# Patient Record
Sex: FEMALE | Race: OTHER | Employment: UNEMPLOYED | URBAN - METROPOLITAN AREA
[De-identification: names, ages, dates, MRNs, and addresses within clinical notes are randomized per-mention and may not be internally consistent; named-entity substitution may affect disease eponyms.]

---

## 2018-02-26 ENCOUNTER — TELEPHONE (OUTPATIENT)
Dept: BARIATRICS/WEIGHT MGMT | Age: 53
End: 2018-02-26

## 2018-02-26 ENCOUNTER — OFFICE VISIT (OUTPATIENT)
Dept: FAMILY MEDICINE CLINIC | Age: 53
End: 2018-02-26

## 2018-02-26 VITALS
SYSTOLIC BLOOD PRESSURE: 142 MMHG | WEIGHT: 293 LBS | RESPIRATION RATE: 18 BRPM | HEART RATE: 111 BPM | HEIGHT: 69 IN | BODY MASS INDEX: 43.4 KG/M2 | DIASTOLIC BLOOD PRESSURE: 88 MMHG | OXYGEN SATURATION: 95 %

## 2018-02-26 DIAGNOSIS — E11.9 TYPE 2 DIABETES MELLITUS WITHOUT COMPLICATION, WITHOUT LONG-TERM CURRENT USE OF INSULIN (HCC): Primary | ICD-10-CM

## 2018-02-26 DIAGNOSIS — Z12.39 SCREENING FOR BREAST CANCER: ICD-10-CM

## 2018-02-26 DIAGNOSIS — I10 ESSENTIAL HYPERTENSION: ICD-10-CM

## 2018-02-26 DIAGNOSIS — G89.29 CHRONIC BILATERAL LOW BACK PAIN, WITH SCIATICA PRESENCE UNSPECIFIED: ICD-10-CM

## 2018-02-26 DIAGNOSIS — R58 BLEEDING: ICD-10-CM

## 2018-02-26 DIAGNOSIS — M54.5 CHRONIC BILATERAL LOW BACK PAIN, WITH SCIATICA PRESENCE UNSPECIFIED: ICD-10-CM

## 2018-02-26 LAB
BILIRUBIN, POC: NEGATIVE
BLOOD URINE, POC: NORMAL
CLARITY, POC: CLEAR
COLOR, POC: YELLOW
CREATININE URINE POCT: 100
GLUCOSE URINE, POC: NEGATIVE
KETONES, POC: NORMAL
LEUKOCYTE EST, POC: NEGATIVE
MICROALBUMIN/CREAT 24H UR: 10 MG/G{CREAT}
MICROALBUMIN/CREAT UR-RTO: NORMAL
NITRITE, POC: NEGATIVE
PH, POC: 5.5
PROTEIN, POC: NEGATIVE
SPECIFIC GRAVITY, POC: 1.02
UROBILINOGEN, POC: 0.2

## 2018-02-26 PROCEDURE — G8417 CALC BMI ABV UP PARAM F/U: HCPCS | Performed by: FAMILY MEDICINE

## 2018-02-26 PROCEDURE — 81002 URINALYSIS NONAUTO W/O SCOPE: CPT | Performed by: FAMILY MEDICINE

## 2018-02-26 PROCEDURE — 3046F HEMOGLOBIN A1C LEVEL >9.0%: CPT | Performed by: FAMILY MEDICINE

## 2018-02-26 PROCEDURE — 3014F SCREEN MAMMO DOC REV: CPT | Performed by: FAMILY MEDICINE

## 2018-02-26 PROCEDURE — 82044 UR ALBUMIN SEMIQUANTITATIVE: CPT | Performed by: FAMILY MEDICINE

## 2018-02-26 PROCEDURE — G8427 DOCREV CUR MEDS BY ELIG CLIN: HCPCS | Performed by: FAMILY MEDICINE

## 2018-02-26 PROCEDURE — 99204 OFFICE O/P NEW MOD 45 MIN: CPT | Performed by: FAMILY MEDICINE

## 2018-02-26 PROCEDURE — 3017F COLORECTAL CA SCREEN DOC REV: CPT | Performed by: FAMILY MEDICINE

## 2018-02-26 PROCEDURE — 1036F TOBACCO NON-USER: CPT | Performed by: FAMILY MEDICINE

## 2018-02-26 PROCEDURE — G8484 FLU IMMUNIZE NO ADMIN: HCPCS | Performed by: FAMILY MEDICINE

## 2018-02-26 ASSESSMENT — PATIENT HEALTH QUESTIONNAIRE - PHQ9
SUM OF ALL RESPONSES TO PHQ QUESTIONS 1-9: 0
2. FEELING DOWN, DEPRESSED OR HOPELESS: 0
SUM OF ALL RESPONSES TO PHQ9 QUESTIONS 1 & 2: 0
1. LITTLE INTEREST OR PLEASURE IN DOING THINGS: 0

## 2018-02-27 ENCOUNTER — TELEPHONE (OUTPATIENT)
Dept: PAIN MANAGEMENT | Age: 53
End: 2018-02-27

## 2018-02-27 ENCOUNTER — TELEPHONE (OUTPATIENT)
Dept: FAMILY MEDICINE CLINIC | Age: 53
End: 2018-02-27

## 2018-02-27 DIAGNOSIS — G89.29 CHRONIC BILATERAL LOW BACK PAIN WITHOUT SCIATICA: Primary | ICD-10-CM

## 2018-02-27 DIAGNOSIS — M54.50 CHRONIC BILATERAL LOW BACK PAIN WITHOUT SCIATICA: Primary | ICD-10-CM

## 2018-02-27 NOTE — TELEPHONE ENCOUNTER
Pt stated that she is having a mammogram scheduled for 03/1      . She stated that her insurance company is requring a CPT code for 3-D mammogram.  Can you please contact pt with information?

## 2018-02-27 NOTE — TELEPHONE ENCOUNTER
Screening Questionnaire for [address] [facility] -    Referring diagnosis:     1. Do you currently have a [facility] Primary Care Provider:  Yes  2. Name of current PCP: Dr. Naty Lee   3. Name of current Insurance: Isabel Ding    4. Name of last Pain provider: Never seen pain management   5. Last time seen by previous Pain provider: Never seen    6. Do you have old medical records from this Pain provider:  No  7. Last time you had MRI/XRays done for your pain: Over a year ago   8. Do you have the MRI/XRay reports:  No  9. Are you taking any opioids at this time:  No      Please inform patients that they need a CURRENT [facility] provider - check with the referring provider's office to confirm. Please inform patients to bring all previous pain and MRI records (if outside of [facility]). Please inform patients about our [facility] program.    Any unresolved questions, please refer to the Provider for approval.    Approved for Consult:   Yes

## 2018-02-28 LAB
ORGANISM: ABNORMAL
URINE CULTURE, ROUTINE: ABNORMAL
URINE CULTURE, ROUTINE: ABNORMAL

## 2018-03-01 ENCOUNTER — TELEPHONE (OUTPATIENT)
Dept: FAMILY MEDICINE CLINIC | Age: 53
End: 2018-03-01

## 2018-03-06 ENCOUNTER — OFFICE VISIT (OUTPATIENT)
Dept: GYNECOLOGY | Age: 53
End: 2018-03-06

## 2018-03-06 ENCOUNTER — OFFICE VISIT (OUTPATIENT)
Dept: PAIN MANAGEMENT | Age: 53
End: 2018-03-06

## 2018-03-06 VITALS
HEIGHT: 64 IN | TEMPERATURE: 97.5 F | HEART RATE: 63 BPM | DIASTOLIC BLOOD PRESSURE: 83 MMHG | WEIGHT: 293 LBS | SYSTOLIC BLOOD PRESSURE: 135 MMHG | RESPIRATION RATE: 17 BRPM | BODY MASS INDEX: 50.02 KG/M2

## 2018-03-06 VITALS
WEIGHT: 293 LBS | SYSTOLIC BLOOD PRESSURE: 136 MMHG | HEIGHT: 68 IN | HEART RATE: 71 BPM | BODY MASS INDEX: 44.41 KG/M2 | DIASTOLIC BLOOD PRESSURE: 85 MMHG

## 2018-03-06 DIAGNOSIS — M54.16 LUMBAR RADICULOPATHY: ICD-10-CM

## 2018-03-06 DIAGNOSIS — Z01.419 WELL WOMAN EXAM WITH ROUTINE GYNECOLOGICAL EXAM: Primary | ICD-10-CM

## 2018-03-06 DIAGNOSIS — N95.0 POST-MENOPAUSAL BLEEDING: ICD-10-CM

## 2018-03-06 DIAGNOSIS — N63.10 BREAST MASS, RIGHT: ICD-10-CM

## 2018-03-06 DIAGNOSIS — G89.29 OTHER CHRONIC PAIN: ICD-10-CM

## 2018-03-06 DIAGNOSIS — N63.11 MASS OF UPPER OUTER QUADRANT OF RIGHT BREAST: ICD-10-CM

## 2018-03-06 DIAGNOSIS — M96.1 POSTLAMINECTOMY SYNDROME OF LUMBAR REGION: Primary | ICD-10-CM

## 2018-03-06 PROCEDURE — 1036F TOBACCO NON-USER: CPT | Performed by: ANESTHESIOLOGY

## 2018-03-06 PROCEDURE — G8484 FLU IMMUNIZE NO ADMIN: HCPCS | Performed by: ANESTHESIOLOGY

## 2018-03-06 PROCEDURE — G0444 DEPRESSION SCREEN ANNUAL: HCPCS | Performed by: ANESTHESIOLOGY

## 2018-03-06 PROCEDURE — G8427 DOCREV CUR MEDS BY ELIG CLIN: HCPCS | Performed by: ANESTHESIOLOGY

## 2018-03-06 PROCEDURE — 99386 PREV VISIT NEW AGE 40-64: CPT | Performed by: OBSTETRICS & GYNECOLOGY

## 2018-03-06 PROCEDURE — G8417 CALC BMI ABV UP PARAM F/U: HCPCS | Performed by: ANESTHESIOLOGY

## 2018-03-06 PROCEDURE — 3017F COLORECTAL CA SCREEN DOC REV: CPT | Performed by: ANESTHESIOLOGY

## 2018-03-06 PROCEDURE — 99204 OFFICE O/P NEW MOD 45 MIN: CPT | Performed by: ANESTHESIOLOGY

## 2018-03-06 PROCEDURE — 3014F SCREEN MAMMO DOC REV: CPT | Performed by: ANESTHESIOLOGY

## 2018-03-06 RX ORDER — OXYCODONE HYDROCHLORIDE AND ACETAMINOPHEN 5; 325 MG/1; MG/1
1 TABLET ORAL 2 TIMES DAILY PRN
Qty: 15 TABLET | Refills: 0 | Status: SHIPPED | OUTPATIENT
Start: 2018-03-06 | End: 2018-03-27 | Stop reason: SDUPTHER

## 2018-03-06 RX ORDER — GABAPENTIN 300 MG/1
CAPSULE ORAL
Qty: 60 CAPSULE | Refills: 1 | Status: SHIPPED | OUTPATIENT
Start: 2018-03-06 | End: 2018-04-23 | Stop reason: DRUGHIGH

## 2018-03-06 RX ORDER — MELOXICAM 15 MG/1
15 TABLET ORAL DAILY
Qty: 30 TABLET | Refills: 1 | Status: SHIPPED | OUTPATIENT
Start: 2018-03-06 | End: 2018-04-23 | Stop reason: SDUPTHER

## 2018-03-06 ASSESSMENT — ENCOUNTER SYMPTOMS
NAUSEA: 0
EYE REDNESS: 0
SHORTNESS OF BREATH: 0
EYE DISCHARGE: 0
HEARTBURN: 0
CONSTIPATION: 0
COUGH: 0
HEMOPTYSIS: 0
ABDOMINAL PAIN: 0
EYE PAIN: 0
ORTHOPNEA: 0
BACK PAIN: 1
WHEEZING: 0
VOMITING: 0

## 2018-03-06 ASSESSMENT — PATIENT HEALTH QUESTIONNAIRE - PHQ9
SUM OF ALL RESPONSES TO PHQ QUESTIONS 1-9: 27
7. TROUBLE CONCENTRATING ON THINGS, SUCH AS READING THE NEWSPAPER OR WATCHING TELEVISION: 3
8. MOVING OR SPEAKING SO SLOWLY THAT OTHER PEOPLE COULD HAVE NOTICED. OR THE OPPOSITE, BEING SO FIGETY OR RESTLESS THAT YOU HAVE BEEN MOVING AROUND A LOT MORE THAN USUAL: 3
1. LITTLE INTEREST OR PLEASURE IN DOING THINGS: 3
10. IF YOU CHECKED OFF ANY PROBLEMS, HOW DIFFICULT HAVE THESE PROBLEMS MADE IT FOR YOU TO DO YOUR WORK, TAKE CARE OF THINGS AT HOME, OR GET ALONG WITH OTHER PEOPLE: 3
SUM OF ALL RESPONSES TO PHQ9 QUESTIONS 1 & 2: 6
3. TROUBLE FALLING OR STAYING ASLEEP: 3
6. FEELING BAD ABOUT YOURSELF - OR THAT YOU ARE A FAILURE OR HAVE LET YOURSELF OR YOUR FAMILY DOWN: 3
9. THOUGHTS THAT YOU WOULD BE BETTER OFF DEAD, OR OF HURTING YOURSELF: 3
5. POOR APPETITE OR OVEREATING: 3
2. FEELING DOWN, DEPRESSED OR HOPELESS: 3
4. FEELING TIRED OR HAVING LITTLE ENERGY: 3

## 2018-03-06 NOTE — PROGRESS NOTES
Conditions:    Past Medical History:   Diagnosis Date    Hyperlipidemia     Hypertension     Type 2 diabetes mellitus without complication ([facility] [address]        Past Surgical History:   Procedure Laterality Date    BACK SURGERY      2001    BREAST LUMPECTOMY Bilateral     2015       No Known Allergies    Current Outpatient Prescriptions   Medication Sig Dispense Refill    meloxicam (MOBIC) 15 MG tablet Take 1 tablet by mouth daily With food 30 tablet 1    gabapentin (NEURONTIN) 300 MG capsule May take 1 to 2 capsule at night, as tolerated. . 60 capsule 1    Handicap Placard MISC by Does not apply route For 1 year 1 each 0    Scooter MISC by Does not apply route Ambulatory aid - needs new motorized scooter. Earlier one from [address] [place] [address] is broken 1 each 0    oxyCODONE-acetaminophen (PERCOCET) 5-325 MG per tablet Take 1 tablet by mouth 2 times daily as needed (for severe pain) for up to 7 days. 15 tablet 0     No current facility-administered medications for this visit. Family History   Problem Relation Age of Onset    Diabetes Mother     Stroke Mother     Heart Disease Father     Other Father     High Blood Pressure Father     Diabetes Maternal Aunt     Diabetes Maternal Grandmother        Social History     Social History    Marital status: Single     Spouse name: N/A    Number of children: N/A    Years of education: N/A     Occupational History    Not on file. Social History Main Topics    Smoking status: Never Smoker    Smokeless tobacco: Never Used    Alcohol use No    Drug use: No    Sexual activity: Not on file     Other Topics Concern    Not on file     Social History Narrative    No narrative on file   Moved to [place] approximately 1 year ago from [address] [place] [address]. Occupation: None  Currently Employed: No  Any pending lawsuits for pain: No  Disability: Yes    5.  Imaging Studies:   NONE    Results of the above studies were personally reviewed by me with the patient in detail along with the images, if

## 2018-03-06 NOTE — PATIENT INSTRUCTIONS
is a state of focused concentration that makes you less aware of your surroundings. It may cause your brain to release chemicals that relieve pain. You can have a therapist help you through hypnosis. Or you can learn to use it on yourself. · Cognitive behavioral therapy is a type of counseling. It helps you change your thought patterns. Changes in your thoughts can help change your behavior and the way you perceive your body. Other treatments and ideas  · Aromatherapy uses the scent of oils obtained from plants to help you relax or to relieve stress. · Meditation focuses your attention to give a clear awareness of your life. You sit quietly, focus on one image or sound, and breathe deeply. · Yoga uses stretching and exercises (called postures) to reduce stress and improve flexibility and health. · Keep track of your pain in a pain diary. This can help you understand how the things you do affect your pain. · In rare cases, your doctor may advise you to get a nerve block to help with pain. A nerve block is a shot of medicine to interrupt pain signals to your brain. · Some hospitals have special pain clinics or centers. Your doctor may refer you to a pain clinic or center. Or you can ask your doctor about them. Where can you learn more? Go to https://"[employer]. org and sign in to your [employer] account. Enter O778 in the Vocent box to learn more about \"Developing a Pain Management Plan: Care Instructions. \"     If you do not have an account, please click on the \"Sign Up Now\" link. Current as of: October 14, 2016  Content Version: 11.5  © 1270-6132 [employer]. Care instructions adapted under license by [facility] ([facility]). If you have questions about a medical condition or this instruction, always ask your healthcare professional. [place] [address] any warranty or liability for your use of this information.        Patient Education        Safe Use of instruction, always ask your healthcare professional. [place] [address] any warranty or liability for your use of this information.

## 2018-03-08 ENCOUNTER — TELEPHONE (OUTPATIENT)
Dept: BARIATRICS/WEIGHT MGMT | Age: 53
End: 2018-03-08

## 2018-03-08 ENCOUNTER — TELEPHONE (OUTPATIENT)
Dept: PAIN MANAGEMENT | Age: 53
End: 2018-03-08

## 2018-03-08 DIAGNOSIS — M96.1 POSTLAMINECTOMY SYNDROME OF LUMBAR REGION: Primary | ICD-10-CM

## 2018-03-08 RX ORDER — ALPRAZOLAM 1 MG/1
TABLET ORAL
Qty: 2 TABLET | Refills: 0 | Status: SHIPPED | OUTPATIENT
Start: 2018-03-08 | End: 2018-04-08

## 2018-03-08 NOTE — TELEPHONE ENCOUNTER
patient attended seminar, certainly with super obesity and current medical condition, will significantly benefit from weight loss in general, has no social support system in place and is wheel chair bound and thus I feel it will not be safe to proceed with surgical weight loss. She asked if she can be admitted to a nursing home so she can have the help needed , advised her that this is not long term solution. She will f/u with PCP to discuss other weight loss options. Case d/w PCP.

## 2018-03-08 NOTE — TELEPHONE ENCOUNTER
The patient needs an order for Oxygen during her Open MRI, and she also needs a prescription for a relaxing  medication prior to for anxiety.

## 2018-03-09 ENCOUNTER — OFFICE VISIT (OUTPATIENT)
Dept: FAMILY MEDICINE CLINIC | Age: 53
End: 2018-03-09

## 2018-03-09 VITALS
HEIGHT: 64 IN | RESPIRATION RATE: 16 BRPM | BODY MASS INDEX: 50.02 KG/M2 | DIASTOLIC BLOOD PRESSURE: 84 MMHG | OXYGEN SATURATION: 97 % | HEART RATE: 78 BPM | SYSTOLIC BLOOD PRESSURE: 136 MMHG | WEIGHT: 293 LBS

## 2018-03-09 DIAGNOSIS — Z20.2 STD EXPOSURE: ICD-10-CM

## 2018-03-09 DIAGNOSIS — E11.9 TYPE 2 DIABETES MELLITUS WITHOUT COMPLICATION, WITHOUT LONG-TERM CURRENT USE OF INSULIN (HCC): Primary | ICD-10-CM

## 2018-03-09 LAB
ALBUMIN SERPL-MCNC: 4.5 G/DL (ref 3.4–5)
ALP BLD-CCNC: 69 U/L (ref 40–129)
ALT SERPL-CCNC: 11 U/L (ref 10–40)
ANION GAP SERPL CALCULATED.3IONS-SCNC: 15 MMOL/L (ref 3–16)
AST SERPL-CCNC: 11 U/L (ref 15–37)
BILIRUB SERPL-MCNC: 0.3 MG/DL (ref 0–1)
BILIRUBIN DIRECT: <0.2 MG/DL (ref 0–0.3)
BILIRUBIN, INDIRECT: ABNORMAL MG/DL (ref 0–1)
BUN BLDV-MCNC: 16 MG/DL (ref 7–20)
CALCIUM SERPL-MCNC: 8.7 MG/DL (ref 8.3–10.6)
CHLORIDE BLD-SCNC: 101 MMOL/L (ref 99–110)
CHOLESTEROL, TOTAL: 216 MG/DL (ref 0–199)
CO2: 25 MMOL/L (ref 21–32)
CREAT SERPL-MCNC: <0.5 MG/DL (ref 0.6–1.1)
GFR AFRICAN AMERICAN: >60
GFR NON-AFRICAN AMERICAN: >60
GLUCOSE BLD-MCNC: 92 MG/DL (ref 70–99)
HCT VFR BLD CALC: 37.8 % (ref 36–48)
HDLC SERPL-MCNC: 69 MG/DL (ref 40–60)
HEMOGLOBIN: 12.7 G/DL (ref 12–16)
HPV COMMENT: NORMAL
HPV TYPE 16: NOT DETECTED
HPV TYPE 18: NOT DETECTED
HPVOH (OTHER TYPES): NOT DETECTED
LDL CHOLESTEROL CALCULATED: 126 MG/DL
MCH RBC QN AUTO: 27.8 PG (ref 26–34)
MCHC RBC AUTO-ENTMCNC: 33.7 G/DL (ref 31–36)
MCV RBC AUTO: 82.5 FL (ref 80–100)
PDW BLD-RTO: 16.8 % (ref 12.4–15.4)
PLATELET # BLD: 323 K/UL (ref 135–450)
PMV BLD AUTO: 7.9 FL (ref 5–10.5)
POTASSIUM SERPL-SCNC: 4.8 MMOL/L (ref 3.5–5.1)
RBC # BLD: 4.59 M/UL (ref 4–5.2)
SODIUM BLD-SCNC: 141 MMOL/L (ref 136–145)
TOTAL PROTEIN: 7.5 G/DL (ref 6.4–8.2)
TRIGL SERPL-MCNC: 104 MG/DL (ref 0–150)
TSH SERPL DL<=0.05 MIU/L-ACNC: 2.95 UIU/ML (ref 0.27–4.2)
VLDLC SERPL CALC-MCNC: 21 MG/DL
WBC # BLD: 7.4 K/UL (ref 4–11)

## 2018-03-09 PROCEDURE — 99213 OFFICE O/P EST LOW 20 MIN: CPT | Performed by: FAMILY MEDICINE

## 2018-03-09 PROCEDURE — 1036F TOBACCO NON-USER: CPT | Performed by: FAMILY MEDICINE

## 2018-03-09 PROCEDURE — 3044F HG A1C LEVEL LT 7.0%: CPT | Performed by: FAMILY MEDICINE

## 2018-03-09 PROCEDURE — 3014F SCREEN MAMMO DOC REV: CPT | Performed by: FAMILY MEDICINE

## 2018-03-09 PROCEDURE — G8417 CALC BMI ABV UP PARAM F/U: HCPCS | Performed by: FAMILY MEDICINE

## 2018-03-09 PROCEDURE — G8427 DOCREV CUR MEDS BY ELIG CLIN: HCPCS | Performed by: FAMILY MEDICINE

## 2018-03-09 PROCEDURE — G8484 FLU IMMUNIZE NO ADMIN: HCPCS | Performed by: FAMILY MEDICINE

## 2018-03-09 PROCEDURE — 3017F COLORECTAL CA SCREEN DOC REV: CPT | Performed by: FAMILY MEDICINE

## 2018-03-10 LAB
ESTIMATED AVERAGE GLUCOSE: 119.8 MG/DL
HBA1C MFR BLD: 5.8 %
HEPATITIS C ANTIBODY INTERPRETATION: NORMAL

## 2018-03-12 LAB
HIV AG/AB: NORMAL
HIV ANTIGEN: NORMAL
HIV-1 ANTIBODY: NORMAL
HIV-2 AB: NORMAL

## 2018-03-12 ASSESSMENT — ENCOUNTER SYMPTOMS
EYES NEGATIVE: 1
GASTROINTESTINAL NEGATIVE: 1
RESPIRATORY NEGATIVE: 1
BACK PAIN: 1

## 2018-03-13 NOTE — PROGRESS NOTES
Subjective:      Patient ID: Leah Ramires is a 47 y.o. female. Patient is here for annual. Patient with some PMB. Patient states feels a lump in her right breast that she has had. Review of Systems   Constitutional: Negative. HENT: Negative. Eyes: Negative. Respiratory: Negative. Cardiovascular: Negative. Gastrointestinal: Negative. Genitourinary: Positive for vaginal bleeding. Musculoskeletal: Positive for arthralgias and back pain. Skin: Negative. Neurological: Negative. Psychiatric/Behavioral: Negative. Date of Birth 1965  Past Medical History:   Diagnosis Date    Hyperlipidemia     Hypertension     [facility]urop[facility] ([facility] [address]     Type 2 diabetes mellitus without complication ([facility] [address] [facility]      Past Surgical History:   Procedure Laterality Date    BACK SURGERY          BREAST LUMPECTOMY Bilateral          OB History    Para Term  AB Living   2 2 2     2   SAB TAB Ectopic Molar Multiple Live Births                    # Outcome Date GA Lbr Bryan/2nd Weight Sex Delivery Anes PTL Lv   2 Term  37w0d   F Vag-Spont      1 Term  37w0d   F Vag-Spont           Social History     Social History    Marital status: Single     Spouse name: N/A    Number of children: N/A    Years of education: N/A     Occupational History    Not on file. Social History Main Topics    Smoking status: Never Smoker    Smokeless tobacco: Never Used    Alcohol use No    Drug use: No    Sexual activity: No     Other Topics Concern    Not on file     Social History Narrative    No narrative on file     No Known Allergies  Outpatient Prescriptions Marked as Taking for the 3/6/18 encounter (Office Visit) with Joe Benitez MD   Medication Sig Dispense Refill    meloxicam (MOBIC) 15 MG tablet Take 1 tablet by mouth daily With food 30 tablet 1    gabapentin (NEURONTIN) 300 MG capsule May take 1 to 2 capsule at night, as tolerated. . 60 capsule 1    Handicap Placard MISC by Does not apply route For 1 year 1 each 0    Scooter MISC by Does not apply route Ambulatory aid - needs new motorized scooter. Earlier one from [place] is broken 1 each 0    oxyCODONE-acetaminophen (PERCOCET) 5-325 MG per tablet Take 1 tablet by mouth 2 times daily as needed (for severe pain) for up to 7 days. 15 tablet 0     Family History   Problem Relation Age of Onset    Diabetes Mother     Stroke Mother     Heart Disease Father     Other Father     High Blood Pressure Father     Diabetes Maternal Aunt     Diabetes Maternal Grandmother      /83 (Site: Right Arm, Position: Sitting, Cuff Size: Large Adult)   Pulse 63   Temp 97.5 °F (36.4 °C) (Oral)   Resp 17   Ht 5' 4\" (1.626 m)   Wt (!) 411 lb 2 oz (186.5 kg)   LMP 02/19/2018   Breastfeeding? No Comment: last menstrual cycle was 2013  BMI 70.57 kg/m²       Objective:   Physical Exam   Constitutional: She is oriented to person, place, and time. She appears well-developed and well-nourished. No distress. HENT:   Head: Normocephalic and atraumatic. Eyes: EOM are normal. Pupils are equal, round, and reactive to light. Neck: Normal range of motion. Neck supple. No thyromegaly present. Cardiovascular: Normal rate, regular rhythm and normal heart sounds. Exam reveals no gallop and no friction rub. No murmur heard. Pulmonary/Chest: Effort normal and breath sounds normal. No respiratory distress. She has no wheezes. She has no rales. Abdominal: Soft. She exhibits no distension and no mass. There is no hepatomegaly. There is no tenderness. There is no rebound and no guarding. No hernia. Genitourinary: Vagina normal and uterus normal. Rectal exam shows no external hemorrhoid and no fissure. No breast swelling, tenderness, discharge or bleeding. Pelvic exam was performed with patient supine. No labial fusion. There is no rash, tenderness, lesion or injury on the right labia. There is no rash, tenderness, lesion or injury on the left labia.

## 2018-03-14 NOTE — TELEPHONE ENCOUNTER
Pt called and LVM stating that PT needs Dr. Bessie Espino to fax over a Rx for oxygen and how much she needs to be on. She states that [address] [place] [address] told her to have Dr. Bessie Espino fax the Rx for Xanax over to [facility], and needs the Rx faxed to [address] [place] [address] for the [employer].

## 2018-03-19 ENCOUNTER — HOSPITAL ENCOUNTER (OUTPATIENT)
Dept: PHYSICAL THERAPY | Age: 53
Discharge: HOME | End: 2018-03-31
Attending: ANESTHESIOLOGY | Admitting: ANESTHESIOLOGY

## 2018-03-21 ENCOUNTER — TELEPHONE (OUTPATIENT)
Dept: GYNECOLOGY | Age: 53
End: 2018-03-21

## 2018-03-26 ENCOUNTER — TELEPHONE (OUTPATIENT)
Dept: FAMILY MEDICINE CLINIC | Age: 53
End: 2018-03-26

## 2018-03-26 DIAGNOSIS — M54.16 LUMBAR RADICULOPATHY: Primary | ICD-10-CM

## 2018-03-27 ENCOUNTER — TELEPHONE (OUTPATIENT)
Dept: PAIN MANAGEMENT | Age: 53
End: 2018-03-27

## 2018-03-27 DIAGNOSIS — E66.01 MORBID OBESITY (HCC): ICD-10-CM

## 2018-03-27 DIAGNOSIS — M96.1 POSTLAMINECTOMY SYNDROME OF LUMBAR REGION: ICD-10-CM

## 2018-03-27 DIAGNOSIS — M96.1 POSTLAMINECTOMY SYNDROME OF LUMBAR REGION: Primary | ICD-10-CM

## 2018-03-27 DIAGNOSIS — M54.16 LUMBAR RADICULOPATHY: ICD-10-CM

## 2018-03-27 RX ORDER — OXYCODONE HYDROCHLORIDE AND ACETAMINOPHEN 5; 325 MG/1; MG/1
1 TABLET ORAL 2 TIMES DAILY PRN
Qty: 15 TABLET | Refills: 0 | Status: SHIPPED | OUTPATIENT
Start: 2018-03-27 | End: 2018-03-30 | Stop reason: SDUPTHER

## 2018-03-30 ENCOUNTER — TELEPHONE (OUTPATIENT)
Dept: FAMILY MEDICINE CLINIC | Age: 53
End: 2018-03-30

## 2018-03-30 DIAGNOSIS — M54.16 LUMBAR RADICULOPATHY: ICD-10-CM

## 2018-03-30 DIAGNOSIS — M96.1 POSTLAMINECTOMY SYNDROME OF LUMBAR REGION: ICD-10-CM

## 2018-03-30 RX ORDER — OXYCODONE HYDROCHLORIDE AND ACETAMINOPHEN 5; 325 MG/1; MG/1
1 TABLET ORAL 2 TIMES DAILY PRN
Qty: 15 TABLET | Refills: 0 | Status: SHIPPED | OUTPATIENT
Start: 2018-03-30 | End: 2018-04-04 | Stop reason: SDUPTHER

## 2018-04-01 ENCOUNTER — HOSPITAL ENCOUNTER (OUTPATIENT)
Dept: OTHER | Age: 53
Discharge: HOME | End: 2018-04-30
Attending: ANESTHESIOLOGY | Admitting: ANESTHESIOLOGY

## 2018-04-04 DIAGNOSIS — M96.1 POSTLAMINECTOMY SYNDROME OF LUMBAR REGION: ICD-10-CM

## 2018-04-04 DIAGNOSIS — M54.16 LUMBAR RADICULOPATHY: ICD-10-CM

## 2018-04-04 RX ORDER — OXYCODONE HYDROCHLORIDE AND ACETAMINOPHEN 5; 325 MG/1; MG/1
1 TABLET ORAL 2 TIMES DAILY PRN
Qty: 30 TABLET | Refills: 0 | Status: SHIPPED | OUTPATIENT
Start: 2018-04-04 | End: 2018-04-19

## 2018-04-11 ENCOUNTER — TELEPHONE (OUTPATIENT)
Dept: PAIN MANAGEMENT | Age: 53
End: 2018-04-11

## 2018-04-11 DIAGNOSIS — M54.16 LUMBAR RADICULOPATHY: ICD-10-CM

## 2018-04-11 DIAGNOSIS — M96.1 POSTLAMINECTOMY SYNDROME OF LUMBAR REGION: ICD-10-CM

## 2018-04-11 RX ORDER — OXYCODONE HYDROCHLORIDE AND ACETAMINOPHEN 5; 325 MG/1; MG/1
1 TABLET ORAL 2 TIMES DAILY PRN
Qty: 30 TABLET | Refills: 0 | Status: CANCELLED | OUTPATIENT
Start: 2018-04-11 | End: 2018-04-26

## 2018-04-17 ENCOUNTER — OFFICE VISIT (OUTPATIENT)
Dept: GYNECOLOGY | Age: 53
End: 2018-04-17

## 2018-04-17 VITALS
RESPIRATION RATE: 17 BRPM | HEART RATE: 59 BPM | SYSTOLIC BLOOD PRESSURE: 135 MMHG | HEIGHT: 69 IN | DIASTOLIC BLOOD PRESSURE: 83 MMHG | TEMPERATURE: 98 F | BODY MASS INDEX: 43.4 KG/M2 | WEIGHT: 293 LBS

## 2018-04-17 DIAGNOSIS — R35.0 URINARY FREQUENCY: ICD-10-CM

## 2018-04-17 DIAGNOSIS — N95.0 POST-MENOPAUSAL BLEEDING: Primary | ICD-10-CM

## 2018-04-17 PROCEDURE — G8427 DOCREV CUR MEDS BY ELIG CLIN: HCPCS | Performed by: OBSTETRICS & GYNECOLOGY

## 2018-04-17 PROCEDURE — 1036F TOBACCO NON-USER: CPT | Performed by: OBSTETRICS & GYNECOLOGY

## 2018-04-17 PROCEDURE — 3014F SCREEN MAMMO DOC REV: CPT | Performed by: OBSTETRICS & GYNECOLOGY

## 2018-04-17 PROCEDURE — 99213 OFFICE O/P EST LOW 20 MIN: CPT | Performed by: OBSTETRICS & GYNECOLOGY

## 2018-04-17 PROCEDURE — 3017F COLORECTAL CA SCREEN DOC REV: CPT | Performed by: OBSTETRICS & GYNECOLOGY

## 2018-04-17 PROCEDURE — G8417 CALC BMI ABV UP PARAM F/U: HCPCS | Performed by: OBSTETRICS & GYNECOLOGY

## 2018-04-23 ENCOUNTER — OFFICE VISIT (OUTPATIENT)
Dept: INTERNAL MEDICINE CLINIC | Age: 53
End: 2018-04-23

## 2018-04-23 ENCOUNTER — OFFICE VISIT (OUTPATIENT)
Dept: PAIN MANAGEMENT | Age: 53
End: 2018-04-23

## 2018-04-23 VITALS
OXYGEN SATURATION: 90 % | SYSTOLIC BLOOD PRESSURE: 120 MMHG | BODY MASS INDEX: 45.99 KG/M2 | HEART RATE: 59 BPM | HEIGHT: 67 IN | WEIGHT: 293 LBS | TEMPERATURE: 98.3 F | DIASTOLIC BLOOD PRESSURE: 88 MMHG

## 2018-04-23 VITALS
BODY MASS INDEX: 45.99 KG/M2 | WEIGHT: 293 LBS | SYSTOLIC BLOOD PRESSURE: 134 MMHG | DIASTOLIC BLOOD PRESSURE: 85 MMHG | HEART RATE: 63 BPM | HEIGHT: 67 IN

## 2018-04-23 DIAGNOSIS — R30.0 BURNING WITH URINATION: ICD-10-CM

## 2018-04-23 DIAGNOSIS — F11.90 CHRONIC, CONTINUOUS USE OF OPIOIDS: ICD-10-CM

## 2018-04-23 DIAGNOSIS — R31.9 HEMATURIA, UNSPECIFIED TYPE: Primary | ICD-10-CM

## 2018-04-23 DIAGNOSIS — R42 VERTIGO: ICD-10-CM

## 2018-04-23 DIAGNOSIS — R32 URINARY INCONTINENCE, UNSPECIFIED TYPE: ICD-10-CM

## 2018-04-23 DIAGNOSIS — M96.1 POSTLAMINECTOMY SYNDROME OF LUMBAR REGION: Primary | ICD-10-CM

## 2018-04-23 DIAGNOSIS — R10.9 BILATERAL FLANK PAIN: ICD-10-CM

## 2018-04-23 DIAGNOSIS — H91.90 HEARING LOSS, UNSPECIFIED HEARING LOSS TYPE, UNSPECIFIED LATERALITY: ICD-10-CM

## 2018-04-23 DIAGNOSIS — Z13.31 POSITIVE DEPRESSION SCREENING: ICD-10-CM

## 2018-04-23 DIAGNOSIS — G89.29 OTHER CHRONIC PAIN: ICD-10-CM

## 2018-04-23 LAB
ANION GAP SERPL CALCULATED.3IONS-SCNC: 20 MMOL/L (ref 3–16)
BASOPHILS ABSOLUTE: 0 K/UL (ref 0–0.2)
BASOPHILS RELATIVE PERCENT: 0.4 %
BILIRUBIN, POC: ABNORMAL
BLOOD URINE, POC: ABNORMAL
BUN BLDV-MCNC: 19 MG/DL (ref 7–20)
CALCIUM SERPL-MCNC: 8.8 MG/DL (ref 8.3–10.6)
CHLORIDE BLD-SCNC: 102 MMOL/L (ref 99–110)
CLARITY, POC: ABNORMAL
CO2: 20 MMOL/L (ref 21–32)
COLOR, POC: YELLOW
CREAT SERPL-MCNC: <0.5 MG/DL (ref 0.6–1.1)
EOSINOPHILS ABSOLUTE: 0.1 K/UL (ref 0–0.6)
EOSINOPHILS RELATIVE PERCENT: 2 %
GFR AFRICAN AMERICAN: >60
GFR NON-AFRICAN AMERICAN: >60
GLUCOSE BLD-MCNC: 76 MG/DL (ref 70–99)
GLUCOSE URINE, POC: ABNORMAL
HCT VFR BLD CALC: 37.3 % (ref 36–48)
HEMOGLOBIN: 12.3 G/DL (ref 12–16)
KETONES, POC: ABNORMAL
LEUKOCYTE EST, POC: ABNORMAL
LYMPHOCYTES ABSOLUTE: 2 K/UL (ref 1–5.1)
LYMPHOCYTES RELATIVE PERCENT: 31.4 %
MCH RBC QN AUTO: 27.4 PG (ref 26–34)
MCHC RBC AUTO-ENTMCNC: 33 G/DL (ref 31–36)
MCV RBC AUTO: 83 FL (ref 80–100)
MONOCYTES ABSOLUTE: 0.5 K/UL (ref 0–1.3)
MONOCYTES RELATIVE PERCENT: 8.5 %
NEUTROPHILS ABSOLUTE: 3.6 K/UL (ref 1.7–7.7)
NEUTROPHILS RELATIVE PERCENT: 57.7 %
NITRITE, POC: ABNORMAL
PDW BLD-RTO: 16.4 % (ref 12.4–15.4)
PH, POC: 6.5
PLATELET # BLD: 298 K/UL (ref 135–450)
PMV BLD AUTO: 8.1 FL (ref 5–10.5)
POTASSIUM SERPL-SCNC: 5.1 MMOL/L (ref 3.5–5.1)
PROTEIN, POC: ABNORMAL
RBC # BLD: 4.5 M/UL (ref 4–5.2)
SODIUM BLD-SCNC: 142 MMOL/L (ref 136–145)
SPECIFIC GRAVITY, POC: 1.02
UROBILINOGEN, POC: 0.2
WBC # BLD: 6.2 K/UL (ref 4–11)

## 2018-04-23 PROCEDURE — 3017F COLORECTAL CA SCREEN DOC REV: CPT | Performed by: INTERNAL MEDICINE

## 2018-04-23 PROCEDURE — G8417 CALC BMI ABV UP PARAM F/U: HCPCS | Performed by: ANESTHESIOLOGY

## 2018-04-23 PROCEDURE — 3014F SCREEN MAMMO DOC REV: CPT | Performed by: INTERNAL MEDICINE

## 2018-04-23 PROCEDURE — 3017F COLORECTAL CA SCREEN DOC REV: CPT | Performed by: ANESTHESIOLOGY

## 2018-04-23 PROCEDURE — 1036F TOBACCO NON-USER: CPT | Performed by: INTERNAL MEDICINE

## 2018-04-23 PROCEDURE — 99214 OFFICE O/P EST MOD 30 MIN: CPT | Performed by: ANESTHESIOLOGY

## 2018-04-23 PROCEDURE — 81002 URINALYSIS NONAUTO W/O SCOPE: CPT | Performed by: INTERNAL MEDICINE

## 2018-04-23 PROCEDURE — 1036F TOBACCO NON-USER: CPT | Performed by: ANESTHESIOLOGY

## 2018-04-23 PROCEDURE — 3014F SCREEN MAMMO DOC REV: CPT | Performed by: ANESTHESIOLOGY

## 2018-04-23 PROCEDURE — G8417 CALC BMI ABV UP PARAM F/U: HCPCS | Performed by: INTERNAL MEDICINE

## 2018-04-23 PROCEDURE — G8427 DOCREV CUR MEDS BY ELIG CLIN: HCPCS | Performed by: INTERNAL MEDICINE

## 2018-04-23 PROCEDURE — G8427 DOCREV CUR MEDS BY ELIG CLIN: HCPCS | Performed by: ANESTHESIOLOGY

## 2018-04-23 PROCEDURE — 99214 OFFICE O/P EST MOD 30 MIN: CPT | Performed by: INTERNAL MEDICINE

## 2018-04-23 RX ORDER — MECLIZINE HCL 12.5 MG/1
12.5 TABLET ORAL 3 TIMES DAILY PRN
Qty: 15 TABLET | Refills: 0 | Status: SHIPPED | OUTPATIENT
Start: 2018-04-23 | End: 2019-01-28

## 2018-04-23 RX ORDER — MELOXICAM 15 MG/1
15 TABLET ORAL DAILY
Qty: 30 TABLET | Refills: 1 | Status: SHIPPED | OUTPATIENT
Start: 2018-04-23 | End: 2018-06-22 | Stop reason: SDUPTHER

## 2018-04-23 RX ORDER — OXYCODONE HYDROCHLORIDE AND ACETAMINOPHEN 5; 325 MG/1; MG/1
1 TABLET ORAL 2 TIMES DAILY PRN
Qty: 60 TABLET | Refills: 0 | Status: SHIPPED | OUTPATIENT
Start: 2018-04-23 | End: 2018-05-22 | Stop reason: SDUPTHER

## 2018-04-23 RX ORDER — GABAPENTIN 300 MG/1
300 CAPSULE ORAL 3 TIMES DAILY
Qty: 90 CAPSULE | Refills: 1 | Status: SHIPPED | OUTPATIENT
Start: 2018-04-23 | End: 2018-05-14 | Stop reason: SDUPTHER

## 2018-04-23 RX ORDER — OXYCODONE HYDROCHLORIDE AND ACETAMINOPHEN 5; 325 MG/1; MG/1
1 TABLET ORAL 2 TIMES DAILY PRN
Qty: 30 TABLET | Refills: 0 | Status: SHIPPED | OUTPATIENT
Start: 2018-04-23 | End: 2018-04-23 | Stop reason: DRUGHIGH

## 2018-04-23 RX ORDER — OXYCODONE AND ACETAMINOPHEN 10; 325 MG/1; MG/1
1 TABLET ORAL EVERY 4 HOURS PRN
COMMUNITY
End: 2018-04-23 | Stop reason: ALTCHOICE

## 2018-04-23 ASSESSMENT — ENCOUNTER SYMPTOMS
HEARTBURN: 0
COUGH: 0
WHEEZING: 0
BACK PAIN: 1
ABDOMINAL PAIN: 0
EYE DISCHARGE: 0
EYE REDNESS: 0
NAUSEA: 0
HEMOPTYSIS: 0
VOMITING: 0
SHORTNESS OF BREATH: 0
CONSTIPATION: 0
ORTHOPNEA: 0
EYE PAIN: 0

## 2018-04-25 LAB — URINE CULTURE, ROUTINE: NORMAL

## 2018-04-27 LAB
6-ACETYLMORPHINE: NOT DETECTED
7-AMINOCLONAZEPAM: NOT DETECTED
ALPHA-OH-ALPRAZOLAM: NOT DETECTED
ALPRAZOLAM: NOT DETECTED
AMPHETAMINE: NOT DETECTED
BARBITURATES: NOT DETECTED
BENZOYLECGONINE: NOT DETECTED
BUPRENORPHINE: NOT DETECTED
CARISOPRODOL: NOT DETECTED
CLONAZEPAM: NOT DETECTED
CODEINE: NOT DETECTED
CREATININE URINE: 199.3 MG/DL (ref 20–400)
DIAZEPAM: NOT DETECTED
DRUGS EXPECTED: NORMAL
EER PAIN MGT DRUG PANEL, HIGH RES/EMIT U: NORMAL
ETHYL GLUCURONIDE: NOT DETECTED
FENTANYL: NOT DETECTED
HYDROCODONE: NOT DETECTED
HYDROMORPHONE: NOT DETECTED
LORAZEPAM: NOT DETECTED
MARIJUANA METABOLITE: NOT DETECTED
MDA: NOT DETECTED
MDEA: NOT DETECTED
MDMA URINE: NOT DETECTED
MEPERIDINE: NOT DETECTED
METHADONE: NOT DETECTED
METHAMPHETAMINE: NOT DETECTED
METHYLPHENIDATE: NOT DETECTED
MIDAZOLAM: NOT DETECTED
MORPHINE: NOT DETECTED
NORBUPRENORPHINE, FREE: NOT DETECTED
NORDIAZEPAM: NOT DETECTED
NORFENTANYL: NOT DETECTED
NORHYDROCODONE, URINE: NOT DETECTED
NOROXYCODONE: PRESENT
NOROXYMORPHONE, URINE: PRESENT
OXAZEPAM: NOT DETECTED
OXYCODONE: PRESENT
OXYMORPHONE: PRESENT
PAIN MANAGEMENT DRUG PANEL: NORMAL
PAIN MANAGEMENT DRUG PANEL: NORMAL
PCP: NOT DETECTED
PHENTERMINE: NOT DETECTED
PROPOXYPHENE: NOT DETECTED
TAPENTADOL, URINE: NOT DETECTED
TAPENTADOL-O-SULFATE, URINE: NOT DETECTED
TEMAZEPAM: NOT DETECTED
TRAMADOL: NOT DETECTED
ZOLPIDEM: NOT DETECTED

## 2018-04-28 ASSESSMENT — ENCOUNTER SYMPTOMS
BACK PAIN: 0
SINUS PRESSURE: 0
CONSTIPATION: 0
COUGH: 0
CHEST TIGHTNESS: 0
SORE THROAT: 0
WHEEZING: 0
DIARRHEA: 0
NAUSEA: 0
SHORTNESS OF BREATH: 0
BLOOD IN STOOL: 0
RHINORRHEA: 0
ABDOMINAL PAIN: 0
VOMITING: 0

## 2018-05-01 ENCOUNTER — HOSPITAL ENCOUNTER (OUTPATIENT)
Dept: OTHER | Age: 53
Discharge: HOME | End: 2018-05-31
Attending: ANESTHESIOLOGY | Admitting: ANESTHESIOLOGY

## 2018-05-03 ENCOUNTER — OFFICE VISIT (OUTPATIENT)
Dept: INTERNAL MEDICINE CLINIC | Age: 53
End: 2018-05-03

## 2018-05-03 ENCOUNTER — OFFICE VISIT (OUTPATIENT)
Dept: SLEEP MEDICINE | Age: 53
End: 2018-05-03

## 2018-05-03 VITALS
SYSTOLIC BLOOD PRESSURE: 118 MMHG | DIASTOLIC BLOOD PRESSURE: 70 MMHG | HEART RATE: 78 BPM | OXYGEN SATURATION: 98 % | HEIGHT: 67 IN

## 2018-05-03 VITALS
SYSTOLIC BLOOD PRESSURE: 118 MMHG | OXYGEN SATURATION: 97 % | BODY MASS INDEX: 45.99 KG/M2 | RESPIRATION RATE: 16 BRPM | HEART RATE: 71 BPM | HEIGHT: 67 IN | WEIGHT: 293 LBS | TEMPERATURE: 97.3 F | DIASTOLIC BLOOD PRESSURE: 62 MMHG

## 2018-05-03 DIAGNOSIS — G47.33 OSA (OBSTRUCTIVE SLEEP APNEA): Primary | ICD-10-CM

## 2018-05-03 DIAGNOSIS — Z12.11 COLON CANCER SCREENING: ICD-10-CM

## 2018-05-03 DIAGNOSIS — M25.511 RIGHT SHOULDER PAIN, UNSPECIFIED CHRONICITY: ICD-10-CM

## 2018-05-03 DIAGNOSIS — E11.42 DIABETIC POLYNEUROPATHY ASSOCIATED WITH TYPE 2 DIABETES MELLITUS (HCC): ICD-10-CM

## 2018-05-03 DIAGNOSIS — M54.2 NECK PAIN: Primary | ICD-10-CM

## 2018-05-03 DIAGNOSIS — Z23 NEED FOR STREPTOCOCCUS PNEUMONIAE VACCINATION: ICD-10-CM

## 2018-05-03 DIAGNOSIS — R07.9 CHEST PAIN, UNSPECIFIED TYPE: ICD-10-CM

## 2018-05-03 PROCEDURE — G0009 ADMIN PNEUMOCOCCAL VACCINE: HCPCS | Performed by: INTERNAL MEDICINE

## 2018-05-03 PROCEDURE — 99204 OFFICE O/P NEW MOD 45 MIN: CPT | Performed by: PSYCHIATRY & NEUROLOGY

## 2018-05-03 PROCEDURE — 3014F SCREEN MAMMO DOC REV: CPT | Performed by: PSYCHIATRY & NEUROLOGY

## 2018-05-03 PROCEDURE — 1036F TOBACCO NON-USER: CPT | Performed by: INTERNAL MEDICINE

## 2018-05-03 PROCEDURE — 3017F COLORECTAL CA SCREEN DOC REV: CPT | Performed by: PSYCHIATRY & NEUROLOGY

## 2018-05-03 PROCEDURE — G8427 DOCREV CUR MEDS BY ELIG CLIN: HCPCS | Performed by: INTERNAL MEDICINE

## 2018-05-03 PROCEDURE — 99214 OFFICE O/P EST MOD 30 MIN: CPT | Performed by: INTERNAL MEDICINE

## 2018-05-03 PROCEDURE — G8417 CALC BMI ABV UP PARAM F/U: HCPCS | Performed by: PSYCHIATRY & NEUROLOGY

## 2018-05-03 PROCEDURE — 1036F TOBACCO NON-USER: CPT | Performed by: PSYCHIATRY & NEUROLOGY

## 2018-05-03 PROCEDURE — 3014F SCREEN MAMMO DOC REV: CPT | Performed by: INTERNAL MEDICINE

## 2018-05-03 PROCEDURE — G8427 DOCREV CUR MEDS BY ELIG CLIN: HCPCS | Performed by: PSYCHIATRY & NEUROLOGY

## 2018-05-03 PROCEDURE — 90732 PPSV23 VACC 2 YRS+ SUBQ/IM: CPT | Performed by: INTERNAL MEDICINE

## 2018-05-03 PROCEDURE — G8417 CALC BMI ABV UP PARAM F/U: HCPCS | Performed by: INTERNAL MEDICINE

## 2018-05-03 PROCEDURE — 3017F COLORECTAL CA SCREEN DOC REV: CPT | Performed by: INTERNAL MEDICINE

## 2018-05-03 PROCEDURE — 3044F HG A1C LEVEL LT 7.0%: CPT | Performed by: INTERNAL MEDICINE

## 2018-05-03 PROCEDURE — 2022F DILAT RTA XM EVC RTNOPTHY: CPT | Performed by: INTERNAL MEDICINE

## 2018-05-03 RX ORDER — TIZANIDINE 2 MG/1
2 TABLET ORAL EVERY 8 HOURS PRN
Qty: 30 TABLET | Refills: 0 | Status: SHIPPED | OUTPATIENT
Start: 2018-05-03 | End: 2018-06-22 | Stop reason: SDUPTHER

## 2018-05-03 ASSESSMENT — ENCOUNTER SYMPTOMS
EYES NEGATIVE: 1
APNEA: 1
CHOKING: 1
ALLERGIC/IMMUNOLOGIC NEGATIVE: 1
GASTROINTESTINAL NEGATIVE: 1

## 2018-05-03 ASSESSMENT — SLEEP AND FATIGUE QUESTIONNAIRES
HOW LIKELY ARE YOU TO NOD OFF OR FALL ASLEEP WHILE SITTING QUIETLY AFTER LUNCH WITHOUT ALCOHOL: 3
HOW LIKELY ARE YOU TO NOD OFF OR FALL ASLEEP WHILE SITTING INACTIVE IN A PUBLIC PLACE: 3
HOW LIKELY ARE YOU TO NOD OFF OR FALL ASLEEP IN A CAR, WHILE STOPPED FOR A FEW MINUTES IN TRAFFIC: 0
HOW LIKELY ARE YOU TO NOD OFF OR FALL ASLEEP WHILE SITTING AND READING: 3
HOW LIKELY ARE YOU TO NOD OFF OR FALL ASLEEP WHEN YOU ARE A PASSENGER IN A CAR FOR AN HOUR WITHOUT A BREAK: 3
ESS TOTAL SCORE: 18
HOW LIKELY ARE YOU TO NOD OFF OR FALL ASLEEP WHILE WATCHING TV: 3
HOW LIKELY ARE YOU TO NOD OFF OR FALL ASLEEP WHILE SITTING AND TALKING TO SOMEONE: 0
HOW LIKELY ARE YOU TO NOD OFF OR FALL ASLEEP WHILE LYING DOWN TO REST IN THE AFTERNOON WHEN CIRCUMSTANCES PERMIT: 3
NECK CIRCUMFERENCE (INCHES): 18

## 2018-05-04 ENCOUNTER — TELEPHONE (OUTPATIENT)
Dept: PULMONOLOGY | Age: 53
End: 2018-05-04

## 2018-05-07 ENCOUNTER — TELEPHONE (OUTPATIENT)
Dept: FAMILY MEDICINE CLINIC | Age: 53
End: 2018-05-07

## 2018-05-11 ENCOUNTER — TELEPHONE (OUTPATIENT)
Dept: PULMONOLOGY | Age: 53
End: 2018-05-11

## 2018-05-14 DIAGNOSIS — G89.29 OTHER CHRONIC PAIN: ICD-10-CM

## 2018-05-14 DIAGNOSIS — M96.1 POSTLAMINECTOMY SYNDROME OF LUMBAR REGION: ICD-10-CM

## 2018-05-14 RX ORDER — GABAPENTIN 300 MG/1
300 CAPSULE ORAL 3 TIMES DAILY
Qty: 90 CAPSULE | Refills: 1 | Status: SHIPPED | OUTPATIENT
Start: 2018-05-14 | End: 2018-07-18 | Stop reason: SDUPTHER

## 2018-05-16 ENCOUNTER — TELEPHONE (OUTPATIENT)
Dept: INTERNAL MEDICINE CLINIC | Age: 53
End: 2018-05-16

## 2018-05-16 NOTE — TELEPHONE ENCOUNTER
Patient just got a walker with wheels and a seat. She uses that in the home. Her scooter has been broken and patient requested a new one and it is being denied because of the walker.  She needs the scooter for long distances when she goes out due to back pain  Physical Therapy at  [facility] is calling for patient  cb [phone] Graeme Duty

## 2018-05-21 NOTE — TELEPHONE ENCOUNTER
Pain staff -  We already ordered motorized scooter for her. Please find out how we could be of help. Thanks.

## 2018-05-22 DIAGNOSIS — M96.1 POSTLAMINECTOMY SYNDROME OF LUMBAR REGION: ICD-10-CM

## 2018-05-22 RX ORDER — OXYCODONE HYDROCHLORIDE AND ACETAMINOPHEN 5; 325 MG/1; MG/1
1 TABLET ORAL 2 TIMES DAILY PRN
Qty: 60 TABLET | Refills: 0 | Status: SHIPPED | OUTPATIENT
Start: 2018-05-22 | End: 2018-06-22 | Stop reason: SDUPTHER

## 2018-05-23 ENCOUNTER — HOSPITAL ENCOUNTER (OUTPATIENT)
Dept: CT IMAGING | Age: 53
Discharge: HOME | End: 2018-05-23
Attending: INTERNAL MEDICINE | Admitting: INTERNAL MEDICINE

## 2018-05-23 ENCOUNTER — OFFICE VISIT (OUTPATIENT)
Dept: ENT CLINIC | Age: 53
End: 2018-05-23

## 2018-05-23 VITALS — DIASTOLIC BLOOD PRESSURE: 74 MMHG | HEART RATE: 69 BPM | TEMPERATURE: 98 F | SYSTOLIC BLOOD PRESSURE: 129 MMHG

## 2018-05-23 DIAGNOSIS — H90.3 SENSORINEURAL HEARING LOSS, BILATERAL: Primary | ICD-10-CM

## 2018-05-23 DIAGNOSIS — R31.9 HEMATURIA, UNSPECIFIED TYPE: ICD-10-CM

## 2018-05-23 DIAGNOSIS — R10.9 BILATERAL FLANK PAIN: ICD-10-CM

## 2018-05-23 DIAGNOSIS — R31.9 HEMATURIA: ICD-10-CM

## 2018-05-23 DIAGNOSIS — K21.9 LARYNGOPHARYNGEAL REFLUX (LPR): ICD-10-CM

## 2018-05-23 DIAGNOSIS — R09.89 GLOBUS PHARYNGEUS: ICD-10-CM

## 2018-05-23 PROCEDURE — G8427 DOCREV CUR MEDS BY ELIG CLIN: HCPCS | Performed by: OTOLARYNGOLOGY

## 2018-05-23 PROCEDURE — G8417 CALC BMI ABV UP PARAM F/U: HCPCS | Performed by: OTOLARYNGOLOGY

## 2018-05-23 PROCEDURE — 3014F SCREEN MAMMO DOC REV: CPT | Performed by: OTOLARYNGOLOGY

## 2018-05-23 PROCEDURE — 3017F COLORECTAL CA SCREEN DOC REV: CPT | Performed by: OTOLARYNGOLOGY

## 2018-05-23 PROCEDURE — 99204 OFFICE O/P NEW MOD 45 MIN: CPT | Performed by: OTOLARYNGOLOGY

## 2018-05-23 RX ORDER — OMEPRAZOLE 40 MG/1
40 CAPSULE, DELAYED RELEASE ORAL DAILY
Qty: 30 CAPSULE | Refills: 3 | Status: SHIPPED | OUTPATIENT
Start: 2018-05-23 | End: 2018-09-24 | Stop reason: SDUPTHER

## 2018-05-23 NOTE — TELEPHONE ENCOUNTER
Dr. Emily Love, here is information on the order for the pt motorized scooter. The patient has an order for a motorized chair. [facility] where she originally ordered it could not accomodates her weight limit.  The patient was told she needs to find a place to take the order that can accomodate  her needs. (Routing comment)     Pain staff -  We already ordered motorized scooter for her. Please find out how we could be of help. Thanks.

## 2018-05-24 ENCOUNTER — TELEPHONE (OUTPATIENT)
Dept: ENT CLINIC | Age: 53
End: 2018-05-24

## 2018-06-01 ENCOUNTER — HOSPITAL ENCOUNTER (OUTPATIENT)
Dept: OTHER | Age: 53
Discharge: HOME | End: 2018-06-30
Attending: ANESTHESIOLOGY | Admitting: ANESTHESIOLOGY

## 2018-06-05 ENCOUNTER — OFFICE VISIT (OUTPATIENT)
Dept: AUDIOLOGY | Age: 53
End: 2018-06-05

## 2018-06-05 ENCOUNTER — TELEPHONE (OUTPATIENT)
Dept: INTERNAL MEDICINE CLINIC | Age: 53
End: 2018-06-05

## 2018-06-05 DIAGNOSIS — H90.3 SENSORY HEARING LOSS, BILATERAL: Primary | ICD-10-CM

## 2018-06-05 PROCEDURE — 92550 TYMPANOMETRY & REFLEX THRESH: CPT | Performed by: AUDIOLOGIST

## 2018-06-05 PROCEDURE — 92557 COMPREHENSIVE HEARING TEST: CPT | Performed by: AUDIOLOGIST

## 2018-06-08 ENCOUNTER — HOSPITAL ENCOUNTER (OUTPATIENT)
Dept: OTHER | Age: 53
Discharge: HOME | End: 2018-06-08
Attending: PSYCHIATRY & NEUROLOGY | Admitting: PSYCHIATRY & NEUROLOGY

## 2018-06-08 DIAGNOSIS — G47.33 OSA (OBSTRUCTIVE SLEEP APNEA): ICD-10-CM

## 2018-06-11 PROCEDURE — 95811 POLYSOM 6/>YRS CPAP 4/> PARM: CPT | Performed by: PSYCHIATRY & NEUROLOGY

## 2018-06-13 ENCOUNTER — OFFICE VISIT (OUTPATIENT)
Dept: ENT CLINIC | Age: 53
End: 2018-06-13

## 2018-06-13 VITALS
WEIGHT: 293 LBS | DIASTOLIC BLOOD PRESSURE: 82 MMHG | SYSTOLIC BLOOD PRESSURE: 117 MMHG | BODY MASS INDEX: 44.41 KG/M2 | HEIGHT: 68 IN

## 2018-06-13 DIAGNOSIS — K21.9 LARYNGOPHARYNGEAL REFLUX (LPR): ICD-10-CM

## 2018-06-13 DIAGNOSIS — H90.3 SENSORINEURAL HEARING LOSS, BILATERAL: ICD-10-CM

## 2018-06-13 DIAGNOSIS — R09.89 GLOBUS PHARYNGEUS: Primary | ICD-10-CM

## 2018-06-13 PROCEDURE — G8417 CALC BMI ABV UP PARAM F/U: HCPCS | Performed by: OTOLARYNGOLOGY

## 2018-06-13 PROCEDURE — 99213 OFFICE O/P EST LOW 20 MIN: CPT | Performed by: OTOLARYNGOLOGY

## 2018-06-13 PROCEDURE — 3014F SCREEN MAMMO DOC REV: CPT | Performed by: OTOLARYNGOLOGY

## 2018-06-13 PROCEDURE — 3017F COLORECTAL CA SCREEN DOC REV: CPT | Performed by: OTOLARYNGOLOGY

## 2018-06-13 PROCEDURE — 1036F TOBACCO NON-USER: CPT | Performed by: OTOLARYNGOLOGY

## 2018-06-13 PROCEDURE — G8427 DOCREV CUR MEDS BY ELIG CLIN: HCPCS | Performed by: OTOLARYNGOLOGY

## 2018-06-13 RX ORDER — RANITIDINE 150 MG/1
150 TABLET ORAL 2 TIMES DAILY
Qty: 60 TABLET | Refills: 3 | Status: SHIPPED | OUTPATIENT
Start: 2018-06-13 | End: 2020-01-09

## 2018-06-14 ENCOUNTER — TELEPHONE (OUTPATIENT)
Dept: PULMONOLOGY | Age: 53
End: 2018-06-14

## 2018-06-19 ENCOUNTER — OFFICE VISIT (OUTPATIENT)
Dept: INTERNAL MEDICINE CLINIC | Age: 53
End: 2018-06-19

## 2018-06-19 ENCOUNTER — TELEPHONE (OUTPATIENT)
Dept: INTERNAL MEDICINE CLINIC | Age: 53
End: 2018-06-19

## 2018-06-19 VITALS
WEIGHT: 293 LBS | OXYGEN SATURATION: 97 % | DIASTOLIC BLOOD PRESSURE: 70 MMHG | SYSTOLIC BLOOD PRESSURE: 120 MMHG | HEIGHT: 68 IN | TEMPERATURE: 98.3 F | BODY MASS INDEX: 44.41 KG/M2 | HEART RATE: 70 BPM

## 2018-06-19 DIAGNOSIS — L30.4 INTERTRIGO: ICD-10-CM

## 2018-06-19 DIAGNOSIS — R26.9 ABNORMALITY OF GAIT AND MOBILITY: Primary | ICD-10-CM

## 2018-06-19 DIAGNOSIS — R21 SKIN ERUPTION: ICD-10-CM

## 2018-06-19 DIAGNOSIS — M54.2 NECK PAIN: ICD-10-CM

## 2018-06-19 DIAGNOSIS — L91.8 SKIN TAGS, MULTIPLE ACQUIRED: ICD-10-CM

## 2018-06-19 DIAGNOSIS — R23.8 SCALP IRRITATION: ICD-10-CM

## 2018-06-19 DIAGNOSIS — M54.16 LUMBAR RADICULOPATHY: ICD-10-CM

## 2018-06-19 PROCEDURE — 99214 OFFICE O/P EST MOD 30 MIN: CPT | Performed by: INTERNAL MEDICINE

## 2018-06-19 PROCEDURE — 3017F COLORECTAL CA SCREEN DOC REV: CPT | Performed by: INTERNAL MEDICINE

## 2018-06-19 PROCEDURE — 1036F TOBACCO NON-USER: CPT | Performed by: INTERNAL MEDICINE

## 2018-06-19 PROCEDURE — 3014F SCREEN MAMMO DOC REV: CPT | Performed by: INTERNAL MEDICINE

## 2018-06-19 PROCEDURE — G8417 CALC BMI ABV UP PARAM F/U: HCPCS | Performed by: INTERNAL MEDICINE

## 2018-06-19 PROCEDURE — G8427 DOCREV CUR MEDS BY ELIG CLIN: HCPCS | Performed by: INTERNAL MEDICINE

## 2018-06-19 RX ORDER — TRIAMCINOLONE ACETONIDE 1 MG/G
CREAM TOPICAL
Qty: 15 G | Refills: 0 | Status: SHIPPED | OUTPATIENT
Start: 2018-06-19 | End: 2019-02-11 | Stop reason: SDUPTHER

## 2018-06-19 RX ORDER — NYSTATIN 100000 U/G
CREAM TOPICAL
Qty: 30 G | Refills: 0 | Status: SHIPPED | OUTPATIENT
Start: 2018-06-19 | End: 2019-01-28 | Stop reason: SDUPTHER

## 2018-06-19 RX ORDER — SELENIUM SULFIDE 2.5 MG/100ML
LOTION TOPICAL
Qty: 120 ML | Refills: 0 | Status: SHIPPED | OUTPATIENT
Start: 2018-06-19 | End: 2018-07-19

## 2018-06-19 NOTE — TELEPHONE ENCOUNTER
Dr. Tressa Ley, I contacted [facility] and you do not need an application. The information needed is the pt demographics, insurance information and Dx faxed to [phone]. The [facility] Sami Coleman in [address] [place] [address] is the best location because of the patient's address.

## 2018-06-20 PROBLEM — F11.90 CHRONIC, CONTINUOUS USE OF OPIOIDS: Status: ACTIVE | Noted: 2018-06-20

## 2018-06-22 ENCOUNTER — OFFICE VISIT (OUTPATIENT)
Dept: PAIN MANAGEMENT | Age: 53
End: 2018-06-22

## 2018-06-22 VITALS
BODY MASS INDEX: 45.99 KG/M2 | OXYGEN SATURATION: 94 % | SYSTOLIC BLOOD PRESSURE: 119 MMHG | DIASTOLIC BLOOD PRESSURE: 70 MMHG | HEIGHT: 67 IN | TEMPERATURE: 98.4 F | WEIGHT: 293 LBS | RESPIRATION RATE: 20 BRPM | HEART RATE: 74 BPM

## 2018-06-22 DIAGNOSIS — M25.562 CHRONIC PAIN OF BOTH KNEES: ICD-10-CM

## 2018-06-22 DIAGNOSIS — G89.4 CHRONIC PAIN SYNDROME: ICD-10-CM

## 2018-06-22 DIAGNOSIS — G89.29 CHRONIC LEFT SHOULDER PAIN: ICD-10-CM

## 2018-06-22 DIAGNOSIS — M25.561 CHRONIC PAIN OF BOTH KNEES: ICD-10-CM

## 2018-06-22 DIAGNOSIS — M25.571 CHRONIC PAIN OF BOTH ANKLES: ICD-10-CM

## 2018-06-22 DIAGNOSIS — G89.29 CHRONIC PAIN OF BOTH KNEES: ICD-10-CM

## 2018-06-22 DIAGNOSIS — M96.1 POSTLAMINECTOMY SYNDROME OF LUMBAR REGION: Primary | ICD-10-CM

## 2018-06-22 DIAGNOSIS — G89.29 CHRONIC PAIN OF BOTH ANKLES: ICD-10-CM

## 2018-06-22 DIAGNOSIS — M25.572 CHRONIC PAIN OF BOTH ANKLES: ICD-10-CM

## 2018-06-22 DIAGNOSIS — M25.512 CHRONIC LEFT SHOULDER PAIN: ICD-10-CM

## 2018-06-22 PROCEDURE — 1036F TOBACCO NON-USER: CPT | Performed by: ANESTHESIOLOGY

## 2018-06-22 PROCEDURE — 3017F COLORECTAL CA SCREEN DOC REV: CPT | Performed by: ANESTHESIOLOGY

## 2018-06-22 PROCEDURE — 99214 OFFICE O/P EST MOD 30 MIN: CPT | Performed by: ANESTHESIOLOGY

## 2018-06-22 PROCEDURE — G8427 DOCREV CUR MEDS BY ELIG CLIN: HCPCS | Performed by: ANESTHESIOLOGY

## 2018-06-22 PROCEDURE — G8417 CALC BMI ABV UP PARAM F/U: HCPCS | Performed by: ANESTHESIOLOGY

## 2018-06-22 PROCEDURE — 3014F SCREEN MAMMO DOC REV: CPT | Performed by: ANESTHESIOLOGY

## 2018-06-22 RX ORDER — MELOXICAM 15 MG/1
15 TABLET ORAL DAILY
Qty: 30 TABLET | Refills: 1 | Status: SHIPPED | OUTPATIENT
Start: 2018-06-22 | End: 2018-07-06 | Stop reason: SDUPTHER

## 2018-06-22 RX ORDER — OXYCODONE HYDROCHLORIDE AND ACETAMINOPHEN 5; 325 MG/1; MG/1
1 TABLET ORAL EVERY 8 HOURS PRN
Qty: 90 TABLET | Refills: 0 | Status: SHIPPED | OUTPATIENT
Start: 2018-06-22 | End: 2018-07-18 | Stop reason: SDUPTHER

## 2018-06-22 RX ORDER — TIZANIDINE 2 MG/1
2 TABLET ORAL EVERY 8 HOURS PRN
Qty: 30 TABLET | Refills: 1 | Status: SHIPPED | OUTPATIENT
Start: 2018-06-22 | End: 2018-07-06 | Stop reason: SDUPTHER

## 2018-06-22 ASSESSMENT — ENCOUNTER SYMPTOMS
VOMITING: 0
ABDOMINAL PAIN: 0
EYE DISCHARGE: 0
SHORTNESS OF BREATH: 0
HEARTBURN: 0
EYE REDNESS: 0
CONSTIPATION: 0
NAUSEA: 0
COUGH: 0
WHEEZING: 0
HEMOPTYSIS: 0
BACK PAIN: 1
EYE PAIN: 0
ORTHOPNEA: 0

## 2018-06-22 ASSESSMENT — PATIENT HEALTH QUESTIONNAIRE - PHQ9
SUM OF ALL RESPONSES TO PHQ QUESTIONS 1-9: 2
2. FEELING DOWN, DEPRESSED OR HOPELESS: 1
1. LITTLE INTEREST OR PLEASURE IN DOING THINGS: 1
SUM OF ALL RESPONSES TO PHQ9 QUESTIONS 1 & 2: 2

## 2018-06-25 ENCOUNTER — TELEPHONE (OUTPATIENT)
Dept: PULMONOLOGY | Age: 53
End: 2018-06-25

## 2018-06-26 ENCOUNTER — TELEPHONE (OUTPATIENT)
Dept: INTERNAL MEDICINE CLINIC | Age: 53
End: 2018-06-26

## 2018-06-26 ENCOUNTER — TELEPHONE (OUTPATIENT)
Dept: PAIN MANAGEMENT | Age: 53
End: 2018-06-26

## 2018-06-26 DIAGNOSIS — M96.1 POSTLAMINECTOMY SYNDROME OF LUMBAR REGION: Primary | ICD-10-CM

## 2018-06-26 DIAGNOSIS — M17.11 PRIMARY OSTEOARTHRITIS OF RIGHT KNEE: ICD-10-CM

## 2018-06-26 DIAGNOSIS — E66.01 MORBID OBESITY WITH BODY MASS INDEX (BMI) OF 60.0 TO 69.9 IN ADULT (HCC): ICD-10-CM

## 2018-06-26 ASSESSMENT — ENCOUNTER SYMPTOMS
BACK PAIN: 1
DIARRHEA: 0
RHINORRHEA: 0
CHEST TIGHTNESS: 0
VOMITING: 0
ABDOMINAL PAIN: 0
NAUSEA: 0
COUGH: 0
BLOOD IN STOOL: 0
SHORTNESS OF BREATH: 0
SINUS PRESSURE: 0
CONSTIPATION: 0
SORE THROAT: 0
WHEEZING: 0

## 2018-06-26 NOTE — TELEPHONE ENCOUNTER
Pt calling, states her current scooter is more than 11years old and she states [place]. She states she needs an Rx to fix her scooter and an Rx for a new scooter to be sent for her. Please see first message for the fax number and what is needed along with the Rx. She is very frustrated that no response is currently available. She is aggravated that she was told we had a paper copy of her Rx for the scooter repair but there is not one, it would need to be reprinted and signed. She states she is not able to take the paper copy of the Rx for the scooter repair to piyush herself, it needs to be faxed. pls advise, she states she doesn't want to keep waiting, wants this to be taken care of now.

## 2018-06-26 NOTE — TELEPHONE ENCOUNTER
patient checking status of diabetic shoe order and power wheel chair  Has been waiting for 2 months please call patient [phone]

## 2018-06-26 NOTE — TELEPHONE ENCOUNTER
I will refer to pain clinic staff -  Paper copy for scooter was provided on the day of her visit to patient    Pain staff - please coordinate this scooter repair/replacement. I have signed multiple scripts over the past couple of months for this issue. Thank you.

## 2018-06-27 ENCOUNTER — TELEPHONE (OUTPATIENT)
Dept: PULMONOLOGY | Age: 53
End: 2018-06-27

## 2018-06-27 NOTE — TELEPHONE ENCOUNTER
Prema Jerry said that they are waiting on surgery clearance from Dr. Maren Watts before they can schedule the patient for surgery. I seen prior notes from Dr. Maren Watts 5/4/18 where she needs to be on cpap prior to surgery. Prema Jerry wanted to know how long does the patient need to be on cpap and I told her that she needs to be on the cpap min 31 days for compliance check. She said that the patient is scheduled for set up of machine 6/29/18 and has tentatively scheduled the patient for surgery 8/6/18. She would like to have Dr. Beata Prater office to keep her updated since the patient has had to cancel a few times if there are any changes or if the patient doesn't meet compliance.

## 2018-06-28 ENCOUNTER — TELEPHONE (OUTPATIENT)
Dept: INTERNAL MEDICINE CLINIC | Age: 53
End: 2018-06-28

## 2018-06-29 ASSESSMENT — ENCOUNTER SYMPTOMS
ABDOMINAL PAIN: 0
BACK PAIN: 1
NAUSEA: 0
RHINORRHEA: 0
VOMITING: 0
WHEEZING: 0
DIARRHEA: 0
BLOOD IN STOOL: 0
COUGH: 0
CHEST TIGHTNESS: 0
SORE THROAT: 0
SINUS PRESSURE: 0
SHORTNESS OF BREATH: 0
CONSTIPATION: 0

## 2018-07-02 ENCOUNTER — OFFICE VISIT (OUTPATIENT)
Dept: ORTHOPEDIC SURGERY | Age: 53
End: 2018-07-02

## 2018-07-02 VITALS
WEIGHT: 293 LBS | HEART RATE: 60 BPM | HEIGHT: 67 IN | BODY MASS INDEX: 45.99 KG/M2 | DIASTOLIC BLOOD PRESSURE: 64 MMHG | SYSTOLIC BLOOD PRESSURE: 113 MMHG

## 2018-07-02 DIAGNOSIS — E66.01 MORBID OBESITY WITH BODY MASS INDEX (BMI) OF 60.0 TO 69.9 IN ADULT (HCC): ICD-10-CM

## 2018-07-02 DIAGNOSIS — M25.562 PAIN IN BOTH KNEES, UNSPECIFIED CHRONICITY: Primary | ICD-10-CM

## 2018-07-02 DIAGNOSIS — M17.12 PRIMARY OSTEOARTHRITIS OF LEFT KNEE: ICD-10-CM

## 2018-07-02 DIAGNOSIS — M17.11 PRIMARY OSTEOARTHRITIS OF RIGHT KNEE: ICD-10-CM

## 2018-07-02 DIAGNOSIS — M25.561 PAIN IN BOTH KNEES, UNSPECIFIED CHRONICITY: Primary | ICD-10-CM

## 2018-07-02 PROCEDURE — 1036F TOBACCO NON-USER: CPT | Performed by: ORTHOPAEDIC SURGERY

## 2018-07-02 PROCEDURE — 99204 OFFICE O/P NEW MOD 45 MIN: CPT | Performed by: ORTHOPAEDIC SURGERY

## 2018-07-02 PROCEDURE — 3017F COLORECTAL CA SCREEN DOC REV: CPT | Performed by: ORTHOPAEDIC SURGERY

## 2018-07-02 PROCEDURE — G8417 CALC BMI ABV UP PARAM F/U: HCPCS | Performed by: ORTHOPAEDIC SURGERY

## 2018-07-02 PROCEDURE — G8427 DOCREV CUR MEDS BY ELIG CLIN: HCPCS | Performed by: ORTHOPAEDIC SURGERY

## 2018-07-02 PROCEDURE — 3014F SCREEN MAMMO DOC REV: CPT | Performed by: ORTHOPAEDIC SURGERY

## 2018-07-02 ASSESSMENT — ENCOUNTER SYMPTOMS
SHORTNESS OF BREATH: 1
BACK PAIN: 1

## 2018-07-02 NOTE — PROGRESS NOTES
Date:  2018    Name:  Pedrito Dai  Address:  [address]    :  1965      Age:   47 y.o.    SSN:  [id number]      Medical Record Number:  [id number]    Reason for Visit:    Chief Complaint    Knee Pain (np bilateral knee pain )      DOS:2018     HPI: Pedrito Dai is a 47 y.o. female here today for evaluation of her bilateral knees. Patient is a morbidly obese woman that has a 5 month history of bilateral knee pain. She uses a wheelchair scooter, but states that she fell out of it, landing on both of her knees. Since that time she has had significant discomfort, especially with flexing her knees. When asked to localize the pain, she points over the anterior aspect of both knees. She has never had any formal treatment for her knees, including physical therapy, injections, but does take daily meloxicam for low back issues. She denies any numbness tingling or paresthesias of the bilateral lower extremities. Pain can be as much as an 8 out of 10 when it's at its worst.  The left is typically worse than the right      Pain Assessment  Location of Pain: Knee  Location Modifiers: Left, Right  Severity of Pain: 8  Quality of Pain: Sharp  Duration of Pain: Persistent  Frequency of Pain: Constant  Aggravating Factors: Standing, Walking (sitting for long time)  Limiting Behavior: Yes  Relieving Factors:  (straighten legs)  Result of Injury: No  Work-Related Injury: No  Are there other pain locations you wish to document?: No  ROS: Pertinent items are noted in HPI.         Past Medical History:   Diagnosis Date    Arthritis     Hyperlipidemia     Hypertension     [facility] ([facility] [address]     SHYANN (obstructive sleep apnea)     Type 2 diabetes mellitus without complication ([facility])         Past Surgical History:   Procedure Laterality Date    BACK SURGERY          BREAST LUMPECTOMY Bilateral     2015       Family History   Problem Relation Age of Onset    Diabetes Mother intact without erythema or ecchymosis. Significant adipose tissues noted over both knees. No significant swelling. No deformity.      Effusion; none.      Palpation: Non-tender to the medial or lateral joint line. No fullness in the popliteal fossa. Moderate pain with patellar grind testing. Non-tender to the medial or lateral patellar facets. Non-tender to medial and lateral collateral ligaments. Moderate Patellofemoral crepitus. Calf compartments are soft and non-tender. No signs of DVT.      Range of Motion: From 0 to 100 degrees of flexion with pain. Internal and external rotation of the hip are maintained without pain or deficit.      Ligamentous Stability: Stable to valgus and varus stress testing at 0° and 30°.      Neurologic and vascular: Intact sensation to light touch in all 5 digits. 2+ palpable pedal pulses.         Diagnostics:  Radiology:     X-rays of the bilateral knee including bilateral AP, Bilateral Merchant, Bilateral Gae Mash and a lateral were obtained and reviewed in office:    Impression: There is tricompartmental osteoarthritis noted of both knees with severe patellofemoral involvement. No other focal bony abnormalities or obvious osseous defects can be appreciated. Assessment: Bilateral knee osteoarthritis with severe patellofemoral involvement    Plan: We had a lengthy discussion involving treatment options. She is not a candidate for knee replacement due to her severe obesity. Additionally, we do not feel that she is a good candidate for cortisone at this point due to the high risk for infection. Her best course of treatment for improvement in symptoms is physical therapy. We would like to start her on 2-3 days of physical therapy for the next 4-6 weeks, focusing on range of motion and strengthening exercises. We feel that this is the best course of action at this point, and may help her become more active.     Additionally, her current scooter is broken in multiple places and does not fit her body habitus. We feel that she needs a new, updated scooter that is more appropriate for her body habitus. We would like to formulate a plan with our DME for a possible replacement scooter. We will plan on seeing her back in 4-6 weeks following a course of physical therapy. At that time we will make any other further treatment recommendations. All questions were answered at today's visit. Penny Rocha DO, MPH  Clinical Fellow in [address] [place] [address]      This dictation was performed with a verbal recognition program [facility]) and it was checked for errors. It is possible that there are still dictated errors within this office note. If so, please bring any errors to my attention for an addendum. All efforts were made to ensure that this office note is accurate.     Attestation:  I attest that my visit today with Temo Piyush was supervised by Dr. Ila Nj

## 2018-07-02 NOTE — PROGRESS NOTES
BACK SURGERY      2001    BREAST LUMPECTOMY Bilateral     2015       Family History   Problem Relation Age of Onset    Diabetes Mother     Stroke Mother     Heart Disease Father     Other Father     High Blood Pressure Father     Diabetes Maternal Aunt     Diabetes Maternal Grandmother        Social History     Social History    Marital status:      Spouse name: N/A    Number of children: N/A    Years of education: N/A     Social History Main Topics    Smoking status: Never Smoker    Smokeless tobacco: Never Used    Alcohol use No    Drug use: No    Sexual activity: No     Other Topics Concern    None     Social History Narrative    None       Current Outpatient Prescriptions   Medication Sig Dispense Refill    diclofenac (PENNSAID) 1.5 % SOLN APPLY TO PAINFUL AREA 1-2 TIMES DAILY  PRN PAIN 1 Bottle 2    Scooter MISC by Does not apply route Requires repair of existing scooter 1 each 0    tiZANidine (ZANAFLEX) 2 MG tablet Take 1 tablet by mouth every 8 hours as needed (neck pain) 30 tablet 1    meloxicam (MOBIC) 15 MG tablet Take 1 tablet by mouth daily With food 30 tablet 1    oxyCODONE-acetaminophen (PERCOCET) 5-325 MG per tablet Take 1 tablet by mouth every 8 hours as needed for Pain for up to 30 days. Take lowest dose possible to manage pain. Earliest Fill Date: 6/22/18 90 tablet 0    nystatin (MYCOSTATIN) 803934 UNIT/GM cream Apply topically 2 times daily. 30 g 0    triamcinolone (KENALOG) 0.1 % cream Apply topically 2 times daily. 15 g 0    selenium sulfide (SELSUN) 2.5 % lotion Apply topically daily as needed. 120 mL 0    ranitidine (ZANTAC) 150 MG tablet Take 1 tablet by mouth 2 times daily 60 tablet 3    omeprazole (PRILOSEC) 40 MG delayed release capsule Take 1 capsule by mouth daily Take tablet one hour before evening meal 30 capsule 3    gabapentin (NEURONTIN) 300 MG capsule Take 1 capsule by mouth 3 times daily for 60 days. . 90 capsule 1    Scooter MISC by Does not 2+ palpable pedal pulses.           Comparison right Knee Exam:   Significant adipose tissues noted over both knees     Gait/Alignment: Uses a scooter chair for ambulation       Patella tracking: No J sign.      Inspection/Skin: Skin is intact without erythema or ecchymosis. Significant adipose tissues noted over both knees. No significant swelling. No deformity.      Effusion; none.      Palpation: Non-tender to the medial or lateral joint line. No fullness in the popliteal fossa. Moderate pain with patellar grind testing. Non-tender to the medial or lateral patellar facets. Non-tender to medial and lateral collateral ligaments. Moderate Patellofemoral crepitus. Calf compartments are soft and non-tender. No signs of DVT.      Range of Motion: From 0 to 100 degrees of flexion with pain. Internal and external rotation of the hip are maintained without pain or deficit.      Ligamentous Stability: Stable to valgus and varus stress testing at 0° and 30°.      Neurologic and vascular: Intact sensation to light touch in all 5 digits. 2+ palpable pedal pulses.         Diagnostics:  Radiology:     X-rays of the bilateral knee including bilateral AP, Bilateral Merchant, Bilateral Nathalia Juba and a lateral were obtained and reviewed in office:    Impression: There is tricompartmental osteoarthritis noted of both knees with severe patellofemoral involvement. No other focal bony abnormalities or obvious osseous defects can be appreciated. Assessment: Bilateral knee osteoarthritis with severe patellofemoral involvement    Plan: We had a lengthy discussion involving treatment options. She is not a candidate for knee replacement due to her severe obesity. Additionally, we do not feel that she is a good candidate for cortisone at this point due to the high risk for infection. Her best course of treatment for improvement in symptoms is physical therapy.   We would like to start her on 2-3 days of physical therapy for the next 4-6 weeks, focusing on range of motion and strengthening exercises. We feel that this is the best course of action at this point, and may help her become more active. Additionally, her current scooter is broken in multiple places and does not fit her body habitus. We feel that she needs a new, updated scooter that is more appropriate for her body habitus. We would like to formulate a plan with our DME for a possible replacement scooter. We will plan on seeing her back in 4-6 weeks following a course of physical therapy. At that time we will make any other further treatment recommendations. All questions were answered at today's visit. Miah Kevin DO, MPH  Clinical Fellow in [address] [place] [address]      This dictation was performed with a verbal recognition program [facility]) and it was checked for errors. It is possible that there are still dictated errors within this office note. If so, please bring any errors to my attention for an addendum. All efforts were made to ensure that this office note is accurate. Attestation:  I attest that my visit today with Gagandeep Angel was supervised by Dr. Wray Records      I supervised my sports medicine fellow in the evaluation and development of a treatment plan  for this patient. I personally interviewed the patient and performed a physical examination. In addition, I discussed the patient's condition and treatment options with them. All of their questions were answered. I personally reviewed the patient's pain scale, review of systems, family history, social history, past medical history, allergies and medications. 13 point review of systems was collected today and is filed in the medical record. Greater than 50% of the visit was spent counseling the patient.       Андрей Lancaster MD  Sports Medicine, Arthroscopic Knee and Shoulder Surgery    This dictation was performed with a verbal recognition program [facility]) and it was checked for errors. It is possible that there are still dictated errors within this office note. If so, please bring any errors to my attention for an addendum. All efforts were made to ensure that this office note is accurate.

## 2018-07-03 ENCOUNTER — TELEPHONE (OUTPATIENT)
Dept: PULMONOLOGY | Age: 53
End: 2018-07-03

## 2018-07-03 NOTE — TELEPHONE ENCOUNTER
Spoke to [facility] at [facility]  She is faxing over download up to today.

## 2018-07-05 NOTE — TELEPHONE ENCOUNTER
I reviewed he download report, Patient is clear for surgery as long as she is able to use the machine on nightly basis more than 4 hours a night

## 2018-07-06 ENCOUNTER — TELEPHONE (OUTPATIENT)
Dept: PULMONOLOGY | Age: 53
End: 2018-07-06

## 2018-07-06 DIAGNOSIS — G89.29 CHRONIC PAIN OF BOTH ANKLES: ICD-10-CM

## 2018-07-06 DIAGNOSIS — G89.29 CHRONIC LEFT SHOULDER PAIN: ICD-10-CM

## 2018-07-06 DIAGNOSIS — M54.2 NECK PAIN: ICD-10-CM

## 2018-07-06 DIAGNOSIS — G89.29 OTHER CHRONIC PAIN: ICD-10-CM

## 2018-07-06 DIAGNOSIS — M25.562 CHRONIC PAIN OF BOTH KNEES: ICD-10-CM

## 2018-07-06 DIAGNOSIS — M96.1 POSTLAMINECTOMY SYNDROME OF LUMBAR REGION: ICD-10-CM

## 2018-07-06 DIAGNOSIS — M25.561 CHRONIC PAIN OF BOTH KNEES: ICD-10-CM

## 2018-07-06 DIAGNOSIS — M25.572 CHRONIC PAIN OF BOTH ANKLES: ICD-10-CM

## 2018-07-06 DIAGNOSIS — M25.512 CHRONIC LEFT SHOULDER PAIN: ICD-10-CM

## 2018-07-06 DIAGNOSIS — M25.571 CHRONIC PAIN OF BOTH ANKLES: ICD-10-CM

## 2018-07-06 DIAGNOSIS — G89.29 CHRONIC PAIN OF BOTH KNEES: ICD-10-CM

## 2018-07-06 RX ORDER — MELOXICAM 15 MG/1
15 TABLET ORAL DAILY
Qty: 30 TABLET | Refills: 1 | Status: SHIPPED | OUTPATIENT
Start: 2018-07-06 | End: 2018-08-14 | Stop reason: SDUPTHER

## 2018-07-06 RX ORDER — TIZANIDINE 2 MG/1
2 TABLET ORAL EVERY 8 HOURS PRN
Qty: 30 TABLET | Refills: 1 | Status: SHIPPED | OUTPATIENT
Start: 2018-07-06 | End: 2018-08-14 | Stop reason: SDUPTHER

## 2018-07-06 RX ORDER — TIZANIDINE 2 MG/1
2 TABLET ORAL EVERY 8 HOURS PRN
Qty: 30 TABLET | Refills: 0 | OUTPATIENT
Start: 2018-07-06

## 2018-07-06 NOTE — TELEPHONE ENCOUNTER
Patient was called she verbalized understanding & ask that I call Dr. Junior Welsh office at [phone]. Marcia Bahena from Dr. Talley  office took down the message that patient was cleared for surgery by Dr. Geovanny Conner as long as she was able to use the machine on nightly basis for more than 4 hours and said that she would send to the nurse.

## 2018-07-06 NOTE — TELEPHONE ENCOUNTER
Mackenzie with Dr. Rodríguez [facility] office is asking if Dr. Jason Elias can type up a letter giving the ok for surgery they are not able to take a verbal.   Fax [phone]

## 2018-07-09 NOTE — TELEPHONE ENCOUNTER
Please type a letter saying the patient is cleared for surgery as long as he uses the CPAP most of the nights more than 4 hours

## 2018-07-11 NOTE — TELEPHONE ENCOUNTER
Pt called in stating that she would like the orders for both her wheelchair & her power scooter to be faxed to [phone]. I saw an order for pt's scooter in her chart but I wasn't sure if the order for the wheelchair is in her chart because I was having a hard time finding it. Pt would like a call back before we close today regardless, indicating whether we were able to fax them or not.     Pt call back -- [phone]

## 2018-07-16 DIAGNOSIS — M96.1 POSTLAMINECTOMY SYNDROME OF LUMBAR REGION: ICD-10-CM

## 2018-07-16 DIAGNOSIS — M54.16 LUMBAR RADICULOPATHY: ICD-10-CM

## 2018-07-16 DIAGNOSIS — Z74.09 IMPAIRED MOBILITY: Primary | ICD-10-CM

## 2018-07-16 RX ORDER — WHEELCHAIR
EACH MISCELLANEOUS
Qty: 1 EACH | Refills: 0 | Status: SHIPPED | OUTPATIENT
Start: 2018-07-16 | End: 2020-01-23

## 2018-07-17 ENCOUNTER — TELEPHONE (OUTPATIENT)
Dept: PAIN MANAGEMENT | Age: 53
End: 2018-07-17

## 2018-07-17 DIAGNOSIS — M25.512 CHRONIC LEFT SHOULDER PAIN: ICD-10-CM

## 2018-07-17 DIAGNOSIS — G89.29 CHRONIC PAIN OF BOTH ANKLES: ICD-10-CM

## 2018-07-17 DIAGNOSIS — M25.571 CHRONIC PAIN OF BOTH ANKLES: ICD-10-CM

## 2018-07-17 DIAGNOSIS — M25.562 CHRONIC PAIN OF BOTH KNEES: ICD-10-CM

## 2018-07-17 DIAGNOSIS — M25.561 CHRONIC PAIN OF BOTH KNEES: ICD-10-CM

## 2018-07-17 DIAGNOSIS — M96.1 POSTLAMINECTOMY SYNDROME OF LUMBAR REGION: ICD-10-CM

## 2018-07-17 DIAGNOSIS — G89.29 CHRONIC PAIN OF BOTH KNEES: ICD-10-CM

## 2018-07-17 DIAGNOSIS — M25.572 CHRONIC PAIN OF BOTH ANKLES: ICD-10-CM

## 2018-07-17 DIAGNOSIS — G89.29 OTHER CHRONIC PAIN: ICD-10-CM

## 2018-07-17 DIAGNOSIS — G89.29 CHRONIC LEFT SHOULDER PAIN: ICD-10-CM

## 2018-07-18 RX ORDER — GABAPENTIN 300 MG/1
300 CAPSULE ORAL 3 TIMES DAILY
Qty: 90 CAPSULE | Refills: 1 | Status: SHIPPED | OUTPATIENT
Start: 2018-07-18 | End: 2018-08-14 | Stop reason: SDUPTHER

## 2018-07-18 RX ORDER — OXYCODONE HYDROCHLORIDE AND ACETAMINOPHEN 5; 325 MG/1; MG/1
1 TABLET ORAL EVERY 8 HOURS PRN
Qty: 90 TABLET | Refills: 0 | Status: SHIPPED | OUTPATIENT
Start: 2018-07-21 | End: 2018-08-14 | Stop reason: SDUPTHER

## 2018-07-19 ENCOUNTER — TELEPHONE (OUTPATIENT)
Dept: INTERNAL MEDICINE CLINIC | Age: 53
End: 2018-07-19

## 2018-07-23 ENCOUNTER — OFFICE VISIT (OUTPATIENT)
Dept: ORTHOPEDIC SURGERY | Age: 53
End: 2018-07-23

## 2018-07-23 ENCOUNTER — OFFICE VISIT (OUTPATIENT)
Dept: INTERNAL MEDICINE CLINIC | Age: 53
End: 2018-07-23

## 2018-07-23 VITALS
HEART RATE: 68 BPM | WEIGHT: 293 LBS | BODY MASS INDEX: 45.99 KG/M2 | DIASTOLIC BLOOD PRESSURE: 68 MMHG | SYSTOLIC BLOOD PRESSURE: 128 MMHG | HEIGHT: 67 IN | OXYGEN SATURATION: 99 %

## 2018-07-23 VITALS
BODY MASS INDEX: 45.99 KG/M2 | DIASTOLIC BLOOD PRESSURE: 66 MMHG | SYSTOLIC BLOOD PRESSURE: 119 MMHG | WEIGHT: 293 LBS | HEIGHT: 67 IN | HEART RATE: 59 BPM

## 2018-07-23 DIAGNOSIS — M96.1 POSTLAMINECTOMY SYNDROME OF LUMBAR REGION: ICD-10-CM

## 2018-07-23 DIAGNOSIS — M19.071 PRIMARY OSTEOARTHRITIS OF BOTH ANKLES: Primary | ICD-10-CM

## 2018-07-23 DIAGNOSIS — M19.072 PRIMARY OSTEOARTHRITIS OF BOTH ANKLES: Primary | ICD-10-CM

## 2018-07-23 DIAGNOSIS — M25.552 BILATERAL HIP PAIN: ICD-10-CM

## 2018-07-23 DIAGNOSIS — Z13.31 POSITIVE DEPRESSION SCREENING: ICD-10-CM

## 2018-07-23 DIAGNOSIS — M54.16 LUMBAR RADICULOPATHY: Primary | ICD-10-CM

## 2018-07-23 DIAGNOSIS — M25.551 BILATERAL HIP PAIN: ICD-10-CM

## 2018-07-23 PROCEDURE — 1036F TOBACCO NON-USER: CPT | Performed by: INTERNAL MEDICINE

## 2018-07-23 PROCEDURE — 3014F SCREEN MAMMO DOC REV: CPT | Performed by: INTERNAL MEDICINE

## 2018-07-23 PROCEDURE — 99213 OFFICE O/P EST LOW 20 MIN: CPT | Performed by: INTERNAL MEDICINE

## 2018-07-23 PROCEDURE — 3014F SCREEN MAMMO DOC REV: CPT | Performed by: ORTHOPAEDIC SURGERY

## 2018-07-23 PROCEDURE — 3017F COLORECTAL CA SCREEN DOC REV: CPT | Performed by: INTERNAL MEDICINE

## 2018-07-23 PROCEDURE — 3017F COLORECTAL CA SCREEN DOC REV: CPT | Performed by: ORTHOPAEDIC SURGERY

## 2018-07-23 PROCEDURE — G8417 CALC BMI ABV UP PARAM F/U: HCPCS | Performed by: ORTHOPAEDIC SURGERY

## 2018-07-23 PROCEDURE — 1036F TOBACCO NON-USER: CPT | Performed by: ORTHOPAEDIC SURGERY

## 2018-07-23 PROCEDURE — G8427 DOCREV CUR MEDS BY ELIG CLIN: HCPCS | Performed by: INTERNAL MEDICINE

## 2018-07-23 PROCEDURE — G8417 CALC BMI ABV UP PARAM F/U: HCPCS | Performed by: INTERNAL MEDICINE

## 2018-07-23 PROCEDURE — G8431 POS CLIN DEPRES SCRN F/U DOC: HCPCS | Performed by: INTERNAL MEDICINE

## 2018-07-23 PROCEDURE — 99214 OFFICE O/P EST MOD 30 MIN: CPT | Performed by: ORTHOPAEDIC SURGERY

## 2018-07-23 PROCEDURE — G8427 DOCREV CUR MEDS BY ELIG CLIN: HCPCS | Performed by: ORTHOPAEDIC SURGERY

## 2018-07-23 ASSESSMENT — ENCOUNTER SYMPTOMS
SORE THROAT: 1
SINUS PAIN: 1
BACK PAIN: 1

## 2018-07-23 NOTE — PROGRESS NOTES
Date:  2018    Name:  Brian Price  Address:  [address]    :  1965      Age:   47 y.o.    SSN:  [id number]      Medical Record Number:  [id number]    Reason for Visit:    Chief Complaint    Pain (opnp bilateral ankle pain )      DOS:2018     HPI: Brian Price is a 47 y.o. female here today for evaluation of her bilateral ankles. We have seen her earlier this month for bilateral knee osteoarthritis that was exacerbated after a fall from her wheelchair. She has been working on getting a replacement scooter since her last visit. Additionally, we had sent her for some focused physical therapy, but she is not been able to do this since her last visit. Her biggest complaint today is very severe bilateral ankle pain. She denies any trauma or inciting events, but states that as she is been attempting to get out of her wheelchair the last couple of months for weight loss that she has noticed increasing pain. She denies any numbness tingling or paresthesias in these areas. She denies any previous treatment in the past.  When asked to localize the pain, she points on the lateral aspect of the right ankle on the medial aspect of the left ankle and hindfoot. Pain can be as much as an 8 out of 10 when it's at its worse. Pain Assessment  Location of Pain: Ankle  Location Modifiers: Left, Right  Severity of Pain: 8  Quality of Pain: Sharp  Duration of Pain: Persistent  Frequency of Pain: Intermittent  Aggravating Factors: Walking, Standing (movement)  Limiting Behavior: Yes  Relieving Factors: Rest  Result of Injury: No  Work-Related Injury: No  Are there other pain locations you wish to document?: No  ROS: Pertinent items are noted in HPI.         Past Medical History:   Diagnosis Date    Arthritis     Hyperlipidemia     Hypertension     Morbid obesity with body mass index (BMI) of 60.0 to 69.9 in adult ([facility] 2018    [facility]     SHYANN (obstructive sleep apnea)     Primary osteoarthritis of right knee 7/2/2018    Type 2 diabetes mellitus without complication [facility])         Past Surgical History:   Procedure Laterality Date    BACK SURGERY      2001    BREAST LUMPECTOMY Bilateral     2015       Family History   Problem Relation Age of Onset    Diabetes Mother     Stroke Mother     Heart Disease Father     Other Father     High Blood Pressure Father     Diabetes Maternal Aunt     Diabetes Maternal Grandmother        Social History     Social History    Marital status:      Spouse name: N/A    Number of children: N/A    Years of education: N/A     Social History Main Topics    Smoking status: Never Smoker    Smokeless tobacco: Never Used    Alcohol use No    Drug use: No    Sexual activity: No     Other Topics Concern    None     Social History Narrative    None       Current Outpatient Prescriptions   Medication Sig Dispense Refill    oxyCODONE-acetaminophen (PERCOCET) 5-325 MG per tablet Take 1 tablet by mouth every 8 hours as needed for Pain for up to 30 days. Take lowest dose possible to manage pain. Earliest Fill Date: 7/21/18 90 tablet 0    gabapentin (NEURONTIN) 300 MG capsule Take 1 capsule by mouth 3 times daily for 60 days. . 90 capsule 1    meloxicam (MOBIC) 15 MG tablet TAKE 1 TABLET BY MOUTH DAILY WITH FOOD 30 tablet 1    Scooter MISC by Does not apply route Dispense a scooter with an adjustable chair  Weight-382.9 lbs 1 each 0    Misc. Devices [facility]) MISC Dispense a power wheelchair  Weight -382.9 lbs 1 each 0    Scooter MISC by Does not apply route Motorized scooter for ambulatory aid  Repair or replace  (Patient is >300 lbs) 1 each 0    tiZANidine (ZANAFLEX) 2 MG tablet Take 1 tablet by mouth every 8 hours as needed (neck pain) 30 tablet 1    Scooter MISC by Does not apply route Requires repair of existing scooter 1 each 0    nystatin (MYCOSTATIN) 799480 UNIT/GM cream Apply topically 2 times daily.  30 g 0

## 2018-07-23 NOTE — PATIENT INSTRUCTIONS
-Referral to Physical Therapy  -Continue same medications  -Continue care per Pain Management  -Have hip xrays done  -Referral to Orthopedics  -Have a screening colonoscopy done by Gastroenterology  -Follow up with Sleep Medicine regarding CPAP machine

## 2018-07-23 NOTE — TELEPHONE ENCOUNTER
I contacted [facility] and spoke with Mynor Hand about the progress of the scooter for Amel. Mynor Hand stated in a few days Dr. Teri Carrera will get the information that is needed to have the order to repair the motorized wheelchair the patient currently have and the process can take up to a month. After the order has been received and processed for the wheel chair to be repaired then they will start to process the order for a new scooter will happen and then that information will go out to Dr. Teri Carrera for the order to begin the process. I am currently waiting for [facility] from [facility] to contact me concerning the scooter for the pt.

## 2018-07-23 NOTE — PROGRESS NOTES
Review of Systems   Constitutional:        Recent weight change   HENT: Positive for sinus pain and sore throat. Eye or hearing impairment   Respiratory:        Difficulty breathing   Cardiovascular: Positive for leg swelling. Poor circulation    Gastrointestinal:        Gallstones    Genitourinary: Positive for frequency. Loss of urine/incontinence   Musculoskeletal: Positive for back pain and neck pain. Bilateral ankle/foot pain    Neurological: Positive for dizziness and loss of consciousness. Endo/Heme/Allergies:        Bleeding problem  Breast masses   All other systems reviewed and are negative.

## 2018-07-23 NOTE — PROGRESS NOTES
Date:  2018    Name:  Henok Armas  Address:  [address]    :  1965      Age:   47 y.o.    SSN:  [id number]      Medical Record Number:  [id number]    Reason for Visit:    Chief Complaint    Pain (opnp bilateral ankle pain )      DOS:2018     HPI: Henok Armas is a 47 y.o. female here today for evaluation of her bilateral ankles. We have seen her earlier this month for bilateral knee osteoarthritis that was exacerbated after a fall from her wheelchair. She has been working on getting a replacement scooter since her last visit. Additionally, we had sent her for some focused physical therapy, but she is not been able to do this since her last visit. Her biggest complaint today is very severe bilateral ankle pain. She denies any trauma or inciting events, but states that as she is been attempting to get out of her wheelchair the last couple of months for weight loss that she has noticed increasing pain. She denies any numbness tingling or paresthesias in these areas. She denies any previous treatment in the past.  When asked to localize the pain, she points on the lateral aspect of the right ankle on the medial aspect of the left ankle and hindfoot. Pain can be as much as an 8 out of 10 when it's at its worse. Pain Assessment  Location of Pain: Ankle  Location Modifiers: Left, Right  Severity of Pain: 8  Quality of Pain: Sharp  Duration of Pain: Persistent  Frequency of Pain: Intermittent  Aggravating Factors: Walking, Standing (movement)  Limiting Behavior: Yes  Relieving Factors: Rest  Result of Injury: No  Work-Related Injury: No  Are there other pain locations you wish to document?: No  ROS: Pertinent items are noted in HPI.         Past Medical History:   Diagnosis Date    Arthritis     Hyperlipidemia     Hypertension     Morbid obesity with body mass index (BMI) of 60.0 to 69.9 in adult ([facility] 2018    [facility]     SHYANN (obstructive  nystatin (MYCOSTATIN) 491689 UNIT/GM cream Apply topically 2 times daily. 30 g 0    triamcinolone (KENALOG) 0.1 % cream Apply topically 2 times daily. 15 g 0    ranitidine (ZANTAC) 150 MG tablet Take 1 tablet by mouth 2 times daily 60 tablet 3    omeprazole (PRILOSEC) 40 MG delayed release capsule Take 1 capsule by mouth daily Take tablet one hour before evening meal 30 capsule 3    Scooter MISC by Does not apply route Ambulatory aid - needs new motorized scooter or repair of existing scooter. Earlier one from [place] is broken 1 each 0    Handicap Placard MISC by Does not apply route Requesting for 5 years 1 each 0    meclizine (ANTIVERT) 12.5 MG tablet Take 1 tablet by mouth 3 times daily as needed for Dizziness 15 tablet 0    Misc. Devices (BARIATRIC ROLLATOR) MISC 1 Units by Does not apply route as needed (ambulatory help) Patient Height - 5'7\" and Weight - 412 lbs 1 each 0     No current facility-administered medications for this visit. No Known Allergies    Vital signs:  /66   Pulse 59   Ht 5' 7\" (1.702 m)   Wt (!) 400 lb (181.4 kg)   BMI 62.65 kg/m²        Neuro: Alert & oriented x 3,  normal,  no focal deficits noted. Normal affect. Eyes: sclera clear  Ears: Normal external ear  Mouth:  No perioral lesions  Pulm: Respirations unlabored and regular  Pulse: Regular rate and rhythm   Skin: Warm, well perfused       Right Ankle Exam:   Overall alignment is appreciated. Within normal limits. Gait: No use of assistive devices. Inspection/Skin: Skin is intact without erythema or ecchymosis. No swelling. No deformity.      Palpation: Moderate tenderness over the lateral ankle distal to the lateral malleolus No bony tenderness of the medial/lateral malleoli, midfoot, and forefoot. Calf compartments are soft and non-tender. No tenderness over the proximal fibula.  No signs of DVT.      Range of Motion: normal plantar/dorsiflexion, eversion and inversion     Strength: 4 /5 strength of the tibialis anterior, EHL, gastrocsoleus complex, and peroneals with no subluxation      Ligamentous Stability: stable anterior drawer test in plantar and dorsiflexion. Negative external rotation test.  Midfoot is stable. Negative squeeze test     Neurologic and vascular: Intact sensation to light touch in all 5 digits. 2+ palpable pedal pulses. Comparison right Ankle Exam:   Overall alignment is appreciated. Within normal limits. Gait: No use of assistive devices. Inspection/Skin: Skin is intact without erythema or ecchymosis. No swelling. No deformity.      Palpation: Moderate tenderness over the subtalar joint  No bony tenderness of the medial/lateral malleoli, midfoot, and forefoot. Calf compartments are soft and non-tender. No tenderness over the proximal fibula. No signs of DVT.      Range of Motion: normal plantar/dorsiflexion, eversion and inversion     Strength: 4/5 strength of the tibialis anterior, EHL, gastrocsoleus complex, and peroneals with no subluxation      Ligamentous Stability: stable anterior drawer test in plantar and dorsiflexion. Negative external rotation test.  Midfoot is stable. Negative squeeze test     Neurologic and vascular: Intact sensation to light touch in all 5 digits. 2+ palpable pedal pulses. Diagnostics:  Radiology:     X-rays of the bilateral ankle including AP, Mortise and lateral views were obtained and reviewed in office:    Impression: The right ankle demonstrates some mild to moderate osteoarthritis of the ankle. The left ankle and hindfoot shows some mild arthrosis of the ankle and subtalar joint. No other focal bony abnormalities or obvious osseous defects be appreciated. Assessment: Osteoarthritis bilateral ankles    Plan: Patient's radiographs and clinical exam findings were discussed with her at today's visit. We feel that the majority of her symptoms in both her knees and her ankles are coming from osteoarthritis.   She is currently taking meloxicam, but we will switch her over to topical Voltaren gel. She is not a good candidate for injections due to her underlying diabetes which places her at a higher risk for infection. We would like to start her on focused physical therapy to address both her ankles and knees with strengthening and range of motion. We anticipate that she will need 2-3 days per week of focused therapy for the next 4-6 weeks. We will plan on seeing her back for a follow-up in the next 4-6 weeks. All questions were answered at today's visit. Caden Thomas DO, MPH  Clinical Fellow in [address] [place] [address]      This dictation was performed with a verbal recognition program [facility]) and it was checked for errors. It is possible that there are still dictated errors within this office note. If so, please bring any errors to my attention for an addendum. All efforts were made to ensure that this office note is accurate. Attestation:  I attest that my visit today with Lianne Davidsonn was supervised by Dr. Roxi Gill          I supervised my sports medicine fellow in the evaluation and development of a treatment plan  for this patient. I personally interviewed the patient and performed a physical examination. In addition, I discussed the patient's condition and treatment options with them. All of their questions were answered. I personally reviewed the patient's pain scale, review of systems, family history, social history, past medical history, allergies and medications. 13 point review of systems was collected today and is filed in the medical record. Greater than 50% of the visit was spent counseling the patient. Kendall Boo MD  Sports Medicine, Arthroscopic Knee and Shoulder Surgery    This dictation was performed with a verbal recognition program [facility]) and it was checked for errors.   It is possible that there are still dictated errors within this office note. If so, please bring any errors to my attention for an addendum. All efforts were made to ensure that this office note is accurate.

## 2018-07-24 ENCOUNTER — TELEPHONE (OUTPATIENT)
Dept: ORTHOPEDIC SURGERY | Age: 53
End: 2018-07-24

## 2018-07-26 ENCOUNTER — TELEPHONE (OUTPATIENT)
Dept: PAIN MANAGEMENT | Age: 53
End: 2018-07-26

## 2018-07-26 NOTE — TELEPHONE ENCOUNTER
Pt calling in regards to for her wheel chair form   She states that  new motion fax the form twice was it received or will  sign for the wheel chair?      New motion /[id number] option 3

## 2018-07-27 ENCOUNTER — TELEPHONE (OUTPATIENT)
Dept: PULMONOLOGY | Age: 53
End: 2018-07-27

## 2018-07-27 NOTE — TELEPHONE ENCOUNTER
I called patient for a confirm for Monday appt an she stated that she was having issue with her mask and that she needed a fan. She was seeing if dr Kristin Vidales could prescribe that to her and find a mask that fits her well.

## 2018-07-29 ASSESSMENT — ENCOUNTER SYMPTOMS
DIARRHEA: 0
BACK PAIN: 1
VOMITING: 0
ABDOMINAL PAIN: 0
CONSTIPATION: 0
SORE THROAT: 0
COUGH: 0
SHORTNESS OF BREATH: 0
CHEST TIGHTNESS: 0
NAUSEA: 0
RHINORRHEA: 0

## 2018-07-30 ENCOUNTER — HOSPITAL ENCOUNTER (OUTPATIENT)
Dept: PHYSICAL THERAPY | Age: 53
Setting detail: THERAPIES SERIES
Discharge: HOME | End: 2018-07-30
Payer: COMMERCIAL

## 2018-07-30 PROCEDURE — 97116 GAIT TRAINING THERAPY: CPT

## 2018-07-30 PROCEDURE — G8978 MOBILITY CURRENT STATUS: HCPCS

## 2018-07-30 PROCEDURE — 97164 PT RE-EVAL EST PLAN CARE: CPT

## 2018-07-30 PROCEDURE — 97530 THERAPEUTIC ACTIVITIES: CPT

## 2018-07-30 PROCEDURE — G8979 MOBILITY GOAL STATUS: HCPCS

## 2018-07-30 NOTE — PROGRESS NOTES
Modified Oswestry Low Back Pain Disability    Patient: Brian Price  : 1965  MRN: [id number]  Date: 2018  Electronically Signed by: Amber Juárez PT, DPT [id number]     Please Read: Could you please complete this questionnaire. It is designed to give us information as to how your back (or leg) trouble has affected your ability to manage in everyday life. Please answer every section. Blue one box only in each section that most closely describes you today  SECTION 1 - Pain Intensity  []A. The pain is mild and comes and goes  []B. The pain is mild and does not vary much  [x]C. The pain is moderate and comes and goes  []D. The pain is moderate and does not vary much  []E. The pain is severe and comes and goes  []F. The pain is severe and does not vary much SECTION 6 - Standing   []A. I can stand as long as I want without increased pain  []B. I can stand as long as I want but my pain increases with time  []C. Pain prevents me from standing more than 1 hour  []D. Pain prevents me from standing for more than 1/2 hour  []E. Pain prevents me from standing for more than 10 minutes  [x]F. I avoid standing because it increases my pain right away   SECTION 2 - Personal Care [place] , etc.)  []A. I do not have to change the way I wash and dress myself to avoid pain  []B. I do not normally change the way I wash or dress myself even though it causes some pain  []C. Washing and dressing increases my pain, but I can do it without changing my way of doing it  [x]D. Washing and dressing increases my pain, and I find it necessary to change the way I do it  []E. Because of my pain I am partially unable to wash and dress without help  []F. Because of my pain I am completely unable to wash or dress without help SECTION 7 - Sleeping  []A. I get no pain when I am in bed  []B. I get pain in bed, but it does not prevent me from sleeping well  [x]C.  Because of my pain, my sleep is only 3/4 of my normal amount travel   SECTION 5 - Sitting   []A. Sitting does not cause me any pain  []B. I can only sit as long as I like providing that I have my choice of seating surfaces  [x]C. Pain prevents me from sitting for more than 1 hour  []D. Pain prevents me from sitting for more than 1/2 hour  []E. Pain prevents me from sitting for more than 10 minutes  []F. Pain prevents me from sitting at all SECTION 10 - Employment/Homemaking  []A. My normal job/homemaking activities do not cause pain  []B. My normal job/homemaking activities increase my pain, but I can still perform all that is required of me  []C. I can perform most of my job/homemaking duties, but pain prevents me from performing more physically stressful activities (ex. Lifting, vacuuming, etc.)  [x]D. Pain prevents me from doing anything but light duties  []E. Pain prevents me from doing even light duties  []F. Pain prevents me from performing any job or homemaking chores     COMMENTS:     Patient Score: 32      Scoring Method for the Modified Oswestry Low Back Pain Disability Questionnaire      1. Each of the 10 sections is scored separately (0 to 5 points each) then added up (max. Total = 50). EXAMPLE:  Section 1. Pain Intensity  Item Score Item Description Point Value   A I have no pain at the moment 0   B The pain is very mild at the moment 1   C The pain is moderate at the moment 2   D The pain is fairly severe at the moment 3   E The pain is very severe at the moment 4   F The pain is the worst imaginable 5     2. If all 10 sections are completed, simply double the patient's score. 3. If a section is omitted, divide the patient's total score by the number of sections completed times 5. FORMULA: [(Patient's score) / (# Sections Completed X 5)] X 100 = ____% Disability    EXAMPLE:  If number of sections completed = 9  If patient's score = 22  The equation = [22 / (9 X 5)] X 100 = 48.9% Disability    4.  Interpretation of disability scores:    SCORE SCORE

## 2018-07-30 NOTE — FLOWSHEET NOTE
Physical Therapy Daily Treatment Note  Date:  2018    Patient Name:  Gagandeep Angel    :  1965  MRN: [id number]  Restrictions/Precautions:  Hx lumbar laminectomy , shortness of breath with movement, OA, Hx falls but most recent 3 years ago, HTN, DM, neuropathy  Medical/Treatment Diagnosis Information:  · Diagnosis: postlaminectomy syndrome of lumbar region M96.1, lumbar radiculopathy M54.16, other chronic pain G89.29  · Treatment Diagnosis: impaired gait due to LBP, B LE weakness  Insurance/Certification information:  PT Insurance Information: Arnold Ryan, prior auth required  Physician Information:  Referring Practitioner: Dr. Leonila Benitez MD Follow-up: 5/3/18  Plan of care signed (Y/N):  Y, resent 18 with updated goals  Visit# / total visits:   (Requires notification after annual cap $ has been reached PT+OT) (INS [place] [place] last 3 visits thru 18)  Pain level: 7/10 upon arrival      G-Code noted on 2018:   PT G-Codes   Functional Assessment Tool Used: modified oswestry  Score: 64% impaired  Functional Limitation: Mobility: Walking and moving around  Mobility: Walking and Moving Around Current Status (): CL  Mobility: Walking and Moving Around Goal Status (): At least 40 percent but less than 60 percent impaired, limited or restricted    Progress Note: []  Yes  [x]  No  Next due by: Visit #10      Subjective:  18: Pt history and communication thru Rachel greenberg.  Pt reports history of LB surgery for L4-5 herniated disc in  and had laminectomy. She reports this did not help her and over the next 5 years she gained weight and was recommended to have another surgery but would have to lose weight first.  Since then pt reports increased weight and increased pain levels in LB and B legs which made her move less. Pt has had an electric scooter for ~ 5 years now which is her primary means for locomotion.   She used to have a cane but tried not to use it as Ruby Astudillo here for entire treatment for communication with pt. Pt reports when went home after last visit, she went out to get the mail in her scooter and with the rain the pathway was wet/slick, slopes a little and pt felt the back of her scooter pushing her and she fell forward out of scooter and landed on knees and since her L ankle has been bothering her more than previously, R ankle also bothering her and L toes 2-5 bothering her. She has not been able to walking with rollator within home since incident due to increased pain with WB but has been able to transfer to toilet. Pt stated she did not go to the ER.       07/30/18: INS authorized last 3 visits thru Aug 20, 2018. Re-eval completed. Preston Washington,  here for entire treatment for communication with pt. \"I took strong medication before I came in, in preparation for exercises. \"   Recently pain ranges from 5-8/10 in LB. No change in medical history since last visit - no heart issues or CA with the exception of stones in gall bladder and thickened uterine lining to have tested. Pt has decreased frequency with going to the gym, when was coming to therapy was more active and went to gym about 3x per week and now only 1x per 2 weeks. Pt has stopped HEP - unsure of reason other than stress with calling INS. Does use TENS unit 2x daily which seems to help pain for about an hour. Pain is worse with standing/walking and only able to walk with rollator very short household distances. This limits household chores. Pain is better with TENS, pain medication. Objective: 03/27/18  Observation:    Palpation: mod tender to LB along surgical scar from prior surgery  Observation: Pt arrives to PT in electric scooter that she maneuvers independently. Ambulates with clinic's 4WW with SBA and followed by W/C with reciprocal gait pattern although with decreased B step lengths, slow speed. Pt ambulated 15', 10'.     Body Mechanics: Pt

## 2018-07-30 NOTE — PLAN OF CARE
Outpatient Physical Therapy  Phone: [phone] Fax: [phone]    To:   Dr. Kayla Hardy          From: Jovanna Garcia, PT, DPT [id number]   Date: 2018  Patient: Sue Hernandez     : 1965 MRN: [id number]  Diagnosis:   postlaminectomy syndrome of lumbar region M96.1, lumbar radiculopathy M54.16, other chronic pain G89.29       Treatment Diagnosis:   impaired gait due to LBP, B LE weakness     Physical Therapy Certification/Re-Certification Form  Dear Dr. Kathryn Julien,   The following patient has been evaluated for physical therapy services and for therapy to continue, Medicare requires monthly physician review of the treatment plan. Please review the attached evaluation and/or summary of the patient's plan of care, and verify that you agree therapy should continue by signing the attached document and sending it back to our office.     Plan of Care/Treatment to date:  [x] Therapeutic Exercise (Review/Progress HEP and provide verbal/tactile cueing for activities related to strengthening, flexibility,  endurance, ROM.)       [x] Therapeutic Activity (Provide verbal/tactile cueing for dynamic activities to promote functional tasks.)          [x] Gait Training (Provide verbal/tactile/visual cueing for facilitation of normalized gait pattern without or with the least restrictive AD to decrease pain and/or risk for falling.)          [x] Neuromuscular Re-education (Review/Progress HEP and provide verbal/tactile cueing for activities related to improving balance, coordination, kinesthetic sense, posture, motor skill, proprioception.)         [x] Manual Therapy (Provide manual therapy to mobilize soft tissue/joints for the purpose of modulating pain, promoting relaxation, increasing ROM, reducing/eliminating soft tissue swelling/inflammation/tightness, improving soft tissue extensibility)                [x] Modalities (For modulating pain/tenderness/paresthesias, reducing swelling/inflammation/tightness, improving soft tissue extensibility, and/or to increase muscle tone/strength):     [] Ultrasound  [] Electrical Stimulation        [] Cervical Traction [] Lumbar Traction    ? [] Cold/hotpack [] Iontophoresis   Other:      []          []      Goals:    Short term goals  Time Frame for Short term goals: 4 weeks (continue/updated STGs 1-2 07/30/18)  Short term goal 1: Decreased pain 6/10 at worst to allow for improved tolerance to sitting   Short term goal 2: B hip/quad strength 4-/5 to allow for improved WB tolerance   Short term goal 3: Pt ambulate with rollator 50' independently with symmetrical gait pattern MET  Long term goals  Time Frame for Long term goals : 8 weeks (continue/updated current LTGs 07/30/18)  Long term goal 1: Pt will demo good understanding and knowledge of HEP  Long term goal 2: Decreased LBP 4/10 at worst to allow for sleep with rare complaints  Long term goal 3: Lumbar AROM flexion 5 inches fingertips to floor to allow for bending for N dressing/bathing  Long term goal 4: Strength B LE 4+/5 to allow for prolonged standing for household chores  Long term goal 5: Ambualte with rollator independently for 200' with symmetrical gait pattern without rest break needed    Frequency/Duration: finish 3 visits per initial POC - delay due to INS  # Days per week: [] 1 day # Weeks: [] 1 week [] 5 weeks     [x] 2 days? [x] 2 weeks [] 6 weeks     [] 3 days   [] 3 weeks [] 7 weeks     [] 4 days   [] 4 weeks [] 8 weeks    Rehab Potential: [] Excellent [x] Good [] Fair  [] Poor       Electronically signed by: Li Masters PT, DPT [id number]        If you have any questions or concerns, please don't hesitate to call.   Thank you for your referral.      Physician Signature:________________________________Date:__________________  By signing above, therapists plan is approved by physician

## 2018-07-31 ENCOUNTER — OFFICE VISIT (OUTPATIENT)
Dept: ORTHOPEDIC SURGERY | Age: 53
End: 2018-07-31

## 2018-07-31 VITALS
WEIGHT: 293 LBS | HEART RATE: 76 BPM | SYSTOLIC BLOOD PRESSURE: 127 MMHG | BODY MASS INDEX: 50.02 KG/M2 | HEIGHT: 64 IN | DIASTOLIC BLOOD PRESSURE: 82 MMHG

## 2018-07-31 DIAGNOSIS — M75.81 ROTATOR CUFF TENDINITIS, RIGHT: ICD-10-CM

## 2018-07-31 DIAGNOSIS — M25.511 RIGHT SHOULDER PAIN, UNSPECIFIED CHRONICITY: Primary | ICD-10-CM

## 2018-07-31 DIAGNOSIS — M75.82 ROTATOR CUFF TENDINITIS, LEFT: ICD-10-CM

## 2018-07-31 DIAGNOSIS — M25.512 LEFT SHOULDER PAIN, UNSPECIFIED CHRONICITY: ICD-10-CM

## 2018-07-31 PROCEDURE — 1036F TOBACCO NON-USER: CPT | Performed by: ORTHOPAEDIC SURGERY

## 2018-07-31 PROCEDURE — G8427 DOCREV CUR MEDS BY ELIG CLIN: HCPCS | Performed by: ORTHOPAEDIC SURGERY

## 2018-07-31 PROCEDURE — 3017F COLORECTAL CA SCREEN DOC REV: CPT | Performed by: ORTHOPAEDIC SURGERY

## 2018-07-31 PROCEDURE — G8417 CALC BMI ABV UP PARAM F/U: HCPCS | Performed by: ORTHOPAEDIC SURGERY

## 2018-07-31 PROCEDURE — 99214 OFFICE O/P EST MOD 30 MIN: CPT | Performed by: ORTHOPAEDIC SURGERY

## 2018-07-31 PROCEDURE — 20610 DRAIN/INJ JOINT/BURSA W/O US: CPT | Performed by: ORTHOPAEDIC SURGERY

## 2018-07-31 PROCEDURE — 3014F SCREEN MAMMO DOC REV: CPT | Performed by: ORTHOPAEDIC SURGERY

## 2018-07-31 NOTE — PROGRESS NOTES
Chief Complaint    Shoulder Pain (bilateral left is worse)      History of Present Illness:  Mathew Bragg is a 47 y.o. female here today for bilateral shoulder evaluation. She is right-hand dominant. She complains of about 4 months of left greater than right shoulder pain. She is generalized deconditioning because of failed back surgery and super obesity - she has been mostly wheelchair bound for the last 4 years. She is able to stand and pivot and can ambulatory from her bed to the restroom at home. She's been wheelchair-bound since some back surgery about 4 years ago. She denies any new injuries to the shoulders. [facility] a few months ago. She's never done any formal physical therapy for her shoulders. She gets some exercise during the week at [facility]. She does some water aerobics as much as she can. She has difficulty with her left shoulder more than the right in the pool and doing anything in general.    She is a native aerobic speaker. An  was present during the visit. Dr. Matty Hightower is also fluent in [employer]. Communication was made with the patient through the , person to person with Dr. Matty Hightower in the patient in both English and Nepali    Pain Assessment  Location of Pain: Shoulder  Location Modifiers: Left, Right  Severity of Pain: 8  Quality of Pain: Other (Comment)  Duration of Pain: Other (Comment)  Frequency of Pain: Other (Comment)  Aggravating Factors: Other (Comment)  Limiting Behavior: Yes  Relieving Factors: Other (Comment)  Result of Injury: No  Work-Related Injury: No  Are there other pain locations you wish to document?: No    Medical History:    Review of Systems   Musculoskeletal: Positive for joint pain.        Lido    NDC#: 5102-7519-43  Lot: [id number]  Expiration Date: 5/19  Dose: 8cc  Location: injection was given in Right Shoulder    Depomedrol     NDC#: 8144-3513-12  Lot: [id number]  Expiration Date: were made to ensure that this office note is accurate. Attestation:  I attest that my visit today with Jack Minaya was supervised by Dr An Russ.  _______________  I was physically present and personally supervised the Orthopaedic Sports Medicine Fellow in the evaluation and development of a treatment plan for this patient. I personally interviewed the patient and performed a physical examination. In addition, I discussed the patient's condition and treatment options with them. I have also reviewed and agree with the past medical, family and social history unless otherwise noted. All of the patient's questions were answered. Saad Russ MD, PhD  7/31/2018

## 2018-07-31 NOTE — LETTER
Shoulder Elbow Rehabilitation Referral    Patient Name: Brandon Deluna      YOB: 1965    Diagnosis: generalized deconditioning. Bilateral rotator cuff tendinitis. Left > right. Surgery Date:     Precautions:     Post Op Instructions:  [] Continuous passive motion (CPM)  [] Elbow range of motion  [] Exercise in plane of scapula  []  Strengthening     [] Pulley and instruction   [x] Home exercise program (copy to patient)   [] Sling when arm at risk  [] Sling or brace at all times   [] AAROM: Forward elevation to              [] AAROM: External rotation  To      [] Isometric external rotator strengthening [] AAROM: internal rotation: up the back  [] Isometric abductor strengthening  [] AAROM: Internal abduction     [] Isometric internal rotator strengthening [] AAROM: cross-body adduction             Stretching:     Strengthening:  [x] Four quadrant (FE, ER, IR, CBA)  [] Sleeper stretch    [x] Rotator cuff (ER, IR, Abd)  [] Forward Elevation    [] External Rotators     [] External Rotation    [] Internal Rotators  [] Internal Rotation: up/back   [] Abductors     [] Internal Rotation: supine in abduction [] Flexors  [] Cross-body abduction    [] Extensors  [] Pendulum (FE, Abd/Add, cw/ccw)  [x] Scapular Stabilizers   [] Wall-walking (FE, Abd)    [] Shoulder shrugs     [] Table slides      [] Rhomboid pinch  [] Elbow (flex, ext, pron, sup)    [] Lat.  Pull downs     [] Medial epicondylitis program   [] Forward punch   [] Lateral epicondylitis program   [] Internal rotators     [] Progressive resistive exercises  [] Bench Press        [] Bench press plus  Activities:     [] Lateral pull-downs  [] Rowing     [] Progressive two-hand supine press  [] Stepper/Exercise bike   [] Biceps: curls/supination  [] Swimming  [] Water exercises    Modalities: PRN    Return to Sport:  [] Ultrasound     [] Plyometrics  [] Iontophoresis    [] Rhythmic stabilization  [] Moist heat     [] Core strengthening [] Massage     [] Sports specific program:      [x] Cryotherapy      [] Electrical stimulation     [] Paraffin  [] Whirlpool  [] TENS    [x] Home exercise program (copy to patient). [x] Supervised physical therapy  Frequency: []  1x week  [x] 2x week  [] 3x week  [] Other:   Duration: [] 2 weeks   [] 4 weeks  [x] 6 weeks  [] Other:     Sincerely,              Mik Ruano MD  Clinical Fellow in [address] [place] [address]  7/31/2018     This dictation was performed with a verbal recognition program [facility]) and it was checked for errors. It is possible that there are still dictated errors within this office note. If so, please bring any errors to my attention for an addendum. All efforts were made to ensure that this office note is accurate.

## 2018-08-02 ENCOUNTER — OFFICE VISIT (OUTPATIENT)
Dept: SLEEP MEDICINE | Age: 53
End: 2018-08-02

## 2018-08-02 VITALS
RESPIRATION RATE: 16 BRPM | TEMPERATURE: 98.5 F | WEIGHT: 293 LBS | HEIGHT: 66 IN | DIASTOLIC BLOOD PRESSURE: 82 MMHG | OXYGEN SATURATION: 97 % | SYSTOLIC BLOOD PRESSURE: 126 MMHG | HEART RATE: 60 BPM | BODY MASS INDEX: 47.09 KG/M2

## 2018-08-02 DIAGNOSIS — N95.1 MENOPAUSAL HOT FLUSHES: ICD-10-CM

## 2018-08-02 DIAGNOSIS — E66.01 MORBID OBESITY WITH BODY MASS INDEX (BMI) OF 60.0 TO 69.9 IN ADULT (HCC): ICD-10-CM

## 2018-08-02 DIAGNOSIS — G47.33 OSA ON CPAP: Primary | ICD-10-CM

## 2018-08-02 DIAGNOSIS — Z99.89 OSA ON CPAP: Primary | ICD-10-CM

## 2018-08-02 PROCEDURE — 1036F TOBACCO NON-USER: CPT | Performed by: PSYCHIATRY & NEUROLOGY

## 2018-08-02 PROCEDURE — G8427 DOCREV CUR MEDS BY ELIG CLIN: HCPCS | Performed by: PSYCHIATRY & NEUROLOGY

## 2018-08-02 PROCEDURE — 99213 OFFICE O/P EST LOW 20 MIN: CPT | Performed by: PSYCHIATRY & NEUROLOGY

## 2018-08-02 PROCEDURE — 3017F COLORECTAL CA SCREEN DOC REV: CPT | Performed by: PSYCHIATRY & NEUROLOGY

## 2018-08-02 PROCEDURE — G8417 CALC BMI ABV UP PARAM F/U: HCPCS | Performed by: PSYCHIATRY & NEUROLOGY

## 2018-08-02 PROCEDURE — 3014F SCREEN MAMMO DOC REV: CPT | Performed by: PSYCHIATRY & NEUROLOGY

## 2018-08-02 ASSESSMENT — SLEEP AND FATIGUE QUESTIONNAIRES
HOW LIKELY ARE YOU TO NOD OFF OR FALL ASLEEP WHILE SITTING AND READING: 0
HOW LIKELY ARE YOU TO NOD OFF OR FALL ASLEEP WHILE SITTING INACTIVE IN A PUBLIC PLACE: 0
HOW LIKELY ARE YOU TO NOD OFF OR FALL ASLEEP WHILE SITTING QUIETLY AFTER LUNCH WITHOUT ALCOHOL: 2
HOW LIKELY ARE YOU TO NOD OFF OR FALL ASLEEP WHEN YOU ARE A PASSENGER IN A CAR FOR AN HOUR WITHOUT A BREAK: 2
ESS TOTAL SCORE: 7
HOW LIKELY ARE YOU TO NOD OFF OR FALL ASLEEP WHILE LYING DOWN TO REST IN THE AFTERNOON WHEN CIRCUMSTANCES PERMIT: 3
HOW LIKELY ARE YOU TO NOD OFF OR FALL ASLEEP IN A CAR, WHILE STOPPED FOR A FEW MINUTES IN TRAFFIC: 0
HOW LIKELY ARE YOU TO NOD OFF OR FALL ASLEEP WHILE SITTING AND TALKING TO SOMEONE: 0
HOW LIKELY ARE YOU TO NOD OFF OR FALL ASLEEP WHILE WATCHING TV: 0

## 2018-08-02 NOTE — PATIENT INSTRUCTIONS
foods.  · Do not smoke. Smoking can make hot flashes worse. If you need help quitting, talk to your doctor about stop-smoking programs and medicines. These can increase your chances of quitting for good. · Get at least 30 minutes of exercise on most days of the week. Walking is a good choice. You also may want to do other activities, such as running, swimming, cycling, or playing tennis or team sports. Where can you learn more? Go to https://DataLockerpepic(In)Touch Network.Scanntech. org and sign in to your CastleOS account. Enter F700 in the [employer] box to learn more about \"Hot Flashes During Menopause: Care Instructions. \"     If you do not have an account, please click on the \"Sign Up Now\" link. Current as of: October 6, 2017  Content Version: 11.6  © 0815-1969 [employer]. Care instructions adapted under license by [facility] ([facility]). If you have questions about a medical condition or this instruction, always ask your healthcare professional. [place] [address] any warranty or liability for your use of this information. Patient Education        Learning About CPAP for Sleep Apnea  What is CPAP? CPAP is a small machine that you use at home every night while you sleep. It increases air pressure in your throat to keep your airway open. When you have sleep apnea, this can help you sleep better so you feel much better. CPAP stands for \"continuous positive airway pressure. \"  The CPAP machine will have one of the following:  · A mask that covers your nose and mouth  · Prongs that fit into your nose  · A mask that covers your nose only, the most common type. This type is called NCPAP. The N stands for \"nasal.\"  Why is it done? CPAP is usually the best treatment for obstructive sleep apnea. It is the first treatment choice and the most widely used. Your doctor may suggest CPAP if you have:  · Moderate to severe sleep apnea.   · Sleep apnea and coronary artery disease (CAD) or heart failure. How does it help? · CPAP can help you have more normal sleep, so you feel less sleepy and more alert during the daytime. · CPAP may help keep heart failure or other heart problems from getting worse. · CPAP may help lower your blood pressure. · If you use CPAP, your bed partner may also sleep better because you are not snoring or restless. What are the side effects? Some people who use CPAP have:  · A dry or stuffy nose and a sore throat. · Irritated skin on the face. · Sore eyes. · Bloating. If you have any of these problems, work with your doctor to fix them. Here are some things you can try:  · Be sure the mask or nasal prongs fit well. · See if your doctor can adjust the pressure of your CPAP. · If your nose is dry, try a humidifier. · If your nose is runny or stuffy, try decongestant medicine or a steroid nasal spray. Be safe with medicines. Read and follow all instructions on the label. Do not use the medicine longer than the label says. If these things do not help, you might try a different type of machine. Some machines have air pressure that adjusts on its own. Others have air pressures that are different when you breathe in than when you breathe out. This may reduce discomfort caused by too much pressure in your nose. Where can you learn more? Go to https://ImmuVenpeandreaeb.Magnum [employer] Resources. org and sign in to your GINKGOTREE account. Enter X934 in the [employer] box to learn more about \"Learning About CPAP for Sleep Apnea. \"     If you do not have an account, please click on the \"Sign Up Now\" link. Current as of: December 6, 2017  Content Version: 11.6  © 5644-7001 [employer]. Care instructions adapted under license by [facility] ([facility]). If you have questions about a medical condition or this instruction, always ask your healthcare professional. [address] any warranty or liability for your use of this information.

## 2018-08-02 NOTE — PROGRESS NOTES
MD RUY Summers Board Certified in Sleep Medicine  Certified in [address] [place] [address] Certified in Neurology [address], [address] ([place] [address] [place] [address], [facility] Arturo Robison [address] SLEEP MEDICINE [place]    Subjective:     Patient ID: Telly Albert is a 47 y.o. female. Chief Complaint   Patient presents with    Follow-up     1st cpap - wants to discuss mask        HPI:        Telly Albert is a 47 y.o. female was seen today as a follow for obstructive sleep apnea. The patient underwent comprehensive split polysomnogram on 06/08/2018, the overnight registration revealed severe obstructive sleep apnea with apnea hypopnea index of 64/h with lowest O2 saturation of 73%, patient spent about 48.4 minutes below 90%. Subsequently, the patient underwent successful PAP titration on the second half of the night, the lowest O2 saturation while on PAP was 91%. Patient is using the PAP machine about 100% of the time, more than 4 hours a night about  97 %, in total average of 6:48 hours a night in last 30 days. Currently on PAP at 15 cm, the AHI is only 0.9 events per hour at this pressure. Patient improved regarding daytime sleepiness and fatigue, wakes up refreshed in the morning. The Patient scored Total score: 7 on Steele Sleepiness Scale ( more than 10 is indicative of daytime sleepiness)   Patient has no problem with PAP pressure or mask. Wakes up in the morning with dry mouth, the setting of the heated humidifier at # auto. DOT/CDL - N/A  Previous Report(s) Reviewed: historical medical records     Social History     Social History    Marital status:      Spouse name: N/A    Number of children: N/A    Years of education: N/A     Occupational History    Not on file.      Social History Main Topics   

## 2018-08-02 NOTE — TELEPHONE ENCOUNTER
I refaxed LMN ( that was already faxed 7/25/2018). I noted for the them to call the patient right away. Fax with confirmation sheet scanned in.

## 2018-08-06 ENCOUNTER — OFFICE VISIT (OUTPATIENT)
Dept: ORTHOPEDIC SURGERY | Age: 53
End: 2018-08-06

## 2018-08-06 VITALS
SYSTOLIC BLOOD PRESSURE: 128 MMHG | BODY MASS INDEX: 43.4 KG/M2 | DIASTOLIC BLOOD PRESSURE: 85 MMHG | HEART RATE: 55 BPM | HEIGHT: 69 IN | WEIGHT: 293 LBS

## 2018-08-06 DIAGNOSIS — M70.61 GREATER TROCHANTERIC BURSITIS OF BOTH HIPS: ICD-10-CM

## 2018-08-06 DIAGNOSIS — M25.551 PAIN OF BOTH HIP JOINTS: Primary | ICD-10-CM

## 2018-08-06 DIAGNOSIS — M70.62 GREATER TROCHANTERIC BURSITIS OF BOTH HIPS: ICD-10-CM

## 2018-08-06 DIAGNOSIS — M25.552 PAIN OF BOTH HIP JOINTS: Primary | ICD-10-CM

## 2018-08-06 PROCEDURE — G8417 CALC BMI ABV UP PARAM F/U: HCPCS | Performed by: ORTHOPAEDIC SURGERY

## 2018-08-06 PROCEDURE — 99213 OFFICE O/P EST LOW 20 MIN: CPT | Performed by: ORTHOPAEDIC SURGERY

## 2018-08-06 PROCEDURE — 3014F SCREEN MAMMO DOC REV: CPT | Performed by: ORTHOPAEDIC SURGERY

## 2018-08-06 PROCEDURE — 3017F COLORECTAL CA SCREEN DOC REV: CPT | Performed by: ORTHOPAEDIC SURGERY

## 2018-08-06 PROCEDURE — G8427 DOCREV CUR MEDS BY ELIG CLIN: HCPCS | Performed by: ORTHOPAEDIC SURGERY

## 2018-08-06 NOTE — LETTER
Hip Rehabilitation Referral    Patient Name: Reji Astorga      YOB: 1965    Diagnosis: Bilateral Greater Trochanteric Bursitis / Hip pain     Precautions: N/A    Evaluate and Treat          Stretching and soft tissue mobs:  Strengthening:  [x] IT Band     [x] Core stabilization  [x] Adductors     [x] Glute eccentric progressing to concentric  [x] Hip Flexors     [] Pelvic and trunk strengthening  [x] Gluteals     [] Abductors     [x] Iliopsoas complex    [] Flexors  [x] Rectus femoris     [x] Progressive resistive exercises  [] TFL        [] Sartorius                     Activities:       [x] Kinematic Retraining       [x] Activity modification     [x] Patient education to avoid continued irritation with ADLs  [x] Normalization of gait    Modalities:     Return to Sport:  [x] Ultrasound     [] Plyometrics  [] Iontophoresis    [] Rhythmic stabilization  [x] Moist heat     [] Core strengthening   [x] Massage     [] Sports specific program:      [x] Cryotherapy      [] Electrical stimulation     [] Paraffin  [] Whirlpool  [] TENS  [x] Per therapist discretion  [x] Home exercise program (copy to patient). Perform exercises for:  30    minutes   2- 3      times/day  [x] Supervised physical therapy  Frequency: []  1x week  [x] 2x week  [] 3x week  [] Other:   Duration: [] 2 weeks   [] 4 weeks  [x] 6 weeks  [] Other:     Additional Instructions:       Non- Op CALLIE / Hip pain   Focus on core stabilization, glute & eccentric hip flexor strengthening progressing to concentric. Soft tissue mobs focus on hip adductors, rectus femoris, iliopsoas, TFL, & gluteals. Kinematic re-training and activity modification as indicated. Trochanteric Bursitis/Glutes Medius Tendinitis/tendinopathy:   Soft tissue mobs to Glutes,  Adductors,  ITB, primary and secondary hip flexors. Core stabilization, pelvic and trunk control exercise, Glute strengthening.   Activity modification and Pt education to avoid continued

## 2018-08-09 ENCOUNTER — TELEPHONE (OUTPATIENT)
Dept: INTERNAL MEDICINE CLINIC | Age: 53
End: 2018-08-09

## 2018-08-09 NOTE — TELEPHONE ENCOUNTER
Cassidy Etienne said [facility] called her insurance and wanted to order a scooter, Insurance needs to know if the Dr. Suzy Wallaceie her to have one and they need a script

## 2018-08-10 ENCOUNTER — TELEPHONE (OUTPATIENT)
Dept: INTERNAL MEDICINE CLINIC | Age: 53
End: 2018-08-10

## 2018-08-14 ENCOUNTER — TELEPHONE (OUTPATIENT)
Dept: PAIN MANAGEMENT | Age: 53
End: 2018-08-14

## 2018-08-14 ENCOUNTER — OFFICE VISIT (OUTPATIENT)
Dept: PAIN MANAGEMENT | Age: 53
End: 2018-08-14

## 2018-08-14 VITALS
SYSTOLIC BLOOD PRESSURE: 138 MMHG | DIASTOLIC BLOOD PRESSURE: 84 MMHG | BODY MASS INDEX: 45.99 KG/M2 | WEIGHT: 293 LBS | HEIGHT: 67 IN | HEART RATE: 59 BPM

## 2018-08-14 DIAGNOSIS — M96.1 POSTLAMINECTOMY SYNDROME OF LUMBAR REGION: Primary | ICD-10-CM

## 2018-08-14 DIAGNOSIS — M13.0 POLYARTHROPATHY, MULTIPLE SITES: ICD-10-CM

## 2018-08-14 DIAGNOSIS — G89.4 CHRONIC PAIN SYNDROME: ICD-10-CM

## 2018-08-14 PROCEDURE — 1036F TOBACCO NON-USER: CPT | Performed by: ANESTHESIOLOGY

## 2018-08-14 PROCEDURE — 3017F COLORECTAL CA SCREEN DOC REV: CPT | Performed by: ANESTHESIOLOGY

## 2018-08-14 PROCEDURE — G8417 CALC BMI ABV UP PARAM F/U: HCPCS | Performed by: ANESTHESIOLOGY

## 2018-08-14 PROCEDURE — 3014F SCREEN MAMMO DOC REV: CPT | Performed by: ANESTHESIOLOGY

## 2018-08-14 PROCEDURE — G8427 DOCREV CUR MEDS BY ELIG CLIN: HCPCS | Performed by: ANESTHESIOLOGY

## 2018-08-14 PROCEDURE — 99213 OFFICE O/P EST LOW 20 MIN: CPT | Performed by: ANESTHESIOLOGY

## 2018-08-14 RX ORDER — TIZANIDINE 2 MG/1
2 TABLET ORAL EVERY 8 HOURS PRN
Qty: 30 TABLET | Refills: 2 | Status: SHIPPED | OUTPATIENT
Start: 2018-08-14 | End: 2018-11-12 | Stop reason: SDUPTHER

## 2018-08-14 RX ORDER — GABAPENTIN 300 MG/1
300 CAPSULE ORAL 3 TIMES DAILY
Qty: 90 CAPSULE | Refills: 2 | Status: SHIPPED | OUTPATIENT
Start: 2018-08-14 | End: 2018-11-12 | Stop reason: SDUPTHER

## 2018-08-14 RX ORDER — OXYCODONE HYDROCHLORIDE AND ACETAMINOPHEN 5; 325 MG/1; MG/1
1 TABLET ORAL EVERY 8 HOURS PRN
Qty: 90 TABLET | Refills: 0 | Status: SHIPPED | OUTPATIENT
Start: 2018-08-20 | End: 2018-09-18 | Stop reason: DRUGHIGH

## 2018-08-14 RX ORDER — MELOXICAM 15 MG/1
15 TABLET ORAL DAILY
Qty: 30 TABLET | Refills: 2 | Status: SHIPPED | OUTPATIENT
Start: 2018-08-14 | End: 2018-11-12 | Stop reason: ALTCHOICE

## 2018-08-14 ASSESSMENT — ENCOUNTER SYMPTOMS
COUGH: 0
EYE DISCHARGE: 0
CONSTIPATION: 0
ABDOMINAL PAIN: 0
HEMOPTYSIS: 0
ORTHOPNEA: 0
VOMITING: 0
BACK PAIN: 1
HEARTBURN: 0
NAUSEA: 0
EYE REDNESS: 0
SHORTNESS OF BREATH: 0
WHEEZING: 0
EYE PAIN: 0

## 2018-08-14 NOTE — TELEPHONE ENCOUNTER
Pt needs the number to company for her back  brace  She will also like to know was the rx faxed for the back   Pt states she has not received it   Please return call

## 2018-08-15 ENCOUNTER — HOSPITAL ENCOUNTER (OUTPATIENT)
Dept: PHYSICAL THERAPY | Age: 53
Setting detail: THERAPIES SERIES
Discharge: HOME | End: 2018-08-15
Payer: COMMERCIAL

## 2018-08-15 PROCEDURE — 97110 THERAPEUTIC EXERCISES: CPT

## 2018-08-15 PROCEDURE — 97116 GAIT TRAINING THERAPY: CPT
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
## 2018-08-17 ENCOUNTER — HOSPITAL ENCOUNTER (OUTPATIENT)
Dept: PHYSICAL THERAPY | Age: 53
Setting detail: THERAPIES SERIES
Discharge: HOME | End: 2018-08-17
Payer: COMMERCIAL

## 2018-08-17 PROCEDURE — 97116 GAIT TRAINING THERAPY: CPT

## 2018-08-17 PROCEDURE — 97110 THERAPEUTIC EXERCISES: CPT

## 2018-08-17 PROCEDURE — 97530 THERAPEUTIC ACTIVITIES: CPT
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
## 2018-08-20 ENCOUNTER — HOSPITAL ENCOUNTER (OUTPATIENT)
Dept: PHYSICAL THERAPY | Age: 53
Setting detail: THERAPIES SERIES
Discharge: HOME | End: 2018-08-20
Payer: COMMERCIAL

## 2018-08-20 PROCEDURE — 97110 THERAPEUTIC EXERCISES: CPT | Performed by: PHYSICAL THERAPIST

## 2018-08-20 PROCEDURE — 97161 PT EVAL LOW COMPLEX 20 MIN: CPT | Performed by: PHYSICAL THERAPIST

## 2018-08-20 PROCEDURE — G8978 MOBILITY CURRENT STATUS: HCPCS | Performed by: PHYSICAL THERAPIST

## 2018-08-20 PROCEDURE — G8979 MOBILITY GOAL STATUS: HCPCS | Performed by: PHYSICAL THERAPIST

## 2018-08-20 NOTE — PLAN OF CARE
above  Functional Disability Index:LEFS:    Pain Scale: 8/10  Easing factors: lying supine, pain medication  Provocative factors: sitting, standing, sleeping     Type: []Constant   [x]Intermittent  []Radiating []Localized []other:     Numbness/Tingling: bilateral legs    Functional Limitations/Impairments: []Sitting [x]Standing [x]Walking    [x]Squatting/bending  [x]Stairs           [x]ADL's  [x]Transfers []Sports/Recreation []Other:    Occupation/School: n/a     Living Status/Prior Level of Function: Independent with ADLs and IADLs  (insert highest prior level of function)    OBJECTIVE:     ROM LEFT RIGHT   HIP Flex 90 90   HIP Abd     HIP Ext     HIP IR 10 5   HIP ER 35 30   Knee ext     Knee Flex     Ankle PF     Ankle DF     Ankle In     Ankle Ev     Strength  LEFT RIGHT   HIP Flexors 3/5 3/5   HIP Abductors 3-/5 3-/5   HIP Ext     Hip ER     Knee EXT (quad) 4/5 4/5   Knee Flex (HS) 4/5 4/5   Ankle DF     Ankle PF     Ankle Inv     Ankle EV          Circumference  Mid  7 cm       LE Dermatomes     LE myotomes       Single Leg Squat: n/a    Joint mobility:    []Normal    [x]Hypo   []Hyper    Palpation: Bilateral Gr trochanter    Functional Mobility/Transfers: Transfer from scooter to hi-lo table independently. Bed mobility-slow, labored but able to perform transfer independently    Posture: n/a    Bandages/Dressings/Incisions: n/a    Gait: (include devices/WB status) not tested    Orthopedic Special Tests: (+) Monico Slight                       [x] Patient history, allergies, meds reviewed. Medical chart reviewed. See intake form. Review Of Systems (ROS):  [x]Performed Review of systems (Integumentary, CardioPulmonary, Neurological) by intake and observation. Intake form has been scanned into medical record. Patient has been instructed to contact their primary care physician regarding ROS issues if not already being addressed at this time.         Co-morbidities/Complexities (which will affect course of rehabilitation):   []None           Arthritic conditions   []Rheumatoid arthritis (M05.9)  [x]Osteoarthritis (M19.91)   Cardiovascular conditions   [x]Hypertension (I10)  []Hyperlipidemia (E78.5)  []Angina pectoris (I20)  []Atherosclerosis (I70)   Musculoskeletal conditions   [x]Disc pathology   []Congenital spine pathologies   [x]Prior surgical intervention  []Osteoporosis (M81.8)  []Osteopenia (M85.8)   Endocrine conditions   []Hypothyroid (E03.9)  []Hyperthyroid Gastrointestinal conditions   []Constipation (K62.05)   Metabolic conditions   [x]Morbid obesity (E66.01)  [x]Diabetes type 1(E10.65) or 2 (E11.65)   []Neuropathy (G60.9)     Pulmonary conditions   []Asthma (J45)  []Coughing   []COPD (J44.9)   Psychological Disorders  []Anxiety (F41.9)  []Depression (F32.9)   []Other:   [x]Other:  Neuropathy          Barriers to/and or personal factors that will affect rehab potential:              []Age  []Sex              []Motivation/Lack of Motivation                        [x]Co-Morbidities              []Cognitive Function, education/learning barriers              []Environmental, home barriers              []profession/work barriers  []past PT/medical experience  []other:  Justification: OA is degenerative, HTN/ DM will slow healing    PACEMAKER:  - Denied having a pacemaker that would contraindicate the use of electrical modalities. METAL IMPLANTS:  - Denied metal implants that would contraindicate the use of thermal modalities. CANCER HISTORY:  - Denied a history of cancer that would contraindicate thermal modalities. Falls Risk Assessment (30 days):   [x] Falls Risk assessed and no intervention required.   [] Falls Risk assessed and Patient requires intervention due to being higher risk   TUG score (>12s at risk):     [] Falls education provided, including       G-Codes:       ASSESSMENT:   Functional Impairments:     [x]Noted lumbar/proximal hip/LE joint hypomobility   [x]Decreased LE functional []Excellent   []Good    [x]Fair   []Poor    Physical Therapy Evaluation Complexity Justification  [x] A history of present problem with:  [] no personal factors and/or comorbidities that impact the plan of care;  []1-2 personal factors and/or comorbidities that impact the plan of care  [x]3 personal factors and/or comorbidities that impact the plan of care  [x] An examination of body systems using standardized tests and measures addressing any of the following: body structures and functions (impairments), activity limitations, and/or participation restrictions;:  [] a total of 1-2 or more elements   [] a total of 3 or more elements   [x] a total of 4 or more elements   [x] A clinical presentation with:  [x] stable and/or uncomplicated characteristics   [] evolving clinical presentation with changing characteristics  [] unstable and unpredictable characteristics;   [x] Clinical decision making of [x] low, [] moderate, [] high complexity using standardized patient assessment instrument and/or measurable assessment of functional outcome. [x] EVAL (LOW) 37569 (typically 20 minutes face-to-face)  [] EVAL (MOD) 89150 (typically 30 minutes face-to-face)  [] EVAL (HIGH) 35295 (typically 45 minutes face-to-face)  [] RE-EVAL     PLAN  Frequency/Duration:  1 days per week for 5 Weeks:  Interventions:  1. Therapeutic exercise including: strength training, ROM/flexibility, NMR and proprioception for the proximal hip, core and Lower extremity  2. Manual therapy as indicated including Dry Needling/IASTM, STM, PROM, Gr I-IV mobilizations, spinal mobilization/manipulation. 3. Modalities as needed including: thermal agents, E-stim, US, iontophoresis as indicated. 4. Patient education on joint protection, activity modification, progression of HEP. HEP instruction: Can be found scanned in media file. (see scanned forms)    GOALS:  Patient stated goal: Decrease pain      Therapist goals for Patient:   Short Term Goals:  To be achieved in: 2 weeks  1. Independent in HEP and progression per patient tolerance, in order to prevent re-injury. 2. Patient will have a decrease in pain to facilitate improvement in movement, function, and ADLs as indicated by Functional Deficits. Long Term Goals: To be achieved in: 5 weeks  1. Disability index score of 60% or less for the LEFS to assist with reaching prior level of function. 2. Patient will demonstrate increased AROM to hip IR 15 to allow for proper joint functioning as indicated by patients Functional Deficits. 3. Patient will demonstrate an increase in Strength to 4/5 hip flex/abd/ER to allow for proper functional mobility as indicated by patients Functional Deficits. 4. Patient will return to tolerate sitting for 30 mintues without increased symptoms or restriction.           Electronically signed by:  Noelle Garcia PT

## 2018-08-20 NOTE — FLOWSHEET NOTE
[facility]      Bike      Elliptical      Standing Stretch:  (insert muscles)      Weight Shifting      Lateral Walk      Squats      Single Leg Stance Balance                            Therapeutic Exercise and NMR EXR  [] (30432) Provided verbal/tactile cueing for activities related to strengthening, flexibility, endurance, ROM for improvements in LE, proximal hip, and core control with self care, mobility, lifting, ambulation.  [] (49215) Provided verbal/tactile cueing for activities related to improving balance, coordination, kinesthetic sense, posture, motor skill, proprioception  to assist with LE, proximal hip, and core control in self care, mobility, lifting, ambulation and eccentric single leg control.      NMR and Therapeutic Activities:    [] (07243 or 08348) Provided verbal/tactile cueing for activities related to improving balance, coordination, kinesthetic sense, posture, motor skill, proprioception and motor activation to allow for proper function of core, proximal hip and LE with self care and ADLs  [] (02557) Gait Re-education- Provided training and instruction to the patient for proper LE, core and proximal hip recruitment and positioning and eccentric body weight control with ambulation re-education including up and down stairs     Home Exercise Program:    [x] (44516) Reviewed/Progressed HEP activities related to strengthening, flexibility, endurance, ROM of core, proximal hip and LE for functional self-care, mobility, lifting and ambulation/stair navigation   [] (92218)Reviewed/Progressed HEP activities related to improving balance, coordination, kinesthetic sense, posture, motor skill, proprioception of core, proximal hip and LE for self care, mobility, lifting, and ambulation/stair navigation      Manual Treatments:  PROM / Scar Mobs / STM/Rollerstick / Knee (Flex./Ext.)  Stretch: H.S. / ITB / Piriformis / Quad / Groin / Hip Flexor   [] (27487) Provided manual therapy to mobilize LE, proximal hip and/or LS spine soft tissue/joints for the purpose of modulating pain, promoting relaxation,  increasing ROM, reducing/eliminating soft tissue swelling/inflammation/restriction, improving soft tissue extensibility and allowing for proper ROM for normal function with self care, mobility, lifting and ambulation. Modalities:      Charges:  Timed Code Treatment Minutes: 15   Total Treatment Minutes: 30     [x] EVAL (LOW) 59533 (typically 20 minutes face-to-face)  [] EVAL (MOD) 12467 (typically 30 minutes face-to-face)  [] EVAL (HIGH) 13909 (typically 45 minutes face-to-face)  [] RE-EVAL     [x] AB(55152) x  1   [] IONTO  [] NMR (70640) x      [] VASO  [] Manual (05484) x       [] Other:  [] TA x       [] Mech Traction (98110)  [] ES(attended) (76034)      [] ES (un) (76318):     GOALS:   Short Term Goals: To be achieved in: 2 weeks  1. Independent in HEP and progression per patient tolerance, in order to prevent re-injury. 2. Patient will have a decrease in pain to facilitate improvement in movement, function, and ADLs as indicated by Functional Deficits. Long Term Goals: To be achieved in: 5 weeks  1. Disability index score of 60% or less for the LEFS to assist with reaching prior level of function. 2. Patient will demonstrate increased AROM to hip IR 15 to allow for proper joint functioning as indicated by patients Functional Deficits. 3. Patient will demonstrate an increase in Strength to 4/5 hip flex/abd/ER to allow for proper functional mobility as indicated by patients Functional Deficits. 4. Patient will return to tolerate sitting for 30 mintues without increased symptoms or restriction. Progression Towards Functional goals:  [] Patient is progressing as expected towards functional goals listed. [] Progression is slowed due to complexities listed. [] Progression has been slowed due to co-morbidities.   [x] Plan just implemented, too soon to assess goals progression  [] Other:     ASSESSMENT: See eval    Treatment/Activity Tolerance:  [] Patient tolerated treatment well [] Patient limited by fatique  [] Patient limited by pain  [x] Patient limited by other medical complications  [] Other:     Prognosis: [] Good [x] Fair  [] Poor    Patient Requires Follow-up: [x] Yes  [] No    PLAN FOR NEXT SESSION: progress hip/LE strengthening as tolerated    PLAN: See eval  [] Continue per plan of care [] Alter current plan (see comments)  [x] Plan of care initiated [] Hold pending MD visit [] Discharge    Electronically signed by: Maximino Fam PT  [id number]

## 2018-08-21 ENCOUNTER — HOSPITAL ENCOUNTER (OUTPATIENT)
Dept: PREADMISSION TESTING | Age: 53
Discharge: HOME | End: 2018-08-21
Attending: OBSTETRICS & GYNECOLOGY | Admitting: OBSTETRICS & GYNECOLOGY

## 2018-08-21 DIAGNOSIS — Z01.818 ENCOUNTER FOR PREADMISSION TESTING: ICD-10-CM

## 2018-08-21 LAB
A/G RATIO: 1.1 (ref 1.1–2.2)
ALBUMIN SERPL-MCNC: 4 G/DL (ref 3.4–5)
ALP BLD-CCNC: 69 U/L (ref 40–129)
ALT SERPL-CCNC: 10 U/L (ref 10–40)
ANION GAP SERPL CALCULATED.3IONS-SCNC: 12 MMOL/L (ref 3–16)
AST SERPL-CCNC: 10 U/L (ref 15–37)
BACTERIA: ABNORMAL /HPF
BASOPHILS ABSOLUTE: 0 K/UL (ref 0–0.2)
BASOPHILS RELATIVE PERCENT: 0.3 %
BILIRUB SERPL-MCNC: 0.4 MG/DL (ref 0–1)
BILIRUBIN URINE: NEGATIVE
BLOOD, URINE: NEGATIVE
BUN BLDV-MCNC: 13 MG/DL (ref 7–20)
CALCIUM SERPL-MCNC: 9.4 MG/DL (ref 8.3–10.6)
CHLORIDE BLD-SCNC: 100 MMOL/L (ref 99–110)
CLARITY: CLEAR
CO2: 24 MMOL/L (ref 21–32)
COLOR: YELLOW
CREAT SERPL-MCNC: <0.5 MG/DL (ref 0.6–1.1)
EKG ATRIAL RATE: 73 BPM
EKG DIAGNOSIS: NORMAL
EKG P AXIS: 29 DEGREES
EKG P-R INTERVAL: 160 MS
EKG Q-T INTERVAL: 382 MS
EKG QRS DURATION: 90 MS
EKG QTC CALCULATION (BAZETT): 420 MS
EKG R AXIS: -7 DEGREES
EKG T AXIS: 30 DEGREES
EKG VENTRICULAR RATE: 73 BPM
EOSINOPHILS ABSOLUTE: 0.1 K/UL (ref 0–0.6)
EOSINOPHILS RELATIVE PERCENT: 0.9 %
EPITHELIAL CELLS, UA: 4 /HPF (ref 0–5)
GFR AFRICAN AMERICAN: >60
GFR NON-AFRICAN AMERICAN: >60
GLOBULIN: 3.8 G/DL
GLUCOSE BLD-MCNC: 93 MG/DL (ref 70–99)
GLUCOSE URINE: NEGATIVE MG/DL
HCT VFR BLD CALC: 39.8 % (ref 36–48)
HEMOGLOBIN: 13.1 G/DL (ref 12–16)
HYALINE CASTS: 2 /LPF (ref 0–8)
KETONES, URINE: ABNORMAL MG/DL
LEUKOCYTE ESTERASE, URINE: ABNORMAL
LYMPHOCYTES ABSOLUTE: 1.8 K/UL (ref 1–5.1)
LYMPHOCYTES RELATIVE PERCENT: 23 %
MCH RBC QN AUTO: 28.3 PG (ref 26–34)
MCHC RBC AUTO-ENTMCNC: 32.9 G/DL (ref 31–36)
MCV RBC AUTO: 86.1 FL (ref 80–100)
MICROSCOPIC EXAMINATION: YES
MONOCYTES ABSOLUTE: 0.5 K/UL (ref 0–1.3)
MONOCYTES RELATIVE PERCENT: 6.5 %
NEUTROPHILS ABSOLUTE: 5.5 K/UL (ref 1.7–7.7)
NEUTROPHILS RELATIVE PERCENT: 69.3 %
NITRITE, URINE: NEGATIVE
PDW BLD-RTO: 15.2 % (ref 12.4–15.4)
PH UA: 7
PLATELET # BLD: 320 K/UL (ref 135–450)
PMV BLD AUTO: 7.4 FL (ref 5–10.5)
POTASSIUM SERPL-SCNC: 4.4 MMOL/L (ref 3.5–5.1)
PROTEIN UA: NEGATIVE MG/DL
RBC # BLD: 4.62 M/UL (ref 4–5.2)
RBC UA: 6 /HPF (ref 0–4)
SODIUM BLD-SCNC: 136 MMOL/L (ref 136–145)
SPECIFIC GRAVITY UA: 1.02
TOTAL PROTEIN: 7.8 G/DL (ref 6.4–8.2)
URINE REFLEX TO CULTURE: YES
URINE TYPE: ABNORMAL
UROBILINOGEN, URINE: 0.2 E.U./DL
WBC # BLD: 8 K/UL (ref 4–11)
WBC UA: 15 /HPF (ref 0–5)

## 2018-08-22 LAB
ORGANISM: ABNORMAL
URINE CULTURE, ROUTINE: ABNORMAL
URINE CULTURE, ROUTINE: ABNORMAL

## 2018-08-24 ENCOUNTER — TELEPHONE (OUTPATIENT)
Dept: INTERNAL MEDICINE CLINIC | Age: 53
End: 2018-08-24

## 2018-08-27 ENCOUNTER — HOSPITAL ENCOUNTER (OUTPATIENT)
Dept: PHYSICAL THERAPY | Age: 53
Setting detail: THERAPIES SERIES
Discharge: HOME | End: 2018-08-27
Payer: COMMERCIAL

## 2018-08-27 PROCEDURE — 97110 THERAPEUTIC EXERCISES: CPT | Performed by: PHYSICAL THERAPIST

## 2018-08-27 NOTE — FLOWSHEET NOTE
Flex 10x B     SLR Abd       SLR Ext      SLR Add. Clamshells 2x15 B Orange loop, supine One leg at a time   [employer] w/ ball squeeze 1x10,  1x5     HR/TR      Add squeeze 10x5''     Ankle Theraband      BAPS      Bike      Elliptical      Standing Stretch:  (insert muscles)      Weight Shifting      Lateral Walk      Squats      Single Leg Stance Balance                            Therapeutic Exercise and NMR EXR  [x] (33070) Provided verbal/tactile cueing for activities related to strengthening, flexibility, endurance, ROM for improvements in LE, proximal hip, and core control with self care, mobility, lifting, ambulation.  [] (65339) Provided verbal/tactile cueing for activities related to improving balance, coordination, kinesthetic sense, posture, motor skill, proprioception  to assist with LE, proximal hip, and core control in self care, mobility, lifting, ambulation and eccentric single leg control.      NMR and Therapeutic Activities:    [] (13639 or 76653) Provided verbal/tactile cueing for activities related to improving balance, coordination, kinesthetic sense, posture, motor skill, proprioception and motor activation to allow for proper function of core, proximal hip and LE with self care and ADLs  [] (86270) Gait Re-education- Provided training and instruction to the patient for proper LE, core and proximal hip recruitment and positioning and eccentric body weight control with ambulation re-education including up and down stairs     Home Exercise Program:    [x] (97896) Reviewed/Progressed HEP activities related to strengthening, flexibility, endurance, ROM of core, proximal hip and LE for functional self-care, mobility, lifting and ambulation/stair navigation   [] (65688)Reviewed/Progressed HEP activities related to improving balance, coordination, kinesthetic sense, posture, motor skill, proprioception of core, proximal hip and LE for self care, mobility, lifting, and ambulation/stair navigation Manual Treatments:  PROM / Scar Mobs / STM/Rollerstick / Knee (Flex./Ext.)  Stretch: H.S. / ITB / Piriformis / Quad / Groin / Hip Flexor   [] (86447) Provided manual therapy to mobilize LE, proximal hip and/or LS spine soft tissue/joints for the purpose of modulating pain, promoting relaxation,  increasing ROM, reducing/eliminating soft tissue swelling/inflammation/restriction, improving soft tissue extensibility and allowing for proper ROM for normal function with self care, mobility, lifting and ambulation. Modalities:      Charges:  Timed Code Treatment Minutes: 30   Total Treatment Minutes: 30     [] EVAL (LOW) 26431 (typically 20 minutes face-to-face)  [] EVAL (MOD) 18514 (typically 30 minutes face-to-face)  [] EVAL (HIGH) 13416 (typically 45 minutes face-to-face)  [] RE-EVAL     [x] UL(83142) x  2   [] IONTO  [] NMR (15711) x      [] VASO  [] Manual (67851) x       [] Other:  [] TA x       [] Mech Traction (92718)  [] ES(attended) (78758)      [] ES (un) (00443):     GOALS:   Short Term Goals: To be achieved in: 2 weeks  1. Independent in HEP and progression per patient tolerance, in order to prevent re-injury. 2. Patient will have a decrease in pain to facilitate improvement in movement, function, and ADLs as indicated by Functional Deficits. Long Term Goals: To be achieved in: 5 weeks  1. Disability index score of 60% or less for the LEFS to assist with reaching prior level of function. 2. Patient will demonstrate increased AROM to hip IR 15 to allow for proper joint functioning as indicated by patients Functional Deficits. 3. Patient will demonstrate an increase in Strength to 4/5 hip flex/abd/ER to allow for proper functional mobility as indicated by patients Functional Deficits. 4. Patient will return to tolerate sitting for 30 mintues without increased symptoms or restriction.      Progression Towards Functional goals:  [] Patient is progressing as expected towards functional goals listed. [] Progression is slowed due to complexities listed. [] Progression has been slowed due to co-morbidities. [x] Plan just implemented, too soon to assess goals progression  [] Other:     ASSESSMENT:  Fatigued easily with TE. Unable to complete second set of bridges due to fatigue.       Treatment/Activity Tolerance:  [] Patient tolerated treatment well [] Patient limited by fatique  [] Patient limited by pain  [x] Patient limited by other medical complications  [] Other:     Prognosis: [] Good [x] Fair  [] Poor    Patient Requires Follow-up: [x] Yes  [] No    PLAN FOR NEXT SESSION: progress hip/LE strengthening as tolerated    PLAN: See eval  [x] Continue per plan of care [] Alter current plan (see comments)  [] Plan of care initiated [] Hold pending MD visit [] Discharge    Electronically signed by: Carmen Her PT  [id number]

## 2018-08-28 ENCOUNTER — PAT TELEPHONE (OUTPATIENT)
Dept: PREADMISSION TESTING | Age: 53
End: 2018-08-28

## 2018-08-28 ENCOUNTER — OFFICE VISIT (OUTPATIENT)
Dept: ORTHOPEDIC SURGERY | Age: 53
End: 2018-08-28

## 2018-08-28 ENCOUNTER — SURG/PROC ORDERS (OUTPATIENT)
Dept: ANESTHESIOLOGY | Age: 53
End: 2018-08-28

## 2018-08-28 VITALS
DIASTOLIC BLOOD PRESSURE: 68 MMHG | HEART RATE: 83 BPM | WEIGHT: 293 LBS | BODY MASS INDEX: 45.99 KG/M2 | HEIGHT: 67 IN | SYSTOLIC BLOOD PRESSURE: 101 MMHG

## 2018-08-28 VITALS — HEIGHT: 67 IN | BODY MASS INDEX: 45.99 KG/M2 | WEIGHT: 293 LBS

## 2018-08-28 DIAGNOSIS — M25.512 LEFT SHOULDER PAIN, UNSPECIFIED CHRONICITY: ICD-10-CM

## 2018-08-28 DIAGNOSIS — M25.511 RIGHT SHOULDER PAIN, UNSPECIFIED CHRONICITY: ICD-10-CM

## 2018-08-28 DIAGNOSIS — M75.81 ROTATOR CUFF TENDINITIS, RIGHT: Primary | ICD-10-CM

## 2018-08-28 DIAGNOSIS — M75.82 ROTATOR CUFF TENDINITIS, LEFT: ICD-10-CM

## 2018-08-28 PROCEDURE — 3014F SCREEN MAMMO DOC REV: CPT | Performed by: ORTHOPAEDIC SURGERY

## 2018-08-28 PROCEDURE — G8417 CALC BMI ABV UP PARAM F/U: HCPCS | Performed by: ORTHOPAEDIC SURGERY

## 2018-08-28 PROCEDURE — 99213 OFFICE O/P EST LOW 20 MIN: CPT | Performed by: ORTHOPAEDIC SURGERY

## 2018-08-28 PROCEDURE — 1036F TOBACCO NON-USER: CPT | Performed by: ORTHOPAEDIC SURGERY

## 2018-08-28 PROCEDURE — G8427 DOCREV CUR MEDS BY ELIG CLIN: HCPCS | Performed by: ORTHOPAEDIC SURGERY

## 2018-08-28 PROCEDURE — 3017F COLORECTAL CA SCREEN DOC REV: CPT | Performed by: ORTHOPAEDIC SURGERY

## 2018-08-28 RX ORDER — AMOXICILLIN AND CLAVULANATE POTASSIUM 875; 125 MG/1; MG/1
1 TABLET, FILM COATED ORAL 2 TIMES DAILY
COMMUNITY
End: 2019-01-28

## 2018-08-28 RX ORDER — SODIUM CHLORIDE 0.9 % (FLUSH) 0.9 %
10 SYRINGE (ML) INJECTION PRN
Status: CANCELLED | OUTPATIENT
Start: 2018-08-28 | End: 2019-08-28

## 2018-08-28 RX ORDER — SODIUM CHLORIDE 0.9 % (FLUSH) 0.9 %
10 SYRINGE (ML) INJECTION EVERY 12 HOURS SCHEDULED
Status: CANCELLED | OUTPATIENT
Start: 2018-08-28 | End: 2019-08-28

## 2018-08-28 RX ORDER — SODIUM CHLORIDE 9 MG/ML
INJECTION, SOLUTION INTRAVENOUS CONTINUOUS
Status: CANCELLED | OUTPATIENT
Start: 2018-08-28 | End: 2019-08-28

## 2018-08-30 ENCOUNTER — HOSPITAL ENCOUNTER (OUTPATIENT)
Dept: SURGERY | Age: 53
Discharge: HOME | End: 2018-08-30
Attending: OBSTETRICS & GYNECOLOGY | Admitting: OBSTETRICS & GYNECOLOGY

## 2018-08-30 VITALS
HEIGHT: 67 IN | DIASTOLIC BLOOD PRESSURE: 87 MMHG | OXYGEN SATURATION: 96 % | WEIGHT: 293 LBS | SYSTOLIC BLOOD PRESSURE: 141 MMHG | TEMPERATURE: 96.9 F | RESPIRATION RATE: 16 BRPM | BODY MASS INDEX: 45.99 KG/M2 | HEART RATE: 81 BPM

## 2018-08-30 DIAGNOSIS — N95.0 POSTMENOPAUSAL BLEEDING: ICD-10-CM

## 2018-08-30 LAB
GLUCOSE BLD-MCNC: 84 MG/DL (ref 70–99)
GLUCOSE BLD-MCNC: 86 MG/DL (ref 70–99)
PERFORMED ON: NORMAL
PERFORMED ON: NORMAL
PREGNANCY, URINE: NEGATIVE

## 2018-08-30 RX ORDER — MEPERIDINE HYDROCHLORIDE 25 MG/ML
12.5 INJECTION INTRAMUSCULAR; INTRAVENOUS; SUBCUTANEOUS EVERY 5 MIN PRN
Status: DISCONTINUED | OUTPATIENT
Start: 2018-08-30 | End: 2018-08-31 | Stop reason: HOSPADM

## 2018-08-30 RX ORDER — ONDANSETRON 2 MG/ML
4 INJECTION INTRAMUSCULAR; INTRAVENOUS
Status: ACTIVE | OUTPATIENT
Start: 2018-08-30 | End: 2018-08-30

## 2018-08-30 RX ORDER — FENTANYL CITRATE 50 UG/ML
25 INJECTION, SOLUTION INTRAMUSCULAR; INTRAVENOUS EVERY 5 MIN PRN
Status: DISCONTINUED | OUTPATIENT
Start: 2018-08-30 | End: 2018-08-31 | Stop reason: HOSPADM

## 2018-08-30 RX ORDER — FENTANYL CITRATE 50 UG/ML
50 INJECTION, SOLUTION INTRAMUSCULAR; INTRAVENOUS EVERY 5 MIN PRN
Status: DISCONTINUED | OUTPATIENT
Start: 2018-08-30 | End: 2018-08-31 | Stop reason: HOSPADM

## 2018-08-30 RX ORDER — MORPHINE SULFATE 2 MG/ML
2 INJECTION, SOLUTION INTRAMUSCULAR; INTRAVENOUS EVERY 5 MIN PRN
Status: DISCONTINUED | OUTPATIENT
Start: 2018-08-30 | End: 2018-08-31 | Stop reason: HOSPADM

## 2018-08-30 RX ORDER — OXYCODONE HYDROCHLORIDE AND ACETAMINOPHEN 5; 325 MG/1; MG/1
2 TABLET ORAL PRN
Status: ACTIVE | OUTPATIENT
Start: 2018-08-30 | End: 2018-08-30

## 2018-08-30 RX ORDER — OXYCODONE HYDROCHLORIDE AND ACETAMINOPHEN 5; 325 MG/1; MG/1
1 TABLET ORAL PRN
Status: ACTIVE | OUTPATIENT
Start: 2018-08-30 | End: 2018-08-30

## 2018-08-30 RX ORDER — ACETAMINOPHEN 500 MG
500 TABLET ORAL EVERY 6 HOURS PRN
Qty: 30 TABLET | Refills: 0 | Status: SHIPPED | OUTPATIENT
Start: 2018-08-30 | End: 2020-01-09

## 2018-08-30 RX ORDER — SODIUM CHLORIDE 0.9 % (FLUSH) 0.9 %
10 SYRINGE (ML) INJECTION PRN
Status: DISCONTINUED | OUTPATIENT
Start: 2018-08-30 | End: 2018-08-31 | Stop reason: HOSPADM

## 2018-08-30 RX ORDER — MORPHINE SULFATE 2 MG/ML
1 INJECTION, SOLUTION INTRAMUSCULAR; INTRAVENOUS EVERY 5 MIN PRN
Status: DISCONTINUED | OUTPATIENT
Start: 2018-08-30 | End: 2018-08-31 | Stop reason: HOSPADM

## 2018-08-30 RX ORDER — SODIUM CHLORIDE 0.9 % (FLUSH) 0.9 %
10 SYRINGE (ML) INJECTION EVERY 12 HOURS SCHEDULED
Status: DISCONTINUED | OUTPATIENT
Start: 2018-08-30 | End: 2018-08-31 | Stop reason: HOSPADM

## 2018-08-30 RX ORDER — SODIUM CHLORIDE 9 MG/ML
INJECTION, SOLUTION INTRAVENOUS CONTINUOUS
Status: DISCONTINUED | OUTPATIENT
Start: 2018-08-30 | End: 2018-08-31 | Stop reason: HOSPADM

## 2018-08-30 RX ADMIN — SODIUM CHLORIDE: 9 INJECTION, SOLUTION INTRAVENOUS at 10:28

## 2018-08-30 RX ADMIN — FENTANYL CITRATE 25 MCG: 50 INJECTION, SOLUTION INTRAMUSCULAR; INTRAVENOUS at 12:50

## 2018-08-30 RX ADMIN — FENTANYL CITRATE 25 MCG: 50 INJECTION, SOLUTION INTRAMUSCULAR; INTRAVENOUS at 12:30

## 2018-08-30 ASSESSMENT — PAIN SCALES - GENERAL
PAINLEVEL_OUTOF10: 0
PAINLEVEL_OUTOF10: 5
PAINLEVEL_OUTOF10: 5

## 2018-08-30 ASSESSMENT — PAIN - FUNCTIONAL ASSESSMENT: PAIN_FUNCTIONAL_ASSESSMENT: 0-10

## 2018-08-30 ASSESSMENT — ENCOUNTER SYMPTOMS: SHORTNESS OF BREATH: 1

## 2018-08-30 ASSESSMENT — LIFESTYLE VARIABLES: SMOKING_STATUS: 0

## 2018-08-30 NOTE — ANESTHESIA POST-OP
[facility] Department of Anesthesiology  Post-Anesthesia Note       Name:  Iraj Parker                                         Age:  47 y.o. MRN:  [id number]     Last Vitals & Oxygen Saturation: BP (!) 141/87   Pulse 81   Temp 96.9 °F (36.1 °C) (Temporal)   Resp 16   Ht 5' 7\" (1.702 m)   Wt (!) 368 lb (166.9 kg)   LMP 08/28/2014   SpO2 96%   BMI 57.64 kg/m²   No data found.       Level of consciousness: awake, alert and oriented    Respiratory: stable     Cardiovascular: stable     Hydration: stable     PONV: stable     Post-op pain: adequate analgesia    Post-op assessment: no apparent anesthetic complications and tolerated procedure well    Complications:  none    Virginia Cesar MD  August 30, 2018   6:43 PM

## 2018-08-30 NOTE — H&P
Patient Name: Sita French  Patient : 1965  Patient MRN: [id number]    Primary Oncologist: Carmelina Guillen  Referring Physician: Ki Vincent MD  OBGYN Physician: Kalpana Macdonald  PCP: Ronnell Garnett MD  Pain Mgt: Leonila Benitez MD  Bariatric: Laura Lundy DO    Reason for Consult     Post menopausal bleeding and thickened endometrium    Chief Complaint  Cervical stenosis  PMB  Urinary incontinence    HPI  Pt reports urinary incontinence, which is worsening in the last 3 months and that she has had episodes of bleeding intermittently since 2018. Her LMP was in . She was seen by Dr. Kalpana Macdonald and in office EMB was attempted on 2 occasions, however due to body habitus, difficult exam, cervical stenosis and pt discomfort procedure was discontinued. Urology referral has been made for urinary incontinence, her appointment is in . She denies fecal incontinence. She denies pelvic pain, however she has ongoing back pain. Of note pt was seen 2018 by Dr. Catarina Sotomayor and is planning laparoscopic gastric sleeve gastrectomy if sheis able to obtain pulmonary and cardiac clearance. These appointments have yet to be arranged. She has SHYANN but has not used CPAP in > 5 years. Her machine broke and she has not had it replaced. She has been counseled on importance of this during and after surgery. PCP ordered CT of abdomen and pelvis on 2018 but this has yet to take place.      Previous Therapies  EMB attempted  Problem List  Cervical stenosis  PMB  Urinary incontinence  Diabetes mellitus type II  Hyperlipidemia  Hypertension  Lumbar radiculopathy  Morbid obesity  SHYANN  Postlaminectomy syndrome    Past Medical History  See HPI & problem list    Surgical History  , Procedure: Lumpectomy of breast (procedure)  , Procedure: Surgical procedure (procedure)    OB/GYN History  Menses Details: Last Period: ; ; Pregnancy Details: : 2; Para: 2; #Living Children: 2; ; Menopause Information: Postmenopausal; Menopause Age: 47; ; OB/GYN Medication Hx: IUD: No; Other Contraceptive Hx: No;    Allergies  No known medication allergies    Medications  Cytotec (Misoprostol Oral) 200 mcg tablet 1 tablet orally 2 times per day. the night before and morning of your procedure  Meclizine Oral 12.5 mg antivert  Mobic (Meloxicam Oral) 15 mg  Neurontin (Gabapentin Oral) 300 mg  Oxycodone-Acetaminophen Oral 5 mg-325 mg    Social History  Smoking Status Never smoker    Pt speaks Kiswahili and is [place]. Her English is quite good. Alcohol use: None    Family History  Father: Unknown History - Hypertension  Mother: Cerebrovascular accident, Diabetes  Grandmother - Maternal (2nd Degree): Unknown History - Diabetes  Aunt - Maternal (2nd Degree):  - Diabetes, Uterine cancer  2 cousins with blood cancer  1 paternal uncle with bone cancer  Health Maintenance  Mammogram - Diagnostic (bilateral) on 03/15/2018, ACR category 2-benign  Mammogram - Screening (bilateral) on 03/15/2018, ACR BI-RADS Category: 0 (incomplete: need additional imaging evaluation and/or prior mammograms  for comparison)    Pap Smear on 2018, Nml HPV Negative    Review of Systems     Constitutional:  No weight loss, No fever, No chills, No night sweats. Energy level good. Eyes:  No diplopia, No transient or permanent loss of vision, No scotomata. ENT / Mouth:  No epistaxis, No dysphagia, No hoarseness, No oral ulcers, No gingival bleeding. No sore throat, No postnasal drip, No nasal drip, No mouth pain, No sinus pain, No tinnitus, Normal hearing. Cardiovascular:  No chest pain, No palpitations, No syncope, No upper extremity edema, No lower extremity edema, No calf discomfort. Respiratory:  No cough. No hemoptysis, No pleurlsy, No wheezing, No dyspnea. Breast:  No breast mass, No pain, No nipple discharge, No change in size, No change in shape.   Gastrointestinal:  No abdominal pain, No abdominal cramping, No nausea, No oriented to name, place and time. Motor skills grossly intact. SKIN: Normal skin color, texture, turgor and no jaundice. appears intact   EXTREMITIES: no LE edema   GYNECOLOGIC: Deferred Per patient request  RECTAL:Deferred Per patient request    Labs                             Imaging    Transabdominal and transvaginal ultrasound of the pelvis:  3/15/2018 10:18 AM EDT     INDICATION:  Postmenopausal bleeding;      COMPARISON:  None     TECHNIQUE:  Transabdominal and transvaginal multiplanar grayscale imaging was performed for   evaluation of the uterus, endometrium, and adnexal structures. FINDINGS:     The uterus measures 8.2 x 4.2 x 4.6 cm for a total volume of 84.7 mL. In the posterior uterine   body, there is a 2.5 x 2.0 x 2.6 cm partially calcified myometrial mass, compatible with a   calcified fibroid. The endometrial stripe is difficult to visualize but abnormally thickened at   7 mm. No free pelvic fluid is seen. The right ovary measures 1.7 x 1.3 x 1.9 cm, for a total volume of 2.2 mL. The left ovary measures 1.9 x 1.2 x 1.4 cm, for a total volume of 1.7 mL. The ovaries are normal in size and imaging appearance. IMPRESSION:     Endometrial thickness of 7 mm is abnormally thickened for a postmenopausal female with   bleeding. Further evaluation with endometrial biopsy is recommended. Calcified uterine fibroid. OCM - Patient Care Management  Pain Scale Value 7  Pain Management Plan:     Depression Status Was screened; Outcome positive: No; Screening Date: 05/01/2018; Screening Tool: Patient Health Questionnaire (PHQ9)  Psycho-social PHQ-9 Follow-up Plan (if applicable):     Assessment & Plan  1. PMB   2. Cervical stenosis  3. Thickened EM  Due to patient intolerance of examinations in the office previously I do recommend the patient an exam under anesthesia, D&C hysteroscopy.  The risks, benefits, and alternatives have been discussed including but not limited to:

## 2018-08-30 NOTE — ANESTHESIA PRE-OP
MG per tablet Take 1 tablet by mouth 2 times daily      oxyCODONE-acetaminophen (PERCOCET) 5-325 MG per tablet Take 1 tablet by mouth every 8 hours as needed for Pain for up to 30 days. Take lowest dose possible to manage pain. Earliest Fill Date: 8/20/18 90 tablet 0    meloxicam (MOBIC) 15 MG tablet Take 1 tablet by mouth daily With food 30 tablet 2    tiZANidine (ZANAFLEX) 2 MG tablet Take 1 tablet by mouth every 8 hours as needed (neck pain) 30 tablet 2    gabapentin (NEURONTIN) 300 MG capsule Take 1 capsule by mouth 3 times daily for 90 days. . 90 capsule 2    Misc. Devices [facility]) MISC Dispense a power wheelchair  Weight -382.9 lbs 1 each 0    nystatin (MYCOSTATIN) 055648 UNIT/GM cream Apply topically 2 times daily. 30 g 0    triamcinolone (KENALOG) 0.1 % cream Apply topically 2 times daily. 15 g 0    ranitidine (ZANTAC) 150 MG tablet Take 1 tablet by mouth 2 times daily 60 tablet 3    Handicap Placard MISC by Does not apply route Requesting for 5 years 1 each 0    meclizine (ANTIVERT) 12.5 MG tablet Take 1 tablet by mouth 3 times daily as needed for Dizziness 15 tablet 0    Misc. Devices (BARIATRIC ROLLATOR) MISC 1 Units by Does not apply route as needed (ambulatory help) Patient Height - 5'7\" and Weight - 412 lbs 1 each 0    diclofenac sodium (VOLTAREN) 1 % GEL Apply 4 g topically 4 times daily 5 Tube 0    Scooter MISC by Does not apply route Dispense a scooter with an adjustable chair  Weight-382.9 lbs 1 each 0    Scooter MISC by Does not apply route Motorized scooter for ambulatory aid  Repair or replace  (Patient is >300 lbs) 1 each 0    Scooter MISC by Does not apply route Requires repair of existing scooter 1 each 0    omeprazole (PRILOSEC) 40 MG delayed release capsule Take 1 capsule by mouth daily Take tablet one hour before evening meal 30 capsule 3    Scooter MISC by Does not apply route Ambulatory aid - needs new motorized scooter or repair of existing scooter.   Earlier one from [place] is broken 1 each 0     No current facility-administered medications on file prior to encounter. Current Outpatient Prescriptions   Medication Sig Dispense Refill    Mirabegron ER 25 MG TB24 Take 25 mg by mouth nightly       Calcium Carb-Cholecalciferol 600-800 MG-UNIT CHEW Take 1 tablet by mouth      amoxicillin-clavulanate (AUGMENTIN) 875-125 MG per tablet Take 1 tablet by mouth 2 times daily      oxyCODONE-acetaminophen (PERCOCET) 5-325 MG per tablet Take 1 tablet by mouth every 8 hours as needed for Pain for up to 30 days. Take lowest dose possible to manage pain. Earliest Fill Date: 8/20/18 90 tablet 0    meloxicam (MOBIC) 15 MG tablet Take 1 tablet by mouth daily With food 30 tablet 2    tiZANidine (ZANAFLEX) 2 MG tablet Take 1 tablet by mouth every 8 hours as needed (neck pain) 30 tablet 2    gabapentin (NEURONTIN) 300 MG capsule Take 1 capsule by mouth 3 times daily for 90 days. . 90 capsule 2    Misc. Devices [facility]) MISC Dispense a power wheelchair  Weight -382.9 lbs 1 each 0    nystatin (MYCOSTATIN) 580396 UNIT/GM cream Apply topically 2 times daily. 30 g 0    triamcinolone (KENALOG) 0.1 % cream Apply topically 2 times daily. 15 g 0    ranitidine (ZANTAC) 150 MG tablet Take 1 tablet by mouth 2 times daily 60 tablet 3    Handicap Placard MISC by Does not apply route Requesting for 5 years 1 each 0    meclizine (ANTIVERT) 12.5 MG tablet Take 1 tablet by mouth 3 times daily as needed for Dizziness 15 tablet 0    Misc.  Devices (BARIATRIC ROLLATOR) MISC 1 Units by Does not apply route as needed (ambulatory help) Patient Height - 5'7\" and Weight - 412 lbs 1 each 0    diclofenac sodium (VOLTAREN) 1 % GEL Apply 4 g topically 4 times daily 5 Tube 0    Scooter MISC by Does not apply route Dispense a scooter with an adjustable chair  Weight-382.9 lbs 1 each 0    Scooter MISC by Does not apply route Motorized scooter for ambulatory aid  Repair or replace  (Patient is >300 lbs) 1 each 0    Value Date    WBC 8.0 08/21/2018    RBC 4.62 08/21/2018    HGB 13.1 08/21/2018    HCT 39.8 08/21/2018    MCV 86.1 08/21/2018    RDW 15.2 08/21/2018     08/21/2018     CMP    Lab Results   Component Value Date     08/21/2018    K 4.4 08/21/2018     08/21/2018    CO2 24 08/21/2018    BUN 13 08/21/2018    CREATININE <0.5 08/21/2018    GFRAA >60 08/21/2018    AGRATIO 1.1 08/21/2018    LABGLOM >60 08/21/2018    GLUCOSE 93 08/21/2018    PROT 7.8 08/21/2018    CALCIUM 9.4 08/21/2018    BILITOT 0.4 08/21/2018    ALKPHOS 69 08/21/2018    AST 10 08/21/2018    ALT 10 08/21/2018     BMP    Lab Results   Component Value Date     08/21/2018    K 4.4 08/21/2018     08/21/2018    CO2 24 08/21/2018    BUN 13 08/21/2018    CREATININE <0.5 08/21/2018    CALCIUM 9.4 08/21/2018    GFRAA >60 08/21/2018    LABGLOM >60 08/21/2018    GLUCOSE 93 08/21/2018     POCGlucose  No results for input(s): GLUCOSE in the last 72 hours. Coags  No results found for: PROTIME, INR, APTT  HCG (If Applicable)   Lab Results   Component Value Date    PREGTESTUR Negative 08/30/2018      ABGs No results found for: PHART, PO2ART, WYD5ARS, HSP1KIL, BEART, K1GRLVXA   Type & Screen (If Applicable)  No results found for: [facility]    Surgeon:    Procedure:    Medications prior to admission:   Prior to Admission medications    Medication Sig Start Date End Date Taking? Authorizing Provider   Mirabegron ER 25 MG TB24 Take 25 mg by mouth nightly  7/27/18   Historical Provider, MD   Calcium Carb-Cholecalciferol 600-800 MG-UNIT CHEW Take 1 tablet by mouth 8/3/18   Historical Provider, MD   amoxicillin-clavulanate (AUGMENTIN) 875-125 MG per tablet Take 1 tablet by mouth 2 times daily    Historical Provider, MD   oxyCODONE-acetaminophen (PERCOCET) 5-325 MG per tablet Take 1 tablet by mouth every 8 hours as needed for Pain for up to 30 days. Take lowest dose possible to manage pain.  Earliest Fill Date: 8/20/18 8/20/18 9/19/18  Han Dainelson Jayy Dixon MD   meloxicam PHILIPP [facility]) 15 MG tablet Take 1 tablet by mouth daily With food 8/14/18   Jersey Ireland MD   tiZANidine (ZANAFLEX) 2 MG tablet Take 1 tablet by mouth every 8 hours as needed (neck pain) 8/14/18   Jersey Ireland MD   gabapentin (NEURONTIN) 300 MG capsule Take 1 capsule by mouth 3 times daily for 90 days. . 8/14/18 11/12/18  Jersey Ireland MD   diclofenac sodium (VOLTAREN) 1 % GEL Apply 4 g topically 4 times daily 7/24/18 8/28/18  Octavio Vera MD   Scooter MISC by Does not apply route Dispense a scooter with an adjustable chair  Weight-382.9 lbs 7/16/18   Tory Hand MD   Misc. Devices [facility]) MISC Dispense a power wheelchair  Weight -382.9 lbs 7/16/18   Tory Hand MD   Scooter MISC by Does not apply route Motorized scooter for ambulatory aid  Repair or replace  (Patient is >300 lbs) 7/11/18   Jersey Ireland MD   Scooter MISC by Does not apply route Requires repair of existing scooter 6/22/18   Jersey Ireland MD   nystatin (MYCOSTATIN) 060542 UNIT/GM cream Apply topically 2 times daily. 6/19/18   Tory Hand MD   triamcinolone (KENALOG) 0.1 % cream Apply topically 2 times daily. 6/19/18   Tory Hand MD   ranitidine (ZANTAC) 150 MG tablet Take 1 tablet by mouth 2 times daily 6/13/18   Henok Caraballo MD   omeprazole (PRILOSEC) 40 MG delayed release capsule Take 1 capsule by mouth daily Take tablet one hour before evening meal 5/23/18 8/28/18  Henok Caraballo MD   Scooter MISC by Does not apply route Ambulatory aid - needs new motorized scooter or repair of existing scooter. Earlier one from [place] is broken 4/23/18   Jersey Ireland MD   Handicap Placard MISC by Does not apply route Requesting for 5 years 4/23/18   Jersey Ireland MD   meclizine (ANTIVERT) 12.5 MG tablet Take 1 tablet by mouth 3 times daily as needed for Dizziness 4/23/18   Tory Hand MD   Misc.  Devices (BARIATRIC ROLLATOR) MISC 1 Units by Does not apply route as needed (ambulatory help) Patient Height - 5'7\" and Weight - 412 lbs 3/27/18   She Castanon MD       Current medications:    Current Outpatient Prescriptions   Medication Sig Dispense Refill    Mirabegron ER 25 MG TB24 Take 25 mg by mouth nightly       Calcium Carb-Cholecalciferol 600-800 MG-UNIT CHEW Take 1 tablet by mouth      amoxicillin-clavulanate (AUGMENTIN) 875-125 MG per tablet Take 1 tablet by mouth 2 times daily      oxyCODONE-acetaminophen (PERCOCET) 5-325 MG per tablet Take 1 tablet by mouth every 8 hours as needed for Pain for up to 30 days. Take lowest dose possible to manage pain. Earliest Fill Date: 8/20/18 90 tablet 0    meloxicam (MOBIC) 15 MG tablet Take 1 tablet by mouth daily With food 30 tablet 2    tiZANidine (ZANAFLEX) 2 MG tablet Take 1 tablet by mouth every 8 hours as needed (neck pain) 30 tablet 2    gabapentin (NEURONTIN) 300 MG capsule Take 1 capsule by mouth 3 times daily for 90 days. . 90 capsule 2    diclofenac sodium (VOLTAREN) 1 % GEL Apply 4 g topically 4 times daily 5 Tube 0    Scooter MISC by Does not apply route Dispense a scooter with an adjustable chair  Weight-382.9 lbs 1 each 0    Misc. Devices [facility]) MISC Dispense a power wheelchair  Weight -382.9 lbs 1 each 0    Scooter MISC by Does not apply route Motorized scooter for ambulatory aid  Repair or replace  (Patient is >300 lbs) 1 each 0    Scooter MISC by Does not apply route Requires repair of existing scooter 1 each 0    nystatin (MYCOSTATIN) 322787 UNIT/GM cream Apply topically 2 times daily. 30 g 0    triamcinolone (KENALOG) 0.1 % cream Apply topically 2 times daily.  15 g 0    ranitidine (ZANTAC) 150 MG tablet Take 1 tablet by mouth 2 times daily 60 tablet 3    omeprazole (PRILOSEC) 40 MG delayed release capsule Take 1 capsule by mouth daily Take tablet one hour before evening meal 30 capsule 3    Scooter MISC by Does not apply route Ambulatory aid - needs new motorized scooter or repair of existing scooter. Earlier one from [place] is broken 1 each 0    Handicap Placard MISC by Does not apply route Requesting for 5 years 1 each 0    meclizine (ANTIVERT) 12.5 MG tablet Take 1 tablet by mouth 3 times daily as needed for Dizziness 15 tablet 0    Misc.  Devices (BARIATRIC ROLLATOR) MISC 1 Units by Does not apply route as needed (ambulatory help) Patient Height - 5'7\" and Weight - 412 lbs 1 each 0     Current Facility-Administered Medications   Medication Dose Route Frequency Provider Last Rate Last Dose    0.9 % sodium chloride infusion   Intravenous Continuous Marcello Griffin MD        famotidine (PEPCID) injection 20 mg  20 mg Intravenous Once Marcello Griffin MD        sodium chloride flush 0.9 % injection 10 mL  10 mL Intravenous 2 times per day Marcello Griffin MD        sodium chloride flush 0.9 % injection 10 mL  10 mL Intravenous PRN Marcello Griffin MD           Allergies:  No Known Allergies    Problem List:    Patient Active Problem List   Diagnosis Code    Type 2 diabetes mellitus without complication, without long-term current use of insulin ([facility]) E11.9    Postlaminectomy syndrome of lumbar region M96.1    Lumbar radiculopathy M54.16    SHYANN (obstructive sleep apnea) G47.33    Chronic, continuous use of opioids F11.90    Morbid obesity with body mass index (BMI) of 60.0 to 69.9 in adult ([facility] [address] E66.01, Z68.44    Primary osteoarthritis of right knee M17.11    Primary osteoarthritis of left knee M17.12    Polyarthropathy, multiple sites M13.0    Chronic pain syndrome G89.4       Past Medical History:        Diagnosis Date    Arthritis     TAN (dyspnea on exertion)     Hyperlipidemia     Hypertension     Morbid obesity with body mass index (BMI) of 60.0 to 69.9 in adult ([facility] [address] 7/2/2018    Neuropathy     SHYANN (obstructive sleep apnea)     Primary osteoarthritis of right knee 7/2/2018    Type 2 diabetes mellitus without complication ([facility] [address] controlled with diet    Urinary incontinence        Past Surgical History:        Procedure Laterality Date    BACK SURGERY      2001    BREAST LUMPECTOMY Bilateral     2015       Social History:    Social History   Substance Use Topics    Smoking status: Never Smoker    Smokeless tobacco: Never Used    Alcohol use No                                Counseling given: Not Answered      Vital Signs (Current):   Vitals:    08/30/18 0946   Weight: (!) 368 lb (166.9 kg)   Height: 5' 7\" (1.702 m)                                              BP Readings from Last 3 Encounters:   08/28/18 101/68   08/14/18 138/84   08/06/18 128/85       NPO Status:  mn+, see mar                                                                               BMI:   Wt Readings from Last 3 Encounters:   08/30/18 (!) 368 lb (166.9 kg)   08/28/18 (!) 368 lb (166.9 kg)   08/28/18 (!) 368 lb (166.9 kg)     Body mass index is 57.64 kg/m². Anesthesia Evaluation  Patient summary reviewed no history of anesthetic complications:   Airway: Mallampati: II  TM distance: >3 FB   Neck ROM: full  Mouth opening: > = 3 FB Dental:          Pulmonary: breath sounds clear to auscultation  (+) shortness of breath (exert):  sleep apnea: on CPAP,      (-) not a current smoker          Patient did not smoke on day of surgery. Cardiovascular:    (+) hypertension:, TAN:, hyperlipidemia    (-) pacemaker, past MI, CAD, CABG/stent and dysrhythmias    ECG reviewed  Rhythm: regular  Rate: normal           Beta Blocker:  Not on Beta Blocker         Neuro/Psych:   (+) neuromuscular disease (lumbar rad, neuropathy):,             GI/Hepatic/Renal:   (+) morbid obesity     (-) liver disease and no renal disease       Endo/Other:    (+) Diabetes (no meds)Type II DM, , : arthritis: OA., no malignancy/cancer. (-) hypothyroidism, hyperthyroidism, no malignancy/cancer               Abdominal:   (+) obese,     Abdomen: soft.     Vascular: negative vascular

## 2018-08-30 NOTE — PROGRESS NOTES
1210 Pt received from OR VSS, Pt on 5L O2, moderate amount of blood on pad. Pt complains of slight pain in vaginal area.  Electronically signed by Sloane Stephens RN on 8/30/2018 at 12:21 PM

## 2018-08-31 ENCOUNTER — OFFICE VISIT (OUTPATIENT)
Dept: INTERNAL MEDICINE CLINIC | Age: 53
End: 2018-08-31

## 2018-08-31 VITALS
OXYGEN SATURATION: 98 % | WEIGHT: 293 LBS | HEART RATE: 67 BPM | BODY MASS INDEX: 45.99 KG/M2 | DIASTOLIC BLOOD PRESSURE: 60 MMHG | SYSTOLIC BLOOD PRESSURE: 132 MMHG | HEIGHT: 67 IN | TEMPERATURE: 98.6 F

## 2018-08-31 DIAGNOSIS — M54.16 LUMBAR RADICULOPATHY: ICD-10-CM

## 2018-08-31 DIAGNOSIS — M15.9 OSTEOARTHRITIS OF MULTIPLE JOINTS, UNSPECIFIED OSTEOARTHRITIS TYPE: ICD-10-CM

## 2018-08-31 DIAGNOSIS — L02.415 ABSCESS OF RIGHT THIGH: ICD-10-CM

## 2018-08-31 DIAGNOSIS — I10 ESSENTIAL HYPERTENSION: ICD-10-CM

## 2018-08-31 DIAGNOSIS — M96.1 POSTLAMINECTOMY SYNDROME OF LUMBAR REGION: ICD-10-CM

## 2018-08-31 DIAGNOSIS — E66.01 MORBID OBESITY WITH BMI OF 50.0-59.9, ADULT (HCC): ICD-10-CM

## 2018-08-31 DIAGNOSIS — E11.9 TYPE 2 DIABETES MELLITUS WITHOUT COMPLICATION, WITHOUT LONG-TERM CURRENT USE OF INSULIN (HCC): Primary | ICD-10-CM

## 2018-08-31 DIAGNOSIS — E78.2 MIXED HYPERLIPIDEMIA: ICD-10-CM

## 2018-08-31 DIAGNOSIS — R06.02 SHORTNESS OF BREATH ON EXERTION: ICD-10-CM

## 2018-08-31 PROCEDURE — 2022F DILAT RTA XM EVC RTNOPTHY: CPT | Performed by: INTERNAL MEDICINE

## 2018-08-31 PROCEDURE — 3014F SCREEN MAMMO DOC REV: CPT | Performed by: INTERNAL MEDICINE

## 2018-08-31 PROCEDURE — 3044F HG A1C LEVEL LT 7.0%: CPT | Performed by: INTERNAL MEDICINE

## 2018-08-31 PROCEDURE — G8417 CALC BMI ABV UP PARAM F/U: HCPCS | Performed by: INTERNAL MEDICINE

## 2018-08-31 PROCEDURE — 3017F COLORECTAL CA SCREEN DOC REV: CPT | Performed by: INTERNAL MEDICINE

## 2018-08-31 PROCEDURE — 1036F TOBACCO NON-USER: CPT | Performed by: INTERNAL MEDICINE

## 2018-08-31 PROCEDURE — G8427 DOCREV CUR MEDS BY ELIG CLIN: HCPCS | Performed by: INTERNAL MEDICINE

## 2018-08-31 PROCEDURE — 99214 OFFICE O/P EST MOD 30 MIN: CPT | Performed by: INTERNAL MEDICINE

## 2018-08-31 RX ORDER — GLUCOSAMINE HCL/CHONDROITIN SU 500-400 MG
CAPSULE ORAL
Qty: 100 STRIP | Refills: 0 | Status: SHIPPED | OUTPATIENT
Start: 2018-08-31 | End: 2018-12-03 | Stop reason: SDUPTHER

## 2018-08-31 RX ORDER — BLOOD PRESSURE TEST KIT
KIT MISCELLANEOUS
Qty: 1 KIT | Refills: 0 | Status: SHIPPED | OUTPATIENT
Start: 2018-08-31 | End: 2020-01-23

## 2018-08-31 RX ORDER — LANCETS 30 GAUGE
EACH MISCELLANEOUS
Qty: 100 EACH | Refills: 3 | Status: SHIPPED | OUTPATIENT
Start: 2018-08-31 | End: 2019-01-28 | Stop reason: SDUPTHER

## 2018-08-31 RX ORDER — WHEELCHAIR
EACH MISCELLANEOUS
Qty: 1 EACH | Refills: 0 | Status: SHIPPED | OUTPATIENT
Start: 2018-08-31 | End: 2021-06-15

## 2018-08-31 RX ORDER — DIPHENHYDRAMINE HYDROCHLORIDE 25 MG/1
CAPSULE, LIQUID FILLED ORAL
Qty: 1 KIT | Refills: 0 | Status: SHIPPED | OUTPATIENT
Start: 2018-08-31 | End: 2020-01-23

## 2018-08-31 ASSESSMENT — ENCOUNTER SYMPTOMS
BLOOD IN STOOL: 0
WHEEZING: 0
COUGH: 0
ABDOMINAL PAIN: 0
SINUS PRESSURE: 0
NAUSEA: 0
SORE THROAT: 0
CHEST TIGHTNESS: 0
BACK PAIN: 1
VOMITING: 0
DIARRHEA: 0
SHORTNESS OF BREATH: 1
CONSTIPATION: 0
RHINORRHEA: 0

## 2018-08-31 NOTE — TELEPHONE ENCOUNTER
Brittanie Augustine from [facility] contacted me today to have the Rx for a bariatric extra wide wheelchair faxed to [facility] at [phone]. Brittanie Augustine stated she was able to have the patient provided with a bariatric extra wide wheelchair that will be delivered sometime today or tomorrow.

## 2018-08-31 NOTE — PROGRESS NOTES
[address] [facility]   YOB: 1965    Date of Visit:  8/31/2018    Chief Complaint   Patient presents with    Hypertension     BP kit to monitor BP    Diabetes     glucometer and testing supplies    Other     Patient states she needs a new chair    Mass     under left breast and between the legs       HPI  Hypertension- took medication 4 years ago. Pt doesn't recall the name. Pt states she reduces salt as much as she can. Type 2 Diabetes-took medication 4 years ago. Pt states she took insulin. Pt has been diet controlled for 4 years. Pt states she has been eating more sugar for the past 1 week. Pt would like monitors to check blood pressure and blood sugars at home. Pt is doing a low fat, low sugar, high protein diet. Pt states she weighed 412 lbs in April and she weighs 375 lbs. Pt has lost 37 lbs on her own. Pt sees a Bariatric monthly for the past 5 months. Pt states she plans to have Gastric Sleeve Surgery this year. Pt has SOB with exertion of taking 10 steps. Pt has seen Pulmonary and Cardiology. Pt has sleep apnea. Pt uses CPAP nightly. Pt has morbid obesity, lumbar spinal stenosis, SOB, hip and knee arthritis, shoulder arthritis must be added. Pt can't carry things or do a manual wheelchair no arm strength to wheel herself or get up and out of the chair. Power wheelchair not working at all. Pt states she had an abcess under her breast last week. Pt states she was given antibiotics for a UTI that cleared it up. Pt states she another infection with green pus inner right thigh, hard big and painful. Pt has had for 2 weeks got better with antibiotics but didn't go away. Pt has felt hot and chills like she needs to be covered before taking antibiotics last week. Pt is taking Augmentin. Review of Systems   Constitutional: Negative for activity change, chills, fatigue and fever. HENT: Negative for congestion, postnasal drip, rhinorrhea, sinus pressure and sore throat. clear and moist and mucous membranes are normal.   Eyes: Pupils are equal, round, and reactive to light. Conjunctivae, EOM and lids are normal.   Neck: Neck supple. Carotid bruit is not present. No thyromegaly present. Cardiovascular: Normal rate, regular rhythm, S1 normal, S2 normal and normal heart sounds. Exam reveals no gallop and no friction rub. No murmur heard. Pulmonary/Chest: Effort normal. She has no wheezes. She has no rhonchi. She has no rales. Abdominal: Soft. Normal appearance and bowel sounds are normal. She exhibits no distension. There is no hepatosplenomegaly. There is no tenderness. Musculoskeletal:        Lumbar back: She exhibits tenderness. Lymphadenopathy:        Head (right side): No submandibular adenopathy present. Head (left side): No submandibular adenopathy present. Neurological: She is alert and oriented to person, place, and time. Gait (pt is unable to ambulate) abnormal.   Skin:   Right inner thigh with small pea sized resolving abscess with no drainage from opening, tender   Psychiatric: She has a normal mood and affect. Nursing note reviewed. No results found for this visit on 08/31/18.   Lab Review    WBC 08/21/2018 8.0     RBC 08/21/2018 4.62     Hemoglobin 08/21/2018 13.1     Hematocrit 08/21/2018 39.8     MCV 08/21/2018 86.1     MCH 08/21/2018 28.3     MCHC 08/21/2018 32.9     RDW 08/21/2018 15.2     Platelets 43/55/9051 320     MPV 08/21/2018 7.4     Neutrophils % 08/21/2018 69.3     Lymphocytes % 08/21/2018 23.0     Monocytes % 08/21/2018 6.5     Eosinophils % 08/21/2018 0.9     Basophils % 08/21/2018 0.3     Neutrophils # 08/21/2018 5.5     Lymphocytes # 08/21/2018 1.8     Monocytes # 08/21/2018 0.5     Eosinophils # 08/21/2018 0.1     Basophils # 08/21/2018 0.0     Sodium 08/21/2018 136     Potassium 08/21/2018 4.4     Chloride 08/21/2018 100     CO2 08/21/2018 24     Anion Gap 08/21/2018 12     Glucose 08/21/2018 93 0    9. Osteoarthritis of multiple joints, unspecified osteoarthritis type  Misc. Devices [facility]) MISC; Dispense bariatric extra wide wheelchair    Height-5 feet 6 & 3/4 inches  Weight- 375 lbs  BMI-59.3  Dispense: 1 each; Refill: 0      Discussed medications with patient, who voiced understanding of their use and indications. All questions answered. Return in about 3 months (around 11/30/2018) for hypertension, and chronic medical conditions.

## 2018-09-04 NOTE — PROGRESS NOTES
History of Present Illness:  Brandon Deluna returns for follow-up of both shoulders. Her right shoulders been bothering her more than the left. She did do a bit of physical therapy at the [place] location  She had been there before well under the care of Dr. Goran Vasquez. She wants to know she can do her physical therapy someplace closer. At her last visit I gave her corticosteroid injection for the more symptomatic right side. She had quite a bit of pain for the first 3 or 4 days after the injection. She reports that the right shoulder has improved overall. She reports that her right shoulder is much better. She rates her pain levels on the left side as 7 out of 10. The interview today was conducted with the aid of a bilingual Yakut speaking . I am fluent in Yakut which facilitated the encounter. Medical History:  Patient's medications, allergies, past medical, surgical, social and family histories were reviewed and updated as appropriate. Pertinent items are noted in HPI  Review of systems reviewed from Patient History Form dated on July 31, 2018 and available in the patient's chart under the Media tab. Vital Signs:  Vitals:    08/28/18 1000   BP: 101/68   Pulse: 83     Constitutional: Morbidly obese with a BMI approaching 60. She is in a wheelchair. Bilateral Shoulder Examination:    Inspection:  Right shoulder benign-appearing. Obese. Palpation:  Tenderness over the rotator cuff. No crepitus. Her maximal tenderness is over the suprascapular notch, on the right. Active Range of Motion: Range of motion is improved on the right side. Forward elevation past 120° versus 90° on the left. .  Diminished in internal rotation. Painful at the end range. Good rhythm is noted. Passive Range of Motion:  Slight pain at the end range and elevation. Strength:  Generalized deconditioning with 4 over 5 external rotation strength. Negative drop arm.     Special Tests:  Intact sensation over the axillary autonomous zone. Assessment :  Bilateral rotator cuff syndrome. She is responding to conservative treatment on the right. We can have her continue with exercises bilaterally. Impression:  Encounter Diagnoses   Name Primary?  Rotator cuff tendinitis, right Yes    Rotator cuff tendinitis, left     Left shoulder pain, unspecified chronicity     Right shoulder pain, unspecified chronicity        Office Procedures:  Orders Placed This Encounter   Procedures    [facility]     Referral Priority:   Routine     Referral Type:   Eval and Treat     Requested Specialty:   Physical Therapy     Number of Visits Requested:   1       Treatment Plan: We will get her into physical therapy closer to her home. We'll consider repeat injection for the left shoulder if the symptoms persist.  Follow-up will be in 4-6 weeks. She is in agreement with this plan, and all questions were answered today. Sada Chu MD, PhD  8/28/2018

## 2018-09-06 ENCOUNTER — HOSPITAL ENCOUNTER (OUTPATIENT)
Dept: PHYSICAL THERAPY | Age: 53
Setting detail: THERAPIES SERIES
Discharge: HOME | End: 2018-09-06
Payer: COMMERCIAL

## 2018-09-06 PROCEDURE — 97110 THERAPEUTIC EXERCISES: CPT | Performed by: PHYSICAL THERAPIST

## 2018-09-06 PROCEDURE — 97112 NEUROMUSCULAR REEDUCATION: CPT | Performed by: PHYSICAL THERAPIST

## 2018-09-06 NOTE — FLOWSHEET NOTE
[address] [facility]    [facility] Therapy Daily Treatment Note  Date:  2018    Patient Name:  Daniel Kent    :  1965  MRN: [id number]  Restrictions/Precautions:    Medical/Treatment Diagnosis Information:  · Diagnosis: Bilateral hip pain   M25.551, M25.552  · Treatment Diagnosis: PT practice pattern: 4D,  bilateral LE pain  Insurance/Certification information:  PT Insurance Information: Lock   30 visits/yr, $0 copay  Physician Information:  Referring Practitioner: Dr Tamara Cho of care signed (Y/N):     Date of Patient follow up with Physician:     G-Code (if applicable):   CM   Date G-Code Applied:   18  PT G-Codes  Functional Assessment Tool Used: LEFS  Score: 88% impaired  Functional Limitation: Mobility: Walking and moving around  Mobility: Walking and Moving Around Current Status (): At least 80 percent but less than 100 percent impaired, limited or restricted  Mobility: Walking and Moving Around Goal Status (): At least 40 percent but less than 60 percent impaired, limited or restricted    Progress Note: [x]  Yes  []  No  Next due by: Visit #10       Latex Allergy:  []NO      [x]YES  Preferred Language for Healthcare:   []English       [x]other:    Visit # Insurance Allowable   3 30 (16 previously used)     Auth Required   []  Yes    [] No    Visits Approved  Date Ranged-       Pain level:  8/10     SUBJECTIVE:   Patient states having pain with sitting. She extends her leg to help with this pain.      OBJECTIVE: See eval  Observation:   Test measurements:      RESTRICTIONS/PRECAUTIONS: OA, DM, HTN, Lumbar laminectomy , Neuropathy    Exercises/Interventions:       Script:  18  Exercise Sets/Reps Notes Last Progression   Gastroc,/Soleus Stretch      Heelslides       LLLD Wallslide      Fig 4 stretch: piriformis 3x30'' B manual    HS Stretch:  Tableside / 90-90 3x30'' B manual    LTR 5x10'' B     SAQ 2x15 B     LAQ 15x B SLR Flex 10x B     SLR Abd       SLR Ext      SLR Add. Clamshells 2x15 B [place] loop, supine One leg at a time   Bridges w/ ball squeeze 1x10,  1x10     HR/TR X10/-     Add squeeze 10x5''     BKFO 10x5\" each     BAPS      Bike      Elliptical      Standing Stretch:  (insert muscles)      Weight Shifting      Lateral Walk      Squats      Single Leg Stance Balance                            Therapeutic Exercise and NMR EXR  [x] (06851) Provided verbal/tactile cueing for activities related to strengthening, flexibility, endurance, ROM for improvements in LE, proximal hip, and core control with self care, mobility, lifting, ambulation. [x] (51699) Provided verbal/tactile cueing for activities related to improving balance, coordination, kinesthetic sense, posture, motor skill, proprioception  to assist with LE, proximal hip, and core control in self care, mobility, lifting, ambulation and eccentric single leg control.      NMR and Therapeutic Activities:    [] (69431 or 53005) Provided verbal/tactile cueing for activities related to improving balance, coordination, kinesthetic sense, posture, motor skill, proprioception and motor activation to allow for proper function of core, proximal hip and LE with self care and ADLs  [] (82841) Gait Re-education- Provided training and instruction to the patient for proper LE, core and proximal hip recruitment and positioning and eccentric body weight control with ambulation re-education including up and down stairs     Home Exercise Program:    [x] (90925) Reviewed/Progressed HEP activities related to strengthening, flexibility, endurance, ROM of core, proximal hip and LE for functional self-care, mobility, lifting and ambulation/stair navigation   [x] (10007)Reviewed/Progressed HEP activities related to improving balance, coordination, kinesthetic sense, posture, motor skill, proprioception of core, proximal hip and LE for self care, mobility, lifting, and ambulation/stair navigation      Manual Treatments:  PROM / Scar Mobs / STM/Rollerstick / Knee (Flex./Ext.)  Stretch: H.S. / ITB / Piriformis / Mitchael Audrey / Groin / Hip Flexor   [] (25620) Provided manual therapy to mobilize LE, proximal hip and/or LS spine soft tissue/joints for the purpose of modulating pain, promoting relaxation,  increasing ROM, reducing/eliminating soft tissue swelling/inflammation/restriction, improving soft tissue extensibility and allowing for proper ROM for normal function with self care, mobility, lifting and ambulation. Modalities:      Charges:  Timed Code Treatment Minutes: 45   Total Treatment Minutes: 45     [] EVAL (LOW) 42946 (typically 20 minutes face-to-face)  [] EVAL (MOD) 54860 (typically 30 minutes face-to-face)  [] EVAL (HIGH) 45558 (typically 45 minutes face-to-face)  [] RE-EVAL     [x] OY(78651) x  2   [] IONTO  [x] NMR (37821) x  1   [] VASO  [] Manual (77250) x       [] Other:  [] TA x       [] Mech Traction (98881)  [] ES(attended) (28066)      [] ES (un) (56461):     GOALS:   Short Term Goals: To be achieved in: 2 weeks  1. Independent in HEP and progression per patient tolerance, in order to prevent re-injury. 2. Patient will have a decrease in pain to facilitate improvement in movement, function, and ADLs as indicated by Functional Deficits. Long Term Goals: To be achieved in: 5 weeks  1. Disability index score of 60% or less for the LEFS to assist with reaching prior level of function. 2. Patient will demonstrate increased AROM to hip IR 15 to allow for proper joint functioning as indicated by patients Functional Deficits. 3. Patient will demonstrate an increase in Strength to 4/5 hip flex/abd/ER to allow for proper functional mobility as indicated by patients Functional Deficits. 4. Patient will return to tolerate sitting for 30 mintues without increased symptoms or restriction.      Progression Towards Functional goals:  [] Patient is progressing as expected towards functional goals listed. [] Progression is slowed due to complexities listed. [] Progression has been slowed due to co-morbidities. [x] Plan just implemented, too soon to assess goals progression  [] Other:     ASSESSMENT:  Fatigued easily with TE. Patient unsteady with standing exercises.      Treatment/Activity Tolerance:  [] Patient tolerated treatment well [] Patient limited by fatique  [] Patient limited by pain  [x] Patient limited by other medical complications  [] Other:     Prognosis: [] Good [x] Fair  [] Poor    Patient Requires Follow-up: [x] Yes  [] No    PLAN FOR NEXT SESSION: progress hip/LE strengthening as tolerated    PLAN: See eval  [x] Continue per plan of care [] Alter current plan (see comments)  [] Plan of care initiated [] Hold pending MD visit [] Discharge    Electronically signed by:Afshin SIEGEL [facility] 1

## 2018-09-10 ENCOUNTER — HOSPITAL ENCOUNTER (OUTPATIENT)
Dept: PHYSICAL THERAPY | Age: 53
Setting detail: THERAPIES SERIES
Discharge: HOME | End: 2018-09-10
Payer: COMMERCIAL

## 2018-09-10 PROCEDURE — 97112 NEUROMUSCULAR REEDUCATION: CPT | Performed by: SPECIALIST/TECHNOLOGIST

## 2018-09-10 PROCEDURE — 97110 THERAPEUTIC EXERCISES: CPT | Performed by: SPECIALIST/TECHNOLOGIST

## 2018-09-10 NOTE — FLOWSHEET NOTE
SLR Abd       SLR Ext      SLR Add. Clamshells 2x15 B [place] loop, supine    Bridges 2 x 10 Hold ball squeeze D/T increase pain    HR/TR 2 X10/- Increase reps     Add squeeze 15x5'' Increase reps     BKFO 10x5\" each     BAPS      Bike      Elliptical      Standing Stretch:  (insert muscles)      Weight Shifting      Lateral Walk      Squats      Single Leg Stance Balance                            Therapeutic Exercise and NMR EXR  [x] (75289) Provided verbal/tactile cueing for activities related to strengthening, flexibility, endurance, ROM for improvements in LE, proximal hip, and core control with self care, mobility, lifting, ambulation. [x] (38822) Provided verbal/tactile cueing for activities related to improving balance, coordination, kinesthetic sense, posture, motor skill, proprioception  to assist with LE, proximal hip, and core control in self care, mobility, lifting, ambulation and eccentric single leg control.      NMR and Therapeutic Activities:    [] (05148 or 36842) Provided verbal/tactile cueing for activities related to improving balance, coordination, kinesthetic sense, posture, motor skill, proprioception and motor activation to allow for proper function of core, proximal hip and LE with self care and ADLs  [] (18485) Gait Re-education- Provided training and instruction to the patient for proper LE, core and proximal hip recruitment and positioning and eccentric body weight control with ambulation re-education including up and down stairs     Home Exercise Program:    [x] (75989) Reviewed/Progressed HEP activities related to strengthening, flexibility, endurance, ROM of core, proximal hip and LE for functional self-care, mobility, lifting and ambulation/stair navigation   [x] (45871)Reviewed/Progressed HEP activities related to improving balance, coordination, kinesthetic sense, posture, motor skill, proprioception of core, proximal hip and LE for self care, mobility, lifting, and

## 2018-09-17 ENCOUNTER — HOSPITAL ENCOUNTER (OUTPATIENT)
Dept: PHYSICAL THERAPY | Age: 53
Setting detail: THERAPIES SERIES
Discharge: HOME | End: 2018-09-17
Payer: COMMERCIAL

## 2018-09-17 ENCOUNTER — CLINICAL DOCUMENTATION (OUTPATIENT)
Dept: INTERNAL MEDICINE CLINIC | Age: 53
End: 2018-09-17

## 2018-09-17 ENCOUNTER — TELEPHONE (OUTPATIENT)
Dept: INTERNAL MEDICINE CLINIC | Age: 53
End: 2018-09-17

## 2018-09-17 DIAGNOSIS — M13.0 POLYARTHROPATHY, MULTIPLE SITES: ICD-10-CM

## 2018-09-17 DIAGNOSIS — M96.1 POSTLAMINECTOMY SYNDROME OF LUMBAR REGION: ICD-10-CM

## 2018-09-17 PROCEDURE — 97112 NEUROMUSCULAR REEDUCATION: CPT | Performed by: PHYSICAL THERAPIST

## 2018-09-17 PROCEDURE — 97110 THERAPEUTIC EXERCISES: CPT | Performed by: PHYSICAL THERAPIST

## 2018-09-17 RX ORDER — OXYCODONE HYDROCHLORIDE AND ACETAMINOPHEN 5; 325 MG/1; MG/1
1 TABLET ORAL EVERY 8 HOURS PRN
Qty: 90 TABLET | Refills: 0 | Status: CANCELLED | OUTPATIENT
Start: 2018-09-17 | End: 2018-10-17

## 2018-09-17 NOTE — TELEPHONE ENCOUNTER
Kenyon be  Stated that the pt requested a low sodium diet for her [employer]    and they will need an order faxed to this number [phone]

## 2018-09-17 NOTE — TELEPHONE ENCOUNTER
Kelsi Allison from [employer] stated she will need an order for a low sodium diet because pt stated her meals need to have low sodium. The fax number is [phone].

## 2018-09-18 RX ORDER — OXYCODONE AND ACETAMINOPHEN 7.5; 325 MG/1; MG/1
1 TABLET ORAL EVERY 8 HOURS PRN
Qty: 90 TABLET | Refills: 0 | Status: SHIPPED | OUTPATIENT
Start: 2018-09-18 | End: 2018-10-15 | Stop reason: SDUPTHER

## 2018-09-19 ENCOUNTER — HOSPITAL ENCOUNTER (OUTPATIENT)
Dept: PHYSICAL THERAPY | Age: 53
Setting detail: THERAPIES SERIES
Discharge: HOME | End: 2018-09-19
Payer: COMMERCIAL

## 2018-09-19 ENCOUNTER — TELEPHONE (OUTPATIENT)
Dept: INTERNAL MEDICINE CLINIC | Age: 53
End: 2018-09-19

## 2018-09-19 DIAGNOSIS — K06.9 GUM DISEASE: Primary | ICD-10-CM

## 2018-09-19 PROCEDURE — 97110 THERAPEUTIC EXERCISES: CPT | Performed by: PHYSICAL THERAPIST

## 2018-09-19 PROCEDURE — 97112 NEUROMUSCULAR REEDUCATION: CPT | Performed by: PHYSICAL THERAPIST

## 2018-09-19 NOTE — FLOWSHEET NOTE
The [address] [facility], [facility]    Physical Therapy Daily Treatment Note  Date:  2018    Patient Name:  Nayla Winter    :  1965  MRN: [id number]  Restrictions/Precautions:    Medical/Treatment Diagnosis Information:  · Diagnosis: Bilateral hip pain   M25.551, M25.552  · Treatment Diagnosis: PT practice pattern: 4D,  bilateral LE pain  Insurance/Certification information:  PT Insurance Information: Lock   30 visits/yr, $0 copay  Physician Information:  Referring Practitioner: Dr Kurt Sharp of care signed (Y/N):     Date of Patient follow up with Physician:     G-Code (if applicable):   CM   Date G-Code Applied:   18  PT G-Codes  Functional Assessment Tool Used: LEFS  Score: 88% impaired  Functional Limitation: Mobility: Walking and moving around  Mobility: Walking and Moving Around Current Status (): At least 80 percent but less than 100 percent impaired, limited or restricted  Mobility: Walking and Moving Around Goal Status (): At least 40 percent but less than 60 percent impaired, limited or restricted    Progress Note: [x]  Yes  []  No  Next due by: Visit #10       Latex Allergy:  []NO      [x]YES  Preferred Language for Healthcare:   []English       [x]other:    Visit # Insurance Allowable   6 30 (16 previously used)     Auth Required   []  Yes    [] No    Visits Approved  Date Ranged-       Pain level:  8/10     SUBJECTIVE:    States the hips are feeling a little better compared to the last visit. OBJECTIVE: See eval  Observation:   Test measurements:      RESTRICTIONS/PRECAUTIONS: OA, DM, HTN, Lumbar laminectomy , Neuropathy    Exercises/Interventions:       Script:  18  Exercise Sets/Reps Notes Last Progression   Gastroc,/Soleus Stretch      Heelslides       LLLD Wallslide      Fig 4 stretch: piriformis 3x30'' B manual    HS Stretch:  Tableside / 90-90 3x30'' B manual    LTR 5x10'' B     SAQ 1# 2x15 B Added wt.     LAQ 15x B SLR Flex 15x B     SLR Abd       SLR Ext      SLR Add. Clamshells 2x15 B [place] loop, supine    Bridges 2 x 10     HR/TR 2 X10/-     Add squeeze 15x5''     BKFO 10x5\" each     Standing hip abd 2x10 B     BAPS      Bike      Elliptical      Standing Stretch:  (insert muscles)      Weight Shifting      Lateral Walk      Mini Squats 2x10     Single Leg Stance Balance                            Therapeutic Exercise and NMR EXR  [x] (36176) Provided verbal/tactile cueing for activities related to strengthening, flexibility, endurance, ROM for improvements in LE, proximal hip, and core control with self care, mobility, lifting, ambulation. [x] (88619) Provided verbal/tactile cueing for activities related to improving balance, coordination, kinesthetic sense, posture, motor skill, proprioception  to assist with LE, proximal hip, and core control in self care, mobility, lifting, ambulation and eccentric single leg control.      NMR and Therapeutic Activities:    [] (58156 or 08810) Provided verbal/tactile cueing for activities related to improving balance, coordination, kinesthetic sense, posture, motor skill, proprioception and motor activation to allow for proper function of core, proximal hip and LE with self care and ADLs  [] (06691) Gait Re-education- Provided training and instruction to the patient for proper LE, core and proximal hip recruitment and positioning and eccentric body weight control with ambulation re-education including up and down stairs     Home Exercise Program:    [x] (28483) Reviewed/Progressed HEP activities related to strengthening, flexibility, endurance, ROM of core, proximal hip and LE for functional self-care, mobility, lifting and ambulation/stair navigation   [x] (81036)Reviewed/Progressed HEP activities related to improving balance, coordination, kinesthetic sense, posture, motor skill, proprioception of core, proximal hip and LE for self care, mobility, lifting, and

## 2018-09-24 ENCOUNTER — HOSPITAL ENCOUNTER (OUTPATIENT)
Dept: PHYSICAL THERAPY | Age: 53
Setting detail: THERAPIES SERIES
Discharge: HOME | End: 2018-09-24
Payer: COMMERCIAL

## 2018-09-24 ENCOUNTER — TELEPHONE (OUTPATIENT)
Dept: INTERNAL MEDICINE CLINIC | Age: 53
End: 2018-09-24

## 2018-09-24 PROCEDURE — 97110 THERAPEUTIC EXERCISES: CPT | Performed by: PHYSICAL THERAPIST

## 2018-09-24 PROCEDURE — 97112 NEUROMUSCULAR REEDUCATION: CPT | Performed by: PHYSICAL THERAPIST

## 2018-09-24 RX ORDER — OMEPRAZOLE 40 MG/1
40 CAPSULE, DELAYED RELEASE ORAL DAILY
Qty: 30 CAPSULE | Refills: 3 | Status: SHIPPED | OUTPATIENT
Start: 2018-09-24 | End: 2018-12-14 | Stop reason: SDUPTHER

## 2018-09-24 NOTE — FLOWSHEET NOTE
[address] [facility]    [facility] Daily Treatment Note  Date:  2018    Patient Name:  Alexandra Bradshaw    :  1965  MRN: [id number]  Restrictions/Precautions:    Medical/Treatment Diagnosis Information:  · Diagnosis: Bilateral hip pain   M25.551, M25.552  · Treatment Diagnosis: PT practice pattern: 4D,  bilateral LE pain  Insurance/Certification information:  PT Insurance Information: Lock   30 visits/yr, $0 copay  Physician Information:  Referring Practitioner: Dr Ulrich Brochure of care signed (Y/N):     Date of Patient follow up with Physician:     G-Code (if applicable):   CM   Date G-Code Applied:   18  PT G-Codes  Functional Assessment Tool Used: LEFS  Score: 88% impaired  Functional Limitation: Mobility: Walking and moving around  Mobility: Walking and Moving Around Current Status (): At least 80 percent but less than 100 percent impaired, limited or restricted  Mobility: Walking and Moving Around Goal Status (): At least 40 percent but less than 60 percent impaired, limited or restricted    Progress Note: [x]  Yes  []  No  Next due by: Visit #10       Latex Allergy:  []NO      [x]YES  Preferred Language for Healthcare:   []English       [x]other:    Visit # Insurance Allowable   7 30 (16 previously used)     Auth Required   []  Yes    [] No    Visits Approved  Date Ranged-       Pain level:  6/10     SUBJECTIVE:  States the hips are not feeling as painful today but also has been lying down for the majority of the past 3 days. Reports having less hip pain overall when sitting but feels more comfortable to extend leg when seated. Still having a lot of pain with standing/walking and unable to bear weight for more than one minute due to pain. OBJECTIVE:   Observation: 18:  Unable to do more than 3 SLRs without taking a rest break. Test measurements:  18:   Hip flex 90 B, Hip IR: L 12, R 7 Hip flex L 3/5, R 3+/5, Hip abd 3-/5                                                      LEFS 88%    RESTRICTIONS/PRECAUTIONS: OA, DM, HTN, Lumbar laminectomy 2001, Neuropathy    Exercises/Interventions:       Script:  9/24/18  Exercise Sets/Reps Notes Last Progression   Gastroc,/Soleus Stretch      Heelslides       LLLD Wallslide      Fig 4 stretch: piriformis 3x30'' B manual    HS Stretch:  Tableside / 90-90 3x30'' B manual    LTR 5x10'' B     SAQ 1# 2x15 B Added wt. LAQ 15x B     SLR Flex 15x B     SLR Abd       SLR Ext      SLR Add. Clamshells 2x15 B [place] loop, supine     Held due to increase pain   HR/TR 2 X10/-     Add squeeze 15x5''  Sub max contraction   BKFO 10x5\" each     Standing hip abd 2x10 B     BAPS      Bike      Elliptical      Standing Stretch:  (insert muscles)      Weight Shifting      Lateral Walk      Mini Squats 2x10     Single Leg Stance Balance                            Therapeutic Exercise and NMR EXR  [x] (56774) Provided verbal/tactile cueing for activities related to strengthening, flexibility, endurance, ROM for improvements in LE, proximal hip, and core control with self care, mobility, lifting, ambulation. [x] (34160) Provided verbal/tactile cueing for activities related to improving balance, coordination, kinesthetic sense, posture, motor skill, proprioception  to assist with LE, proximal hip, and core control in self care, mobility, lifting, ambulation and eccentric single leg control.      NMR and Therapeutic Activities:    [] (23986 or 14457) Provided verbal/tactile cueing for activities related to improving balance, coordination, kinesthetic sense, posture, motor skill, proprioception and motor activation to allow for proper function of core, proximal hip and LE with self care and ADLs  [] (33345) Gait Re-education- Provided training and instruction to the patient for proper LE, core and proximal hip recruitment and positioning and LEFS to assist with reaching prior level of function. 2. Patient will demonstrate increased AROM to hip IR 15 to allow for proper joint functioning as indicated by patients Functional Deficits. 3. Patient will demonstrate an increase in Strength to 4/5 hip flex/abd/ER to allow for proper functional mobility as indicated by patients Functional Deficits. 4. Patient will return to tolerate sitting for 30 mintues without increased symptoms or restriction. Progression Towards Functional goals:  [] Patient is progressing as expected towards functional goals listed. [] Progression is slowed due to complexities listed. [x] Progression has been slowed due to co-morbidities. [] Plan just implemented, too soon to assess goals progression  [] Other:     ASSESSMENT:  Unable to perform bridge due to increased pain. Also has increased pain with maximal contraction on hip add isometric. Pt was instructed to do sub-max contraction for improved comfort. Minimal changes with hip ROM, strength. No change in functional disability score. Functionally unable to stand or walk more than one minute due to hip pain. Treatment/Activity Tolerance:  [] Patient tolerated treatment well [] Patient limited by fatique  [] Patient limited by pain  [x] Patient limited by other medical complications  [] Other:     Prognosis: [] Good [x] Fair  [] Poor    Patient Requires Follow-up: [x] Yes  [] No    PLAN FOR NEXT SESSION: progress hip/LE strengthening as tolerated    PLAN: Continue x 1 visit, then D/C to HEP.     [x] Continue per plan of care [] Alter current plan (see comments)  [] Plan of care initiated [] Hold pending MD visit [] Discharge    Electronically signed by: Laura Medina PT [id number]

## 2018-09-26 ENCOUNTER — HOSPITAL ENCOUNTER (OUTPATIENT)
Dept: PHYSICAL THERAPY | Age: 53
Setting detail: THERAPIES SERIES
Discharge: HOME | End: 2018-09-26
Payer: COMMERCIAL

## 2018-09-26 ENCOUNTER — CLINICAL DOCUMENTATION (OUTPATIENT)
Dept: INTERNAL MEDICINE CLINIC | Age: 53
End: 2018-09-26

## 2018-09-26 PROCEDURE — 97112 NEUROMUSCULAR REEDUCATION: CPT | Performed by: PHYSICAL THERAPIST

## 2018-09-26 PROCEDURE — G8979 MOBILITY GOAL STATUS: HCPCS | Performed by: PHYSICAL THERAPIST

## 2018-09-26 PROCEDURE — 97110 THERAPEUTIC EXERCISES: CPT | Performed by: PHYSICAL THERAPIST

## 2018-09-26 PROCEDURE — G8980 MOBILITY D/C STATUS: HCPCS | Performed by: PHYSICAL THERAPIST

## 2018-09-26 NOTE — DISCHARGE SUMMARY
[facility] [place]     Physical Therapy Discharge  Date: 2018        Patient Name:  Daniel Kent    :  1965  MRN: [id number]  Referring Physician: Dr Ninfa Mcghee  Diagnosis:   Bilateral hip pain    M25.551, M25.552                        [] Surgical [x] Conservative    Total number of visits: 8   Reporting Period:   Beginning Date:18   End Date:18    OBJECTIVE  Test used Initial score Discharge Score   Pain Summary  8/10 6/10   Functional questionnaire LEFS 88% 88%   Functional Testing            ROM Hip flex 90 B 90 B    Hip IR  L 5, R 10 L 12, R 7   Strength Hip flex L 3/5, R 3/5 L 3/5, R 3+/5    Hip abd L 3-/5, R 3-/5 L 3-/5, R 3-/5        Functional Limitation G-Code (if applicable):   CM          Test/tests used to determine % limitation: LEFS   Actual Score used to drive % limitation:  10/80     88%    Co-morbidities/Complexities (which will affect course of rehabilitation):   []None           Arthritic conditions   []Rheumatoid arthritis (M05.9)  [x]Osteoarthritis (M19.91)   Cardiovascular conditions   [x]Hypertension (I10)  []Hyperlipidemia (E78.5)  []Angina pectoris (I20)  []Atherosclerosis (I70)   Musculoskeletal conditions   [x]Disc pathology   []Congenital spine pathologies   [x]Prior surgical intervention  []Osteoporosis (M81.8)  []Osteopenia (M85.8)   Endocrine conditions   []Hypothyroid (E03.9)  []Hyperthyroid Gastrointestinal conditions   []Constipation (F41.89)   Metabolic conditions   [x]Morbid obesity (E66.01)  [x]Diabetes type 1(E10.65) or 2 (E11.65)   []Neuropathy (G60.9)     Pulmonary conditions   []Asthma (J45)  []Coughing   []COPD (J44.9)   Psychological Disorders  []Anxiety (F41.9)  []Depression (F32.9)   []Other:   [x]Other:  Neuropathy                Treatment to date:  [x] Therapeutic Exercise    [] Modalities:  [] Therapeutic Activity             []Ultrasound            []Electrical Stimulation  [] Gait Training []Cervical Traction    [] Lumbar Traction  [x] Neuromuscular Re-education [] Cold/hotpack         []Iontophoresis  [x] Instruction in HEP      Other:  [x] Manual Therapy                   []                       ?           []   Assessment:  [] All Goals were achieved. [] The following goals were achieved (#'s):  [x] The following goals were not achieved for the following reasons:/assessmen of improvement as it relates to each goal: No golas met. ROM, Strength, Function, LEFS score all unchanged since initial evaluation    Plan of Care:  [x] Discharge from Therapy Services due to:    Reason for Discharge:   [] All goals achieved    [] Patient having surgery  [] Physician discontinued therapy  [] Insurance/Financial Limitations [] Patient did not return for follow up visits [] Home program/1 visit only   [x] No subjective or objective improvement [] Plateaued   [] Patient was unable to adhere to the plan of care established at evaluation. [] Referred back to physician for re-evaluation and did not return.      [] Other:?       Electronically signed by:  Geetha Tom PT

## 2018-09-26 NOTE — FLOWSHEET NOTE
[address] [facility]    Physical Therapy Daily Treatment Note  Date:  2018    Patient Name:  Azalia Clemons    :  1965  MRN: [id number]  Restrictions/Precautions:    Medical/Treatment Diagnosis Information:  · Diagnosis: Bilateral hip pain   M25.551, M25.552  · Treatment Diagnosis: PT practice pattern: 4D,  bilateral LE pain  Insurance/Certification information:  PT Insurance Information: Lock   30 visits/yr, $0 copay  Physician Information:  Referring Practitioner: Dr Dane Cardenas of care signed (Y/N):     Date of Patient follow up with Physician:     G-Code (if applicable):   CM   Date G-Code Applied:   18  PT G-Codes  Functional Assessment Tool Used: LEFS  Score: 88% impaired  Functional Limitation: Mobility: Walking and moving around  Mobility: Walking and Moving Around Current Status (): At least 80 percent but less than 100 percent impaired, limited or restricted  Mobility: Walking and Moving Around Goal Status (): At least 40 percent but less than 60 percent impaired, limited or restricted    Progress Note: [x]  Yes  []  No  Next due by: Visit #10       Latex Allergy:  []NO      [x]YES  Preferred Language for Healthcare:   []English       [x]other:    Visit # Insurance Allowable    (16 previously used)     Auth Required   []  Yes    [] No    Visits Approved  Date Ranged-       Pain level:  10     SUBJECTIVE:  States the hips are feeling about the same as the last visit. Continues to hurt when standing or walking. OBJECTIVE:   Observation: 18:  Unable to do more than 3 SLRs without taking a rest break. Test measurements:  18:   Hip flex 90 B, Hip IR: L 12, R 7                                                     Hip flex L 3/5, R 3+/5, Hip abd 3-/5                                                      LEFS 88%                                      18:  See D/C note    RESTRICTIONS/PRECAUTIONS: OA, DM, HTN, strengthening, flexibility, endurance, ROM of core, proximal hip and LE for functional self-care, mobility, lifting and ambulation/stair navigation   [x] (72063)Reviewed/Progressed HEP activities related to improving balance, coordination, kinesthetic sense, posture, motor skill, proprioception of core, proximal hip and LE for self care, mobility, lifting, and ambulation/stair navigation      Manual Treatments:  PROM / Scar Mobs / STM/Rollerstick / Knee (Flex./Ext.)  Stretch: H.S. / ITB / Piriformis / Mitchael Audrey / Groin / Hip Flexor   [] (55089) Provided manual therapy to mobilize LE, proximal hip and/or LS spine soft tissue/joints for the purpose of modulating pain, promoting relaxation,  increasing ROM, reducing/eliminating soft tissue swelling/inflammation/restriction, improving soft tissue extensibility and allowing for proper ROM for normal function with self care, mobility, lifting and ambulation. Modalities:      Charges:  Timed Code Treatment Minutes: 45   Total Treatment Minutes: 45     [] EVAL (LOW) 66543 (typically 20 minutes face-to-face)  [] EVAL (MOD) 89118 (typically 30 minutes face-to-face)  [] EVAL (HIGH) 65638 (typically 45 minutes face-to-face)  [] RE-EVAL     [x] FL(14623) x  2   [] IONTO  [x] NMR (55395) x  1   [] VASO  [] Manual (12840) x       [] Other:  [] TA x       [] Mech Traction (13790)  [] ES(attended) (19313)      [] ES (un) (54623):     GOALS:   Short Term Goals: To be achieved in: 2 weeks  1. Independent in HEP and progression per patient tolerance, in order to prevent re-injury. 2. Patient will have a decrease in pain to facilitate improvement in movement, function, and ADLs as indicated by Functional Deficits. Long Term Goals: To be achieved in: 5 weeks  1. Disability index score of 60% or less for the LEFS to assist with reaching prior level of function.    2. Patient will demonstrate increased AROM to hip IR 15 to allow for proper joint functioning as indicated by patients

## 2018-10-01 ENCOUNTER — TELEPHONE (OUTPATIENT)
Dept: INTERNAL MEDICINE CLINIC | Age: 53
End: 2018-10-01

## 2018-10-01 ENCOUNTER — TELEPHONE (OUTPATIENT)
Dept: PULMONOLOGY | Age: 53
End: 2018-10-01

## 2018-10-01 NOTE — TELEPHONE ENCOUNTER
Patient is calling to see if orders were faxed for assistance form for power wheel chair with adjustable arm and leg rests as well as adjustable back. Her old wheel chair is broken and costs to much to repair.   Insurance said they will replace it but patient said the old kind she had is too painful and needs the adjustable as noted above     [phone] or [phone]

## 2018-10-02 ENCOUNTER — TELEPHONE (OUTPATIENT)
Dept: INTERNAL MEDICINE CLINIC | Age: 53
End: 2018-10-02

## 2018-10-05 ENCOUNTER — OFFICE VISIT (OUTPATIENT)
Dept: ORTHOPEDIC SURGERY | Age: 53
End: 2018-10-05
Payer: COMMERCIAL

## 2018-10-05 VITALS
BODY MASS INDEX: 47.09 KG/M2 | SYSTOLIC BLOOD PRESSURE: 106 MMHG | HEART RATE: 71 BPM | HEIGHT: 66 IN | DIASTOLIC BLOOD PRESSURE: 58 MMHG | WEIGHT: 293 LBS

## 2018-10-05 DIAGNOSIS — M70.62 TROCHANTERIC BURSITIS OF BOTH HIPS: Primary | ICD-10-CM

## 2018-10-05 DIAGNOSIS — M70.61 TROCHANTERIC BURSITIS OF BOTH HIPS: Primary | ICD-10-CM

## 2018-10-05 PROCEDURE — 3017F COLORECTAL CA SCREEN DOC REV: CPT | Performed by: ORTHOPAEDIC SURGERY

## 2018-10-05 PROCEDURE — 20610 DRAIN/INJ JOINT/BURSA W/O US: CPT | Performed by: ORTHOPAEDIC SURGERY

## 2018-10-05 PROCEDURE — G8428 CUR MEDS NOT DOCUMENT: HCPCS | Performed by: ORTHOPAEDIC SURGERY

## 2018-10-05 PROCEDURE — G8484 FLU IMMUNIZE NO ADMIN: HCPCS | Performed by: ORTHOPAEDIC SURGERY

## 2018-10-05 PROCEDURE — 1036F TOBACCO NON-USER: CPT | Performed by: ORTHOPAEDIC SURGERY

## 2018-10-05 PROCEDURE — 3014F SCREEN MAMMO DOC REV: CPT | Performed by: ORTHOPAEDIC SURGERY

## 2018-10-05 PROCEDURE — G8417 CALC BMI ABV UP PARAM F/U: HCPCS | Performed by: ORTHOPAEDIC SURGERY

## 2018-10-05 PROCEDURE — 99214 OFFICE O/P EST MOD 30 MIN: CPT | Performed by: ORTHOPAEDIC SURGERY

## 2018-10-05 NOTE — PROGRESS NOTES
Melba cShmitz MD  Orthopaedic Surgery & Sports Medicine  Shoulder, Hip & Knee Specialist                       MAIN PHONE NUMBER  [phone]      PATIENT: Rosamaria Paige    46 y.o.  female  Zee: 1965   MRN:  [id number]       Date of current encounter: 8/6/2018  This encounter is evaluated as a:       [] New Patient Visit    [] Established Patient Visit   [] Post-Op Visit     [x] Consult: requested by  Dr. Louisa Asher        [] Worker's Comp       Patient's PCP is Dr. Abdulaziz Polk MD    Subjective:     Chief Complaint   Patient presents with    Hip Pain     np, mindy hips       HPI:  Patient is a very nice 54-year-old gentleman who comes in today for evaluation of both of his hips. He is been complaining of persistent lateral sided hip pain on both sides. There is no pain radiating down the leg. There is no pain radiating to the groin.         Pain Assessment  Location of Pain:  (hip)  Location Modifiers: Right, Left  Severity of Pain: 10  Quality of Pain: Sharp  Frequency of Pain: Intermittent  Limiting Behavior: Yes  Result of Injury: No  Work-Related Injury: No  Are there other pain locations you wish to document?: No    Patient Active Problem List   Diagnosis    Type 2 diabetes mellitus without complication, without long-term current use of insulin (HCC)    Postlaminectomy syndrome of lumbar region    Lumbar radiculopathy    SHYANN (obstructive sleep apnea)    Chronic, continuous use of opioids    Morbid obesity with body mass index (BMI) of 60.0 to 69.9 in adult [facility])    Primary osteoarthritis of right knee    Primary osteoarthritis of left knee    Polyarthropathy, multiple sites    Chronic pain syndrome    Postmenopausal bleeding     Past Medical History:   Diagnosis Date    Arthritis     TAN (dyspnea on exertion)     Hyperlipidemia     Hypertension     Morbid obesity with body mass index (BMI) of 60.0 to 69.9 in adult ([facility] [address] 7/2/2018    Neuropathy     SHYANN (obstructive sleep 2 times daily 60 tablet 3    Scooter MISC by Does not apply route Ambulatory aid - needs new motorized scooter or repair of existing scooter. Earlier one from [place] is broken 1 each 0    Handicap Placard MISC by Does not apply route Requesting for 5 years 1 each 0    meclizine (ANTIVERT) 12.5 MG tablet Take 1 tablet by mouth 3 times daily as needed for Dizziness 15 tablet 0    Misc. Devices (BARIATRIC ROLLATOR) MISC 1 Units by Does not apply route as needed (ambulatory help) Patient Height - 5'7\" and Weight - 412 lbs 1 each 0     Social History     Social History    Marital status:      Spouse name: N/A    Number of children: N/A    Years of education: N/A     Occupational History    Not on file. Social History Main Topics    Smoking status: Never Smoker    Smokeless tobacco: Never Used    Alcohol use No    Drug use: No    Sexual activity: No     Other Topics Concern    Not on file     Social History Narrative    No narrative on file     Family History   Problem Relation Age of Onset    Diabetes Mother     Stroke Mother     Heart Disease Father     Other Father     High Blood Pressure Father     Diabetes Maternal Aunt     Diabetes Maternal Grandmother        Review of Systems:    Farrah Rivers's reported review of systems has been reviewed and has been scanned into her medical record for today's visit. The scanned image can be found in media images folder. She was instructed to contact her primary care physician regarding ROS positives if not already being addressed during today's visit. Objective:   Physical Exam  Vital Signs:  /85   Pulse 55   Ht 5' 9\" (1.753 m)   Wt (!) 400 lb (181.4 kg)   BMI 59.07 kg/m²     Constitution:  Generally, Farrah Rivers is [x] alert, [x] appears stated age, and [x] in no distress.   Her general body habitus is [] Cachectic  [] Thin  [x] Normal  [] Obese  [] Morbidly Obese    Head: [x] Normocephalic  Eyes: [x] Extra-occular muscles if she has any additional concerns or questions. Patient Education Materials Provided:  [x] Dr Aliza Lamas: New patient folder,  Anatomic Drawings and treatment algorithms    There are no Patient Instructions on file for this visit. Orders Placed This Encounter   Procedures    XR HIP BILATERAL W AP PELVIS (2 VIEWS)     Order Specific Question:   Reason for exam:     Answer:   room 12    OSR PT - [facility]     Referral Priority:   Routine     Referral Type:   Eval and Treat     Referral Reason:   Specialty Services Required     Requested Specialty:   Physical Therapy     Number of Visits Requested:   1       Refills/New Prescriptions:  No orders of the defined types were placed in this encounter. Today's prescription medications will be e-scribed (when appropriate) to the Patient's Preferred Pharmacy:   [facility], [address] [facility]  [facility]  Phone: [phone] Fax: [phone]    [id number], [address] [address]  Phone: [phone] Fax: [phone]    [facility] [address] [address] [place] [address] - [address] [place] [address] [phone] [facility] [phone]  [facility] [address]  Phone: [phone] Fax: [phone]         Renzo Michel MD  Orthopaedic Surgeon, Sports Medicine  Director, Hip Arthroscopy and [address] [place] [address]    Contact Information:  ([place] [address] [phone])  [facility] main number: use after hours [phone])    This dictation was performed with a verbal recognition program (DRAGON) and it was checked for errors. It is possible that there are still dictated errors within this office note.   If so, please bring any errors

## 2018-10-05 NOTE — LETTER
Hip Rehabilitation Referral    Patient Name: Alex Sanford      YOB: 1965    Diagnosis: Bilateral CALLIE / Hip pain     Precautions: N/A    Evaluate and Treat          Stretching and soft tissue mobs:  Strengthening:  [x] IT Band     [x] Core stabilization  [x] Adductors     [x] Glute eccentric progressing to concentric  [x] Hip Flexors     [] Pelvic and trunk strengthening  [x] Gluteals     [] Abductors     [x] Iliopsoas complex    [] Flexors  [x] Rectus femoris     [x] Progressive resistive exercises  [] TFL        [] Sartorius                     Activities:       [x] Kinematic Retraining       [x] Activity modification     [x] Patient education to avoid continued irritation with ADLs  [x] Normalization of gait    Modalities:     Return to Sport:  [x] Ultrasound     [] Plyometrics  [] Iontophoresis    [] Rhythmic stabilization  [x] Moist heat     [] Core strengthening   [x] Massage     [] Sports specific program:      [x] Cryotherapy      [] Electrical stimulation     [] Paraffin  [] Whirlpool  [] TENS  [x] Per therapist discretion  [x] Home exercise program (copy to patient). Perform exercises for:  30    minutes   2- 3      times/day  [x] Supervised physical therapy  Frequency: []  1x week  [x] 2x week  [] 3x week  [] Other:   Duration: [] 2 weeks   [] 4 weeks  [x] 6 weeks  [] Other:     Additional Instructions:       Non- Op CALLIE / Hip pain   Focus on core stabilization, glute & eccentric hip flexor strengthening progressing to concentric. Soft tissue mobs focus on hip adductors, rectus femoris, iliopsoas, TFL, & gluteals. Kinematic re-training and activity modification as indicated. Trochanteric Bursitis/Glutes Medius Tendinitis/tendinopathy:   Soft tissue mobs to Glutes,  Adductors,  ITB, primary and secondary hip flexors. Core stabilization, pelvic and trunk control exercise, Glute strengthening.   Activity modification and Pt education to avoid continued

## 2018-10-05 NOTE — PROGRESS NOTES
MAIN PHONE NUMBER  [phone]      PATIENT: Dayan Ritter    46 y.o.  female  YOB: 1965   MRN:  [id number]       Date of current encounter: 10/5/2018  This encounter is evaluated as a:       [] New Patient Visit    [x] Established Patient Visit   [] Post-Op Visit    Patient's PCP is Dr. Bebeto Manuel MD    Subjective:     Chief Complaint   Patient presents with    Follow-up     bilateral Hip pain       HPI:  Dayan Ritter is a very pleasant 46year old female who presents today for a follow up of her bilateral hips. A trained  was present for the entirety of the visit to assist with communication. As previously noted she has bilateral lateral sided hip pain consistent with trochanteric bursitis. Since her last visit she has done physical therapy which has helped some. Her left hip is more painful than her right. There no pain radiating down the leg. There is no pain radiating to the groin.         Pain Assessment  Location of Pain:  (bilateral hip)  Location Modifiers: Left, Right  Severity of Pain: 6  Quality of Pain: Dull  Duration of Pain: Persistent (depends on movement)  Frequency of Pain: Constant  Aggravating Factors: Bending, Stretching, Straightening, Exercise, Kneeling, Squatting, Standing, Walking, Stairs  Limiting Behavior: Yes  Relieving Factors:  (nothing)  Result of Injury: No  Work-Related Injury: No  Are there other pain locations you wish to document?: No    Patient Active Problem List   Diagnosis    Type 2 diabetes mellitus without complication, without long-term current use of insulin (HCC)    Postlaminectomy syndrome of lumbar region    Lumbar radiculopathy    SHYANN (obstructive sleep apnea)    Chronic, continuous use of opioids    Morbid obesity with body mass index (BMI) of 60.0 to 69.9 in adult [facility])    Primary osteoarthritis of right knee    Primary osteoarthritis of left knee    Polyarthropathy, multiple sites    Chronic her condition or if she needs assistance in scheduling the above tests. She is welcome to call for an appointment sooner if she has any additional concerns or questions. Orders Placed This Encounter   Procedures    [facility] TO - [facility]     Referral Priority:   Routine     Referral Type:   Eval and Treat     Referral Reason:   Specialty Services Required     Requested Specialty:   Physical Therapy     Number of Visits Requested:   1       Refills/New Prescriptions:  No orders of the defined types were placed in this encounter. Today's prescription medications will be e-scribed (when appropriate) to the Patient's Preferred Pharmacy:   [facility], [address] [facility]  [address] [address]  Phone: [phone] Fax: [phone]    [id number] [address] [facility], [address] [address]  Phone: [phone] Fax: [phone]    [phone] - [facility] - [address] [place] [address] [phone] Judy Tomas [phone]  [facility] [address]  Phone: [phone] Fax: [phone]       Geoff Gilmore PA-C  Physician Assistant, Orthopedic Surgery    During this examination, Geoff Gilmore PA-C, functioned as a scribe for Dr. Rubin Thomas. This dictation was performed with a verbal recognition program (DRAGON) and it was checked for errors. It is possible that there are still dictated errors within this office note. If so, please bring any errors to my attention for an addendum. All efforts were made to ensure that this office note is accurate. Attending Attestation Note:    I, Dr. Rubin Thomas MD, personally performed the services described in this documentation as scribed by Geoff Gilmore PA-C   in my presence, and it is both accurate and complete.       Rubin Thomas MD  Orthopaedic Surgeon,

## 2018-10-10 ENCOUNTER — TELEPHONE (OUTPATIENT)
Dept: INTERNAL MEDICINE CLINIC | Age: 53
End: 2018-10-10

## 2018-10-10 NOTE — TELEPHONE ENCOUNTER
Patient spoke with the people that are going to deliver her wheelchair. They need to be called with all of her diagnosis so she can get the wheelchair. Traci Connelly 30. He is the mobility . Phone [phone]  Fax -[phone]  This has to do with her getting adjustable seat, back seat and leg rests.   Patient would like to be called when the Dr. Tony Quiroz to the Mobility Rep

## 2018-10-12 ENCOUNTER — TELEPHONE (OUTPATIENT)
Dept: INTERNAL MEDICINE CLINIC | Age: 53
End: 2018-10-12

## 2018-10-12 NOTE — TELEPHONE ENCOUNTER
Pt called in letting us know that she has approx. 6 lesions on the skin of both of her thighs & her stomach; yellow in color & yellow colored drainage comes out when squeezed; painful. Pt has surgery this month & a follow up appt scheduled with Dr. Hunter on 11/30; cannot come in any sooner. Pt also inquiring about status of her wheelchair through [address] [place] [address]. Please review telephone encounter from 10/10.

## 2018-10-15 ENCOUNTER — TELEPHONE (OUTPATIENT)
Dept: PAIN MANAGEMENT | Age: 53
End: 2018-10-15

## 2018-10-15 DIAGNOSIS — M13.0 POLYARTHROPATHY, MULTIPLE SITES: ICD-10-CM

## 2018-10-15 DIAGNOSIS — M96.1 POSTLAMINECTOMY SYNDROME OF LUMBAR REGION: ICD-10-CM

## 2018-10-15 RX ORDER — OXYCODONE AND ACETAMINOPHEN 7.5; 325 MG/1; MG/1
1 TABLET ORAL EVERY 8 HOURS PRN
Qty: 90 TABLET | Refills: 0 | Status: SHIPPED | OUTPATIENT
Start: 2018-10-18 | End: 2018-11-12 | Stop reason: SDUPTHER

## 2018-10-18 ENCOUNTER — CLINICAL DOCUMENTATION (OUTPATIENT)
Dept: FAMILY MEDICINE CLINIC | Age: 53
End: 2018-10-18

## 2018-10-23 ENCOUNTER — OFFICE VISIT (OUTPATIENT)
Dept: ORTHOPEDIC SURGERY | Age: 53
End: 2018-10-23
Payer: COMMERCIAL

## 2018-10-23 VITALS — HEART RATE: 73 BPM | DIASTOLIC BLOOD PRESSURE: 61 MMHG | SYSTOLIC BLOOD PRESSURE: 116 MMHG

## 2018-10-23 DIAGNOSIS — M16.11 PRIMARY OSTEOARTHRITIS OF RIGHT HIP: Primary | ICD-10-CM

## 2018-10-23 DIAGNOSIS — M70.61 GREATER TROCHANTERIC BURSITIS OF RIGHT HIP: ICD-10-CM

## 2018-10-23 PROCEDURE — 1036F TOBACCO NON-USER: CPT | Performed by: ORTHOPAEDIC SURGERY

## 2018-10-23 PROCEDURE — G8417 CALC BMI ABV UP PARAM F/U: HCPCS | Performed by: ORTHOPAEDIC SURGERY

## 2018-10-23 PROCEDURE — 3017F COLORECTAL CA SCREEN DOC REV: CPT | Performed by: ORTHOPAEDIC SURGERY

## 2018-10-23 PROCEDURE — 99214 OFFICE O/P EST MOD 30 MIN: CPT | Performed by: ORTHOPAEDIC SURGERY

## 2018-10-23 PROCEDURE — 3014F SCREEN MAMMO DOC REV: CPT | Performed by: ORTHOPAEDIC SURGERY

## 2018-10-23 PROCEDURE — G8428 CUR MEDS NOT DOCUMENT: HCPCS | Performed by: ORTHOPAEDIC SURGERY

## 2018-10-23 PROCEDURE — 20610 DRAIN/INJ JOINT/BURSA W/O US: CPT | Performed by: ORTHOPAEDIC SURGERY

## 2018-10-23 PROCEDURE — G8482 FLU IMMUNIZE ORDER/ADMIN: HCPCS | Performed by: ORTHOPAEDIC SURGERY

## 2018-11-09 NOTE — PROGRESS NOTES
abnormal. Coordination normal. She displays no Babinski's sign on the right side. She displays no Babinski's sign on the left side. Reflex Scores:       Tricep reflexes are 2+ on the right side and 2+ on the left side. Bicep reflexes are 2+ on the right side and 2+ on the left side. Brachioradialis reflexes are 2+ on the right side and 2+ on the left side. Patellar reflexes are 1+ on the right side and 1+ on the left side. Achilles reflexes are 1+ on the right side and 1+ on the left side. Skin: Skin is warm. No rash noted. She is not diaphoretic. Psychiatric: She has a normal mood and affect. Her behavior is normal. Thought content normal.       Vitals:    11/12/18 1134   BP: 128/82   Site: Right Lower Arm   Position: Sitting   Cuff Size: Medium Adult   Pulse: 63   Weight: (!) 340 lb (154.2 kg)   Height: 5' 8\" (1.727 m)       Body mass index is 51.7 kg/m². Pain Score:   6 /10    Goals of chronic pain therapy:      Multi-disciplinary comprehensive pain management with functional improvement and reducedopioid use. Prescription pain medication monitoring:      MEDD current = 33.75   ORT Score =  0   Other Risk factors - depression, morbid obesity. OARRS:    Checked today: Yes    Adversereport: No   UDS:    Date of last UDS: 04/23/2018    Adverse report: No      Medication Effects -        Analgesia:      Average level of pain for the past month = 6/10     Percentage of pain relief = 80%     Activities Improved: Activities of daily living = 30%     Sleep = 30%     Mood = 30%     Social functioning = 30%     Adverse Effects:      Any adverse effects: No     Type/Severity of side effect: None    Aberrant Behavior:     Requests for frequent early refills: No     Increased dose without authorization: No     Reports lost or stolen prescriptions: No     Attempts to obtain prescriptions from other providers: No     Use of pain medication in response to situational stressor: No

## 2018-11-12 ENCOUNTER — OFFICE VISIT (OUTPATIENT)
Dept: PAIN MANAGEMENT | Age: 53
End: 2018-11-12
Payer: COMMERCIAL

## 2018-11-12 VITALS
DIASTOLIC BLOOD PRESSURE: 82 MMHG | HEIGHT: 68 IN | WEIGHT: 293 LBS | HEART RATE: 63 BPM | BODY MASS INDEX: 44.41 KG/M2 | SYSTOLIC BLOOD PRESSURE: 128 MMHG

## 2018-11-12 DIAGNOSIS — M13.0 POLYARTHROPATHY, MULTIPLE SITES: ICD-10-CM

## 2018-11-12 DIAGNOSIS — F11.90 CHRONIC, CONTINUOUS USE OF OPIOIDS: ICD-10-CM

## 2018-11-12 DIAGNOSIS — M96.1 POSTLAMINECTOMY SYNDROME OF LUMBAR REGION: Primary | ICD-10-CM

## 2018-11-12 DIAGNOSIS — G89.4 CHRONIC PAIN SYNDROME: ICD-10-CM

## 2018-11-12 PROCEDURE — G8417 CALC BMI ABV UP PARAM F/U: HCPCS | Performed by: ANESTHESIOLOGY

## 2018-11-12 PROCEDURE — 3014F SCREEN MAMMO DOC REV: CPT | Performed by: ANESTHESIOLOGY

## 2018-11-12 PROCEDURE — G8427 DOCREV CUR MEDS BY ELIG CLIN: HCPCS | Performed by: ANESTHESIOLOGY

## 2018-11-12 PROCEDURE — 3017F COLORECTAL CA SCREEN DOC REV: CPT | Performed by: ANESTHESIOLOGY

## 2018-11-12 PROCEDURE — 99213 OFFICE O/P EST LOW 20 MIN: CPT | Performed by: ANESTHESIOLOGY

## 2018-11-12 PROCEDURE — 1036F TOBACCO NON-USER: CPT | Performed by: ANESTHESIOLOGY

## 2018-11-12 PROCEDURE — G8482 FLU IMMUNIZE ORDER/ADMIN: HCPCS | Performed by: ANESTHESIOLOGY

## 2018-11-12 RX ORDER — TIZANIDINE 2 MG/1
2 TABLET ORAL EVERY 8 HOURS PRN
Qty: 30 TABLET | Refills: 1 | Status: SHIPPED | OUTPATIENT
Start: 2018-11-12 | End: 2019-01-16 | Stop reason: SDUPTHER

## 2018-11-12 RX ORDER — GABAPENTIN 300 MG/1
300 CAPSULE ORAL 3 TIMES DAILY
Qty: 90 CAPSULE | Refills: 1 | Status: SHIPPED | OUTPATIENT
Start: 2018-11-12 | End: 2019-01-16 | Stop reason: SDUPTHER

## 2018-11-12 RX ORDER — OXYCODONE AND ACETAMINOPHEN 7.5; 325 MG/1; MG/1
1 TABLET ORAL EVERY 8 HOURS PRN
Qty: 90 TABLET | Refills: 0 | Status: SHIPPED | OUTPATIENT
Start: 2018-11-16 | End: 2018-12-14 | Stop reason: SDUPTHER

## 2018-11-12 ASSESSMENT — ENCOUNTER SYMPTOMS
VOMITING: 0
NAUSEA: 0
CONSTIPATION: 0
BACK PAIN: 1
COUGH: 0
EYE DISCHARGE: 0
ABDOMINAL PAIN: 0
EYE PAIN: 0
EYE REDNESS: 0
WHEEZING: 0
SHORTNESS OF BREATH: 0

## 2018-11-30 ENCOUNTER — OFFICE VISIT (OUTPATIENT)
Dept: INTERNAL MEDICINE CLINIC | Age: 53
End: 2018-11-30
Payer: COMMERCIAL

## 2018-11-30 VITALS
OXYGEN SATURATION: 95 % | BODY MASS INDEX: 44.41 KG/M2 | HEART RATE: 65 BPM | HEIGHT: 68 IN | WEIGHT: 293 LBS | SYSTOLIC BLOOD PRESSURE: 120 MMHG | DIASTOLIC BLOOD PRESSURE: 70 MMHG

## 2018-11-30 DIAGNOSIS — E78.2 MIXED HYPERLIPIDEMIA: ICD-10-CM

## 2018-11-30 DIAGNOSIS — E87.6 HYPOKALEMIA: ICD-10-CM

## 2018-11-30 DIAGNOSIS — R31.29 MICROSCOPIC HEMATURIA: ICD-10-CM

## 2018-11-30 DIAGNOSIS — R10.30 LOWER ABDOMINAL PAIN: ICD-10-CM

## 2018-11-30 DIAGNOSIS — R11.2 NAUSEA AND VOMITING, INTRACTABILITY OF VOMITING NOT SPECIFIED, UNSPECIFIED VOMITING TYPE: Primary | ICD-10-CM

## 2018-11-30 DIAGNOSIS — K59.00 CONSTIPATION, UNSPECIFIED CONSTIPATION TYPE: ICD-10-CM

## 2018-11-30 DIAGNOSIS — E11.9 TYPE 2 DIABETES MELLITUS WITHOUT COMPLICATION, WITHOUT LONG-TERM CURRENT USE OF INSULIN (HCC): ICD-10-CM

## 2018-11-30 LAB
BILIRUBIN, POC: ABNORMAL
BLOOD URINE, POC: ABNORMAL
CHOLESTEROL, TOTAL: 164 MG/DL (ref 0–199)
CLARITY, POC: ABNORMAL
COLOR, POC: ABNORMAL
GLUCOSE URINE, POC: ABNORMAL
HDLC SERPL-MCNC: 53 MG/DL (ref 40–60)
KETONES, POC: 40
LDL CHOLESTEROL CALCULATED: 94 MG/DL
LEUKOCYTE EST, POC: ABNORMAL
NITRITE, POC: ABNORMAL
PH, POC: 5.5
POTASSIUM SERPL-SCNC: 3.6 MMOL/L (ref 3.5–5.1)
PROTEIN, POC: ABNORMAL
SPECIFIC GRAVITY, POC: 1.03
TRIGL SERPL-MCNC: 86 MG/DL (ref 0–150)
UROBILINOGEN, POC: 0.2
VLDLC SERPL CALC-MCNC: 17 MG/DL

## 2018-11-30 PROCEDURE — 81002 URINALYSIS NONAUTO W/O SCOPE: CPT | Performed by: INTERNAL MEDICINE

## 2018-11-30 PROCEDURE — 3014F SCREEN MAMMO DOC REV: CPT | Performed by: INTERNAL MEDICINE

## 2018-11-30 PROCEDURE — 3017F COLORECTAL CA SCREEN DOC REV: CPT | Performed by: INTERNAL MEDICINE

## 2018-11-30 PROCEDURE — G8417 CALC BMI ABV UP PARAM F/U: HCPCS | Performed by: INTERNAL MEDICINE

## 2018-11-30 PROCEDURE — G8427 DOCREV CUR MEDS BY ELIG CLIN: HCPCS | Performed by: INTERNAL MEDICINE

## 2018-11-30 PROCEDURE — G8482 FLU IMMUNIZE ORDER/ADMIN: HCPCS | Performed by: INTERNAL MEDICINE

## 2018-11-30 PROCEDURE — 1036F TOBACCO NON-USER: CPT | Performed by: INTERNAL MEDICINE

## 2018-11-30 PROCEDURE — 99214 OFFICE O/P EST MOD 30 MIN: CPT | Performed by: INTERNAL MEDICINE

## 2018-11-30 RX ORDER — ONDANSETRON 4 MG/1
4 TABLET, ORALLY DISINTEGRATING ORAL EVERY 8 HOURS
COMMUNITY
Start: 2018-11-29 | End: 2019-02-11 | Stop reason: CLARIF

## 2018-11-30 RX ORDER — ONDANSETRON 4 MG/1
4 TABLET, FILM COATED ORAL 3 TIMES DAILY PRN
Qty: 30 TABLET | Refills: 0 | Status: SHIPPED | OUTPATIENT
Start: 2018-11-30 | End: 2019-02-11 | Stop reason: CLARIF

## 2018-11-30 RX ORDER — POLYETHYLENE GLYCOL 3350 17 G/17G
17 POWDER, FOR SOLUTION ORAL DAILY
Qty: 510 G | Refills: 0 | Status: SHIPPED | OUTPATIENT
Start: 2018-11-30 | End: 2018-12-30

## 2018-11-30 RX ORDER — DOCUSATE SODIUM 100 MG/1
100 CAPSULE, LIQUID FILLED ORAL DAILY
COMMUNITY
Start: 2018-10-25 | End: 2019-01-24

## 2018-11-30 RX ORDER — DOCUSATE SODIUM 100 MG/1
100 CAPSULE, LIQUID FILLED ORAL 2 TIMES DAILY
Qty: 60 CAPSULE | Refills: 0 | Status: SHIPPED | OUTPATIENT
Start: 2018-11-30 | End: 2020-01-09

## 2018-11-30 ASSESSMENT — ENCOUNTER SYMPTOMS
DIARRHEA: 0
NAUSEA: 1
SHORTNESS OF BREATH: 0
SINUS PRESSURE: 0
BACK PAIN: 1
VOMITING: 1
SORE THROAT: 0
WHEEZING: 0
BLOOD IN STOOL: 0
CONSTIPATION: 0
COUGH: 0
RHINORRHEA: 0
CHEST TIGHTNESS: 0
ABDOMINAL PAIN: 0

## 2018-11-30 NOTE — PROGRESS NOTES
Ben Chavira   Date ofBirth:  1965    Date of Visit:  11/30/2018    Chief Complaint   Patient presents with    Diabetes    Hypertension    Other     Chronic Medical Conditions       HPI  Margi Part ID# [id number]    Vomiting   Couldn't eat and keep food down  4 oz per day supposed to drinking 64 oz  IVF's    S/p gastric Sleeve Surgery    Is not able to eat or do anything  BP up and down  Vomiting  Clear foods    Constipation-stools very hard and once a week. Ensure once daily    Review of Systems   Constitutional: Negative for chills, fatigue and fever. HENT: Negative for congestion, postnasal drip, rhinorrhea, sinus pressure and sore throat. Eyes: Negative for visual disturbance. Respiratory: Negative for cough, chest tightness, shortness of breath and wheezing. Cardiovascular: Negative for chest pain, palpitations and leg swelling. Gastrointestinal: Positive for nausea and vomiting. Negative for abdominal pain, blood in stool, constipation and diarrhea. Genitourinary: Negative for dysuria, frequency and hematuria. Musculoskeletal: Positive for back pain and gait problem. Negative for arthralgias and myalgias. Skin: Negative for rash. Neurological: Negative for dizziness, tremors, syncope, weakness, light-headedness, numbness and headaches. Psychiatric/Behavioral: Negative for dysphoric mood and sleep disturbance. The patient is not nervous/anxious.         No Known Allergies  Outpatient Prescriptions Marked as Taking for the 11/30/18 encounter (Office Visit) with Watson Peng MD   Medication Sig Dispense Refill    ondansetron (ZOFRAN) 4 MG tablet Take 1 tablet by mouth 3 times daily as needed for Nausea or Vomiting 30 tablet 0    polyethylene glycol (GLYCOLAX) powder Take 17 g by mouth daily 510 g 0    docusate sodium (COLACE) 100 MG capsule Take 1 capsule by mouth 2 times daily 60 capsule 0    diclofenac sodium 1 % GEL APPLY 4 G TOPICALLY 4 TIMES DAILY 500 g 0    affect. Nursing note reviewed.         Results for POC orders placed in visit on 11/30/18   POCT Urinalysis no Micro   Result Value Ref Range    Color, UA Dark Irene     Clarity, UA Cloudy     Glucose, UA POC N     Bilirubin, UA Moderate     Ketones, UA 40     Spec Grav, UA 1.030     Blood, UA POC Small     pH, UA 5.5     Protein, UA POC Trace     Urobilinogen, UA 0.2     Leukocytes, UA N     Nitrite, UA N      Lab Review   [facility] Outpatient Visit on 08/30/2018   Component Date Value    Pregnancy, Urine 08/30/2018 Negative     POC Glucose 08/30/2018 86     Performed on 08/30/2018 ACCU-CHEK     POC Glucose 08/30/2018 84     Performed on 08/30/2018 [facility] Outpatient Visit on 08/21/2018   Component Date Value    Ventricular Rate 08/21/2018 73     Atrial Rate 08/21/2018 73     P-R Interval 08/21/2018 160     QRS Duration 08/21/2018 90     Q-T Interval 08/21/2018 382     QTc Calculation (Bazett) 08/21/2018 420     P Axis 08/21/2018 29     R Axis 08/21/2018 -7     T Axis 08/21/2018 30     Diagnosis 08/21/2018                      Value:Normal sinus rhythm  Moderate voltage criteria for LVH, may be normal variant  Borderline ECG  No previous ECGs available  Confirmed by DHEERAJ BILLINGS, ARMAAN ([id number]) on 8/21/2018 6:15:38 PM      WBC 08/21/2018 8.0     RBC 08/21/2018 4.62     Hemoglobin 08/21/2018 13.1     Hematocrit 08/21/2018 39.8     MCV 08/21/2018 86.1     MCH 08/21/2018 28.3     MCHC 08/21/2018 32.9     RDW 08/21/2018 15.2     Platelets 96/96/7411 320     MPV 08/21/2018 7.4     Neutrophils % 08/21/2018 69.3     Lymphocytes % 08/21/2018 23.0     Monocytes % 08/21/2018 6.5     Eosinophils % 08/21/2018 0.9     Basophils % 08/21/2018 0.3     Neutrophils # 08/21/2018 5.5     Lymphocytes # 08/21/2018 1.8     Monocytes # 08/21/2018 0.5     Eosinophils # 08/21/2018 0.1     Basophils # 08/21/2018 0.0     Sodium 08/21/2018 136     Potassium 08/21/2018 4.4     Chloride 08/21/2018

## 2018-11-30 NOTE — PATIENT INSTRUCTIONS
-Colace 100mg 2 times daily (stool softener)  -Miralax 17 grams once daily (laxative)  -Zofran 4 mg 3 times daily as needed for nausea and vomiting  -Increase fluids  -Increase Ensure nutritional shake to 2 times daily  -CT abdomen pelvis  -over the counter Neosporin to skin areas as needed        Patient Education       ÇáÅãÓÇß: ÅÑÔÇÏÇÊ ÇáÑÚÇíÉ  [ Constipation: Care Instructions ]  ÅÑÔÇÏÇÊ ÇáÚäÇíÉ ÇáÎÇÕÉ Èß  ÇáÅãÓÇß ãÚäÇå ÇáãÑæÑ ÈæÞÊ ÚÕíÈ ÃËäÇÁ ÇáÊÈÑÒ (ÍÑßÉ ÇáÃãÚÇÁ). íÊÈÑÒ ÇáÃÔÎÇÕ ãä 3 ãÑÇÊ Ýí Çáíæã ÇáæÇÍÏ Åáì ãÑÉ ßá 3 ÃíÇã. æãÇ ÞÏ íßæä ÚÇÏíðÇ YFKSWNT áß ÞÏ íßæä ãÎÊáÝðÇ. ÞÏ íÍÏË ÇáÅãÓÇß OSTQHPQ ÈÃáã Ýí ÇáãÓÊÞíã æÊÔäÌÇÊ. æÞÏ íÊÝÇÞã ÇáÃáã ÚäÏ ãÍÇæáÉ ÇáÊÈÑÒ. PCPXHVVV ÊæÌÏ ßãíÇÊ ÈÓíØÉ ãä ÇáÏã ÝÇÊÍ XLAQWILT Úáì æÑÞ CXANTXS Ãæ ÓØÍ ÇáÈÑÇÒ ÈÓÈÈ ÇáÃæÑÏÉ FBDRPGDQ ÈÇáÞÑÈ ãä ÇáãÓÊÞíã (ÇáÈæÇÓíÑ). ÞÏ ÊÓÇÚÏ ÇáÊÛííÑÇÊ ÇáÈÓíØÉ Ýí äÙÇãß ÇáÛÐÇÆí æäãØ ÍíÇÊß Ýí ÊÌäÈ ÇáÅãÓÇß XQGVUCZL. æÞÏ íÕÝ áß ØÈíÈß ÃíÖðÇ ÏæÇÁð ááãÓÇÚÏÉ Ýí Êáííä ÇáÈÑÇÒ. ÞÏ ÊÓÈÈ ÈÚÖ ÇáÃÏæíÉ (ãËá ÃÏæíÉ ÚáÇÌ ÇáÃáã Ãæ ãÖÇÏÇÊ ÇáÇßÊÆÇÈ) ÇáÅãÓÇß. ÃÎÈÑ ØÈíÈß ÈÌãíÚ ÇáÃÏæíÉ ÇáÊí RPJELPSD. ÞÏ íÑÛÈ ØÈíÈß Ýí ÅÌÑÇÁ ÊÛííÑ ÏæÇÆí áÊÎÝíÝ ÇáÃÚÑÇÖ. Åä ãÊÇÈÚÉ ÇáÑÚÇíÉ ÌÒÁ ÑÆíÓí Ýí ÚáÇÌß æÓáÇãÊß. ÊÃßÏ ãä ÅÌÑÇÁ ÊÑÊíÈÇÊ ÌãíÚ ãæÇÚíÏ ÒíÇÑÉ ÇáØÈíÈ VKBGFXT ÅáíåÇ CFQZTRSE ÈØÈíÈß ÅÐÇ ßÇäÊ áÏíß ãÔßáÇÊ. æãä ÇáÃÝßÇÑ ÇáÌíÏÉ ÃíÖðÇ ãÚÑÝÉ äÊÇÆÌ SQMYGHUQ WRQUKIBEK ÈÞÇÆãÉ MDPEMJNS ÇáÊí SGQKWEQR. ßíÝ íãßäß ÇáÇÚÊäÇÁ ÈäÝÓß Ýí THYSIJ¿   · ÇÔÑÈ ßãíÉ æÝíÑÉ ãä EPSMASO ÈãÇ íßÝí áÃä íÕÈÍ áæä Èæáß ÃÕÝÑ OCPJCY Ãæ ÕÇÝíðÇ ãËá ÇáãÇÁ. ÅÐÇ ßäÊ ãä ÇáãÕÇÈíä ÈÃãÑÇÖ Çáßáì Ãæ ÇáÞáÈ Ãæ ÇáßÈÏ æáÒã Úáíß ÊÞáíá PYGLRWK¡ ÝÊÍÏË ãÚ ØÈíÈß ÞÈá Ãä ÊÞæã ÈÒíÇÏÉ ßãíÉ KXFCDEU ÇáÊí ÊÔÑÈåÇ.  · ÃÏÑÌ ÇáÃØÚãÉ ÇáÛäíÉ KIHRIYBY ãËá ÇáÝæÇßå KUKEPNKVY æÇáÈÞæáíÇÊ æÇáÍÈæÈ ÇáßÇãáÉ Ýí äÙÇãß ÇáÛÐÇÆí ÈÔßá íæãí.  · Þã Úáì ÇáÃÞá ÈããÇÑÓÉ ÇáÊãÑíäÇÊ áãÏÉ 30 ÏÞíÞÉ Ýí ãÚÙã ÃíÇã ÇáÃÓÈæÚ. æíÚÏ ÇáãÔí ÎíÇÑðÇ ÌíÏðÇ. ÞÏ ÊÑÛÈ ÃíÖðÇ Ýí ããÇÑÓÉ ÃäÔØÉ ÃÎÑì ãËá ÇáÌÑí Ãæ WEWKMWK Ãæ ÑßæÈ ÇáÏÑÇÌÉ Ãæ áÚÈ ÇáÊäÓ Ãæ ÇáÑíÇÖÇÊ ÇáÌãÇÚíÉ.    · ÊäÇæá ãßãáÇÊ ÇáÃáíÇÝ ãËá ÓíÊÑæÓíá ÓÇÚÉ. ÞÏ íÓÊãÑ ÇáÛËíÇä æÇáÞíÁ ÇáäÇÊÌÇä Úä ÇáÊÓãã ÇáÛÐÇÆí ãä 12 Åáì 48 ÓÇÚÉ. áÞÏ ÝÍÕß ÇáØÈíÈ ÝÍÕðÇ ÏÞíÞðÇ¡ æáßä ÞÏ ÊÊØæÑ ÇáÃÚÑÇÖ áÇÍÞðÇ. ÝÅÐÇ ãÇ áÇÍÙÊ ÃíÉ STNBZS Ãæ ÃÚÑÇÖ ÌÏíÏÉ¡ ÝÇØáÈ GSUBUP ÇáØÈí ÝæÑðÇ. Åä ãÊÇÈÚÉ ÇáÑÚÇíÉ ÌÒÁ ÑÆíÓí Ýí ÚáÇÌß æÓáÇãÊß. ÝÇÍÑÕ Úáì ÅÌÑÇÁ ÊÑÊíÈÇÊ ÌãíÚ ãæÇÚíÏ ÒíÇÑÉ ÇáØÈíÈ æÇáÊÒã ÈÊáß ÇáãæÇÚíÏ æÇÊÕá ÈØÈíÈß ÅÐÇ ßÇäÊ áÏíß ãÔßáÇÊ. æãä ÇáÃÝßÇÑ ÇáÌíÏÉ ÃíÖðÇ ãÚÑÝÉ äÊÇÆÌ OCBMFWLQ DQSUMIXSX ÈÞÇÆãÉ ZWWFQQGU ÇáÊí TPMCQJHP. ßíÝ ÊÚÊäí ÈäÝÓß Ýí GHGCXF¿   · ááæÞÇíÉ ãä CQQYHU¡ ÇÔÑÈ ßãíÉ æÝíÑÉ ãä KFIDPWB ÈãÇ íßÝí áÃä íÕÈÍ áæä Èæáß ÃÕÝÑ WFRUFY Ãæ ÕÇÝíðÇ ãËá ÇáãÇÁ. æÇÔÑÈ ÇáãÇÁ æÇáÓæÇÆá ÇáÕÇÝíÉ ÇáÃÎÑì ÇáÎÇáíÉ ãä ÇáßÇÝííä ÍÊì ÊÔÚÑ ÈÇáÊÍÓä. ÅÐÇ ßäÊ ãÕÇÈðÇ ÈÃãÑÇÖ Çáßõáì Ãæ ÇáÞáÈ Ãæ ÇáßÈÏ æÚáíß Ãä ÊáÊÒã ÈÊÞáíá DBDXFXP¡ ÝÊÍÏË ãÚ ØÈíÈß ÞÈá Ãä ÊÞæã ÈÒíÇÏÉ ßãíÉ ORVDNGY ÇáÊí ÊÔÑÈåÇ.  · ÇÓÊÑÍ Ýí ÇáÓÑíÑ Åáì Ãä ÊÔÚÑ ÈÇáÊÍÓä.  · ÚäÏãÇ íãßäß ÇáÃßá¡ ÌÑøöÈ ÊäÇæá ÇáÍÓÇÁ ÇáÓÇÏÉ æÇáÃØÚãÉ ÇáÎÝíÝÉ æÇáÓæÇÆá Åáì Ãä ÊÐåÈ ÌãíÚ ÇáÃÚÑÇÖ áãÏÉ ãä 12 Åáì 48 ÓÇÚÉ. ÊÊÖãä ÇáÇÎÊíÇÑÇÊ ÇáÌíÏÉ ÇáÃÎÑì ÇáÎÈÒ ÇáãÍãÕ ÇáÌÇÝ æÇáÈÓßæíÊ ÇáãÞÑãÔ æÇáÍÈæÈ ÇáãØåæÉ æØÈÞ Íáæ ÇáÌíáÇÊíä ãËá ÇáÌíá Ãæ (Korina-O). ãÊì íÌÈ PWBDECW áØáÈ ÇáãÓÇÚÏÉ¿  ÇÊÕá ÈÇáÑÞã 911 Ýí Ãí æÞÊ ÊÑì Ýíå ÍÇÌÊß Åáì ÑÚÇíÉ ØÇÑÆÉ. ÝãËáÇð¡ ÇÊÕá Ýí ÍÇáÉ:   · ÝÞÏÇä ÇáæÚí. ÇÊÕá ÈØÈíÈß ÇáÂä Ãæ ÇÈÍË Úä ÇáÑÚÇíÉ ÇáØÈíÉ ÇáÝæÑíÉ Ýí ÇáÍÇáÇÊ ÇáÊÇáíÉ:   · ÙåæÑ ÚáÇãÇÊ ÇáÌÝÇÝ áÏíß¡ ãËá:   o o ÌÝÇÝ ÇáÚíä æÇáÝã.  o o ÊÈæá ßãíÉ ÞáíáÉ ãä ÇáÈæá ÇáÞÇÊã ÝÞØ. o o ÇáÔÚæÑ ÈÔÏÉ ÇáÚØÔ Úä ÇáãÚÊÇÏ.  · ÅÕÇÈÊß ÈÃáã ÌÏíÏ Ýí ÇáÈØä Ãæ ÊÝÇÞã ÇáÃáã ÇáãæÌæÏ ÈÇáÝÚá.  · ÅÕÇÈÊß AKFMXX Ãæ ÇÑÊÝÇÚ ÏÑÌÉ HVXOPTJ ÇáãÑÊÝÚÉ ÃÕáÇð.  · ÇáÊÞíÄ ÏãðÇ Ãæ ãÇ íÔÈå ÑÇÓÈ ÇáÞåæÉ. ÊÇÈÚ ÌíÏðÇ ÃíÉ ÊÛííÑÇÊ ÊØÑÃ Úáì ÕÍÊß æÇÍÑÕ Úáì ÇáÇÊÕÇá ÈÇáØÈíÈ Ýí SEFSYEP ÇáÊÇáíÉ:   · áÏíß ÛËíÇä æÞíÁ ãÓÊãÑíä.  · ÊÝÇÞã ÇáÞíÁ.  · ÇÓÊãÑÇÑ ÇáÞíÁ áÃßËÑ ãä íæãíä.  · ÚÏã ÇáÊÍÓøõä æÝÞ ãÇ ÊÊæÞÚå. Ãíä íãßä ãÚÑÝÉ ÇáãÒíÏ¿  ÇäÊÞÇá Åáì [email or web address]   ÏÎæá H591 íãßäß ãÚÑÝÉ ÇáãÒíÏ ãä ÎáÇá ãÑÈÚ ÇáÈÍË \"ÇáÛËíÇä æÇáÞíÁ: ÅÑÔÇÏÇÊ ÇáÑÚÇíÉ - [ Nausea and Vomiting: Care Instructions ]. \"  © 0776-3019 [employer]. ÊãÊ ÊåíÆÉ ÅÑÔÇÏÇÊ ÇáÚäÇíÉ ÈãæÌÈ ÊÑÎíÕ ãä ãÎÊÕ ÇáÑÚÇíÉ ÇáÕÍíÉ áÏíß. ÅÐÇ ßÇäÊ áÏíß ÃíÉ NQSOTGKFT Úä ÍÇáÉ ØÈíÉ Ãæ Ãí ãä åÐå MTPSLXBBZ¡ ÝÊæÌå ÏæãðÇ FNRSIIV Åáì ãÎÊÕ ÇáÑÚÇíÉ ÇáÕÍíÉ. ÊäÝí ãäÙãÉ [employer] [place] ÖãÇä Ãæ [facility] IURIZFM åÐå BEWECTGII.   ÅÕÏÇÑ ÇáãÍÊæì: 11.8 ãÍÏøË VZBIFUMB ãä: 2 XSNB DXFBX [id number]

## 2018-12-01 LAB
ESTIMATED AVERAGE GLUCOSE: 111.2 MG/DL
HBA1C MFR BLD: 5.5 %

## 2018-12-02 LAB — URINE CULTURE, ROUTINE: NORMAL

## 2018-12-03 DIAGNOSIS — E11.9 TYPE 2 DIABETES MELLITUS WITHOUT COMPLICATION, WITHOUT LONG-TERM CURRENT USE OF INSULIN (HCC): ICD-10-CM

## 2018-12-06 RX ORDER — CALCIUM CITRATE/VITAMIN D3 200MG-6.25
TABLET ORAL
Qty: 100 STRIP | Refills: 0 | Status: SHIPPED | OUTPATIENT
Start: 2018-12-06 | End: 2019-01-28 | Stop reason: SDUPTHER

## 2018-12-14 ENCOUNTER — HOSPITAL ENCOUNTER (OUTPATIENT)
Dept: CT IMAGING | Age: 53
Discharge: HOME | End: 2018-12-14
Payer: COMMERCIAL

## 2018-12-14 DIAGNOSIS — R10.30 LOWER ABDOMINAL PAIN: ICD-10-CM

## 2018-12-14 DIAGNOSIS — M96.1 POSTLAMINECTOMY SYNDROME OF LUMBAR REGION: ICD-10-CM

## 2018-12-14 DIAGNOSIS — M13.0 POLYARTHROPATHY, MULTIPLE SITES: ICD-10-CM

## 2018-12-14 PROCEDURE — 74177 CT ABD & PELVIS W/CONTRAST: CPT

## 2018-12-14 PROCEDURE — 6360000004 HC RX CONTRAST MEDICATION: Performed by: INTERNAL MEDICINE

## 2018-12-14 RX ORDER — OXYCODONE AND ACETAMINOPHEN 7.5; 325 MG/1; MG/1
1 TABLET ORAL EVERY 8 HOURS PRN
Qty: 90 TABLET | Refills: 0 | Status: SHIPPED | OUTPATIENT
Start: 2018-12-14 | End: 2019-01-16 | Stop reason: SDUPTHER

## 2018-12-14 RX ADMIN — IOHEXOL 50 ML: 240 INJECTION, SOLUTION INTRATHECAL; INTRAVASCULAR; INTRAVENOUS; ORAL at 10:14

## 2018-12-14 RX ADMIN — IOVERSOL 75 ML: 678 INJECTION INTRA-ARTERIAL; INTRAVENOUS at 10:14

## 2018-12-14 NOTE — TELEPHONE ENCOUNTER
Patient called requesting a Percocet refill. States she did not go out of town. She is also requesting an e-script because she can't come to the office to pick it up, she doesn't have transportation.

## 2018-12-15 RX ORDER — OMEPRAZOLE 40 MG/1
40 CAPSULE, DELAYED RELEASE ORAL DAILY
Qty: 30 CAPSULE | Refills: 3 | Status: SHIPPED | OUTPATIENT
Start: 2018-12-15 | End: 2019-02-28 | Stop reason: SDUPTHER

## 2019-01-16 ENCOUNTER — OFFICE VISIT (OUTPATIENT)
Dept: PAIN MANAGEMENT | Age: 54
End: 2019-01-16
Payer: COMMERCIAL

## 2019-01-16 VITALS
SYSTOLIC BLOOD PRESSURE: 115 MMHG | HEART RATE: 74 BPM | WEIGHT: 293 LBS | BODY MASS INDEX: 45.99 KG/M2 | HEIGHT: 67 IN | DIASTOLIC BLOOD PRESSURE: 76 MMHG

## 2019-01-16 DIAGNOSIS — M13.0 POLYARTHROPATHY, MULTIPLE SITES: ICD-10-CM

## 2019-01-16 DIAGNOSIS — M96.1 POSTLAMINECTOMY SYNDROME OF LUMBAR REGION: ICD-10-CM

## 2019-01-16 DIAGNOSIS — G89.4 CHRONIC PAIN SYNDROME: Primary | ICD-10-CM

## 2019-01-16 DIAGNOSIS — F11.90 CHRONIC, CONTINUOUS USE OF OPIOIDS: ICD-10-CM

## 2019-01-16 PROCEDURE — 1036F TOBACCO NON-USER: CPT | Performed by: ANESTHESIOLOGY

## 2019-01-16 PROCEDURE — G8417 CALC BMI ABV UP PARAM F/U: HCPCS | Performed by: ANESTHESIOLOGY

## 2019-01-16 PROCEDURE — G8482 FLU IMMUNIZE ORDER/ADMIN: HCPCS | Performed by: ANESTHESIOLOGY

## 2019-01-16 PROCEDURE — 99214 OFFICE O/P EST MOD 30 MIN: CPT | Performed by: ANESTHESIOLOGY

## 2019-01-16 PROCEDURE — G8427 DOCREV CUR MEDS BY ELIG CLIN: HCPCS | Performed by: ANESTHESIOLOGY

## 2019-01-16 PROCEDURE — 3017F COLORECTAL CA SCREEN DOC REV: CPT | Performed by: ANESTHESIOLOGY

## 2019-01-16 PROCEDURE — 3014F SCREEN MAMMO DOC REV: CPT | Performed by: ANESTHESIOLOGY

## 2019-01-16 RX ORDER — OXYCODONE AND ACETAMINOPHEN 7.5; 325 MG/1; MG/1
1 TABLET ORAL EVERY 8 HOURS PRN
Qty: 90 TABLET | Refills: 0 | Status: SHIPPED | OUTPATIENT
Start: 2019-01-16 | End: 2019-02-08 | Stop reason: SDUPTHER

## 2019-01-16 RX ORDER — GABAPENTIN 300 MG/1
300 CAPSULE ORAL 3 TIMES DAILY
Qty: 90 CAPSULE | Refills: 1 | Status: SHIPPED | OUTPATIENT
Start: 2019-01-16 | End: 2020-01-08 | Stop reason: ALTCHOICE

## 2019-01-16 RX ORDER — TIZANIDINE 2 MG/1
2 TABLET ORAL EVERY 8 HOURS PRN
Qty: 30 TABLET | Refills: 1 | Status: SHIPPED | OUTPATIENT
Start: 2019-01-16 | End: 2020-01-08 | Stop reason: ALTCHOICE

## 2019-01-16 ASSESSMENT — ENCOUNTER SYMPTOMS
EYE REDNESS: 0
VOMITING: 0
SHORTNESS OF BREATH: 0
ABDOMINAL PAIN: 0
CONSTIPATION: 0
EYE PAIN: 0
BACK PAIN: 1
WHEEZING: 0
EYE DISCHARGE: 0
NAUSEA: 0
COUGH: 0

## 2019-01-18 ENCOUNTER — OFFICE VISIT (OUTPATIENT)
Dept: ORTHOPEDIC SURGERY | Age: 54
End: 2019-01-18
Payer: COMMERCIAL

## 2019-01-18 VITALS — WEIGHT: 293 LBS | BODY MASS INDEX: 43.4 KG/M2 | HEIGHT: 69 IN

## 2019-01-18 DIAGNOSIS — M70.62 TROCHANTERIC BURSITIS OF BOTH HIPS: Primary | ICD-10-CM

## 2019-01-18 DIAGNOSIS — M70.61 TROCHANTERIC BURSITIS OF BOTH HIPS: Primary | ICD-10-CM

## 2019-01-18 PROCEDURE — G8417 CALC BMI ABV UP PARAM F/U: HCPCS | Performed by: ORTHOPAEDIC SURGERY

## 2019-01-18 PROCEDURE — 1036F TOBACCO NON-USER: CPT | Performed by: ORTHOPAEDIC SURGERY

## 2019-01-18 PROCEDURE — G8427 DOCREV CUR MEDS BY ELIG CLIN: HCPCS | Performed by: ORTHOPAEDIC SURGERY

## 2019-01-18 PROCEDURE — G8482 FLU IMMUNIZE ORDER/ADMIN: HCPCS | Performed by: ORTHOPAEDIC SURGERY

## 2019-01-18 PROCEDURE — 20610 DRAIN/INJ JOINT/BURSA W/O US: CPT | Performed by: ORTHOPAEDIC SURGERY

## 2019-01-18 PROCEDURE — 3017F COLORECTAL CA SCREEN DOC REV: CPT | Performed by: ORTHOPAEDIC SURGERY

## 2019-01-18 PROCEDURE — 99204 OFFICE O/P NEW MOD 45 MIN: CPT | Performed by: ORTHOPAEDIC SURGERY

## 2019-01-18 PROCEDURE — 3014F SCREEN MAMMO DOC REV: CPT | Performed by: ORTHOPAEDIC SURGERY

## 2019-01-24 ENCOUNTER — OFFICE VISIT (OUTPATIENT)
Dept: ORTHOPEDIC SURGERY | Age: 54
End: 2019-01-24
Payer: COMMERCIAL

## 2019-01-24 VITALS
BODY MASS INDEX: 43.4 KG/M2 | HEART RATE: 70 BPM | WEIGHT: 293 LBS | DIASTOLIC BLOOD PRESSURE: 49 MMHG | HEIGHT: 69 IN | SYSTOLIC BLOOD PRESSURE: 108 MMHG

## 2019-01-24 DIAGNOSIS — M25.512 LEFT SHOULDER PAIN, UNSPECIFIED CHRONICITY: ICD-10-CM

## 2019-01-24 DIAGNOSIS — M25.511 RIGHT SHOULDER PAIN, UNSPECIFIED CHRONICITY: ICD-10-CM

## 2019-01-24 DIAGNOSIS — M79.641 RIGHT HAND PAIN: ICD-10-CM

## 2019-01-24 DIAGNOSIS — M75.82 ROTATOR CUFF TENDINITIS, LEFT: Primary | ICD-10-CM

## 2019-01-24 DIAGNOSIS — M75.81 ROTATOR CUFF TENDINITIS, RIGHT: ICD-10-CM

## 2019-01-24 DIAGNOSIS — M79.642 HAND PAIN, LEFT: ICD-10-CM

## 2019-01-24 PROCEDURE — G8482 FLU IMMUNIZE ORDER/ADMIN: HCPCS | Performed by: ORTHOPAEDIC SURGERY

## 2019-01-24 PROCEDURE — 1036F TOBACCO NON-USER: CPT | Performed by: ORTHOPAEDIC SURGERY

## 2019-01-24 PROCEDURE — G8417 CALC BMI ABV UP PARAM F/U: HCPCS | Performed by: ORTHOPAEDIC SURGERY

## 2019-01-24 PROCEDURE — G8427 DOCREV CUR MEDS BY ELIG CLIN: HCPCS | Performed by: ORTHOPAEDIC SURGERY

## 2019-01-24 PROCEDURE — 3014F SCREEN MAMMO DOC REV: CPT | Performed by: ORTHOPAEDIC SURGERY

## 2019-01-24 PROCEDURE — 3017F COLORECTAL CA SCREEN DOC REV: CPT | Performed by: ORTHOPAEDIC SURGERY

## 2019-01-24 PROCEDURE — 99213 OFFICE O/P EST LOW 20 MIN: CPT | Performed by: ORTHOPAEDIC SURGERY

## 2019-01-24 PROCEDURE — 20610 DRAIN/INJ JOINT/BURSA W/O US: CPT | Performed by: ORTHOPAEDIC SURGERY

## 2019-01-24 RX ORDER — PANTOPRAZOLE SODIUM 40 MG/1
40 TABLET, DELAYED RELEASE ORAL
COMMUNITY
Start: 2019-01-23 | End: 2019-02-11 | Stop reason: CLARIF

## 2019-01-24 RX ORDER — SUCRALFATE 1 G/1
1 TABLET ORAL
COMMUNITY
Start: 2019-01-23 | End: 2019-02-11 | Stop reason: CLARIF

## 2019-01-28 ENCOUNTER — OFFICE VISIT (OUTPATIENT)
Dept: ORTHOPEDIC SURGERY | Age: 54
End: 2019-01-28
Payer: COMMERCIAL

## 2019-01-28 ENCOUNTER — OFFICE VISIT (OUTPATIENT)
Dept: INTERNAL MEDICINE CLINIC | Age: 54
End: 2019-01-28
Payer: COMMERCIAL

## 2019-01-28 VITALS
WEIGHT: 293 LBS | HEIGHT: 69 IN | HEART RATE: 84 BPM | SYSTOLIC BLOOD PRESSURE: 131 MMHG | BODY MASS INDEX: 43.4 KG/M2 | DIASTOLIC BLOOD PRESSURE: 80 MMHG

## 2019-01-28 VITALS
HEIGHT: 67 IN | WEIGHT: 293 LBS | SYSTOLIC BLOOD PRESSURE: 118 MMHG | BODY MASS INDEX: 45.99 KG/M2 | DIASTOLIC BLOOD PRESSURE: 88 MMHG | HEART RATE: 71 BPM | OXYGEN SATURATION: 96 %

## 2019-01-28 DIAGNOSIS — L30.4 INTERTRIGO: ICD-10-CM

## 2019-01-28 DIAGNOSIS — E11.9 TYPE 2 DIABETES MELLITUS WITHOUT COMPLICATION, WITHOUT LONG-TERM CURRENT USE OF INSULIN (HCC): ICD-10-CM

## 2019-01-28 DIAGNOSIS — E55.9 VITAMIN D DEFICIENCY: ICD-10-CM

## 2019-01-28 DIAGNOSIS — K21.9 GASTROESOPHAGEAL REFLUX DISEASE, ESOPHAGITIS PRESENCE NOT SPECIFIED: Primary | ICD-10-CM

## 2019-01-28 DIAGNOSIS — K59.00 CONSTIPATION, UNSPECIFIED CONSTIPATION TYPE: ICD-10-CM

## 2019-01-28 DIAGNOSIS — M17.12 PRIMARY OSTEOARTHRITIS OF LEFT KNEE: ICD-10-CM

## 2019-01-28 DIAGNOSIS — R10.9 ABDOMINAL PAIN, UNSPECIFIED ABDOMINAL LOCATION: ICD-10-CM

## 2019-01-28 DIAGNOSIS — R23.4 FISSURE IN SKIN: ICD-10-CM

## 2019-01-28 DIAGNOSIS — R31.29 MICROSCOPIC HEMATURIA: ICD-10-CM

## 2019-01-28 DIAGNOSIS — L65.9 ALOPECIA: ICD-10-CM

## 2019-01-28 DIAGNOSIS — M17.11 PRIMARY OSTEOARTHRITIS OF RIGHT KNEE: Primary | ICD-10-CM

## 2019-01-28 LAB
AMORPHOUS: ABNORMAL /HPF
BILIRUBIN URINE: ABNORMAL
BLOOD, URINE: ABNORMAL
CLARITY: ABNORMAL
COLOR: ABNORMAL
CRYSTALS, UA: ABNORMAL /HPF
EPITHELIAL CELLS, UA: 8 /HPF (ref 0–5)
GLUCOSE URINE: NEGATIVE MG/DL
KETONES, URINE: 15 MG/DL
LEUKOCYTE ESTERASE, URINE: ABNORMAL
MICROSCOPIC EXAMINATION: YES
NITRITE, URINE: NEGATIVE
PH UA: 5.5
PROTEIN UA: ABNORMAL MG/DL
RBC UA: ABNORMAL /HPF (ref 0–2)
SPECIFIC GRAVITY UA: >1.03
UROBILINOGEN, URINE: 0.2 E.U./DL
WBC UA: 12 /HPF (ref 0–5)

## 2019-01-28 PROCEDURE — 20610 DRAIN/INJ JOINT/BURSA W/O US: CPT | Performed by: ORTHOPAEDIC SURGERY

## 2019-01-28 PROCEDURE — 3014F SCREEN MAMMO DOC REV: CPT | Performed by: INTERNAL MEDICINE

## 2019-01-28 PROCEDURE — 99214 OFFICE O/P EST MOD 30 MIN: CPT | Performed by: ORTHOPAEDIC SURGERY

## 2019-01-28 PROCEDURE — 3046F HEMOGLOBIN A1C LEVEL >9.0%: CPT | Performed by: INTERNAL MEDICINE

## 2019-01-28 PROCEDURE — G8428 CUR MEDS NOT DOCUMENT: HCPCS | Performed by: ORTHOPAEDIC SURGERY

## 2019-01-28 PROCEDURE — 1036F TOBACCO NON-USER: CPT | Performed by: ORTHOPAEDIC SURGERY

## 2019-01-28 PROCEDURE — 3017F COLORECTAL CA SCREEN DOC REV: CPT | Performed by: ORTHOPAEDIC SURGERY

## 2019-01-28 PROCEDURE — 1036F TOBACCO NON-USER: CPT | Performed by: INTERNAL MEDICINE

## 2019-01-28 PROCEDURE — 2022F DILAT RTA XM EVC RTNOPTHY: CPT | Performed by: INTERNAL MEDICINE

## 2019-01-28 PROCEDURE — G8482 FLU IMMUNIZE ORDER/ADMIN: HCPCS | Performed by: ORTHOPAEDIC SURGERY

## 2019-01-28 PROCEDURE — G8417 CALC BMI ABV UP PARAM F/U: HCPCS | Performed by: INTERNAL MEDICINE

## 2019-01-28 PROCEDURE — 99214 OFFICE O/P EST MOD 30 MIN: CPT | Performed by: INTERNAL MEDICINE

## 2019-01-28 PROCEDURE — 81001 URINALYSIS AUTO W/SCOPE: CPT | Performed by: INTERNAL MEDICINE

## 2019-01-28 PROCEDURE — 3014F SCREEN MAMMO DOC REV: CPT | Performed by: ORTHOPAEDIC SURGERY

## 2019-01-28 PROCEDURE — G8482 FLU IMMUNIZE ORDER/ADMIN: HCPCS | Performed by: INTERNAL MEDICINE

## 2019-01-28 PROCEDURE — G8417 CALC BMI ABV UP PARAM F/U: HCPCS | Performed by: ORTHOPAEDIC SURGERY

## 2019-01-28 PROCEDURE — G8427 DOCREV CUR MEDS BY ELIG CLIN: HCPCS | Performed by: INTERNAL MEDICINE

## 2019-01-28 PROCEDURE — 3017F COLORECTAL CA SCREEN DOC REV: CPT | Performed by: INTERNAL MEDICINE

## 2019-01-28 RX ORDER — NYSTATIN 100000 U/G
CREAM TOPICAL
Qty: 30 G | Refills: 0 | Status: SHIPPED | OUTPATIENT
Start: 2019-01-28 | End: 2020-01-09

## 2019-01-28 RX ORDER — LANCETS 30 GAUGE
EACH MISCELLANEOUS
Qty: 100 EACH | Refills: 2 | Status: SHIPPED | OUTPATIENT
Start: 2019-01-28 | End: 2020-01-23

## 2019-01-28 RX ORDER — ERGOCALCIFEROL 1.25 MG/1
50000 CAPSULE ORAL WEEKLY
Qty: 4 CAPSULE | Refills: 3 | Status: SHIPPED | OUTPATIENT
Start: 2019-01-28 | End: 2020-01-09

## 2019-01-29 ENCOUNTER — TELEPHONE (OUTPATIENT)
Dept: ORTHOPEDIC SURGERY | Age: 54
End: 2019-01-29

## 2019-02-03 ASSESSMENT — ENCOUNTER SYMPTOMS
BLOOD IN STOOL: 0
DIARRHEA: 0
CONSTIPATION: 1
RHINORRHEA: 0
SINUS PRESSURE: 0
ABDOMINAL PAIN: 1
CHEST TIGHTNESS: 0
COUGH: 0
SHORTNESS OF BREATH: 0
VOMITING: 0
SORE THROAT: 0
NAUSEA: 1
WHEEZING: 0

## 2019-02-05 ENCOUNTER — OFFICE VISIT (OUTPATIENT)
Dept: RHEUMATOLOGY | Age: 54
End: 2019-02-05
Payer: COMMERCIAL

## 2019-02-05 VITALS — SYSTOLIC BLOOD PRESSURE: 110 MMHG | HEART RATE: 60 BPM | DIASTOLIC BLOOD PRESSURE: 73 MMHG

## 2019-02-05 DIAGNOSIS — Z11.59 ENCOUNTER FOR SCREENING FOR OTHER VIRAL DISEASES: ICD-10-CM

## 2019-02-05 DIAGNOSIS — M15.9 PRIMARY OSTEOARTHRITIS INVOLVING MULTIPLE JOINTS: ICD-10-CM

## 2019-02-05 DIAGNOSIS — M15.9 PRIMARY OSTEOARTHRITIS INVOLVING MULTIPLE JOINTS: Primary | ICD-10-CM

## 2019-02-05 LAB — SEDIMENTATION RATE, ERYTHROCYTE: 39 MM/HR (ref 0–30)

## 2019-02-05 PROCEDURE — G8417 CALC BMI ABV UP PARAM F/U: HCPCS | Performed by: INTERNAL MEDICINE

## 2019-02-05 PROCEDURE — 3014F SCREEN MAMMO DOC REV: CPT | Performed by: INTERNAL MEDICINE

## 2019-02-05 PROCEDURE — G8427 DOCREV CUR MEDS BY ELIG CLIN: HCPCS | Performed by: INTERNAL MEDICINE

## 2019-02-05 PROCEDURE — G8482 FLU IMMUNIZE ORDER/ADMIN: HCPCS | Performed by: INTERNAL MEDICINE

## 2019-02-05 PROCEDURE — 1036F TOBACCO NON-USER: CPT | Performed by: INTERNAL MEDICINE

## 2019-02-05 PROCEDURE — 3017F COLORECTAL CA SCREEN DOC REV: CPT | Performed by: INTERNAL MEDICINE

## 2019-02-05 PROCEDURE — 99204 OFFICE O/P NEW MOD 45 MIN: CPT | Performed by: INTERNAL MEDICINE

## 2019-02-06 LAB
A/G RATIO: 1.5 (ref 1.1–2.2)
ALBUMIN SERPL-MCNC: 4.1 G/DL (ref 3.4–5)
ALP BLD-CCNC: 60 U/L (ref 40–129)
ALT SERPL-CCNC: 13 U/L (ref 10–40)
ANION GAP SERPL CALCULATED.3IONS-SCNC: 14 MMOL/L (ref 3–16)
AST SERPL-CCNC: 10 U/L (ref 15–37)
BILIRUB SERPL-MCNC: <0.2 MG/DL (ref 0–1)
BUN BLDV-MCNC: 18 MG/DL (ref 7–20)
C-REACTIVE PROTEIN: 12.1 MG/L (ref 0–5.1)
CALCIUM SERPL-MCNC: 8.9 MG/DL (ref 8.3–10.6)
CHLORIDE BLD-SCNC: 105 MMOL/L (ref 99–110)
CO2: 26 MMOL/L (ref 21–32)
CREAT SERPL-MCNC: 0.7 MG/DL (ref 0.6–1.1)
GFR AFRICAN AMERICAN: >60
GFR NON-AFRICAN AMERICAN: >60
GLOBULIN: 2.7 G/DL
GLUCOSE BLD-MCNC: 82 MG/DL (ref 70–99)
HEPATITIS B CORE IGM ANTIBODY: NORMAL
HEPATITIS B SURFACE ANTIGEN INTERPRETATION: NORMAL
HEPATITIS C ANTIBODY INTERPRETATION: NORMAL
POTASSIUM SERPL-SCNC: 4.3 MMOL/L (ref 3.5–5.1)
RHEUMATOID FACTOR: 14 IU/ML
SODIUM BLD-SCNC: 145 MMOL/L (ref 136–145)
TOTAL PROTEIN: 6.8 G/DL (ref 6.4–8.2)

## 2019-02-07 ENCOUNTER — APPOINTMENT (OUTPATIENT)
Dept: CT IMAGING | Age: 54
End: 2019-02-07
Payer: COMMERCIAL

## 2019-02-07 ENCOUNTER — HOSPITAL ENCOUNTER (EMERGENCY)
Age: 54
Discharge: HOME | End: 2019-02-07
Attending: EMERGENCY MEDICINE
Payer: COMMERCIAL

## 2019-02-07 ENCOUNTER — APPOINTMENT (OUTPATIENT)
Dept: GENERAL RADIOLOGY | Age: 54
End: 2019-02-07
Payer: COMMERCIAL

## 2019-02-07 VITALS
DIASTOLIC BLOOD PRESSURE: 80 MMHG | SYSTOLIC BLOOD PRESSURE: 115 MMHG | BODY MASS INDEX: 45.99 KG/M2 | WEIGHT: 293 LBS | RESPIRATION RATE: 18 BRPM | HEART RATE: 72 BPM | HEIGHT: 67 IN | OXYGEN SATURATION: 99 % | TEMPERATURE: 97.8 F

## 2019-02-07 DIAGNOSIS — S50.02XA CONTUSION OF LEFT ELBOW, INITIAL ENCOUNTER: ICD-10-CM

## 2019-02-07 DIAGNOSIS — G93.0 ARACHNOID CYST: ICD-10-CM
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
## 2019-02-07 LAB
A/G RATIO: 1 (ref 1.1–2.2)
ALBUMIN SERPL-MCNC: 3.7 G/DL (ref 3.4–5)
ALP BLD-CCNC: 58 U/L (ref 40–129)
ALT SERPL-CCNC: 9 U/L (ref 10–40)
ANION GAP SERPL CALCULATED.3IONS-SCNC: 15 MMOL/L (ref 3–16)
AST SERPL-CCNC: 19 U/L (ref 15–37)
BILIRUB SERPL-MCNC: 0.4 MG/DL (ref 0–1)
BUN BLDV-MCNC: 16 MG/DL (ref 7–20)
CALCIUM SERPL-MCNC: 9.7 MG/DL (ref 8.3–10.6)
CHLORIDE BLD-SCNC: 104 MMOL/L (ref 99–110)
CO2: 20 MMOL/L (ref 21–32)
CREAT SERPL-MCNC: 0.6 MG/DL (ref 0.6–1.1)
GFR AFRICAN AMERICAN: >60
GFR NON-AFRICAN AMERICAN: >60
GLOBULIN: 3.8 G/DL
GLUCOSE BLD-MCNC: 83 MG/DL (ref 70–99)
POTASSIUM SERPL-SCNC: 4.7 MMOL/L (ref 3.5–5.1)
SODIUM BLD-SCNC: 139 MMOL/L (ref 136–145)
TOTAL PROTEIN: 7.5 G/DL (ref 6.4–8.2)
TROPONIN: <0.01 NG/ML

## 2019-02-07 PROCEDURE — 93005 ELECTROCARDIOGRAM TRACING: CPT | Performed by: EMERGENCY MEDICINE

## 2019-02-07 PROCEDURE — 6370000000 HC RX 637 (ALT 250 FOR IP): Performed by: EMERGENCY MEDICINE

## 2019-02-07 PROCEDURE — 70450 CT HEAD/BRAIN W/O DYE: CPT

## 2019-02-07 PROCEDURE — 36415 COLL VENOUS BLD VENIPUNCTURE: CPT

## 2019-02-07 PROCEDURE — 99284 EMERGENCY DEPT VISIT MOD MDM: CPT

## 2019-02-07 PROCEDURE — 80053 COMPREHEN METABOLIC PANEL: CPT

## 2019-02-07 PROCEDURE — 73080 X-RAY EXAM OF ELBOW: CPT

## 2019-02-07 PROCEDURE — 84484 ASSAY OF TROPONIN QUANT: CPT

## 2019-02-07 RX ADMIN — LIDOCAINE HYDROCHLORIDE: 20 SOLUTION ORAL; TOPICAL at 14:39

## 2019-02-07 ASSESSMENT — PAIN DESCRIPTION - DESCRIPTORS: DESCRIPTORS: ACHING

## 2019-02-07 ASSESSMENT — PAIN DESCRIPTION - ORIENTATION: ORIENTATION: RIGHT;LEFT

## 2019-02-07 ASSESSMENT — PAIN SCALES - GENERAL: PAINLEVEL_OUTOF10: 5

## 2019-02-07 ASSESSMENT — PAIN DESCRIPTION - LOCATION: LOCATION: KNEE

## 2019-02-08 ENCOUNTER — CLINICAL DOCUMENTATION (OUTPATIENT)
Dept: INTERNAL MEDICINE CLINIC | Age: 54
End: 2019-02-08

## 2019-02-08 DIAGNOSIS — M96.1 POSTLAMINECTOMY SYNDROME OF LUMBAR REGION: ICD-10-CM

## 2019-02-08 DIAGNOSIS — M13.0 POLYARTHROPATHY, MULTIPLE SITES: ICD-10-CM

## 2019-02-08 DIAGNOSIS — K59.00 CONSTIPATION, UNSPECIFIED CONSTIPATION TYPE: ICD-10-CM

## 2019-02-08 LAB
CCP IGG ANTIBODIES: 6 UNITS (ref 0–19)
EKG ATRIAL RATE: 57 BPM
EKG DIAGNOSIS: NORMAL
EKG P AXIS: 40 DEGREES
EKG P-R INTERVAL: 164 MS
EKG Q-T INTERVAL: 404 MS
EKG QRS DURATION: 94 MS
EKG QTC CALCULATION (BAZETT): 393 MS
EKG R AXIS: -8 DEGREES
EKG T AXIS: 7 DEGREES
EKG VENTRICULAR RATE: 57 BPM

## 2019-02-08 PROCEDURE — 93010 ELECTROCARDIOGRAM REPORT: CPT | Performed by: INTERNAL MEDICINE

## 2019-02-08 RX ORDER — OXYCODONE AND ACETAMINOPHEN 7.5; 325 MG/1; MG/1
1 TABLET ORAL EVERY 8 HOURS PRN
Qty: 90 TABLET | Refills: 0 | Status: SHIPPED | OUTPATIENT
Start: 2019-02-15 | End: 2020-01-08 | Stop reason: ALTCHOICE

## 2019-02-08 RX ORDER — POLYETHYLENE GLYCOL 3350 17 G/17G
17 POWDER, FOR SOLUTION ORAL DAILY
Qty: 510 G | Refills: 0 | Status: SHIPPED | OUTPATIENT
Start: 2019-02-08 | End: 2019-02-28 | Stop reason: SDUPTHER

## 2019-02-11 ENCOUNTER — OFFICE VISIT (OUTPATIENT)
Dept: ORTHOPEDIC SURGERY | Age: 54
End: 2019-02-11
Payer: COMMERCIAL

## 2019-02-11 ENCOUNTER — OFFICE VISIT (OUTPATIENT)
Dept: INTERNAL MEDICINE CLINIC | Age: 54
End: 2019-02-11
Payer: COMMERCIAL

## 2019-02-11 VITALS
HEIGHT: 66 IN | BODY MASS INDEX: 47.09 KG/M2 | WEIGHT: 293 LBS | HEART RATE: 80 BPM | SYSTOLIC BLOOD PRESSURE: 111 MMHG | DIASTOLIC BLOOD PRESSURE: 73 MMHG

## 2019-02-11 VITALS
RESPIRATION RATE: 16 BRPM | HEART RATE: 76 BPM | BODY MASS INDEX: 47.09 KG/M2 | WEIGHT: 293 LBS | OXYGEN SATURATION: 97 % | HEIGHT: 66 IN | TEMPERATURE: 98.2 F | SYSTOLIC BLOOD PRESSURE: 120 MMHG | DIASTOLIC BLOOD PRESSURE: 74 MMHG

## 2019-02-11 DIAGNOSIS — K13.0 DRY LIPS: ICD-10-CM

## 2019-02-11 DIAGNOSIS — R21 RASH: ICD-10-CM

## 2019-02-11 DIAGNOSIS — R11.10 VOMITING, INTRACTABILITY OF VOMITING NOT SPECIFIED, PRESENCE OF NAUSEA NOT SPECIFIED, UNSPECIFIED VOMITING TYPE: ICD-10-CM

## 2019-02-11 DIAGNOSIS — M17.11 PRIMARY OSTEOARTHRITIS OF RIGHT KNEE: Primary | ICD-10-CM

## 2019-02-11 DIAGNOSIS — K59.00 CONSTIPATION, UNSPECIFIED CONSTIPATION TYPE: Primary | ICD-10-CM

## 2019-02-11 DIAGNOSIS — K80.20 GALLSTONES: ICD-10-CM

## 2019-02-11 DIAGNOSIS — R11.0 NAUSEA: ICD-10-CM

## 2019-02-11 DIAGNOSIS — M17.12 PRIMARY OSTEOARTHRITIS OF LEFT KNEE: ICD-10-CM

## 2019-02-11 DIAGNOSIS — K21.9 GASTROESOPHAGEAL REFLUX DISEASE, ESOPHAGITIS PRESENCE NOT SPECIFIED: ICD-10-CM

## 2019-02-11 PROCEDURE — 3017F COLORECTAL CA SCREEN DOC REV: CPT | Performed by: INTERNAL MEDICINE

## 2019-02-11 PROCEDURE — 20610 DRAIN/INJ JOINT/BURSA W/O US: CPT | Performed by: ORTHOPAEDIC SURGERY

## 2019-02-11 PROCEDURE — G8427 DOCREV CUR MEDS BY ELIG CLIN: HCPCS | Performed by: ORTHOPAEDIC SURGERY

## 2019-02-11 PROCEDURE — 1036F TOBACCO NON-USER: CPT | Performed by: ORTHOPAEDIC SURGERY

## 2019-02-11 PROCEDURE — 99214 OFFICE O/P EST MOD 30 MIN: CPT | Performed by: INTERNAL MEDICINE

## 2019-02-11 PROCEDURE — 3014F SCREEN MAMMO DOC REV: CPT | Performed by: INTERNAL MEDICINE

## 2019-02-11 PROCEDURE — 1036F TOBACCO NON-USER: CPT | Performed by: INTERNAL MEDICINE

## 2019-02-11 PROCEDURE — 3017F COLORECTAL CA SCREEN DOC REV: CPT | Performed by: ORTHOPAEDIC SURGERY

## 2019-02-11 PROCEDURE — G8482 FLU IMMUNIZE ORDER/ADMIN: HCPCS | Performed by: INTERNAL MEDICINE

## 2019-02-11 PROCEDURE — G8482 FLU IMMUNIZE ORDER/ADMIN: HCPCS | Performed by: ORTHOPAEDIC SURGERY

## 2019-02-11 PROCEDURE — G8417 CALC BMI ABV UP PARAM F/U: HCPCS | Performed by: INTERNAL MEDICINE

## 2019-02-11 PROCEDURE — G8427 DOCREV CUR MEDS BY ELIG CLIN: HCPCS | Performed by: INTERNAL MEDICINE

## 2019-02-11 PROCEDURE — 99213 OFFICE O/P EST LOW 20 MIN: CPT | Performed by: ORTHOPAEDIC SURGERY

## 2019-02-11 PROCEDURE — 3014F SCREEN MAMMO DOC REV: CPT | Performed by: ORTHOPAEDIC SURGERY

## 2019-02-11 PROCEDURE — G8417 CALC BMI ABV UP PARAM F/U: HCPCS | Performed by: ORTHOPAEDIC SURGERY

## 2019-02-11 RX ORDER — ONDANSETRON 4 MG/1
4 TABLET, FILM COATED ORAL 3 TIMES DAILY PRN
Qty: 30 TABLET | Refills: 0 | Status: SHIPPED | OUTPATIENT
Start: 2019-02-11 | End: 2020-01-09

## 2019-02-11 RX ORDER — CHOLECALCIFEROL (VITAMIN D3) 125 MCG
500 CAPSULE ORAL DAILY
COMMUNITY
End: 2020-01-09

## 2019-02-11 RX ORDER — TRIAMCINOLONE ACETONIDE 1 MG/G
CREAM TOPICAL
Qty: 15 G | Refills: 0 | Status: SHIPPED | OUTPATIENT
Start: 2019-02-11 | End: 2019-02-11 | Stop reason: SDUPTHER

## 2019-02-11 RX ORDER — ONDANSETRON 4 MG/1
4 TABLET, FILM COATED ORAL 3 TIMES DAILY PRN
Qty: 30 TABLET | Refills: 0 | Status: SHIPPED | OUTPATIENT
Start: 2019-02-11 | End: 2019-02-11 | Stop reason: SDUPTHER

## 2019-02-11 RX ORDER — TRIAMCINOLONE ACETONIDE 1 MG/G
CREAM TOPICAL
Qty: 15 G | Refills: 0 | Status: SHIPPED | OUTPATIENT
Start: 2019-02-11 | End: 2020-01-09

## 2019-02-11 ASSESSMENT — ENCOUNTER SYMPTOMS
BACK PAIN: 1
SHORTNESS OF BREATH: 1
CONSTIPATION: 1

## 2019-02-18 ASSESSMENT — ENCOUNTER SYMPTOMS
TROUBLE SWALLOWING: 0
RHINORRHEA: 0
SORE THROAT: 0
BLOOD IN STOOL: 0
NAUSEA: 1
ABDOMINAL DISTENTION: 0
CHEST TIGHTNESS: 0
CONSTIPATION: 1
SHORTNESS OF BREATH: 0
COUGH: 0
VOMITING: 1
ABDOMINAL PAIN: 0
DIARRHEA: 0
WHEEZING: 0
SINUS PRESSURE: 0

## 2019-02-19 ENCOUNTER — OFFICE VISIT (OUTPATIENT)
Dept: DERMATOLOGY | Age: 54
End: 2019-02-19
Payer: COMMERCIAL

## 2019-02-19 DIAGNOSIS — Z78.9 NON-TOBACCO USER: ICD-10-CM

## 2019-02-19 DIAGNOSIS — L64.9 ANDROGENETIC ALOPECIA: ICD-10-CM

## 2019-02-19 DIAGNOSIS — L81.0 POST-INFLAMMATORY HYPERPIGMENTATION: ICD-10-CM

## 2019-02-19 DIAGNOSIS — L73.9 FOLLICULITIS: ICD-10-CM

## 2019-02-19 DIAGNOSIS — L21.9 SEBORRHEIC DERMATITIS: Primary | ICD-10-CM

## 2019-02-19 DIAGNOSIS — L30.4 INTERTRIGO: ICD-10-CM

## 2019-02-19 DIAGNOSIS — R23.8 LONGITUDINAL RIDGING OF NAIL: ICD-10-CM

## 2019-02-19 PROCEDURE — 3017F COLORECTAL CA SCREEN DOC REV: CPT | Performed by: DERMATOLOGY

## 2019-02-19 PROCEDURE — G8417 CALC BMI ABV UP PARAM F/U: HCPCS | Performed by: DERMATOLOGY

## 2019-02-19 PROCEDURE — G8482 FLU IMMUNIZE ORDER/ADMIN: HCPCS | Performed by: DERMATOLOGY

## 2019-02-19 PROCEDURE — 99203 OFFICE O/P NEW LOW 30 MIN: CPT | Performed by: DERMATOLOGY

## 2019-02-19 PROCEDURE — 3014F SCREEN MAMMO DOC REV: CPT | Performed by: DERMATOLOGY

## 2019-02-19 PROCEDURE — 1036F TOBACCO NON-USER: CPT | Performed by: DERMATOLOGY

## 2019-02-19 PROCEDURE — G8427 DOCREV CUR MEDS BY ELIG CLIN: HCPCS | Performed by: DERMATOLOGY

## 2019-02-19 RX ORDER — CLINDAMYCIN PHOSPHATE 10 UG/ML
LOTION TOPICAL
Qty: 60 ML | Refills: 2 | Status: SHIPPED | OUTPATIENT
Start: 2019-02-19 | End: 2020-01-09

## 2019-02-19 RX ORDER — KETOCONAZOLE 20 MG/ML
SHAMPOO TOPICAL
Qty: 120 ML | Refills: 2 | Status: SHIPPED | OUTPATIENT
Start: 2019-02-19 | End: 2020-01-09

## 2019-02-27 ENCOUNTER — CLINICAL DOCUMENTATION (OUTPATIENT)
Dept: INTERNAL MEDICINE CLINIC | Age: 54
End: 2019-02-27

## 2019-02-28 ENCOUNTER — OFFICE VISIT (OUTPATIENT)
Dept: INTERNAL MEDICINE CLINIC | Age: 54
End: 2019-02-28
Payer: COMMERCIAL

## 2019-02-28 VITALS
HEART RATE: 76 BPM | TEMPERATURE: 98.5 F | SYSTOLIC BLOOD PRESSURE: 118 MMHG | HEIGHT: 66 IN | BODY MASS INDEX: 47.09 KG/M2 | WEIGHT: 293 LBS | OXYGEN SATURATION: 93 % | DIASTOLIC BLOOD PRESSURE: 82 MMHG

## 2019-02-28 DIAGNOSIS — K13.0 DRY LIPS: ICD-10-CM

## 2019-02-28 DIAGNOSIS — R21 RASH: ICD-10-CM

## 2019-02-28 DIAGNOSIS — K21.9 GASTROESOPHAGEAL REFLUX DISEASE, ESOPHAGITIS PRESENCE NOT SPECIFIED: ICD-10-CM

## 2019-02-28 DIAGNOSIS — K59.00 CONSTIPATION, UNSPECIFIED CONSTIPATION TYPE: Primary | ICD-10-CM

## 2019-02-28 DIAGNOSIS — L85.3 DRY SKIN DERMATITIS: ICD-10-CM

## 2019-02-28 PROCEDURE — 3017F COLORECTAL CA SCREEN DOC REV: CPT | Performed by: INTERNAL MEDICINE

## 2019-02-28 PROCEDURE — 3014F SCREEN MAMMO DOC REV: CPT | Performed by: INTERNAL MEDICINE

## 2019-02-28 PROCEDURE — G8427 DOCREV CUR MEDS BY ELIG CLIN: HCPCS | Performed by: INTERNAL MEDICINE

## 2019-02-28 PROCEDURE — G8482 FLU IMMUNIZE ORDER/ADMIN: HCPCS | Performed by: INTERNAL MEDICINE

## 2019-02-28 PROCEDURE — 1036F TOBACCO NON-USER: CPT | Performed by: INTERNAL MEDICINE

## 2019-02-28 PROCEDURE — G8417 CALC BMI ABV UP PARAM F/U: HCPCS | Performed by: INTERNAL MEDICINE

## 2019-02-28 PROCEDURE — 99214 OFFICE O/P EST MOD 30 MIN: CPT | Performed by: INTERNAL MEDICINE

## 2019-02-28 RX ORDER — AMMONIUM LACTATE 12 G/100G
LOTION TOPICAL
Qty: 225 G | Refills: 0 | Status: SHIPPED | OUTPATIENT
Start: 2019-02-28 | End: 2020-01-09

## 2019-02-28 RX ORDER — POLYETHYLENE GLYCOL 3350 17 G/17G
17 POWDER, FOR SOLUTION ORAL DAILY
Qty: 1530 G | Refills: 0 | Status: SHIPPED | OUTPATIENT
Start: 2019-02-28 | End: 2019-03-30

## 2019-02-28 RX ORDER — OMEPRAZOLE 40 MG/1
40 CAPSULE, DELAYED RELEASE ORAL DAILY
Qty: 90 CAPSULE | Refills: 0 | Status: SHIPPED | OUTPATIENT
Start: 2019-02-28 | End: 2020-01-09

## 2019-02-28 ASSESSMENT — PATIENT HEALTH QUESTIONNAIRE - PHQ9
SUM OF ALL RESPONSES TO PHQ QUESTIONS 1-9: 0
SUM OF ALL RESPONSES TO PHQ9 QUESTIONS 1 & 2: 0
2. FEELING DOWN, DEPRESSED OR HOPELESS: 0
1. LITTLE INTEREST OR PLEASURE IN DOING THINGS: 0
SUM OF ALL RESPONSES TO PHQ QUESTIONS 1-9: 0

## 2019-03-07 ASSESSMENT — ENCOUNTER SYMPTOMS
WHEEZING: 0
DIARRHEA: 0
SORE THROAT: 0
SHORTNESS OF BREATH: 0
COUGH: 0
RHINORRHEA: 0
ABDOMINAL PAIN: 0
BLOOD IN STOOL: 0
CHEST TIGHTNESS: 0
CONSTIPATION: 1
VOMITING: 0
SINUS PRESSURE: 0
NAUSEA: 0

## 2019-04-02 ENCOUNTER — TELEPHONE (OUTPATIENT)
Dept: INTERNAL MEDICINE CLINIC | Age: 54
End: 2019-04-02

## 2019-04-02 NOTE — TELEPHONE ENCOUNTER
Sita Barker from [employer] is calling to ask about the status of the request he placed for a motor wheelchair for Pt.    Sita Barker Phone # [phone]

## 2019-04-06 ENCOUNTER — TELEPHONE (OUTPATIENT)
Dept: RHEUMATOLOGY | Age: 54
End: 2019-04-06

## 2019-04-06 NOTE — TELEPHONE ENCOUNTER
Notes recorded by Shanika Hazel on 4/6/2019 at 12:13 PM EDT  Used [employer] to have  leave VM on both home and mobile numbers requesting patient call us to schedule.  ------    Notes recorded by Shannan Wu MD on 2/10/2019 at 4:53 PM EST  Please call and let her know blood work shows slightly elevated inflammatory arthritis and rheumatoid factor, please make a follow up appointment to discuss

## 2019-04-08 ENCOUNTER — TELEPHONE (OUTPATIENT)
Dept: RHEUMATOLOGY | Age: 54
End: 2019-04-08

## 2019-04-08 NOTE — TELEPHONE ENCOUNTER
This patient saw Dr. Ila Espino, not Dr. Rosalynd Fleischer, please forward to the MA in [facility].

## 2019-04-18 ENCOUNTER — TELEPHONE (OUTPATIENT)
Dept: INTERNAL MEDICINE CLINIC | Age: 54
End: 2019-04-18

## 2019-12-30 ENCOUNTER — TELEPHONE (OUTPATIENT)
Dept: PAIN MANAGEMENT | Age: 54
End: 2019-12-30

## 2019-12-30 NOTE — TELEPHONE ENCOUNTER
The pt called she would like to come in for a f/u appt. She has not been seen since 1/2019. Is it ok to make a f/u appt?

## 2020-01-03 ENCOUNTER — OFFICE VISIT (OUTPATIENT)
Dept: ORTHOPEDIC SURGERY | Age: 55
End: 2020-01-03
Payer: COMMERCIAL

## 2020-01-03 VITALS
SYSTOLIC BLOOD PRESSURE: 104 MMHG | HEIGHT: 69 IN | WEIGHT: 241 LBS | HEART RATE: 56 BPM | BODY MASS INDEX: 35.7 KG/M2 | DIASTOLIC BLOOD PRESSURE: 62 MMHG

## 2020-01-03 PROCEDURE — 3017F COLORECTAL CA SCREEN DOC REV: CPT | Performed by: ORTHOPAEDIC SURGERY

## 2020-01-03 PROCEDURE — G8417 CALC BMI ABV UP PARAM F/U: HCPCS | Performed by: ORTHOPAEDIC SURGERY

## 2020-01-03 PROCEDURE — 1036F TOBACCO NON-USER: CPT | Performed by: ORTHOPAEDIC SURGERY

## 2020-01-03 PROCEDURE — G8484 FLU IMMUNIZE NO ADMIN: HCPCS | Performed by: ORTHOPAEDIC SURGERY

## 2020-01-03 PROCEDURE — 20610 DRAIN/INJ JOINT/BURSA W/O US: CPT | Performed by: ORTHOPAEDIC SURGERY

## 2020-01-03 PROCEDURE — G8427 DOCREV CUR MEDS BY ELIG CLIN: HCPCS | Performed by: ORTHOPAEDIC SURGERY

## 2020-01-03 PROCEDURE — 99214 OFFICE O/P EST MOD 30 MIN: CPT | Performed by: ORTHOPAEDIC SURGERY

## 2020-01-03 NOTE — PROGRESS NOTES
Date:  1/3/2020    Name:  Jonathan Bernardo  Address:  [address] [address]    :  1965      Age:   53 y.o.    SSN:  [id number]      Medical Record Number:  [id number]    Reason for Visit:    Chief Complaint    Hip Pain (OP/SP bilateral hip pain, injections last given on 19 helped for about 6 months, reports no injury, feeling same pain as before)      DOS:1/3/2020     HPI: Jonathan Bernardo is a 53 y.o. female here today for repeat evaluation of bilateral lateral sided hip pain. This has been ongoing for several years. She had been previously evaluated by us 1 year ago by Dr Lui Gonzales at which time she received cortisone injections for both greater trochanter bursitis. These were quite helpful and the relief lasted for about 6 months. Lately she continues to have lateral sided hip pain worse with lying on her left hip but also with prolonged sitting. The pain does radiate into her groin again worse with prolonged sitting. She had been evaluated by multiple orthopedic surgeons at her group for bilateral shoulder discomfort, bilateral knee arthritis all receiving cortisone injections. She had generally been deconditioned and is wheelchair enabled, but this has been specifically as she has been [place] from both bariatric surgery and lumbar spine surgery. Of note she has lost about 200 pounds in the past year and 4 months. She continues to see gains in functional improvement in weight loss. ROS: Review of systems reviewed from Patient History Form completed today and available in the patient's chart under the Media tab.        Past Medical History:   Diagnosis Date    Arthritis     TAN (dyspnea on exertion)     Hyperlipidemia     Hypertension     Morbid obesity with body mass index (BMI) of 60.0 to 69.9 in adult ([facility] [address] 2018    Neuropathy     SHYANN (obstructive sleep apnea)     Primary osteoarthritis of right knee 2018    Type 2 diabetes mellitus without complication ([address]     controlled with diet    Urinary incontinence         Past Surgical History:   Procedure Laterality Date    BACK SURGERY      2001    BREAST LUMPECTOMY Bilateral     2015    DILATION AND CURETTAGE OF UTERUS N/A 08/30/2018    GASTRIC BYPASS SURGERY      SLEEVE GASTRECTOMY  10/13/2018       Family History   Problem Relation Age of Onset    Diabetes Mother     Stroke Mother     Osteoarthritis Mother     Heart Disease Father     Other Father     High Blood Pressure Father     Osteoarthritis Father     Diabetes Maternal Aunt     Cancer Maternal Aunt     Diabetes Maternal Grandmother     Cancer Paternal Aunt        Social History     Socioeconomic History    Marital status:      Spouse name: None    Number of children: None    Years of education: None    Highest education level: None   Occupational History    None   Social Needs    Financial resource strain: None    Food insecurity:     Worry: None     Inability: None    Transportation needs:     Medical: None     Non-medical: None   Tobacco Use    Smoking status: Never Smoker    Smokeless tobacco: Never Used   Substance and Sexual Activity    Alcohol use: No    Drug use: No    Sexual activity: Never   Lifestyle    Physical activity:     Days per week: None     Minutes per session: None    Stress: None   Relationships    Social connections:     Talks on phone: None     Gets together: None     Attends Islam service: None     Active member of club or organization: None     Attends meetings of clubs or organizations: None     Relationship status: None    Intimate partner violence:     Fear of current or ex partner: None     Emotionally abused: None     Physically abused: None     Forced sexual activity: None   Other Topics Concern    None   Social History Narrative    ** Merged History Encounter **            Current Outpatient Medications   Medication Sig Dispense Refill    ammonium lactate (LAC-HYDRIN) 12 % lotion Apply topically daily. 225 g 0    omeprazole (PRILOSEC) 40 MG delayed release capsule Take 1 capsule by mouth daily 90 capsule 0    ketoconazole (NIZORAL) 2 % shampoo Apply to scalp 3 days per week on alternating days, leave lather on for 5 minutes, then rinse off. 120 mL 2    clindamycin (CLEOCIN T) 1 % lotion Apply thin layer to affected areas on body BID 60 mL 2    vitamin B-12 (CYANOCOBALAMIN) 500 MCG tablet Take 500 mcg by mouth daily      ondansetron (ZOFRAN) 4 MG tablet Take 1 tablet by mouth 3 times daily as needed for Nausea or Vomiting 30 tablet 0    triamcinolone (KENALOG) 0.1 % cream Apply topically 2 times daily. 15 g 0    diclofenac sodium 1 % GEL Apply 4 grams to lower extremity joints and 2 grams to upper extremity joints as needed 4 times/day 4 Tube 2    blood glucose test strips (TRUE METRIX BLOOD GLUCOSE TEST) strip USE TO TEST ONE A  strip 2    Lancets MISC Use to test blood sugar once daily 100 each 2    nystatin (MYCOSTATIN) 859608 UNIT/GM cream Apply topically 2 times daily. 30 g 0    vitamin D (ERGOCALCIFEROL) 24596 units CAPS capsule Take 1 capsule by mouth once a week 4 capsule 3    tiZANidine (ZANAFLEX) 2 MG tablet Take 1 tablet by mouth every 8 hours as needed (neck pain) 30 tablet 1    docusate sodium (COLACE) 100 MG capsule Take 1 capsule by mouth 2 times daily 60 capsule 0    Meals on Wheels MISC by Does not apply route Meals should be low carbohydrate, low sodium, and low fat. 1 each 0    Misc.  Devices [facility]) MISC Dispense bariatric extra wide wheelchair    Height-5 feet 6 & 3/4 inches  Weight- 375 lbs  BMI-59.3 1 each 0    Blood Pressure KIT Dispense blood pressure kit with extra large cuff 1 kit 0    Blood Glucose Monitoring Suppl (BLOOD GLUCOSE MONITOR SYSTEM) w/Device KIT Dispense glucometer covered by patient's insurance 1 kit 0    acetaminophen (TYLENOL) 500 MG tablet Take 1 tablet by mouth every 6 hours as needed for Pain 30 tablet 0  Mirabegron ER 25 MG TB24 Take 25 mg by mouth nightly       Calcium Carb-Cholecalciferol 600-800 MG-UNIT CHEW Take 1 tablet by mouth      Scooter MISC by Does not apply route Dispense a scooter with an adjustable chair  Weight-382.9 lbs 1 each 0    Misc. Devices [facility]) MISC Dispense a power wheelchair  Weight -382.9 lbs 1 each 0    Scooter MISC by Does not apply route Motorized scooter for ambulatory aid  Repair or replace  (Patient is >300 lbs) 1 each 0    Scooter MISC by Does not apply route Requires repair of existing scooter 1 each 0    ranitidine (ZANTAC) 150 MG tablet Take 1 tablet by mouth 2 times daily 60 tablet 3    Scooter MISC by Does not apply route Ambulatory aid - needs new motorized scooter or repair of existing scooter. Earlier one from [place] is broken 1 each 0    Handicap Placard MISC by Does not apply route Requesting for 5 years 1 each 0    Misc. Devices (BARIATRIC ROLLATOR) MISC 1 Units by Does not apply route as needed (ambulatory help) Patient Height - 5'7\" and Weight - 412 lbs 1 each 0    oxyCODONE-acetaminophen (PERCOCET) 7.5-325 MG per tablet Take 1 tablet by mouth every 8 hours as needed for Pain for up to 30 days. Intended supply: 30 days. Earliest Fill Date: 2/15/19 90 tablet 0    gabapentin (NEURONTIN) 300 MG capsule Take 1 capsule by mouth 3 times daily for 60 days. . 90 capsule 1     No current facility-administered medications for this visit. No Known Allergies    Vital signs:  /62   Pulse 56   Ht 5' 9\" (1.753 m)   Wt 241 lb (109.3 kg)   BMI 35.59 kg/m²        Constitutional: The physical examination finds the patient to be well-developed and well-nourished. The patient is alert and oriented x3 and was cooperative throughout the visit. Neuro: no focal deficits noted.  Normal mood, judgement, decision making  Eyes: sclera clear, EOMI  Ears: Normal external ear  Mouth:  No perioral lesions  Pulm: Respirations unlabored and regular  Pulse: Extremities well perfused, warm, capillary refill < 2 seconds  Musculoskeletal:    Hip Examination:  LEFT    Skin/Inspection: no skin lesions, cellulitis, or extreme edema in the lower extremities. Palpation: Very tender at the  greater trochanter. But non tender around the ASIS, AIIS, abductor musculature, nor TFL origin. Range of Motion: Flexion arc 0 to 90deg, internal rotation to 25 deg, external rotation to 45 deg, with mild pain. Strength: 5/5 hip flexion strength, 4/5 abductor strength, 5/5 adductor strength. Special Tests: No pain FADIR , no pain with DENISE, positive Simona test.       Contralateral Hip Examination: RIGHT     Skin/Inspection: no skin lesions, cellulitis, or extreme edema in the lower extremities. Palpation: Tender at greater trochanter. Non tender around the ASIS, AIIS, abductor musculature, nor TFL origin. Range of Motion: Flexion arc 0 to 90deg, internal rotation to 25 deg, external rotation to 45 deg, with no pain or difficulty. Strength: 5/5 hip flexion strength, 4/5 abductor strength, 5/5 adductor strength. Special Tests: No pain FADIR , no pain with DENISE, negative Simona test.     Diagnostics:  Radiology:       Pertinent imaging reviewed, images only - no report available. Radiographs were obtained and reviewed in the office; 2 views: bilateral michaud views of the hip with an AP PELVIS    Impression: mild to moderate hip joint space narrowing with no evidence of cyst formation, osteophyte formation, AVN, fracture or loose bodies. Assessment: Bilateral hip greater trochanteric bursitis, and mild osteoarthritis of the hip joints. Plan:   Pertinent imaging was reviewed. The etiology, natural history, and treatment options for the disorder were discussed. The roles of activity medication, antiinflammatories, injections, bracing, physical therapy, and surgical interventions were all described to the patient and questions were answered.     We believe patient is a candidate for bilateral greater trochanter bursa injections. The indications and risks of steroid injection as well as treatment alternatives were discussed with the patient who consented to the procedure. Under sterile conditions and after informed consent was obtained the patient was given an injection into the left and then right hip greater trochanter region knee. 2cc 40 mg of Depo-Medrol and 4 mL of 1% ropivicaine were placed in the hip after it was prepped with chlorhexidine. This resulted in good relief of symptoms. There were no complications. The patient was advised to ice the hip this evening and to avoid vigorous activities for the next 2 days. They were advised to call us if there was any erythema, enduration, swelling or increasing pain. She would also benefit from a physical therapy program bilateral abductor strengthening. A referral was sent via Epic. We will see her back as needed going forward. Val Salguero is in agreement with this plan. All questions were answered to patient's satisfaction and was encouraged to call with any further questions. Lidia Spicer  [address]   Email: [email or web address]  Cell: [phone]    The encounter with Val Salguero was supervised by Dr Miracle Cabrera who personally examined the patient and reviewed the plan. This dictation was performed with a verbal recognition program (DRAGON) and it was checked for errors. It is possible that there are still dictated errors within this office note. If so, please bring any errors to my attention for an addendum. All efforts were made to ensure that this office note is accurate. Attestation:  I was physically present and performed my own examination of this patient and have discussed the case, including pertinent history and exam findings with the fellow. I agree with the documented assessment and plan. Ej Ewing.  Ryan

## 2020-01-03 NOTE — PROGRESS NOTES
Depo medrol NDC 4446-4016-33  Lot# [id number]  Exp.03/2021  Hip, bilateral    Ropivacaine    [facility]#:  [id number]  LOT#:  [id number]  Exp Date:  05/2023  Hip, bilateral

## 2020-01-08 ENCOUNTER — OFFICE VISIT (OUTPATIENT)
Dept: PAIN MANAGEMENT | Age: 55
End: 2020-01-08
Payer: COMMERCIAL

## 2020-01-08 VITALS
HEART RATE: 58 BPM | BODY MASS INDEX: 37.28 KG/M2 | DIASTOLIC BLOOD PRESSURE: 59 MMHG | SYSTOLIC BLOOD PRESSURE: 97 MMHG | HEIGHT: 66 IN | WEIGHT: 232 LBS

## 2020-01-08 PROBLEM — F45.42 PAIN DISORDER WITH PSYCHOLOGICAL FACTORS: Status: ACTIVE | Noted: 2020-01-08

## 2020-01-08 PROBLEM — M47.892 OTHER SPONDYLOSIS, CERVICAL REGION: Status: ACTIVE | Noted: 2020-01-08

## 2020-01-08 PROCEDURE — G8427 DOCREV CUR MEDS BY ELIG CLIN: HCPCS | Performed by: ANESTHESIOLOGY

## 2020-01-08 PROCEDURE — G8417 CALC BMI ABV UP PARAM F/U: HCPCS | Performed by: ANESTHESIOLOGY

## 2020-01-08 PROCEDURE — 1036F TOBACCO NON-USER: CPT | Performed by: ANESTHESIOLOGY

## 2020-01-08 PROCEDURE — 99214 OFFICE O/P EST MOD 30 MIN: CPT | Performed by: ANESTHESIOLOGY

## 2020-01-08 PROCEDURE — G8484 FLU IMMUNIZE NO ADMIN: HCPCS | Performed by: ANESTHESIOLOGY

## 2020-01-08 PROCEDURE — 3017F COLORECTAL CA SCREEN DOC REV: CPT | Performed by: ANESTHESIOLOGY

## 2020-01-08 RX ORDER — GABAPENTIN 300 MG/1
300 CAPSULE ORAL 2 TIMES DAILY
Qty: 60 CAPSULE | Refills: 1 | Status: SHIPPED | OUTPATIENT
Start: 2020-01-08 | End: 2020-03-06 | Stop reason: SDUPTHER

## 2020-01-08 RX ORDER — OXYCODONE HYDROCHLORIDE AND ACETAMINOPHEN 5; 325 MG/1; MG/1
1 TABLET ORAL EVERY 8 HOURS PRN
Qty: 20 TABLET | Refills: 0 | Status: SHIPPED | OUTPATIENT
Start: 2020-01-08 | End: 2020-01-30 | Stop reason: SDUPTHER

## 2020-01-08 ASSESSMENT — ENCOUNTER SYMPTOMS
EYE REDNESS: 0
WHEEZING: 0
VOMITING: 0
NAUSEA: 0
COUGH: 0
CONSTIPATION: 0
SHORTNESS OF BREATH: 0
EYE DISCHARGE: 0
BACK PAIN: 0
EYE PAIN: 0
ABDOMINAL PAIN: 0

## 2020-01-08 NOTE — PROGRESS NOTES
[facility]  [address], [place], [address]  [phone] (B)  [phone] (f)    1/8/20        Farrah Subramanianarben Franco  1965      Leslie Robledo MD      Chief Complaint:   Chief Complaint   Patient presents with    Back Pain     Follow up on back pain       History of Present Illness   HPI    Seen us last in 11/2018 - returns after >1 year    Pain constant in lower back>neck>joints alana shoulders, knees, hips, achy sharp throbbing with occasional tingling without new onset weakness. Pain worse with daily activities, changes in weather standing walking and is currently not taking any medications for pain. RED FLAG symptoms (Symptoms may include, but not limited to, acute trauma,fever, drug use, malignancy, weakness, perianal numbness, bladder/bowel changes) since last visit - No    Changes since last visit:  Reports she stopped all medications over the past year. Seen RHEUM in 02/2019 (Dr Corrinne Shiver) for OA - did not follow through. Seen ORTHO (Dr Mei Mercado) last month, and had greater trochanteric injections. Past Medical History:   Diagnosis Date    Arthritis     TAN (dyspnea on exertion)     Hyperlipidemia     Hypertension     Morbid obesity with body mass index (BMI) of 60.0 to 69.9 in adult [facility]) 7/2/2018    Neuropathy     SHYANN (obstructive sleep apnea)     Primary osteoarthritis of right knee 7/2/2018    Type 2 diabetes mellitus without complication ([address]     controlled with diet    Urinary incontinence        Past Surgical History:   Procedure Laterality Date    BACK SURGERY      2001    BREAST LUMPECTOMY Bilateral     2015    DILATION AND CURETTAGE OF UTERUS N/A 08/30/2018    GASTRIC BYPASS SURGERY      SLEEVE GASTRECTOMY  10/13/2018       No Known Allergies    Current Outpatient Medications   Medication Sig Dispense Refill    gabapentin (NEURONTIN) 300 MG capsule Take 1 capsule by mouth 2 times daily for 60 days.  Intended supply: 90 days 60 capsule 1    oxyCODONE-acetaminophen (PERCOCET) 5-325 MG per tablet Take 1 tablet by mouth every 8 hours as needed (severe pain) for up to 7 days. Take lowest dose possible to manage pain 20 tablet 0    ammonium lactate (LAC-HYDRIN) 12 % lotion Apply topically daily. 225 g 0    omeprazole (PRILOSEC) 40 MG delayed release capsule Take 1 capsule by mouth daily 90 capsule 0    ketoconazole (NIZORAL) 2 % shampoo Apply to scalp 3 days per week on alternating days, leave lather on for 5 minutes, then rinse off. 120 mL 2    clindamycin (CLEOCIN T) 1 % lotion Apply thin layer to affected areas on body BID 60 mL 2    vitamin B-12 (CYANOCOBALAMIN) 500 MCG tablet Take 500 mcg by mouth daily      ondansetron (ZOFRAN) 4 MG tablet Take 1 tablet by mouth 3 times daily as needed for Nausea or Vomiting 30 tablet 0    triamcinolone (KENALOG) 0.1 % cream Apply topically 2 times daily. 15 g 0    diclofenac sodium 1 % GEL Apply 4 grams to lower extremity joints and 2 grams to upper extremity joints as needed 4 times/day 4 Tube 2    blood glucose test strips (TRUE METRIX BLOOD GLUCOSE TEST) strip USE TO TEST ONE A  strip 2    Lancets MISC Use to test blood sugar once daily 100 each 2    nystatin (MYCOSTATIN) 834051 UNIT/GM cream Apply topically 2 times daily. 30 g 0    vitamin D (ERGOCALCIFEROL) 02164 units CAPS capsule Take 1 capsule by mouth once a week 4 capsule 3    docusate sodium (COLACE) 100 MG capsule Take 1 capsule by mouth 2 times daily 60 capsule 0    Meals on Wheels MISC by Does not apply route Meals should be low carbohydrate, low sodium, and low fat. 1 each 0    Misc.  Devices [facility]) MISC Dispense bariatric extra wide wheelchair    Height-5 feet 6 & 3/4 inches  Weight- 375 lbs  BMI-59.3 1 each 0    Blood Pressure KIT Dispense blood pressure kit with extra large cuff 1 kit 0    Blood Glucose Monitoring Suppl (BLOOD GLUCOSE MONITOR SYSTEM) w/Device KIT Dispense glucometer covered by patient's insurance 1 kit 0    acetaminophen (TYLENOL) 500 MG tablet Take 1 tablet by mouth every 6 hours as needed for Pain 30 tablet 0    Mirabegron ER 25 MG TB24 Take 25 mg by mouth nightly       Calcium Carb-Cholecalciferol 600-800 MG-UNIT CHEW Take 1 tablet by mouth      Scooter MISC by Does not apply route Dispense a scooter with an adjustable chair  Weight-382.9 lbs 1 each 0    Misc. Devices [facility]) MISC Dispense a power wheelchair  Weight -382.9 lbs 1 each 0    Scooter MISC by Does not apply route Motorized scooter for ambulatory aid  Repair or replace  (Patient is >300 lbs) 1 each 0    Scooter MISC by Does not apply route Requires repair of existing scooter 1 each 0    ranitidine (ZANTAC) 150 MG tablet Take 1 tablet by mouth 2 times daily 60 tablet 3    Scooter MISC by Does not apply route Ambulatory aid - needs new motorized scooter or repair of existing scooter. Earlier one from [place] is broken 1 each 0    Handicap Placard MISC by Does not apply route Requesting for 5 years 1 each 0    Misc. Devices (BARIATRIC ROLLATOR) MISC 1 Units by Does not apply route as needed (ambulatory help) Patient Height - 5'7\" and Weight - 412 lbs 1 each 0     No current facility-administered medications for this visit.         Family History   Problem Relation Age of Onset    Diabetes Mother     Stroke Mother     Osteoarthritis Mother     Heart Disease Father     Other Father     High Blood Pressure Father     Osteoarthritis Father     Diabetes Maternal Aunt     Cancer Maternal Aunt     Diabetes Maternal Grandmother     Cancer Paternal Aunt        Social History     Socioeconomic History    Marital status:      Spouse name: Not on file    Number of children: Not on file    Years of education: Not on file    Highest education level: Not on file   Occupational History    Not on file   Social Needs    Financial resource strain: Not on file    Food insecurity:     Worry: Not on file Inability: Not on file    Transportation needs:     Medical: Not on file     Non-medical: Not on file   Tobacco Use    Smoking status: Never Smoker    Smokeless tobacco: Never Used   Substance and Sexual Activity    Alcohol use: No    Drug use: No    Sexual activity: Never   Lifestyle    Physical activity:     Days per week: Not on file     Minutes per session: Not on file    Stress: Not on file   Relationships    Social connections:     Talks on phone: Not on file     Gets together: Not on file     Attends Buddhist service: Not on file     Active member of club or organization: Not on file     Attends meetings of clubs or organizations: Not on file     Relationship status: Not on file    Intimate partner violence:     Fear of current or ex partner: Not on file     Emotionally abused: Not on file     Physically abused: Not on file     Forced sexual activity: Not on file   Other Topics Concern    Not on file   Social History Narrative    ** Merged History Encounter **          Imaging Studies:   MRI LS (03/22/2018)  \"Impression   Degenerative thecal sac narrowing and neural foraminal stenosis as detailed   above, worst at L4-5. \"        Results of the above studies were personally reviewed by me with the patient indetail along with the images, when available. The patient was instructed to contact the primary care provider regarding other unrelated findings not addressed during today's visit. Review of Systems:   Review of Systems   Constitutional: Negative for chills and fever. HENT: Negative for hearing loss and tinnitus. Eyes: Negative for pain, discharge and redness. Respiratory: Negative for cough, shortness of breath and wheezing. Cardiovascular: Negative for chest pain and palpitations. Gastrointestinal: Negative for abdominal pain, constipation, nausea and vomiting. Genitourinary: Negative for frequency, hematuria and urgency.    Musculoskeletal: Negative for back pain, myalgias and the right side and 1+ on the left side. Bicep reflexes are 1+ on the right side and 1+ on the left side. Brachioradialis reflexes are 1+ on the right side and 1+ on the left side. Patellar reflexes are 1+ on the right side and 1+ on the left side. Achilles reflexes are 1+ on the right side and 1+ on the left side. Psychiatric:         Behavior: Behavior normal.         Thought Content: Thought content normal.         Vitals:    01/08/20 0936   BP: (!) 97/59   Site: Left Lower Arm   Position: Sitting   Cuff Size: Medium Adult   Pulse: 58   Weight: 232 lb (105.2 kg)   Height: 5' 6\" (1.676 m)       Body mass index is 37.45 kg/m². Pain Score:   9 /10    Goals of chronic pain therapy:      Multi-disciplinary comprehensive pain management with functional improvement. Prescription pain medication monitoring:      MEDD current = 0   ORT Score = 1   LOW     Other Risk factors - depression, morbid obesity. Date of Last Medication Agreement: 04/23/2018   Date Naloxone prescribed: NA     UDT:    Date of last UDT: 04/23/2018    Adverse report: No     OARRS:    Checked today: Yes    Adverse report: No     Medication Effects -        Analgesia:      Average level of pain for the past month = 9/10     Percentage of pain relief = 80%     Activities Improved: Activities of daily living = 10%     Sleep = 60%     Mood = 40%     Social functioning = 10%     Adverse Effects:      Any adverse effects: No     Type/Severity of side effect: none    Aberrant Behavior:     Requests for frequent early refills: No     Increased dose without authorization: No     Reports lost or stolen prescriptions: No     Attempts to obtain prescriptions from other providers: No     Use of pain medication in response to situational stressor: No      Increasingly unkempt or impaired: No     Negative mood changes: No     Abusing alcohol or illicit drugs: No     Appears intoxicated: No     Other: NA     Controlled Substances Reviewed OARRS report in detail, including Narx Scores and Overdose Risk Score. - Encouraged regular home exercises and strengthening as long-termgoals. - Counseled on dual effects, limitations, side effects and long-term complications of opioids. Discussed proper use and potential life threatening side effects of inappropriate use. - Educational materialprovided on multidisciplinary chronic pain management, safe opioid use. Follow-up: RTC X 2 months. Patient engaged in shared decision making. The entire treatment plan was discussed with patient and agreed. More than 25 minutes spent on face-to-face encounterwith the patient by the provider. More than 50% of the time spent on counseling/coordination of care regarding chronic pain. Thank you for allowing us to participate in the care of your patient - please do not hesitate to contact us if you have any questions or concerns.           Luke Gar MD  Board Certified in Anesthesiology and Pain Medicine

## 2020-01-09 ENCOUNTER — HOSPITAL ENCOUNTER (OUTPATIENT)
Age: 55
Discharge: HOME | End: 2020-01-09
Payer: COMMERCIAL

## 2020-01-09 ENCOUNTER — HOSPITAL ENCOUNTER (OUTPATIENT)
Dept: GENERAL RADIOLOGY | Age: 55
Discharge: HOME | End: 2020-01-09
Payer: COMMERCIAL

## 2020-01-09 ENCOUNTER — OFFICE VISIT (OUTPATIENT)
Dept: PRIMARY CARE CLINIC | Age: 55
End: 2020-01-09
Payer: COMMERCIAL

## 2020-01-09 VITALS
HEIGHT: 66 IN | BODY MASS INDEX: 37.38 KG/M2 | TEMPERATURE: 98.2 F | SYSTOLIC BLOOD PRESSURE: 90 MMHG | DIASTOLIC BLOOD PRESSURE: 60 MMHG | HEART RATE: 52 BPM | OXYGEN SATURATION: 97 % | WEIGHT: 232.6 LBS

## 2020-01-09 DIAGNOSIS — R11.2 NAUSEA AND VOMITING, INTRACTABILITY OF VOMITING NOT SPECIFIED, UNSPECIFIED VOMITING TYPE: ICD-10-CM

## 2020-01-09 DIAGNOSIS — R42 DIZZINESS: ICD-10-CM

## 2020-01-09 DIAGNOSIS — R00.2 PALPITATIONS: ICD-10-CM

## 2020-01-09 DIAGNOSIS — R06.02 SHORTNESS OF BREATH: ICD-10-CM

## 2020-01-09 LAB
A/G RATIO: 1.6 (ref 1.1–2.2)
ALBUMIN SERPL-MCNC: 4.4 G/DL (ref 3.4–5)
ALP BLD-CCNC: 81 U/L (ref 40–129)
ALT SERPL-CCNC: 10 U/L (ref 10–40)
ANION GAP SERPL CALCULATED.3IONS-SCNC: 15 MMOL/L (ref 3–16)
AST SERPL-CCNC: 9 U/L (ref 15–37)
BILIRUB SERPL-MCNC: 0.4 MG/DL (ref 0–1)
BILIRUBIN URINE: NEGATIVE
BLOOD, URINE: ABNORMAL
BUN BLDV-MCNC: 21 MG/DL (ref 7–20)
CALCIUM SERPL-MCNC: 9.3 MG/DL (ref 8.3–10.6)
CHLORIDE BLD-SCNC: 101 MMOL/L (ref 99–110)
CLARITY: CLEAR
CO2: 25 MMOL/L (ref 21–32)
COLOR: YELLOW
CREAT SERPL-MCNC: <0.5 MG/DL (ref 0.6–1.1)
EPITHELIAL CELLS, UA: 6 /HPF (ref 0–5)
GFR AFRICAN AMERICAN: >60
GFR NON-AFRICAN AMERICAN: >60
GLOBULIN: 2.7 G/DL
GLUCOSE BLD-MCNC: 79 MG/DL (ref 70–99)
GLUCOSE URINE: NEGATIVE MG/DL
HYALINE CASTS: 3 /LPF (ref 0–8)
KETONES, URINE: 15 MG/DL
LEUKOCYTE ESTERASE, URINE: ABNORMAL
MICROSCOPIC EXAMINATION: YES
NITRITE, URINE: NEGATIVE
PH UA: 5.5 (ref 5–8)
POTASSIUM SERPL-SCNC: 4.4 MMOL/L (ref 3.5–5.1)
PROTEIN UA: NEGATIVE MG/DL
RBC UA: 5 /HPF (ref 0–4)
REASON FOR REJECTION: NORMAL
REJECTED TEST: NORMAL
SODIUM BLD-SCNC: 141 MMOL/L (ref 136–145)
SPECIFIC GRAVITY UA: 1.03 (ref 1–1.03)
TOTAL PROTEIN: 7.1 G/DL (ref 6.4–8.2)
TSH SERPL DL<=0.05 MIU/L-ACNC: 1.81 UIU/ML (ref 0.27–4.2)
URINE TYPE: ABNORMAL
UROBILINOGEN, URINE: 0.2 E.U./DL
WBC UA: 50 /HPF (ref 0–5)

## 2020-01-09 PROCEDURE — G8427 DOCREV CUR MEDS BY ELIG CLIN: HCPCS | Performed by: INTERNAL MEDICINE

## 2020-01-09 PROCEDURE — G0008 ADMIN INFLUENZA VIRUS VAC: HCPCS | Performed by: INTERNAL MEDICINE

## 2020-01-09 PROCEDURE — 1036F TOBACCO NON-USER: CPT | Performed by: INTERNAL MEDICINE

## 2020-01-09 PROCEDURE — 90715 TDAP VACCINE 7 YRS/> IM: CPT | Performed by: INTERNAL MEDICINE

## 2020-01-09 PROCEDURE — 93000 ELECTROCARDIOGRAM COMPLETE: CPT | Performed by: INTERNAL MEDICINE

## 2020-01-09 PROCEDURE — 99214 OFFICE O/P EST MOD 30 MIN: CPT | Performed by: INTERNAL MEDICINE

## 2020-01-09 PROCEDURE — G8482 FLU IMMUNIZE ORDER/ADMIN: HCPCS | Performed by: INTERNAL MEDICINE

## 2020-01-09 PROCEDURE — 90686 IIV4 VACC NO PRSV 0.5 ML IM: CPT | Performed by: INTERNAL MEDICINE

## 2020-01-09 PROCEDURE — 90471 IMMUNIZATION ADMIN: CPT | Performed by: INTERNAL MEDICINE

## 2020-01-09 PROCEDURE — G8417 CALC BMI ABV UP PARAM F/U: HCPCS | Performed by: INTERNAL MEDICINE

## 2020-01-09 PROCEDURE — 71046 X-RAY EXAM CHEST 2 VIEWS: CPT

## 2020-01-09 PROCEDURE — 3017F COLORECTAL CA SCREEN DOC REV: CPT | Performed by: INTERNAL MEDICINE

## 2020-01-09 RX ORDER — MECLIZINE HYDROCHLORIDE 25 MG/1
25 TABLET ORAL 3 TIMES DAILY PRN
Qty: 30 TABLET | Refills: 0 | Status: SHIPPED | OUTPATIENT
Start: 2020-01-09 | End: 2020-03-02

## 2020-01-09 RX ORDER — OMEPRAZOLE 20 MG/1
20 CAPSULE, DELAYED RELEASE ORAL DAILY
Qty: 30 CAPSULE | Refills: 3 | Status: SHIPPED | OUTPATIENT
Start: 2020-01-09 | End: 2020-03-02 | Stop reason: SDUPTHER

## 2020-01-09 ASSESSMENT — ENCOUNTER SYMPTOMS
VOMITING: 1
SINUS PRESSURE: 0
RHINORRHEA: 0
BLOOD IN STOOL: 0
CONSTIPATION: 0
SORE THROAT: 0
WHEEZING: 0
COLOR CHANGE: 1
ABDOMINAL PAIN: 0
NAUSEA: 1
SHORTNESS OF BREATH: 1
DIARRHEA: 0
CHEST TIGHTNESS: 0
COUGH: 0

## 2020-01-09 ASSESSMENT — PATIENT HEALTH QUESTIONNAIRE - PHQ9
SUM OF ALL RESPONSES TO PHQ QUESTIONS 1-9: 0
SUM OF ALL RESPONSES TO PHQ9 QUESTIONS 1 & 2: 0
1. LITTLE INTEREST OR PLEASURE IN DOING THINGS: 0
SUM OF ALL RESPONSES TO PHQ QUESTIONS 1-9: 0
2. FEELING DOWN, DEPRESSED OR HOPELESS: 0

## 2020-01-09 NOTE — PROGRESS NOTES
PHQ Scores 1/9/2020 2/28/2019 6/22/2018 3/6/2018 2/26/2018   PHQ2 Score 0 0 2 6 0   PHQ9 Score 0 0 2 27 0     Interpretation of Total Score Depression Severity: 1-4 = Minimal depression, 5-9 = Mild depression, 10-14 = Moderate depression, 15-19 = Moderately severe depression, 20-27 = Severe depression    Vaccine Information Sheet, \"Influenza - Inactivated\"  given to [facility], or parent/legal guardian of  [facility] and verbalized understanding. Patient responses:    Have you ever had a reaction to a flu vaccine? No  Do you have any current illness? No  Have you ever had Gu[place] Syndrome? No  Do you have a serious allergy to any of the follow: Neomycin, Polymyxin, Thimerosal, eggs or egg products? No    Flu vaccine given per order. Please see immunization tab. Risks and benefits explained. Current VIS given.

## 2020-01-09 NOTE — PATIENT INSTRUCTIONS
WO CAD BILATERAL; Future      Patient Education        Influenza (Flu) Vaccine (Inactivated or Recombinant): What You Need to Know  Why get vaccinated? Influenza vaccine can prevent influenza (flu). Flu is a contagious disease that spreads around the [place] every year, usually between October and May. Anyone can get the flu, but it is more dangerous for some people. Infants and young children, people 72years of age and older, pregnant women, and people with certain health conditions or a weakened immune system are at greatest risk of flu complications. Pneumonia, bronchitis, sinus infections and ear infections are examples of flu-related complications. If you have a medical condition, such as heart disease, cancer or diabetes, flu can make it worse. Flu can cause fever and chills, sore throat, muscle aches, fatigue, cough, headache, and runny or stuffy nose. Some people may have vomiting and diarrhea, though this is more common in children than adults. Each year, thousands of people in the [facility] die from flu, and many more are hospitalized. Flu vaccine prevents millions of illnesses and flu-related visits to the doctor each year. Influenza vaccine  CDC recommends everyone 10months of age and older get vaccinated every flu season. Children 6 months through 6years of age may need 2 doses during a single flu season. Everyone else needs only 1 dose each flu season. It takes about 2 weeks for protection to develop after vaccination. There are many flu viruses, and they are always changing. Each year a new flu vaccine is made to protect against three or four viruses that are likely to cause disease in the upcoming flu season. Even when the vaccine doesn't exactly match these viruses, it may still provide some protection. Influenza vaccine does not cause flu. Influenza vaccine may be given at the same time as other vaccines.   Talk with your health care provider  Tell your vaccine provider if the person getting the vaccine:  · Has had an allergic reaction after a previous dose of influenza vaccine, or has any severe, life-threatening allergies. · Has ever had Guillain-Barré Syndrome (also called GBS). In some cases, your health care provider may decide to postpone influenza vaccination to a future visit. People with minor illnesses, such as a cold, may be vaccinated. People who are moderately or severely ill should usually wait until they recover before getting influenza vaccine. Your health care provider can give you more information. Risks of a vaccine reaction  · Soreness, redness, and swelling where shot is given, fever, muscle aches, and headache can happen after influenza vaccine. · There may be a very small increased risk of Guillain-Barré Syndrome (GBS) after inactivated influenza vaccine (the flu shot). Guy Esposito children who get the flu shot along with pneumococcal vaccine (PCV13), and/or DTaP vaccine at the same time might be slightly more likely to have a seizure caused by fever. Tell your health care provider if a child who is getting flu vaccine has ever had a seizure. People sometimes faint after medical procedures, including vaccination. Tell your provider if you feel dizzy or have vision changes or ringing in the ears. As with any medicine, there is a very remote chance of a vaccine causing a severe allergic reaction, other serious injury, or death. What if there is a serious problem? An allergic reaction could occur after the vaccinated person leaves the clinic. If you see signs of a severe allergic reaction (hives, swelling of the face and throat, difficulty breathing, a fast heartbeat, dizziness, or weakness), call 9-1-1 and get the person to the nearest hospital.  For other signs that concern you, call your health care provider. Adverse reactions should be reported to the Vaccine Adverse Event Reporting System (VAERS).  Your health care provider will usually file this report, or

## 2020-01-09 NOTE — PROGRESS NOTES
 Scooter MISC by Does not apply route Motorized scooter for ambulatory aid  Repair or replace  (Patient is >300 lbs) 1 each 0    Scooter MISC by Does not apply route Requires repair of existing scooter 1 each 0    Scooter MISC by Does not apply route Ambulatory aid - needs new motorized scooter or repair of existing scooter. Earlier one from [place] is broken 1 each 0    Handicap Placard MISC by Does not apply route Requesting for 5 years 1 each 0    Misc. Devices (BARIATRIC ROLLATOR) MISC 1 Units by Does not apply route as needed (ambulatory help) Patient Height - 5'7\" and Weight - 412 lbs 1 each 0         Vitals:    01/09/20 0946   BP: 90/60   Site: Left Upper Arm   Position: Sitting   Cuff Size: Large Adult   Pulse: 52   Temp: 98.2 °F (36.8 °C)   TempSrc: Oral   SpO2: 97%   Weight: 232 lb 9.6 oz (105.5 kg)   Height: 5' 6\" (1.676 m)     Body mass index is 37.54 kg/m². Physical Exam  Nursing note reviewed. Constitutional:       General: She is not in acute distress. Appearance: Normal appearance. She is well-developed. HENT:      Right Ear: Tympanic membrane and ear canal normal.      Left Ear: Tympanic membrane and ear canal normal.      Mouth/Throat:      Pharynx: Oropharynx is clear. Eyes:      General: Lids are normal.      Extraocular Movements: Extraocular movements intact. Conjunctiva/sclera: Conjunctivae normal.      Pupils: Pupils are equal, round, and reactive to light. Neck:      Musculoskeletal: Neck supple. Thyroid: No thyromegaly. Vascular: No carotid bruit. Cardiovascular:      Rate and Rhythm: Normal rate and regular rhythm. Heart sounds: Normal heart sounds, S1 normal and S2 normal. No murmur. No friction rub. No gallop. Pulmonary:      Effort: Pulmonary effort is normal. No respiratory distress. Breath sounds: Normal breath sounds. No wheezing, rhonchi or rales. Abdominal:      General: Bowel sounds are normal. There is no distension. Palpations: Abdomen is soft. Tenderness: There is no tenderness. Musculoskeletal:      Right lower leg: No edema. Left lower leg: She exhibits tenderness (medial lower leg). No edema. Lymphadenopathy:      Head:      Right side of head: No submandibular adenopathy. Left side of head: No submandibular adenopathy. Skin:     Comments: Reddish brown discoloration left lower leg   Neurological:      Mental Status: She is alert and oriented to person, place, and time. Cranial Nerves: Cranial nerves are intact. Psychiatric:         Mood and Affect: Mood normal.           No results found for this visit on 01/09/20. Lab Review         Assessment/Plan     1. Palpitations  - CBC Auto Differential; Future  - Comprehensive Metabolic Panel; Future  - TSH without Reflex; Future  - EKG 12 Lead  - Referral to Sommer Gann MD, Cardiology [facility]  - Echocardiogram complete; Future  - avoid caffeine  - avoid decongestants    2. Chest pain, unspecified type  - XR CHEST STANDARD (2 VW); Future  - Echocardiogram complete; Future  - Referral to Sommer Gann MD, Cardiology [facility]    3. Shortness of breath  - CBC Auto Differential; Future  - Referral to Sommer Gann MD, Cardiology [facility]  - XR CHEST STANDARD (2 VW); Future  - Echocardiogram complete; Future    4. Dizziness  - CBC Auto Differential; Future  - Comprehensive Metabolic Panel; Future  - meclizine (ANTIVERT) 25 MG tablet; Take 1 tablet by mouth 3 times daily as needed for Dizziness or Nausea  Dispense: 30 tablet; Refill: 0    5. Gastroesophageal reflux disease, esophagitis presence not specified  - omeprazole (PRILOSEC) 20 MG delayed release capsule; Take 1 capsule by mouth daily  Dispense: 30 capsule; Refill: 3  -Decrease caffeine, avoid spicy foods, avoid tomato based foods  -Eat small meals instead of large meals  -Wait 2-3 hours after eating before lying down    6.  Nausea and vomiting, intractability of vomiting not specified, unspecified vomiting type  - CBC Auto Differential; Future  - Comprehensive Metabolic Panel; Future  - meclizine (ANTIVERT) 25 MG tablet; Take 1 tablet by mouth 3 times daily as needed for Dizziness or Nausea  Dispense: 30 tablet; Refill: 0  - US Abdomen Complete; Future  - POCT Urinalysis no Micro    7. Left leg pain  - US DUP LOWER EXTREMITY LEFT GUANACO; Future    8. Need for Tdap vaccination  - Tdap (age 6y and older) IM ([address]) given    5. Need for influenza vaccination  - INFLUENZA, QUADV, 3 YRS AND OLDER, IM PF, PREFILL SYR OR SDV, 0.5ML (AFLURIA QUADV, PF) given    10. Encounter for screening mammogram for breast cancer  - NORMA DIGITAL SCREEN W OR WO CAD BILATERAL; Future          Return in about 1 week (around 1/16/2020) for results.

## 2020-01-10 ENCOUNTER — HOSPITAL ENCOUNTER (OUTPATIENT)
Dept: PHYSICAL THERAPY | Age: 55
Setting detail: THERAPIES SERIES
Discharge: HOME | End: 2020-01-10
Payer: COMMERCIAL

## 2020-01-10 ENCOUNTER — TELEPHONE (OUTPATIENT)
Dept: PRIMARY CARE CLINIC | Age: 55
End: 2020-01-10

## 2020-01-10 PROCEDURE — 97162 PT EVAL MOD COMPLEX 30 MIN: CPT | Performed by: PHYSICAL THERAPIST

## 2020-01-10 PROCEDURE — 97110 THERAPEUTIC EXERCISES: CPT | Performed by: PHYSICAL THERAPIST

## 2020-01-10 NOTE — PLAN OF CARE
[facility] [address]                                                         Physical Therapy Certification    Dear Referring Practitioner: Muna Cordon. Bora iPckett MD,    We had the pleasure of evaluating the following patient for physical therapy services at [address] [place] [address]. A summary of our findings can be found in the initial assessment below. This includes our plan of care. If you have any questions or concerns regarding these findings, please do not hesitate to contact me at the office phone number checked above. Thank you for the referral.       Physician Signature:_______________________________Date:__________________  By signing above (or electronic signature), therapists plan is approved by physician      Patient: Ashley Barnes   : 1965   MRN: [id number]  Referring Physician: Referring Practitioner: Muna Cordon. Bora Pickett MD      Evaluation Date: 1/10/2020      Medical Diagnosis Information:  Diagnosis: M70.61, M70.62 (ICD-10-CM) - Trochanteric bursitis of both hips   Treatment Diagnosis: M54.5, M70.71, M70.72, M25.551, M25.552; leading to impaired ADLs and IADLs                                         Insurance information: PT Insurance Information: PT BENEFITS  FACILITY/ [facility]/ %/30 [facility]/ AUTH REQUIRED ONLY AFTER 30TH VISIT / 20 PAG     Precautions/ Contra-indications:   Latex Allergy:  [x]NO      []YES  Preferred Language for Healthcare:   [x]English       []other:    SUBJECTIVE: Patient stated complaint: Pt presents to PT with B hip pain, pain has been present for 3 years. L hip hurts more than R. Pain is sharp in nature. Pain is on and off. Walking, sleeping on side, sitting for a long time cause pain. Pain is 9/10. Pain is at lateral hip and groin. Pain is not taking pain medication. Pt has not used heat or ice.  Pt states that change of position helps relieve pain a little but the pain is still present. Pt has been using a wheel chair for 6 years. Pt uses the wheel chair for community and home mobility. Pt can not stand up unsupported, she always needs to hold on to something. Pt is not sure if she has had PT in the past for her hips. 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 Pt states that her back still bothers her despite the surgery. Pt saw Dr. Mike Patton recently and received cortisone injections into both hips. Pt states that her pain is a little bit less since the injection. He encouraged her to try PT. Relevant Medical History: 2001 lumbar surgery  Functional Disability Index:   WOMAC 64/96=66.7% deficit 1/20/20    Pain Scale: 9/10  Easing factors: change of position, but pain persisted  Provocative factors: Walking, sleeping on side, sitting for a long     Type: []Constant   [x]Intermittent  []Radiating []Localized []other:     Numbness/Tingling: + present for several years and is getting worse. Knee to toes, laterally. Equal bilaterally. Occupation/School: Disabled    Living Status/Prior Level of Function: Independent with ADLs and IADLs. OBJECTIVE:     ROM PROM AROM Comments    Left Right Left Right    Flexion 110 90 60 60 P! And stretch   Extension     Not assessed. Pt unable to lay prone and unable to stand   Abduction     Not assessed   Adduction     Not assessed   ER 45 35   stretch   IR 10 20                Flexibility Left Right Comments   Hamstrings SLR 75 SLR 75 P!  And stretch   ITB (Obers test)   Not assessed- can't lay on side   Hip flexor(Afshin test)   Not assessed   gastroc + tightness + tightness Not assessed-can't lay prone   Rectus femoris(Elys test)              Special  Test Left Right Comments   FABERS      Scour test      Impingement test      Trendelenburg test   Not assessed- can't perform SLS           Strength Left Right Comments   Hip flexors 3+/5 3+/5 Back p! Hip extension 3+/5 3+/5 Back p! Hip abduction 3+/5 3+/5 Back and hip p! Hip adduction 3+/5 3+/5    Hip ER 3+/5 3+/5    Hip IR 3+/5 3+/5    Quads 3+/5 3+/5 Back p! Hamstrings 3+/5 3+/5      Joint mobility: Not assessed   []Normal    []Hypo   []Hyper    Palpation: generalized TTP around lateral hip    Functional Mobility/Transfers: Independent but with modifications. Posture: Seated posture WNL. Forward flexed posture when standing    Gait: Not assessed, pt utilizes WC and can not stand independently    Dermatomes: sensation equal B, though progressive decreased in sensation from proximal to distal from L1-5. No sensation at S1-2                       [x] Patient history, allergies, meds reviewed. Medical chart reviewed. See intake form. Review Of Systems (ROS):  [x]Performed Review of systems (Integumentary, CardioPulmonary, Neurological) by intake and observation. Intake form has been scanned into medical record. Patient has been instructed to contact their primary care physician regarding ROS issues if not already being addressed at this time.       Co-morbidities/Complexities (which will affect course of rehabilitation):   []None           Arthritic conditions   []Rheumatoid arthritis (M05.9)  [x]Osteoarthritis (M19.91)   Cardiovascular conditions   [x]Hypertension (I10)  []Hyperlipidemia (E78.5)  []Angina pectoris (I20)  []Atherosclerosis (I70)   Musculoskeletal conditions   []Disc pathology   []Congenital spine pathologies   []Prior surgical intervention  []Osteoporosis (M81.8)  []Osteopenia (M85.8)   Endocrine conditions   []Hypothyroid (E03.9)  []Hyperthyroid Gastrointestinal conditions   []Constipation (L68.80)   Metabolic conditions   []Morbid obesity (E66.01)  [x]Diabetes type 1(E10.65) or 2 (E11.65)   []Neuropathy (G60.9)     Pulmonary conditions   []Asthma (J45)  []Coughing   []COPD (J44.9)   Psychological Disorders  []Anxiety (F41.9)  []Depression (F32.9)   []Other:   [x]Other: WC       Barriers to/and or personal factors that will affect rehab potential:              []Age  []Sex              []Motivation/Lack of Motivation                        []Co-Morbidities              []Cognitive Function, education/learning barriers              []Environmental, home barriers              []profession/work barriers  []past PT/medical experience  []other:  Justification: Pt requires use of WC for mobility and cannot lay prone, therefore will be limited in progressions of and participating in PT program. Pt has chronic pain. Falls Risk Assessment (30 days): Increased risk d/t inability to stand independently; however, balance not formally assessed for this reason and d/t safety  [x] Falls Risk assessed and no intervention required.   [] Falls Risk assessed and Patient requires intervention due to being higher risk   TUG score (>12s at risk):     [] Falls education provided, including     ASSESSMENT:   Functional Impairments:     [x]Noted lumbar/proximal hip/LE hypomobility   [x]Decreased LE functional ROM   [x]Decreased core/proximal hip strength and neuromuscular control   [x]Decreased LE functional strength   [x]Reduced balance/proprioceptive control   []other:      Functional Activity Limitations (from functional questionnaire and intake)   [x]Reduced ability to tolerate prolonged functional positions   [x]Reduced ability or difficulty with changes of positions or transfers between positions   [x]Reduced ability to maintain good posture and demonstrate good body mechanics with sitting, bending, and lifting   [x]Reduced ability to sleep   [x] Reduced ability or tolerance with driving and/or computer work   [x]Reduced ability to perform lifting, carrying tasks   [x]Reduced ability to squat   [x]Reduced ability to forward bend   [x]Reduced ability to ambulate prolonged functional periods/distances/surfaces   [x]Reduced ability to ascend/descend stairs   [x]Reduced ability to run, hop or jump   []other:     Participation Restrictions   [x]Reduced participation in self care activities   [x]Reduced participation in home management activities   []Reduced participation in work activities   [x]Reduced participation in social activities. []Reduced participation in sport activities. Classification :    []Signs/symptoms consistent with post-surgical status including decreased ROM, strength and function.    []Signs/symptoms consistent with joint sprain/strain   []Signs/symptoms consistent with patella-femoral syndrome   []Signs/symptoms consistent with knee OA/hip OA   [x]Signs/symptoms consistent with internal derangement of knee/Hip   [x]Signs/symptoms consistent with functional hip weakness/NMR control      []Signs/symptoms consistent with tendinitis/tendinosis    []signs/symptoms consistent with pathology which may benefit from Dry needling      []other:      Prognosis/Rehab Potential:      []Excellent   []Good    [x]Fair   []Poor    Tolerance of evaluation/treatment:    []Excellent   []Good    [x]Fair   []Poor    Physical Therapy Evaluation Complexity Justification  [x] A history of present problem with:  [] no personal factors and/or comorbidities that impact the plan of care;  []1-2 personal factors and/or comorbidities that impact the plan of care  [x]3 personal factors and/or comorbidities that impact the plan of care  [x] An examination of body systems using standardized tests and measures addressing any of the following: body structures and functions (impairments), activity limitations, and/or participation restrictions;:  [] a total of 1-2 or more elements   [] a total of 3 or more elements   [x] a total of 4 or more elements   [x] A clinical presentation with:  [] stable and/or uncomplicated characteristics   [x] evolving clinical presentation with changing characteristics  [] unstable and unpredictable characteristics;   [x] Clinical decision making of [] low, [x] moderate, [] high complexity using standardized patient assessment instrument and/or measurable assessment of functional outcome. [] EVAL (LOW) 92655 (typically 20 minutes face-to-face)  [x] EVAL (MOD) 56741 (typically 30 minutes face-to-face)  [] EVAL (HIGH) 80040 (typically 45 minutes face-to-face)  [] RE-EVAL       PLAN  Frequency/Duration:  2 days per week for 8 Weeks:  Interventions:  [x]  Therapeutic exercise including: strength training, ROM, for Lower extremity and core   [x]  NMR activation and proprioception for LE, Glutes and Core   [x]  Manual therapy as indicated for LE, Hip and spine to include: Dry Needling/IASTM, STM, PROM, Gr I-IV mobilizations, manipulation. [x] Modalities as needed that may include: thermal agents, E-stim, Biofeedback, US, iontophoresis as indicated  [x] Patient education on joint protection, postural re-education, activity modification, progression of HEP. HEP instruction:   Initiated 1/10/20. Printed hand out given. Pt demonstrated proper form of each exercise and expressed verbal understanding of frequency and duration. GOALS:   Patient stated goal: decrease pain, get better, get stronger   [] Progressing: [] Met: [] Not Met: [] Adjusted    Therapist goals for Patient:   Short Term Goals: To be achieved in: 4 weeks 2/7/20  1. Independent in HEP and progression per patient tolerance, in order to prevent re-injury. [] Progressing: [] Met: [] Not Met: [] Adjusted   2. Patient will have a decrease in pain to facilitate improvement in movement, function, and ADLs as indicated by Functional Deficits. [] Progressing: [] Met: [] Not Met: [] Adjusted     Long Term Goals: To be achieved in: 8 weeks 3/6/20  1. Disability index score of 33% or less for the [facility] to assist with reaching prior level of function. [] Progressing: [] Met: [] Not Met: [] Adjusted  2.  Patient will demonstrate increased AROM to [facility] to allow for proper joint functioning as indicated by patients Functional

## 2020-01-10 NOTE — FLOWSHEET NOTE
Not assessed-can't lay prone   Rectus femoris(Elys test)                      Special  Test Left Right Comments   FABERS         Scour test         Impingement test         Trendelenburg test     Not assessed- can't perform SLS                Strength Left Right Comments   Hip flexors 3+/5 3+/5 Back p! Hip extension 3+/5 3+/5 Back p! Hip abduction 3+/5 3+/5 Back and hip p! Hip adduction 3+/5 3+/5     Hip ER 3+/5 3+/5     Hip IR 3+/5 3+/5     Quads 3+/5 3+/5 Back p! Hamstrings 3+/5 3+/5        Joint mobility: Not assessed              []?Normal                       []?Hypo              []?Hyper     Palpation: generalized TTP around lateral hip     Functional Mobility/Transfers: Independent but with modifications.     Posture: Seated posture WNL. Forward flexed posture when standing     Gait: Not assessed, pt utilizes WC and can not stand independently     Dermatomes: sensation equal B, though progressive decreased in sensation from proximal to distal from L1-5.  No sensation at S1-2      RESTRICTIONS/PRECAUTIONS:     Exercises/Interventions:     ROM/STRETCHES     Seated hamstring      Seated piriformis     Supine piriformis     Supine figure 4     Quad       ITB     Butterfly     Hip flexor stretch kneeling     PREs     Glute sets 10x10\"    ADD sets 10x10\" foam roll    ABD sets     Quad sets     TA brace          LAQ 2x10 B, 0#                                  Manual interventions            Plan for next session: TA brace, quad sets, ABD sets    Therapeutic Exercise and NMR EXR  [x] (64406) Provided verbal/tactile cueing for activities related to strengthening, flexibility, endurance, ROM for improvements in LE, proximal hip, and core control with self care, mobility, lifting, ambulation.  [] (52426) Provided verbal/tactile cueing for activities related to improving balance, coordination, kinesthetic sense, posture, motor skill, proprioception  to assist with LE, proximal hip, and core control in self care, mobility, lifting, ambulation and eccentric single leg control. NMR and Therapeutic Activities:    [] (18717 or 99775) Provided verbal/tactile cueing for activities related to improving balance, coordination, kinesthetic sense, posture, motor skill, proprioception and motor activation to allow for proper function of core, proximal hip and LE with self care and ADLs  [] (83295) Gait Re-education- Provided training and instruction to the patient for proper LE, core and proximal hip recruitment and positioning and eccentric body weight control with ambulation re-education including up and down stairs     Home Exercise Program:    [x] (16695) Reviewed/Progressed HEP activities related to strengthening, flexibility, endurance, ROM of core, proximal hip and LE for functional self-care, mobility, lifting and ambulation/stair navigation   [] (58850)Reviewed/Progressed HEP activities related to improving balance, coordination, kinesthetic sense, posture, motor skill, proprioception of core, proximal hip and LE for self care, mobility, lifting, and ambulation/stair navigation      Manual Treatments:    [] (04565) Provided manual therapy to mobilize LE, proximal hip and/or LS spine soft tissue/joints for the purpose of modulating pain, promoting relaxation,  increasing ROM, reducing/eliminating soft tissue swelling/inflammation/restriction, improving soft tissue extensibility and allowing for proper ROM for normal function with self care, mobility, lifting and ambulation.      Modalities:      Charges:  Timed Code Treatment Minutes: 15   Total Treatment Minutes: 35   Time in: 8:30  Time out: 9:19    [] EVAL (LOW) 76516 (typically 20 minutes face-to-face)  [x] EVAL (MOD) 36164 (typically 30 minutes face-to-face)  [] EVAL (HIGH) 59908 (typically 45 minutes face-to-face)  [] RE-EVAL     [x] SY(78163) x 1     [] IONTO  [] NMR (99040) x     [] VASO  [] Manual (06077) x      [] Other:  [] TA x      [] Mech Traction (91229)  [] ES(attended) (94983)      [] ES (un) (16926):     GOALS:   Patient stated goal: decrease pain, get better, get stronger   []? Progressing: []? Met: []? Not Met: []? Adjusted     Therapist goals for Patient:   Short Term Goals: To be achieved in: 4 weeks 2/7/20  1. Independent in HEP and progression per patient tolerance, in order to prevent re-injury. []? Progressing: []? Met: []? Not Met: []? Adjusted   2. Patient will have a decrease in pain to facilitate improvement in movement, function, and ADLs as indicated by Functional Deficits. []? Progressing: []? Met: []? Not Met: []? Adjusted      Long Term Goals: To be achieved in: 8 weeks 3/6/20  1. Disability index score of 33% or less for the [facility] to assist with reaching prior level of function. []? Progressing: []? Met: []? Not Met: []? Adjusted  2. Patient will demonstrate increased AROM to [facility] to allow for proper joint functioning as indicated by patients Functional Deficits. []? Progressing: []? Met: []? Not Met: []? Adjusted  3. Patient will demonstrate an increase in BLE strength to 4/5 or greater to allow for proper functional mobility as indicated by patients Functional Deficits. []? Progressing: []? Met: []? Not Met: []? Adjusted  4. Patient will return to ADLs, IADLs and functional activities without increased symptoms or restriction. []? Progressing: []? Met: []? Not Met: []? Adjusted      Overall Progression Towards Functional goals/ Treatment Progress Update:  [] Patient is progressing as expected towards functional goals listed. [] Progression is slowed due to complexities/Impairments listed. [] Progression has been slowed due to co-morbidities.   [x] Plan just implemented, too soon to assess goals progression <30days   [] Goals require adjustment due to lack of progress  [] Patient is not progressing as expected and requires additional follow up with physician  [] Other    Prognosis for POC: [x] Good [] Fair  [] Poor      Patient requires continued skilled intervention: [x] Yes  [] No    Treatment/Activity Tolerance:  [x] Patient able to complete treatment  [] Patient limited by fatigue  [] Patient limited by pain     [] Patient limited by other medical complications  [x] Other: Full exercise session not completed at  needed to leave by [phone] to get to another appointment. PLAN: See eval  [] Continue per plan of care [] Alter current plan (see comments above)  [x] Plan of care initiated [] Hold pending MD visit [] Discharge      Electronically signed by:  Ck Baer PT, DPT  Physical Therapist  [email or web address]  [email or web address]      Note: If patient does not return for scheduled/ recommended follow up visits, this note will serve as a discharge from care along with most recent update on progress.

## 2020-01-11 ENCOUNTER — TELEPHONE (OUTPATIENT)
Dept: PRIMARY CARE CLINIC | Age: 55
End: 2020-01-11

## 2020-01-11 NOTE — TELEPHONE ENCOUNTER
Marii with [facility], [phone] called in to report the lab was unable to run the CBC and the specimen was QNS.

## 2020-01-13 ENCOUNTER — TELEPHONE (OUTPATIENT)
Dept: PRIMARY CARE CLINIC | Age: 55
End: 2020-01-13

## 2020-01-14 ENCOUNTER — TELEPHONE (OUTPATIENT)
Dept: ORTHOPEDIC SURGERY | Age: 55
End: 2020-01-14

## 2020-01-14 NOTE — TELEPHONE ENCOUNTER
Called Farrah on 1/14/20 @ 5:00 on her mobile number had to leave a voicemail letting the patient know she will need to call and set up her transportation for her Dr. Eric Rojo appt on 1/23/20 @ 10:15am in the [facility] location.

## 2020-01-15 ENCOUNTER — HOSPITAL ENCOUNTER (OUTPATIENT)
Dept: PHYSICAL THERAPY | Age: 55
Setting detail: THERAPIES SERIES
Discharge: HOME | End: 2020-01-15
Payer: COMMERCIAL

## 2020-01-15 PROCEDURE — 97110 THERAPEUTIC EXERCISES: CPT | Performed by: PHYSICAL THERAPIST

## 2020-01-15 PROCEDURE — 97530 THERAPEUTIC ACTIVITIES: CPT | Performed by: PHYSICAL THERAPIST

## 2020-01-15 NOTE — FLOWSHEET NOTE
self care, mobility, lifting, ambulation.  [] (13325) Provided verbal/tactile cueing for activities related to improving balance, coordination, kinesthetic sense, posture, motor skill, proprioception  to assist with LE, proximal hip, and core control in self care, mobility, lifting, ambulation and eccentric single leg control. NMR and Therapeutic Activities:    [] (85537 or 54140) Provided verbal/tactile cueing for activities related to improving balance, coordination, kinesthetic sense, posture, motor skill, proprioception and motor activation to allow for proper function of core, proximal hip and LE with self care and ADLs  [] (16055) Gait Re-education- Provided training and instruction to the patient for proper LE, core and proximal hip recruitment and positioning and eccentric body weight control with ambulation re-education including up and down stairs     Home Exercise Program:    [x] (26781) Reviewed/Progressed HEP activities related to strengthening, flexibility, endurance, ROM of core, proximal hip and LE for functional self-care, mobility, lifting and ambulation/stair navigation   [] (73952)Reviewed/Progressed HEP activities related to improving balance, coordination, kinesthetic sense, posture, motor skill, proprioception of core, proximal hip and LE for self care, mobility, lifting, and ambulation/stair navigation      Manual Treatments:    [] (50865) Provided manual therapy to mobilize LE, proximal hip and/or LS spine soft tissue/joints for the purpose of modulating pain, promoting relaxation,  increasing ROM, reducing/eliminating soft tissue swelling/inflammation/restriction, improving soft tissue extensibility and allowing for proper ROM for normal function with self care, mobility, lifting and ambulation.      Modalities:      Charges:  Timed Code Treatment Minutes: 50   Total Treatment Minutes: 50   Time in: 8:30  Time out: 9:20    [] EVAL (LOW) 07535 (typically 20 minutes face-to-face)  [] EVAL

## 2020-01-17 ENCOUNTER — HOSPITAL ENCOUNTER (OUTPATIENT)
Dept: ULTRASOUND IMAGING | Age: 55
Discharge: HOME | End: 2020-01-17
Payer: COMMERCIAL

## 2020-01-17 ENCOUNTER — HOSPITAL ENCOUNTER (OUTPATIENT)
Dept: PHYSICAL THERAPY | Age: 55
Setting detail: THERAPIES SERIES
Discharge: HOME | End: 2020-01-17
Payer: COMMERCIAL

## 2020-01-17 ENCOUNTER — HOSPITAL ENCOUNTER (OUTPATIENT)
Dept: VASCULAR LAB | Age: 55
Discharge: HOME | End: 2020-01-17
Payer: COMMERCIAL

## 2020-01-17 ENCOUNTER — HOSPITAL ENCOUNTER (OUTPATIENT)
Dept: MAMMOGRAPHY | Age: 55
Discharge: HOME | End: 2020-01-17
Payer: COMMERCIAL

## 2020-01-17 ENCOUNTER — APPOINTMENT (OUTPATIENT)
Dept: ULTRASOUND IMAGING | Age: 55
End: 2020-01-17
Payer: COMMERCIAL

## 2020-01-17 ENCOUNTER — HOSPITAL ENCOUNTER (OUTPATIENT)
Dept: NON INVASIVE DIAGNOSTICS | Age: 55
Discharge: HOME | End: 2020-01-17
Payer: COMMERCIAL

## 2020-01-17 LAB
BASOPHILS ABSOLUTE: 0 K/UL (ref 0–0.2)
BASOPHILS RELATIVE PERCENT: 0.6 %
EOSINOPHILS ABSOLUTE: 0.1 K/UL (ref 0–0.6)
EOSINOPHILS RELATIVE PERCENT: 1.2 %
HCT VFR BLD CALC: 38.5 % (ref 36–48)
HEMOGLOBIN: 12.6 G/DL (ref 12–16)
LV EF: 55 %
LVEF MODALITY: NORMAL
LYMPHOCYTES ABSOLUTE: 2.5 K/UL (ref 1–5.1)
LYMPHOCYTES RELATIVE PERCENT: 34.5 %
MCH RBC QN AUTO: 28.4 PG (ref 26–34)
MCHC RBC AUTO-ENTMCNC: 32.8 G/DL (ref 31–36)
MCV RBC AUTO: 86.6 FL (ref 80–100)
MONOCYTES ABSOLUTE: 0.5 K/UL (ref 0–1.3)
MONOCYTES RELATIVE PERCENT: 6.6 %
NEUTROPHILS ABSOLUTE: 4.2 K/UL (ref 1.7–7.7)
NEUTROPHILS RELATIVE PERCENT: 57.1 %
PDW BLD-RTO: 14.5 % (ref 12.4–15.4)
PLATELET # BLD: 287 K/UL (ref 135–450)
PMV BLD AUTO: 8.7 FL (ref 5–10.5)
RBC # BLD: 4.45 M/UL (ref 4–5.2)
WBC # BLD: 7.3 K/UL (ref 4–11)

## 2020-01-17 PROCEDURE — 76642 ULTRASOUND BREAST LIMITED: CPT

## 2020-01-17 PROCEDURE — 97530 THERAPEUTIC ACTIVITIES: CPT | Performed by: PHYSICAL THERAPIST

## 2020-01-17 PROCEDURE — 77063 BREAST TOMOSYNTHESIS BI: CPT

## 2020-01-17 PROCEDURE — 93971 EXTREMITY STUDY: CPT

## 2020-01-17 PROCEDURE — 93306 TTE W/DOPPLER COMPLETE: CPT

## 2020-01-17 PROCEDURE — 97110 THERAPEUTIC EXERCISES: CPT | Performed by: PHYSICAL THERAPIST

## 2020-01-17 PROCEDURE — 97112 NEUROMUSCULAR REEDUCATION: CPT | Performed by: PHYSICAL THERAPIST

## 2020-01-17 NOTE — FLOWSHEET NOTE
related to strengthening, flexibility, endurance, ROM for improvements in LE, proximal hip, and core control with self care, mobility, lifting, ambulation.  [] (12126) Provided verbal/tactile cueing for activities related to improving balance, coordination, kinesthetic sense, posture, motor skill, proprioception  to assist with LE, proximal hip, and core control in self care, mobility, lifting, ambulation and eccentric single leg control. NMR and Therapeutic Activities:    [] (93761 or 70280) Provided verbal/tactile cueing for activities related to improving balance, coordination, kinesthetic sense, posture, motor skill, proprioception and motor activation to allow for proper function of core, proximal hip and LE with self care and ADLs  [] (85503) Gait Re-education- Provided training and instruction to the patient for proper LE, core and proximal hip recruitment and positioning and eccentric body weight control with ambulation re-education including up and down stairs     Home Exercise Program:    [x] (66883) Reviewed/Progressed HEP activities related to strengthening, flexibility, endurance, ROM of core, proximal hip and LE for functional self-care, mobility, lifting and ambulation/stair navigation   [] (71924)Reviewed/Progressed HEP activities related to improving balance, coordination, kinesthetic sense, posture, motor skill, proprioception of core, proximal hip and LE for self care, mobility, lifting, and ambulation/stair navigation      Manual Treatments:    [] (76981) Provided manual therapy to mobilize LE, proximal hip and/or LS spine soft tissue/joints for the purpose of modulating pain, promoting relaxation,  increasing ROM, reducing/eliminating soft tissue swelling/inflammation/restriction, improving soft tissue extensibility and allowing for proper ROM for normal function with self care, mobility, lifting and ambulation.      Modalities:      Charges:  Timed Code Treatment Minutes: 53   Total Treatment Minutes: 53   Time in: 1:44  Time out: 3:00    [] EVAL (LOW) 19325 (typically 20 minutes face-to-face)  [] EVAL (MOD) 04712 (typically 30 minutes face-to-face)  [] EVAL (HIGH) 92499 (typically 45 minutes face-to-face)  [] RE-EVAL     [x] KM(39258) x 2     [] IONTO  [x] NMR (83553) x 1     [] VASO  [] Manual (31050) x      [] Other:  [x] TA x 1     [] Mech Traction (88637)  [] ES(attended) (56628)      [] ES (un) (74303):     GOALS:   Patient stated goal: decrease pain, get better, get stronger   []? Progressing: []? Met: []? Not Met: []? Adjusted     Therapist goals for Patient:   Short Term Goals: To be achieved in: 4 weeks 2/7/20  1. Independent in HEP and progression per patient tolerance, in order to prevent re-injury. []? Progressing: []? Met: []? Not Met: []? Adjusted   2. Patient will have a decrease in pain to facilitate improvement in movement, function, and ADLs as indicated by Functional Deficits. []? Progressing: []? Met: []? Not Met: []? Adjusted      Long Term Goals: To be achieved in: 8 weeks 3/6/20  1. Disability index score of 33% or less for the [facility] to assist with reaching prior level of function. []? Progressing: []? Met: []? Not Met: []? Adjusted  2. Patient will demonstrate increased AROM to [facility] to allow for proper joint functioning as indicated by patients Functional Deficits. []? Progressing: []? Met: []? Not Met: []? Adjusted  3. Patient will demonstrate an increase in BLE strength to 4/5 or greater to allow for proper functional mobility as indicated by patients Functional Deficits. []? Progressing: []? Met: []? Not Met: []? Adjusted  4. Patient will return to ADLs, IADLs and functional activities without increased symptoms or restriction. []? Progressing: []? Met: []? Not Met: []? Adjusted      Overall Progression Towards Functional goals/ Treatment Progress Update:  [] Patient is progressing as expected towards functional goals listed.     [] Progression is

## 2020-01-21 ENCOUNTER — OFFICE VISIT (OUTPATIENT)
Dept: PRIMARY CARE CLINIC | Age: 55
End: 2020-01-21
Payer: COMMERCIAL

## 2020-01-21 ENCOUNTER — TELEPHONE (OUTPATIENT)
Dept: PRIMARY CARE CLINIC | Age: 55
End: 2020-01-21

## 2020-01-21 ENCOUNTER — HOSPITAL ENCOUNTER (OUTPATIENT)
Dept: PHYSICAL THERAPY | Age: 55
Setting detail: THERAPIES SERIES
Discharge: HOME | End: 2020-01-21
Payer: COMMERCIAL

## 2020-01-21 ENCOUNTER — NURSE ONLY (OUTPATIENT)
Dept: CARDIOLOGY CLINIC | Age: 55
End: 2020-01-21

## 2020-01-21 ENCOUNTER — OFFICE VISIT (OUTPATIENT)
Dept: CARDIOLOGY CLINIC | Age: 55
End: 2020-01-21
Payer: COMMERCIAL

## 2020-01-21 VITALS
SYSTOLIC BLOOD PRESSURE: 90 MMHG | BODY MASS INDEX: 37.12 KG/M2 | HEIGHT: 66 IN | WEIGHT: 231 LBS | DIASTOLIC BLOOD PRESSURE: 60 MMHG | HEART RATE: 65 BPM

## 2020-01-21 VITALS
OXYGEN SATURATION: 99 % | DIASTOLIC BLOOD PRESSURE: 60 MMHG | WEIGHT: 231.2 LBS | SYSTOLIC BLOOD PRESSURE: 126 MMHG | BODY MASS INDEX: 37.16 KG/M2 | HEIGHT: 66 IN | HEART RATE: 55 BPM

## 2020-01-21 DIAGNOSIS — Z98.84 HISTORY OF WEIGHT LOSS SURGERY: ICD-10-CM

## 2020-01-21 DIAGNOSIS — R82.998 LEUKOCYTES IN URINE: ICD-10-CM

## 2020-01-21 DIAGNOSIS — E55.9 VITAMIN D DEFICIENCY: ICD-10-CM

## 2020-01-21 PROBLEM — R00.1 SINUS BRADYCARDIA: Status: ACTIVE | Noted: 2020-01-21

## 2020-01-21 LAB
VITAMIN B-12: 290 PG/ML (ref 211–911)
VITAMIN D 25-HYDROXY: 25 NG/ML

## 2020-01-21 PROCEDURE — 93000 ELECTROCARDIOGRAM COMPLETE: CPT | Performed by: INTERNAL MEDICINE

## 2020-01-21 PROCEDURE — 1036F TOBACCO NON-USER: CPT | Performed by: INTERNAL MEDICINE

## 2020-01-21 PROCEDURE — 99204 OFFICE O/P NEW MOD 45 MIN: CPT | Performed by: INTERNAL MEDICINE

## 2020-01-21 PROCEDURE — 3017F COLORECTAL CA SCREEN DOC REV: CPT | Performed by: INTERNAL MEDICINE

## 2020-01-21 PROCEDURE — G8482 FLU IMMUNIZE ORDER/ADMIN: HCPCS | Performed by: INTERNAL MEDICINE

## 2020-01-21 PROCEDURE — 97530 THERAPEUTIC ACTIVITIES: CPT | Performed by: PHYSICAL THERAPIST

## 2020-01-21 PROCEDURE — 97112 NEUROMUSCULAR REEDUCATION: CPT | Performed by: PHYSICAL THERAPIST

## 2020-01-21 PROCEDURE — 99214 OFFICE O/P EST MOD 30 MIN: CPT | Performed by: INTERNAL MEDICINE

## 2020-01-21 PROCEDURE — 97110 THERAPEUTIC EXERCISES: CPT | Performed by: PHYSICAL THERAPIST

## 2020-01-21 PROCEDURE — G8417 CALC BMI ABV UP PARAM F/U: HCPCS | Performed by: INTERNAL MEDICINE

## 2020-01-21 PROCEDURE — G8427 DOCREV CUR MEDS BY ELIG CLIN: HCPCS | Performed by: INTERNAL MEDICINE

## 2020-01-21 PROCEDURE — 0296T PR EXT ECG > 48HR TO 21 DAY RCRD W/CONECT INTL RCRD: CPT | Performed by: INTERNAL MEDICINE

## 2020-01-21 RX ORDER — CIPROFLOXACIN 250 MG/1
250 TABLET, FILM COATED ORAL 2 TIMES DAILY
Qty: 14 TABLET | Refills: 0 | Status: SHIPPED | OUTPATIENT
Start: 2020-01-21 | End: 2020-01-23

## 2020-01-21 RX ORDER — MECLIZINE HCL 12.5 MG/1
12.5 TABLET ORAL 3 TIMES DAILY PRN
Qty: 60 TABLET | Refills: 1 | Status: SHIPPED | OUTPATIENT
Start: 2020-01-21 | End: 2020-04-07 | Stop reason: SDUPTHER

## 2020-01-21 ASSESSMENT — ENCOUNTER SYMPTOMS
WHEEZING: 0
ABDOMINAL PAIN: 0
COLOR CHANGE: 0
EYE DISCHARGE: 0
COUGH: 0
SHORTNESS OF BREATH: 0
BLOOD IN STOOL: 0
VOMITING: 0
ABDOMINAL DISTENTION: 0
BACK PAIN: 0
FACIAL SWELLING: 0
CHEST TIGHTNESS: 0

## 2020-01-21 NOTE — PROGRESS NOTES
Age of Onset    Diabetes Mother     Stroke Mother     Osteoarthritis Mother     Heart Disease Father     Other Father     High Blood Pressure Father     Osteoarthritis Father     Diabetes Maternal Aunt     Cancer Maternal Aunt     Diabetes Maternal Grandmother     Cancer Paternal Aunt        Allergies   No Known Allergies    Medications:     Home Medications:  Were reviewed and are listed in nursing record. and/or listed below    Prior to Admission medications    Medication Sig Start Date End Date Taking? Authorizing Provider   ciprofloxacin (CIPRO) 250 MG tablet Take 1 tablet by mouth 2 times daily for 7 days 1/21/20 1/28/20 Yes Timothy Salmeron MD   meclizine (ANTIVERT) 12.5 MG tablet Take 1 tablet by mouth 3 times daily as needed for Dizziness or Nausea 1/21/20  Yes Timothy Salmeron MD   omeprazole (PRILOSEC) 20 MG delayed release capsule Take 1 capsule by mouth daily 1/9/20  Yes Timothy Salmeorn MD   meclizine (ANTIVERT) 25 MG tablet Take 1 tablet by mouth 3 times daily as needed for Dizziness or Nausea 1/9/20  Yes Timothy Salmeron MD   gabapentin (NEURONTIN) 300 MG capsule Take 1 capsule by mouth 2 times daily for 60 days. Intended supply: 90 days 1/8/20 3/8/20 Yes Betty Moreira MD   blood glucose test strips (TRUE METRIX BLOOD GLUCOSE TEST) strip USE TO TEST ONE A DAY 1/28/19  Yes Timothy Salmeron MD   Lancets MISC Use to test blood sugar once daily 1/28/19  Yes Timothy Salmeron MD   Meals on Wheels MISC by Does not apply route Meals should be low carbohydrate, low sodium, and low fat. 9/18/18  Yes Timothy Salmeron MD   Misc.  Devices [facility]) MISC Dispense bariatric extra wide wheelchair    Height-5 feet 6 & 3/4 inches  Weight- 375 lbs  BMI-59.3 8/31/18  Yes Timothy Salmeron MD   Blood Pressure KIT Dispense blood pressure kit with extra large cuff 8/31/18  Yes Timothy Salmeron MD   Blood Glucose Monitoring Suppl (BLOOD GLUCOSE MONITOR SYSTEM) w/Device KIT Dispense

## 2020-01-21 NOTE — PROGRESS NOTES
Patricia Kwaku   Date ofBirth:  1965    Date of Visit:  1/21/2020    Chief Complaint   Patient presents with    Results    Dizziness    Gastroesophageal Reflux    Dysuria       HPI  Mongolian  May number #[id number]    GERD- Pt states Omeprazole is helpful. Pt doesn't eat spicy foods or tomato based. Pt drinks hot chocolate. Dizziness-Pt states Meclizine helps but makes her sleepy. Pt c/o burning with urination and some urine leakage for awhile. Pt denies urinary frequency, urinary urgency, and hematuria. Pt c/o change in vision. Pt wants a referral to Eye specialist.    Pt wants a referral to Plastic Surgery for excess skin from losing weight. Abdominal ultrasound will be done on Friday 1/24/20. Pt has appointment with Cardiology at 1:00pm today for chest pain and palpitations. Chest xray is normal.     Review of Systems   Constitutional: Positive for unexpected weight change (s/p Laparoscopic Sleeve Gastrectomy). Negative for chills, fatigue and fever. HENT: Negative for congestion, postnasal drip, rhinorrhea, sinus pressure and sore throat. Eyes: Positive for visual disturbance. Respiratory: Positive for shortness of breath. Negative for cough, chest tightness and wheezing. Cardiovascular: Positive for chest pain and palpitations. Negative for leg swelling. Gastrointestinal: Positive for nausea and vomiting. Negative for abdominal pain, blood in stool, constipation and diarrhea. Genitourinary: Negative for dysuria, frequency and hematuria. Musculoskeletal: Positive for myalgias. Negative for arthralgias. Skin: Negative for rash. Neurological: Positive for dizziness. Negative for tremors, syncope, weakness, light-headedness, numbness and headaches. Psychiatric/Behavioral: Negative for dysphoric mood and sleep disturbance. The patient is not nervous/anxious.         No Known Allergies  Outpatient Medications Marked as Taking for the 1/21/20 encounter (Office Visit) with Mandie Beard MD   Medication Sig Dispense Refill    omeprazole (PRILOSEC) 20 MG delayed release capsule Take 1 capsule by mouth daily 30 capsule 3    meclizine (ANTIVERT) 25 MG tablet Take 1 tablet by mouth 3 times daily as needed for Dizziness or Nausea 30 tablet 0    gabapentin (NEURONTIN) 300 MG capsule Take 1 capsule by mouth 2 times daily for 60 days. Intended supply: 90 days 60 capsule 1    [DISCONTINUED] blood glucose test strips (TRUE METRIX BLOOD GLUCOSE TEST) strip USE TO TEST ONE A  strip 2    [DISCONTINUED] Lancets MISC Use to test blood sugar once daily 100 each 2    [DISCONTINUED] Meals on Wheels MISC by Does not apply route Meals should be low carbohydrate, low sodium, and low fat. 1 each 0    Misc. Devices [facility]) MISC Dispense bariatric extra wide wheelchair    Height-5 feet 6 & 3/4 inches  Weight- 375 lbs  BMI-59.3 1 each 0    [DISCONTINUED] Blood Pressure KIT Dispense blood pressure kit with extra large cuff 1 kit 0    [DISCONTINUED] Blood Glucose Monitoring Suppl (BLOOD GLUCOSE MONITOR SYSTEM) w/Device KIT Dispense glucometer covered by patient's insurance 1 kit 0    [DISCONTINUED] Mirabegron ER 25 MG TB24 Take 25 mg by mouth nightly       [DISCONTINUED] Scooter MISC by Does not apply route Dispense a scooter with an adjustable chair  Weight-382.9 lbs (Patient not taking: Reported on 1/21/2020) 1 each 0    [DISCONTINUED] Misc. Devices [facility]) MISC Dispense a power wheelchair  Weight -382.9 lbs 1 each 0    [DISCONTINUED] Scooter MISC by Does not apply route Motorized scooter for ambulatory aid  Repair or replace  (Patient is >300 lbs) (Patient not taking: Reported on 1/21/2020) 1 each 0    Scooter MISC by Does not apply route Requires repair of existing scooter 1 each 0    [DISCONTINUED] Scooter MISC by Does not apply route Ambulatory aid - needs new motorized scooter or repair of existing scooter.   Earlier one from [place] is broken 1 each 0    [DISCONTINUED] Handicap Placard MISC by Does not apply route Requesting for 5 years 1 each 0    [DISCONTINUED] Misc. Devices (BARIATRIC ROLLATOR) MISC 1 Units by Does not apply route as needed (ambulatory help) Patient Height - 5'7\" and Weight - 412 lbs 1 each 0         Vitals:    01/21/20 1143   BP: 126/60   Site: Right Upper Arm   Position: Sitting   Cuff Size: Large Adult   Pulse: 55   SpO2: 99%   Weight: 231 lb 3.2 oz (104.9 kg)   Height: 5' 6\" (1.676 m)     Body mass index is 37.32 kg/m². Physical Exam  Nursing note reviewed. Constitutional:       General: She is not in acute distress. Appearance: Normal appearance. She is well-developed. HENT:      Right Ear: Tympanic membrane and ear canal normal.      Left Ear: Tympanic membrane and ear canal normal.      Mouth/Throat:      Pharynx: Oropharynx is clear. Eyes:      General: Lids are normal.      Extraocular Movements: Extraocular movements intact. Conjunctiva/sclera: Conjunctivae normal.      Pupils: Pupils are equal, round, and reactive to light. Neck:      Musculoskeletal: Neck supple. Thyroid: No thyromegaly. Vascular: No carotid bruit. Cardiovascular:      Rate and Rhythm: Normal rate and regular rhythm. Heart sounds: Normal heart sounds, S1 normal and S2 normal. No murmur. No friction rub. No gallop. Pulmonary:      Effort: Pulmonary effort is normal. No respiratory distress. Breath sounds: Normal breath sounds. No wheezing, rhonchi or rales. Abdominal:      General: Bowel sounds are normal. There is no distension. Palpations: Abdomen is soft. Tenderness: There is no abdominal tenderness. Musculoskeletal:      Right lower leg: No edema. Left lower leg: No edema. Lymphadenopathy:      Head:      Right side of head: No submandibular adenopathy. Left side of head: No submandibular adenopathy.    Skin:     Comments: Excess skin from significant weight loss   Neurological:      Mental

## 2020-01-21 NOTE — PROGRESS NOTES
14 day Zio placed in [place] office for palpitations,  present during hook up and instructions explained to patient. Patient expressed understanding

## 2020-01-21 NOTE — FLOWSHEET NOTE
Abduction         Not assessed   Adduction         Not assessed   ER 45 35     stretch   IR 10 20                          Flexibility Left Right Comments   Hamstrings SLR 75 SLR 75 P! And stretch   ITB (Obers test)     Not assessed- can't lay on side   Hip flexor(Afshin test)     Not assessed   gastroc + tightness + tightness Not assessed-can't lay prone   Rectus femoris(Elys test)                      Special  Test Left Right Comments   FABERS         Scour test         Impingement test         Trendelenburg test     Not assessed- can't perform SLS                Strength Left Right Comments   Hip flexors 3+/5 3+/5 Back p! Hip extension 3+/5 3+/5 Back p! Hip abduction 3+/5 3+/5 Back and hip p! Hip adduction 3+/5 3+/5     Hip ER 3+/5 3+/5     Hip IR 3+/5 3+/5     Quads 3+/5 3+/5 Back p! Hamstrings 3+/5 3+/5        Joint mobility: Not assessed              []?Normal                       []?Hypo              []?Hyper     Palpation: generalized TTP around lateral hip     Functional Mobility/Transfers: Independent but with modifications.     Posture: Seated posture WNL. Forward flexed posture when standing     Gait: Not assessed, pt utilizes WC and can not stand independently     Dermatomes: sensation equal B, though progressive decreased in sensation from proximal to distal from L1-5.  No sensation at S1-2      RESTRICTIONS/PRECAUTIONS:     Exercises/Interventions:     ROM/STRETCHES     Seated hamstring  3x30\" B    Incline calf 3x30\"    Supine piriformis     Seated figure 4 3x30\" B    Standing hip flexion 3x30\" B At half wall for support and stability    ITB     Butterfly     Hip flexor stretch kneeling     PREs     Glute sets 10x10\"    ADD sets 10x10\" foam roll    Quad sets    TA brace    Hooklying heel lift  Lift foot 1-2\" from table                  LAQ 3x10 B, 3#    Clamshells 3x10, blue tied band Seated, back supported   Bridges 3x10 DL    Step ups x10 L2, B         Balance     DLS, FT, EO 9J98\" Airex Manual interventions            Plan for next session: progress as tolerated    Therapeutic Exercise and NMR EXR  [x] (27255) Provided verbal/tactile cueing for activities related to strengthening, flexibility, endurance, ROM for improvements in LE, proximal hip, and core control with self care, mobility, lifting, ambulation.  [] (43061) Provided verbal/tactile cueing for activities related to improving balance, coordination, kinesthetic sense, posture, motor skill, proprioception  to assist with LE, proximal hip, and core control in self care, mobility, lifting, ambulation and eccentric single leg control.      NMR and Therapeutic Activities:    [] (24722 or 35848) Provided verbal/tactile cueing for activities related to improving balance, coordination, kinesthetic sense, posture, motor skill, proprioception and motor activation to allow for proper function of core, proximal hip and LE with self care and ADLs  [] (91864) Gait Re-education- Provided training and instruction to the patient for proper LE, core and proximal hip recruitment and positioning and eccentric body weight control with ambulation re-education including up and down stairs     Home Exercise Program:    [x] (52444) Reviewed/Progressed HEP activities related to strengthening, flexibility, endurance, ROM of core, proximal hip and LE for functional self-care, mobility, lifting and ambulation/stair navigation   [] (29664)Reviewed/Progressed HEP activities related to improving balance, coordination, kinesthetic sense, posture, motor skill, proprioception of core, proximal hip and LE for self care, mobility, lifting, and ambulation/stair navigation      Manual Treatments:    [] (61019) Provided manual therapy to mobilize LE, proximal hip and/or LS spine soft tissue/joints for the purpose of modulating pain, promoting relaxation,  increasing ROM, reducing/eliminating soft tissue swelling/inflammation/restriction, improving soft tissue extensibility and allowing for proper ROM for normal function with self care, mobility, lifting and ambulation. Modalities:      Charges:  Timed Code Treatment Minutes: 38   Total Treatment Minutes: 38   Time in: 1:44  Time out: 3:00    [] EVAL (LOW) 33959 (typically 20 minutes face-to-face)  [] EVAL (MOD) 62704 (typically 30 minutes face-to-face)  [] EVAL (HIGH) 13501 (typically 45 minutes face-to-face)  [] RE-EVAL     [x] WC(34936) x 1     [] IONTO  [x] NMR (94089) x 1     [] VASO  [] Manual (36537) x      [] Other:  [x] TA x 1     [] Mech Traction (49911)  [] ES(attended) (50268)      [] ES (un) (02710):     GOALS:   Patient stated goal: decrease pain, get better, get stronger   []? Progressing: []? Met: []? Not Met: []? Adjusted     Therapist goals for Patient:   Short Term Goals: To be achieved in: 4 weeks 2/7/20  1. Independent in HEP and progression per patient tolerance, in order to prevent re-injury. []? Progressing: []? Met: []? Not Met: []? Adjusted   2. Patient will have a decrease in pain to facilitate improvement in movement, function, and ADLs as indicated by Functional Deficits. []? Progressing: []? Met: []? Not Met: []? Adjusted      Long Term Goals: To be achieved in: 8 weeks 3/6/20  1. Disability index score of 33% or less for the [facility] to assist with reaching prior level of function. []? Progressing: []? Met: []? Not Met: []? Adjusted  2. Patient will demonstrate increased AROM to [facility] to allow for proper joint functioning as indicated by patients Functional Deficits. []? Progressing: []? Met: []? Not Met: []? Adjusted  3. Patient will demonstrate an increase in BLE strength to 4/5 or greater to allow for proper functional mobility as indicated by patients Functional Deficits. []? Progressing: []? Met: []? Not Met: []? Adjusted  4. Patient will return to ADLs, IADLs and functional activities without increased symptoms or restriction. []? Progressing: []? Met: []?  Not Met: []?

## 2020-01-21 NOTE — ASSESSMENT & PLAN NOTE
ECG today shows sinus bradycardia with a heart rate of 49. She is asymptomatic from a bradycardia perspective. No prior episodes of syncope. We will get a monitor to assess her palpitations at the same time this will help assessing her overall heart rate.

## 2020-01-22 LAB — URINE CULTURE, ROUTINE: NORMAL

## 2020-01-23 ENCOUNTER — OFFICE VISIT (OUTPATIENT)
Dept: ORTHOPEDIC SURGERY | Age: 55
End: 2020-01-23
Payer: COMMERCIAL

## 2020-01-23 ENCOUNTER — HOSPITAL ENCOUNTER (OUTPATIENT)
Dept: PHYSICAL THERAPY | Age: 55
Setting detail: THERAPIES SERIES
Discharge: HOME | End: 2020-01-23
Payer: COMMERCIAL

## 2020-01-23 VITALS
BODY MASS INDEX: 37.13 KG/M2 | SYSTOLIC BLOOD PRESSURE: 100 MMHG | DIASTOLIC BLOOD PRESSURE: 64 MMHG | HEART RATE: 50 BPM | HEIGHT: 66 IN | WEIGHT: 231.04 LBS

## 2020-01-23 PROCEDURE — 97530 THERAPEUTIC ACTIVITIES: CPT | Performed by: PHYSICAL THERAPIST

## 2020-01-23 PROCEDURE — 3017F COLORECTAL CA SCREEN DOC REV: CPT | Performed by: ORTHOPAEDIC SURGERY

## 2020-01-23 PROCEDURE — 99214 OFFICE O/P EST MOD 30 MIN: CPT | Performed by: ORTHOPAEDIC SURGERY

## 2020-01-23 PROCEDURE — G8482 FLU IMMUNIZE ORDER/ADMIN: HCPCS | Performed by: ORTHOPAEDIC SURGERY

## 2020-01-23 PROCEDURE — 1036F TOBACCO NON-USER: CPT | Performed by: ORTHOPAEDIC SURGERY

## 2020-01-23 PROCEDURE — 97110 THERAPEUTIC EXERCISES: CPT | Performed by: PHYSICAL THERAPIST

## 2020-01-23 PROCEDURE — G8428 CUR MEDS NOT DOCUMENT: HCPCS | Performed by: ORTHOPAEDIC SURGERY

## 2020-01-23 PROCEDURE — 20610 DRAIN/INJ JOINT/BURSA W/O US: CPT | Performed by: ORTHOPAEDIC SURGERY

## 2020-01-23 PROCEDURE — G8417 CALC BMI ABV UP PARAM F/U: HCPCS | Performed by: ORTHOPAEDIC SURGERY

## 2020-01-23 RX ORDER — ONDANSETRON 4 MG/1
TABLET, FILM COATED ORAL
COMMUNITY
End: 2020-03-02 | Stop reason: SDUPTHER

## 2020-01-23 RX ORDER — METHYLPREDNISOLONE ACETATE 40 MG/ML
80 INJECTION, SUSPENSION INTRA-ARTICULAR; INTRALESIONAL; INTRAMUSCULAR; SOFT TISSUE ONCE
Status: COMPLETED | OUTPATIENT
Start: 2020-01-23 | End: 2020-01-23

## 2020-01-23 RX ORDER — SUCRALFATE 1 G/1
TABLET ORAL
COMMUNITY
End: 2020-01-23

## 2020-01-23 RX ORDER — OXYBUTYNIN CHLORIDE 10 MG/1
TABLET, EXTENDED RELEASE ORAL
COMMUNITY
Start: 2018-08-22 | End: 2020-01-23

## 2020-01-23 RX ORDER — OXYCODONE AND ACETAMINOPHEN 7.5; 325 MG/1; MG/1
TABLET ORAL
COMMUNITY
End: 2020-01-30 | Stop reason: SDUPTHER

## 2020-01-23 RX ORDER — B-COMPLEX WITH VITAMIN C
TABLET ORAL
COMMUNITY
Start: 2020-01-08 | End: 2020-03-05

## 2020-01-23 RX ORDER — KETOCONAZOLE 20 MG/ML
SHAMPOO TOPICAL
COMMUNITY
End: 2021-06-15

## 2020-01-23 RX ORDER — RANITIDINE 150 MG/1
TABLET ORAL
COMMUNITY
End: 2021-06-15

## 2020-01-23 RX ORDER — CHLORHEXIDINE GLUCONATE 0.12 MG/ML
RINSE ORAL
COMMUNITY
End: 2021-06-15

## 2020-01-23 RX ORDER — PANTOPRAZOLE SODIUM 40 MG/1
TABLET, DELAYED RELEASE ORAL
COMMUNITY
End: 2020-01-23

## 2020-01-23 RX ORDER — TIZANIDINE 2 MG/1
TABLET ORAL
COMMUNITY
End: 2021-06-15

## 2020-01-23 RX ORDER — TRIAMCINOLONE ACETONIDE 1 MG/G
CREAM TOPICAL
COMMUNITY
End: 2021-06-15

## 2020-01-23 RX ORDER — SELENIUM SULFIDE 2.5 MG/100ML
LOTION TOPICAL
COMMUNITY
End: 2021-06-15

## 2020-01-23 RX ORDER — MISOPROSTOL 200 UG/1
TABLET ORAL
COMMUNITY
Start: 2018-05-01 | End: 2021-06-15

## 2020-01-23 RX ORDER — NYSTATIN 100000 U/G
CREAM TOPICAL
COMMUNITY
End: 2021-06-15

## 2020-01-23 RX ORDER — MELOXICAM 15 MG/1
TABLET ORAL
COMMUNITY
End: 2020-03-02

## 2020-01-23 RX ADMIN — METHYLPREDNISOLONE ACETATE 80 MG: 40 INJECTION, SUSPENSION INTRA-ARTICULAR; INTRALESIONAL; INTRAMUSCULAR; SOFT TISSUE at 12:01

## 2020-01-23 RX ADMIN — METHYLPREDNISOLONE ACETATE 80 MG: 40 INJECTION, SUSPENSION INTRA-ARTICULAR; INTRALESIONAL; INTRAMUSCULAR; SOFT TISSUE at 12:00

## 2020-01-23 NOTE — PROGRESS NOTES
Chief Complaint    Shoulder Pain (F/U BILAT SHOULDER)      History of Present Illness:  Olivia Kaminski is a pleasant, 53 y.o., female, here today for follow up of bilateral shoulder pain and limitation. The pain is worse in the left shoulder, and located superior along the [facility] joint. This has been ongoing for over a year, with no preceding injury. She has been followed by several surgeons in our group for bilateral shoulder cuff disease, bilateral knee OA, and hip OA. She has been treated conservatively with intermittent injections, as she recovers from general deconditioning and over 170lb weight loss from Gastric Sleeve surgery in Oct of 2018. She is wheelchair enabled, but is working with PT and is able to ambulate short distances with a walker. She currently finds her left shoulder bothersome with cross body adduction movements, such as swimming. She has pain at night only if she rolls on it. She has no neck pain or distal parasthesias. She cannot take NSAIDS due to her sleeve surgery. She has not had PT for her shoulders, and last time for an injection in both shoulder was in Jan 2019. She had relief for a number of months. She reports no new injuries or setbacks. Medical History:  Patient's medications, allergies, past medical, surgical, social and family histories were reviewed and updated as appropriate. Musculoskeletal ROS: positive for - joint pain      Review of Systems  A 14 point review of systems was completed by the patient on 1/23/2020 and is available in the media section of the scanned medical record and was reviewed on 1/23/2020. The review is negative with the exception of those things mentioned in the HPI and Past Medical History    Vital Signs:  Vitals:    01/23/20 1020   BP: 100/64   Pulse: 50       General/Appearance: Alert and oriented and in no apparent distress. Skin:  There are no skin lesions, cellulitis, or extreme edema.  The patient has warm and well-perfused Bilateral encouraged to call with any further questions or concerns. Misa Mario is in agreement with this plan. Sincerely,    Oskar Mccoy  [address]   Email: [email or web address]  Cell: [phone]    01/23/20  11:44 AM     The encounter with Misa Mario was supervised by Dr Vikash Polk who personally examined the patient and reviewed the plan. This dictation was performed with a verbal recognition program (DRAGON) and it was checked for errors. It is possible that there are still dictated errors within this office note. If so, please bring any errors to my attention for an addendum. All efforts were made to ensure that this office note is accurate.   ________________________  I was physically present and personally supervised the Orthopaedic Sports Medicine Fellow in the evaluation and development of a treatment plan for this patient. I personally interviewed the patient and performed a physical examination. In addition, I discussed the patient's condition and treatment options with them. I have also reviewed and agree with the past medical, family and social history unless otherwise noted. All of the patient's questions were answered. Saad Polk MD, PhD  1/23/2020

## 2020-01-23 NOTE — FLOWSHEET NOTE
Pt will come in early for ob history update.   The [address] [place] [address] and Sports Rehabilitation, Loki Mathias    Physical Therapy Daily Treatment Note  Date:  2020    Patient Name:  Jeromy Green    :  1965  MRN: [id number]  Restrictions/Precautions:    Medical/Treatment Diagnosis Information:  · Diagnosis: M70.61, M70.62 (ICD-10-CM) - Trochanteric bursitis of both hips  · Treatment Diagnosis: M54.5, M70.71, M70.72, M25.551, M25.552; leading to impaired ADLs and IADLs  Insurance/Certification information:  PT Insurance Information: PT BENEFITS  [facility]/ AUTH REQUIRED ONLY AFTER 30TH VISIT / 20 PAG  Physician Information:  Referring Practitioner: Alexis Wolfe MD  Has the plan of care been signed (Y/N):        []  Yes  [x]  No     Date of Patient follow up with Physician: Not scheduled at this time      Is this a Progress Report:     []  Yes  [x]  No        If Yes:  Date Range for reporting period:  Beginning  Ending     Progress report will be due (10 Rx or 30 days whichever is less):       Recertification will be due (POC Duration  / 90 days whichever is less): 3/6/20         Visit # Insurance Allowable Auth Required   3 30 []  Yes [x]  No        Functional Scale:    Date assessed:    WOMAC 64/96=66.7% deficit   20     Latex Allergy:  [x]NO      []YES  Preferred Language for Healthcare:   [x]English       []other:      Pain level:  8/10     SUBJECTIVE:   present. Pt states that her L hip continues to be more painful than the R but overall she continues to feel better. OBJECTIVE:              ROM PROM AROM Comments     Left Right Left Right     Flexion 110 90 60 60 P! And stretch   Extension         Not assessed. Pt unable to lay prone and unable to stand   Abduction         Not assessed   Adduction         Not assessed   ER 45 35     stretch   IR 10 20                          Flexibility Left Right Comments   Hamstrings SLR 75 SLR 75 P!  And stretch   ITB Alberto Bess improvements in LE, proximal hip, and core control with self care, mobility, lifting, ambulation.  [] (06913) Provided verbal/tactile cueing for activities related to improving balance, coordination, kinesthetic sense, posture, motor skill, proprioception  to assist with LE, proximal hip, and core control in self care, mobility, lifting, ambulation and eccentric single leg control. NMR and Therapeutic Activities:    [] (65395 or 23156) Provided verbal/tactile cueing for activities related to improving balance, coordination, kinesthetic sense, posture, motor skill, proprioception and motor activation to allow for proper function of core, proximal hip and LE with self care and ADLs  [] (53185) Gait Re-education- Provided training and instruction to the patient for proper LE, core and proximal hip recruitment and positioning and eccentric body weight control with ambulation re-education including up and down stairs     Home Exercise Program:    [x] (99708) Reviewed/Progressed HEP activities related to strengthening, flexibility, endurance, ROM of core, proximal hip and LE for functional self-care, mobility, lifting and ambulation/stair navigation   [] (50326)Reviewed/Progressed HEP activities related to improving balance, coordination, kinesthetic sense, posture, motor skill, proprioception of core, proximal hip and LE for self care, mobility, lifting, and ambulation/stair navigation      Manual Treatments:    [] (80077) Provided manual therapy to mobilize LE, proximal hip and/or LS spine soft tissue/joints for the purpose of modulating pain, promoting relaxation,  increasing ROM, reducing/eliminating soft tissue swelling/inflammation/restriction, improving soft tissue extensibility and allowing for proper ROM for normal function with self care, mobility, lifting and ambulation.      Modalities:      Charges:  Timed Code Treatment Minutes: 30   Total Treatment Minutes: 30   Time in: 8:30  Time out: 9:30    [] LIAM (LOW) 53624 (typically 20 minutes face-to-face)  [] EVAL (MOD) 15533 (typically 30 minutes face-to-face)  [] EVAL (HIGH) 39509 (typically 45 minutes face-to-face)  [] RE-EVAL     [x] VD(79280) x 1     [] IONTO  [] NMR (19027) x      [] VASO  [] Manual (13870) x      [] Other:  [x] TA x 1     [] Mech Traction (00120)  [] ES(attended) (91066)      [] ES (un) (76007):     GOALS:   Patient stated goal: decrease pain, get better, get stronger   []? Progressing: []? Met: []? Not Met: []? Adjusted     Therapist goals for Patient:   Short Term Goals: To be achieved in: 4 weeks 2/7/20  1. Independent in HEP and progression per patient tolerance, in order to prevent re-injury. []? Progressing: []? Met: []? Not Met: []? Adjusted   2. Patient will have a decrease in pain to facilitate improvement in movement, function, and ADLs as indicated by Functional Deficits. []? Progressing: []? Met: []? Not Met: []? Adjusted      Long Term Goals: To be achieved in: 8 weeks 3/6/20  1. Disability index score of 33% or less for the [facility] to assist with reaching prior level of function. []? Progressing: []? Met: []? Not Met: []? Adjusted  2. Patient will demonstrate increased AROM to [facility] to allow for proper joint functioning as indicated by patients Functional Deficits. []? Progressing: []? Met: []? Not Met: []? Adjusted  3. Patient will demonstrate an increase in BLE strength to 4/5 or greater to allow for proper functional mobility as indicated by patients Functional Deficits. []? Progressing: []? Met: []? Not Met: []? Adjusted  4. Patient will return to ADLs, IADLs and functional activities without increased symptoms or restriction. []? Progressing: []? Met: []? Not Met: []? Adjusted      Overall Progression Towards Functional goals/ Treatment Progress Update:  [] Patient is progressing as expected towards functional goals listed. [] Progression is slowed due to complexities/Impairments listed.   [] Progression has been slowed due to co-morbidities. [x] Plan just implemented, too soon to assess goals progression <30days   [] Goals require adjustment due to lack of progress  [] Patient is not progressing as expected and requires additional follow up with physician  [] Other    Prognosis for POC: [x] Good [] Fair  [] Poor      Patient requires continued skilled intervention: [x] Yes  [] No    Treatment/Activity Tolerance:  [x] Patient able to complete treatment  [] Patient limited by fatigue  [] Patient limited by pain     [] Patient limited by other medical complications  [x] Other: Did not perform all standing exercises sequentially today d/t reports of LBP at LPV, provided rest breaks to perform seated exercises between standing exercises. Pt tolerated well and did experience back pain. Pt stated that she felt really good during session. Continues to note fatigue with LAQ. Balance not performed this date d/t time constraint, pt's transportation had arrived and she had another appointment to get to. Pt requires PT follow up to address ROM, strength and functional mobility deficits. PLAN: See eval  [x] Continue per plan of care [] Alter current plan (see comments above)   [] Plan of care initiated [] Hold pending MD visit [] Discharge    Electronically signed by:  Idania Hanley, PT, DPT  Physical Therapist  [id number]  [email or web address]      Note: If patient does not return for scheduled/ recommended follow up visits, this note will serve as a discharge from care along with most recent update on progress.

## 2020-01-24 ENCOUNTER — HOSPITAL ENCOUNTER (OUTPATIENT)
Dept: ULTRASOUND IMAGING | Age: 55
Discharge: HOME | End: 2020-01-24
Payer: COMMERCIAL

## 2020-01-24 ENCOUNTER — TELEPHONE (OUTPATIENT)
Dept: ADMINISTRATIVE | Age: 55
End: 2020-01-24

## 2020-01-24 PROCEDURE — 76700 US EXAM ABDOM COMPLETE: CPT

## 2020-01-27 ENCOUNTER — OFFICE VISIT (OUTPATIENT)
Dept: ORTHOPEDIC SURGERY | Age: 55
End: 2020-01-27
Payer: COMMERCIAL

## 2020-01-27 VITALS — HEIGHT: 68 IN | BODY MASS INDEX: 35.01 KG/M2 | WEIGHT: 231 LBS

## 2020-01-27 PROCEDURE — 20610 DRAIN/INJ JOINT/BURSA W/O US: CPT | Performed by: ORTHOPAEDIC SURGERY

## 2020-01-27 PROCEDURE — 3017F COLORECTAL CA SCREEN DOC REV: CPT | Performed by: ORTHOPAEDIC SURGERY

## 2020-01-27 PROCEDURE — 1036F TOBACCO NON-USER: CPT | Performed by: ORTHOPAEDIC SURGERY

## 2020-01-27 PROCEDURE — G8427 DOCREV CUR MEDS BY ELIG CLIN: HCPCS | Performed by: ORTHOPAEDIC SURGERY

## 2020-01-27 PROCEDURE — 99213 OFFICE O/P EST LOW 20 MIN: CPT | Performed by: ORTHOPAEDIC SURGERY

## 2020-01-27 PROCEDURE — G8482 FLU IMMUNIZE ORDER/ADMIN: HCPCS | Performed by: ORTHOPAEDIC SURGERY

## 2020-01-27 PROCEDURE — G8417 CALC BMI ABV UP PARAM F/U: HCPCS | Performed by: ORTHOPAEDIC SURGERY

## 2020-01-27 RX ORDER — METHYLPREDNISOLONE ACETATE 40 MG/ML
40 INJECTION, SUSPENSION INTRA-ARTICULAR; INTRALESIONAL; INTRAMUSCULAR; SOFT TISSUE ONCE
Status: COMPLETED | OUTPATIENT
Start: 2020-01-27 | End: 2020-01-27

## 2020-01-27 RX ADMIN — METHYLPREDNISOLONE ACETATE 40 MG: 40 INJECTION, SUSPENSION INTRA-ARTICULAR; INTRALESIONAL; INTRAMUSCULAR; SOFT TISSUE at 17:58

## 2020-01-27 RX ADMIN — METHYLPREDNISOLONE ACETATE 40 MG: 40 INJECTION, SUSPENSION INTRA-ARTICULAR; INTRALESIONAL; INTRAMUSCULAR; SOFT TISSUE at 17:57

## 2020-01-27 NOTE — PROGRESS NOTES
Center  1/27/2020         The encounter with Zonia Watt was supervised by Dr Eunice Shah who personally examined the patient and reviewed the plan. This dictation was performed with a verbal recognition program (DRAGON) and it was checked for errors. It is possible that there are still dictated errors within this office note. If so, please bring any errors to my attention for an addendum. All efforts were made to ensure that this office note is accurate. I supervised my sports medicine fellow in the evaluation and development of a treatment plan  for this patient. I personally interviewed the patient and performed the physical examination. In addition, I discussed the diagnosis and treatment options. All of the patient's questions were answered. I personally reviewed the patient's pain scale, review of systems, family history, social history, past medical history, allergies and medications. Review of systems was collected today, reviewed and is included in the medical record. It is available under the media tab. Steven Carlos MD  Sports Medicine, Arthroscopic Knee and Shoulder Surgery    This dictation was performed with a verbal recognition program [facility]) and it was checked for errors. It is possible that there are still dictated errors within this office note. If so, please bring any errors to my attention for an addendum. All efforts were made to ensure that this office note is accurate.

## 2020-01-28 ENCOUNTER — TELEPHONE (OUTPATIENT)
Dept: PRIMARY CARE CLINIC | Age: 55
End: 2020-01-28

## 2020-01-28 ENCOUNTER — HOSPITAL ENCOUNTER (OUTPATIENT)
Dept: PHYSICAL THERAPY | Age: 55
Setting detail: THERAPIES SERIES
Discharge: HOME | End: 2020-01-28
Payer: COMMERCIAL

## 2020-01-28 PROBLEM — E55.9 VITAMIN D DEFICIENCY: Status: ACTIVE | Noted: 2020-01-28

## 2020-01-28 PROBLEM — R42 DIZZINESS: Status: ACTIVE | Noted: 2020-01-28

## 2020-01-28 PROBLEM — K21.9 GASTROESOPHAGEAL REFLUX DISEASE: Status: ACTIVE | Noted: 2020-01-28

## 2020-01-28 PROBLEM — R39.9 URINARY TRACT INFECTION SYMPTOMS: Status: ACTIVE | Noted: 2020-01-28

## 2020-01-28 PROCEDURE — 97110 THERAPEUTIC EXERCISES: CPT | Performed by: PHYSICAL THERAPIST

## 2020-01-28 ASSESSMENT — ENCOUNTER SYMPTOMS
ABDOMINAL PAIN: 0
CONSTIPATION: 0
NAUSEA: 1
VOMITING: 1
BLOOD IN STOOL: 0
DIARRHEA: 0
COUGH: 0
CHEST TIGHTNESS: 0
SORE THROAT: 0
RHINORRHEA: 0
WHEEZING: 0
SINUS PRESSURE: 0
SHORTNESS OF BREATH: 1

## 2020-01-28 NOTE — FLOWSHEET NOTE
[facility] [address] [place] [address] [facility]    [facility] Therapy Daily Treatment Note  Date:  2020    Patient Name:  Benedict Fontenot    :  1965  MRN: [id number]  Restrictions/Precautions:    Medical/Treatment Diagnosis Information:  · Diagnosis: M70.61, M70.62 (ICD-10-CM) - Trochanteric bursitis of both hips  · Treatment Diagnosis: M54.5, M70.71, M70.72, M25.551, M25.552; leading to impaired ADLs and IADLs  Insurance/Certification information:  PT Insurance Information: PT BENEFITS  [facility] [employer] %/30 VPCY/ AUTH REQUIRED ONLY AFTER 30TH VISIT / 20 PAG  Physician Information:  Referring Practitioner: Kyler Hayward. Antonio Duarte MD  Has the plan of care been signed (Y/N):        []  Yes  [x]  No     Date of Patient follow up with Physician: Not scheduled at this time      Is this a Progress Report:     []  Yes  [x]  No        If Yes:  Date Range for reporting period:  Beginning  Ending     Progress report will be due (10 Rx or 30 days whichever is less):       Recertification will be due (POC Duration  / 90 days whichever is less): 3/6/20         Visit # Insurance Allowable Auth Required   6 30 []  Yes [x]  No        Functional Scale:    Date assessed:    WOMAC 64/96=66.7% deficit   20     Latex Allergy:  [x]NO      []YES  Preferred Language for Healthcare:   [x]English       []other:      Pain level:  8/10     SUBJECTIVE:   present. Pt states that her hips are feeling better, she has only faint pain in the L hip. Pt states she saw another MD yesterday for her knees and got 2 injections    OBJECTIVE:              ROM PROM AROM Comments     Left Right Left Right     Flexion 110 90 60 60 P! And stretch   Extension         Not assessed.  Pt unable to lay prone and unable to stand   Abduction         Not assessed   Adduction         Not assessed   ER 45 35     stretch   IR 10 20                          Flexibility Left Right Comments Hamstrings SLR 75 SLR 75 P! And stretch   ITB (Obers test)     Not assessed- can't lay on side   Hip flexor(Afshin test)     Not assessed   gastroc + tightness + tightness Not assessed-can't lay prone   Rectus femoris(Elys test)                      Special  Test Left Right Comments   FABERS         Scour test         Impingement test         Trendelenburg test     Not assessed- can't perform SLS                Strength Left Right Comments   Hip flexors 3+/5 3+/5 Back p! Hip extension 3+/5 3+/5 Back p! Hip abduction 3+/5 3+/5 Back and hip p! Hip adduction 3+/5 3+/5     Hip ER 3+/5 3+/5     Hip IR 3+/5 3+/5     Quads 3+/5 3+/5 Back p! Hamstrings 3+/5 3+/5        Joint mobility: Not assessed              []?Normal                       []?Hypo              []?Hyper     Palpation: generalized TTP around lateral hip     Functional Mobility/Transfers: Independent but with modifications.     Posture: Seated posture WNL. Forward flexed posture when standing     Gait: Not assessed, pt utilizes WC and can not stand independently     Dermatomes: sensation equal B, though progressive decreased in sensation from proximal to distal from L1-5.  No sensation at S1-2      RESTRICTIONS/PRECAUTIONS:     Exercises/Interventions:     ROM/STRETCHES     Seated hamstring  3x30\" B    Incline calf 3x30\"    Supine piriformis     Seated figure 4 3x30\" B    Standing hip flexion    HEP    ITB     Butterfly     Hip flexor stretch kneeling     PREs     Glute sets 10x10\"    ADD sets 10x10\" foam roll    Quad sets    TA brace    Hooklying heel lift  Lift foot 1-2\" from table                  LAQ 3x10 B, 3#    Clamshells 3x12, blue tied band Seated, back supported   Bridges 3x10 DL    Step ups 2x10 L2, B         Balance     DLS, FT, EO Tandem stance               Manual interventions            Plan for next session: progress as tolerated, SLR    Therapeutic Exercise and NMR EXR  [x] (23999) Provided verbal/tactile cueing for activities related to strengthening, flexibility, endurance, ROM for improvements in LE, proximal hip, and core control with self care, mobility, lifting, ambulation.  [] (93664) Provided verbal/tactile cueing for activities related to improving balance, coordination, kinesthetic sense, posture, motor skill, proprioception  to assist with LE, proximal hip, and core control in self care, mobility, lifting, ambulation and eccentric single leg control. NMR and Therapeutic Activities:    [] (20814 or 21018) Provided verbal/tactile cueing for activities related to improving balance, coordination, kinesthetic sense, posture, motor skill, proprioception and motor activation to allow for proper function of core, proximal hip and LE with self care and ADLs  [] (26892) Gait Re-education- Provided training and instruction to the patient for proper LE, core and proximal hip recruitment and positioning and eccentric body weight control with ambulation re-education including up and down stairs     Home Exercise Program:    [x] (28019) Reviewed/Progressed HEP activities related to strengthening, flexibility, endurance, ROM of core, proximal hip and LE for functional self-care, mobility, lifting and ambulation/stair navigation   [] (56737)Reviewed/Progressed HEP activities related to improving balance, coordination, kinesthetic sense, posture, motor skill, proprioception of core, proximal hip and LE for self care, mobility, lifting, and ambulation/stair navigation      Manual Treatments:    [] (72178) Provided manual therapy to mobilize LE, proximal hip and/or LS spine soft tissue/joints for the purpose of modulating pain, promoting relaxation,  increasing ROM, reducing/eliminating soft tissue swelling/inflammation/restriction, improving soft tissue extensibility and allowing for proper ROM for normal function with self care, mobility, lifting and ambulation.      Modalities:      Charges:  Timed Code Treatment Minutes: 25   Total Treatment Minutes: 25   Time in: 8:35  Time out: 9:00    [] EVAL (LOW) 48270 (typically 20 minutes face-to-face)  [] EVAL (MOD) 42270 (typically 30 minutes face-to-face)  [] EVAL (HIGH) 15075 (typically 45 minutes face-to-face)  [] RE-EVAL     [x] XT(61762) x 2     [] IONTO  [] NMR (01695) x      [] VASO  [] Manual (40972) x      [] Other:  [] TA x      [] Mech Traction (75589)  [] ES(attended) (34171)      [] ES (un) (49004):     GOALS:   Patient stated goal: decrease pain, get better, get stronger   []? Progressing: []? Met: []? Not Met: []? Adjusted     Therapist goals for Patient:   Short Term Goals: To be achieved in: 4 weeks 2/7/20  1. Independent in HEP and progression per patient tolerance, in order to prevent re-injury. []? Progressing: []? Met: []? Not Met: []? Adjusted   2. Patient will have a decrease in pain to facilitate improvement in movement, function, and ADLs as indicated by Functional Deficits. []? Progressing: []? Met: []? Not Met: []? Adjusted      Long Term Goals: To be achieved in: 8 weeks 3/6/20  1. Disability index score of 33% or less for the [facility] to assist with reaching prior level of function. []? Progressing: []? Met: []? Not Met: []? Adjusted  2. Patient will demonstrate increased AROM to [facility] to allow for proper joint functioning as indicated by patients Functional Deficits. []? Progressing: []? Met: []? Not Met: []? Adjusted  3. Patient will demonstrate an increase in BLE strength to 4/5 or greater to allow for proper functional mobility as indicated by patients Functional Deficits. []? Progressing: []? Met: []? Not Met: []? Adjusted  4. Patient will return to ADLs, IADLs and functional activities without increased symptoms or restriction. []? Progressing: []? Met: []? Not Met: []? Adjusted      Overall Progression Towards Functional goals/ Treatment Progress Update:  [] Patient is progressing as expected towards functional goals listed.     [] Progression is slowed due to complexities/Impairments listed. [] Progression has been slowed due to co-morbidities. [x] Plan just implemented, too soon to assess goals progression <30days   [] Goals require adjustment due to lack of progress  [] Patient is not progressing as expected and requires additional follow up with physician  [] Other    Prognosis for POC: [x] Good [] Fair  [] Poor      Patient requires continued skilled intervention: [x] Yes  [] No    Treatment/Activity Tolerance:  [x] Patient able to complete treatment  [] Patient limited by fatigue  [] Patient limited by pain     [] Patient limited by other medical complications  [x] Other: Pt noted some R knee pain/pressure with LAQ this date and reported that it was more challenging compared to the L knee. Full session not completed as pt's ride service arrived earlier than they were scheduled and they do not wait. Transportation has been inconsistent for the pt's last several sessions. It is the primary restraint to progression of program at this time as full sessions are unable to be completed. Pt requires PT follow up to address ROM, strength and functional mobility deficits. PLAN: See eval  [x] Continue per plan of care [] Alter current plan (see comments above)   [] Plan of care initiated [] Hold pending MD visit [] Discharge    Electronically signed by:  Nitish Hughes, PT, DPT  Physical Therapist  [id number]  [email or web address]      Note: If patient does not return for scheduled/ recommended follow up visits, this note will serve as a discharge from care along with most recent update on progress.

## 2020-01-29 ENCOUNTER — HOSPITAL ENCOUNTER (OUTPATIENT)
Dept: PHYSICAL THERAPY | Age: 55
Setting detail: THERAPIES SERIES
Discharge: HOME | End: 2020-01-29
Payer: COMMERCIAL

## 2020-01-29 ENCOUNTER — TELEPHONE (OUTPATIENT)
Dept: ORTHOPEDIC SURGERY | Age: 55
End: 2020-01-29

## 2020-01-29 ENCOUNTER — TELEPHONE (OUTPATIENT)
Dept: PRIMARY CARE CLINIC | Age: 55
End: 2020-01-29

## 2020-01-29 PROCEDURE — 97161 PT EVAL LOW COMPLEX 20 MIN: CPT | Performed by: PHYSICAL THERAPIST

## 2020-01-29 PROCEDURE — 97110 THERAPEUTIC EXERCISES: CPT | Performed by: PHYSICAL THERAPIST

## 2020-01-29 NOTE — PLAN OF CARE
The [address] [place] [address] and [facility]       Physical Therapy Certification    Dear Referring Practitioner: Dr. Marilynn Ríos,    We had the pleasure of evaluating the following patient for physical therapy services at [address] [place] [address]. A summary of our findings can be found in the initial assessment below. This includes our plan of care. If you have any questions or concerns regarding these findings, please do not hesitate to contact me at the office phone number checked above. Thank you for the referral.       Physician Signature:_______________________________Date:__________________  By signing above (or electronic signature), therapists plan is approved by physician      Patient: Evelyne Banerjee   : 1965   MRN: [id number]  Referring Physician: Referring Practitioner: Dr. Marilynn Ríos      Evaluation Date: 2020      Medical Diagnosis Information:  Diagnosis: D47.970 (ICD-10-CM) - Other spondylosis, cervical region   Treatment Diagnosis: M54.2, R53.1 leading to impaired ADLs and IADLs                                         Insurance information: PT Insurance Information: PT BENEFITS 2020 FACILITY/ [facility]/ %/30 VPCY/ AUTH REQUIRED ONLY AFTER 30TH VISIT / 20 PAG    Precautions/ Contra-indications:   Latex Allergy:  [x]NO      []YES  Preferred Language for Healthcare:   [x]English       []other:    SUBJECTIVE: Patient stated complaint: Pt presents to PT with neck pain. Pt states that the pain began 6 months ago, pt does not recall a specific injury or event that caused the pain. Of note she has had B shoulder pain for 1 year, L > R. Pt states that she does get cortisone injections for her shoulders as she has arthritis, states she can get them every 3 months but does not always get them that frequently. Pt states that her pain is in the center of the neck and above both shoulders.  Pt states that she has pain that goes down along her shoulder Elbow extension and/or wrist flexion  (C7) 4/5 4/5    Thumb extension and/or ulnar deviation ((C8)   Not assessed   Abduction and /or adduction of hand intrinsics (T1)   Not assessed   Shoulder ER 3+/5 3+/5    Shoulder IR 4-/5 4-/5          DTR  Not assessed L R Comments   Biceps (C5,C6)      Brachioradialis (C5,C6)      Triceps (C7,C8)                Special Tests Results Comments   Foraminal compression test  -    Distraction test -    Upper limb tension test  NPV   Shoulder abduction(relief) test   NPV   Vertebral artery test   NPV   Valsalva test  NPV   Joint play assessments  Hypo mobile R lateral glides  Extension     Dermatomes:  Equal sensation: C1-3, C6-8  Decreased sensation: C4-5 on L    Joint mobility:    []Normal    [x]Hypo   []Hyper    Palpation: TTP TP of C2-7. B 1st and 2nd rib    Functional Mobility/Transfers: Independent    Posture: Slightly rounded shoulders and forward head    Gait: Use of WB                       [x] Patient history, allergies, meds reviewed. Medical chart reviewed. See intake form. Review Of Systems (ROS):  [x]Performed Review of systems (Integumentary, CardioPulmonary, Neurological) by intake and observation. Intake form has been scanned into medical record. Patient has been instructed to contact their primary care physician regarding ROS issues if not already being addressed at this time.       Co-morbidities/Complexities (which will affect course of rehabilitation):  []None           Arthritic conditions   []Rheumatoid arthritis (M05.9)  [x]Osteoarthritis (M19.91)   Cardiovascular conditions   []Hypertension (I10)  []Hyperlipidemia (E78.5)  []Angina pectoris (I20)  []Atherosclerosis (I70)   Musculoskeletal conditions   [x]Disc pathology   []Congenital spine pathologies   [x]Prior surgical intervention  []Osteoporosis (M81.8)  []Osteopenia (M85.8)   Endocrine conditions   []Hypothyroid (E03.9)  []Hyperthyroid Gastrointestinal conditions   []Constipation (K59.00) Metabolic conditions   []Morbid obesity (E66.01)  []Diabetes type 1(E10.65) or 2 (E11.65)   []Neuropathy (G60.9)     Pulmonary conditions   []Asthma (J45)  []Coughing   []COPD (J44.9)   Psychological Disorders  []Anxiety (F41.9)  []Depression (F32.9)   []Other:   []Other:          Barriers to/and or personal factors that will affect rehab potential:              []Age  []Sex              []Motivation/Lack of Motivation                        [x]Co-Morbidities              []Cognitive Function, education/learning barriers              []Environmental, home barriers              []profession/work barriers  []past PT/medical experience  []other:  Justification: Pt has several medication conditions but she wants to get better    Falls Risk Assessment (30 days): Increased risk d/t poor balance and gait tolerance that requires use of AD. [x] Falls Risk assessed and no intervention required.   [] Falls Risk assessed and Patient requires intervention due to being higher risk   TUG score (>12s at risk):     [] Falls education provided, including     ASSESSMENT:    Functional Impairments:     [x]Noted cervical/thoracic/GHJ joint hypomobility   []Noted cervical/thoracic/GHJ joint hypermobility   [x]Decreased cervical/UE functional ROM   []Noted Headache pain aggravated by neck movements with/without dizziness   []Abnormal reflexes/sensation/myotomal/dermatomal deficits   []Decreased DCF control or ability to hold head up   [x]Decreased RC/scapular/core strength and neuromuscular control    [x]Decreased UE functional strength   []other:      Functional Activity Limitations (from functional questionnaire and intake)   [x]Reduced ability to tolerate prolonged functional positions   []Reduced ability or difficulty with changes of positions or transfers between positions   [x]Reduced ability to maintain good posture and demonstrate good body mechanics with sitting, bending, and lifting   [] Reduced ability or tolerance with driving and/or computer work   []Reduced ability to perform lifting, reaching, carrying tasks   []Reduced ability to concentrate   [x]Reduced ability to sleep    []Reduced ability to tolerate any impact through UE or spine   []Reduced ability to ambulate prolonged functional periods/distances   []other:    Participation Restrictions   [x]Reduced participation in self care activities   [x]Reduced participation in home management activities   []Reduced participation in work activities   [x]Reduced participation in social activities. []Reduced participation in sport/recreational activities. Classification/Subgrouping:   [x]signs/symptoms consistent with neck pain with mobility deficits     []signs/symptoms consistent with neck pain with movement coordinated impairments    []signs/symptoms consistent with neck pain with radiating pain    []signs/symptoms consistent with neck pain with headaches (cervicogenic)    [x]Signs/symptoms consistent with nerve root involvement including myotome & dermatome dysfunction   []sign/symptoms consistent with facet dysfunction of cervical and thoracic spine    []signs/symptoms consistent suggesting central cord compression/UMN syndromes   []signs/symptoms consistent with discogenic cervical pain   [x]signs/symptoms consistent with rib dysfunction   []signs/symptoms consistent with postural dysfunction   [x]signs/symptoms consistent with shoulder pathology    []signs/symptoms consistent with post-surgical status including decreased ROM, strength and function.    []signs/symptoms consistent with pathology which may benefit from Dry Needling   []signs/symptoms which may limit the use of advanced manual therapy techniques: (Hypertension, recent trauma, intolerance to end range positions, prior TIA, visual issues, UE myotomes loss )     Prognosis/Rehab Potential:      []Excellent   []Good    [x]Fair   []Poor    Tolerance of evaluation/treatment: modification, progression of HEP. HEP instruction:   Initiated 1/29/20. Printed hand out given. Pt demonstrated proper form of each exercise and expressed verbal understanding of frequency and duration. GOALS:   Patient stated goal: get better    [] Progressing: [] Met: [] Not Met: [] Adjusted     Therapist goals for Patient:   Short Term Goals: To be achieved in: 2 weeks  1. Independent in HEP and progression per patient tolerance, in order to prevent re-injury. [] Progressing: [] Met: [] Not Met: [] Adjusted   2. Patient will have a decrease in pain to facilitate improvement in movement, function, and ADLs as indicated by Functional Deficits. [] Progressing: [] Met: [] Not Met: [] Adjusted    Long Term Goals: To be achieved in: 8 weeks  1. Disability index score of 20% or less for the NDI to assist with reaching prior level of function. [] Progressing: [] Met: [] Not Met: [] Adjusted  2. Patient will demonstrate increased AROM to WNL of cervical/thoracic spine to allow for proper joint functioning as indicated by patients Functional Deficits. [] Progressing: [] Met: [] Not Met: [] Adjusted  Patient will demonstrate an increase in postural awareness and control and activation of  Deep cervical stabilizers to allow for proper functional mobility as indicated by patients Functional Deficits. [] Progressing: [] Met: [] Not Met: [] Adjusted  4. Patient will return to ADLs, IADLs and functional activities without increased symptoms or restriction. [] Progressing: [] Met: [] Not Met: [] Adjusted  5. Patient will be able to swim without neck pain. [] Progressing: [] Met: [] Not Met: [] Adjusted      Electronically signed by:  Berry Brunner, PT, DPT  Physical Therapist  [id number]  [email or web address]      Note: If patient does not return for scheduled/ recommended follow up visits, this note will serve as a discharge from care along with most recent update on progress.

## 2020-01-29 NOTE — FLOWSHEET NOTE
The [address] [place] [address] [facility]      Physical Therapy Daily Treatment Note  Date:  2020    Patient Name:  Jason Lema    :  1965  MRN: [id number]  Restrictions/Precautions:    Medical/Treatment Diagnosis Information:  Diagnosis: M47.892 (ICD-10-CM) - Other spondylosis, cervical region  Treatment Diagnosis: M54.2, R53.1 leading to impaired ADLs and IADLs  Insurance/Certification information:  PT Insurance Information: PT BENEFITS  FACILITY/ [facility]/ %/30 VPCY/ AUTH REQUIRED ONLY AFTER 30TH VISIT / 20 PAG  Physician Information:  Referring Practitioner: Dr. Irma Matthew  Has the plan of care been signed (Y/N):        []  Yes  [x]  No     Date of Patient follow up with Physician:       Is this a Progress Report:     []  Yes  [x]  No        If Yes:  Date Range for reporting period:  Beginning  Ending    Progress report will be due (10 Rx or 30 days whichever is less):  84      Recertification will be due (POC Duration  / 90 days whichever is less):  3/29/20      Visit # Insurance Allowable Auth Required   1 30 []  Yes [x]  No        Functional Scale:    Date assessed:   Complete at [facility].  Pt's transportation arrived before she was able to complete paperwork    Latex Allergy:  [x]NO      []YES  Preferred Language for Healthcare:   [x]English       []other:    Pain level:  7/10     SUBJECTIVE:  See eval    OBJECTIVE:         ROM AROM Comments   Flexion 30 p!  40 end range     Extension 25!  35 end range     Side bend R 35     Side bend L 30     Rotation R 55     Rotation L 47     Shoulder AROM clearing Seated: 90 deg flex/ABD                   Strength L R Comments   Neck flexion (C1-2)     4-/5   Neck side bend (C3) 4-/5 4-/5     Shoulder elevation (C4) 4-/5 4-/5     Shoulder abduction (C5) 4-/5 4-/5     Elbow flexion and /or wrist extension  (C6) 4/5 4/5     Elbow extension and/or wrist flexion  (C7) 4/5 4/5     Thumb extension and/or ulnar None    Charges:  Timed Code Treatment Minutes: 30   Total Treatment Minutes: 56   Time in: 8:45  Time out: 9:41    [x] EVAL (LOW) 09584 (typically 20 minutes face-to-face)  [] EVAL (MOD) 70134 (typically 30 minutes face-to-face)  [] EVAL (HIGH) 24060 (typically 45 minutes face-to-face)  [] RE-EVAL     [x] SV(20465) x 2    [] IONTO  [] NMR (47266) x     [] VASO  [] Manual (10548) x      [] Other:  [] TA x      [] Mech Traction (11479)  [] ES(attended) (28295)      [] ES (un) (69101):     GOALS:  Patient stated goal: get better    []? Progressing: []? Met: []? Not Met: []? Adjusted      Therapist goals for Patient:   Short Term Goals: To be achieved in: 2 weeks 2/12/20  1. Independent in HEP and progression per patient tolerance, in order to prevent re-injury. []? Progressing: []? Met: []? Not Met: []? Adjusted   2. Patient will have a decrease in pain to facilitate improvement in movement, function, and ADLs as indicated by Functional Deficits. []? Progressing: []? Met: []? Not Met: []? Adjusted     Long Term Goals: To be achieved in: 8 weeks 3/25/20  1. Disability index score of 20% or less for the NDI to assist with reaching prior level of function. []? Progressing: []? Met: []? Not Met: []? Adjusted  2. Patient will demonstrate increased AROM to WNL of cervical/thoracic spine to allow for proper joint functioning as indicated by patients Functional Deficits. []? Progressing: []? Met: []? Not Met: []? Adjusted  Patient will demonstrate an increase in postural awareness and control and activation of  Deep cervical stabilizers to allow for proper functional mobility as indicated by patients Functional Deficits. []? Progressing: []? Met: []? Not Met: []? Adjusted  4. Patient will return to ADLs, IADLs and functional activities without increased symptoms or restriction. []? Progressing: []? Met: []? Not Met: []? Adjusted  5. Patient will be able to swim without neck pain. []? Progressing: []?  Met: []? Not Met: []? Adjusted       Overall Progression Towards Functional goals/ Treatment Progress Update:  [] Patient is progressing as expected towards functional goals listed. [] Progression is slowed due to complexities/Impairments listed. [] Progression has been slowed due to co-morbidities. [x] Plan just implemented, too soon to assess goals progression <30days   [] Goals require adjustment due to lack of progress  [] Patient is not progressing as expected and requires additional follow up with physician  [] Other    Prognosis for POC: [x] Good [] Fair  [] Poor      Patient requires continued skilled intervention: [x] Yes  [] No    Treatment/Activity Tolerance:  [x] Patient able to complete treatment  [] Patient limited by fatigue  [] Patient limited by pain     [] Patient limited by other medical complications  [] Other:                  PLAN: See eval  [] Continue per plan of care [] Alter current plan (see comments above)  [x] Plan of care initiated [] Hold pending MD visit [] Discharge      Electronically signed by:  Angelina Porras, PT, DPT  Physical Therapist  [id number]  [email or web address]    Note: If patient does not return for scheduled/ recommended follow up visits, this note will serve as a discharge from care along with most recent update on progress.

## 2020-01-30 ENCOUNTER — TELEPHONE (OUTPATIENT)
Dept: ORTHOPEDIC SURGERY | Age: 55
End: 2020-01-30

## 2020-01-30 ENCOUNTER — HOSPITAL ENCOUNTER (OUTPATIENT)
Dept: PHYSICAL THERAPY | Age: 55
Setting detail: THERAPIES SERIES
Discharge: HOME | End: 2020-01-30
Payer: COMMERCIAL

## 2020-01-30 PROCEDURE — 97530 THERAPEUTIC ACTIVITIES: CPT | Performed by: PHYSICAL THERAPIST

## 2020-01-30 PROCEDURE — 97110 THERAPEUTIC EXERCISES: CPT | Performed by: PHYSICAL THERAPIST

## 2020-01-30 PROCEDURE — 97112 NEUROMUSCULAR REEDUCATION: CPT | Performed by: PHYSICAL THERAPIST

## 2020-01-30 RX ORDER — OXYCODONE HYDROCHLORIDE AND ACETAMINOPHEN 5; 325 MG/1; MG/1
1 TABLET ORAL EVERY 8 HOURS PRN
Qty: 15 TABLET | Refills: 0 | Status: SHIPPED | OUTPATIENT
Start: 2020-01-30 | End: 2020-02-17 | Stop reason: SDUPTHER

## 2020-01-30 RX ORDER — CYANOCOBALAMIN (VITAMIN B-12) 500 MCG
1 TABLET ORAL DAILY
Qty: 30 TABLET | Refills: 5 | Status: SHIPPED | OUTPATIENT
Start: 2020-01-30 | End: 2021-06-15

## 2020-01-30 NOTE — FLOWSHEET NOTE
Hip flexor(Afshin test)     Not assessed   gastroc + tightness + tightness Not assessed-can't lay prone   Rectus femoris(Elys test)                      Special  Test Left Right Comments   FABERS         Scour test         Impingement test         Trendelenburg test     Not assessed- can't perform SLS                Strength Left Right Comments   Hip flexors 3+/5 3+/5 Back p! Hip extension 3+/5 3+/5 Back p! Hip abduction 3+/5 3+/5 Back and hip p! Hip adduction 3+/5 3+/5     Hip ER 3+/5 3+/5     Hip IR 3+/5 3+/5     Quads 3+/5 3+/5 Back p! Hamstrings 3+/5 3+/5        Joint mobility: Not assessed              []?Normal                       []?Hypo              []?Hyper     Palpation: generalized TTP around lateral hip     Functional Mobility/Transfers: Independent but with modifications.     Posture: Seated posture WNL. Forward flexed posture when standing     Gait: Not assessed, pt utilizes WC and can not stand independently     Dermatomes: sensation equal B, though progressive decreased in sensation from proximal to distal from L1-5.  No sensation at S1-2      RESTRICTIONS/PRECAUTIONS:     Exercises/Interventions:     ROM/STRETCHES     Seated hamstring  3x30\" B    Incline calf 3x30\"    Supine piriformis     Seated figure 4 3x30\" B    Standing hip flexion    HEP    ITB     Butterfly     Hip flexor stretch kneeling     PREs     Glute sets 10x10\" Seated   ADD sets 10x10\" foam roll Seated   Quad sets    TA brace    Hooklying heel lift  Lift foot 1-2\" from table                  LAQ 3x10 B, 3#    Clamshells  Seated, back supported   Bridges     Step ups 2x10 L2, B         Balance     DLS, FT, EO [id number]\"[id number] stance 2x30\" B              Manual interventions            Plan for next session: progress as tolerated, recumbent bike, leg press    Therapeutic Exercise and NMR EXR  [x] (91828) Provided verbal/tactile cueing for activities related to strengthening, flexibility, endurance, ROM for improvements in LE,

## 2020-01-30 NOTE — FLOWSHEET NOTE
extension and/or ulnar deviation ((C8)     Not assessed   Abduction and /or adduction of hand intrinsics (T1)     Not assessed   Shoulder ER 3+/5 3+/5     Shoulder IR 4-/5 4-/5               DTR  Not assessed L R Comments   Biceps (C5,C6)         Brachioradialis (C5,C6)         Triceps (C7,C8)                         Special Tests Results Comments   Foraminal compression test  -     Distraction test -     Upper limb tension test   NPV   Shoulder abduction(relief) test    NPV   Vertebral artery test    NPV   Valsalva test   NPV   Joint play assessments  Hypo mobile R lateral glides  Extension      Dermatomes:  Equal sensation: C1-3, C6-8  Decreased sensation: C4-5 on L     Joint mobility:               []?Normal                      [x]? Hypo              []?Hyper     Palpation: TTP TP of C2-7.  B 1st and 2nd rib     Functional Mobility/Transfers: Independent     Posture: Slightly rounded shoulders and forward head     Gait: Use of WB    RESTRICTIONS/PRECAUTIONS:     Exercises/Interventions:   Exercise/Equipment Resistance/Repetitions Other comments   Stretching/PROM     3 finger rotation x15 rotation    3 finger retraction 10x5\"    UT side bend stretch     Levater scap stretch     Scalene stretch     Pectoral stretch     Table slide 10x10\" flex         Isometrics     Retraction     shrugs     Cervical Flexion      Cervical Extension     Cervical sidebending               PRE's     External Rotation     Internal Rotation     Serratus     Biceps     Triceps     Shrugs     Horizontal Abd with ER     Reverse Flys     EXT     Flexion     Retraction x10         Cable Column/Theraband     Scapular Retraction     External Rotation     Internal Rotation     Ext     TRIC     Lats     Shrugs     Flex     BIC     W ER 2x10 red               Manual Intervention      Cerv mobs/manip      Thoracic mobs/manip      CT manip      Rib mobilizations        Therapeutic Exercise and NMR EXR  [x] (00726) Provided verbal/tactile cueing for activities related to strengthening, flexibility, endurance, ROM  for improvements in cervical, postural, scapular, scapulothoracic and UE control with self care, reaching, carrying, lifting, house/yardwork, driving/computer work.    [] (04236) Provided verbal/tactile cueing for activities related to improving balance, coordination, kinesthetic sense, posture, motor skill, proprioception  to assist with cervical, scapular, scapulothoracic and UE control with self care, reaching, carrying, lifting, house/yardwork, driving/computer work. Therapeutic Activities:    [] (46982 or 67445) Provided verbal/tactile cueing for activities related to improving balance, coordination, kinesthetic sense, posture, motor skill, proprioception and motor activation to allow for proper function of cervical, scapular, scapulothoracic and UE control with self care, carrying, lifting, driving/computer work. Home Exercise Program:  Pt access code: Helen Salazar  Initiated 1/29/20. Printed hand out given. Pt demonstrated proper form of each exercise and expressed verbal understanding of frequency and duration.     [x] (59285) Reviewed/Progressed HEP activities related to strengthening, flexibility, endurance, ROM of cervical, scapular, scapulothoracic and UE control with self care, reaching, carrying, lifting, house/yardwork, driving/computer work  [] (26149) Reviewed/Progressed HEP activities related to improving balance, coordination, kinesthetic sense, posture, motor skill, proprioception of cervical, scapular, scapulothoracic and UE control with self care, reaching, carrying, lifting, house/yardwork, driving/computer work      Manual Treatments:    [] (22457) Provided manual therapy to mobilize soft tissue/joints of cervical/CT, scapular GHJ and UE for the purpose of decreasing headache, modulating pain, promoting relaxation,  increasing ROM, reducing/eliminating soft tissue swelling/inflammation/restriction, improving soft tissue

## 2020-02-03 ENCOUNTER — OFFICE VISIT (OUTPATIENT)
Dept: SURGERY | Age: 55
End: 2020-02-03
Payer: COMMERCIAL

## 2020-02-03 VITALS
BODY MASS INDEX: 34.71 KG/M2 | WEIGHT: 229 LBS | TEMPERATURE: 97.6 F | HEIGHT: 68 IN | DIASTOLIC BLOOD PRESSURE: 78 MMHG | SYSTOLIC BLOOD PRESSURE: 119 MMHG

## 2020-02-03 PROCEDURE — 99203 OFFICE O/P NEW LOW 30 MIN: CPT | Performed by: SURGERY

## 2020-02-03 PROCEDURE — G8482 FLU IMMUNIZE ORDER/ADMIN: HCPCS | Performed by: SURGERY

## 2020-02-03 PROCEDURE — G8427 DOCREV CUR MEDS BY ELIG CLIN: HCPCS | Performed by: SURGERY

## 2020-02-03 PROCEDURE — 3017F COLORECTAL CA SCREEN DOC REV: CPT | Performed by: SURGERY

## 2020-02-03 PROCEDURE — 1036F TOBACCO NON-USER: CPT | Performed by: SURGERY

## 2020-02-03 PROCEDURE — G8417 CALC BMI ABV UP PARAM F/U: HCPCS | Performed by: SURGERY

## 2020-02-03 NOTE — PROGRESS NOTES
Department of General Surgery - Adult  General Surgery Service  Resident Office Note      CHIEF COMPLAINT:  Right upper quadrant and nausea    HISTORY OF PRESENT ILLNESS:  This is a 53 y.o. female with Hx of a laparoscopic sleeve gastrectomy in October 2018 who presents with nausea and RUQ pain since the surgery. Patient states that she always has nausea when she eats. The pain is usually in the morning and not associated with meals. She was given Reglan which she takes before each meal and which she states helps with the nausea. She also had a RUQ ultrasound performed to further evaluate the pain on 1/24/2020 and it showed a distended gallbladder with cholelithiasis. Past Medical History:        Diagnosis Date    Arthritis     TAN (dyspnea on exertion)     Gastroesophageal reflux disease 1/28/2020    Hyperlipidemia     Hypertension     Morbid obesity with body mass index (BMI) of 60.0 to 69.9 in adult ([address] 7/2/2018    Neuropathy     SHYANN (obstructive sleep apnea)     Primary osteoarthritis of right knee 7/2/2018    Type 2 diabetes mellitus without complication ([address]     controlled with diet    Urinary incontinence        Past Surgical History:           Procedure Laterality Date    BACK SURGERY      2001    BREAST LUMPECTOMY Bilateral     2015    DILATION AND CURETTAGE OF UTERUS N/A 08/30/2018    GASTRIC BYPASS SURGERY      SLEEVE GASTRECTOMY  10/13/2018       Allergies:  Patient has no known allergies. Medications:   Home Meds  Current Outpatient Medications on File Prior to Visit   Medication Sig Dispense Refill    vitamin D-3 (CHOLECALCIFEROL) 125 MCG (5000 UT) TABS Take 1 tablet by mouth daily 30 tablet 5    Cyanocobalamin (VITAMIN B 12) 500 MCG TABS Take 1 tablet by mouth daily 30 tablet 5    oxyCODONE-acetaminophen (PERCOCET) 5-325 MG per tablet Take 1 tablet by mouth every 8 hours as needed (severe pain) for up to 5 days.  Take lowest dose possible to manage pain 15 tablet 0    Relation Age of Onset    Diabetes Mother     Stroke Mother     Osteoarthritis Mother     Heart Disease Father     Other Father     High Blood Pressure Father     Osteoarthritis Father     Diabetes Maternal Aunt     Cancer Maternal Aunt     Diabetes Maternal Grandmother     Cancer Paternal Aunt        Social History:   TOBACCO:   reports that she has never smoked. She has never used smokeless tobacco.  ETOH:   reports no history of alcohol use. DRUGS:   reports no history of drug use. REVIEW OF SYSTEMS:     Constitutional: Negative for chills and fever. Neurological: Negative for dizziness, speech difficulty, numbness. HENT: Negative for congestion, facial swelling, and voice change. Eyes: Negative for photophobia and visual disturbance. Respiratory: Negative for cough, chest tightness and shortness of breath. Cardiovascular: Negative for chest pain and palpitations. Genitourinary: Negative for difficulty urinating, dysuria, flank pain, frequency and hematuria. Musculoskeletal: Negative for new gait problem, joint swelling and myalgias. Skin: Negative for color change, pallor and rash. PHYSICAL EXAM:    Vitals:    02/03/20 1516   BP: 119/78   Temp: 97.6 °F (36.4 °C)       General appearance: alert, resting in bed  Neuro: A&Ox3, no focal deficits  HENT: trachea midline, no JVD, no LAD  Eyes: PERRLA, no scleral icterus  Chest/Lungs: CTAB, no crackles/rales, wheezes/rhonchi   Cardiovascular: RRR, no murmurs/gallops/rubs  Abdomen: soft, obese, non-tender, non-distended, negative barber's sign  Skin: warm and dry  Extremities: no edema, no cyanosis    ASSESSMENT AND PLAN:  This is a 53 y.o. female with Hx of a laparoscopic sleeve gastrectomy who presents with nausea and RUQ abdominal pain. The nausea and pain seem to be more likely related to gastroparesis as opposed to cholelithiasis. Recommend patient continue Reglan.  If her nausea persists then she should see her bariatric

## 2020-02-03 NOTE — LETTER
The [employer] of [address] [place] [address]  Phone: [phone]  Fax: [phone]    Dc Armas MD        February 5, 2020     Michelle Jaramillo MD  [address] [facility] [address]    Patient: Allyn Boggs  MR Number: [id number]  YOB: 1965  Date of Visit: 2/3/2020    Dear Dr. Michelle Jaramillo: Thank you for the request for consultation for Allyn Boggs to me for the evaluation of postprandial nausea. Below are the relevant portions of my assessment and plan of care. This is a 53 y.o. female with Hx of a laparoscopic sleeve gastrectomy who presents with nausea and RUQ abdominal pain. The nausea and pain seem to be more likely related to gastroparesis as opposed to cholelithiasis. Recommend patient continue Reglan. If her nausea persists then she should see her bariatric surgeon. If her work up is negative and her symptoms persists then she may benefit from a cholecystectomy in the future. If you have questions, please do not hesitate to call me. I look forward to following Amel along with you.     Sincerely,        Dc Armas MD

## 2020-02-04 ENCOUNTER — HOSPITAL ENCOUNTER (OUTPATIENT)
Dept: PHYSICAL THERAPY | Age: 55
Setting detail: THERAPIES SERIES
Discharge: HOME | End: 2020-02-04
Payer: COMMERCIAL

## 2020-02-04 PROCEDURE — 97110 THERAPEUTIC EXERCISES: CPT | Performed by: PHYSICAL THERAPIST

## 2020-02-04 PROCEDURE — 97530 THERAPEUTIC ACTIVITIES: CPT | Performed by: PHYSICAL THERAPIST

## 2020-02-04 NOTE — FLOWSHEET NOTE
and/or wrist flexion  (C7) 4/5 4/5     Thumb extension and/or ulnar deviation ((C8)     Not assessed   Abduction and /or adduction of hand intrinsics (T1)     Not assessed   Shoulder ER 3+/5 3+/5     Shoulder IR 4-/5 4-/5               DTR  Not assessed L R Comments   Biceps (C5,C6)         Brachioradialis (C5,C6)         Triceps (C7,C8)                         Special Tests Results Comments   Foraminal compression test  -     Distraction test -     Upper limb tension test  -    Shoulder abduction(relief) test   -    Vertebral artery test   Not assessed    Valsalva test  Not assessed    Joint play assessments  Hypo mobile R lateral glides  Extension      Dermatomes:  Equal sensation: C1-3, C6-8  Decreased sensation: C4-5 on L     Joint mobility:               []?Normal                      [x]? Hypo              []?Hyper     Palpation: TTP TP of C2-7.  B 1st and 2nd rib     Functional Mobility/Transfers: Independent     Posture: Slightly rounded shoulders and forward head     Gait: Use of WB    RESTRICTIONS/PRECAUTIONS:     Exercises/Interventions:   Exercise/Equipment Resistance/Repetitions Other comments   Stretching/PROM     3 finger rotation x15 rotation    3 finger retraction     UT side bend stretch 5x10\" B    Levater scap stretch     Scalene stretch     Pectoral stretch     Table slide 10x10\" flex         Isometrics     Retraction 10x10\" supine    shrugs     Cervical Flexion      Cervical Extension     Cervical sidebending               PRE's     External Rotation     Internal Rotation     Serratus     Biceps     Triceps     Shrugs     Horizontal Abd with ER     Reverse Flys     EXT     Flexion     Retraction x10         Cable Column/Theraband     Scapular Retraction     External Rotation     Internal Rotation     Ext     TRIC     Lats     Shrugs     Flex     BIC     W ER 3x10 red               Manual Intervention      Cerv mobs/manip      Thoracic mobs/manip      CT manip      Rib mobilizations cervical stabilizers to allow for proper functional mobility as indicated by patients Functional Deficits. []? Progressing: []? Met: []? Not Met: []? Adjusted  4. Patient will return to ADLs, IADLs and functional activities without increased symptoms or restriction. []? Progressing: []? Met: []? Not Met: []? Adjusted  5. Patient will be able to swim without neck pain. []? Progressing: []? Met: []? Not Met: []? Adjusted       Overall Progression Towards Functional goals/ Treatment Progress Update:  [] Patient is progressing as expected towards functional goals listed. [] Progression is slowed due to complexities/Impairments listed. [] Progression has been slowed due to co-morbidities. [x] Plan just implemented, too soon to assess goals progression <30days   [] Goals require adjustment due to lack of progress  [] Patient is not progressing as expected and requires additional follow up with physician  [] Other    Prognosis for POC: [x] Good [] Fair  [] Poor      Patient requires continued skilled intervention: [x] Yes  [] No    Treatment/Activity Tolerance:  [x] Patient able to complete treatment  [] Patient limited by fatigue  [] Patient limited by pain     [] Patient limited by other medical complications   [] Other: Pt tolerated supine retraction well, noted pain with initiation of exercise but it was tolerable and pain resolved as she continued reps. Good tolerance to UT stretch, pain free and reported feeling a good stretch. Pt no longer experiencing pain with scap retraction. Did report mild by by final reps of W ER. Pt requires PT follow up to address ROM, strength and functional mobility deficits. PLAN: See eval  [x] Continue per plan of care [] Alter current plan (see comments above)  [] Plan of care initiated [] Hold pending MD visit [] Discharge      Electronically signed by:  Eric Leahy, PT, DPT  Physical Therapist  [id number]  [email or web address]    Note: If patient does not

## 2020-02-04 NOTE — FLOWSHEET NOTE
[facility] [address] [place] [address] [facility]    [facility] Therapy Daily Treatment Note  Date:  2020    Patient Name:  Doroteo Guevara    :  1965  MRN: [id number]  Restrictions/Precautions:    Medical/Treatment Diagnosis Information:  · Diagnosis: M70.61, M70.62 (ICD-10-CM) - Trochanteric bursitis of both hips  · Treatment Diagnosis: M54.5, M70.71, M70.72, M25.551, M25.552; leading to impaired ADLs and IADLs  Insurance/Certification information:  PT Insurance Information: PT BENEFITS  FACILITY/ [facility]/ %/30 [facility]/ AUTH REQUIRED ONLY AFTER 30TH VISIT / 20 PAG  Physician Information:  Referring Practitioner: Bolivar Martinez. Juan Carlos Whitley MD  Has the plan of care been signed (Y/N):        []  Yes  [x]  No     Date of Patient follow up with Physician: Not scheduled at this time      Is this a Progress Report:     []  Yes  [x]  No        If Yes:  Date Range for reporting period:  Beginning  Ending     Progress report will be due (10 Rx or 30 days whichever is less): 34      Recertification will be due (POC Duration  / 90 days whichever is less): 3/6/20         Visit # Insurance Allowable Auth Required   8 30 []  Yes [x]  No        Functional Scale:    Date assessed:    WOMAC 64/96=66.7% deficit   1/10/20     Latex Allergy:  [x]NO      []YES  Preferred Language for Healthcare:   [x]English       []other:      Pain level:  8/10     SUBJECTIVE:   present. Pt states that her hips are feeling fine, but her knees hurt for 3 days after LPV, pt states that she stopped all exercises because of the pain, feeling better now. Pain is the front of the knee. OBJECTIVE:              ROM PROM AROM Comments     Left Right Left Right     Flexion 110 90 60 60 P! And stretch   Extension         Not assessed.  Pt unable to lay prone and unable to stand   Abduction         Not assessed   Adduction         Not assessed   ER 45 35     stretch   IR 10 20                        Flexibility Left Right Comments   Hamstrings SLR 75 SLR 75 P! And stretch   ITB (Obers test)     Not assessed- can't lay on side   Hip flexor(Afshin test)     Not assessed   gastroc + tightness + tightness Not assessed-can't lay prone   Rectus femoris(Elys test)                      Special  Test Left Right Comments   FABERS         Scour test         Impingement test         Trendelenburg test     Not assessed- can't perform SLS                Strength Left Right Comments   Hip flexors 3+/5 3+/5 Back p! Hip extension 3+/5 3+/5 Back p! Hip abduction 3+/5 3+/5 Back and hip p! Hip adduction 3+/5 3+/5     Hip ER 3+/5 3+/5     Hip IR 3+/5 3+/5     Quads 3+/5 3+/5 Back p! Hamstrings 3+/5 3+/5        Joint mobility: Not assessed              []?Normal                       []?Hypo              []?Hyper     Palpation: generalized TTP around lateral hip     Functional Mobility/Transfers: Independent but with modifications.     Posture: Seated posture WNL. Forward flexed posture when standing     Gait: Not assessed, pt utilizes WC and can not stand independently     Dermatomes: sensation equal B, though progressive decreased in sensation from proximal to distal from L1-5.  No sensation at S1-2      RESTRICTIONS/PRECAUTIONS:     Exercises/Interventions:     ROM/STRETCHES     Seated hamstring  3x30\" B    Incline calf 3x30\"    Supine piriformis     Seated figure 4 3x30\" B    Standing hip flexion    HEP    ITB     Butterfly     Hip flexor stretch kneeling     PREs     Glute sets Seated; HEP   ADD sets Seated; HEP   Quad sets    TA brace    Hooklying heel lift  Lift foot 1-2\" from table                  LAQ     Clamshells 3x15, blue tied band Seated, back supported   Bridges 3x15 DL    Step ups 2x10 L2, B         Quantum knee ext 3x10 DL 10# Range: 90-30   Quantum knee flex 3x10 DL 25# Range: available   Quantum leg press 3x10 # Range: 90-10        Balance     DLS, FT, EO 1G96\"[id number] stance Manual interventions            Plan for next session: progress as tolerated    Therapeutic Exercise and NMR EXR  [x] (61247) Provided verbal/tactile cueing for activities related to strengthening, flexibility, endurance, ROM for improvements in LE, proximal hip, and core control with self care, mobility, lifting, ambulation.  [] (83326) Provided verbal/tactile cueing for activities related to improving balance, coordination, kinesthetic sense, posture, motor skill, proprioception  to assist with LE, proximal hip, and core control in self care, mobility, lifting, ambulation and eccentric single leg control.      NMR and Therapeutic Activities:    [] (66529 or 63408) Provided verbal/tactile cueing for activities related to improving balance, coordination, kinesthetic sense, posture, motor skill, proprioception and motor activation to allow for proper function of core, proximal hip and LE with self care and ADLs  [] (22153) Gait Re-education- Provided training and instruction to the patient for proper LE, core and proximal hip recruitment and positioning and eccentric body weight control with ambulation re-education including up and down stairs     Home Exercise Program:    [x] (89564) Reviewed/Progressed HEP activities related to strengthening, flexibility, endurance, ROM of core, proximal hip and LE for functional self-care, mobility, lifting and ambulation/stair navigation   [] (66997)Reviewed/Progressed HEP activities related to improving balance, coordination, kinesthetic sense, posture, motor skill, proprioception of core, proximal hip and LE for self care, mobility, lifting, and ambulation/stair navigation      Manual Treatments:    [] (05783) Provided manual therapy to mobilize LE, proximal hip and/or LS spine soft tissue/joints for the purpose of modulating pain, promoting relaxation,  increasing ROM, reducing/eliminating soft tissue swelling/inflammation/restriction, improving soft tissue extensibility and allowing for proper ROM for normal function with self care, mobility, lifting and ambulation. Modalities:      Charges:  Timed Code Treatment Minutes: 53   Total Treatment Minutes: 53   Time in: 8:38  Time out: 10:01    [] EVAL (LOW) 24043 (typically 20 minutes face-to-face)  [] EVAL (MOD) 63820 (typically 30 minutes face-to-face)  [] EVAL (HIGH) 56099 (typically 45 minutes face-to-face)  [] RE-EVAL     [x] BA(00779) x 3     [] IONTO  [] NMR (58510) x       [] VASO  [] Manual (87088) x      [] Other:  [x] TA x  1    [] Mech Traction (93635)  [] ES(attended) (98301)      [] ES (un) (07174):     GOALS:   Patient stated goal: decrease pain, get better, get stronger   []? Progressing: []? Met: []? Not Met: []? Adjusted     Therapist goals for Patient:   Short Term Goals: To be achieved in: 4 weeks 2/7/20  1. Independent in HEP and progression per patient tolerance, in order to prevent re-injury. []? Progressing: []? Met: []? Not Met: []? Adjusted   2. Patient will have a decrease in pain to facilitate improvement in movement, function, and ADLs as indicated by Functional Deficits. []? Progressing: []? Met: []? Not Met: []? Adjusted      Long Term Goals: To be achieved in: 8 weeks 3/6/20  1. Disability index score of 33% or less for the [facility] to assist with reaching prior level of function. []? Progressing: []? Met: []? Not Met: []? Adjusted  2. Patient will demonstrate increased AROM to [facility] to allow for proper joint functioning as indicated by patients Functional Deficits. []? Progressing: []? Met: []? Not Met: []? Adjusted  3. Patient will demonstrate an increase in BLE strength to 4/5 or greater to allow for proper functional mobility as indicated by patients Functional Deficits. []? Progressing: []? Met: []? Not Met: []? Adjusted  4. Patient will return to ADLs, IADLs and functional activities without increased symptoms or restriction. []? Progressing: []? Met: []? Not Met: []?  Adjusted

## 2020-02-06 ENCOUNTER — HOSPITAL ENCOUNTER (OUTPATIENT)
Dept: PHYSICAL THERAPY | Age: 55
Setting detail: THERAPIES SERIES
Discharge: HOME | End: 2020-02-06
Payer: COMMERCIAL

## 2020-02-06 PROCEDURE — 97530 THERAPEUTIC ACTIVITIES: CPT | Performed by: PHYSICAL THERAPIST

## 2020-02-06 PROCEDURE — 97110 THERAPEUTIC EXERCISES: CPT | Performed by: PHYSICAL THERAPIST

## 2020-02-06 NOTE — FLOWSHEET NOTE
and/or ulnar deviation ((C8)     Not assessed   Abduction and /or adduction of hand intrinsics (T1)     Not assessed   Shoulder ER 3+/5 3+/5     Shoulder IR 4-/5 4-/5               DTR  Not assessed L R Comments   Biceps (C5,C6)         Brachioradialis (C5,C6)         Triceps (C7,C8)                         Special Tests Results Comments   Foraminal compression test  -     Distraction test -     Upper limb tension test  -    Shoulder abduction(relief) test   -    Vertebral artery test   Not assessed    Valsalva test  Not assessed    Joint play assessments  Hypo mobile R lateral glides  Extension      Dermatomes:  Equal sensation: C1-3, C6-8  Decreased sensation: C4-5 on L     Joint mobility:               []?Normal                      [x]? Hypo              []?Hyper     Palpation: TTP TP of C2-7.  B 1st and 2nd rib     Functional Mobility/Transfers: Independent     Posture: Slightly rounded shoulders and forward head     Gait: Use of WB    RESTRICTIONS/PRECAUTIONS:     Exercises/Interventions:   Exercise/Equipment Resistance/Repetitions Other comments   Stretching/PROM     3 finger rotation x15 rotation    3 finger retraction     UT side bend stretch 5x10\" B    Levater scap stretch     Scalene stretch     Pectoral stretch     Table slide 10x10\" flex    OH flex 10x10\" supine with bar         Isometrics     Retraction 10x10\" supine    shrugs     Cervical Flexion      Cervical Extension     Cervical sidebending               PRE's     External Rotation     Internal Rotation     Serratus     Biceps     Triceps     Shrugs     Horizontal Abd with ER     Reverse Flys     EXT     Flexion     Retraction          Cable Column/Theraband     Scapular Retraction 3x10 green    External Rotation 2x10 red, B Noted shoulder pain by end of 2nd set   Internal Rotation 3x10 green, B    Ext     TRIC     Lats     Shrugs     Flex     BIC     W ER 2x10 green               Manual Intervention      Cerv mobs/manip      Thoracic mobs/manip
Progression Towards Functional goals/ Treatment Progress Update:  [] Patient is progressing as expected towards functional goals listed. [] Progression is slowed due to complexities/Impairments listed. [] Progression has been slowed due to co-morbidities. [x] Plan just implemented, too soon to assess goals progression <30days   [] Goals require adjustment due to lack of progress  [] Patient is not progressing as expected and requires additional follow up with physician   [] Other    Prognosis for POC: [x] Good [] Fair  [] Poor      Patient requires continued skilled intervention: [x] Yes  [] No    Treatment/Activity Tolerance:  [x] Patient able to complete treatment  [] Patient limited by fatigue  [] Patient limited by pain     [] Patient limited by other medical complications  [x] Other: Pt tolerated weight machines well again this date, no pain. Good tolerance to addition of side stepping, notes glute fatigue and required seated rest break after 2 passes. Pt demonstrates good control with DLS on airex. Pt requires PT follow up to address ROM, strength and functional mobility deficits. PLAN: See eval  [x] Continue per plan of care [] Alter current plan (see comments above)   [] Plan of care initiated [] Hold pending MD visit [] Discharge    Electronically signed by:  Bri Williamson, PT, DPT  Physical Therapist  [id number]  [email or web address]      Note: If patient does not return for scheduled/ recommended follow up visits, this note will serve as a discharge from care along with most recent update on progress.

## 2020-02-12 ENCOUNTER — HOSPITAL ENCOUNTER (OUTPATIENT)
Dept: PHYSICAL THERAPY | Age: 55
Setting detail: THERAPIES SERIES
Discharge: HOME | End: 2020-02-12
Payer: COMMERCIAL

## 2020-02-12 PROCEDURE — 97530 THERAPEUTIC ACTIVITIES: CPT | Performed by: PHYSICAL THERAPIST

## 2020-02-12 PROCEDURE — 97110 THERAPEUTIC EXERCISES: CPT | Performed by: PHYSICAL THERAPIST

## 2020-02-12 NOTE — FLOWSHEET NOTE
[facility]    Physical Therapy Daily Treatment Note  Date:  2020    Patient Name:  Jason Wallace    :  1965  MRN: [id number]  Restrictions/Precautions:    Medical/Treatment Diagnosis Information:  · Diagnosis: M70.61, M70.62 (ICD-10-CM) - Trochanteric bursitis of both hips  · Treatment Diagnosis: M54.5, M70.71, M70.72, M25.551, M25.552; leading to impaired ADLs and IADLs  Insurance/Certification information:  PT Insurance Information: PT BENEFITS  FACILITY/ [facility]/ %/30 VPCY/ AUTH REQUIRED ONLY AFTER 30TH VISIT / 20 PAG  Physician Information:  Referring Practitioner: Nancy Magallanes. Shelda Sandifer, MD  Has the plan of care been signed (Y/N):        []  Yes  [x]  No     Date of Patient follow up with Physician: Not scheduled at this time      Is this a Progress Report:     [x]  Yes  []  No        If Yes:  Date Range for reporting period:  Beginning 20  Ending  20    Progress report will be due (10 Rx or 30 days whichever is less): 29      Recertification will be due (POC Duration  / 90 days whichever is less): 3/6/20         Visit # Insurance Allowable Auth Required   10 30 []  Yes [x]  No        Functional Scale:    Date assessed:    WOMAC 64/96=66.7% deficit   1/10/20  WOMAC  L hip 65/96=67.7% deficit   20  R hip =1.1%     20  (3 questions omitted on R d/t L hip)    Latex Allergy:  [x]NO      []YES  Preferred Language for Healthcare:   [x]English       []other:      Pain level:  8/10     SUBJECTIVE:   present. Pt states that her hips continue to feel much better. She feels stronger. She has pain with laying on L side. OBJECTIVE:              ROM PROM AROM Comments     Left Right Left Right     Flexion 110 90 60 60 P! And stretch   Extension         Not assessed.  Pt unable to lay prone and unable to stand   Abduction         Not assessed   Adduction         Not assessed   ER 45 35     increasing ROM, reducing/eliminating soft tissue swelling/inflammation/restriction, improving soft tissue extensibility and allowing for proper ROM for normal function with self care, mobility, lifting and ambulation. Modalities:      Charges:  Timed Code Treatment Minutes: 60   Total Treatment Minutes: 60   Time in: 10:02  Time out: 11:08    [] EVAL (LOW) 37369 (typically 20 minutes face-to-face)  [] EVAL (MOD) 10758 (typically 30 minutes face-to-face)  [] EVAL (HIGH) 11552 (typically 45 minutes face-to-face)  [] RE-EVAL     [x] GS(08796) x 3     [] IONTO  [] NMR (42558) x       [] VASO  [] Manual (12594) x      [] Other:  [x] TA x  1    [] Mech Traction (89829)  [] ES(attended) (75113)      [] ES (un) (13535):     GOALS:   Patient stated goal: decrease pain, get better, get stronger   []? Progressing: []? Met: []? Not Met: []? Adjusted     Therapist goals for Patient:   Short Term Goals: To be achieved in: 4 weeks 2/7/20  1. Independent in HEP and progression per patient tolerance, in order to prevent re-injury. []? Progressing: []? Met: []? Not Met: []? Adjusted   2. Patient will have a decrease in pain to facilitate improvement in movement, function, and ADLs as indicated by Functional Deficits. []? Progressing: []? Met: []? Not Met: []? Adjusted      Long Term Goals: To be achieved in: 8 weeks 3/6/20  1. Disability index score of 33% or less for the [facility] to assist with reaching prior level of function. []? Progressing: []? Met: []? Not Met: []? Adjusted  2. Patient will demonstrate [facility] to allow for proper joint functioning as indicated by patients Functional Deficits. []? Progressing: []? Met: []? Not Met: []? Adjusted  3. Patient will demonstrate an increase in BLE strength to 4/5 or greater to allow for proper functional mobility as indicated by patients Functional Deficits. []? Progressing: []? Met: []? Not Met: []? Adjusted  4.  Patient will return to ADLs, IADLs and

## 2020-02-12 NOTE — FLOWSHEET NOTE
The [address] [place] [address] [facility]      Physical Therapy Daily Treatment Note  Date:  2020    Patient Name:  Johnathan Robbins    :  1965  MRN: [id number]  Restrictions/Precautions:    Medical/Treatment Diagnosis Information:  Diagnosis: M47.892 (ICD-10-CM) - Other spondylosis, cervical region  Treatment Diagnosis: M54.2, R53.1 leading to impaired ADLs and IADLs  Insurance/Certification information:  PT Insurance Information: PT BENEFITS  FACILITY/ [facility]/ %/30 VPCY/ AUTH REQUIRED ONLY AFTER 30TH VISIT / 20 PAG  Physician Information:  Referring Practitioner: Dr. Car Corea  Has the plan of care been signed (Y/N):        []  Yes  [x]  No     Date of Patient follow up with Physician:       Is this a Progress Report:     []  Yes  [x]  No        If Yes:  Date Range for reporting period:  Beginning  Ending    Progress report will be due (10 Rx or 30 days whichever is less):        Recertification will be due (POC Duration  / 90 days whichever is less):  3/29/20      Visit # Insurance Allowable Auth Required   4 30 []  Yes [x]  No    Total visits: 10 (treated in conjunction with hip)    Functional Scale:    Date assessed:   NDI 25/50=50% deficit    20    Latex Allergy:  [x]NO      []YES  Preferred Language for Healthcare:   [x]English       []other:    Pain level:  7/10     SUBJECTIVE:  Pt states her neck feels better. She had L shoulder pain for 3 days following LPV. She could not raise her arm up, she could not hold onto her medication bottle to open them. Pain went in an arc around the shoulder from front to back. Pain went down into her arm and hand. Her R shoulder is not bothering her. MD appt scheduled with Dr. Eunice Cuellar on 3/5.     OBJECTIVE:         ROM AROM Comments   Flexion 30 p!  40 end range     Extension 25!  35 end range     Side bend R 35     Side bend L 30     Rotation R 55     Rotation L 47     Shoulder AROM clearing Seated: 90 deg flex/ABD                   Strength L R Comments   Neck flexion (C1-2)     4-/5   Neck side bend (C3) 4-/5 4-/5     Shoulder elevation (C4) 4-/5 4-/5     Shoulder abduction (C5) 4-/5 4-/5     Elbow flexion and /or wrist extension  (C6) 4/5 4/5     Elbow extension and/or wrist flexion  (C7) 4/5 4/5     Thumb extension and/or ulnar deviation ((C8)     Not assessed   Abduction and /or adduction of hand intrinsics (T1)     Not assessed   Shoulder ER 3+/5 3+/5     Shoulder IR 4-/5 4-/5               DTR  Not assessed L R Comments   Biceps (C5,C6)         Brachioradialis (C5,C6)         Triceps (C7,C8)                         Special Tests Results Comments   Foraminal compression test  -     Distraction test -     Upper limb tension test  -    Shoulder abduction(relief) test   -    Vertebral artery test   Not assessed    Valsalva test  Not assessed    Joint play assessments  Hypo mobile R lateral glides  Extension      Dermatomes:  Equal sensation: C1-3, C6-8  Decreased sensation: C4-5 on L     Joint mobility:               []?Normal                      [x]? Hypo              []?Hyper     Palpation: TTP TP of C2-7.  B 1st and 2nd rib     Functional Mobility/Transfers: Independent     Posture: Slightly rounded shoulders and forward head     Gait: Use of WB    RESTRICTIONS/PRECAUTIONS:     Exercises/Interventions:   Exercise/Equipment Resistance/Repetitions Other comments   Stretching/PROM     3 finger rotation x15 rotation    3 finger retraction     UT side bend stretch 5x10\" B    Levater scap stretch     Scalene stretch     Pectoral stretch     Table slide 10x10\" flex    OH flex          Isometrics     Cervical Retraction 10x10\" supine    Cervical Flexion      Cervical Extension     Cervical sidebending               PRE's     External Rotation     Internal Rotation     Serratus     Biceps     Triceps     Shrugs     Horizontal Abd with ER     Reverse Flys     EXT     Flexion     Retraction          Cable Column/Theraband related to improving balance, coordination, kinesthetic sense, posture, motor skill, proprioception of cervical, scapular, scapulothoracic and UE control with self care, reaching, carrying, lifting, house/yardwork, driving/computer work      Manual Treatments:    [] (83617) Provided manual therapy to mobilize soft tissue/joints of cervical/CT, scapular GHJ and UE for the purpose of decreasing headache, modulating pain, promoting relaxation,  increasing ROM, reducing/eliminating soft tissue swelling/inflammation/restriction, improving soft tissue extensibility and allowing for proper ROM for normal function with self care, reaching, carrying, lifting, house/yardwork, driving/computer work    Modalities:  None    Charges:  Timed Code Treatment Minutes: 42   Total Treatment Minutes: 42   Time in: 11:08  Time out: 11:50    [] EVAL (LOW) 11640 (typically 20 minutes face-to-face)  [] EVAL (MOD) 48654 (typically 30 minutes face-to-face)  [] EVAL (HIGH) 52007 (typically 45 minutes face-to-face)  [] RE-EVAL     [x] QK(32526) x 3    [] IONTO  [] NMR (09154) x     [] VASO  [] Manual (80471) x      [] Other:  [] TA x      [] Mech Traction (65855)  [] ES(attended) (39240)      [] ES (un) (25541):     GOALS:  Patient stated goal: get better    []? Progressing: []? Met: []? Not Met: []? Adjusted      Therapist goals for Patient:   Short Term Goals: To be achieved in: 2 weeks 2/12/20  1. Independent in HEP and progression per patient tolerance, in order to prevent re-injury. []? Progressing: []? Met: []? Not Met: []? Adjusted   2. Patient will have a decrease in pain to facilitate improvement in movement, function, and ADLs as indicated by Functional Deficits. []? Progressing: []? Met: []? Not Met: []? Adjusted     Long Term Goals: To be achieved in: 8 weeks 3/25/20  1. Disability index score of 20% or less for the NDI to assist with reaching prior level of function. []? Progressing: []? Met: []? Not Met: []? Adjusted  2. Patient will demonstrate increased AROM to WNL of cervical/thoracic spine to allow for proper joint functioning as indicated by patients Functional Deficits. []? Progressing: []? Met: []? Not Met: []? Adjusted  Patient will demonstrate an increase in postural awareness and control and activation of  Deep cervical stabilizers to allow for proper functional mobility as indicated by patients Functional Deficits. []? Progressing: []? Met: []? Not Met: []? Adjusted  4. Patient will return to ADLs, IADLs and functional activities without increased symptoms or restriction. []? Progressing: []? Met: []? Not Met: []? Adjusted  5. Patient will be able to swim without neck pain. []? Progressing: []? Met: []? Not Met: []? Adjusted       Overall Progression Towards Functional goals/ Treatment Progress Update:  [] Patient is progressing as expected towards functional goals listed. [] Progression is slowed due to complexities/Impairments listed. [] Progression has been slowed due to co-morbidities. [x] Plan just implemented, too soon to assess goals progression <30days   [] Goals require adjustment due to lack of progress  [] Patient is not progressing as expected and requires additional follow up with physician  [] Other    Prognosis for POC: [x] Good [] Fair  [] Poor      Patient requires continued skilled intervention: [x] Yes  [] No    Treatment/Activity Tolerance:  [x] Patient able to complete treatment  [] Patient limited by fatigue  [] Patient limited by pain     [] Patient limited by other medical complications   [] Other: Held OH flex with wand and TB ER this date d/t exacerbation of pain following LPV, rest of program remained the same to assess pt response and determine if those 2 exercises were the aggravating factors. Pt required VCs to keep trunk upright and focus on scap squeeze only rather than leaning trunk back.  Pt only able to complete 10 reps of TB IR on L d/t increase in shoulder pain, pain felt in same area where pain was increased following LPV. Able to complete on R side without pain. Pt requires PT follow up to address ROM, strength and functional mobility deficits. PLAN: See eval  [x] Continue per plan of care [] Alter current plan (see comments above)  [] Plan of care initiated [] Hold pending MD visit [] Discharge      Electronically signed by:  Morales Langston, PT, DPT  Physical Therapist  [id number]  [email or web address]    Note: If patient does not return for scheduled/ recommended follow up visits, this note will serve as a discharge from care along with most recent update on progress.

## 2020-02-14 ENCOUNTER — HOSPITAL ENCOUNTER (OUTPATIENT)
Dept: PHYSICAL THERAPY | Age: 55
Setting detail: THERAPIES SERIES
Discharge: HOME | End: 2020-02-14
Payer: COMMERCIAL

## 2020-02-14 PROCEDURE — 97110 THERAPEUTIC EXERCISES: CPT | Performed by: PHYSICAL THERAPIST

## 2020-02-14 PROCEDURE — 97530 THERAPEUTIC ACTIVITIES: CPT | Performed by: PHYSICAL THERAPIST

## 2020-02-14 NOTE — FLOWSHEET NOTE
The [address] [place] [address] and [facility]    Physical Therapy Daily Treatment Note  Date:  2020    Patient Name:  Jason Wallace    :  1965  MRN: [id number]  Restrictions/Precautions:    Medical/Treatment Diagnosis Information:  · Diagnosis: M70.61, M70.62 (ICD-10-CM) - Trochanteric bursitis of both hips  · Treatment Diagnosis: M54.5, M70.71, M70.72, M25.551, M25.552; leading to impaired ADLs and IADLs  Insurance/Certification information:  PT Insurance Information: PT BENEFITS  FACILITY/ [facility]/ %/30 VPCY/ AUTH REQUIRED ONLY AFTER 30TH VISIT / 20 PAG  Physician Information:  Referring Practitioner: Nancy Magallanes. Shelda Sandifer, MD  Has the plan of care been signed (Y/N):        []  Yes  [x]  No     Date of Patient follow up with Physician: Not scheduled at this time      Is this a Progress Report:     [x]  Yes  []  No        If Yes:  Date Range for reporting period:  Beginning 20  Ending  20    Progress report will be due (10 Rx or 30 days whichever is less): 65      Recertification will be due (POC Duration  / 90 days whichever is less): 3/6/20         Visit # Insurance Allowable Auth Required   11 30 []  Yes [x]  No        Functional Scale:    Date assessed:    WOMAC 64/96=66.7% deficit   1/10/20  WOMAC  L hip 65/96=67.7% deficit   20  R hip =1.1%     20  (3 questions omitted on R d/t L hip)    Latex Allergy:  [x]NO      []YES  Preferred Language for Healthcare:   [x]English       []other:      Pain level:  8/10     SUBJECTIVE:   present. Pt's transportation was late to pick her up, arrives 10 min late. Hips are feeling good, no complaints. Pt went to the gym yesterday and rode the bike for 30 minutes in addition to using weight machines and a stretching machine. OBJECTIVE:              ROM PROM AROM Comments     Left Right Left Right     Flexion 110 90 60 60 P! And stretch   Extension         Not assessed.  Pt unable to lay prone and unable to stand   Abduction         Not assessed   Adduction         Not assessed   ER 45 35     stretch   IR 10 20                          Flexibility Left Right Comments   Hamstrings SLR 75 SLR 75 P! And stretch   ITB (Obers test)     Not assessed- can't lay on side   Hip flexor(Afshin test)     Not assessed   gastroc + tightness + tightness Not assessed-can't lay prone   Rectus femoris(Elys test)                      Special  Test Left Right Comments   FABERS         Scour test         Impingement test         Trendelenburg test     Not assessed- can't perform SLS                Strength Left Right Comments   Hip flexors 3+/5 3+/5 Back p! Hip extension 3+/5 3+/5 Back p! Hip abduction 3+/5 3+/5 Back and hip p! Hip adduction 3+/5 3+/5     Hip ER 3+/5 3+/5     Hip IR 3+/5 3+/5     Quads 3+/5 3+/5 Back p! Hamstrings 3+/5 3+/5        Joint mobility: Not assessed              []?Normal                       []?Hypo              []?Hyper     Palpation: generalized TTP around lateral hip     Functional Mobility/Transfers: Independent but with modifications.     Posture: Seated posture WNL. Forward flexed posture when standing     Gait: Not assessed, pt utilizes WC and can not stand independently     Dermatomes: sensation equal B, though progressive decreased in sensation from proximal to distal from L1-5.  No sensation at S1-2      RESTRICTIONS/PRECAUTIONS:     Exercises/Interventions:     ROM/STRETCHES     Seated hamstring  3x30\" B    Incline calf 3x30\"    Supine piriformis     Seated figure 4 3x30\" B    Standing hip flexion    HEP    ITB     Butterfly     Hip flexor stretch kneeling     PREs     Glute sets 10x10\"Seated   ADD sets Seated; HEP   Quad sets    TA brace    Hooklying heel lift  Lift foot 1-2\" from table                  LAQ     Clamshells 3x15, black tied band Seated, back supported   Bridges 3x15 DL, black tied band    Step ups 3x10 L3, B    Side stepping 2x passes, blue tied band Along half wall for support             Quantum knee ext 3x10 DL 15# Range: 90-30   Quantum knee flex 3x10 DL 25# Range: available   Quantum leg press 3x10 # Range: 90-10        Balance     DLS, FT, EO AirexTandem stance     Static sitting 3x30\" SB         Manual interventions            Plan for next session: progress as tolerated    Therapeutic Exercise and NMR EXR  [x] (05906) Provided verbal/tactile cueing for activities related to strengthening, flexibility, endurance, ROM for improvements in LE, proximal hip, and core control with self care, mobility, lifting, ambulation.  [] (96938) Provided verbal/tactile cueing for activities related to improving balance, coordination, kinesthetic sense, posture, motor skill, proprioception  to assist with LE, proximal hip, and core control in self care, mobility, lifting, ambulation and eccentric single leg control.      NMR and Therapeutic Activities:    [] (31548 or 41698) Provided verbal/tactile cueing for activities related to improving balance, coordination, kinesthetic sense, posture, motor skill, proprioception and motor activation to allow for proper function of core, proximal hip and LE with self care and ADLs  [] (89500) Gait Re-education- Provided training and instruction to the patient for proper LE, core and proximal hip recruitment and positioning and eccentric body weight control with ambulation re-education including up and down stairs     Home Exercise Program:    [x] (31725) Reviewed/Progressed HEP activities related to strengthening, flexibility, endurance, ROM of core, proximal hip and LE for functional self-care, mobility, lifting and ambulation/stair navigation   [] (15645)Reviewed/Progressed HEP activities related to improving balance, coordination, kinesthetic sense, posture, motor skill, proprioception of core, proximal hip and LE for self care, mobility, lifting, and ambulation/stair navigation      Manual Treatments:    [] (51701) Provided manual therapy to mobilize LE, proximal hip and/or LS spine soft tissue/joints for the purpose of modulating pain, promoting relaxation,  increasing ROM, reducing/eliminating soft tissue swelling/inflammation/restriction, improving soft tissue extensibility and allowing for proper ROM for normal function with self care, mobility, lifting and ambulation. Modalities:      Charges:  Timed Code Treatment Minutes: 54   Total Treatment Minutes: 54   Time in: 1:40  Time out: 2:50    [] EVAL (LOW) 32791 (typically 20 minutes face-to-face)  [] EVAL (MOD) 50161 (typically 30 minutes face-to-face)  [] EVAL (HIGH) 63220 (typically 45 minutes face-to-face)  [] RE-EVAL     [x] YS(95899) x 3     [] IONTO  [] NMR (94596) x       [] VASO  [] Manual (50921) x      [] Other:  [x] TA x  1    [] Mech Traction (04315)  [] ES(attended) (52616)      [] ES (un) (31478):     GOALS:   Patient stated goal: decrease pain, get better, get stronger   []? Progressing: []? Met: []? Not Met: []? Adjusted     Therapist goals for Patient:   Short Term Goals: To be achieved in: 4 weeks 2/7/20  1. Independent in HEP and progression per patient tolerance, in order to prevent re-injury. []? Progressing: []? Met: []? Not Met: []? Adjusted   2. Patient will have a decrease in pain to facilitate improvement in movement, function, and ADLs as indicated by Functional Deficits. []? Progressing: []? Met: []? Not Met: []? Adjusted      Long Term Goals: To be achieved in: 8 weeks 3/6/20  1. Disability index score of 33% or less for the [facility] to assist with reaching prior level of function. []? Progressing: []? Met: []? Not Met: []? Adjusted  2. Patient will demonstrate increased AROM to [facility] to allow for proper joint functioning as indicated by patients Functional Deficits. []? Progressing: []? Met: []? Not Met: []? Adjusted  3.  Patient will demonstrate an increase in BLE strength to 4/5 or greater to allow for proper functional mobility as indicated by patients Functional Deficits. []? Progressing: []? Met: []? Not Met: []? Adjusted  4. Patient will return to ADLs, IADLs and functional activities without increased symptoms or restriction. []? Progressing: []? Met: []? Not Met: []? Adjusted      Overall Progression Towards Functional goals/ Treatment Progress Update:  [] Patient is progressing as expected towards functional goals listed. [] Progression is slowed due to complexities/Impairments listed. [] Progression has been slowed due to co-morbidities. [x] Plan just implemented, too soon to assess goals progression <30days   [] Goals require adjustment due to lack of progress  [] Patient is not progressing as expected and requires additional follow up with physician   [] Other    Prognosis for POC: [x] Good [] Fair  [] Poor      Patient requires continued skilled intervention: [x] Yes  [] No    Treatment/Activity Tolerance:  [x] Patient able to complete treatment  [] Patient limited by fatigue  [] Patient limited by pain     [] Patient limited by other medical complications  [x] Other: Pt had difficulty initially with seated static balance on SB, + sway side to side, but able to steady herself as reps continued, able to complete without exacerbation of back pain. Pt tolerated increase to L3 step ups well, require initial VCs to use UEs for balance control only and not to pull herself up with her arms; did require seated rest break after 2 sets d/t back pain but then able to complete final set. Required seated rest break between passes of side stepping d/t back pain. Standing exercise progress is limited d/t pt tolerance secondary to back pain. Pt able to increase weight on knee ext and leg press this date. Overall pt demonstrating improved mobility since beginning PT. Pt requires PT follow up to address ROM, strength and functional mobility deficits.        PLAN: See eval  [x] Continue per plan of care [] Alter current plan (see comments above)   [] Plan of care initiated [] Hold pending MD visit [] Discharge    Electronically signed by:  Trista Haynes PT, DPT  Physical Therapist  [id number]  [email or web address]      Note: If patient does not return for scheduled/ recommended follow up visits, this note will serve as a discharge from care along with most recent update on progress.

## 2020-02-14 NOTE — FLOWSHEET NOTE
[facility] [address] [place] [address] [facility]      Physical Therapy Daily Treatment Note  Date:  2020    Patient Name:  Beata Hill    :  1965  MRN: [id number]  Restrictions/Precautions:    Medical/Treatment Diagnosis Information:  Diagnosis: M47.892 (ICD-10-CM) - Other spondylosis, cervical region  Treatment Diagnosis: M54.2, R53.1 leading to impaired ADLs and IADLs  Insurance/Certification information:  PT Insurance Information: PT BENEFITS  [facility]/ AUTH REQUIRED ONLY AFTER 30TH VISIT / 20 PAG  Physician Information:  Referring Practitioner: Dr. Blue Apodaca  Has the plan of care been signed (Y/N):        []  Yes  [x]  No     Date of Patient follow up with Physician:       Is this a Progress Report:     []  Yes  [x]  No        If Yes:  Date Range for reporting period:  Beginning  Ending    Progress report will be due (10 Rx or 30 days whichever is less):        Recertification will be due (POC Duration  / 90 days whichever is less):  3/29/20      Visit # Insurance Allowable Auth Required   5 30 []  Yes [x]  No    Total visits: 11 (treated in conjunction with hip)    Functional Scale:    Date assessed:   NDI 25/50=50% deficit    20    Latex Allergy:  [x]NO      []YES  Preferred Language for Healthcare:   [x]English       []other:    Pain level:  7/10     SUBJECTIVE:  Pt states her shoulders feel better. She did not have flare up of pain after last visit as she did last week. Feels good about changes made to program. She went to the gym yesterday and used some weight machines: shoulder press, row, rear deltoid. She swam for 45 min. Pt states she felt good and did not have shoulder pain.     OBJECTIVE:         ROM AROM Comments   Flexion 30 p!  40 end range     Extension 25!  35 end range     Side bend R 35     Side bend L 30     Rotation R 55     Rotation L 47     Shoulder AROM clearing Seated: 90 deg flex/ABD                 Strength L R Comments   Neck flexion (C1-2)     4-/5   Neck side bend (C3) 4-/5 4-/5     Shoulder elevation (C4) 4-/5 4-/5     Shoulder abduction (C5) 4-/5 4-/5     Elbow flexion and /or wrist extension  (C6) 4/5 4/5     Elbow extension and/or wrist flexion  (C7) 4/5 4/5     Thumb extension and/or ulnar deviation ((C8)     Not assessed   Abduction and /or adduction of hand intrinsics (T1)     Not assessed   Shoulder ER 3+/5 3+/5     Shoulder IR 4-/5 4-/5               DTR  Not assessed L R Comments   Biceps (C5,C6)         Brachioradialis (C5,C6)         Triceps (C7,C8)                         Special Tests Results Comments   Foraminal compression test  -     Distraction test -     Upper limb tension test  -    Shoulder abduction(relief) test   -    Vertebral artery test   Not assessed    Valsalva test  Not assessed    Joint play assessments  Hypo mobile R lateral glides  Extension      Dermatomes:  Equal sensation: C1-3, C6-8  Decreased sensation: C4-5 on L     Joint mobility:               []?Normal                      [x]? Hypo              []?Hyper     Palpation: TTP TP of C2-7.  B 1st and 2nd rib     Functional Mobility/Transfers: Independent     Posture: Slightly rounded shoulders and forward head     Gait: Use of WB    RESTRICTIONS/PRECAUTIONS:     Exercises/Interventions:   Exercise/Equipment Resistance/Repetitions Other comments   Stretching/PROM     3 finger rotation x15 rotation    3 finger retraction     UT side bend stretch     Levater scap stretch     Scalene stretch     Pectoral stretch     Table slide 10x10\" flex    OH flex          Isometrics     Cervical Retraction 10x10\" supine    Cervical Flexion      Cervical Extension     Cervical sidebending               PRE's     External Rotation     Internal Rotation     Serratus     Biceps 3x10 3#    Triceps     Shrugs     Horizontal Abd with ER     Reverse Flys     EXT     Flexion     Retraction          Cable Column/Theraband     Scapular Retraction 3x10 green    External Rotation  Noted shoulder pain by end of 2nd set   Internal Rotation 3x10 green, B    Ext     TRIC     Lats     Shrugs     Flex     BIC     W ER 3x10 green               Manual Intervention      Cerv mobs/manip      Thoracic mobs/manip      CT manip      Rib mobilizations        Therapeutic Exercise and NMR EXR  [x] (89331) Provided verbal/tactile cueing for activities related to strengthening, flexibility, endurance, ROM  for improvements in cervical, postural, scapular, scapulothoracic and UE control with self care, reaching, carrying, lifting, house/yardwork, driving/computer work.    [] (06064) Provided verbal/tactile cueing for activities related to improving balance, coordination, kinesthetic sense, posture, motor skill, proprioception  to assist with cervical, scapular, scapulothoracic and UE control with self care, reaching, carrying, lifting, house/yardwork, driving/computer work. Therapeutic Activities:    [] (48275 or 55459) Provided verbal/tactile cueing for activities related to improving balance, coordination, kinesthetic sense, posture, motor skill, proprioception and motor activation to allow for proper function of cervical, scapular, scapulothoracic and UE control with self care, carrying, lifting, driving/computer work. Home Exercise Program:  Pt access code: Maura Stock  Initiated 1/29/20. Printed hand out given. Pt demonstrated proper form of each exercise and expressed verbal understanding of frequency and duration. Updated 2/4/20. Printed hand out provided. Pt demonstrated proper form of each exercise and expressed verbal understanding of frequency and duration.   [x] (36866) Reviewed/Progressed HEP activities related to strengthening, flexibility, endurance, ROM of cervical, scapular, scapulothoracic and UE control with self care, reaching, carrying, lifting, house/yardwork, driving/computer work  [] (73157) Reviewed/Progressed HEP activities related to demonstrate increased AROM to WNL of cervical/thoracic spine to allow for proper joint functioning as indicated by patients Functional Deficits. []? Progressing: []? Met: []? Not Met: []? Adjusted  Patient will demonstrate an increase in postural awareness and control and activation of  Deep cervical stabilizers to allow for proper functional mobility as indicated by patients Functional Deficits. []? Progressing: []? Met: []? Not Met: []? Adjusted  4. Patient will return to ADLs, IADLs and functional activities without increased symptoms or restriction. []? Progressing: []? Met: []? Not Met: []? Adjusted  5. Patient will be able to swim without neck pain. []? Progressing: []? Met: []? Not Met: []? Adjusted       Overall Progression Towards Functional goals/ Treatment Progress Update:  [] Patient is progressing as expected towards functional goals listed. [] Progression is slowed due to complexities/Impairments listed. [] Progression has been slowed due to co-morbidities. [x] Plan just implemented, too soon to assess goals progression <30days   [] Goals require adjustment due to lack of progress  [] Patient is not progressing as expected and requires additional follow up with physician  [] Other    Prognosis for POC: [x] Good [] Fair  [] Poor      Patient requires continued skilled intervention: [x] Yes  [] No    Treatment/Activity Tolerance:  [x] Patient able to complete treatment  [] Patient limited by fatigue  [] Patient limited by pain     [] Patient limited by other medical complications   [] Other: Pt tolerated addition of bicep curls well. Continues to note pain by end of 2nd set of TB IR on L. Reports only fatigue with W ER, no longer feels pain. Notes neck cracking following cervical rotation AROM. Pt requires PT follow up to address ROM, strength and functional mobility deficits.                  PLAN: See eval  [x] Continue per plan of care [] Alter current plan (see comments above)  [] Plan of care initiated [] Hold pending MD visit [] Discharge      Electronically signed by:  Javier Byrd PT, DPT  Physical Therapist  [id number]  [email or web address]    Note: If patient does not return for scheduled/ recommended follow up visits, this note will serve as a discharge from care along with most recent update on progress.

## 2020-02-17 RX ORDER — OXYCODONE HYDROCHLORIDE AND ACETAMINOPHEN 5; 325 MG/1; MG/1
1 TABLET ORAL EVERY 8 HOURS PRN
Qty: 15 TABLET | Refills: 0 | Status: SHIPPED | OUTPATIENT
Start: 2020-02-17 | End: 2020-02-27 | Stop reason: SDUPTHER

## 2020-02-17 RX ORDER — ERGOCALCIFEROL 1.25 MG/1
CAPSULE ORAL
Qty: 4 CAPSULE | Refills: 0 | OUTPATIENT
Start: 2020-02-17

## 2020-02-17 NOTE — TELEPHONE ENCOUNTER
MEDICATION ISSUES     Reported Issue:  Medication Refill    Medication Information     - Refill medication requested: Percocet     - Medication dosage and quantity: 5/325mg  #15     - Reporting side effects, if any: No     - Other issues, if any: NA     Controlled Substances     - Last refill date (per [place]): NA      - Follow-up Appointment date: None     Pharmacy Information      - PHARMACY updated with patient: Yes     - PHARMACY updated in Chart: Yes      NOTE for Controlled Substances     PLEASE READ TO PATIENTS:    1. For routine refills: ALL MEDICATION REFILLS NEED 2 WORKING DAYS (48-HOUR) ADVANCED NOTICE - Not filled on weekends or holidays.     - Patient informed about the above policy:  Yes    2. If switching or changing opioid pain medications -    PATIENTS NEED TO BRING OLD MEDICATION FOR PILL COUNT AND POSSIBLE DESTRUCTION - before new medication could be filled.      - Patient informed about the above policy:  Yes

## 2020-02-18 ENCOUNTER — HOSPITAL ENCOUNTER (OUTPATIENT)
Dept: PHYSICAL THERAPY | Age: 55
Setting detail: THERAPIES SERIES
Discharge: HOME | End: 2020-02-18
Payer: COMMERCIAL

## 2020-02-18 PROCEDURE — 97110 THERAPEUTIC EXERCISES: CPT | Performed by: PHYSICAL THERAPIST

## 2020-02-18 PROCEDURE — 97530 THERAPEUTIC ACTIVITIES: CPT | Performed by: PHYSICAL THERAPIST

## 2020-02-18 PROCEDURE — 0298T PR EXT ECG > 48HR TO 21 DAY REVIEW AND INTERPRETATN: CPT | Performed by: INTERNAL MEDICINE

## 2020-02-18 NOTE — FLOWSHEET NOTE
The [address] [place] [address] and Sports Rehabilitation, [facility]    Physical Therapy Daily Treatment Note  Date:  2020    Patient Name:  Beata Hill    :  1965  MRN: [id number]  Restrictions/Precautions:    Medical/Treatment Diagnosis Information:  · Diagnosis: M70.61, M70.62 (ICD-10-CM) - Trochanteric bursitis of both hips  · Treatment Diagnosis: M54.5, M70.71, M70.72, M25.551, M25.552; leading to impaired ADLs and IADLs  Insurance/Certification information:  PT Insurance Information: PT BENEFITS  FACILITY/ [facility]/ %/30 VPCY/ AUTH REQUIRED ONLY AFTER 30TH VISIT / 20 PAG  Physician Information:  Referring Practitioner: Jessi Hanson. Román Guthrie MD  Has the plan of care been signed (Y/N):        []  Yes  [x]  No     Date of Patient follow up with Physician: Not scheduled at this time      Is this a Progress Report:     [x]  Yes  []  No        If Yes:  Date Range for reporting period:  Beginning 20  Ending  20    Progress report will be due (10 Rx or 30 days whichever is less): 3/77/66      Recertification will be due (POC Duration  / 90 days whichever is less): 3/6/20         Visit # Insurance Allowable Auth Required   12 30 []  Yes [x]  No        Functional Scale:    Date assessed:    WOMAC 64/96=66.7% deficit   1/10/20  WOMAC  L hip 65/96=67.7% deficit   20  R hip =1.1%     20  (3 questions omitted on R d/t L hip)    Latex Allergy:  [x]NO      []YES  Preferred Language for Healthcare:   [x]English       []other:      Pain level:  8/10     SUBJECTIVE:   present. Her hips continue to feel much better. Pt states that when she swims it is very hard to move her whole leg, feels strong from her knee down but not from her hip. OBJECTIVE:              ROM PROM AROM Comments     Left Right Left Right     Flexion 110 90 60 60 P! And stretch   Extension         Not assessed.  Pt unable to lay prone and unable to stand   Abduction         Not stepping 3x passes, blue tied band Along half wall for support             Quantum knee ext 3x10 DL 15# Range: 90-30   Quantum knee flex 3x10 DL 25# Range: available   Quantum leg press 3x10 # Range: 90-10        Balance     DLS, FT, EO AirexTandem stance     Static sitting 3x30\" SB         Manual interventions            Plan for next session: progress as tolerated    Therapeutic Exercise and NMR EXR  [x] (33813) Provided verbal/tactile cueing for activities related to strengthening, flexibility, endurance, ROM for improvements in LE, proximal hip, and core control with self care, mobility, lifting, ambulation.  [] (49501) Provided verbal/tactile cueing for activities related to improving balance, coordination, kinesthetic sense, posture, motor skill, proprioception  to assist with LE, proximal hip, and core control in self care, mobility, lifting, ambulation and eccentric single leg control.      NMR and Therapeutic Activities:    [] (11300 or 69127) Provided verbal/tactile cueing for activities related to improving balance, coordination, kinesthetic sense, posture, motor skill, proprioception and motor activation to allow for proper function of core, proximal hip and LE with self care and ADLs  [] (28052) Gait Re-education- Provided training and instruction to the patient for proper LE, core and proximal hip recruitment and positioning and eccentric body weight control with ambulation re-education including up and down stairs     Home Exercise Program:    [x] (03278) Reviewed/Progressed HEP activities related to strengthening, flexibility, endurance, ROM of core, proximal hip and LE for functional self-care, mobility, lifting and ambulation/stair navigation   [] (73805)Reviewed/Progressed HEP activities related to improving balance, coordination, kinesthetic sense, posture, motor skill, proprioception of core, proximal hip and LE for self care, mobility, lifting, and ambulation/stair navigation      Manual Treatments:    [] (24624) Provided manual therapy to mobilize LE, proximal hip and/or LS spine soft tissue/joints for the purpose of modulating pain, promoting relaxation,  increasing ROM, reducing/eliminating soft tissue swelling/inflammation/restriction, improving soft tissue extensibility and allowing for proper ROM for normal function with self care, mobility, lifting and ambulation. Modalities:      Charges:  Timed Code Treatment Minutes: 55   Total Treatment Minutes: 55   Time in: 8:35  Time out: 10:00    [] EVAL (LOW) 76262 (typically 20 minutes face-to-face)  [] EVAL (MOD) 88943 (typically 30 minutes face-to-face)  [] EVAL (HIGH) 24685 (typically 45 minutes face-to-face)  [] RE-EVAL     [x] ML(54902) x 3     [] IONTO  [] NMR (18719) x       [] VASO  [] Manual (17715) x      [] Other:  [x] TA x  1    [] Mech Traction (16721)  [] ES(attended) (89017)      [] ES (un) (89329):     GOALS:   Patient stated goal: decrease pain, get better, get stronger   []? Progressing: []? Met: []? Not Met: []? Adjusted     Therapist goals for Patient:   Short Term Goals: To be achieved in: 4 weeks 2/7/20  1. Independent in HEP and progression per patient tolerance, in order to prevent re-injury. []? Progressing: []? Met: []? Not Met: []? Adjusted   2. Patient will have a decrease in pain to facilitate improvement in movement, function, and ADLs as indicated by Functional Deficits. []? Progressing: []? Met: []? Not Met: []? Adjusted      Long Term Goals: To be achieved in: 8 weeks 3/6/20  1. Disability index score of 33% or less for the [facility] to assist with reaching prior level of function. []? Progressing: []? Met: []? Not Met: []? Adjusted  2. Patient will demonstrate increased AROM to [facility] to allow for proper joint functioning as indicated by patients Functional Deficits. []? Progressing: []? Met: []? Not Met: []? Adjusted  3.  Patient will demonstrate an increase in BLE strength to 4/5 or greater to allow for up to address ROM, strength and functional mobility deficits. PLAN: See eval  [x] Continue per plan of care [] Alter current plan (see comments above)   [] Plan of care initiated [] Hold pending MD visit [] Discharge    Electronically signed by:  Bri Williamson PT, DPT  Physical Therapist  [id number]  [email or web address]      Note: If patient does not return for scheduled/ recommended follow up visits, this note will serve as a discharge from care along with most recent update on progress.

## 2020-02-18 NOTE — FLOWSHEET NOTE
[address] [facility], Nurys Gibbs      Physical Therapy Daily Treatment Note  Date:  2020    Patient Name:  Klaudia Foote    :  1965  MRN: [id number]  Restrictions/Precautions:    Medical/Treatment Diagnosis Information:  Diagnosis: M47.892 (ICD-10-CM) - Other spondylosis, cervical region  Treatment Diagnosis: M54.2, R53.1 leading to impaired ADLs and IADLs  Insurance/Certification information:  PT Insurance Information: PT BENEFITS  FACILITY/ [facility]/ %/30 VPCY/ AUTH REQUIRED ONLY AFTER 30TH VISIT / 20 PAG  Physician Information:  Referring Practitioner: Dr. Justa Tsai  Has the plan of care been signed (Y/N):        []  Yes  [x]  No     Date of Patient follow up with Physician:       Is this a Progress Report:     []  Yes  [x]  No        If Yes:  Date Range for reporting period:  Beginning  Ending    Progress report will be due (10 Rx or 30 days whichever is less):  3/26/95      Recertification will be due (POC Duration  / 90 days whichever is less):  3/29/20      Visit # Insurance Allowable Auth Required   5 30 []  Yes [x]  No    Total visits: 11 (treated in conjunction with hip)    Functional Scale:    Date assessed:   NDI 25/50=50% deficit    20    Latex Allergy:  [x]NO      []YES  Preferred Language for Healthcare:   [x]English       []other:    Pain level:  7/10     SUBJECTIVE:  Pt states her shoulders feel better. She did not have flare up of pain after last visit as she did last week. Feels good about changes made to program. She went to the gym yesterday and used some weight machines: shoulder press, row, rear deltoid. She swam for 45 min. Pt states she felt good and did not have shoulder pain.     OBJECTIVE:         ROM AROM Comments   Flexion 30 p!  40 end range     Extension 25!  35 end range     Side bend R 35     Side bend L 30     Rotation R 55     Rotation L 47     Shoulder AROM clearing Seated: 90 deg flex/ABD                 Strength L R Comments   Neck flexion (C1-2)     4-/5   Neck side bend (C3) 4-/5 4-/5     Shoulder elevation (C4) 4-/5 4-/5     Shoulder abduction (C5) 4-/5 4-/5     Elbow flexion and /or wrist extension  (C6) 4/5 4/5     Elbow extension and/or wrist flexion  (C7) 4/5 4/5     Thumb extension and/or ulnar deviation ((C8)     Not assessed   Abduction and /or adduction of hand intrinsics (T1)     Not assessed   Shoulder ER 3+/5 3+/5     Shoulder IR 4-/5 4-/5               DTR  Not assessed L R Comments   Biceps (C5,C6)         Brachioradialis (C5,C6)         Triceps (C7,C8)                         Special Tests Results Comments   Foraminal compression test  -     Distraction test -     Upper limb tension test  -    Shoulder abduction(relief) test   -    Vertebral artery test   Not assessed    Valsalva test  Not assessed    Joint play assessments  Hypo mobile R lateral glides  Extension      Dermatomes:  Equal sensation: C1-3, C6-8  Decreased sensation: C4-5 on L     Joint mobility:               []?Normal                      [x]? Hypo              []?Hyper     Palpation: TTP TP of C2-7.  B 1st and 2nd rib     Functional Mobility/Transfers: Independent     Posture: Slightly rounded shoulders and forward head     Gait: Use of WB    RESTRICTIONS/PRECAUTIONS:     Exercises/Interventions:   Exercise/Equipment Resistance/Repetitions Other comments   Stretching/PROM     3 finger rotation x15 rotation    3 finger retraction     UT side bend stretch     Levater scap stretch     Scalene stretch     Pectoral stretch     Table slide 10x10\" flex    OH flex          Isometrics     Cervical Retraction 10x10\" supine    Cervical Flexion      Cervical Extension     Cervical sidebending               PRE's     External Rotation     Internal Rotation     Serratus     Biceps 3x10 3#    Triceps     Shrugs     Horizontal Abd with ER     Reverse Flys     EXT     Flexion     Retraction          Cable Column/Theraband     Scapular improving balance, coordination, kinesthetic sense, posture, motor skill, proprioception of cervical, scapular, scapulothoracic and UE control with self care, reaching, carrying, lifting, house/yardwork, driving/computer work      Manual Treatments:    [] (50845) Provided manual therapy to mobilize soft tissue/joints of cervical/CT, scapular GHJ and UE for the purpose of decreasing headache, modulating pain, promoting relaxation,  increasing ROM, reducing/eliminating soft tissue swelling/inflammation/restriction, improving soft tissue extensibility and allowing for proper ROM for normal function with self care, reaching, carrying, lifting, house/yardwork, driving/computer work    Modalities:  None    Charges:  Timed Code Treatment Minutes: 8   Total Treatment Minutes: 8   Time in: 10:08  Time out: 11:00    [] EVAL (LOW) 20847 (typically 20 minutes face-to-face)  [] EVAL (MOD) 28978 (typically 30 minutes face-to-face)  [] EVAL (HIGH) 82390 (typically 45 minutes face-to-face)  [] RE-EVAL     [x] FJ(08701) x 1    [] IONTO  [] NMR (41564) x     [] VASO  [] Manual (20278) x      [] Other:  [] TA x      [] Mech Traction (06080)  [] ES(attended) (20194)      [] ES (un) (70016):     GOALS:  Patient stated goal: get better    []? Progressing: []? Met: []? Not Met: []? Adjusted      Therapist goals for Patient:   Short Term Goals: To be achieved in: 2 weeks 2/12/20  1. Independent in HEP and progression per patient tolerance, in order to prevent re-injury. []? Progressing: []? Met: []? Not Met: []? Adjusted   2. Patient will have a decrease in pain to facilitate improvement in movement, function, and ADLs as indicated by Functional Deficits. []? Progressing: []? Met: []? Not Met: []? Adjusted     Long Term Goals: To be achieved in: 8 weeks 3/25/20  1. Disability index score of 20% or less for the NDI to assist with reaching prior level of function. []? Progressing: []? Met: []? Not Met: []? Adjusted  2.  Patient will

## 2020-02-19 ENCOUNTER — TELEPHONE (OUTPATIENT)
Dept: CARDIOLOGY CLINIC | Age: 55
End: 2020-02-19

## 2020-02-19 NOTE — TELEPHONE ENCOUNTER
Spoke with patient and went over monitor results, overall normal sinus rhythm with a very short run of SVT. Patient is going to call and make an appointment if having more symptoms.

## 2020-02-20 ENCOUNTER — HOSPITAL ENCOUNTER (OUTPATIENT)
Dept: PHYSICAL THERAPY | Age: 55
Setting detail: THERAPIES SERIES
Discharge: HOME | End: 2020-02-20
Payer: COMMERCIAL

## 2020-02-20 ENCOUNTER — TELEPHONE (OUTPATIENT)
Dept: ORTHOPEDIC SURGERY | Age: 55
End: 2020-02-20

## 2020-02-20 PROCEDURE — 97110 THERAPEUTIC EXERCISES: CPT | Performed by: PHYSICAL THERAPIST

## 2020-02-20 NOTE — FLOWSHEET NOTE
The [address] [place] [address] [facility]    Physical Therapy Daily Treatment Note  Date:  2020    Patient Name:  Alejandra Romero    :  1965  MRN: [id number]  Restrictions/Precautions:    Medical/Treatment Diagnosis Information:  · Diagnosis: M70.61, M70.62 (ICD-10-CM) - Trochanteric bursitis of both hips  · Treatment Diagnosis: M54.5, M70.71, M70.72, M25.551, M25.552; leading to impaired ADLs and IADLs  Insurance/Certification information:  PT Insurance Information: PT BENEFITS  FACILITY/ [facility]/ %/30 VPC[facility]/ AUTH REQUIRED ONLY AFTER 30TH VISIT / 20 PAG  Physician Information:  Referring Practitioner: Maynor Marino MD  Has the plan of care been signed (Y/N):        []  Yes  [x]  No     Date of Patient follow up with Physician: Not scheduled at this time      Is this a Progress Report:     [x]  Yes  []  No        If Yes:  Date Range for reporting period:  Beginning 20  Ending  20    Progress report will be due (10 Rx or 30 days whichever is less): 88      Recertification will be due (POC Duration  / 90 days whichever is less): 3/6/20         Visit # Insurance Allowable Auth Required   13 30 []  Yes [x]  No        Functional Scale:    Date assessed:    WOMAC 64/96=66.7% deficit   1/10/20  WOMAC  L hip 65/96=67.7% deficit   20  R hip =1.1%     20  (3 questions omitted on R d/t L hip)    Latex Allergy:  [x]NO      []YES  Preferred Language for Healthcare:   [x]English       []other:      Pain level:  8/10     SUBJECTIVE:   present. Pt arrives 25 minutes late as her  took her to the wrong location. Pt states that following last visit, once she got home she had increased pain in her knees and in her back. The knee pain is B and it is below the knee cap. Sitting with her knees bent is very painful. The pain feels deep, it is not tender to the touch.      OBJECTIVE:              ROM PROM AROM 2nd rep on L, after 5th rep on R   LAQ     Clamshells 3x15, black tied band Seated, back supported   Bridges 3x15 DL, black tied band    Step ups   x15 L2, B    Side stepping 2x passes, blue tied band Along half wall for support             Quantum knee ext 3x10 DL 15# Range: 90-30   Quantum knee flex 3x10 DL 25# Range: available   Quantum leg press 3x10 # Range: 90-10        Balance     DLS, FT, EO AirexTandem stance     Static sitting 3x30\" SB         Manual interventions            Plan for next session: progress as tolerated    Patient Education:  2/20/20 Pt had questions in regards to knee joint and TKA. Extensive time spent educating patient on the knee joint anatomy, OA and TKA procedure. Verbal education with use of knee model. Therapeutic Exercise and NMR EXR  [x] (00926) Provided verbal/tactile cueing for activities related to strengthening, flexibility, endurance, ROM for improvements in LE, proximal hip, and core control with self care, mobility, lifting, ambulation.  [] (76179) Provided verbal/tactile cueing for activities related to improving balance, coordination, kinesthetic sense, posture, motor skill, proprioception  to assist with LE, proximal hip, and core control in self care, mobility, lifting, ambulation and eccentric single leg control.      NMR and Therapeutic Activities:    [] (28936 or 78799) Provided verbal/tactile cueing for activities related to improving balance, coordination, kinesthetic sense, posture, motor skill, proprioception and motor activation to allow for proper function of core, proximal hip and LE with self care and ADLs  [] (40395) Gait Re-education- Provided training and instruction to the patient for proper LE, core and proximal hip recruitment and positioning and eccentric body weight control with ambulation re-education including up and down stairs     Home Exercise Program:    [x] (54752) Reviewed/Progressed HEP activities related to strengthening, flexibility, endurance, ROM of core, proximal hip and LE for functional self-care, mobility, lifting and ambulation/stair navigation   [] (32699)Reviewed/Progressed HEP activities related to improving balance, coordination, kinesthetic sense, posture, motor skill, proprioception of core, proximal hip and LE for self care, mobility, lifting, and ambulation/stair navigation      Manual Treatments:    [] (95521) Provided manual therapy to mobilize LE, proximal hip and/or LS spine soft tissue/joints for the purpose of modulating pain, promoting relaxation,  increasing ROM, reducing/eliminating soft tissue swelling/inflammation/restriction, improving soft tissue extensibility and allowing for proper ROM for normal function with self care, mobility, lifting and ambulation. Modalities:  20' ice, B knees    Charges:  Timed Code Treatment Minutes: 38   Total Treatment Minutes: 58   Time in: 8:35  Time out: 10:30    [] EVAL (LOW) 32286 (typically 20 minutes face-to-face)  [] EVAL (MOD) 78889 (typically 30 minutes face-to-face)  [] EVAL (HIGH) 70534 (typically 45 minutes face-to-face)  [] RE-EVAL     [x] VI(42099) x 3     [] IONTO  [] NMR (00166) x       [] VASO  [] Manual (25447) x      [] Other:  [] TA x      [] Mech Traction (41210)  [] ES(attended) (96548)      [] ES (un) (98025):     GOALS:   Patient stated goal: decrease pain, get better, get stronger   []? Progressing: []? Met: []? Not Met: []? Adjusted     Therapist goals for Patient:   Short Term Goals: To be achieved in: 4 weeks 2/7/20  1. Independent in HEP and progression per patient tolerance, in order to prevent re-injury. []? Progressing: []? Met: []? Not Met: []? Adjusted   2. Patient will have a decrease in pain to facilitate improvement in movement, function, and ADLs as indicated by Functional Deficits. []? Progressing: []? Met: []? Not Met: []? Adjusted      Long Term Goals: To be achieved in: 8 weeks 3/6/20  1.  Disability index score of 33% or less for the [facility] to assist with reaching prior level of function. []? Progressing: []? Met: []? Not Met: []? Adjusted  2. Patient will demonstrate increased AROM to [facility] to allow for proper joint functioning as indicated by patients Functional Deficits. []? Progressing: []? Met: []? Not Met: []? Adjusted  3. Patient will demonstrate an increase in BLE strength to 4/5 or greater to allow for proper functional mobility as indicated by patients Functional Deficits. []? Progressing: []? Met: []? Not Met: []? Adjusted  4. Patient will return to ADLs, IADLs and functional activities without increased symptoms or restriction. []? Progressing: []? Met: []? Not Met: []? Adjusted      Overall Progression Towards Functional goals/ Treatment Progress Update:  [] Patient is progressing as expected towards functional goals listed. [] Progression is slowed due to complexities/Impairments listed. [] Progression has been slowed due to co-morbidities. [x] Plan just implemented, too soon to assess goals progression <30days   [] Goals require adjustment due to lack of progress  [] Patient is not progressing as expected and requires additional follow up with physician   [] Other    Prognosis for POC: [x] Good [] Fair  [] Poor      Patient requires continued skilled intervention: [x] Yes  [] No    Treatment/Activity Tolerance:  [x] Patient able to complete treatment  [] Patient limited by fatigue  [] Patient limited by pain     [] Patient limited by other medical complications  [x] Other: Pt presented to clinic with increased pain. Able to complete clamshells and bridges without an increase in her baseline level of pain. Pt demonstrates good erect posture during sitting balance on SB.  Regressed weight on leg press to 100# d/t anterior knee pain upon arrival to PT, re-educated pt on proper form and to drive through heels rather than through toes, pt stated that it felt better and she was able to perform without increase in pain. Pt did note that knee pain increased mildly on knee ext this date. Held step ups d/t increased knee pain upon arrival to PT. Pt requires PT follow up to address ROM, strength and functional mobility deficits. PLAN: See eval  [x] Continue per plan of care [] Alter current plan (see comments above)   [] Plan of care initiated [] Hold pending MD visit [] Discharge    Electronically signed by:  Vanesa Morales, PT, DPT  Physical Therapist  [id number]  [email or web address]      Note: If patient does not return for scheduled/ recommended follow up visits, this note will serve as a discharge from care along with most recent update on progress.

## 2020-02-25 ENCOUNTER — HOSPITAL ENCOUNTER (OUTPATIENT)
Dept: PHYSICAL THERAPY | Age: 55
Setting detail: THERAPIES SERIES
Discharge: HOME | End: 2020-02-25
Payer: COMMERCIAL

## 2020-02-25 PROCEDURE — 97530 THERAPEUTIC ACTIVITIES: CPT | Performed by: PHYSICAL THERAPIST

## 2020-02-25 PROCEDURE — 97110 THERAPEUTIC EXERCISES: CPT | Performed by: PHYSICAL THERAPIST

## 2020-02-25 PROCEDURE — 97140 MANUAL THERAPY 1/> REGIONS: CPT | Performed by: PHYSICAL THERAPIST

## 2020-02-25 NOTE — FLOWSHEET NOTE
The [address] [place] [address] [facility]      Physical Therapy Daily Treatment Note  Date:  2020    Patient Name:  Kevin Dailey    :  1965  MRN: [id number]  Restrictions/Precautions:    Medical/Treatment Diagnosis Information:  Diagnosis: M47.892 (ICD-10-CM) - Other spondylosis, cervical region  Treatment Diagnosis: M54.2, R53.1 leading to impaired ADLs and IADLs  Insurance/Certification information:  PT Insurance Information: PT BENEFITS  FACILITY/ [facility]/ %/30 VPCY/ AUTH REQUIRED ONLY AFTER 30TH VISIT / 20 PAG  Physician Information:  Referring Practitioner: Dr. Vinh Thompson  Has the plan of care been signed (Y/N):        []  Yes  [x]  No     Date of Patient follow up with Physician:       Is this a Progress Report:     []  Yes  [x]  No        If Yes:  Date Range for reporting period:  Beginning  Ending    Progress report will be due (10 Rx or 30 days whichever is less):        Recertification will be due (POC Duration  / 90 days whichever is less):  3/29/20      Visit # Insurance Allowable Auth Required   6 30 []  Yes [x]  No    Total visits: 14 (treated in conjunction with hip)    Functional Scale:    Date assessed:   NDI 25/50=50% deficit    20    Latex Allergy:  [x]NO      []YES  Preferred Language for Healthcare:   [x]English       []other:    Pain level:  7/10     SUBJECTIVE:  Pt states that her neck is feeling much better, she can now turn her head during stretching without pain/restriction, she is very pleased by this.  She is now feeling most of her discomfort through the shoulder/upper trap area, R > L.    OBJECTIVE:         ROM AROM Comments   Flexion 30 p!  40 end range     Extension 25!  35 end range     Side bend R 35     Side bend L 30     Rotation R 55     Rotation L 47     Shoulder AROM clearing Seated: 90 deg flex/ABD                   Strength L R Comments   Neck flexion (C1-2)     4-/5   Neck side bend (C3) 4-/5 4-/5   Shoulder elevation (C4) 4-/5 4-/5     Shoulder abduction (C5) 4-/5 4-/5     Elbow flexion and /or wrist extension  (C6) 4/5 4/5     Elbow extension and/or wrist flexion  (C7) 4/5 4/5     Thumb extension and/or ulnar deviation ((C8)     Not assessed   Abduction and /or adduction of hand intrinsics (T1)     Not assessed   Shoulder ER 3+/5 3+/5     Shoulder IR 4-/5 4-/5               DTR  Not assessed L R Comments   Biceps (C5,C6)         Brachioradialis (C5,C6)         Triceps (C7,C8)                         Special Tests Results Comments   Foraminal compression test  -     Distraction test -     Upper limb tension test  -    Shoulder abduction(relief) test   -    Vertebral artery test   Not assessed    Valsalva test  Not assessed    Joint play assessments  Hypo mobile R lateral glides  Extension      Dermatomes:  Equal sensation: C1-3, C6-8  Decreased sensation: C4-5 on L     Joint mobility:               []?Normal                      [x]? Hypo              []?Hyper     Palpation: TTP TP of C2-7.  B 1st and 2nd rib     Functional Mobility/Transfers: Independent     Posture: Slightly rounded shoulders and forward head     Gait: Use of WB    RESTRICTIONS/PRECAUTIONS:     Exercises/Interventions:   Exercise/Equipment Resistance/Repetitions Other comments   Stretching/PROM     3 finger rotation x15 rotation    3 finger retraction     UT side bend stretch     Levater scap stretch     Scalene stretch     Pectoral stretch     Table slide 10x10\" flex    OH flex          Isometrics     Cervical Retraction 10x10\" supine    Cervical Flexion      Cervical Extension     Cervical sidebending               PRE's     External Rotation     Internal Rotation     Serratus     Biceps 3x10 3#    Triceps     Shrugs     Horizontal Abd with ER     Reverse Flys     EXT     Flexion     Retraction 10x10\" seated         Cable Column/Theraband     Scapular Retraction 3x10 blue    External Rotation  Noted shoulder pain by end of 2nd set Internal Rotation 3x10 green, B    Ext     TRIC     Lats     Shrugs     Flex     BIC     W ER 3x10 green               Manual Intervention     Cerv mobs/manip     Thoracic mobs/manip     CT manip     Rib mobilizations     STM 8' B UT/LS     Therapeutic Exercise and NMR EXR  [x] (25000) Provided verbal/tactile cueing for activities related to strengthening, flexibility, endurance, ROM  for improvements in cervical, postural, scapular, scapulothoracic and UE control with self care, reaching, carrying, lifting, house/yardwork, driving/computer work.    [] (72742) Provided verbal/tactile cueing for activities related to improving balance, coordination, kinesthetic sense, posture, motor skill, proprioception  to assist with cervical, scapular, scapulothoracic and UE control with self care, reaching, carrying, lifting, house/yardwork, driving/computer work. Therapeutic Activities:    [] (34220 or 45069) Provided verbal/tactile cueing for activities related to improving balance, coordination, kinesthetic sense, posture, motor skill, proprioception and motor activation to allow for proper function of cervical, scapular, scapulothoracic and UE control with self care, carrying, lifting, driving/computer work. Home Exercise Program:  Pt access code: Calvin Bruce  Initiated 1/29/20. Printed hand out given. Pt demonstrated proper form of each exercise and expressed verbal understanding of frequency and duration. Updated 2/4/20. Printed hand out provided. Pt demonstrated proper form of each exercise and expressed verbal understanding of frequency and duration.   [x] (76033) Reviewed/Progressed HEP activities related to strengthening, flexibility, endurance, ROM of cervical, scapular, scapulothoracic and UE control with self care, reaching, carrying, lifting, house/yardwork, driving/computer work  [] (50710) Reviewed/Progressed HEP activities related to improving balance, coordination, kinesthetic sense, posture, motor skill, proprioception of cervical, scapular, scapulothoracic and UE control with self care, reaching, carrying, lifting, house/yardwork, driving/computer work      Manual Treatments:    [] (63844) Provided manual therapy to mobilize soft tissue/joints of cervical/CT, scapular GHJ and UE for the purpose of decreasing headache, modulating pain, promoting relaxation,  increasing ROM, reducing/eliminating soft tissue swelling/inflammation/restriction, improving soft tissue extensibility and allowing for proper ROM for normal function with self care, reaching, carrying, lifting, house/yardwork, driving/computer work    Modalities:  None    Charges:  Timed Code Treatment Minutes: 23   Total Treatment Minutes: 23   Time in: 10:20  Time out: 11:05    [] EVAL (LOW) 05425 (typically 20 minutes face-to-face)  [] EVAL (MOD) 08501 (typically 30 minutes face-to-face)  [] EVAL (HIGH) 36832 (typically 45 minutes face-to-face)  [] RE-EVAL     [x] YV(85050) x 1    [] IONTO  [] NMR (39344) x     [] VASO  [x] Manual (97305) x 1     [] Other:  [] TA x      [] Mech Traction (71046)  [] ES(attended) (61272)      [] ES (un) (94579):     GOALS:  Patient stated goal: get better    []? Progressing: []? Met: []? Not Met: []? Adjusted      Therapist goals for Patient:   Short Term Goals: To be achieved in: 2 weeks 2/12/20  1. Independent in HEP and progression per patient tolerance, in order to prevent re-injury. []? Progressing: []? Met: []? Not Met: []? Adjusted   2. Patient will have a decrease in pain to facilitate improvement in movement, function, and ADLs as indicated by Functional Deficits. []? Progressing: []? Met: []? Not Met: []? Adjusted     Long Term Goals: To be achieved in: 8 weeks 3/25/20  1. Disability index score of 20% or less for the NDI to assist with reaching prior level of function. []? Progressing: []? Met: []? Not Met: []? Adjusted  2.  Patient will demonstrate increased AROM to WNL of cervical/thoracic spine to allow

## 2020-02-25 NOTE — FLOWSHEET NOTE
The [address] [place] [address] and Sports [facility]    Physical Therapy Daily Treatment Note  Date:  2020    Patient Name:  Jason Lema    :  1965  MRN: [id number]  Restrictions/Precautions:    Medical/Treatment Diagnosis Information:  · Diagnosis: M70.61, M70.62 (ICD-10-CM) - Trochanteric bursitis of both hips  · Treatment Diagnosis: M54.5, M70.71, M70.72, M25.551, M25.552; leading to impaired ADLs and IADLs  Insurance/Certification information:  PT Insurance Information: PT BENEFITS  FACILITY/ [facility]/ %/30 VPCY/ AUTH REQUIRED ONLY AFTER 30TH VISIT / 20 PAG  Physician Information:  Referring Practitioner: Raul Fontana. Ramon Wilson MD  Has the plan of care been signed (Y/N):        []  Yes  [x]  No     Date of Patient follow up with Physician: Not scheduled at this time      Is this a Progress Report:     []  Yes  [x]  No      If Yes:  Date Range for reporting period:  Beginning   Ending     Progress report will be due (10 Rx or 30 days whichever is less):       Recertification will be due (POC Duration  / 90 days whichever is less): 3/6/20         Visit # Insurance Allowable Auth Required   14 30 []  Yes [x]  No        Functional Scale:    Date assessed:    WOMAC 64/96=66.7% deficit   1/10/20  WOMAC  L hip 65/96=67.7% deficit   20  R hip =1.1%     20  (3 questions omitted on R d/t L hip)    Latex Allergy:  [x]NO      []YES  Preferred Language for Healthcare:   [x]English       []other:      Pain level:  6/10     SUBJECTIVE:   present. Pt states that her hips feeling better, but her knees still hurt. Knee pain continues to be in the front of the knee, over the knee cap and below the knee cap. Knee 7/10, hip 6/10. OBJECTIVE:              ROM PROM AROM Comments     Left Right Left Right     Flexion 110 90 90 90    Extension  10  10     Assessed in sidelying. Pt unable to lay prone.    Abduction  20  15        Adduction         Not assessed   ER 45 35     stretch   IR 10 20                          Flexibility Left Right Comments   Hamstrings SLR 75 SLR 75 Reports LBP   ITB (Obers test)  -  -    Hip flexor(Afshin test)     Not assessed   gastroc + tightness + tightness    Rectus femoris  Mild tightness Mild tightness Assessed in sidelying, pt can't lay prone                Special  Test Left Right Comments   FABERS  + tightness + tightness     Scour test  -  -     Trendelenburg test     Not assessed- can't perform SLS                Strength Left Right Comments   Hip flexors 4-/5 4-/5 Back p! Hip extension 4-/5 4-/5 Back p! Hip abduction 4-/5 4-/5    Hip adduction 4-/5 4-/5     Hip ER 4-/5 4-/5  groin p! Hip IR 4/5 4/5  groin p! Quads 4-/5 4-/5    Hamstrings 4-/5 4-/5        Joint mobility: Not assessed              []?Normal                       []?Hypo              []?Hyper     Palpation: generalized TTP around lateral hip     Functional Mobility/Transfers: Independent but with modifications.     Posture: Seated posture WNL. Forward flexed posture when standing     Gait: Pt uses automatic WC for ambulation. She is able to walk short distance to get from chair to treatment table but she requires UE support on table, she is not able to walk independently without support     Dermatomes: sensation equal B, though progressive decreased in sensation from proximal to distal from L1-5.  No sensation at S1-2      RESTRICTIONS/PRECAUTIONS:     Exercises/Interventions:     ROM/STRETCHES     Seated hamstring  3x30\" B    Incline calf 3x30\"    Supine piriformis     Seated figure 4 3x30\" B    Standing hip flexion    HEP    ITB     Butterfly     Hip flexor stretch kneeling     PREs     Glute sets Seated   ADD sets Seated; HEP   Quad sets    TA brace    Hooklying heel lift x10 B Lift foot 1-2\" from table             Supine SLR Attempted Caused sharp pain in groin; after 2nd rep on L, after 5th rep on R   LAQ     Clamshells 3x15, black tied band Seated, back supported   Bridges 3x15 DL, black tied band    Step ups   3x15 L2, B    Side stepping 2x passes, blue tied band Along half wall for support   Standing ABD SLR               Quantum knee ext 3x10 DL 20# Range: 90-30   Quantum knee flex 3x10 DL 30/25/25# Range: available   Quantum leg press 3x10 # Range: 90-10        Balance     DLS, FT, EO AirexTandem stance     Static sitting 3x30\" SB         Manual interventions            Plan for next session: progress as tolerated, DLS FT EC airex, standing hip ABD    Patient Education:  2/20/20 Pt had questions in regards to knee joint and TKA. Extensive time spent educating patient on the knee joint anatomy, OA and TKA procedure. Verbal education with use of knee model. Therapeutic Exercise and NMR EXR  [x] (99678) Provided verbal/tactile cueing for activities related to strengthening, flexibility, endurance, ROM for improvements in LE, proximal hip, and core control with self care, mobility, lifting, ambulation.  [] (15625) Provided verbal/tactile cueing for activities related to improving balance, coordination, kinesthetic sense, posture, motor skill, proprioception  to assist with LE, proximal hip, and core control in self care, mobility, lifting, ambulation and eccentric single leg control.      NMR and Therapeutic Activities:    [] (48235 or 61325) Provided verbal/tactile cueing for activities related to improving balance, coordination, kinesthetic sense, posture, motor skill, proprioception and motor activation to allow for proper function of core, proximal hip and LE with self care and ADLs  [] (05451) Gait Re-education- Provided training and instruction to the patient for proper LE, core and proximal hip recruitment and positioning and eccentric body weight control with ambulation re-education including up and down stairs     Home Exercise Program:    [x] (33489) Reviewed/Progressed HEP activities related to strengthening, flexibility, endurance, assist with reaching prior level of function. [x]? Progressing: []? Met: []? Not Met: []? Adjusted  2. Patient will demonstrate increased AROM to [facility] to allow for proper joint functioning as indicated by patients Functional Deficits. [x]? Progressing: []? Met: []? Not Met: []? Adjusted  3. Patient will demonstrate an increase in BLE strength to 4/5 or greater to allow for proper functional mobility as indicated by patients Functional Deficits. [x]? Progressing: []? Met: []? Not Met: []? Adjusted  4. Patient will return to ADLs, IADLs and functional activities without increased symptoms or restriction. [x]? Progressing: []? Met: []? Not Met: []? Adjusted      Overall Progression Towards Functional goals/ Treatment Progress Update:  [x] Patient is progressing as expected towards functional goals listed. [] Progression is slowed due to complexities/Impairments listed. [] Progression has been slowed due to co-morbidities. [] Plan just implemented, too soon to assess goals progression <30days   [] Goals require adjustment due to lack of progress  [] Patient is not progressing as expected and requires additional follow up with physician   [] Other    Prognosis for POC: [x] Good [] Fair  [] Poor      Patient requires continued skilled intervention: [x] Yes  [] No    Treatment/Activity Tolerance:  [x] Patient able to complete treatment  [] Patient limited by fatigue  [] Patient limited by pain     [] Patient limited by other medical complications  [x] Other: Pt exhibits improved AROM hip flexion, ER remains WNL, no change in IR. Pt continues to note exacerbation of back pain with end range hip motions. Pt no longer has hip pain with SLR, though continues to note LBP. Pt exhibits improved strength since IE, greatest deficits remains in hip flex/abd and knee ext.  Pt exhibits functional weakness as noted by limited standing/walking tolerance, difficulty with sit<>stand (requires assist of push off from UEs) and fatigue at low weights on weight machines. Pt has improved tolerance to exercise program and demonstrates improved mobility around the clinic, she can get on and off the treatment table and in and out of her WC with greater ease. She continues to be limited in standing exercises d/t LBP and requires seated rest breaks as a result of the pain. Pt able to complete step ups this date without exacerbation of knee pain, returned to L2 step as L3 exacerbated knee pain, but increased reps to 15 and pt tolerated well. Pt able to tolerate increase weight on OKC knee ext this date, attempted increase weight on knee flex, tolerated for 10 reps but then too tiring, completed remaining sets on 25#. Pt requires PT follow up to address ROM, strength and functional mobility deficits. Pt exhibits bruising over medial lower leg, just below calf, states that this has been present for several months and continues to be painful, pt states at the beginning of January she had an US to r/o blood clot and the US was negative. Pt states that her leg can get red, painful and swollen. At beginning of session no redness or warmth noted over bruise, as session progressed pt's L ankle became red and warm to the touch. Pt's L ankle is swollen in comparison to her R. Advised pt to contact PCP for appropriate course of action. PLAN: See eval  [x] Continue per plan of care [] Alter current plan (see comments above)   [] Plan of care initiated [] Hold pending MD visit [] Discharge    Electronically signed by:  Berry Brunner, PT, DPT  Physical Therapist  [id number]  [email or web address]      Note: If patient does not return for scheduled/ recommended follow up visits, this note will serve as a discharge from care along with most recent update on progress.

## 2020-02-26 ENCOUNTER — TELEPHONE (OUTPATIENT)
Dept: PAIN MANAGEMENT | Age: 55
End: 2020-02-26

## 2020-02-26 ENCOUNTER — OFFICE VISIT (OUTPATIENT)
Dept: SURGERY | Age: 55
End: 2020-02-26
Payer: COMMERCIAL

## 2020-02-26 VITALS
RESPIRATION RATE: 16 BRPM | DIASTOLIC BLOOD PRESSURE: 84 MMHG | HEIGHT: 67 IN | SYSTOLIC BLOOD PRESSURE: 148 MMHG | HEART RATE: 78 BPM | BODY MASS INDEX: 35.94 KG/M2 | TEMPERATURE: 98 F | OXYGEN SATURATION: 99 % | WEIGHT: 229 LBS

## 2020-02-26 PROCEDURE — 3017F COLORECTAL CA SCREEN DOC REV: CPT | Performed by: SURGERY

## 2020-02-26 PROCEDURE — G8482 FLU IMMUNIZE ORDER/ADMIN: HCPCS | Performed by: SURGERY

## 2020-02-26 PROCEDURE — 99214 OFFICE O/P EST MOD 30 MIN: CPT | Performed by: SURGERY

## 2020-02-26 PROCEDURE — 1036F TOBACCO NON-USER: CPT | Performed by: SURGERY

## 2020-02-26 PROCEDURE — G8427 DOCREV CUR MEDS BY ELIG CLIN: HCPCS | Performed by: SURGERY

## 2020-02-26 PROCEDURE — G8417 CALC BMI ABV UP PARAM F/U: HCPCS | Performed by: SURGERY

## 2020-02-26 RX ORDER — OXYCODONE HYDROCHLORIDE AND ACETAMINOPHEN 5; 325 MG/1; MG/1
1 TABLET ORAL EVERY 8 HOURS PRN
Qty: 15 TABLET | Refills: 0 | Status: CANCELLED | OUTPATIENT
Start: 2020-02-26 | End: 2020-03-02

## 2020-02-26 NOTE — PROGRESS NOTES
physiology were discussed with the patient. She was counseled on ideal medical optimization (nutrition, weight stability, etc.). We also discussed the pros & cons of staging for multiple procedures including controlling tension from various sites and postoperative care managment. Clinical photos were obtained. The risks, benefits, alternatives, outcomes, personnel involved were discussed and all questions were answered in a satisfactory manner according to the patient. Specifically,the risks including, but not limited to: bleeding possibly requiring transfusion orreoperation, infection, seroma, nonhealing of wounds, poor cosmetic outcome, scarring, VTE (DVT/PE), and death were discussed.       Patria Gottlieb MD  [facility]  02/26/20

## 2020-02-27 ENCOUNTER — HOSPITAL ENCOUNTER (OUTPATIENT)
Dept: PHYSICAL THERAPY | Age: 55
Setting detail: THERAPIES SERIES
Discharge: HOME | End: 2020-02-27
Payer: COMMERCIAL

## 2020-02-27 ENCOUNTER — TELEPHONE (OUTPATIENT)
Dept: PRIMARY CARE CLINIC | Age: 55
End: 2020-02-27

## 2020-02-27 PROCEDURE — 97530 THERAPEUTIC ACTIVITIES: CPT | Performed by: PHYSICAL THERAPIST

## 2020-02-27 PROCEDURE — 97140 MANUAL THERAPY 1/> REGIONS: CPT | Performed by: PHYSICAL THERAPIST

## 2020-02-27 PROCEDURE — 97110 THERAPEUTIC EXERCISES: CPT | Performed by: PHYSICAL THERAPIST

## 2020-02-27 RX ORDER — OXYCODONE HYDROCHLORIDE AND ACETAMINOPHEN 5; 325 MG/1; MG/1
1 TABLET ORAL EVERY 8 HOURS PRN
Qty: 20 TABLET | Refills: 0 | Status: SHIPPED | OUTPATIENT
Start: 2020-02-27 | End: 2020-03-05

## 2020-02-27 NOTE — TELEPHONE ENCOUNTER
The pt called and LVM asking for our office to call her back so she can make her f/u appt. I called and got the pt scheduled for 3/6/20 @ 9:15 AM. Pt is now asking if she can get a refill till her f/u appt?

## 2020-02-27 NOTE — PROGRESS NOTES
The [address] [place] [address] [facility]      Physical Therapy Daily Treatment Note  Date:  2020    Patient Name:  Vira Ramirez    :  1965  MRN: [id number]  Restrictions/Precautions:    Medical/Treatment Diagnosis Information:  Diagnosis: M47.892 (ICD-10-CM) - Other spondylosis, cervical region  Treatment Diagnosis: M54.2, R53.1 leading to impaired ADLs and IADLs  Insurance/Certification information:  PT Insurance Information: PT BENEFITS  FACILITY/ [facility]/ %/30 VPCY/ AUTH REQUIRED ONLY AFTER 30TH VISIT / 20 PAG  Physician Information:  Referring Practitioner: Dr. Susan Chan  Has the plan of care been signed (Y/N):        []  Yes  [x]  No     Date of Patient follow up with Physician:       Is this a Progress Report:     []  Yes  [x]  No        If Yes:  Date Range for reporting period:  Beginning  Ending    Progress report will be due (10 Rx or 30 days whichever is less):        Recertification will be due (POC Duration  / 90 days whichever is less):  3/29/20      Visit # Insurance Allowable Auth Required   7 30 []  Yes [x]  No    Total visits: 15 (treated in conjunction with hip)    Functional Scale:    Date assessed:   NDI 25/50=50% deficit    20  NDI NPV    Latex Allergy:  [x]NO      []YES  Preferred Language for Healthcare:   [x]English       []other:    Pain level:  7/10     SUBJECTIVE:  Pt states that her neck continues to feel good. Primary complaint is now her shoulders. OBJECTIVE:         ROM AROM Comments   Flexion 40     Extension 40  p!  Along superior shoulder/upper trap   Side bend R 35     Side bend L 30     Rotation R 40    Rotation L 48     Shoulder AROM clearing Seated: 90 deg flex/ABD                   Strength L R Comments   Neck flexion (C1-2)     4-/5   Neck side bend (C3) 4-/5 4-/5     Shoulder elevation (C4) 4-/5 4-/5     Shoulder abduction (C5) 4-/5 4-/5     Elbow flexion and /or wrist extension  (C6) 4/5 4/5   Retraction 3x10 blue    External Rotation  Noted shoulder pain by end of 2nd set   Internal Rotation 3x10 green, B    Ext     TRIC     Lats     Shrugs     Flex     BIC     W ER 3x10 green               Manual Intervention     Cerv mobs/manip     Thoracic mobs/manip     CT manip     Rib mobilizations 3' R 1st rib, grade 3, pt supine   STM 8' B UT/LS     Therapeutic Exercise and NMR EXR  [x] (22694) Provided verbal/tactile cueing for activities related to strengthening, flexibility, endurance, ROM  for improvements in cervical, postural, scapular, scapulothoracic and UE control with self care, reaching, carrying, lifting, house/yardwork, driving/computer work.    [] (46595) Provided verbal/tactile cueing for activities related to improving balance, coordination, kinesthetic sense, posture, motor skill, proprioception  to assist with cervical, scapular, scapulothoracic and UE control with self care, reaching, carrying, lifting, house/yardwork, driving/computer work. Therapeutic Activities:    [] (98877 or 98095) Provided verbal/tactile cueing for activities related to improving balance, coordination, kinesthetic sense, posture, motor skill, proprioception and motor activation to allow for proper function of cervical, scapular, scapulothoracic and UE control with self care, carrying, lifting, driving/computer work. Home Exercise Program:  Pt access code: Rich Camejo  Initiated 1/29/20. Printed hand out given. Pt demonstrated proper form of each exercise and expressed verbal understanding of frequency and duration. Updated 2/4/20. Printed hand out provided. Pt demonstrated proper form of each exercise and expressed verbal understanding of frequency and duration.   [x] (44476) Reviewed/Progressed HEP activities related to strengthening, flexibility, endurance, ROM of cervical, scapular, scapulothoracic and UE control with self care, reaching, carrying, lifting, house/yardwork, driving/computer work  [] (55953) Reviewed/Progressed HEP activities related to improving balance, coordination, kinesthetic sense, posture, motor skill, proprioception of cervical, scapular, scapulothoracic and UE control with self care, reaching, carrying, lifting, house/yardwork, driving/computer work      Manual Treatments:    [] (35792) Provided manual therapy to mobilize soft tissue/joints of cervical/CT, scapular GHJ and UE for the purpose of decreasing headache, modulating pain, promoting relaxation,  increasing ROM, reducing/eliminating soft tissue swelling/inflammation/restriction, improving soft tissue extensibility and allowing for proper ROM for normal function with self care, reaching, carrying, lifting, house/yardwork, driving/computer work    Modalities:  None    Charges:  Timed Code Treatment Minutes: 57   Total Treatment Minutes: 57   Time in: 8:40  Time out: 9:45    [] EVAL (LOW) 08285 (typically 20 minutes face-to-face)  [] EVAL (MOD) 74204 (typically 30 minutes face-to-face)  [] EVAL (HIGH) 15984 (typically 45 minutes face-to-face)  [] RE-EVAL     [x] GN(69205) x 3    [] IONTO  [] NMR (79498) x     [] VASO  [x] Manual (68401) x 1     [] Other:  [] TA x      [] Mech Traction (80080)  [] ES(attended) (48547)      [] ES (un) (31793):     GOALS:  Patient stated goal: get better    []? Progressing: []? Met: []? Not Met: []? Adjusted      Therapist goals for Patient:   Short Term Goals: To be achieved in: 2 weeks 2/12/20  1. Independent in HEP and progression per patient tolerance, in order to prevent re-injury. []? Progressing: []? Met: []? Not Met: []? Adjusted   2. Patient will have a decrease in pain to facilitate improvement in movement, function, and ADLs as indicated by Functional Deficits. []? Progressing: []? Met: []? Not Met: []? Adjusted     Long Term Goals: To be achieved in: 8 weeks 3/25/20  1. Disability index score of 20% or less for the NDI to assist with reaching prior level of function. []?  Progressing: []? Met: []? Not Met: []? Adjusted  2. Patient will demonstrate increased AROM to WNL of cervical/thoracic spine to allow for proper joint functioning as indicated by patients Functional Deficits. []? Progressing: []? Met: []? Not Met: []? Adjusted  Patient will demonstrate an increase in postural awareness and control and activation of  Deep cervical stabilizers to allow for proper functional mobility as indicated by patients Functional Deficits. []? Progressing: []? Met: []? Not Met: []? Adjusted  4. Patient will return to ADLs, IADLs and functional activities without increased symptoms or restriction. []? Progressing: []? Met: []? Not Met: []? Adjusted  5. Patient will be able to swim without neck pain. []? Progressing: []? Met: []? Not Met: []? Adjusted       Overall Progression Towards Functional goals/ Treatment Progress Update:  [] Patient is progressing as expected towards functional goals listed. [] Progression is slowed due to complexities/Impairments listed. [] Progression has been slowed due to co-morbidities. [x] Plan just implemented, too soon to assess goals progression <30days   [] Goals require adjustment due to lack of progress  [] Patient is not progressing as expected and requires additional follow up with physician  [] Other    Prognosis for POC: [x] Good [] Fair  [] Poor      Patient requires continued skilled intervention: [x] Yes  [] No    Treatment/Activity Tolerance:  [x] Patient able to complete treatment  [] Patient limited by fatigue  [] Patient limited by pain     [] Patient limited by other medical complications   [] Other: Pt exhibits pain free cervical AROM which was painful at IE; however, ROM deficits persist. Pt does presents with regressed R rotation from IE states she feels like her neck won't go further. Pt exhibits equal sensation to light touch at all UE dermatomes. Pt exhibts soreness/TTP at B UT, R > L. She not longer has TTP along the cervical spine.  Pt exhibits improved ER and IR strength; flex and ABD not assessed at IE though strength deficits are noted at this time. L shoulder is more painful per subjective reports, though pt exhibits greater TTP and strength deficits on R. Added serratus punches and SL ER to program this date, pt noted R superior shoulder pain following 10 reps of punches and superior shoulder B following final set of SL ER. Pt tolerated R rotation stretching well, though noted her R shoulder got tired from having to hold it up for the stretch. Pt requires PT follow up to address ROM, strength and functional mobility deficits. PLAN: See eval  [x] Continue per plan of care [] Alter current plan (see comments above)  [] Plan of care initiated [] Hold pending MD visit [] Discharge      Electronically signed by:  May Way PT, DPT  Physical Therapist  [id number]  [email or web address]    Note: If patient does not return for scheduled/ recommended follow up visits, this note will serve as a discharge from care along with most recent update on progress.

## 2020-02-27 NOTE — TELEPHONE ENCOUNTER
Pt stated that she recently had an ultrasound on her leg for pain. The Ultrasound showed that there were no blood clots. She is still experiencing the pain and wanting to know what she should do for her symptoms?

## 2020-02-27 NOTE — FLOWSHEET NOTE
The [address] [place] [address] and Sports [facility]    Physical Therapy Daily Treatment Note  Date:  2020    Patient Name:  Ewa Antoine    :  1965  MRN: [id number]  Restrictions/Precautions:    Medical/Treatment Diagnosis Information:  · Diagnosis: M70.61, M70.62 (ICD-10-CM) - Trochanteric bursitis of both hips  · Treatment Diagnosis: M54.5, M70.71, M70.72, M25.551, M25.552; leading to impaired ADLs and IADLs  Insurance/Certification information:  PT Insurance Information: PT BENEFITS  FACILITY/ [facility]/ %/30 VPCY/ AUTH REQUIRED ONLY AFTER 30TH VISIT / 20 PAG  Physician Information:  Referring Practitioner: Rosi Warren MD  Has the plan of care been signed (Y/N):        []  Yes  [x]  No     Date of Patient follow up with Physician: Not scheduled at this time      Is this a Progress Report:     []  Yes  [x]  No      If Yes:  Date Range for reporting period:  Beginning   Ending     Progress report will be due (10 Rx or 30 days whichever is less):       Recertification will be due (POC Duration  / 90 days whichever is less): 3/6/20         Visit # Insurance Allowable Auth Required   14 30 []  Yes [x]  No        Functional Scale:    Date assessed:    WOMAC 64/96=66.7% deficit   1/10/20  WOMAC  L hip 65/96=67.7% deficit   20  R hip =1.1%     20  (3 questions omitted on R d/t L hip)    Latex Allergy:  [x]NO      []YES  Preferred Language for Healthcare:   [x]English       []other:      Pain level:  6/10     SUBJECTIVE:   present. Hips continue to feel better, but knees still hurt. OBJECTIVE:              ROM PROM AROM Comments     Left Right Left Right     Flexion 110 90 90 90    Extension  10  10     Assessed in sidelying. Pt unable to lay prone.    Abduction  20  15        Adduction         Not assessed   ER 45 35     stretch   IR 10 20                          Flexibility Left Right Comments   Hamstrings SLR 75 SLR Along half wall for support   Standing ABD SLR               Quantum knee ext 3x10 DL 20# Range: 90-30   Quantum knee flex 3x10 DL 25# Range: available   Quantum leg press 3x10 # Range: 90-10        Balance     DLS, FT, EO AirexTandem stance     Static sitting 3x30\" SB         Manual interventions            Plan for next session: progress as tolerated, DLS FT EC airex, standing hip ABD    Patient Education:  2/20/20 Pt had questions in regards to knee joint and TKA. Extensive time spent educating patient on the knee joint anatomy, OA and TKA procedure. Verbal education with use of knee model. Therapeutic Exercise and NMR EXR  [x] (18734) Provided verbal/tactile cueing for activities related to strengthening, flexibility, endurance, ROM for improvements in LE, proximal hip, and core control with self care, mobility, lifting, ambulation.  [] (17123) Provided verbal/tactile cueing for activities related to improving balance, coordination, kinesthetic sense, posture, motor skill, proprioception  to assist with LE, proximal hip, and core control in self care, mobility, lifting, ambulation and eccentric single leg control.      NMR and Therapeutic Activities:    [] (05478 or 13611) Provided verbal/tactile cueing for activities related to improving balance, coordination, kinesthetic sense, posture, motor skill, proprioception and motor activation to allow for proper function of core, proximal hip and LE with self care and ADLs  [] (20274) Gait Re-education- Provided training and instruction to the patient for proper LE, core and proximal hip recruitment and positioning and eccentric body weight control with ambulation re-education including up and down stairs     Home Exercise Program:    [x] (86751) Reviewed/Progressed HEP activities related to strengthening, flexibility, endurance, ROM of core, proximal hip and LE for functional self-care, mobility, lifting and ambulation/stair navigation   []

## 2020-03-02 ENCOUNTER — OFFICE VISIT (OUTPATIENT)
Dept: PRIMARY CARE CLINIC | Age: 55
End: 2020-03-02
Payer: COMMERCIAL

## 2020-03-02 VITALS
OXYGEN SATURATION: 99 % | DIASTOLIC BLOOD PRESSURE: 66 MMHG | HEART RATE: 54 BPM | TEMPERATURE: 98.4 F | WEIGHT: 229.6 LBS | BODY MASS INDEX: 36.03 KG/M2 | HEIGHT: 67 IN | SYSTOLIC BLOOD PRESSURE: 110 MMHG

## 2020-03-02 DIAGNOSIS — R20.2 NUMBNESS AND TINGLING OF BOTH FEET: ICD-10-CM

## 2020-03-02 DIAGNOSIS — L03.119 CELLULITIS OF LOWER EXTREMITY, UNSPECIFIED LATERALITY: ICD-10-CM

## 2020-03-02 DIAGNOSIS — R11.0 NAUSEA: ICD-10-CM

## 2020-03-02 DIAGNOSIS — R20.0 NUMBNESS AND TINGLING OF BOTH FEET: ICD-10-CM

## 2020-03-02 DIAGNOSIS — R10.13 EPIGASTRIC ABDOMINAL PAIN: ICD-10-CM

## 2020-03-02 DIAGNOSIS — R10.9 BILATERAL FLANK PAIN: ICD-10-CM

## 2020-03-02 DIAGNOSIS — E55.9 VITAMIN D DEFICIENCY: ICD-10-CM

## 2020-03-02 PROBLEM — K59.00 CONSTIPATION: Status: ACTIVE | Noted: 2020-03-02

## 2020-03-02 PROBLEM — K64.9 HEMORRHOIDS: Status: ACTIVE | Noted: 2020-03-02

## 2020-03-02 PROBLEM — M79.605 BILATERAL LEG PAIN: Status: ACTIVE | Noted: 2020-03-02

## 2020-03-02 PROBLEM — M79.604 BILATERAL LEG PAIN: Status: ACTIVE | Noted: 2020-03-02

## 2020-03-02 PROBLEM — E11.9 TYPE 2 DIABETES MELLITUS WITHOUT COMPLICATION, WITHOUT LONG-TERM CURRENT USE OF INSULIN (HCC): Status: RESOLVED | Noted: 2018-02-26 | Resolved: 2020-03-02

## 2020-03-02 LAB
A/G RATIO: 1.6 (ref 1.1–2.2)
ALBUMIN SERPL-MCNC: 3.9 G/DL (ref 3.4–5)
ALP BLD-CCNC: 65 U/L (ref 40–129)
ALT SERPL-CCNC: 12 U/L (ref 10–40)
ANION GAP SERPL CALCULATED.3IONS-SCNC: 16 MMOL/L (ref 3–16)
AST SERPL-CCNC: 13 U/L (ref 15–37)
BASOPHILS ABSOLUTE: 0 K/UL (ref 0–0.2)
BASOPHILS RELATIVE PERCENT: 0.4 %
BILIRUB SERPL-MCNC: 0.5 MG/DL (ref 0–1)
BILIRUBIN, POC: ABNORMAL
BLOOD URINE, POC: ABNORMAL
BUN BLDV-MCNC: 14 MG/DL (ref 7–20)
CALCIUM SERPL-MCNC: 8.9 MG/DL (ref 8.3–10.6)
CHLORIDE BLD-SCNC: 103 MMOL/L (ref 99–110)
CLARITY, POC: CLEAR
CO2: 24 MMOL/L (ref 21–32)
COLOR, POC: YELLOW
CREAT SERPL-MCNC: <0.5 MG/DL (ref 0.6–1.1)
EOSINOPHILS ABSOLUTE: 0 K/UL (ref 0–0.6)
EOSINOPHILS RELATIVE PERCENT: 0.8 %
GFR AFRICAN AMERICAN: >60
GFR NON-AFRICAN AMERICAN: >60
GLOBULIN: 2.4 G/DL
GLUCOSE BLD-MCNC: 80 MG/DL (ref 70–99)
GLUCOSE URINE, POC: ABNORMAL
HCT VFR BLD CALC: 35.9 % (ref 36–48)
HEMOGLOBIN: 11.9 G/DL (ref 12–16)
KETONES, POC: ABNORMAL
LEUKOCYTE EST, POC: ABNORMAL
LYMPHOCYTES ABSOLUTE: 1.7 K/UL (ref 1–5.1)
LYMPHOCYTES RELATIVE PERCENT: 32.2 %
MCH RBC QN AUTO: 29.1 PG (ref 26–34)
MCHC RBC AUTO-ENTMCNC: 33 G/DL (ref 31–36)
MCV RBC AUTO: 88.1 FL (ref 80–100)
MONOCYTES ABSOLUTE: 0.4 K/UL (ref 0–1.3)
MONOCYTES RELATIVE PERCENT: 7.1 %
NEUTROPHILS ABSOLUTE: 3.2 K/UL (ref 1.7–7.7)
NEUTROPHILS RELATIVE PERCENT: 59.5 %
NITRITE, POC: ABNORMAL
PDW BLD-RTO: 15.9 % (ref 12.4–15.4)
PH, POC: 7
PLATELET # BLD: 275 K/UL (ref 135–450)
PMV BLD AUTO: 8.3 FL (ref 5–10.5)
POTASSIUM SERPL-SCNC: 4.2 MMOL/L (ref 3.5–5.1)
PROTEIN, POC: ABNORMAL
RBC # BLD: 4.08 M/UL (ref 4–5.2)
SODIUM BLD-SCNC: 143 MMOL/L (ref 136–145)
SPECIFIC GRAVITY, POC: 1.03
TOTAL PROTEIN: 6.3 G/DL (ref 6.4–8.2)
UROBILINOGEN, POC: 0.2
VITAMIN D 25-HYDROXY: 31.4 NG/ML
WBC # BLD: 5.3 K/UL (ref 4–11)

## 2020-03-02 PROCEDURE — 99214 OFFICE O/P EST MOD 30 MIN: CPT | Performed by: INTERNAL MEDICINE

## 2020-03-02 PROCEDURE — 81002 URINALYSIS NONAUTO W/O SCOPE: CPT | Performed by: INTERNAL MEDICINE

## 2020-03-02 PROCEDURE — G8482 FLU IMMUNIZE ORDER/ADMIN: HCPCS | Performed by: INTERNAL MEDICINE

## 2020-03-02 PROCEDURE — 3017F COLORECTAL CA SCREEN DOC REV: CPT | Performed by: INTERNAL MEDICINE

## 2020-03-02 PROCEDURE — 1036F TOBACCO NON-USER: CPT | Performed by: INTERNAL MEDICINE

## 2020-03-02 PROCEDURE — G8417 CALC BMI ABV UP PARAM F/U: HCPCS | Performed by: INTERNAL MEDICINE

## 2020-03-02 PROCEDURE — G8427 DOCREV CUR MEDS BY ELIG CLIN: HCPCS | Performed by: INTERNAL MEDICINE

## 2020-03-02 RX ORDER — POLYETHYLENE GLYCOL 3350 17 G/17G
17 POWDER, FOR SOLUTION ORAL DAILY
Qty: 510 G | Refills: 0 | Status: SHIPPED | OUTPATIENT
Start: 2020-03-02 | End: 2020-04-08 | Stop reason: SDUPTHER

## 2020-03-02 RX ORDER — ONDANSETRON 4 MG/1
4 TABLET, FILM COATED ORAL EVERY 8 HOURS PRN
Qty: 30 TABLET | Refills: 0 | Status: SHIPPED | OUTPATIENT
Start: 2020-03-02 | End: 2020-04-08 | Stop reason: SDUPTHER

## 2020-03-02 RX ORDER — HYDROCORTISONE ACETATE 25 MG/1
25 SUPPOSITORY RECTAL EVERY 12 HOURS
Qty: 12 SUPPOSITORY | Refills: 1 | Status: SHIPPED | OUTPATIENT
Start: 2020-03-02 | End: 2021-06-15

## 2020-03-02 RX ORDER — DOCUSATE SODIUM 100 MG/1
100 CAPSULE, LIQUID FILLED ORAL 2 TIMES DAILY
Qty: 60 CAPSULE | Refills: 1 | Status: SHIPPED | OUTPATIENT
Start: 2020-03-02 | End: 2020-04-08 | Stop reason: SDUPTHER

## 2020-03-02 RX ORDER — OMEPRAZOLE 20 MG/1
20 CAPSULE, DELAYED RELEASE ORAL DAILY
Qty: 30 CAPSULE | Refills: 3 | Status: SHIPPED | OUTPATIENT
Start: 2020-03-02 | End: 2020-08-03

## 2020-03-02 RX ORDER — CEPHALEXIN 500 MG/1
500 CAPSULE ORAL 2 TIMES DAILY
Qty: 20 CAPSULE | Refills: 0 | Status: SHIPPED | OUTPATIENT
Start: 2020-03-02 | End: 2020-03-12

## 2020-03-02 ASSESSMENT — ENCOUNTER SYMPTOMS
DIARRHEA: 0
ABDOMINAL PAIN: 1
SINUS PRESSURE: 0
RHINORRHEA: 0
BACK PAIN: 1
SHORTNESS OF BREATH: 0
WHEEZING: 0
CONSTIPATION: 1
SORE THROAT: 0
COUGH: 0
CHEST TIGHTNESS: 0
BLOOD IN STOOL: 0
COLOR CHANGE: 1
VOMITING: 0
NAUSEA: 1

## 2020-03-02 NOTE — PROGRESS NOTES
Psychiatric/Behavioral: Negative for dysphoric mood and sleep disturbance. The patient is not nervous/anxious. No Known Allergies  Outpatient Medications Marked as Taking for the 3/2/20 encounter (Office Visit) with Ifeanyi Banda MD   Medication Sig Dispense Refill    oxyCODONE-acetaminophen (PERCOCET) 5-325 MG per tablet Take 1 tablet by mouth every 8 hours as needed (severe pain) for up to 7 days.  Take lowest dose possible to manage pain 20 tablet 0    vitamin D-3 (CHOLECALCIFEROL) 125 MCG (5000 UT) TABS Take 1 tablet by mouth daily 30 tablet 5    Cyanocobalamin (VITAMIN B 12) 500 MCG TABS Take 1 tablet by mouth daily 30 tablet 5    diclofenac sodium 1 % GEL Apply 4 g topically 4 times daily 5 Tube 3    B Complex Vitamins (VITAMIN B COMPLEX) TABS TAKE 1 TABLET BY MOUTH EVERY DAY      bisacodyl (DULCOLAX) 5 MG EC tablet bisacodyl 5 mg tablet,delayed release      meloxicam (MOBIC) 15 MG tablet meloxicam 15 mg tablet      misoprostol (CYTOTEC) 200 MCG tablet misoprostol 200 mcg tablet      Calcium Carb-Cholecalciferol 600-800 MG-UNIT CHEW Take 1 tablet by mouth      chlorhexidine (PERIDEX) 0.12 % solution chlorhexidine gluconate 0.12 % mouthwash      nystatin (MYCOSTATIN) 617622 UNIT/GM cream nystatin 100,000 unit/gram topical cream      triamcinolone (KENALOG) 0.1 % cream triamcinolone acetonide 0.1 % topical cream      tiZANidine (ZANAFLEX) 2 MG tablet tizanidine 2 mg tablet      selenium sulfide (SELSUN) 2.5 % lotion selenium sulfide 2.5 % lotion      ranitidine (ZANTAC) 150 MG tablet ranitidine 150 mg tablet      ketoconazole (NIZORAL) 2 % shampoo ketoconazole 2 % shampoo      ondansetron (ZOFRAN) 4 MG tablet ondansetron HCl 4 mg tablet      meclizine (ANTIVERT) 12.5 MG tablet Take 1 tablet by mouth 3 times daily as needed for Dizziness or Nausea 60 tablet 1    omeprazole (PRILOSEC) 20 MG delayed release capsule Take 1 capsule by mouth daily 30 capsule 3    gabapentin (NEURONTIN) 300 MG capsule Take 1 capsule by mouth 2 times daily for 60 days. Intended supply: 90 days 60 capsule 1    Misc. Devices [facility]) MISC Dispense bariatric extra wide wheelchair    Height-5 feet 6 & 3/4 inches  Weight- 375 lbs  BMI-59.3 1 each 0    Scooter MISC by Does not apply route Requires repair of existing scooter 1 each 0         Vitals:    03/02/20 0940   BP: 110/66   Site: Right Upper Arm   Position: Sitting   Cuff Size: Large Adult   Pulse: 54   Temp: 98.4 °F (36.9 °C)   TempSrc: Oral   SpO2: 99%   Weight: 229 lb 9.6 oz (104.1 kg)   Height: 5' 7\" (1.702 m)     Body mass index is 35.96 kg/m². Physical Exam  Nursing note reviewed. Constitutional:       General: She is not in acute distress. Appearance: Normal appearance. She is well-developed. HENT:      Mouth/Throat:      Pharynx: Oropharynx is clear. Eyes:      General: Lids are normal.      Extraocular Movements: Extraocular movements intact. Conjunctiva/sclera: Conjunctivae normal.      Pupils: Pupils are equal, round, and reactive to light. Neck:      Musculoskeletal: Neck supple. Thyroid: No thyromegaly. Vascular: No carotid bruit. Cardiovascular:      Rate and Rhythm: Normal rate and regular rhythm. Heart sounds: Normal heart sounds, S1 normal and S2 normal. No murmur. No friction rub. No gallop. Pulmonary:      Effort: Pulmonary effort is normal. No respiratory distress. Breath sounds: Normal breath sounds. No wheezing, rhonchi or rales. Abdominal:      General: Bowel sounds are normal. There is no distension. Palpations: Abdomen is soft. Tenderness: There is abdominal tenderness in the epigastric area. There is no guarding or rebound. Genitourinary:     Rectum: External hemorrhoid present. Musculoskeletal:      Right lower leg: No edema. Left lower leg: No edema. Comments: Left flank tender   Lymphadenopathy:      Head:      Right side of head: No submandibular adenopathy. 500 MG capsule; Take 1 capsule by mouth 2 times daily for 10 days  Dispense: 20 capsule; Refill: 0    3. Constipation, unspecified constipation type  - docusate sodium (COLACE) 100 MG capsule; Take 1 capsule by mouth 2 times daily  Dispense: 60 capsule; Refill: 1  - polyethylene glycol (MIRALAX) powder; Take 17 g by mouth daily  Dispense: 510 g; Refill: 0  -high fiber diet    4. Hemorrhoids, unspecified hemorrhoid type  - hydrocortisone (ANUSOL-HC) 25 MG suppository; Place 1 suppository rectally every 12 hours  Dispense: 12 suppository; Refill: 1    5. Numbness and tingling of both feet  - EMG; Future  - Hemoglobin A1C; Future    6. Epigastric abdominal pain  - [facility]  - [facility] Auto Differential; Future  - Comprehensive Metabolic Panel; Future  - Resume omeprazole (PRILOSEC) 20 MG delayed release capsule; Take 1 capsule by mouth daily  Dispense: 30 capsule; Refill: 3    7. Nausea  - [facility]  - CBC Auto Differential; Future  - Comprehensive Metabolic Panel; Future  - ondansetron (ZOFRAN) 4 MG tablet; Take 1 tablet by mouth every 8 hours as needed for Nausea or Vomiting  Dispense: 30 tablet; Refill: 0    8. Bilateral flank pain  - POCT Urinalysis no Micro  - CBC Auto Differential; Future  -increase water intake    9. Vitamin D deficiency  -Continue vitamin D 5,000 IU once daily  - Vitamin D 25 Hydroxy; Future            Discussed medications with patient, who voiced understanding of their use and indications. All questions answered. Return in about 1 week (around 3/9/2020) for leg pain, cellulitis, hemorrhoid, constipation.

## 2020-03-02 NOTE — PATIENT INSTRUCTIONS
1. Bilateral leg pain  -patient has chronic pain medication  -recent Left lower extremity venous ultrasound done on 1/17/20 is negative    2. Cellulitis of lower extremity, unspecified laterality  - CBC Auto Differential; Future  - cephALEXin (KEFLEX) 500 MG capsule; Take 1 capsule by mouth 2 times daily for 10 days  Dispense: 20 capsule; Refill: 0    3. Constipation, unspecified constipation type  - docusate sodium (COLACE) 100 MG capsule; Take 1 capsule by mouth 2 times daily  Dispense: 60 capsule; Refill: 1  - polyethylene glycol (MIRALAX) powder; Take 17 g by mouth daily  Dispense: 510 g; Refill: 0  -high fiber diet    4. Hemorrhoids, unspecified hemorrhoid type  - hydrocortisone (ANUSOL-HC) 25 MG suppository; Place 1 suppository rectally every 12 hours  Dispense: 12 suppository; Refill: 1    5. Numbness and tingling of both feet  - EMG; Future  - Hemoglobin A1C; Future    6. Epigastric abdominal pain  - Referral to Fadia Crawley MD, Gastroenterology, [place]  - CBC Auto Differential; Future  - Comprehensive Metabolic Panel; Future  - Resume omeprazole (PRILOSEC) 20 MG delayed release capsule; Take 1 capsule by mouth daily  Dispense: 30 capsule; Refill: 3    7. Nausea  - Referral to Fadia Crawley MD, Gastroenterology, [place]  - CBC Auto Differential; Future  - Comprehensive Metabolic Panel; Future  - ondansetron (ZOFRAN) 4 MG tablet; Take 1 tablet by mouth every 8 hours as needed for Nausea or Vomiting  Dispense: 30 tablet; Refill: 0    8. Bilateral flank pain  - POCT Urinalysis no Micro  - CBC Auto Differential; Future  -increase water intake    9. Vitamin D deficiency  -Continue vitamin D 5,000 IU once daily  - Vitamin D 25 Hydroxy; Future        Patient Education       ÇáÅãÓÇß: [id number] Shavon  [ Constipation: Care Instructions ]  ÅÑÔÇÏÇÊ ÇáÚäÇíÉ ÇáÎÇÕÉ Èß  ÇáÅãÓÇß ãÚäÇå ÇáãÑæÑ ÈæÞÊ ÚÕíÈ ÃËäÇÁ ÇáÊÈÑÒ (ÍÑßÉ ÇáÃãÚÇÁ).  íÊÈÑÒ ÇáÃÔÎÇÕ ãä 3 ãÑÇÊ Ýí Çáíæã ÇáæÇÍÏ Åáì ãÑÉ ÇáØæíá. ãÊì íÌÈ Úáíß MXNVDFS áØáÈ ÇáãÓÇÚÏÉ¿  ÇÊÕá ÈØÈíÈß ÇáÂä Ãæ ÇÈÍË Úä ÇáÑÚÇíÉ ÇáØÈíÉ ÇáÝæÑíÉ Ýí KCXUDYU ÇáÊÇáíÉ:   · ÅÕÇÈÊß ÈÃáã ÌÏíÏ Ýí ÇáÈØä Ãæ ÊÝÇÞã ÇáÃáã ÇáãæÌæÏ ÈÇáÝÚá.  · ÅÕÇÈÊß ÈÛËíÇä Ãæ ÞíÁ ÌÏíÏíä Ãæ ÊÝÇÞãåãÇ.  · æÌæÏ Ïã Ýí ÇáÈÑÇÒ. íÊÚíä ÇáãÑÇÞÈÉ ÇáÔÏíÏÉ áÃíÉ ÊÛííÑÇÊ ÊØÑÃ Úáì ÇáÕÍÉ QYHNZY Úáì IYQIPMC ÈÇáØÈíÈ Ýí CAKKYHW ÇáÊÇáíÉ:   · ÊÝÇÞã ÇáÅãÓÇß.  · ÅÐÇ áã ÊÊÍÓä æÝÞ ãÇ ÊÊæÞÚå. Ãíä íãßä ãÚÑÝÉ ÇáãÒíÏ¿  ÇäÊÞÇá Åáì [email or web address]  ÏÎæá P18 íãßäß ãÚÑÝÉ ÇáãÒíÏ ãä ÎáÇá ãÑÈÚ ÇáÈÍË \"ÇáÅãÓÇß: ÅÑÔÇÏÇÊ ÇáÑÚÇíÉ - [ Constipation: Care Instructions ]. \"  © 3838-1711 [employer]. Êã ÊÚÏíá ÅÑÔÇÏÇÊ ÇáÑÚÇíÉ ÈãæÌÈ ÊÑÎíÕ ÕÇÏÑ ãä ÇÎÊÕÇÕí ÇáÑÚÇíÉ ÇáÕÍíÉ ÇáÎÇÕ Èß. ÅÐÇ ßÇäÊ áÏíß ÃÓÆáÉ RWIMM ÈÍÇáÉ ãÑÖíÉ Ãæ ÈåÐå ÇáÊÚáíãÇÊ¡ ÝÇÍÑÕ Úáì ÇáÑÌæÚ ÏÇÆãðÇ Åáì ÇÎÊÕÇÕí ÇáÑÚÇíÉ ÇáÕÍíÉ. ÊõÎáí ÔÑßÉ Brevity KILNASIIK Úä Ãí ÖãÇä Ãæ ÇáÊÒÇã íÊÚáÞ ISGXCYEVW áåÐå CAVDONQDN. äÓÎÉ ÇáãÍÊæì: 12.3 ÓÇÑò ÇÚÊÈÇÑðÇ ãä: 26 ÍÒíÑÇä 2019       Patient Education       ÇáÈæÇÓíÑ: ÅÑÔÇÏÇÊ ÇáÑÚÇíÉ  [ Hemorrhoids: Care Instructions ]  ÅÑÔÇÏÇÊ ÇáÑÚÇíÉ ÇáÎÇÕÉ Èß    íõÚÏ ãÑÖ ÇáÈæÇÓíÑ ÊÖÎãðÇ íÕíÈ ÇáÃæÑÏÉ OGDQCJBA Ýí ÇáÞäÇÉ ÇáÔÑÌíÉ. ßãÇ Ãä ÇáäÒíÝ ÃËäÇÁ ÇáÊÛæøØ æÇáÍßÉ [id number] æÃáã ÇáãÓÊÞíã OKTBYW ÃßËÑ ÇáÃÚÑÇÖ ÔíæÚðÇ. æÞÏ íßæä ãÑÖ ÇáÈæÇÓíÑ ãÒÚÌðÇ Ýí ÈÚÖ ÃæÞÇÊ¡ ÅáÇ Ãäå äÇÏÑðÇ ãÇ íßæä ãÔßáÉ ÎØíÑÉ. æíãßä ÚáÇÌå ÈÅÌÑÇÁ ÊÛííÑÇÊ ÈÓíØÉ Úáì ÇáäÙÇã ÇáÛÐÇÆí æÚÇÏÇÊ ÇáÊÛæøØ. æÊÊÖãä åÐå ÇáÊÛííÑÇÊ ÊäÇæá ÇáãÒíÏ ãä ÇáÃáíÇÝ æÚÏã ÇáÖÛØ ÈÌåÏ áÅÎÑÇÌ ÇáÈÑÇÒ. æáÇ ÊÍÇÌ ãÚÙã ÍÇáÇÊ ÇáÈæÇÓíÑ ááÊÏÎá ÇáÌÑÇÍí æáÇ ZHIRUQMF ÇáÃÎÑì ÅáÇ ÅÐÇ ßÇäÊ ÖÎãÉ æãÄáãÉ æÊäÒÝ ßËíÑðÇ. ÊõÚÏ ÑÚÇíÉ ÇáãÊÇÈÚÉ ÌÒÁðÇ ÃÓÇÓíðÇ Ýí ÚáÇÌß æÓáÇãÊß. ÝÚáíß ÇáÍÑÕ Úáì ÊÑÊíÈ ÌãíÚ ãæÇÚíÏ ÒíÇÑÉ ÇáØÈíÈ GEXFDURLL ÈåÇ¡ XSKRUZVM ÈØÈíÈß ÚäÏ NAHSUGJX ãä Ãí ãÔßáÇÊ. æãä ÇáÌíÏ ÃíÖðÇ Ãä ÊÚÑÝ äÊÇÆÌ RTAPXVXE æßÐáß WRTQHCDP ÈÞÇÆãÉ ÇáÃÏæíÉ ÇáÊí PTYIHJAU. ßíÝ íãßäß ÇáÇÚÊäÇÁ ÈäÝÓß Ýí FELUEE¿   · æÃíÖðÇ ÍÇæáí ÇáÌáæÓ Úáì ÈõÚÏ ÈæÕÇÊ ÞáíáÉ ãä ÍãÇã ãÇÆí ÏÇÝÆ (ÍãÇã ÓíÊÒ) 3 ãÑÇÊ íæãíðÇ æÈÚÏ ÇáÊÈÑÒ.  æíÝíÏ ÇáãÇÁ

## 2020-03-03 ENCOUNTER — HOSPITAL ENCOUNTER (OUTPATIENT)
Dept: PHYSICAL THERAPY | Age: 55
Setting detail: THERAPIES SERIES
Discharge: HOME | End: 2020-03-03
Payer: COMMERCIAL

## 2020-03-03 LAB
ESTIMATED AVERAGE GLUCOSE: 102.5 MG/DL
HBA1C MFR BLD: 5.2 %

## 2020-03-03 PROCEDURE — 97530 THERAPEUTIC ACTIVITIES: CPT | Performed by: PHYSICAL THERAPIST

## 2020-03-03 PROCEDURE — 97110 THERAPEUTIC EXERCISES: CPT | Performed by: PHYSICAL THERAPIST

## 2020-03-03 NOTE — FLOWSHEET NOTE
The [address] [place] [address] and Sports [facility]    Physical Therapy Daily Treatment Note  Date:  3/3/2020    Patient Name:  Ashley Barnes    :  1965  MRN: [id number]  Restrictions/Precautions:    Medical/Treatment Diagnosis Information:  · Diagnosis: M70.61, M70.62 (ICD-10-CM) - Trochanteric bursitis of both hips  · Treatment Diagnosis: M54.5, M70.71, M70.72, M25.551, M25.552; leading to impaired ADLs and IADLs  Insurance/Certification information:  PT Insurance Information: PT BENEFITS  [facility]AC[facility]/ [facility]/ %/30 VPCY/ AUTH REQUIRED ONLY AFTER 30TH VISIT / 20 PAG  Physician Information:  Referring Practitioner: Muna Cordon. Bora Pickett MD  Has the plan of care been signed (Y/N):        []  Yes  [x]  No     Date of Patient follow up with Physician: Not scheduled at this time      Is this a Progress Report:     []  Yes  [x]  No      If Yes:  Date Range for reporting period:  Beginning   Ending     Progress report will be due (10 Rx or 30 days whichever is less):       Recertification will be due (POC Duration  / 90 days whichever is less): 3/6/20         Visit # Insurance Allowable Auth Required   15 30 []  Yes [x]  No        Functional Scale:    Date assessed:    WOMAC 64/96=66.7% deficit   1/10/20  WOMAC  L hip 65/96=67.7% deficit   20  R hip =1.1%     20  (3 questions omitted on R d/t L hip)    Latex Allergy:  [x]NO      []YES  Preferred Language for Healthcare:   [x]English       []other:      Pain level:  6/10     SUBJECTIVE:   present. Hips feel better. Knees still hurt. Saw PCP yesterday and foot MD today about the pain/bruising at her ankle, they have ordered another US and put her on an antibiotic. OBJECTIVE:              ROM PROM AROM Comments     Left Right Left Right     Flexion 110 90 90 90    Extension  10  10     Assessed in sidelying. Pt unable to lay prone.    Abduction  20  15        Adduction         Not assessed ER 45 35     stretch   IR 10 20                          Flexibility Left Right Comments   Hamstrings SLR 75 SLR 75 Reports LBP   ITB (Obers test)  -  -    Hip flexor(Afshin test)     Not assessed   gastroc + tightness + tightness    Rectus femoris  Mild tightness Mild tightness Assessed in sidelying, pt can't lay prone                Special  Test Left Right Comments   FABERS  + tightness + tightness     Scour test  -  -     Trendelenburg test     Not assessed- can't perform SLS                Strength Left Right Comments   Hip flexors 4-/5 4-/5 Back p! Hip extension 4-/5 4-/5 Back p! Hip abduction 4-/5 4-/5    Hip adduction 4-/5 4-/5     Hip ER 4-/5 4-/5  groin p! Hip IR 4/5 4/5  groin p! Quads 4-/5 4-/5    Hamstrings 4-/5 4-/5        Joint mobility: Not assessed              []?Normal                       []?Hypo              []?Hyper     Palpation: generalized TTP around lateral hip     Functional Mobility/Transfers: Independent but with modifications.     Posture: Seated posture WNL. Forward flexed posture when standing     Gait: Pt uses automatic WC for ambulation. She is able to walk short distance to get from chair to treatment table but she requires UE support on table, she is not able to walk independently without support     Dermatomes: sensation equal B, though progressive decreased in sensation from proximal to distal from L1-5.  No sensation at S1-2      RESTRICTIONS/PRECAUTIONS:     Exercises/Interventions:     ROM/STRETCHES     Seated hamstring  Resume 3/9, not completed d/t time constraint   Incline calf Resume 3/9, not completed d/t time constraint   Supine piriformis    Seated figure 4 Resume 3/9, not completed d/t time constraint       HEP    ITB     Butterfly     Hip flexor stretch kneeling     PREs     Hooklying heel lift  Lift foot 1-2\" from table  Resume 3/9, not completed d/t time constraint             Supine SLR Attempted Caused sharp pain in groin; after 2nd rep on L, after 5th to strengthening, flexibility, endurance, ROM of core, proximal hip and LE for functional self-care, mobility, lifting and ambulation/stair navigation   [] (48839)Reviewed/Progressed HEP activities related to improving balance, coordination, kinesthetic sense, posture, motor skill, proprioception of core, proximal hip and LE for self care, mobility, lifting, and ambulation/stair navigation      Manual Treatments:    [] (65310) Provided manual therapy to mobilize LE, proximal hip and/or LS spine soft tissue/joints for the purpose of modulating pain, promoting relaxation,  increasing ROM, reducing/eliminating soft tissue swelling/inflammation/restriction, improving soft tissue extensibility and allowing for proper ROM for normal function with self care, mobility, lifting and ambulation. Modalities:     Charges:  Timed Code Treatment Minutes: 43   Total Treatment Minutes: 43   Time in: 12:55  Time out: 1:38    [] EVAL (LOW) 32839 (typically 20 minutes face-to-face)  [] EVAL (MOD) 43795 (typically 30 minutes face-to-face)  [] EVAL (HIGH) 11373 (typically 45 minutes face-to-face)  [] RE-EVAL     [x] SN(38091) x 2     [] IONTO  [] NMR (88522) x       [] VASO  [] Manual (58206) x      [] Other:  [x] TA x 1     [] Mech Traction (13492)  [] ES(attended) (12726)      [] ES (un) (21618):     GOALS:   Patient stated goal: decrease pain, get better, get stronger   []? Progressing: []? Met: []? Not Met: []? Adjusted     Therapist goals for Patient:   Short Term Goals: To be achieved in: 4 weeks 2/7/20  1. Independent in HEP and progression per patient tolerance, in order to prevent re-injury. []? Progressing: [x]? Met: []? Not Met: []? Adjusted   2. Patient will have a decrease in pain to facilitate improvement in movement, function, and ADLs as indicated by Functional Deficits. []? Progressing: [x]? Met: []? Not Met: []? Adjusted      Long Term Goals: To be achieved in: 8 weeks 3/6/20  1.  Disability index score of 33%

## 2020-03-03 NOTE — PROGRESS NOTES
The [address] [place] [address] [facility]      Physical Therapy Daily Treatment Note  Date:  3/3/2020    Patient Name:  Jason Wallace    :  1965  MRN: [id number]  Restrictions/Precautions:    Medical/Treatment Diagnosis Information:  Diagnosis: M47.892 (ICD-10-CM) - Other spondylosis, cervical region  Treatment Diagnosis: M54.2, R53.1 leading to impaired ADLs and IADLs  Insurance/Certification information:  PT Insurance Information: PT BENEFITS  FACILITY/ [facility]/ %/30 VPCY/ AUTH REQUIRED ONLY AFTER 30TH VISIT / 20 PAG  Physician Information:  Referring Practitioner: Dr. Mami Batista  Has the plan of care been signed (Y/N):        []  Yes  [x]  No     Date of Patient follow up with Physician:       Is this a Progress Report:     []  Yes  [x]  No        If Yes:  Date Range for reporting period:  Beginning  Ending    Progress report will be due (10 Rx or 30 days whichever is less):        Recertification will be due (POC Duration  / 90 days whichever is less):  3/29/20      Visit # Insurance Allowable Auth Required   8 30 []  Yes [x]  No    Total visits: 16 (treated in conjunction with hip)    Functional Scale:    Date assessed:   NDI 25/50=50% deficit   20  =17.5% deficit   3/3/20 (work and driving omitted- N/A)    Latex Allergy:  [x]NO      []YES  Preferred Language for Healthcare:   [x]English       []other:    Pain level:  7/10     SUBJECTIVE:  Pt states that her neck and shoulders feel better. She has muscle soreness in both arms. New HEP is going well. OBJECTIVE:         ROM AROM Comments   Flexion 40     Extension 40  p!  Along superior shoulder/upper trap   Side bend R 35     Side bend L 30     Rotation R 40    Rotation L 48     Shoulder AROM clearing Seated: 90 deg flex/ABD                   Strength L R Comments   Neck flexion (C1-2)     4-/5   Neck side bend (C3) 4-/5 4-/5     Shoulder elevation (C4) 4-/5 4-/5     Shoulder abduction (C5) 4-/5 4-/5     Elbow flexion and /or wrist extension  (C6) 4/5 4/5     Elbow extension and/or wrist flexion  (C7) 4/5 4/5     Thumb extension and/or ulnar deviation ((C8)     Not assessed   Abduction and /or adduction of hand intrinsics (T1)     Not assessed   Shoulder ER 4-/5 4-/5     Shoulder IR 4/5 4/5     Shoulder flex 4/5 4-/5 p! Shoulder ABD 4/5 4-/5              DTR  Not assessed L R Comments   Biceps (C5,C6)         Brachioradialis (C5,C6)         Triceps (C7,C8)                         Special Tests Results Comments   Foraminal compression test  -     Distraction test -     Upper limb tension test  -    Shoulder abduction(relief) test   -    Vertebral artery test   Not assessed    Valsalva test  Not assessed    Joint play assessments  Hypo mobile R lateral glides  Extension   Painful arc - B L p! At end range   Neers + B    Drop arm - B    Hawkin's Jack -L, +R            Dermatomes:  Equal sensation: C1-T2, light touch     Joint mobility:               []?Normal                      [x]? Hypo              []?Hyper     Palpation: TTP TP of C2-7. B 1st and 2nd rib     Functional Mobility/Transfers: Independent     Posture: Slightly rounded shoulders and forward head     Gait: Use of WC for mobility      RESTRICTIONS/PRECAUTIONS:     Exercises/Interventions:   Exercise/Equipment Resistance/Repetitions Other comments   Stretching/PROM     3 finger rotation     3 finger retraction     UT side bend stretch     Levater scap stretch     Scalene stretch     Pectoral stretch     Table slide 10x10\" flex    OH flex     Cervical rotation 10x10\" to R side         Isometrics     Cervical Retraction 10x10\" supine    Cervical Flexion      Cervical Extension     Cervical sidebending               PRE's     External Rotation  Hold d/t p! Internal Rotation     Serratus     Biceps 3x10 3#, B    Triceps     Supine Serratus Punch  Hold d/t p!    Horizontal Abd with ER     Reverse Flys     EXT     Flexion Retraction 10x10\" seated         Cable Column/Theraband     Scapular Retraction 3x10 blue    External Rotation x7 R  3x10 L Reported p! Internal Rotation 3x10 green, B    Ext     TRIC     Lats     Shrugs     Flex     BIC     W ER 3x10 green               Manual Intervention     Cerv mobs/manip     Thoracic mobs/manip     CT manip     Rib mobilizations R 1st rib, grade 3, pt supine   STM B UT/LS     Therapeutic Exercise and NMR EXR  [x] (43618) Provided verbal/tactile cueing for activities related to strengthening, flexibility, endurance, ROM  for improvements in cervical, postural, scapular, scapulothoracic and UE control with self care, reaching, carrying, lifting, house/yardwork, driving/computer work.    [] (60341) Provided verbal/tactile cueing for activities related to improving balance, coordination, kinesthetic sense, posture, motor skill, proprioception  to assist with cervical, scapular, scapulothoracic and UE control with self care, reaching, carrying, lifting, house/yardwork, driving/computer work. Therapeutic Activities:    [] (03642 or 75138) Provided verbal/tactile cueing for activities related to improving balance, coordination, kinesthetic sense, posture, motor skill, proprioception and motor activation to allow for proper function of cervical, scapular, scapulothoracic and UE control with self care, carrying, lifting, driving/computer work. Home Exercise Program:  Pt access code: Sheyla Apodaca  Initiated 1/29/20. Printed hand out given. Pt demonstrated proper form of each exercise and expressed verbal understanding of frequency and duration. Updated 2/4/20. Printed hand out provided. Pt demonstrated proper form of each exercise and expressed verbal understanding of frequency and duration.   [x] (22393) Reviewed/Progressed HEP activities related to strengthening, flexibility, endurance, ROM of cervical, scapular, scapulothoracic and UE control with self care, reaching, carrying, lifting, reaching prior level of function. []? Progressing: []? Met: []? Not Met: []? Adjusted  2. Patient will demonstrate increased AROM to WNL of cervical/thoracic spine to allow for proper joint functioning as indicated by patients Functional Deficits. []? Progressing: []? Met: []? Not Met: []? Adjusted  Patient will demonstrate an increase in postural awareness and control and activation of  Deep cervical stabilizers to allow for proper functional mobility as indicated by patients Functional Deficits. []? Progressing: []? Met: []? Not Met: []? Adjusted  4. Patient will return to ADLs, IADLs and functional activities without increased symptoms or restriction. []? Progressing: []? Met: []? Not Met: []? Adjusted  5. Patient will be able to swim without neck pain. []? Progressing: []? Met: []? Not Met: []? Adjusted       Overall Progression Towards Functional goals/ Treatment Progress Update:  [] Patient is progressing as expected towards functional goals listed. [] Progression is slowed due to complexities/Impairments listed. [] Progression has been slowed due to co-morbidities. [x] Plan just implemented, too soon to assess goals progression <30days   [] Goals require adjustment due to lack of progress  [] Patient is not progressing as expected and requires additional follow up with physician  [] Other    Prognosis for POC: [x] Good [] Fair  [] Poor      Patient requires continued skilled intervention: [x] Yes  [] No    Treatment/Activity Tolerance:  [x] Patient able to complete treatment  [] Patient limited by fatigue  [] Patient limited by pain     [] Patient limited by other medical complications   [] Other: Good improvement in cervical rotation stretch. Pt notes that her L shoulder hurts > R during flex table slide, but stretch is tolerable. Reintroduced TB ER this date, able to complete 3x10 on L, only able to complete 7 reps on R secondary to exacerbation of shoulder pain.  Pt requires PT follow up to address ROM, strength and functional mobility deficits. PLAN: See eval  [x] Continue per plan of care [] Alter current plan (see comments above)  [] Plan of care initiated [] Hold pending MD visit [] Discharge      Electronically signed by:  Bing Lincoln PT, DPT  Physical Therapist  [id number]  [email or web address]    Note: If patient does not return for scheduled/ recommended follow up visits, this note will serve as a discharge from care along with most recent update on progress.

## 2020-03-05 ENCOUNTER — TELEPHONE (OUTPATIENT)
Dept: ORTHOPEDIC SURGERY | Age: 55
End: 2020-03-05

## 2020-03-05 ENCOUNTER — OFFICE VISIT (OUTPATIENT)
Dept: ORTHOPEDIC SURGERY | Age: 55
End: 2020-03-05
Payer: COMMERCIAL

## 2020-03-05 VITALS
DIASTOLIC BLOOD PRESSURE: 66 MMHG | BODY MASS INDEX: 36.02 KG/M2 | WEIGHT: 229.5 LBS | SYSTOLIC BLOOD PRESSURE: 110 MMHG | HEART RATE: 56 BPM | HEIGHT: 67 IN

## 2020-03-05 PROCEDURE — 1036F TOBACCO NON-USER: CPT | Performed by: ORTHOPAEDIC SURGERY

## 2020-03-05 PROCEDURE — G8482 FLU IMMUNIZE ORDER/ADMIN: HCPCS | Performed by: ORTHOPAEDIC SURGERY

## 2020-03-05 PROCEDURE — G8417 CALC BMI ABV UP PARAM F/U: HCPCS | Performed by: ORTHOPAEDIC SURGERY

## 2020-03-05 PROCEDURE — G8427 DOCREV CUR MEDS BY ELIG CLIN: HCPCS | Performed by: ORTHOPAEDIC SURGERY

## 2020-03-05 PROCEDURE — 99213 OFFICE O/P EST LOW 20 MIN: CPT | Performed by: ORTHOPAEDIC SURGERY

## 2020-03-05 PROCEDURE — 3017F COLORECTAL CA SCREEN DOC REV: CPT | Performed by: ORTHOPAEDIC SURGERY

## 2020-03-05 RX ORDER — MELOXICAM 15 MG/1
TABLET ORAL
COMMUNITY
End: 2020-03-16

## 2020-03-05 RX ORDER — MULTIVITAMIN,THER AND MINERALS
TABLET ORAL
COMMUNITY
Start: 2020-03-04 | End: 2021-06-15

## 2020-03-05 RX ORDER — ERGOCALCIFEROL 1.25 MG/1
CAPSULE ORAL
COMMUNITY
Start: 2020-01-27 | End: 2020-06-25 | Stop reason: SDUPTHER

## 2020-03-05 RX ORDER — VITAMIN B COMPLEX
CAPSULE ORAL
COMMUNITY
Start: 2020-01-22 | End: 2021-06-15

## 2020-03-05 RX ORDER — SPIRONOLACTONE 25 MG
TABLET ORAL
COMMUNITY
Start: 2020-03-04 | End: 2021-06-15

## 2020-03-05 NOTE — PROGRESS NOTES
Chief Complaint    Shoulder Pain (F/u bilat shoulder)      History of Present Illness:  Jonathan Bernardo is a pleasant, 53 y.o., female, here today for follow up of bilateral shoulder pain and limitation. The pain is worse in the left shoulder, and located laterally at the deltoid, and superior along the [facility] joint. This has been ongoing for over a year, with no preceding injury. She has been followed by several surgeons in our group for bilateral shoulder cuff disease, bilateral knee OA, and hip OA. She has been treated conservatively with intermittent injections, as she recovers from general deconditioning and over 170lb weight loss from Gastric Sleeve surgery in Oct of 2018. She is wheelchair enabled, but is working with PT and is able to ambulate short distances with a walker. After injections on her lateral shoulders last visit, her right side is doing much better and almost completely resolved. She is been working with physical therapy. She continues to have significant symptoms on her left radiating out to the lateral deltoid area. She has pain with overhead activities and especially when swimming. It does wake her from sleep. She has no neck pain or distal parasthesias. She cannot take NSAIDS due to her sleeve surgery. She denies any new fevers, chills, chest pains, shortness of breath, numbness, tingling or any new symptoms. Medical History:  Patient's medications, allergies, past medical, surgical, social and family histories were reviewed and updated as appropriate. Patient has had no medical changes since last evaluated      Review of Systems  A 14 point review of systems was completed by the patient on 1/23/2020 and is available in the media section of the scanned medical record and was reviewed on 3/5/2020.   The review is negative with the exception of those things mentioned in the HPI and Past Medical History    Physical Exam:  /66   Pulse 56   Ht 5' 7.01\" (1.702 m)   Wt 229 lb 8 tendinitis, left     Arthrosis of left acromioclavicular joint        Office Procedures:  Orders Placed This Encounter   Procedures    MRI SHOULDER LEFT WO CONTRAST     Standing Status:   Future     Standing Expiration Date:   3/5/2021     Order Specific Question:   Reason for exam:     Answer:   [facility]    MRI SHOULDER LEFT WO CONTRAST     Standing Status:   Future     Standing Expiration Date:   3/5/2021     Order Specific Question:   Reason for exam:     Answer:   Abdirizak Mccall MD, Orthopedic Surgery (Foot, Ankle), [facility]     Referral Priority:   Routine     Referral Type:   Eval and Treat     Referral Reason:   Specialty Services Required     Referred to Provider:   Magdiel Turner MD     Requested Specialty:   Orthopedic Surgery     Number of Visits Requested:   1       Treatment Plan:     Pertinent imaging was reviewed. The etiology, natural history, and treatment options for the disorder were discussed. The roles of activity modifications, medications, injections, physical therapy, and surgical interventions were all described to the patient and questions were answered. Her left shoulder pain is persistent despite injection and physical therapy. She cannot take NSAIDs. She has failed to gain significant improvement with conservative management. At this time I think we should pursue MRI to rule out rotator cuff tear or significant intra-articular pathology  Also noted she would prefer natural or holistic treatment if possible. We discussed surgery, as well as post-op rehab and recovery. She has multiple hip, knee and ankle problems, and may need her shoulder for mobilization with assistive device. We would not intervene until she did not need her shoulder for mobilization.  We also referred to Dr. Waddell Ask for her ankle    We will see Amel back after MRI    All questions were answered to patient's satisfaction and She was encouraged to call with any further questions or

## 2020-03-05 NOTE — TELEPHONE ENCOUNTER
REGARDING MRI OF LT SHLD, DX CODES ARE PRETAINING TO R SHOULDER,  NEED TO VERIFY INFO. CALLBACK [address] @ [facility]. [phone].

## 2020-03-06 ENCOUNTER — OFFICE VISIT (OUTPATIENT)
Dept: PAIN MANAGEMENT | Age: 55
End: 2020-03-06
Payer: COMMERCIAL

## 2020-03-06 VITALS
BODY MASS INDEX: 35.94 KG/M2 | WEIGHT: 229 LBS | SYSTOLIC BLOOD PRESSURE: 108 MMHG | DIASTOLIC BLOOD PRESSURE: 64 MMHG | HEIGHT: 67 IN

## 2020-03-06 PROBLEM — Z02.89 PAIN MEDICATION AGREEMENT: Status: ACTIVE | Noted: 2020-03-06

## 2020-03-06 PROBLEM — M47.896 OTHER SPONDYLOSIS, LUMBAR REGION: Status: ACTIVE | Noted: 2020-03-06

## 2020-03-06 PROCEDURE — 99214 OFFICE O/P EST MOD 30 MIN: CPT | Performed by: ANESTHESIOLOGY

## 2020-03-06 PROCEDURE — G8417 CALC BMI ABV UP PARAM F/U: HCPCS | Performed by: ANESTHESIOLOGY

## 2020-03-06 PROCEDURE — 1036F TOBACCO NON-USER: CPT | Performed by: ANESTHESIOLOGY

## 2020-03-06 PROCEDURE — G8482 FLU IMMUNIZE ORDER/ADMIN: HCPCS | Performed by: ANESTHESIOLOGY

## 2020-03-06 PROCEDURE — G8427 DOCREV CUR MEDS BY ELIG CLIN: HCPCS | Performed by: ANESTHESIOLOGY

## 2020-03-06 PROCEDURE — 3017F COLORECTAL CA SCREEN DOC REV: CPT | Performed by: ANESTHESIOLOGY

## 2020-03-06 RX ORDER — GABAPENTIN 300 MG/1
300 CAPSULE ORAL 3 TIMES DAILY
Qty: 90 CAPSULE | Refills: 1 | Status: SHIPPED | OUTPATIENT
Start: 2020-03-06 | End: 2020-03-30

## 2020-03-06 RX ORDER — OXYCODONE HYDROCHLORIDE AND ACETAMINOPHEN 5; 325 MG/1; MG/1
1 TABLET ORAL EVERY 8 HOURS PRN
Qty: 90 TABLET | Refills: 0 | Status: SHIPPED | OUTPATIENT
Start: 2020-03-06 | End: 2020-04-05

## 2020-03-06 ASSESSMENT — ENCOUNTER SYMPTOMS
EYE PAIN: 0
WHEEZING: 0
ABDOMINAL PAIN: 0
EYE REDNESS: 0
SHORTNESS OF BREATH: 0
BACK PAIN: 1
NAUSEA: 0
CONSTIPATION: 0
COUGH: 0
EYE DISCHARGE: 0
VOMITING: 0

## 2020-03-06 NOTE — PATIENT INSTRUCTIONS
Patient Education     Follow with your family physician for redness in left leg (cellulitis). I have ordered XRay for the left foot; follow with ORTHO for shoulder  Continue home exercises and gabapentin. Limit use of Percocet for severe pain as discussed    Learning About Managing Chronic Pain  What is a plan for pain management? A pain management plan helps you find ways to control pain with side effects you can live with. Some diseases and injuries can cause pain that lasts a long time. Constant pain can make you depressed. It can cause stress and make it hard for you to eat and sleep. But you don't need to live with uncontrolled pain. How can you plan for managing your pain? You and your doctor will work to make your plan. Your plan can include more than one type of pain control. You may take prescription or over-the-counter drugs. You can also use physical treatments, like massage and acupuncture. Other things can help too, such as meditation or a type of counseling to change how you think about your pain. It's important to let your doctor know how your pain is affecting your life. The goal of a pain management plan isn't to totally get rid of pain. Instead, the goal is to control the pain enough so that daily activities are easier. If your pain isn't controlled well enough, talk with your doctor. You may need to make a new plan. Or your doctor may refer you to a specialist.  What medicines are used? Your doctor may prescribe medicine to help with your pain. If you aren't taking a prescription medicine, you may be able to take an over-the-counter one. Here are the main types of medicine for chronic pain. · Non-opioids. These are things like acetaminophen, such as Tylenol, and non-steroidal anti-inflammatory drugs (NSAIDs), such as Advil. · Opioids. Morphine, codeine, and oxycodone are some examples. · Other medicines. Antidepressants and anti-seizure medicines may be used.  These medicines seem to change the way your brain senses pain. Another option may be a nerve block injection. Medicines are the most common treatment for pain. But to feel better, you'll need to do more than take medicine. You can also do things like reducing your stress level and changing how you think. How can you take medicine safely? Medicines can help you get better. But they can also be dangerous, especially if you don't take them the right way. Be safe with medicines. Read and follow all instructions on the label. If the medicine you take causes side effects such as constipation or nausea, you may need to take other medicines for those problems. Talk to your doctor about any side effects you have. If you were prescribed an opioid pain reliever, your care team will give you information on how to use it safely. You will also get directions for how to safely store the medicine and how to get rid of any that's left over. Follow these instructions carefully. What physical treatments can help? Physical treatments can be an important part of managing chronic pain. You may find that combining more than one treatment helps the most.  These treatments can include:  · Heat or cold. This can help arthritis, sore muscles, and other aches. · Hydrotherapy. It uses flowing water to relax muscles. · Massage. Massage involves rubbing the soft tissues of the body. It eases tension and pain. · Transcutaneous electrical nerve stimulation (TENS). This treatment uses a gentle electric current applied to the skin for pain relief. · Acupuncture. This is a form of traditional [facility] medicine. It uses very thin needles inserted into certain points of the body. · Physical therapy. This treatment uses stretches and exercises to reduce pain and help you move better. If you get physical therapy, make sure to do any home exercises or stretching your therapist has prescribed. Stay as active as you can.  Try to get some physical activity every

## 2020-03-06 NOTE — PROGRESS NOTES
tablet tizanidine 2 mg tablet      selenium sulfide (SELSUN) 2.5 % lotion selenium sulfide 2.5 % lotion      ranitidine (ZANTAC) 150 MG tablet ranitidine 150 mg tablet      ketoconazole (NIZORAL) 2 % shampoo ketoconazole 2 % shampoo      meclizine (ANTIVERT) 12.5 MG tablet Take 1 tablet by mouth 3 times daily as needed for Dizziness or Nausea 60 tablet 1    Misc. Devices [facility]) MISC Dispense bariatric extra wide wheelchair    Height-5 feet 6 & 3/4 inches  Weight- 375 lbs  BMI-59.3 1 each 0    Scooter MISC by Does not apply route Requires repair of existing scooter 1 each 0     No current facility-administered medications for this visit.         Family History   Problem Relation Age of Onset    Diabetes Mother     Stroke Mother     Osteoarthritis Mother     Heart Disease Father     Other Father     High Blood Pressure Father     Osteoarthritis Father     Diabetes Maternal Aunt     Cancer Maternal Aunt     Diabetes Maternal Grandmother     Cancer Paternal Aunt        Social History     Socioeconomic History    Marital status:      Spouse name: Not on file    Number of children: Not on file    Years of education: Not on file    Highest education level: Not on file   Occupational History    Not on file   Social Needs    Financial resource strain: Not on file    Food insecurity:     Worry: Not on file     Inability: Not on file    Transportation needs:     Medical: Not on file     Non-medical: Not on file   Tobacco Use    Smoking status: Never Smoker    Smokeless tobacco: Never Used   Substance and Sexual Activity    Alcohol use: No    Drug use: No    Sexual activity: Never   Lifestyle    Physical activity:     Days per week: Not on file     Minutes per session: Not on file    Stress: Not on file   Relationships    Social connections:     Talks on phone: Not on file     Gets together: Not on file     Attends Mandaen service: Not on file     Active member of club or organization: Not on file     Attends meetings of clubs or organizations: Not on file     Relationship status: Not on file    Intimate partner violence:     Fear of current or ex partner: Not on file     Emotionally abused: Not on file     Physically abused: Not on file     Forced sexual activity: Not on file   Other Topics Concern    Not on file   Social History Narrative    ** Merged History Encounter **              Imaging Studies:   MRI LS (03/22/2018)  \"Impression   Degenerative thecal sac narrowing and neural foraminal stenosis as detailed   above, worst at L4-5. \"          Results of the above studies were personally reviewed by me with the patient indetail along with the images, when available. The patient was instructed to contact the primary care provider regarding other unrelated findings not addressed during today's visit. Review of Systems:   Review of Systems   Constitutional: Negative for chills and fever. HENT: Negative for hearing loss and tinnitus. Eyes: Negative for pain, discharge and redness. Respiratory: Negative for cough, shortness of breath and wheezing. Cardiovascular: Negative for chest pain and palpitations. Gastrointestinal: Negative for abdominal pain, constipation, nausea and vomiting. Genitourinary: Negative for frequency, hematuria and urgency. Musculoskeletal: Positive for arthralgias, back pain and joint swelling. Negative for myalgias and neck pain. Skin: Negative for rash. Neurological: Negative for dizziness, seizures, weakness and headaches. Hematological: Does not bruise/bleed easily. Psychiatric/Behavioral: Negative for suicidal ideas. The patient is not nervous/anxious. The patient was instructed to contact primary care physician regarding ROS positives not being addressed during today's visit. Physical Exam:   Physical Exam  Constitutional:       General: She is not in acute distress. Appearance: She is not diaphoretic. Suamn Alonso MD)    - Informed patient that opioid pain medications may not be phoned into the pharmacy or refilled without being seen. Overall Progress:       PEG Score = 4 / 10     Physical Therapy: ongoing - last in 02/2020   Counseling: N/A   Injections: N/A     Patient compliance on plan of care:  Poor   Progress on current plan:  Fair    Assessment:      ICD-10-CM    1. Left foot pain M79.672 XR FOOT LEFT (2 VIEWS)     oxyCODONE-acetaminophen (PERCOCET) 5-325 MG per tablet   2. Polyarthropathy, multiple sites M13.0 XR FOOT LEFT (2 VIEWS)     oxyCODONE-acetaminophen (PERCOCET) 5-325 MG per tablet   3. Other spondylosis, lumbar region M47.896 oxyCODONE-acetaminophen (PERCOCET) 5-325 MG per tablet   4. Postlaminectomy syndrome of lumbar region M96.1 gabapentin (NEURONTIN) 300 MG capsule     oxyCODONE-acetaminophen (PERCOCET) 5-325 MG per tablet   5. H/O diabetic neuropathy Z86.39 gabapentin (NEURONTIN) 300 MG capsule   6. Chronic pain syndrome G89.4 Drug Panel-PM-HI Res-UR Interp-A     gabapentin (NEURONTIN) 300 MG capsule   7. Pain disorder with psychological factors F45.41    8. Chronic, continuous use of opioids F11.90    9. Pain medication agreement Z02.89 oxyCODONE-acetaminophen (PERCOCET) 5-325 MG per tablet       Plan:     Multiple pain issues -  1. Chronic low back pain s/p fusion 15 years ago; no focal changes. 2. Multiple joint pain - following with ORTHO for shoulder pain at this time, pending MRI. 3. Complains of pain in legs - recently had US through PCP and no evidence of DVT. 4. Complains of pain in left foot with cellulitis in the ankle area - may get XR left foot. 5. Low back exam showed mild facet tenderness, worse on extension/lateral rotation. Reports some benefit from PT - encouraged home exercises. 7. May consider lumbar facet injections in the future; requesting to continue Percocet,   8. Intolerant of NSAIDs (gastrectomy in 2018); off opioid for >1 year.  Counseled on opioid

## 2020-03-09 ENCOUNTER — HOSPITAL ENCOUNTER (OUTPATIENT)
Dept: PHYSICAL THERAPY | Age: 55
Setting detail: THERAPIES SERIES
Discharge: HOME | End: 2020-03-09
Payer: COMMERCIAL

## 2020-03-09 PROCEDURE — 97110 THERAPEUTIC EXERCISES: CPT | Performed by: PHYSICAL THERAPY ASSISTANT

## 2020-03-09 PROCEDURE — 97530 THERAPEUTIC ACTIVITIES: CPT | Performed by: PHYSICAL THERAPY ASSISTANT

## 2020-03-09 PROCEDURE — 97112 NEUROMUSCULAR REEDUCATION: CPT | Performed by: PHYSICAL THERAPY ASSISTANT

## 2020-03-09 NOTE — FLOWSHEET NOTE
The [address] [place] [address] and [facility]    Physical Therapy Daily Treatment Note  Date:  3/9/2020    Patient Name:  Jt Adan    :  1965  MRN: [id number]  Restrictions/Precautions:    Medical/Treatment Diagnosis Information:  · Diagnosis: M70.61, M70.62 (ICD-10-CM) - Trochanteric bursitis of both hips  · Treatment Diagnosis: M54.5, M70.71, M70.72, M25.551, M25.552; leading to impaired ADLs and IADLs  Insurance/Certification information:  PT Insurance Information: PT BENEFITS  FACILITY/ [facility] [employer]/ %/30 VPCY/ AUTH REQUIRED ONLY AFTER 30TH VISIT / 20 PAG  Physician Information:  Referring Practitioner: Wes Gavin. Layne Jorge MD  Has the plan of care been signed (Y/N):        []  Yes  [x]  No     Date of Patient follow up with Physician: Not scheduled at this time      Is this a Progress Report:     []  Yes  [x]  No      If Yes:  Date Range for reporting period:  Beginning   Ending     Progress report will be due (10 Rx or 30 days whichever is less):       Recertification will be due (POC Duration  / 90 days whichever is less): 3/6/20         Visit # Insurance Allowable Auth Required   16 30 []  Yes [x]  No        Functional Scale:    Date assessed:    WOMAC 64/96=66.7% deficit   1/10/20  WOMAC  L hip 65/96=67.7% deficit   20  R hip =1.1%     20  (3 questions omitted on R d/t L hip)    Latex Allergy:  [x]NO      []YES  Preferred Language for Healthcare:   [x]English       []other:      Pain level:  6/10 L, 0/10 R     SUBJECTIVE:   present. Hips are feeling much better. L knee continues to be painful. Per previous PCP appt- Pt states US is scheduled but has not been completed yet. Pt reports no improvement in redness since beginning antibiotic, has f/u with PCP tomorrow. Pt states ankle pain and redness increase throughout day.      OBJECTIVE:              ROM PROM AROM Comments     Left Right Left Right     Flexion 110 90 90 5th rep on R   LAQ     Clamshells 3x15, black tied band Seated, back supported   Bridges 3x15 DL, black tied band    Step ups   2x15 L2, B    Side stepping  Along half wall for support   Standing ABD SLR               Quantum knee ext 3x10 DL 20# Range: 90-30   Quantum knee flex 3x10 DL 25# Range: available   Quantum leg press 3x10 # Range: 90-10        Balance     DLS, FT, EO AirexTandem stance     Static sitting 3x30\" SB 1 rep with EC        Manual interventions            Plan for next session: progress as tolerated,    Patient Education:  2/20/20 Pt had questions in regards to knee joint and TKA. Extensive time spent educating patient on the knee joint anatomy, OA and TKA procedure. Verbal education with use of knee model. Therapeutic Exercise and NMR EXR  [x] (29898) Provided verbal/tactile cueing for activities related to strengthening, flexibility, endurance, ROM for improvements in LE, proximal hip, and core control with self care, mobility, lifting, ambulation.  [] (26992) Provided verbal/tactile cueing for activities related to improving balance, coordination, kinesthetic sense, posture, motor skill, proprioception  to assist with LE, proximal hip, and core control in self care, mobility, lifting, ambulation and eccentric single leg control.      NMR and Therapeutic Activities:    [] (44297 or 60437) Provided verbal/tactile cueing for activities related to improving balance, coordination, kinesthetic sense, posture, motor skill, proprioception and motor activation to allow for proper function of core, proximal hip and LE with self care and ADLs  [] (76876) Gait Re-education- Provided training and instruction to the patient for proper LE, core and proximal hip recruitment and positioning and eccentric body weight control with ambulation re-education including up and down stairs     Home Exercise Program:    [x] (06308) Reviewed/Progressed HEP activities related to strengthening, flexibility, less for the [facility] to assist with reaching prior level of function. [x]? Progressing: []? Met: []? Not Met: []? Adjusted  2. Patient will demonstrate increased AROM to [facility] to allow for proper joint functioning as indicated by patients Functional Deficits. [x]? Progressing: []? Met: []? Not Met: []? Adjusted  3. Patient will demonstrate an increase in BLE strength to 4/5 or greater to allow for proper functional mobility as indicated by patients Functional Deficits. [x]? Progressing: []? Met: []? Not Met: []? Adjusted  4. Patient will return to ADLs, IADLs and functional activities without increased symptoms or restriction. [x]? Progressing: []? Met: []? Not Met: []? Adjusted      Overall Progression Towards Functional goals/ Treatment Progress Update:  [x] Patient is progressing as expected towards functional goals listed. [] Progression is slowed due to complexities/Impairments listed. [] Progression has been slowed due to co-morbidities. [] Plan just implemented, too soon to assess goals progression <30days   [] Goals require adjustment due to lack of progress  [] Patient is not progressing as expected and requires additional follow up with physician   [] Other    Prognosis for POC: [x] Good [] Fair  [] Poor      Patient requires continued skilled intervention: [x] Yes  [] No    Treatment/Activity Tolerance:  [x] Patient able to complete treatment  [] Patient limited by fatigue  [] Patient limited by pain     [] Patient limited by other medical complications  [x] Other: Tolerated program well. Minimal LOB even with eyes closed during seated SB and stance on aeromat. Pt requires PT follow up to address ROM, strength and functional mobility deficits.       PLAN: See eval  [x] Continue per plan of care [] Alter current plan (see comments above)   [] Plan of care initiated [] Hold pending MD visit [] Discharge    Electronically signed by:  Devorah Arteaga PTA        Note: If patient does not return for

## 2020-03-10 ENCOUNTER — TELEPHONE (OUTPATIENT)
Dept: PAIN MANAGEMENT | Age: 55
End: 2020-03-10

## 2020-03-10 ENCOUNTER — HOSPITAL ENCOUNTER (OUTPATIENT)
Dept: VASCULAR LAB | Age: 55
Discharge: HOME | End: 2020-03-10
Payer: COMMERCIAL

## 2020-03-10 ENCOUNTER — HOSPITAL ENCOUNTER (OUTPATIENT)
Dept: GENERAL RADIOLOGY | Age: 55
Discharge: HOME | End: 2020-03-10
Payer: COMMERCIAL

## 2020-03-10 ENCOUNTER — TELEPHONE (OUTPATIENT)
Dept: PRIMARY CARE CLINIC | Age: 55
End: 2020-03-10

## 2020-03-10 ENCOUNTER — OFFICE VISIT (OUTPATIENT)
Dept: PRIMARY CARE CLINIC | Age: 55
End: 2020-03-10
Payer: COMMERCIAL

## 2020-03-10 VITALS
WEIGHT: 229.2 LBS | DIASTOLIC BLOOD PRESSURE: 66 MMHG | SYSTOLIC BLOOD PRESSURE: 104 MMHG | BODY MASS INDEX: 35.97 KG/M2 | RESPIRATION RATE: 16 BRPM | HEART RATE: 50 BPM | TEMPERATURE: 98.7 F | OXYGEN SATURATION: 97 % | HEIGHT: 67 IN

## 2020-03-10 LAB
6-ACETYLMORPHINE: NOT DETECTED
7-AMINOCLONAZEPAM: NOT DETECTED
ALPHA-OH-ALPRAZOLAM: NOT DETECTED
ALPRAZOLAM: NOT DETECTED
AMPHETAMINE: NOT DETECTED
BARBITURATES: NOT DETECTED
BENZOYLECGONINE: NOT DETECTED
BUPRENORPHINE: NOT DETECTED
CARISOPRODOL: NOT DETECTED
CLONAZEPAM: NOT DETECTED
CODEINE: NOT DETECTED
CREATININE URINE: 103.2 MG/DL (ref 20–400)
DIAZEPAM: NOT DETECTED
DRUGS EXPECTED: NORMAL
EER PAIN MGT DRUG PANEL, HIGH RES/EMIT U: NORMAL
ETHYL GLUCURONIDE: NOT DETECTED
FENTANYL: NOT DETECTED
HYDROCODONE: NOT DETECTED
HYDROMORPHONE: NOT DETECTED
LORAZEPAM: NOT DETECTED
MARIJUANA METABOLITE: NOT DETECTED
MDA: NOT DETECTED
MDEA: NOT DETECTED
MDMA URINE: NOT DETECTED
MEPERIDINE: NOT DETECTED
METHADONE: NOT DETECTED
METHAMPHETAMINE: NOT DETECTED
METHYLPHENIDATE: NOT DETECTED
MIDAZOLAM: NOT DETECTED
MORPHINE: NOT DETECTED
NORBUPRENORPHINE, FREE: NOT DETECTED
NORDIAZEPAM: NOT DETECTED
NORFENTANYL: NOT DETECTED
NORHYDROCODONE, URINE: NOT DETECTED
NOROXYCODONE: PRESENT
NOROXYMORPHONE, URINE: PRESENT
OXAZEPAM: NOT DETECTED
OXYCODONE: PRESENT
OXYMORPHONE: NOT DETECTED
PAIN MANAGEMENT DRUG PANEL: NORMAL
PAIN MANAGEMENT DRUG PANEL: NORMAL
PCP: NOT DETECTED
PHENTERMINE: NOT DETECTED
PROPOXYPHENE: NOT DETECTED
TAPENTADOL, URINE: NOT DETECTED
TAPENTADOL-O-SULFATE, URINE: NOT DETECTED
TEMAZEPAM: NOT DETECTED
TRAMADOL: NOT DETECTED
ZOLPIDEM: NOT DETECTED

## 2020-03-10 PROCEDURE — 73630 X-RAY EXAM OF FOOT: CPT

## 2020-03-10 PROCEDURE — 99213 OFFICE O/P EST LOW 20 MIN: CPT | Performed by: INTERNAL MEDICINE

## 2020-03-10 PROCEDURE — G8417 CALC BMI ABV UP PARAM F/U: HCPCS | Performed by: INTERNAL MEDICINE

## 2020-03-10 PROCEDURE — 3017F COLORECTAL CA SCREEN DOC REV: CPT | Performed by: INTERNAL MEDICINE

## 2020-03-10 PROCEDURE — 1036F TOBACCO NON-USER: CPT | Performed by: INTERNAL MEDICINE

## 2020-03-10 PROCEDURE — G8482 FLU IMMUNIZE ORDER/ADMIN: HCPCS | Performed by: INTERNAL MEDICINE

## 2020-03-10 PROCEDURE — 93970 EXTREMITY STUDY: CPT

## 2020-03-10 PROCEDURE — G8427 DOCREV CUR MEDS BY ELIG CLIN: HCPCS | Performed by: INTERNAL MEDICINE

## 2020-03-10 RX ORDER — DOXYCYCLINE HYCLATE 100 MG
100 TABLET ORAL 2 TIMES DAILY
Qty: 20 TABLET | Refills: 0 | Status: SHIPPED | OUTPATIENT
Start: 2020-03-10 | End: 2020-03-20

## 2020-03-10 NOTE — PROGRESS NOTES
She is not in acute distress. Appearance: Normal appearance. She is well-developed. HENT:      Mouth/Throat:      Pharynx: Oropharynx is clear. Eyes:      General: Lids are normal.      Extraocular Movements: Extraocular movements intact. Pupils: Pupils are equal, round, and reactive to light. Musculoskeletal:      Right lower leg: Edema present. Left lower leg: Edema present. Skin:     Comments: 10 x 14 cm area of erythema left lower leg, 8 x 9 cm area of erythema right lower leg    Left left tender  Swelling left lower leg, warmth left leg   Neurological:      Mental Status: She is alert. No results found for this visit on 03/10/20. Lab Review         Assessment/Plan     1. Bilateral leg pain  - US DUP LOWER EXTREMITY LEFT GUANACO  - US DUP LOWER EXTREMITY RIGHT GUANACO    2. Leg swelling  - US DUP LOWER EXTREMITY LEFT GUANACO  - US DUP LOWER EXTREMITY RIGHT GUANACO    3. Cellulitis of lower extremity, unspecified laterality  - Continue Keflex  - Start doxycycline hyclate (VIBRA-TABS) 100 MG tablet; Take 1 tablet by mouth 2 times daily for 10 days  Dispense: 20 tablet; Refill: 0      Discussed medications with patient, who voiced understanding of their use and indications. All questions answered. Return in about 1 week (around 3/17/2020) for leg pain and results.

## 2020-03-12 ENCOUNTER — HOSPITAL ENCOUNTER (OUTPATIENT)
Dept: PHYSICAL THERAPY | Age: 55
Setting detail: THERAPIES SERIES
Discharge: HOME | End: 2020-03-12
Payer: COMMERCIAL

## 2020-03-12 ENCOUNTER — OFFICE VISIT (OUTPATIENT)
Dept: ORTHOPEDIC SURGERY | Age: 55
End: 2020-03-12
Payer: COMMERCIAL

## 2020-03-12 VITALS — RESPIRATION RATE: 16 BRPM | HEART RATE: 56 BPM | WEIGHT: 229 LBS | BODY MASS INDEX: 35.94 KG/M2 | HEIGHT: 67 IN

## 2020-03-12 PROCEDURE — 3017F COLORECTAL CA SCREEN DOC REV: CPT | Performed by: ORTHOPAEDIC SURGERY

## 2020-03-12 PROCEDURE — G8482 FLU IMMUNIZE ORDER/ADMIN: HCPCS | Performed by: ORTHOPAEDIC SURGERY

## 2020-03-12 PROCEDURE — 1036F TOBACCO NON-USER: CPT | Performed by: ORTHOPAEDIC SURGERY

## 2020-03-12 PROCEDURE — 97110 THERAPEUTIC EXERCISES: CPT | Performed by: PHYSICAL THERAPIST

## 2020-03-12 PROCEDURE — 99213 OFFICE O/P EST LOW 20 MIN: CPT | Performed by: ORTHOPAEDIC SURGERY

## 2020-03-12 PROCEDURE — G8427 DOCREV CUR MEDS BY ELIG CLIN: HCPCS | Performed by: ORTHOPAEDIC SURGERY

## 2020-03-12 PROCEDURE — G8417 CALC BMI ABV UP PARAM F/U: HCPCS | Performed by: ORTHOPAEDIC SURGERY

## 2020-03-12 NOTE — FLOWSHEET NOTE
The [address] [place] [address] and Sports Rehabilitation, Deng Arita    Physical Therapy Daily Treatment Note  Date:  3/12/2020    Patient Name:  Beata Hill    :  1965  MRN: [id number]  Restrictions/Precautions:    Medical/Treatment Diagnosis Information:  · Diagnosis: M70.61, M70.62 (ICD-10-CM) - Trochanteric bursitis of both hips  · Treatment Diagnosis: M54.5, M70.71, M70.72, M25.551, M25.552; leading to impaired ADLs and IADLs  Insurance/Certification information:  PT Insurance Information: PT BENEFITS  FACILITY/ [facility]/ %/30 [facility]/ AUTH REQUIRED ONLY AFTER 30TH VISIT / 20 PAG  Physician Information:  Referring Practitioner: Jessi Hanson. Román Guthrie MD  Has the plan of care been signed (Y/N):        []  Yes  [x]  No     Date of Patient follow up with Physician: Not scheduled at this time      Is this a Progress Report:     []  Yes  [x]  No      If Yes:  Date Range for reporting period:  Beginning   Ending     Progress report will be due (10 Rx or 30 days whichever is less):       Recertification will be due (POC Duration  / 90 days whichever is less): 3/6/20         Visit # Insurance Allowable Auth Required   17 30 []  Yes [x]  No        Functional Scale:    Date assessed:    WOMAC 64/96=66.7% deficit   1/10/20  WOMAC  L hip 65/96=67.7% deficit   20  R hip =1.1%     20  (3 questions omitted on R d/t L hip)    Latex Allergy:  [x]NO      []YES  Preferred Language for Healthcare:   [x]English       []other:      Pain level:  6/10 L, 0/10 R     SUBJECTIVE:   present. Pt states that although her hips feel better she still has joint pain on the L and her L continues to be bothersome. She has trouble sitting now d/t knee pain and has to extend the knee for relief. Her L ankle continues to be problematic, she is seeing the foot MD today.     OBJECTIVE:              ROM PROM AROM Comments     Left Right Left Right     Flexion 110 90 90 90    Extension  10  10     Assessed in sidelying. Pt unable to lay prone. Abduction  20  15        Adduction         Not assessed   ER 45 35     stretch   IR 10 20                          Flexibility Left Right Comments   Hamstrings SLR 75 SLR 75 Reports LBP   ITB (Obers test)  -  -    Hip flexor(Afshin test)     Not assessed   gastroc + tightness + tightness    Rectus femoris  Mild tightness Mild tightness Assessed in sidelying, pt can't lay prone                Special  Test Left Right Comments   FABERS  + tightness + tightness     Scour test  -  -     Trendelenburg test     Not assessed- can't perform SLS                Strength Left Right Comments   Hip flexors 4-/5 4-/5 Back p! Hip extension 4-/5 4-/5 Back p! Hip abduction 4-/5 4-/5    Hip adduction 4-/5 4-/5     Hip ER 4-/5 4-/5  groin p! Hip IR 4/5 4/5  groin p! Quads 4-/5 4-/5    Hamstrings 4-/5 4-/5        Joint mobility: Not assessed              []?Normal                       []?Hypo              []?Hyper     Palpation: generalized TTP around lateral hip     Functional Mobility/Transfers: Independent but with modifications.     Posture: Seated posture WNL. Forward flexed posture when standing     Gait: Pt uses automatic WC for ambulation. She is able to walk short distance to get from chair to treatment table but she requires UE support on table, she is not able to walk independently without support     Dermatomes: sensation equal B, though progressive decreased in sensation from proximal to distal from L1-5.  No sensation at S1-2      RESTRICTIONS/PRECAUTIONS:     Exercises/Interventions:     ROM/STRETCHES     Seated hamstring  3x30\" B   Incline calf    Supine piriformis    Seated figure 4 3x30\" B       HEP    ITB     Butterfly     Hip flexor stretch kneeling     PREs     Hooklying heel lift x10 B Lift foot 1-2\" from table-              Supine SLR Attempted Caused sharp pain in groin; after 2nd rep on L, after 5th rep on R   LAQ     Clamshells 3x15, black tied band Seated, back supported   Bridges 3x15 DL, black tied band    Step ups       Side stepping  Along half wall for support   Standing ABD SLR               Quantum knee ext 3x10 DL 20# Range: 90-30   Quantum knee flex 3x10 DL 25# Range: available   Quantum leg press 3x10 # Range: 90-10        Balance     DLS, FT, EO AirexTandem stance     Static sitting 3x30\" SB 1 rep with EC        Manual interventions            Plan for next session: progress as tolerated,    Patient Education:  2/20/20 Pt had questions in regards to knee joint and TKA. Extensive time spent educating patient on the knee joint anatomy, OA and TKA procedure. Verbal education with use of knee model. Therapeutic Exercise and NMR EXR  [x] (54134) Provided verbal/tactile cueing for activities related to strengthening, flexibility, endurance, ROM for improvements in LE, proximal hip, and core control with self care, mobility, lifting, ambulation.  [] (51911) Provided verbal/tactile cueing for activities related to improving balance, coordination, kinesthetic sense, posture, motor skill, proprioception  to assist with LE, proximal hip, and core control in self care, mobility, lifting, ambulation and eccentric single leg control.      NMR and Therapeutic Activities:    [] (85842 or 91211) Provided verbal/tactile cueing for activities related to improving balance, coordination, kinesthetic sense, posture, motor skill, proprioception and motor activation to allow for proper function of core, proximal hip and LE with self care and ADLs  [] (31960) Gait Re-education- Provided training and instruction to the patient for proper LE, core and proximal hip recruitment and positioning and eccentric body weight control with ambulation re-education including up and down stairs     Home Exercise Program:    [x] (76359) Reviewed/Progressed HEP activities related to strengthening, flexibility, endurance, ROM of core, proximal hip and LE for functional self-care, mobility, lifting and ambulation/stair navigation   [] (66904)Reviewed/Progressed HEP activities related to improving balance, coordination, kinesthetic sense, posture, motor skill, proprioception of core, proximal hip and LE for self care, mobility, lifting, and ambulation/stair navigation      Manual Treatments:    [] (03485) Provided manual therapy to mobilize LE, proximal hip and/or LS spine soft tissue/joints for the purpose of modulating pain, promoting relaxation,  increasing ROM, reducing/eliminating soft tissue swelling/inflammation/restriction, improving soft tissue extensibility and allowing for proper ROM for normal function with self care, mobility, lifting and ambulation. Modalities:  20' ice, L knee and hip    Charges:  Timed Code Treatment Minutes: 23   Total Treatment Minutes: 43   Time in: 9:50  Time out: 10:50    [] EVAL (LOW) 86044 (typically 20 minutes face-to-face)  [] EVAL (MOD) 09380 (typically 30 minutes face-to-face)  [] EVAL (HIGH) 26662 (typically 45 minutes face-to-face)  [] RE-EVAL     [x] WR(55419) x 2     [] IONTO  [] NMR (72421) x       [] VASO  [] Manual (52194) x      [] Other:  [] TA x      [] Mech Traction (79851)  [] ES(attended) (11546)      [] ES (un) (18496):     GOALS:   Patient stated goal: decrease pain, get better, get stronger   []? Progressing: []? Met: []? Not Met: []? Adjusted     Therapist goals for Patient:   Short Term Goals: To be achieved in: 4 weeks 2/7/20  1. Independent in HEP and progression per patient tolerance, in order to prevent re-injury. []? Progressing: [x]? Met: []? Not Met: []? Adjusted   2. Patient will have a decrease in pain to facilitate improvement in movement, function, and ADLs as indicated by Functional Deficits. []? Progressing: [x]? Met: []? Not Met: []? Adjusted      Long Term Goals: To be achieved in: 8 weeks 3/6/20  1.  Disability index score of 33% or less for the [facility] to assist with reaching prior level of Plan of care initiated [] Hold pending MD visit [] Discharge    Electronically signed by:  Eric Leahy PT, DPT  Physical Therapist  [id number]  [email or web address]        Note: If patient does not return for scheduled/ recommended follow up visits, this note will serve as a discharge from care along with most recent update on progress.

## 2020-03-12 NOTE — PROGRESS NOTES
have pain/redness/swelling despite seeing PCP and having negative US and MRI for blood clots. OBJECTIVE:         ROM AROM Comments   Flexion 40     Extension 40  p! Along superior shoulder/upper trap   Side bend R 35     Side bend L 30     Rotation R 40    Rotation L 48     Shoulder AROM clearing Seated: 90 deg flex/ABD                   Strength L R Comments   Neck flexion (C1-2)     4-/5   Neck side bend (C3) 4-/5 4-/5     Shoulder elevation (C4) 4-/5 4-/5     Shoulder abduction (C5) 4-/5 4-/5     Elbow flexion and /or wrist extension  (C6) 4/5 4/5     Elbow extension and/or wrist flexion  (C7) 4/5 4/5     Thumb extension and/or ulnar deviation ((C8)     Not assessed   Abduction and /or adduction of hand intrinsics (T1)     Not assessed   Shoulder ER 4-/5 4-/5     Shoulder IR 4/5 4/5     Shoulder flex 4/5 4-/5 p! Shoulder ABD 4/5 4-/5              DTR  Not assessed L R Comments   Biceps (C5,C6)         Brachioradialis (C5,C6)         Triceps (C7,C8)                         Special Tests Results Comments   Foraminal compression test  -     Distraction test -     Upper limb tension test  -    Shoulder abduction(relief) test   -    Vertebral artery test   Not assessed    Valsalva test  Not assessed    Joint play assessments  Hypo mobile R lateral glides  Extension   Painful arc - B L p! At end range   Neers + B    Drop arm - B    Hawkin's Jack -L, +R            Dermatomes:  Equal sensation: C1-T2, light touch     Joint mobility:               []?Normal                      [x]? Hypo              []?Hyper     Palpation: TTP TP of C2-7.  B 1st and 2nd rib     Functional Mobility/Transfers: Independent     Posture: Slightly rounded shoulders and forward head     Gait: Use of WC for mobility      RESTRICTIONS/PRECAUTIONS:     Exercises/Interventions:   Exercise/Equipment Resistance/Repetitions Other comments   Stretching/PROM     3 finger rotation     3 finger retraction     UT side bend stretch     Levater scap progression per patient tolerance, in order to prevent re-injury. []? Progressing: []? Met: []? Not Met: []? Adjusted   2. Patient will have a decrease in pain to facilitate improvement in movement, function, and ADLs as indicated by Functional Deficits. []? Progressing: []? Met: []? Not Met: []? Adjusted     Long Term Goals: To be achieved in: 8 weeks 3/25/20  1. Disability index score of 20% or less for the NDI to assist with reaching prior level of function. []? Progressing: []? Met: []? Not Met: []? Adjusted  2. Patient will demonstrate increased AROM to WNL of cervical/thoracic spine to allow for proper joint functioning as indicated by patients Functional Deficits. []? Progressing: []? Met: []? Not Met: []? Adjusted  Patient will demonstrate an increase in postural awareness and control and activation of  Deep cervical stabilizers to allow for proper functional mobility as indicated by patients Functional Deficits. []? Progressing: []? Met: []? Not Met: []? Adjusted  4. Patient will return to ADLs, IADLs and functional activities without increased symptoms or restriction. []? Progressing: []? Met: []? Not Met: []? Adjusted  5. Patient will be able to swim without neck pain. []? Progressing: []? Met: []? Not Met: []? Adjusted       Overall Progression Towards Functional goals/ Treatment Progress Update:  [] Patient is progressing as expected towards functional goals listed. [] Progression is slowed due to complexities/Impairments listed. [] Progression has been slowed due to co-morbidities.   [x] Plan just implemented, too soon to assess goals progression <30days   [] Goals require adjustment due to lack of progress  [] Patient is not progressing as expected and requires additional follow up with physician  [] Other    Prognosis for POC: [x] Good [] Fair  [] Poor      Patient requires continued skilled intervention: [x] Yes  [] No    Treatment/Activity Tolerance:  [x] Patient able to

## 2020-03-13 PROBLEM — M84.362A STRESS FRACTURE OF LEFT TIBIA: Status: ACTIVE | Noted: 2020-03-13

## 2020-03-13 NOTE — PROGRESS NOTES
CHIEF COMPLAINT: Left anterior leg/ankle pain/ possible stress fracture distal tibia. HISTORY:  Ms. Bahman Ayers 53 y.o. [address] [facility] female presents today for evaluation of left anterior leg and ankle pain which started Feb 2020, and reported doing leg press with about 100 Lb as an exercise to loose more weight.  She is complaining of sharp pain, and swelling with ecchymosis end of Feb 2020. Pain is increase with standing and walking and ankle plantar flexion. Pain is dull achy pain by the end of the day. No radiation and no numbness and tingling sensation. No other complaint. There is no history of ankle injury. She uses a motorized wheelchair for the past 7 years because of her back. She also lost over 200 Lb.      Past Medical History:   Diagnosis Date    Arthritis     TAN (dyspnea on exertion)     Gastroesophageal reflux disease 1/28/2020    Hyperlipidemia     Hypertension     Morbid obesity with body mass index (BMI) of 60.0 to 69.9 in adult ([facility] [address] 7/2/2018    Neuropathy     SHYANN (obstructive sleep apnea)     Primary osteoarthritis of right knee 7/2/2018    Type 2 diabetes mellitus without complication ([facility] [address]     controlled with diet    Urinary incontinence        Past Surgical History:   Procedure Laterality Date    BACK SURGERY      2001    BREAST LUMPECTOMY Bilateral     2015    DILATION AND CURETTAGE OF UTERUS N/A 08/30/2018    GASTRIC BYPASS SURGERY      SLEEVE GASTRECTOMY  10/13/2018       Social History     Socioeconomic History    Marital status:      Spouse name: Not on file    Number of children: Not on file    Years of education: Not on file    Highest education level: Not on file   Occupational History    Not on file   Social Needs    Financial resource strain: Not on file    Food insecurity     Worry: Not on file     Inability: Not on file    Transportation needs     Medical: Not on file     Non-medical: Not on file   Tobacco Use    Smoking status: Never Smoker    EVERY DAY      Multiple Vitamins-Iron (DAILY CALISTA MULTIVITAMIN/IRON) TABS       meloxicam (MOBIC) 15 MG tablet meloxicam meloxicam (MOBIC) 15 MG tablet meloxicam 15 mg tablet 0 Active  Historical Provider [address] (00000)      docusate sodium (COLACE) 100 MG capsule Take 1 capsule by mouth 2 times daily 60 capsule 1    polyethylene glycol (MIRALAX) powder Take 17 g by mouth daily 510 g 0    hydrocortisone (ANUSOL-HC) 25 MG suppository Place 1 suppository rectally every 12 hours 12 suppository 1    omeprazole (PRILOSEC) 20 MG delayed release capsule Take 1 capsule by mouth daily 30 capsule 3    ondansetron (ZOFRAN) 4 MG tablet Take 1 tablet by mouth every 8 hours as needed for Nausea or Vomiting 30 tablet 0    Cyanocobalamin (VITAMIN B 12) 500 MCG TABS Take 1 tablet by mouth daily 30 tablet 5    diclofenac sodium 1 % GEL Apply 4 g topically 4 times daily 5 Tube 3    bisacodyl (DULCOLAX) 5 MG EC tablet bisacodyl 5 mg tablet,delayed release      misoprostol (CYTOTEC) 200 MCG tablet misoprostol 200 mcg tablet      Calcium Carb-Cholecalciferol 600-800 MG-UNIT CHEW Take 1 tablet by mouth      chlorhexidine (PERIDEX) 0.12 % solution chlorhexidine gluconate 0.12 % mouthwash      nystatin (MYCOSTATIN) 110521 UNIT/GM cream nystatin 100,000 unit/gram topical cream      triamcinolone (KENALOG) 0.1 % cream triamcinolone acetonide 0.1 % topical cream      tiZANidine (ZANAFLEX) 2 MG tablet tizanidine 2 mg tablet      selenium sulfide (SELSUN) 2.5 % lotion selenium sulfide 2.5 % lotion      ranitidine (ZANTAC) 150 MG tablet ranitidine 150 mg tablet      ketoconazole (NIZORAL) 2 % shampoo ketoconazole 2 % shampoo      meclizine (ANTIVERT) 12.5 MG tablet Take 1 tablet by mouth 3 times daily as needed for Dizziness or Nausea 60 tablet 1    Misc.  Devices [facility]) MISC Dispense bariatric extra wide wheelchair    Height-5 feet 6 & 3/4 inches  Weight- 375 lbs  BMI-59.3 1 each 0    Scooter MISC by Does not apply route Requires repair of existing scooter 1 each 0     No current facility-administered medications on file prior to visit. Pertinent items are noted in HPI  Review of systems reviewed from Patient History Form dated on 1/27/2020 and available in the patient's chart under the Media tab. No change noted. PHYSICAL EXAMINATION:  Ms. Praful Garduno is a very pleasant 53 y.o. [address] [facility] female who presents today in no acute distress, awake, alert, and oriented. She is well dressed, nourished and  groomed. Patient with normal affect. Height is  5' 6.5\" (1.689 m), weight is 229 lb (103.9 kg), Body mass index is 36.41 kg/m². Resting respiratory rate is 16. Examination of the gait, showed that the patient in a motorized chair.  Examination of left ankle showing decrease ROM of ankle joint(5 degree DF) with anterior ankle pain with plantar flexion compare to the other side. She has intact sensation and good pedal pulses.  She has good strength in all four planes, including eversion, and has significant tenderness on deep palpation over the anterior ankle and distal tibia, with moderate swelling compared to the other side.  The ankles are stable to drawer test bilaterally, equally.      IMAGING:  Xray's were reviewed, 3 views of the left ankle taken in office today, and showed no acute fracture. IMPRESSION: Left anterior leg/ankle pain/ possible stress fracture distal tibia. PLAN: I discussed with the patient the different treatment options. We recommended stretching exercises of the calf which was taught to the patient today. Since she is continuing to have significant symptoms, we will obtain an MRI of the left ankle in order to further evaluate for a stress fracture. F/U after MRI.       Eileen Chandler MD

## 2020-03-16 ENCOUNTER — TELEPHONE (OUTPATIENT)
Dept: PRIMARY CARE CLINIC | Age: 55
End: 2020-03-16

## 2020-03-16 ENCOUNTER — OFFICE VISIT (OUTPATIENT)
Dept: PRIMARY CARE CLINIC | Age: 55
End: 2020-03-16
Payer: COMMERCIAL

## 2020-03-16 VITALS
DIASTOLIC BLOOD PRESSURE: 60 MMHG | HEART RATE: 61 BPM | SYSTOLIC BLOOD PRESSURE: 118 MMHG | OXYGEN SATURATION: 96 % | WEIGHT: 230.4 LBS | BODY MASS INDEX: 36.16 KG/M2 | HEIGHT: 67 IN

## 2020-03-16 PROCEDURE — 3017F COLORECTAL CA SCREEN DOC REV: CPT | Performed by: INTERNAL MEDICINE

## 2020-03-16 PROCEDURE — G8427 DOCREV CUR MEDS BY ELIG CLIN: HCPCS | Performed by: INTERNAL MEDICINE

## 2020-03-16 PROCEDURE — 99213 OFFICE O/P EST LOW 20 MIN: CPT | Performed by: INTERNAL MEDICINE

## 2020-03-16 PROCEDURE — G8482 FLU IMMUNIZE ORDER/ADMIN: HCPCS | Performed by: INTERNAL MEDICINE

## 2020-03-16 PROCEDURE — 1036F TOBACCO NON-USER: CPT | Performed by: INTERNAL MEDICINE

## 2020-03-16 PROCEDURE — G8417 CALC BMI ABV UP PARAM F/U: HCPCS | Performed by: INTERNAL MEDICINE

## 2020-03-16 RX ORDER — BISACODYL 5 MG
5 TABLET, DELAYED RELEASE (ENTERIC COATED) ORAL DAILY PRN
Qty: 30 TABLET | Refills: 1 | Status: SHIPPED | OUTPATIENT
Start: 2020-03-16 | End: 2020-06-09 | Stop reason: SDUPTHER

## 2020-03-16 NOTE — PROGRESS NOTES
1026   BP: 118/60   Site: Left Upper Arm   Position: Sitting   Cuff Size: Large Adult   Pulse: 61   SpO2: 96%   Weight: 230 lb 6.4 oz (104.5 kg)   Height: 5' 6.5\" (1.689 m)     Body mass index is 36.63 kg/m². Physical Exam  Nursing note reviewed. Constitutional:       General: She is not in acute distress. Appearance: Normal appearance. She is well-developed. HENT:      Mouth/Throat:      Pharynx: Oropharynx is clear. Eyes:      General: Lids are normal.      Extraocular Movements: Extraocular movements intact. Pupils: Pupils are equal, round, and reactive to light. Neck:      Musculoskeletal: Neck supple. Thyroid: No thyromegaly. Vascular: No carotid bruit. Cardiovascular:      Rate and Rhythm: Normal rate and regular rhythm. Heart sounds: Normal heart sounds, S1 normal and S2 normal. No murmur. No friction rub. No gallop. Abdominal:      General: Bowel sounds are normal.      Palpations: Abdomen is soft. Tenderness: There is no abdominal tenderness. Musculoskeletal:      Right lower leg: No edema. Left lower leg: No edema. Lymphadenopathy:      Head:      Right side of head: No submandibular adenopathy. Left side of head: No submandibular adenopathy. Skin:     Comments: 10 x 13 cm area of erythema left lower leg, 6x3  cm area of erythema right lower leg    Left left tender  Swelling left lower leg has improved some, warmth left leg   Neurological:      Mental Status: She is alert. No results found for this visit on 03/16/20. Lab Review         Assessment/Plan     1. Bilateral leg pain  -right leg pain is better  -left leg no improvement in pain  -Pt takes Oxycodone-Acetaminophen for chronic pain  - Referral to [facility] Yvonne ECHEVARRIA MD, Vascular Surgery, [facility]    2. Leg swelling  - persistent with leg erythema  - Referral to [facility], MD, Vascular Surgery, [facility]    3.  Cellulitis of lower extremity, unspecified laterality  -right leg shows improvement  -Left leg no significant improvement with 2 antibiotics  -continue Doxycycline as prescribed    4. Constipation, unspecified constipation type  - bisacodyl (DULCOLAX) 5 MG EC tablet; Take 1 tablet by mouth daily as needed for Constipation  Dispense: 30 tablet; Refill: 1  -Continue Colace 100mg 2 times daily  -high fiber diet  -Metamucil daily    Discussed medications with patient, who voiced understanding of their use and indications. All questions answered. Return in about 4 weeks (around 4/13/2020) for constipation.

## 2020-03-16 NOTE — PATIENT INSTRUCTIONS
1. Bilateral leg pain  -right leg pain is better  -left leg no improvement in pain  -Pt takes Oxycodone-Acetaminophen for chronic pain  - Referral to [facility] Yvonne ECHEVARRIA MD, Vascular Surgery, [facility]    2. Leg swelling  - persistent with leg erythema  - Referral to [facility], MD, Vascular Surgery, [facility]    3. Cellulitis of lower extremity, unspecified laterality  -right leg shows improvement  -Left leg no significant improvement with 2 antibiotics  -continue Doxycycline as prescribed    4. Constipation, unspecified constipation type  - bisacodyl (DULCOLAX) 5 MG EC tablet; Take 1 tablet by mouth daily as needed for Constipation  Dispense: 30 tablet;  Refill: 1  -Continue Colace 100mg 2 times daily  -high fiber diet  -Metamucil daily

## 2020-03-17 ENCOUNTER — HOSPITAL ENCOUNTER (OUTPATIENT)
Dept: MRI IMAGING | Age: 55
Discharge: HOME | End: 2020-03-17
Payer: COMMERCIAL

## 2020-03-17 ENCOUNTER — APPOINTMENT (OUTPATIENT)
Dept: MRI IMAGING | Age: 55
End: 2020-03-17
Payer: COMMERCIAL

## 2020-03-17 PROCEDURE — 73221 MRI JOINT UPR EXTREM W/O DYE: CPT

## 2020-03-17 PROCEDURE — 73721 MRI JNT OF LWR EXTRE W/O DYE: CPT

## 2020-03-17 ASSESSMENT — ENCOUNTER SYMPTOMS
SHORTNESS OF BREATH: 0
COLOR CHANGE: 1

## 2020-03-23 ASSESSMENT — ENCOUNTER SYMPTOMS
ABDOMINAL PAIN: 0
DIARRHEA: 0
COLOR CHANGE: 1
CONSTIPATION: 1
SHORTNESS OF BREATH: 0

## 2020-03-24 ENCOUNTER — TELEPHONE (OUTPATIENT)
Dept: ORTHOPEDIC SURGERY | Age: 55
End: 2020-03-24

## 2020-03-24 ENCOUNTER — TELEPHONE (OUTPATIENT)
Dept: PRIMARY CARE CLINIC | Age: 55
End: 2020-03-24

## 2020-03-24 ENCOUNTER — VIRTUAL VISIT (OUTPATIENT)
Dept: ORTHOPEDIC SURGERY | Age: 55
End: 2020-03-24
Payer: COMMERCIAL

## 2020-03-24 PROBLEM — S86.312A PERONEAL TENDON TEAR, LEFT, INITIAL ENCOUNTER: Status: ACTIVE | Noted: 2020-03-24

## 2020-03-24 PROBLEM — M25.572 LEFT ANKLE PAIN: Status: ACTIVE | Noted: 2020-03-24

## 2020-03-24 PROCEDURE — 99442 PR PHYS/QHP TELEPHONE EVALUATION 11-20 MIN: CPT | Performed by: ORTHOPAEDIC SURGERY

## 2020-03-24 RX ORDER — METHYLPREDNISOLONE 4 MG/1
4 TABLET ORAL SEE ADMIN INSTRUCTIONS
Qty: 1 KIT | Refills: 0 | Status: SHIPPED | OUTPATIENT
Start: 2020-03-24 | End: 2020-03-31

## 2020-03-24 NOTE — TELEPHONE ENCOUNTER
They need a medical necessity form  To have emg   Done  Or else  They will reschedule  For end of may

## 2020-03-24 NOTE — TELEPHONE ENCOUNTER
[facility] is scheduling for a later date in May. They need a new referral to be able to do the new schedule.      Thank you

## 2020-03-25 ENCOUNTER — OFFICE VISIT (OUTPATIENT)
Dept: PRIMARY CARE CLINIC | Age: 55
End: 2020-03-25
Payer: COMMERCIAL

## 2020-03-25 VITALS
DIASTOLIC BLOOD PRESSURE: 66 MMHG | RESPIRATION RATE: 16 BRPM | HEART RATE: 60 BPM | BODY MASS INDEX: 36.29 KG/M2 | WEIGHT: 231.2 LBS | HEIGHT: 67 IN | OXYGEN SATURATION: 99 % | SYSTOLIC BLOOD PRESSURE: 104 MMHG | TEMPERATURE: 98.6 F

## 2020-03-25 PROCEDURE — G8482 FLU IMMUNIZE ORDER/ADMIN: HCPCS | Performed by: INTERNAL MEDICINE

## 2020-03-25 PROCEDURE — 1036F TOBACCO NON-USER: CPT | Performed by: INTERNAL MEDICINE

## 2020-03-25 PROCEDURE — 99214 OFFICE O/P EST MOD 30 MIN: CPT | Performed by: INTERNAL MEDICINE

## 2020-03-25 PROCEDURE — G8427 DOCREV CUR MEDS BY ELIG CLIN: HCPCS | Performed by: INTERNAL MEDICINE

## 2020-03-25 PROCEDURE — 2022F DILAT RTA XM EVC RTNOPTHY: CPT | Performed by: INTERNAL MEDICINE

## 2020-03-25 PROCEDURE — L4360 PNEUMAT WALKING BOOT PRE CST: HCPCS | Performed by: ORTHOPAEDIC SURGERY

## 2020-03-25 PROCEDURE — G8417 CALC BMI ABV UP PARAM F/U: HCPCS | Performed by: INTERNAL MEDICINE

## 2020-03-25 PROCEDURE — 3017F COLORECTAL CA SCREEN DOC REV: CPT | Performed by: INTERNAL MEDICINE

## 2020-03-25 PROCEDURE — 3044F HG A1C LEVEL LT 7.0%: CPT | Performed by: INTERNAL MEDICINE

## 2020-03-25 NOTE — PROGRESS NOTES
nervous/anxious. No Known Allergies  Outpatient Medications Marked as Taking for the 3/25/20 encounter (Office Visit) with Timothy Salmeron MD   Medication Sig Dispense Refill    methylPREDNISolone (MEDROL, CHRISTIANA,) 4 MG tablet Take 1 tablet by mouth See Admin Instructions for 7 days Take by mouth. 1 kit 0    bisacodyl (DULCOLAX) 5 MG EC tablet Take 1 tablet by mouth daily as needed for Constipation 30 tablet 1    gabapentin (NEURONTIN) 300 MG capsule Take 1 capsule by mouth 3 times daily for 60 days. Intended supply: 90 days 90 capsule 1    oxyCODONE-acetaminophen (PERCOCET) 5-325 MG per tablet Take 1 tablet by mouth every 8 hours as needed (severe pain) for up to 30 days.  Take lowest dose possible to manage pain 90 tablet 0    CALCIUM 600/VITAMIN D 600-400 MG-UNIT CHEW       Cholecalciferol (VITAMIN D3) 125 MCG (5000 UT) CAPS EVERY DAY      vitamin D (ERGOCALCIFEROL) 1.25 MG (79154 UT) CAPS capsule       b complex vitamins capsule TAKE 1 CAPSULE BY MOUTH EVERY DAY      Multiple Vitamins-Iron (DAILY CALISTA MULTIVITAMIN/IRON) TABS       docusate sodium (COLACE) 100 MG capsule Take 1 capsule by mouth 2 times daily 60 capsule 1    polyethylene glycol (MIRALAX) powder Take 17 g by mouth daily 510 g 0    hydrocortisone (ANUSOL-HC) 25 MG suppository Place 1 suppository rectally every 12 hours 12 suppository 1    omeprazole (PRILOSEC) 20 MG delayed release capsule Take 1 capsule by mouth daily 30 capsule 3    ondansetron (ZOFRAN) 4 MG tablet Take 1 tablet by mouth every 8 hours as needed for Nausea or Vomiting 30 tablet 0    Cyanocobalamin (VITAMIN B 12) 500 MCG TABS Take 1 tablet by mouth daily 30 tablet 5    diclofenac sodium 1 % GEL Apply 4 g topically 4 times daily 5 Tube 3    bisacodyl (DULCOLAX) 5 MG EC tablet bisacodyl 5 mg tablet,delayed release      misoprostol (CYTOTEC) 200 MCG tablet misoprostol 200 mcg tablet      Calcium Carb-Cholecalciferol 600-800 MG-UNIT CHEW Take 1 tablet by mouth respiratory distress. Breath sounds: Normal breath sounds. No wheezing, rhonchi or rales. Abdominal:      General: Bowel sounds are normal. There is no distension. Palpations: Abdomen is soft. Tenderness: There is abdominal tenderness (under abdominal fold). There is no rebound. Musculoskeletal:      Right lower leg: Edema present. Left lower leg: Edema present. Lymphadenopathy:      Head:      Right side of head: No submandibular adenopathy. Left side of head: No submandibular adenopathy. Skin:     Findings: Abrasion present. Comments: No foot ulcers    horizontal splits or tears under abdominal fold in crease, tender, no drainage   Neurological:      Mental Status: She is alert and oriented to person, place, and time. Comments: Bilateral foot monofilament exam abnormal and patient is unable to feel monofilament bilateral feet   Psychiatric:         Mood and Affect: Mood normal.           No results found for this visit on 03/25/20. Lab Review         Assessment/Plan     1. Type 2 diabetes mellitus with diabetic polyneuropathy, without long-term current use of insulin ([address]  - well controlled with weight loss  - Referral to James Ochoa MD, Ophthalmology, [facility]  - Comprehensive Metabolic Panel; Future  - Lipid Panel; Future  - Microalbumin / Creatinine Urine Ratio  -  DIABETES FOOT EXAM    2. Epigastric abdominal pain  - Increase Omeprazole 20mg to twice daily  - US Abdomen Complete; Future  - CBC Auto Differential; Future  - Comprehensive Metabolic Panel; Future    3. Right upper quadrant abdominal pain  - US Abdomen Complete; Future  - CBC Auto Differential; Future  - Comprehensive Metabolic Panel; Future    4. Skin abrasion  - mupirocin (BACTROBAN) 2 % ointment; Apply 3 times daily. Dispense: 15 g; Refill: 0  - Telfa pad to cover area      Discussed medications with patient, who voiced understanding of their use and indications.  All questions answered. Return in about 1 week (around 4/1/2020) for abdominal pain and skin abrasions.

## 2020-03-25 NOTE — PATIENT INSTRUCTIONS
1. Type 2 diabetes mellitus with diabetic polyneuropathy, without long-term current use of insulin ([facility] [address]  - well controlled with weight loss  - Referral to Chevy Mattson MD, Ophthalmology, [facility]  - Comprehensive Metabolic Panel; Future  - Lipid Panel; Future  - Microalbumin / Creatinine Urine Ratio  -  DIABETES FOOT EXAM    2. Epigastric abdominal pain  - Increase Omeprazole 20mg to twice daily  - US Abdomen Complete; Future  - CBC Auto Differential; Future  - Comprehensive Metabolic Panel; Future    3. Right upper quadrant abdominal pain  - US Abdomen Complete; Future  - CBC Auto Differential; Future  - Comprehensive Metabolic Panel; Future    4. Skin abrasion  - mupirocin (BACTROBAN) 2 % ointment; Apply 3 times daily.   Dispense: 15 g; Refill: 0  - Telfa pad to cover area

## 2020-03-26 ENCOUNTER — TELEPHONE (OUTPATIENT)
Dept: PRIMARY CARE CLINIC | Age: 55
End: 2020-03-26

## 2020-03-26 ENCOUNTER — TELEPHONE (OUTPATIENT)
Dept: ORTHOPEDIC SURGERY | Age: 55
End: 2020-03-26

## 2020-03-26 DIAGNOSIS — R10.13 EPIGASTRIC ABDOMINAL PAIN: ICD-10-CM

## 2020-03-26 DIAGNOSIS — R10.11 RIGHT UPPER QUADRANT ABDOMINAL PAIN: ICD-10-CM

## 2020-03-26 DIAGNOSIS — E11.42 TYPE 2 DIABETES MELLITUS WITH DIABETIC POLYNEUROPATHY, WITHOUT LONG-TERM CURRENT USE OF INSULIN (HCC): ICD-10-CM

## 2020-03-26 LAB
A/G RATIO: 1.7 (ref 1.1–2.2)
ALBUMIN SERPL-MCNC: 3.7 G/DL (ref 3.4–5)
ALP BLD-CCNC: 58 U/L (ref 40–129)
ALT SERPL-CCNC: 15 U/L (ref 10–40)
ANION GAP SERPL CALCULATED.3IONS-SCNC: 10 MMOL/L (ref 3–16)
AST SERPL-CCNC: 15 U/L (ref 15–37)
BASOPHILS ABSOLUTE: 0 K/UL (ref 0–0.2)
BASOPHILS RELATIVE PERCENT: 0.5 %
BILIRUB SERPL-MCNC: 0.3 MG/DL (ref 0–1)
BUN BLDV-MCNC: 14 MG/DL (ref 7–20)
CALCIUM SERPL-MCNC: 9 MG/DL (ref 8.3–10.6)
CHLORIDE BLD-SCNC: 108 MMOL/L (ref 99–110)
CHOLESTEROL, TOTAL: 178 MG/DL (ref 0–199)
CO2: 26 MMOL/L (ref 21–32)
CREAT SERPL-MCNC: <0.5 MG/DL (ref 0.6–1.1)
EOSINOPHILS ABSOLUTE: 0.1 K/UL (ref 0–0.6)
EOSINOPHILS RELATIVE PERCENT: 2 %
GFR AFRICAN AMERICAN: >60
GFR NON-AFRICAN AMERICAN: >60
GLOBULIN: 2.2 G/DL
GLUCOSE BLD-MCNC: 94 MG/DL (ref 70–99)
HCT VFR BLD CALC: 34.5 % (ref 36–48)
HDLC SERPL-MCNC: 68 MG/DL (ref 40–60)
HEMOGLOBIN: 11.4 G/DL (ref 12–16)
LDL CHOLESTEROL CALCULATED: 97 MG/DL
LYMPHOCYTES ABSOLUTE: 1.9 K/UL (ref 1–5.1)
LYMPHOCYTES RELATIVE PERCENT: 34 %
MCH RBC QN AUTO: 29.1 PG (ref 26–34)
MCHC RBC AUTO-ENTMCNC: 33.1 G/DL (ref 31–36)
MCV RBC AUTO: 88.1 FL (ref 80–100)
MONOCYTES ABSOLUTE: 0.4 K/UL (ref 0–1.3)
MONOCYTES RELATIVE PERCENT: 7.1 %
NEUTROPHILS ABSOLUTE: 3.1 K/UL (ref 1.7–7.7)
NEUTROPHILS RELATIVE PERCENT: 56.4 %
PDW BLD-RTO: 15.7 % (ref 12.4–15.4)
PLATELET # BLD: 246 K/UL (ref 135–450)
PMV BLD AUTO: 8.3 FL (ref 5–10.5)
POTASSIUM SERPL-SCNC: 5 MMOL/L (ref 3.5–5.1)
RBC # BLD: 3.92 M/UL (ref 4–5.2)
SODIUM BLD-SCNC: 144 MMOL/L (ref 136–145)
TOTAL PROTEIN: 5.9 G/DL (ref 6.4–8.2)
TRIGL SERPL-MCNC: 67 MG/DL (ref 0–150)
VLDLC SERPL CALC-MCNC: 13 MG/DL
WBC # BLD: 5.6 K/UL (ref 4–11)

## 2020-03-26 NOTE — TELEPHONE ENCOUNTER
Called and spoke with Farrah who speaks Urdu and broken english . She reports after a long discussion that she has pain in her calf what gets worse with the use of her boot. She did state that with the boot on she can stand and her foot pain is better. DVT screening showed negative signs for DVT. She is confused about wearing her boot and really wants to know why she is still having pain in her calf. Due to Covid-19 Pandemic we are triaging office visit to reduce exposure to Virus. I will address with Dr. Carroll Kaminski tomorrow at office.

## 2020-03-27 ASSESSMENT — ENCOUNTER SYMPTOMS
COUGH: 0
WHEEZING: 0
CONSTIPATION: 1
SHORTNESS OF BREATH: 0
BACK PAIN: 1
BLOOD IN STOOL: 0
SINUS PRESSURE: 0
CHEST TIGHTNESS: 0
VOMITING: 0
ABDOMINAL PAIN: 1
RHINORRHEA: 0
NAUSEA: 1
DIARRHEA: 0
COLOR CHANGE: 1
SORE THROAT: 0

## 2020-03-30 RX ORDER — GABAPENTIN 300 MG/1
CAPSULE ORAL
Qty: 90 CAPSULE | Refills: 0 | Status: SHIPPED | OUTPATIENT
Start: 2020-03-30 | End: 2020-04-06

## 2020-04-06 ENCOUNTER — TELEMEDICINE (OUTPATIENT)
Dept: PAIN MANAGEMENT | Age: 55
End: 2020-04-06
Payer: COMMERCIAL

## 2020-04-06 PROBLEM — Z86.39 H/O DIABETIC NEUROPATHY: Status: ACTIVE | Noted: 2020-04-06

## 2020-04-06 PROCEDURE — 99213 OFFICE O/P EST LOW 20 MIN: CPT | Performed by: ANESTHESIOLOGY

## 2020-04-06 PROCEDURE — 1036F TOBACCO NON-USER: CPT | Performed by: ANESTHESIOLOGY

## 2020-04-06 PROCEDURE — G8417 CALC BMI ABV UP PARAM F/U: HCPCS | Performed by: ANESTHESIOLOGY

## 2020-04-06 PROCEDURE — 3017F COLORECTAL CA SCREEN DOC REV: CPT | Performed by: ANESTHESIOLOGY

## 2020-04-06 PROCEDURE — G8428 CUR MEDS NOT DOCUMENT: HCPCS | Performed by: ANESTHESIOLOGY

## 2020-04-06 RX ORDER — OXYCODONE HYDROCHLORIDE AND ACETAMINOPHEN 5; 325 MG/1; MG/1
1 TABLET ORAL EVERY 8 HOURS PRN
Qty: 90 TABLET | Refills: 0 | Status: SHIPPED | OUTPATIENT
Start: 2020-04-06 | End: 2020-05-06 | Stop reason: SDUPTHER

## 2020-04-06 RX ORDER — GABAPENTIN 300 MG/1
300 CAPSULE ORAL 3 TIMES DAILY
Qty: 90 CAPSULE | Refills: 2 | Status: SHIPPED | OUTPATIENT
Start: 2020-04-06 | End: 2020-05-06 | Stop reason: SDUPTHER

## 2020-04-07 RX ORDER — MECLIZINE HCL 12.5 MG/1
12.5 TABLET ORAL 3 TIMES DAILY PRN
Qty: 60 TABLET | Refills: 1 | Status: SHIPPED | OUTPATIENT
Start: 2020-04-07 | End: 2020-05-06

## 2020-04-08 RX ORDER — DOCUSATE SODIUM 100 MG/1
100 CAPSULE, LIQUID FILLED ORAL 2 TIMES DAILY
Qty: 60 CAPSULE | Refills: 2 | Status: SHIPPED | OUTPATIENT
Start: 2020-04-08 | End: 2020-07-09

## 2020-04-08 RX ORDER — ONDANSETRON 4 MG/1
4 TABLET, FILM COATED ORAL EVERY 8 HOURS PRN
Qty: 30 TABLET | Refills: 2 | Status: SHIPPED | OUTPATIENT
Start: 2020-04-08 | End: 2020-07-09

## 2020-04-08 RX ORDER — POLYETHYLENE GLYCOL 3350 17 G/17G
17 POWDER, FOR SOLUTION ORAL DAILY
Qty: 510 G | Refills: 2 | Status: SHIPPED | OUTPATIENT
Start: 2020-04-08 | End: 2020-07-09

## 2020-04-09 ENCOUNTER — TELEPHONE (OUTPATIENT)
Dept: PRIMARY CARE CLINIC | Age: 55
End: 2020-04-09

## 2020-04-09 NOTE — TELEPHONE ENCOUNTER
[facility] informed me of the information that she received concerning patient. The paperwork is in process for the patient. Patient will  the original copy on Friday 4/10/2020. Original place up front for pickup.

## 2020-04-10 ENCOUNTER — TELEPHONE (OUTPATIENT)
Dept: PRIMARY CARE CLINIC | Age: 55
End: 2020-04-10

## 2020-04-21 ENCOUNTER — TELEPHONE (OUTPATIENT)
Dept: ORTHOPEDIC SURGERY | Age: 55
End: 2020-04-21

## 2020-04-24 ENCOUNTER — OFFICE VISIT (OUTPATIENT)
Dept: ORTHOPEDIC SURGERY | Age: 55
End: 2020-04-24
Payer: COMMERCIAL

## 2020-04-24 VITALS — BODY MASS INDEX: 34.86 KG/M2 | WEIGHT: 230 LBS | TEMPERATURE: 97.4 F | HEIGHT: 68 IN

## 2020-04-24 PROCEDURE — 99213 OFFICE O/P EST LOW 20 MIN: CPT | Performed by: ORTHOPAEDIC SURGERY

## 2020-04-24 PROCEDURE — 3017F COLORECTAL CA SCREEN DOC REV: CPT | Performed by: ORTHOPAEDIC SURGERY

## 2020-04-24 PROCEDURE — G8417 CALC BMI ABV UP PARAM F/U: HCPCS | Performed by: ORTHOPAEDIC SURGERY

## 2020-04-24 PROCEDURE — 1036F TOBACCO NON-USER: CPT | Performed by: ORTHOPAEDIC SURGERY

## 2020-04-24 PROCEDURE — G8427 DOCREV CUR MEDS BY ELIG CLIN: HCPCS | Performed by: ORTHOPAEDIC SURGERY

## 2020-04-24 PROCEDURE — 20610 DRAIN/INJ JOINT/BURSA W/O US: CPT | Performed by: ORTHOPAEDIC SURGERY

## 2020-04-27 ENCOUNTER — OFFICE VISIT (OUTPATIENT)
Dept: ORTHOPEDIC SURGERY | Age: 55
End: 2020-04-27
Payer: COMMERCIAL

## 2020-04-27 VITALS — HEIGHT: 68 IN | TEMPERATURE: 97.9 F | WEIGHT: 230 LBS | BODY MASS INDEX: 34.86 KG/M2

## 2020-04-27 PROCEDURE — 3017F COLORECTAL CA SCREEN DOC REV: CPT | Performed by: ORTHOPAEDIC SURGERY

## 2020-04-27 PROCEDURE — 1036F TOBACCO NON-USER: CPT | Performed by: ORTHOPAEDIC SURGERY

## 2020-04-27 PROCEDURE — G8417 CALC BMI ABV UP PARAM F/U: HCPCS | Performed by: ORTHOPAEDIC SURGERY

## 2020-04-27 PROCEDURE — G8427 DOCREV CUR MEDS BY ELIG CLIN: HCPCS | Performed by: ORTHOPAEDIC SURGERY

## 2020-04-27 PROCEDURE — 20610 DRAIN/INJ JOINT/BURSA W/O US: CPT | Performed by: ORTHOPAEDIC SURGERY

## 2020-04-27 PROCEDURE — 99214 OFFICE O/P EST MOD 30 MIN: CPT | Performed by: ORTHOPAEDIC SURGERY

## 2020-04-27 NOTE — PROGRESS NOTES
osteoarthritis of right knee 7/2/2018    Type 2 diabetes mellitus without complication ([address]     controlled with diet    Urinary incontinence         Past Surgical History:   Procedure Laterality Date    BACK SURGERY      2001    BREAST LUMPECTOMY Bilateral     2015    DILATION AND CURETTAGE OF UTERUS N/A 08/30/2018    GASTRIC BYPASS SURGERY      SLEEVE GASTRECTOMY  10/13/2018       Family History   Problem Relation Age of Onset    Diabetes Mother     Stroke Mother     Osteoarthritis Mother     Heart Disease Father     Other Father     High Blood Pressure Father     Osteoarthritis Father     Diabetes Maternal Aunt     Cancer Maternal Aunt     Diabetes Maternal Grandmother     Cancer Paternal Aunt        Social History     Socioeconomic History    Marital status:      Spouse name: Not on file    Number of children: Not on file    Years of education: Not on file    Highest education level: Not on file   Occupational History    Not on file   Social Needs    Financial resource strain: Not on file    Food insecurity     Worry: Not on file     Inability: Not on file    Transportation needs     Medical: Not on file     Non-medical: Not on file   Tobacco Use    Smoking status: Never Smoker    Smokeless tobacco: Never Used   Substance and Sexual Activity    Alcohol use: No    Drug use: No    Sexual activity: Never   Lifestyle    Physical activity     Days per week: Not on file     Minutes per session: Not on file    Stress: Not on file   Relationships    Social connections     Talks on phone: Not on file     Gets together: Not on file     Attends L[employer]-day service: Not on file     Active member of club or organization: Not on file     Attends meetings of clubs or organizations: Not on file     Relationship status: Not on file    Intimate partner violence     Fear of current or ex partner: Not on file     Emotionally abused: Not on file     Physically abused: Not on file     Forced sexual activity: Not on file   Other Topics Concern    Not on file   Social History Narrative    ** Merged History Encounter **            Current Outpatient Medications   Medication Sig Dispense Refill    docusate sodium (COLACE) 100 MG capsule Take 1 capsule by mouth 2 times daily 60 capsule 2    polyethylene glycol (MIRALAX) powder Take 17 g by mouth daily 510 g 2    ondansetron (ZOFRAN) 4 MG tablet Take 1 tablet by mouth every 8 hours as needed for Nausea or Vomiting 30 tablet 2    meclizine (ANTIVERT) 12.5 MG tablet Take 1 tablet by mouth 3 times daily as needed for Dizziness or Nausea 60 tablet 1    hydrocortisone (ANUSOL-HC) 2.5 % rectal cream Place rectally 2 times daily. 30 g 1    gabapentin (NEURONTIN) 300 MG capsule Take 1 capsule by mouth 3 times daily for 90 days. Intended supply: 90 days 90 capsule 2    oxyCODONE-acetaminophen (PERCOCET) 5-325 MG per tablet Take 1 tablet by mouth every 8 hours as needed for Pain for up to 30 days.  Take lowest dose possible to manage pain 90 tablet 0    bisacodyl (DULCOLAX) 5 MG EC tablet Take 1 tablet by mouth daily as needed for Constipation 30 tablet 1    CALCIUM 600/VITAMIN D 600-400 MG-UNIT CHEW       Cholecalciferol (VITAMIN D3) 125 MCG (5000 UT) CAPS EVERY DAY      vitamin D (ERGOCALCIFEROL) 1.25 MG (03545 UT) CAPS capsule       b complex vitamins capsule TAKE 1 CAPSULE BY MOUTH EVERY DAY      Multiple Vitamins-Iron (DAILY CALISTA MULTIVITAMIN/IRON) TABS       hydrocortisone (ANUSOL-HC) 25 MG suppository Place 1 suppository rectally every 12 hours 12 suppository 1    omeprazole (PRILOSEC) 20 MG delayed release capsule Take 1 capsule by mouth daily 30 capsule 3    Cyanocobalamin (VITAMIN B 12) 500 MCG TABS Take 1 tablet by mouth daily 30 tablet 5    diclofenac sodium 1 % GEL Apply 4 g topically 4 times daily 5 Tube 3    bisacodyl (DULCOLAX) 5 MG EC tablet bisacodyl 5 mg tablet,delayed release      misoprostol (CYTOTEC) 200 MCG tablet misoprostol

## 2020-04-29 ENCOUNTER — OFFICE VISIT (OUTPATIENT)
Dept: ORTHOPEDIC SURGERY | Age: 55
End: 2020-04-29
Payer: COMMERCIAL

## 2020-04-29 VITALS — WEIGHT: 230 LBS | HEIGHT: 68 IN | BODY MASS INDEX: 34.86 KG/M2

## 2020-04-29 PROCEDURE — 1036F TOBACCO NON-USER: CPT | Performed by: ORTHOPAEDIC SURGERY

## 2020-04-29 PROCEDURE — 99214 OFFICE O/P EST MOD 30 MIN: CPT | Performed by: ORTHOPAEDIC SURGERY

## 2020-04-29 PROCEDURE — 3017F COLORECTAL CA SCREEN DOC REV: CPT | Performed by: ORTHOPAEDIC SURGERY

## 2020-04-29 PROCEDURE — G8427 DOCREV CUR MEDS BY ELIG CLIN: HCPCS | Performed by: ORTHOPAEDIC SURGERY

## 2020-04-29 PROCEDURE — G8417 CALC BMI ABV UP PARAM F/U: HCPCS | Performed by: ORTHOPAEDIC SURGERY

## 2020-05-06 ENCOUNTER — TELEMEDICINE (OUTPATIENT)
Dept: PAIN MANAGEMENT | Age: 55
End: 2020-05-06
Payer: COMMERCIAL

## 2020-05-06 PROCEDURE — 99213 OFFICE O/P EST LOW 20 MIN: CPT | Performed by: ANESTHESIOLOGY

## 2020-05-06 PROCEDURE — 1036F TOBACCO NON-USER: CPT | Performed by: ANESTHESIOLOGY

## 2020-05-06 PROCEDURE — G8417 CALC BMI ABV UP PARAM F/U: HCPCS | Performed by: ANESTHESIOLOGY

## 2020-05-06 PROCEDURE — 3017F COLORECTAL CA SCREEN DOC REV: CPT | Performed by: ANESTHESIOLOGY

## 2020-05-06 PROCEDURE — G8427 DOCREV CUR MEDS BY ELIG CLIN: HCPCS | Performed by: ANESTHESIOLOGY

## 2020-05-06 RX ORDER — MECLIZINE HCL 12.5 MG/1
12.5 TABLET ORAL 3 TIMES DAILY PRN
Qty: 60 TABLET | Refills: 1 | Status: SHIPPED | OUTPATIENT
Start: 2020-05-06 | End: 2020-07-09

## 2020-05-06 RX ORDER — GABAPENTIN 300 MG/1
300 CAPSULE ORAL 3 TIMES DAILY
Qty: 90 CAPSULE | Refills: 2 | Status: SHIPPED | OUTPATIENT
Start: 2020-05-06 | End: 2020-06-02 | Stop reason: SDUPTHER

## 2020-05-06 RX ORDER — OXYCODONE HYDROCHLORIDE AND ACETAMINOPHEN 5; 325 MG/1; MG/1
1 TABLET ORAL EVERY 8 HOURS PRN
Qty: 90 TABLET | Refills: 0 | Status: SHIPPED | OUTPATIENT
Start: 2020-05-06 | End: 2020-06-02 | Stop reason: SDUPTHER

## 2020-05-06 NOTE — TELEPHONE ENCOUNTER
Medication:   Requested Prescriptions     Pending Prescriptions Disp Refills    meclizine (ANTIVERT) 12.5 MG tablet [Pharmacy Med Name: MECLIZINE 12.5 MG TABLET] 60 tablet 0     Sig: TAKE 1 TABLET BY MOUTH 3 TIMES DAILY AS NEEDED FOR DIZZINESS OR NAUSEA     Last Filled: 04/07/20    Last appt: 3/25/2020   Next appt: Visit date not found    Last OARRS:   RX Monitoring 4/6/2020   Attestation -   Periodic Controlled Substance Monitoring Possible medication side effects, risk of tolerance/dependence & alternative treatments discussed. ;Assessed functional status. ;Obtaining appropriate analgesic effect of treatment. Chronic Pain > 50 MEDD Obtained or confirmed \"Consent for Opioid Use\" on file.

## 2020-05-06 NOTE — PROGRESS NOTES
days. Intended supply: 90 days 90 capsule 2    oxyCODONE-acetaminophen (PERCOCET) 5-325 MG per tablet Take 1 tablet by mouth every 8 hours as needed for Pain for up to 30 days. Take lowest dose possible to manage pain 90 tablet 0    meclizine (ANTIVERT) 12.5 MG tablet TAKE 1 TABLET BY MOUTH 3 TIMES DAILY AS NEEDED FOR DIZZINESS OR NAUSEA 60 tablet 1    diclofenac sodium (VOLTAREN) 1 % GEL Apply 2 g topically 4 times daily 1 Tube 5    diclofenac sodium (VOLTAREN) 1 % GEL Apply 4 g topically 4 times daily 5 Tube 3    docusate sodium (COLACE) 100 MG capsule Take 1 capsule by mouth 2 times daily 60 capsule 2    polyethylene glycol (MIRALAX) powder Take 17 g by mouth daily 510 g 2    ondansetron (ZOFRAN) 4 MG tablet Take 1 tablet by mouth every 8 hours as needed for Nausea or Vomiting 30 tablet 2    hydrocortisone (ANUSOL-HC) 2.5 % rectal cream Place rectally 2 times daily.  30 g 1    bisacodyl (DULCOLAX) 5 MG EC tablet Take 1 tablet by mouth daily as needed for Constipation 30 tablet 1    CALCIUM 600/VITAMIN D 600-400 MG-UNIT CHEW       Cholecalciferol (VITAMIN D3) 125 MCG (5000 UT) CAPS EVERY DAY      vitamin D (ERGOCALCIFEROL) 1.25 MG (21723 UT) CAPS capsule       b complex vitamins capsule TAKE 1 CAPSULE BY MOUTH EVERY DAY      Multiple Vitamins-Iron (DAILY CALISTA MULTIVITAMIN/IRON) TABS       hydrocortisone (ANUSOL-HC) 25 MG suppository Place 1 suppository rectally every 12 hours 12 suppository 1    omeprazole (PRILOSEC) 20 MG delayed release capsule Take 1 capsule by mouth daily 30 capsule 3    Cyanocobalamin (VITAMIN B 12) 500 MCG TABS Take 1 tablet by mouth daily 30 tablet 5    bisacodyl (DULCOLAX) 5 MG EC tablet bisacodyl 5 mg tablet,delayed release      misoprostol (CYTOTEC) 200 MCG tablet misoprostol 200 mcg tablet      Calcium Carb-Cholecalciferol 600-800 MG-UNIT CHEW Take 1 tablet by mouth      chlorhexidine (PERIDEX) 0.12 % solution chlorhexidine gluconate 0.12 % mouthwash      nystatin (MYCOSTATIN) 454328 UNIT/GM cream nystatin 100,000 unit/gram topical cream      triamcinolone (KENALOG) 0.1 % cream triamcinolone acetonide 0.1 % topical cream      tiZANidine (ZANAFLEX) 2 MG tablet tizanidine 2 mg tablet      selenium sulfide (SELSUN) 2.5 % lotion selenium sulfide 2.5 % lotion      ranitidine (ZANTAC) 150 MG tablet ranitidine 150 mg tablet      ketoconazole (NIZORAL) 2 % shampoo ketoconazole 2 % shampoo      Misc. Devices [facility]) MISC Dispense bariatric extra wide wheelchair    Height-5 feet 6 & 3/4 inches  Weight- 375 lbs  BMI-59.3 1 each 0    Scooter MISC by Does not apply route Requires repair of existing scooter 1 each 0     No current facility-administered medications for this visit. Prescription pain medication monitoring:      MEDD current = 22.50   ORT Score =1   LOW     Other Risk factors - depression, obesity. Date of Last Medication Agreement: 03/06/2020   Date Naloxone prescribed: NA     UDT:    Date of last UDT: 03/06/2020    Adverse report: No     OARRS:    Checked today: Yes    Adverse report: No     Medication Effects -        Analgesia:      Average level of pain for the past month = 8/10     Percentage of pain relief = 70%     Activities Improved: Activities of daily living = 50%     Adverse Effects: Any adverse effects: No     Type/Severity of side effect: None    Aberrant Behavior:     Any aberrant behavior: No  If yes, describe: None     Controlled Substances Monitoring:    Periodic Controlled Substance Monitoring: Possible medication side effects, risk of tolerance/dependence & alternative treatments discussed. , Assessed functional status., Obtaining appropriate analgesic effect of treatment. Rufina Fuentes MD)    - Informed patient that opioid pain medications may not be phoned into the pharmacy or refilled without being seen. Assessment:      ICD-10-CM    1.  Postlaminectomy syndrome of lumbar region M96.1 gabapentin (NEURONTIN) 300 MG capsule     oxyCODONE-acetaminophen (PERCOCET) 5-325 MG per tablet   2. Polyarthropathy, multiple sites M13.0 oxyCODONE-acetaminophen (PERCOCET) 5-325 MG per tablet   3. H/O diabetic neuropathy Z86.39 gabapentin (NEURONTIN) 300 MG capsule   4. Chronic pain syndrome G89.4 gabapentin (NEURONTIN) 300 MG capsule   5. Chronic, continuous use of opioids F11.90    6. Pain medication agreement Z02.89 oxyCODONE-acetaminophen (PERCOCET) 5-325 MG per tablet       Plan:     1. Chronic pain in multiple joints and lower back s/p fusion; no focal symptoms. 2. Followed with [facility] on three separate occasions for the joint pain issues - had knee injections done. 3. Reports her wheel chair broke down over the past month, but was unable to get it repaired due to [employer] lock down. Encouraged her to check back with her insurance since businesses are opening up from [employer] lock down. 4. Low back pain is stable on current medications; reports transportation issues due to wheel chair break down. 5. Counseled on opioid and refilled gabapentin and Percocet 5 mg TID PRN - plan follow up next month. Analgesic Plan:   Continue present regimen: Yes   Adjust dose of present analgesic: No   Switch analgesics: No   Add/Adjust concomitant therapy: No    Follow up:    RTC X 1 month     Documentation:    Details of this discussion including any medical advice provided: as above    I affirm this is a Patient Initiated Episode with an Established Patient who has not had a related appointment within my department in the past 7 days or scheduled within the next 24 hours. Total Time: minutes: 11-20 minutes    This visit was completed VIRTUALLY using VIDEO encounter as above.                Chase Eckert MD  Board Certified in Anesthesiology and Pain Medicine

## 2020-05-07 RX ORDER — BISACODYL 5 MG
TABLET, DELAYED RELEASE (ENTERIC COATED) ORAL
Qty: 30 TABLET | Refills: 1 | Status: SHIPPED | OUTPATIENT
Start: 2020-05-07 | End: 2020-07-09

## 2020-05-20 ENCOUNTER — OFFICE VISIT (OUTPATIENT)
Dept: ORTHOPEDIC SURGERY | Age: 55
End: 2020-05-20
Payer: COMMERCIAL

## 2020-05-20 VITALS
SYSTOLIC BLOOD PRESSURE: 122 MMHG | BODY MASS INDEX: 34.85 KG/M2 | HEART RATE: 79 BPM | WEIGHT: 229.94 LBS | HEIGHT: 68 IN | DIASTOLIC BLOOD PRESSURE: 89 MMHG | TEMPERATURE: 97.6 F

## 2020-05-20 PROCEDURE — 1036F TOBACCO NON-USER: CPT | Performed by: ORTHOPAEDIC SURGERY

## 2020-05-20 PROCEDURE — 3017F COLORECTAL CA SCREEN DOC REV: CPT | Performed by: ORTHOPAEDIC SURGERY

## 2020-05-20 PROCEDURE — G8417 CALC BMI ABV UP PARAM F/U: HCPCS | Performed by: ORTHOPAEDIC SURGERY

## 2020-05-20 PROCEDURE — 99214 OFFICE O/P EST MOD 30 MIN: CPT | Performed by: ORTHOPAEDIC SURGERY

## 2020-05-20 PROCEDURE — G8427 DOCREV CUR MEDS BY ELIG CLIN: HCPCS | Performed by: ORTHOPAEDIC SURGERY

## 2020-05-20 PROCEDURE — 20610 DRAIN/INJ JOINT/BURSA W/O US: CPT | Performed by: ORTHOPAEDIC SURGERY

## 2020-05-20 RX ORDER — LIDOCAINE HYDROCHLORIDE 10 MG/ML
20 INJECTION, SOLUTION INFILTRATION; PERINEURAL ONCE
Status: COMPLETED | OUTPATIENT
Start: 2020-05-20 | End: 2020-05-20

## 2020-05-20 RX ORDER — METHYLPREDNISOLONE ACETATE 40 MG/ML
40 INJECTION, SUSPENSION INTRA-ARTICULAR; INTRALESIONAL; INTRAMUSCULAR; SOFT TISSUE ONCE
Status: COMPLETED | OUTPATIENT
Start: 2020-05-20 | End: 2020-05-20

## 2020-05-20 RX ADMIN — METHYLPREDNISOLONE ACETATE 40 MG: 40 INJECTION, SUSPENSION INTRA-ARTICULAR; INTRALESIONAL; INTRAMUSCULAR; SOFT TISSUE at 14:34

## 2020-05-20 RX ADMIN — LIDOCAINE HYDROCHLORIDE 20 ML: 10 INJECTION, SOLUTION INFILTRATION; PERINEURAL at 14:34

## 2020-05-20 NOTE — LETTER
[] Ultrasound     [] Rhythmic stabilization  [] Iontophoresis    [] Core strengthening   [] Moist heat     [] Sports specific program:   [] Massage         [x] Cryotherapy      [] Electrical stimulation     [] Paraffin  [] Whirlpool  [] TENS    [x] Home exercise program (copy to patient). Perform exercises for:   15     minutes    3      times/day  [x] Supervised physical therapy  Frequency: []  1x week  [x] 2x week  [] 3x week  [] Other:   Duration: [] 2 weeks   [] 4 weeks  [x] 6 weeks  [] Other:     Additional Instructions:     Saad Hinojosa MD, PhD
on unit/security/safe/locker 2

## 2020-05-20 NOTE — PROGRESS NOTES
identified.  Multifocal chondromalacia and   subcortical marrow edema within the glenoid rim.       GLENOHUMERAL JOINT: Osteoarthrosis of the glenohumeral joint.  No joint   effusion identified.       AC JOINT AND ACROMIOCLAVICULAR ARCH: Moderate AC joint arthrosis.  The   acromion is flat in the sagittal plane.       BONE MARROW: No discrete marrow signal abnormality.       OUTLET SPACES: The suprascapular notch and quadrilateral space are without   obstructing or space occupying lesions.           Impression   Supraspinatus and infraspinatus tendinopathy with multifocal intrasubstance   tears.  No full-thickness tear identified.       Subacromial/subdeltoid bursitis without associated full-thickness rotator   cuff tear.       Moderate AC joint arthrosis.       Partial-thickness chondromalacia and subcortical marrow edema within the   glenoid rim. Assessment:  Reina Kimbrough is a 53 y.o. female with left shoulder pain related to rotator cuff tendinitis and right shoulder pain related to rotator cuff dysfunction. Impression:  Encounter Diagnoses   Name Primary?  Tendinitis of left rotator cuff Yes    Arthritis of both acromioclavicular joints        Office Procedures:  Orders Placed This Encounter   Procedures    MRI SHOULDER RIGHT WO CONTRAST     Standing Status:   Future     Standing Expiration Date:   5/20/2021     Order Specific Question:   Reason for exam:     Answer:   Saida Bravo External Referral To Physical Therapy     Referral Priority:   Routine     Referral Type:   Consult for Advice and Opinion     Referral Reason:   Patient Preference     Requested Specialty:   Physical Therapy     Number of Visits Requested:   1    [place] ARTHROCENTESIS ASPIR&/INJ MAJOR JT/BURSA W/O US       Plan:   Pertinent imaging was reviewed. The etiology, natural history, and treatment options for the disorder were discussed.   The roles of activity modifications, medications, cryotherapy and heat, injections, accurate and complete. Saad Rocha MD, PhD  5/20/2020

## 2020-05-21 ENCOUNTER — TELEPHONE (OUTPATIENT)
Dept: ORTHOPEDIC SURGERY | Age: 55
End: 2020-05-21

## 2020-05-28 ENCOUNTER — APPOINTMENT (OUTPATIENT)
Dept: ULTRASOUND IMAGING | Age: 55
End: 2020-05-28
Payer: COMMERCIAL

## 2020-05-28 ENCOUNTER — HOSPITAL ENCOUNTER (OUTPATIENT)
Dept: NEUROLOGY | Age: 55
Discharge: HOME | End: 2020-05-28
Payer: COMMERCIAL

## 2020-05-28 PROCEDURE — 95909 NRV CNDJ TST 5-6 STUDIES: CPT

## 2020-05-28 PROCEDURE — 95886 MUSC TEST DONE W/N TEST COMP: CPT

## 2020-06-01 ENCOUNTER — VIRTUAL VISIT (OUTPATIENT)
Dept: PRIMARY CARE CLINIC | Age: 55
End: 2020-06-01
Payer: COMMERCIAL

## 2020-06-01 VITALS — HEIGHT: 68 IN | BODY MASS INDEX: 34.86 KG/M2 | WEIGHT: 230 LBS

## 2020-06-01 PROCEDURE — 99442 PR PHYS/QHP TELEPHONE EVALUATION 11-20 MIN: CPT | Performed by: INTERNAL MEDICINE

## 2020-06-01 RX ORDER — LORATADINE AND PSEUDOEPHEDRINE SULFATE 5; 120 MG/1; MG/1
1 TABLET, EXTENDED RELEASE ORAL 2 TIMES DAILY
Qty: 20 TABLET | Refills: 0 | Status: SHIPPED | OUTPATIENT
Start: 2020-06-01 | End: 2021-06-15

## 2020-06-01 RX ORDER — ACETAMINOPHEN 325 MG/1
650 TABLET ORAL 3 TIMES DAILY PRN
Qty: 40 TABLET | Refills: 0 | Status: SHIPPED | OUTPATIENT
Start: 2020-06-01 | End: 2021-06-15

## 2020-06-01 NOTE — PROGRESS NOTES
Benny Age is a 53 y.o. female evaluated via telephone on 6/1/2020. Consent:  She and/or health care decision maker is aware that that she may receive a bill for this telephone service, depending on her insurance coverage, and has provided verbal consent to proceed: Yes      Documentation:  I communicated with the patient and/or health care decision maker about nasal congestion, headache, sore throat, and jaw pain. Details of this discussion including any medical advice provided:     Spoke with patient with an  on a 3 way call. Pt c/o symptoms since Thursday 5/28/20 of sneezing, runny nose, green and orange nasal discharge. right side headache, stuffy nose, pain in cheeks, teeth pain, sore throat, post nasal drainage, green post nasal drainage, and sinus pain that radiates to ears. Pt feels hot and cold. Pt's friend who is a Doctor prescribed Amox-Clav 875mg and patient took last night 9 pm and 9 am this morning. Review of Systems   Constitutional: Negative for chills and fever. HENT: Positive for congestion, postnasal drip, rhinorrhea, sinus pressure, sinus pain, sneezing and sore throat. Negative for ear pain. Respiratory: Negative for cough, chest tightness, shortness of breath and wheezing. Neurological: Positive for headaches. Assessment/Plan:  1. Sinusitis, unspecified chronicity, unspecified location  -Continue Augmentin 875mg 2 times daily   - loratadine-pseudoephedrine (CLARITIN-D 12 HOUR) 5-120 MG per extended release tablet; Take 1 tablet by mouth 2 times daily  Dispense: 20 tablet; Refill: 0  - acetaminophen (TYLENOL) 325 MG tablet; Take 2 tablets by mouth 3 times daily as needed for Pain  Dispense: 40 tablet; Refill: 0      I affirm this is a Patient Initiated Episode with a Patient who has not had a related appointment within my department in the past 7 days or scheduled within the next 24 hours.     Patient identification was verified at the start of the visit:

## 2020-06-02 ENCOUNTER — TELEPHONE (OUTPATIENT)
Dept: ORTHOPEDIC SURGERY | Age: 55
End: 2020-06-02

## 2020-06-02 ENCOUNTER — TELEMEDICINE (OUTPATIENT)
Dept: PAIN MANAGEMENT | Age: 55
End: 2020-06-02
Payer: COMMERCIAL

## 2020-06-02 PROCEDURE — 1036F TOBACCO NON-USER: CPT | Performed by: ANESTHESIOLOGY

## 2020-06-02 PROCEDURE — G8427 DOCREV CUR MEDS BY ELIG CLIN: HCPCS | Performed by: ANESTHESIOLOGY

## 2020-06-02 PROCEDURE — 3017F COLORECTAL CA SCREEN DOC REV: CPT | Performed by: ANESTHESIOLOGY

## 2020-06-02 PROCEDURE — 99213 OFFICE O/P EST LOW 20 MIN: CPT | Performed by: ANESTHESIOLOGY

## 2020-06-02 PROCEDURE — G8417 CALC BMI ABV UP PARAM F/U: HCPCS | Performed by: ANESTHESIOLOGY

## 2020-06-02 RX ORDER — GABAPENTIN 300 MG/1
300 CAPSULE ORAL 3 TIMES DAILY
Qty: 90 CAPSULE | Refills: 2 | Status: SHIPPED | OUTPATIENT
Start: 2020-06-02 | End: 2020-08-07 | Stop reason: SDUPTHER

## 2020-06-02 RX ORDER — OXYCODONE HYDROCHLORIDE AND ACETAMINOPHEN 5; 325 MG/1; MG/1
1 TABLET ORAL EVERY 8 HOURS PRN
Qty: 90 TABLET | Refills: 0 | Status: SHIPPED | OUTPATIENT
Start: 2020-06-02 | End: 2020-07-02 | Stop reason: SDUPTHER

## 2020-06-02 NOTE — PROGRESS NOTES
[address] [facility] [address], [address] [facility], [address]  [phone] (Y)  [phone] (f)    6/2/20      Terri Vazquez is a 53 y.o. female evaluated via [address] [employer] on 6/2/2020 using HIPPA compliant hardware/software (Epic/Haiku). Consent:  She and/or health care decision maker is aware that that she may receive a bill for this virtual video service, depending on her insurance coverage, and has provided verbal consent to proceed: Yes. Due to nature of the virtual visit, evaluation of organ systems was limited to presenting complaints. Chief Complaint   Patient presents with    Back Pain     virtual visit due to CoVID19 lock down       Reason for call: back pain    Any changes since last visit:   Pain constant in multiple joints, alana knees/shoulders and lower back, achy sharp with occasional tingling down the legs; no new weakness. Pain worse with daily activities of living, standing walking and helped by pain medications. Followed with [facility]TH[facility] (Dr Angélica Rosen) and had injection to left shoulder; also recently had EMG of legs done through PCP.       Past Medical History:   Diagnosis Date    Arthritis     TAN (dyspnea on exertion)     Gastroesophageal reflux disease 1/28/2020    Hyperlipidemia     Hypertension     Morbid obesity with body mass index (BMI) of 60.0 to 69.9 in adult ([facility] [address] 7/2/2018    Neuropathy     SHYANN (obstructive sleep apnea)     Primary osteoarthritis of right knee 7/2/2018    Type 2 diabetes mellitus without complication ([facility] [address]     controlled with diet    Urinary incontinence        Past Surgical History:   Procedure Laterality Date    BACK SURGERY      2001    BREAST LUMPECTOMY Bilateral     2015    DILATION AND CURETTAGE OF UTERUS N/A 08/30/2018    GASTRIC BYPASS SURGERY      SLEEVE GASTRECTOMY  10/13/2018       No Known Allergies    Current Outpatient Medications   Medication Sig Dispense Refill    gabapentin (NEURONTIN) 300 MG by mouth daily 30 capsule 3    Cyanocobalamin (VITAMIN B 12) 500 MCG TABS Take 1 tablet by mouth daily 30 tablet 5    bisacodyl (DULCOLAX) 5 MG EC tablet bisacodyl 5 mg tablet,delayed release      misoprostol (CYTOTEC) 200 MCG tablet misoprostol 200 mcg tablet      Calcium Carb-Cholecalciferol 600-800 MG-UNIT CHEW Take 1 tablet by mouth      chlorhexidine (PERIDEX) 0.12 % solution chlorhexidine gluconate 0.12 % mouthwash      nystatin (MYCOSTATIN) 684992 UNIT/GM cream nystatin 100,000 unit/gram topical cream      triamcinolone (KENALOG) 0.1 % cream triamcinolone acetonide 0.1 % topical cream      tiZANidine (ZANAFLEX) 2 MG tablet tizanidine 2 mg tablet      selenium sulfide (SELSUN) 2.5 % lotion selenium sulfide 2.5 % lotion      ranitidine (ZANTAC) 150 MG tablet ranitidine 150 mg tablet      ketoconazole (NIZORAL) 2 % shampoo ketoconazole 2 % shampoo      Misc. Devices [facility]) MISC Dispense bariatric extra wide wheelchair    Height-5 feet 6 & 3/4 inches  Weight- 375 lbs  BMI-59.3 1 each 0    Scooter MISC by Does not apply route Requires repair of existing scooter 1 each 0     No current facility-administered medications for this visit. EMG (05/28/2020)  \"Impression:  Study is consistent with an old Left L5 radiculopathy with an underlying peripheral neuropathy mainly sensory in nature. no evidence of an acut radiculopathy or other lower motor neuron dysfunction\"    Prescription pain medication monitoring:      MEDD current = 22.50   ORT Score =1   LOW     Other Risk factors - depression, obesity. Date of Last Medication Agreement: 03/06/2020   Date Naloxone prescribed: NA     UDT:    Date of last UDT: 03/06/2020    Adverse report: No     OARRS:    Checked today: Yes    Adverse report: No     Medication Effects -        Analgesia:      Average level of pain for the past month = 8/10     Percentage of pain relief = 50%     Activities Improved:      Activities of daily living = 50% Adverse Effects: Any adverse effects: No     Type/Severity of side effect: None    Aberrant Behavior:     Any aberrant behavior: No  If yes, describe: None     Controlled Substances Monitoring:    Periodic Controlled Substance Monitoring: Possible medication side effects, risk of tolerance/dependence & alternative treatments discussed. , Assessed functional status., Obtaining appropriate analgesic effect of treatment. Tori Pinto MD)    - Informed patient that opioid pain medications may not be phoned into the pharmacy or refilled without being seen. Assessment:      ICD-10-CM    1. Postlaminectomy syndrome of lumbar region M96.1 gabapentin (NEURONTIN) 300 MG capsule     oxyCODONE-acetaminophen (PERCOCET) 5-325 MG per tablet   2. Polyarthropathy, multiple sites M13.0 gabapentin (NEURONTIN) 300 MG capsule     oxyCODONE-acetaminophen (PERCOCET) 5-325 MG per tablet   3. H/O diabetic neuropathy Z86.39 gabapentin (NEURONTIN) 300 MG capsule   4. Chronic pain syndrome G89.4 gabapentin (NEURONTIN) 300 MG capsule   5. Chronic, continuous use of opioids F11.90    6. Pain medication agreement Z02.89 oxyCODONE-acetaminophen (PERCOCET) 5-325 MG per tablet       Plan:     1. Chronic low back pain s/p fusion >10 years ago in [place]; persistent pain since. 2. Failed PT and spinal injections through several providers   3. Recently completed EMG of legs through PCP, which showed chronic L5 radiculopathy, without neuropathy. 4. She followed with ORTHO and had injection to left shoulder; awaiting MRI right shoulder. 5. Continues gabapentin and Percocet PRN with moderate help; intolerant of NSAIDs due to GI issues. 6. Counseled on opioid; refilled Percocet 5 mg TID PRN - medication sent by e-script due to CoVID19 lock down of our office.        Analgesic Plan:   Continue present regimen: Yes   Adjust dose of present analgesic: No   Switch analgesics: No   Add/Adjust concomitant therapy: No    Follow up:     RTC X 6

## 2020-06-02 NOTE — TELEPHONE ENCOUNTER
Please call [facility]# [phone] option 4 on jeff to give authorization code for mri right shoulder. Please call patient. She can then make appt.

## 2020-06-04 ENCOUNTER — OFFICE VISIT (OUTPATIENT)
Dept: VASCULAR SURGERY | Age: 55
End: 2020-06-04
Payer: COMMERCIAL

## 2020-06-04 VITALS — HEIGHT: 66 IN | BODY MASS INDEX: 35.68 KG/M2 | WEIGHT: 222 LBS | TEMPERATURE: 98.6 F

## 2020-06-04 PROCEDURE — G8417 CALC BMI ABV UP PARAM F/U: HCPCS | Performed by: SURGERY

## 2020-06-04 PROCEDURE — 1036F TOBACCO NON-USER: CPT | Performed by: SURGERY

## 2020-06-04 PROCEDURE — 99204 OFFICE O/P NEW MOD 45 MIN: CPT | Performed by: SURGERY

## 2020-06-04 PROCEDURE — G8427 DOCREV CUR MEDS BY ELIG CLIN: HCPCS | Performed by: SURGERY

## 2020-06-04 PROCEDURE — 3017F COLORECTAL CA SCREEN DOC REV: CPT | Performed by: SURGERY

## 2020-06-04 ASSESSMENT — ENCOUNTER SYMPTOMS
EYES NEGATIVE: 1
RESPIRATORY NEGATIVE: 1
ALLERGIC/IMMUNOLOGIC NEGATIVE: 1
GASTROINTESTINAL NEGATIVE: 1
COLOR CHANGE: 1

## 2020-06-05 ENCOUNTER — TELEPHONE (OUTPATIENT)
Dept: ORTHOPEDIC SURGERY | Age: 55
End: 2020-06-05

## 2020-06-05 NOTE — TELEPHONE ENCOUNTER
Patient is calling to get a prescription faxed over to [facility]. Ph [phone] fax [phone] Would also like the office to call  for approval Vanessa Peacock ph [phone] Would like a call back when complete.  Ph [phone]

## 2020-06-08 ENCOUNTER — TELEPHONE (OUTPATIENT)
Dept: PRIMARY CARE CLINIC | Age: 55
End: 2020-06-08

## 2020-06-08 RX ORDER — AMOXICILLIN AND CLAVULANATE POTASSIUM 875; 125 MG/1; MG/1
1 TABLET, FILM COATED ORAL 2 TIMES DAILY
Qty: 6 TABLET | Refills: 0 | Status: SHIPPED | OUTPATIENT
Start: 2020-06-08 | End: 2020-06-11

## 2020-06-08 ASSESSMENT — ENCOUNTER SYMPTOMS
WHEEZING: 0
SINUS PRESSURE: 1
SINUS PAIN: 1
COUGH: 0
RHINORRHEA: 1
SORE THROAT: 1
CHEST TIGHTNESS: 0
SHORTNESS OF BREATH: 0

## 2020-06-08 NOTE — TELEPHONE ENCOUNTER
Pt states the antibiotic was never sent to the pharmacy for sinus infection. Runny nose sore throat headache  Denies fever. Her friend who is a MD prescribed 7 day  Amoxicillin 875mg but stated she should be on 10 day.    Can you send to  [facility]

## 2020-06-09 ENCOUNTER — HOSPITAL ENCOUNTER (OUTPATIENT)
Dept: ULTRASOUND IMAGING | Age: 55
Discharge: HOME | End: 2020-06-09
Payer: COMMERCIAL

## 2020-06-09 PROCEDURE — 76700 US EXAM ABDOM COMPLETE: CPT

## 2020-06-09 RX ORDER — BISACODYL 5 MG
5 TABLET, DELAYED RELEASE (ENTERIC COATED) ORAL DAILY PRN
Qty: 30 TABLET | Refills: 1 | Status: SHIPPED | OUTPATIENT
Start: 2020-06-09 | End: 2020-08-11

## 2020-06-10 ENCOUNTER — OFFICE VISIT (OUTPATIENT)
Dept: ORTHOPEDIC SURGERY | Age: 55
End: 2020-06-10
Payer: COMMERCIAL

## 2020-06-10 VITALS — BODY MASS INDEX: 35.68 KG/M2 | TEMPERATURE: 97.9 F | WEIGHT: 222 LBS | HEIGHT: 66 IN

## 2020-06-10 PROCEDURE — G8417 CALC BMI ABV UP PARAM F/U: HCPCS | Performed by: ORTHOPAEDIC SURGERY

## 2020-06-10 PROCEDURE — 99213 OFFICE O/P EST LOW 20 MIN: CPT | Performed by: ORTHOPAEDIC SURGERY

## 2020-06-10 PROCEDURE — G8427 DOCREV CUR MEDS BY ELIG CLIN: HCPCS | Performed by: ORTHOPAEDIC SURGERY

## 2020-06-10 PROCEDURE — 3017F COLORECTAL CA SCREEN DOC REV: CPT | Performed by: ORTHOPAEDIC SURGERY

## 2020-06-10 PROCEDURE — 1036F TOBACCO NON-USER: CPT | Performed by: ORTHOPAEDIC SURGERY

## 2020-06-10 PROCEDURE — 20610 DRAIN/INJ JOINT/BURSA W/O US: CPT | Performed by: ORTHOPAEDIC SURGERY

## 2020-06-10 RX ORDER — METHYLPREDNISOLONE ACETATE 40 MG/ML
40 INJECTION, SUSPENSION INTRA-ARTICULAR; INTRALESIONAL; INTRAMUSCULAR; SOFT TISSUE ONCE
Status: COMPLETED | OUTPATIENT
Start: 2020-06-10 | End: 2020-06-10

## 2020-06-10 RX ORDER — LIDOCAINE HYDROCHLORIDE 10 MG/ML
20 INJECTION, SOLUTION INFILTRATION; PERINEURAL ONCE
Status: COMPLETED | OUTPATIENT
Start: 2020-06-10 | End: 2020-06-10

## 2020-06-10 RX ADMIN — LIDOCAINE HYDROCHLORIDE 20 ML: 10 INJECTION, SOLUTION INFILTRATION; PERINEURAL at 10:54

## 2020-06-10 RX ADMIN — METHYLPREDNISOLONE ACETATE 40 MG: 40 INJECTION, SUSPENSION INTRA-ARTICULAR; INTRALESIONAL; INTRAMUSCULAR; SOFT TISSUE at 10:55

## 2020-06-10 NOTE — PROGRESS NOTES
Review of Systems    Done: 4/27/20
refill. Right shoulder Exam:  Inspection:  No gross deformities, no signs of infection. Palpation: Tenderness over greater tuberosity    Active Range of Motion: Forward Elevation 120 with poor effort, Abduction 120, External Rotation 40, Internal Rotation L2    Passive Range of Motion: Forward Elevation 150 with pain    Strength:  External Rotation 4+/5, Internal Rotation 4+/5, Supraspinatus 5-/5 with significant prompting, Champagne Toast 5-/5    Special Tests:  Negative napoleon, No Ildefonso muscle deformity. Neurovascular: Sensation to light touch is intact, no motor deficits, palpable radial pulses 2+    Left shoulder Exam:  Inspection:  No gross deformities, no signs of infection. Palpation: Tenderness over greater tuberosity    Active Range of Motion: Forward Elevation 120 with poor effort, Abduction 120, External Rotation 40, Internal Rotation L2    Passive Range of Motion: Forward Elevation 150 with pain    Strength:  External Rotation 4/5, Internal Rotation 4+/5, Supraspinatus 4/5, Champagne Toast 4/5    Special Tests:  Negative napoleon, No Ildefonso muscle deformity. Neurovascular: Sensation to light touch is intact, no motor deficits, palpable radial pulses 2+      Radiology:    No new XR obtained at this time. Assessment :  Ms. Susan Pierson is a pleasant, 53 y.o. patient who is right shoulder impingement, rotator cuff teninitis. Impression:  Encounter Diagnoses   Name Primary?  Tendinopathy of rotator cuff, right Yes    Impingement syndrome of right shoulder        Office Procedures:  Orders Placed This Encounter   Procedures    IL ARTHROCENTESIS ASPIR&/INJ MAJOR JT/BURSA W/O US       Treatment Plan:    A shared decision making conference was held. We discussed continuing with empiric treatment with corticosteroid injections and physical therapy prior to obtaining a new MRI.   As she has no trauma and after significant prompting demonstrates no isolatable weakness empiric

## 2020-06-12 ENCOUNTER — TELEPHONE (OUTPATIENT)
Dept: ORTHOPEDIC SURGERY | Age: 55
End: 2020-06-12

## 2020-06-15 ENCOUNTER — TELEPHONE (OUTPATIENT)
Dept: ORTHOPEDIC SURGERY | Age: 55
End: 2020-06-15

## 2020-06-16 ENCOUNTER — HOSPITAL ENCOUNTER (OUTPATIENT)
Dept: MRI IMAGING | Age: 55
Discharge: HOME | End: 2020-06-16
Payer: COMMERCIAL

## 2020-06-16 PROCEDURE — 73221 MRI JOINT UPR EXTREM W/O DYE: CPT

## 2020-06-17 ENCOUNTER — HOSPITAL ENCOUNTER (OUTPATIENT)
Dept: PHYSICAL THERAPY | Age: 55
Setting detail: THERAPIES SERIES
Discharge: HOME | End: 2020-06-17
Payer: COMMERCIAL

## 2020-06-17 PROCEDURE — 97162 PT EVAL MOD COMPLEX 30 MIN: CPT | Performed by: PHYSICAL THERAPIST

## 2020-06-17 PROCEDURE — 97110 THERAPEUTIC EXERCISES: CPT | Performed by: PHYSICAL THERAPIST

## 2020-06-17 NOTE — PLAN OF CARE
program from PT. Pt had a cortisone injection when she saw MD last week, it reduced her pain but did not take it away. Pt was initially asked to get MRI by MD but did not complete that, was decided at [facility] with MD to hold on MRI for now. Pt states she can't lift > 10lb and she can't open a jar. Pt does take oxycodone and gabapentin, prescribed by her pain management MD (Dr. Greg Headley). Pt was previously seen for neck and states that this is feeling much better. Her pain is now mostly isolated to shoulder, feels it at anterior and superior GHJ as well as lateral shoulder. Pain feels like it is deep within joint. Pt states that her pain is sharp in nature. Relevant Medical History: see intake form  Functional Disability Index:   UEFI  58/68 (3 questions omitted)=73.5% (26.5% deficit)    Pain Scale: 9/10 before cortisone injection, 8/10 since cortisone injection  Easing factors: rest  Provocative factors: laying on side/sleeping, lifting > 10lb, raising arm away from body     Type: []Constant   [x]Intermittent  []Radiating []Localized []other:     Numbness/Tingling: + when laying on R side    Occupation/School: Disabled    Living Status/Prior Level of Function: Pt uses motorized WC for all mobility. Can do some limited standing. Independent use of UEs but with pain. OBJECTIVE:     ROM PROM AROM  Comment    L R L R    Flexion  110 p!  118 p! Abduction  105 p!  115 p! ER  95 p!  85 p! IR  55 p!  55 p! Superior shoulder pain in all positions    Strength L R Comment   Flexion 4-/5 3+/5* * unable to hold d/t p!; p! Superior shoulder   Abduction 4/5 4-/5* *p! Superior shoulder   ER 4/5 4-/5* *p! Lateral shoulder   IR 4/5 4/5    Biceps 4/5 4-/5* *p!  Anterior shoulder   Triceps  N/A      Special Tests Results/Comment   [place] +   [place] Unable to achieve test position d/t ROM deficits   Painful arc Unable to achieve test position d/t ROM deficits   OBriens +       Reflexes/Sensation: [x]Dermatomes/Myotomes intact               []Reflexes equal and normal bilaterally               []Other:     Joint mobility:               [x]Normal               []Hypo              []Hyper     Palpation: anterior shoulder, LHBT     Functional Mobility/Transfers: Limited, uses motorized WC     Posture: Rounded shoulders     Gait: uses motorized WC                       [x] Patient history, allergies, meds reviewed. Medical chart reviewed. See intake form. Review Of Systems (ROS):  [x]Performed Review of systems (Integumentary, CardioPulmonary, Neurological) by intake and observation. Intake form has been scanned into medical record. Patient has been instructed to contact their primary care physician regarding ROS issues if not already being addressed at this time.        Co-morbidities/Complexities (which will affect course of rehabilitation):   []None              Arthritic conditions   []Rheumatoid arthritis (M05.9)  [x]Osteoarthritis (M19.91)    Cardiovascular conditions   []Hypertension (I10)  []Hyperlipidemia (E78.5)  []Angina pectoris (I20)  []Atherosclerosis (I70)    Musculoskeletal conditions   [x]Disc pathology   []Congenital spine pathologies   [x]Prior surgical intervention  []Osteoporosis (M81.8)  []Osteopenia (M85.8)   Endocrine conditions   []Hypothyroid (E03.9)  []Hyperthyroid Gastrointestinal conditions   []Constipation (Y34.80)    Metabolic conditions   []Morbid obesity (E66.01)  []Diabetes type 1(E10.65) or 2 (E11.65)   []Neuropathy (G60.9)      Pulmonary conditions   []Asthma (J45)  []Coughing   []COPD (J44.9)    Psychological Disorders  []Anxiety (F41.9)  []Depression (F32.9)   []Other:    []Other:            Barriers to/and or personal factors that will affect rehab potential:              []Age  []Sex              []Motivation/Lack of Motivation                        [x]Co-Morbidities              []Cognitive Function, education/learning barriers              []Environmental, home []Reduced participation in social activities. []Reduced participation in sport / recreational activities. Classification:              []Signs/symptoms consistent with post-surgical status including decreased ROM, strength and function.   []Signs/symptoms consistent with joint sprain/strain              [x]Signs/symptoms consistent with shoulder impingement              []Signs/symptoms consistent with shoulder/elbow/wrist tendinopathy              []Signs/symptoms consistent with Rotator cuff tear              []Signs/symptoms consistent with labral tear              []Signs/symptoms consistent with postural dysfunction                         []Signs/symptoms consistent with Glenohumeral IR Deficit - <45 degrees              []Signs/symptoms consistent with facet dysfunction of cervical/thoracic spine                         []Signs/symptoms consistent with pathology which may benefit from Dry needling                []other:      Prognosis/Rehab Potential:                                       []Excellent              []Good                 [x]Fair              []Poor     Tolerance of evaluation/treatment:               []Excellent              []Good                 [x]Fair              []Poor     Physical Therapy Evaluation Complexity Justification  [x] A history of present problem with:  [] no personal factors and/or comorbidities that impact the plan of care;  []1-2 personal factors and/or comorbidities that impact the plan of care  [x]3 personal factors and/or comorbidities that impact the plan of care  [x] An examination of body systems using standardized tests and measures addressing any of the following: body structures and functions (impairments), activity limitations, and/or participation restrictions;:  [] a total of 1-2 or more elements   [] a total of 3 or more elements   [x] a total of 4 or more elements   [x] A clinical presentation with:  [x] stable and/or uncomplicated

## 2020-06-17 NOTE — FLOWSHEET NOTE
achieve test position d/t ROM deficits   Painful arc Unable to achieve test position d/t ROM deficits   OBriens +         Reflexes/Sensation:               [x]? Dermatomes/Myotomes intact               []? Reflexes equal and normal bilaterally               []? Other:     Joint mobility:               [x]? Normal               []? Hypo              []? Hyper     Palpation: anterior shoulder, LHBT     Functional Mobility/Transfers: Limited, uses motorized WC     Posture: Rounded shoulders     Gait: uses motorized WC      RESTRICTIONS/PRECAUTIONS:     Exercises/Interventions:     Exercise/Equipment Resistance/Repetitions Other comments   Stretching/PROM     Wand     Table Slides 10x10\" flex  8x10\" ABD, limited by p!    UE [place]     Pulleys     Pendulum          Isometrics     Retraction          Weight shift     Flexion     Abduction     External Rotation     Internal Rotation     Biceps     Triceps          PRE's     Flexion     Abduction     External Rotation     Internal Rotation     Shrugs     EXT     Reverse Flys     Serratus     Horizontal Abd with ER     Biceps     Triceps     Retraction          Cable Column/Theraband     External Rotation 2x10 red Limited by p! Internal Rotation 2x10 blue Limited by p! Shrugs     Lats     Ext     Flex     Scapular Retraction 2x10 blue    BIC 2x10 green Limited by p!   TRIC     PNF     W ER Limited by p!          Dynamic Stability          [place]          Manual interventions                 Plan for next session: CBA, wall walking, tricep pull down    Therapeutic Exercise and NMR EXR  [x] (84929) Provided verbal/tactile cueing for activities related to strengthening, flexibility, endurance, ROM  for improvements in scapular, scapulothoracic and UE control with self care, reaching, carrying, lifting, house/yardwork, driving/computer work.    [] (35742) Provided verbal/tactile cueing for activities related to improving balance, coordination, kinesthetic sense, posture, ES(attended) (40100)      [] ES (un) (61705):     GOALS:  Patient stated goal: be able to sleep on shoulders, be able to lift without pain    []? Progressing: []? Met: []? Not Met: []? Adjusted     Therapist goals for Patient:   Short Term Goals: To be achieved in: 2 weeks 7/1/20  1. Independent in HEP and progression per patient tolerance, in order to prevent re-injury. []? Progressing: []? Met: []? Not Met: []? Adjusted   2. Patient will have a decrease in pain to facilitate improvement in movement, function, and ADLs as indicated by Functional Deficits. []? Progressing: []? Met: []? Not Met: []? Adjusted      Long Term Goals: To be achieved in: 6 weeks 7/29/20  1. Disability index score of 10% or less for the UEFS to assist with reaching prior level of function. []? Progressing: []? Met: []? Not Met: []? Adjusted  2. Patient will demonstrate increased AROM to [facility] to allow for proper joint functioning as indicated by patients Functional Deficits. []? Progressing: []? Met: []? Not Met: []? Adjusted  3. Patient will demonstrate an increase in R RC strength to 4/5 or greater to allow for proper functional mobility as indicated by patients Functional Deficits. []? Progressing: []? Met: []? Not Met: []? Adjusted  4. Patient will return to ADLs, IADLs and functional activities without increased symptoms or restriction. []? Progressing: []? Met: []? Not Met: []? Adjusted  5. Patient will be able to lift 10lb or greater for improved tolerance to ADLs.   []? Progressing: []? Met: []? Not Met: []? Adjusted  6. Patient will be able to lay on R side without pain for improved sleeping.  []? Progressing: []? Met: []? Not Met: []? Adjusted     Overall Progression Towards Functional goals/ Treatment Progress Update:  [] Patient is progressing as expected towards functional goals listed. [] Progression is slowed due to complexities/Impairments listed. [] Progression has been slowed due to co-morbidities.   [x] Plan just implemented, too soon to assess goals progression <30days   [] Goals require adjustment due to lack of progress  [] Patient is not progressing as expected and requires additional follow up with physician  [] Other    Prognosis for POC: [x] Good [] Fair  [] Poor      Patient requires continued skilled intervention: [x] Yes  [] No    Treatment/Activity Tolerance:  [x] Patient able to complete treatment  [] Patient limited by fatigue  [] Patient limited by pain     [] Patient limited by other medical complications  [] Other:                  PLAN: See eval  [] Continue per plan of care [] Alter current plan (see comments above)  [x] Plan of care initiated [] Hold pending MD visit [] Discharge      Electronically signed by:  Armand Vilchis PT, DPT  Physical Therapist  [id number]  [email or web address]@"[email or web address] com    Note: If patient does not return for scheduled/ recommended follow up visits, this note will serve as a discharge from care along with most recent update on progress.

## 2020-06-23 ENCOUNTER — TELEPHONE (OUTPATIENT)
Dept: PRIMARY CARE CLINIC | Age: 55
End: 2020-06-23

## 2020-06-24 ENCOUNTER — HOSPITAL ENCOUNTER (OUTPATIENT)
Dept: PHYSICAL THERAPY | Age: 55
Setting detail: THERAPIES SERIES
Discharge: HOME | End: 2020-06-24
Payer: COMMERCIAL

## 2020-06-25 RX ORDER — ERGOCALCIFEROL 1.25 MG/1
50000 CAPSULE ORAL WEEKLY
Qty: 4 CAPSULE | Refills: 3 | Status: SHIPPED | OUTPATIENT
Start: 2020-06-25 | End: 2020-07-07 | Stop reason: SDUPTHER

## 2020-06-26 ENCOUNTER — APPOINTMENT (OUTPATIENT)
Dept: PHYSICAL THERAPY | Age: 55
End: 2020-06-26
Payer: COMMERCIAL

## 2020-06-29 ENCOUNTER — TELEPHONE (OUTPATIENT)
Dept: PRIMARY CARE CLINIC | Age: 55
End: 2020-06-29

## 2020-06-29 NOTE — TELEPHONE ENCOUNTER
Pt is due for mammogram test because her last was in 2017.  please place the order and call the pt when it is ready to schedule her test.

## 2020-06-30 ENCOUNTER — HOSPITAL ENCOUNTER (OUTPATIENT)
Dept: PHYSICAL THERAPY | Age: 55
Setting detail: THERAPIES SERIES
Discharge: HOME | End: 2020-06-30
Payer: COMMERCIAL

## 2020-06-30 PROCEDURE — 97110 THERAPEUTIC EXERCISES: CPT | Performed by: PHYSICAL THERAPIST

## 2020-06-30 NOTE — FLOWSHEET NOTE
The [address] [place] [address] and Sports Rehabilitation, Clau Acevedo    Physical Therapy Daily Treatment Note  Date:  2020    Patient Name:  Ken Rogers    :  1965  MRN: [id number]  Restrictions/Precautions:    Medical/Treatment Diagnosis Information:  · Diagnosis: M67.911 (ICD-10-CM) - Tendinopathy of rotator cuff, right  · Treatment Diagnosis: M25.511, R53.1; leading to impaired ADLs and IADLs  Insurance/Certification information:  PT Insurance Information: PT BENEFITS  [facility]/ AUTH REQUIRED ONLY AFTER 30TH VISIT / 20 PAG  Physician Information:  Referring Practitioner: Gurjit Galdamez MD  Has the plan of care been signed (Y/N):        []  Yes  [x]  No     Date of Patient follow up with Physician: 7/15/20      Is this a Progress Report:     []  Yes  [x]  No        If Yes:  Date Range for reporting period:  Beginning  Ending    Progress report will be due (10 Rx or 30 days whichever is less):  88      Recertification will be due (POC Duration  / 90 days whichever is less): 20        Visit # Insurance Allowable Auth Required   2  (20 year total) 30 []  Yes [x]  No        Functional Scale:       Date assessed:   UEFI 58/68 (3 questions omitted)=73.5% (26.5% deficit)  20    Latex Allergy:  [x]NO      []YES  Preferred Language for Healthcare:   [x]English       []other:    Pain level:  8/10     SUBJECTIVE:  Pt states that her shoulder feels that same as it did on IE, \"nothing has changed. \" + HEP. OBJECTIVE:              ROM PROM AROM  Comment     L R L R     Flexion   110 p!   118 p!     Abduction   105 p!   115 p!     ER   95 p!   85 p!     IR   55 p!   55 p!     Superior shoulder pain in all positions     Strength L R Comment   Flexion 4-/5 3+/5* * unable to hold d/t p!; p! Superior shoulder   Abduction 4/5 4-/5* *p! Superior shoulder   ER 4/5 4-/5* *p! Lateral shoulder   IR 4/5 4/5     Biceps 4/5 4-/5* *p!  Anterior shoulder   Triceps   N/A        Special Tests Results/Comment   Chago +   Neers Unable to achieve test position d/t ROM deficits   Painful arc Unable to achieve test position d/t ROM deficits   OBriens +         Reflexes/Sensation:               [x]? Dermatomes/Myotomes intact               []? Reflexes equal and normal bilaterally               []? Other:     Joint mobility:               [x]? Normal               []? Hypo              []? Hyper     Palpation: anterior shoulder, LHBT     Functional Mobility/Transfers: Limited, uses motorized WC     Posture: Rounded shoulders     Gait: uses motorized WC      RESTRICTIONS/PRECAUTIONS:     Exercises/Interventions:     Exercise/Equipment Resistance/Repetitions Other comments   Stretching/PROM     Wand     Table Slides 10x10\" flex    UE Bedias     Pulleys 10x10\" scaption    CBA Attempted-anterior shoulder p! BB IR 10x10\"         Isometrics     Retraction          Weight shift     Flexion     Abduction     External Rotation     Internal Rotation     Biceps     Triceps          PRE's     Flexion     Abduction     External Rotation     Internal Rotation     Shrugs 2x10 1#    EXT     Reverse Flys     Serratus     Horizontal Abd with ER     Biceps 2x10 1#    Triceps     Retraction          Cable Column/Theraband     External Rotation 2x10 red Limited by p! After 2nd set   Internal Rotation 2x10 blue    Shrugs     Lats     Ext     Flex     Scapular Retraction 2x10 blue    BIC  Limited by p!   TRIC 3x10 green   PNF     W ER 2x10 yellow P!  By completion        Dynamic Stability          Plyoback          Manual interventions                 Plan for next session: progress as tolerated    Therapeutic Exercise and NMR EXR  [x] (77627) Provided verbal/tactile cueing for activities related to strengthening, flexibility, endurance, ROM  for improvements in scapular, scapulothoracic and UE control with self care, reaching, carrying, lifting, house/yardwork, driving/computer work.    [] (37556) [] IONTO  [] NMR (52552) x     [] VASO  [] Manual (34915) x      [] Other:  [] TA x      [] Mech Traction (93683)  [] ES(attended) (95025)      [] ES (un) (23181):     GOALS:  Patient stated goal: be able to sleep on shoulders, be able to lift without pain    []? Progressing: []? Met: []? Not Met: []? Adjusted     Therapist goals for Patient:   Short Term Goals: To be achieved in: 2 weeks 7/1/20  1. Independent in HEP and progression per patient tolerance, in order to prevent re-injury. []? Progressing: []? Met: []? Not Met: []? Adjusted   2. Patient will have a decrease in pain to facilitate improvement in movement, function, and ADLs as indicated by Functional Deficits. []? Progressing: []? Met: []? Not Met: []? Adjusted      Long Term Goals: To be achieved in: 6 weeks 7/29/20  1. Disability index score of 10% or less for the UEFS to assist with reaching prior level of function. []? Progressing: []? Met: []? Not Met: []? Adjusted  2. Patient will demonstrate increased AROM to [facility] to allow for proper joint functioning as indicated by patients Functional Deficits. []? Progressing: []? Met: []? Not Met: []? Adjusted  3. Patient will demonstrate an increase in R RC strength to 4/5 or greater to allow for proper functional mobility as indicated by patients Functional Deficits. []? Progressing: []? Met: []? Not Met: []? Adjusted  4. Patient will return to ADLs, IADLs and functional activities without increased symptoms or restriction. []? Progressing: []? Met: []? Not Met: []? Adjusted  5. Patient will be able to lift 10lb or greater for improved tolerance to ADLs.   []? Progressing: []? Met: []? Not Met: []? Adjusted  6. Patient will be able to lay on R side without pain for improved sleeping.  []? Progressing: []? Met: []? Not Met: []? Adjusted     Overall Progression Towards Functional goals/ Treatment Progress Update:  [] Patient is progressing as expected towards functional goals listed.     [] Progression is slowed due to complexities/Impairments listed. [] Progression has been slowed due to co-morbidities. [x] Plan just implemented, too soon to assess goals progression <30days   [] Goals require adjustment due to lack of progress  [] Patient is not progressing as expected and requires additional follow up with physician  [] Other    Prognosis for POC: [x] Good [] Fair  [] Poor      Patient requires continued skilled intervention: [x] Yes  [] No    Treatment/Activity Tolerance:  [x] Patient able to complete treatment  [] Patient limited by fatigue  [] Patient limited by pain     [] Patient limited by other medical complications  [] Other: Good tolerance to addition of pulleys in scaption, noted some discomfort at end range but able to complete all reps and pt demo'd increased ROM by completion compared to when she first started the stretch. Able to complete tricep pull down without pain, did require occasional remainder to keep arm at side. Pt noted dull pain in anterior shoulder following first set of TB ER, adjusted form to perform scap pinch before ER and pt reported reduction in pain for first 8 reps, on rep 9 pain returned. Pt able to tolerate scap pinches with minimal to no pain. Good tolerance to addition of bicep curl and shrug with weight. Pt able to tolerate W ER with decreased resistance this date, no pain with 1st set, noted pain by end of 2nd set despite instructions to stop if there was pain. Attempted CBA stretching, reported anterior shoulder pain. Good tolerance to BB IR stretching, noted some anterior shoulder discomfort. Pt reports increased pain in her shoulder by end of session. Pt requires PT follow up to address ROM, strength and functional mobility deficits.                   PLAN: See eval  [x] Continue per plan of care [] Alter current plan (see comments above)  [] Plan of care initiated [] Hold pending MD visit [] Discharge      Electronically signed by:  Lorena Clements, PT, DPT  Physical Therapist  [id number]  [email or web address]    Note: If patient does not return for scheduled/ recommended follow up visits, this note will serve as a discharge from care along with most recent update on progress.

## 2020-07-02 ENCOUNTER — HOSPITAL ENCOUNTER (OUTPATIENT)
Dept: PHYSICAL THERAPY | Age: 55
Setting detail: THERAPIES SERIES
Discharge: HOME | End: 2020-07-02
Payer: COMMERCIAL

## 2020-07-02 ENCOUNTER — TELEPHONE (OUTPATIENT)
Dept: SURGERY | Age: 55
End: 2020-07-02

## 2020-07-02 PROCEDURE — 97110 THERAPEUTIC EXERCISES: CPT | Performed by: PHYSICAL THERAPIST

## 2020-07-02 RX ORDER — OXYCODONE HYDROCHLORIDE AND ACETAMINOPHEN 5; 325 MG/1; MG/1
1 TABLET ORAL EVERY 8 HOURS PRN
Qty: 90 TABLET | Refills: 0 | Status: SHIPPED | OUTPATIENT
Start: 2020-07-07 | End: 2020-08-06

## 2020-07-02 NOTE — TELEPHONE ENCOUNTER
Patient seen 2/26/2020:                 IMP: 53 y.o. female with panniculitis, breast ptosis, and excess skin   PLAN: Would benefit from panniculectomy for functional improvement with concomittant abdominoplasty with rectus plication and umbilical transposition. At the same time, would benefit from mastopexy +/- augmentation. Once she has healed from this, would perform brachioplasty with staged thighplasties. Will submit to insurance and pricing provided with the  today in the office for the cosmetic components. Once she has been approved, will have her return to discuss questions specific for her first stage surgery. Will then schedule. Routing to MD for surgery letter. No surgery letter or PA process started.

## 2020-07-02 NOTE — TELEPHONE ENCOUNTER
I notified patient of previous notes. Patient voiced understanding.   1 mo f/u appt was scheduled for 8/7/20 @ 1pm

## 2020-07-02 NOTE — TELEPHONE ENCOUNTER
Ken Rogers is a 53 y.o. female     Last office visit date: 20  Future appointment date: n/a    Patient was informed of the followin. Consent:  She and/or health care decision maker is aware that that she may receive a bill for this telephone or video service, depending on her insurance coverage, and has provided verbal consent to proceed: Yes    2. Patient has MyChart on Phone: Yes  (Need to schedule as Virtual Video Visit)    3. If no, Patient willing to use Doxy. me: No  (Need to schedule as 'VV Special Use Case' visit type)    4. Patient requesting Virtual Visit Phone Call only: No  (Please check preferred time and set up a Virtual Phone Visit)    5.  Verified Pharmacy Name/location:  [facility] [address].    Reason for call:   Back Pain      Notes -   Refill Request for Percocet

## 2020-07-02 NOTE — TELEPHONE ENCOUNTER
OARRS reviewed -  Percocet filled (due 07/07/20); sent by e-script due to continued lock down of our office for [address] outbreak.     Needs F/U X 1 month

## 2020-07-02 NOTE — FLOWSHEET NOTE
The [address] [place] [address] [facility]    [facility] Daily Treatment Note  Date:  2020    Patient Name:  Uriel Gomez    :  1965  MRN: [id number]  Restrictions/Precautions:    Medical/Treatment Diagnosis Information:  · Diagnosis: M67.911 (ICD-10-CM) - Tendinopathy of rotator cuff, right  · Treatment Diagnosis: M25.511, R53.1; leading to impaired ADLs and IADLs  Insurance/Certification information:  PT Insurance Information: PT BENEFITS  FACILITY/ [facility]/ %/30 VPCY/ AUTH REQUIRED ONLY AFTER 30TH VISIT / 20 PAG  Physician Information:  Referring Practitioner: Theresa Valerio MD  Has the plan of care been signed (Y/N):        []  Yes  [x]  No     Date of Patient follow up with Physician: 7/15/20      Is this a Progress Report:     []  Yes  [x]  No        If Yes:  Date Range for reporting period:  Beginning  Ending    Progress report will be due (10 Rx or 30 days whichever is less):  3/25/10      Recertification will be due (POC Duration  / 90 days whichever is less): 20        Visit # Insurance Allowable Auth Required   3  (20 year total) 30 []  Yes [x]  No        Functional Scale:       Date assessed:   UEFI 58/68 (3 questions omitted)=73.5% (26.5% deficit)  20    Latex Allergy:  [x]NO      []YES  Preferred Language for Healthcare:   [x]English       []other:    Pain level:  7/10     SUBJECTIVE:  Pt states that her pain was 8/10 following LPV and the pain lasted for a few hours. She feels better today, pain 7/10. OBJECTIVE:              ROM PROM AROM  Comment     L R L R     Flexion   110 p!   118 p!     Abduction   105 p!   115 p!     ER   95 p!   85 p!     IR   55 p!   55 p!     Superior shoulder pain in all positions     Strength L R Comment   Flexion 4-/5 3+/5* * unable to hold d/t p!; p! Superior shoulder   Abduction 4/5 4-/5* *p! Superior shoulder   ER 4/5 4-/5* *p!  Lateral shoulder   IR 4/5 4/5     Biceps 4/5 4-/5* *p! Anterior shoulder   Triceps   N/A        Special Tests Results/Comment   Chago +   Neers Unable to achieve test position d/t ROM deficits   Painful arc Unable to achieve test position d/t ROM deficits   OBriens +         Reflexes/Sensation:               [x]? Dermatomes/Myotomes intact               []? Reflexes equal and normal bilaterally               []? Other:     Joint mobility:               [x]? Normal               []? Hypo              []? Hyper     Palpation: anterior shoulder, LHBT     Functional Mobility/Transfers: Limited, uses motorized WC     Posture: Rounded shoulders     Gait: uses motorized WC      RESTRICTIONS/PRECAUTIONS:     Exercises/Interventions:     Exercise/Equipment Resistance/Repetitions Other comments   Stretching/PROM     Wand     Table Slides 10x10\" flex    UE Madeline     Pulleys 10x10\" scaption    CBA Attempted-anterior shoulder p! 6/30   BB IR 10x10\"         Isometrics     Retraction          Weight shift     Flexion     Abduction     External Rotation 10x5\"  Ball between pole and arm    Internal Rotation 10x5\" Ball between torso and arm   Biceps     Triceps          PRE's     Flexion     Abduction     External Rotation     Internal Rotation     Shrugs 3x10 1# B    EXT     Reverse Flys     Serratus     Horizontal Abd with ER     Biceps 3x10 1# B    Triceps     Retraction          Cable Column/Theraband     External Rotation Limited by p! After 2nd set   Internal Rotation    Shrugs     Lats     Ext     Flex     Scapular Retraction 3x10 blue    BIC  Limited by p!   TRIC 3x10 green   PNF     W ER  P!  By completion        Dynamic Stability          Plyoback          Manual interventions                 Plan for next session: progress as tolerated    Therapeutic Exercise and NMR EXR  [x] (09886) Provided verbal/tactile cueing for activities related to strengthening, flexibility, endurance, ROM  for improvements in scapular, scapulothoracic and UE control with self care, reaching, carrying, lifting, house/yardwork, driving/computer work.    [] (43551) Provided verbal/tactile cueing for activities related to improving balance, coordination, kinesthetic sense, posture, motor skill, proprioception  to assist with  scapular, scapulothoracic and UE control with self care, reaching, carrying, lifting, house/yardwork, driving/computer work. Therapeutic Activities:    [] (69499 or 63949) Provided verbal/tactile cueing for activities related to improving balance, coordination, kinesthetic sense, posture, motor skill, proprioception and motor activation to allow for proper function of scapular, scapulothoracic and UE control with self care, carrying, lifting, driving/computer work. Home Exercise Program:  [address] [place] [address] access code: [email or web address]   Initiated 6/17/20. Printed hand out given. Pt demonstrated proper form of each exercise and expressed verbal understanding of frequency and duration. Updated 7/2/20. Printed handout provided.   [x] (27576) Reviewed/Progressed HEP activities related to strengthening, flexibility, endurance, ROM of scapular, scapulothoracic and UE control with self care, reaching, carrying, lifting, house/yardwork, driving/computer work  [] (67933) Reviewed/Progressed HEP activities related to improving balance, coordination, kinesthetic sense, posture, motor skill, proprioception of scapular, scapulothoracic and UE control with self care, reaching, carrying, lifting, house/yardwork, driving/computer work      Manual Treatments:    [] (64870) Provided manual therapy to mobilize soft tissue/joints of cervical/CT, scapular GHJ and UE for the purpose of modulating pain, promoting relaxation,  increasing ROM, reducing/eliminating soft tissue swelling/inflammation/restriction, improving soft tissue extensibility and allowing for proper ROM for normal function with self care, reaching, carrying, lifting, house/yardwork, driving/computer work    Modalities:  None    Charges:  Timed Code Treatment Minutes: 50   Total Treatment Minutes: 50   Time in: 10:08  Time out: 10:58    [] EVAL (LOW) 16950 (typically 20 minutes face-to-face)  [] EVAL (MOD) 11780 (typically 30 minutes face-to-face)  [] EVAL (HIGH) 03616 (typically 45 minutes face-to-face)  [] RE-EVAL     [x] QZ(36275) x3    [] IONTO  [] NMR (00741) x     [] VASO  [] Manual (00513) x      [] Other:  [] TA x      [] Mech Traction (38738)  [] ES(attended) (13864)      [] ES (un) (07018):     GOALS:  Patient stated goal: be able to sleep on shoulders, be able to lift without pain    []? Progressing: []? Met: []? Not Met: []? Adjusted     Therapist goals for Patient:   Short Term Goals: To be achieved in: 2 weeks 7/1/20  1. Independent in HEP and progression per patient tolerance, in order to prevent re-injury. []? Progressing: []? Met: []? Not Met: []? Adjusted   2. Patient will have a decrease in pain to facilitate improvement in movement, function, and ADLs as indicated by Functional Deficits. []? Progressing: []? Met: []? Not Met: []? Adjusted      Long Term Goals: To be achieved in: 6 weeks 7/29/20  1. Disability index score of 10% or less for the UEFS to assist with reaching prior level of function. []? Progressing: []? Met: []? Not Met: []? Adjusted  2. Patient will demonstrate increased AROM to [facility] to allow for proper joint functioning as indicated by patients Functional Deficits. []? Progressing: []? Met: []? Not Met: []? Adjusted  3. Patient will demonstrate an increase in R RC strength to 4/5 or greater to allow for proper functional mobility as indicated by patients Functional Deficits. []? Progressing: []? Met: []? Not Met: []? Adjusted  4. Patient will return to ADLs, IADLs and functional activities without increased symptoms or restriction. []? Progressing: []? Met: []? Not Met: []? Adjusted  5. Patient will be able to lift 10lb or greater for improved tolerance to ADLs.   []? Progressing: []? Met: []?  Not Met: []? care initiated [] Hold pending MD visit [] Discharge      Electronically signed by:  Michelle Vu PT, DPT  Physical Therapist  [id number]  [email or web address]    Note: If patient does not return for scheduled/ recommended follow up visits, this note will serve as a discharge from care along with most recent update on progress.

## 2020-07-02 NOTE — TELEPHONE ENCOUNTER
The patient was in the office to see Dr. Reuben Taylor 2-. The patient called to see if we started the insurance paperwork so that she can have surgery. She would like to discuss scheduling and if she will need to be seen again. Please call.

## 2020-07-06 NOTE — TELEPHONE ENCOUNTER
Medication:   Requested Prescriptions     Pending Prescriptions Disp Refills    ondansetron (ZOFRAN) 4 MG tablet [Pharmacy Med Name: ONDANSETRON HCL 4 MG TABLET] 30 tablet 2     Sig: TAKE 1 TABLET BY MOUTH EVERY 8 HOURS AS NEEDED FOR NAUSEA OR VOMITING    docusate sodium (COLACE) 100 MG capsule [Pharmacy Med Name: DOCUSATE SODIUM 100 MG SOFTGEL] 60 capsule 2     Sig: TAKE 1 CAPSULE BY MOUTH 2 TIMES DAILY    [facility] PURELAX 17 GM/SCOOP powder [Pharmacy Med Name: [facility] PURELAX POWDER] 510 g 2     Sig: TAKE 17 G BY MOUTH DAILY    [facility] GENTLE LAXATIVE 5 MG EC tablet [Pharmacy Med Name: [facility] BISACODYL 5 MG EC TABLET] 30 tablet 1     Sig: TAKE 1 TABLET BY MOUTH DAILY AS NEEDED FOR CONSTIPATION    meclizine (ANTIVERT) 12.5 MG tablet [Pharmacy Med Name: MECLIZINE 12.5 MG TABLET] 60 tablet 1     Sig: TAKE 1 TABLET BY MOUTH 3 TIMES DAILY AS NEEDED FOR DIZZINESS OR NAUSEA     Last Filled: 4.8.20, 4.8.20, 5.7.20, 5.6.20    Last appt: 6.1.20   Next appt: Visit date not found    Last OARRS:   RX Monitoring 7/2/2020   Attestation -   Periodic Controlled Substance Monitoring Possible medication side effects, risk of tolerance/dependence & alternative treatments discussed. Chronic Pain > 50 MEDD Obtained or confirmed \"Consent for Opioid Use\" on file.

## 2020-07-07 ENCOUNTER — HOSPITAL ENCOUNTER (OUTPATIENT)
Dept: PHYSICAL THERAPY | Age: 55
Setting detail: THERAPIES SERIES
Discharge: HOME | End: 2020-07-07
Payer: COMMERCIAL

## 2020-07-07 PROCEDURE — 97110 THERAPEUTIC EXERCISES: CPT | Performed by: PHYSICAL THERAPIST

## 2020-07-07 RX ORDER — ERGOCALCIFEROL 1.25 MG/1
50000 CAPSULE ORAL WEEKLY
Qty: 4 CAPSULE | Refills: 3 | Status: SHIPPED | OUTPATIENT
Start: 2020-07-07 | End: 2020-07-09 | Stop reason: SDUPTHER

## 2020-07-07 NOTE — TELEPHONE ENCOUNTER
Medication:   Requested Prescriptions     Pending Prescriptions Disp Refills    Cholecalciferol (VITAMIN D3) 125 MCG (5000 UT) CAPS 30 capsule 2     Sig: Take 1 capsule by mouth daily        Last Filled:      Patient Phone Number: [phone] (home)     Last appt: 3/25/2020   Next appt: Visit date not found    Last OARRS:   RX Monitoring 7/2/2020   Attestation -   Periodic Controlled Substance Monitoring Possible medication side effects, risk of tolerance/dependence & alternative treatments discussed. Chronic Pain > 50 MEDD Obtained or confirmed \"Consent for Opioid Use\" on file.        Preferred Pharmacy:   [facility]/pharmacy [address] [place] [address], [address] [place], [place] [address] [address] [facility] [address]  Phone: [phone] Fax: [phone] - [address] [facility], [facility]1 [facility] Mauricio Schwarzenoc [phone]  [address] [facility] [address]  Phone: [phone] Fax: [phone]    [facility] [address] [facility] 5 - P [phone] - F [phone]  [place] [address]  Phone: [phone] Fax: [phone]

## 2020-07-07 NOTE — FLOWSHEET NOTE
[facility] [address] [facility]    Physical Therapy Daily Treatment Note  Date:  2020    Patient Name:  Kinjal Francsi    :  1965  MRN: [id number]  Restrictions/Precautions:    Medical/Treatment Diagnosis Information:  · Diagnosis: M67.911 (ICD-10-CM) - Tendinopathy of rotator cuff, right  · Treatment Diagnosis: M25.511, R53.1; leading to impaired ADLs and IADLs  Insurance/Certification information:  PT Insurance Information: PT BENEFITS  FACILITY/ [facility]/ %/30 VPCY/ AUTH REQUIRED ONLY AFTER 30TH VISIT / 20 PAG  Physician Information:  Referring Practitioner: Joseph Farley MD  Has the plan of care been signed (Y/N):        []  Yes  [x]  No     Date of Patient follow up with Physician: 7/15/20      Is this a Progress Report:     []  Yes  [x]  No        If Yes:  Date Range for reporting period:  Beginning  Ending    Progress report will be due (10 Rx or 30 days whichever is less):        Recertification will be due (POC Duration  / 90 days whichever is less): 20        Visit # Insurance Allowable Auth Required   4  (20 year total) 30 []  Yes [x]  No        Functional Scale:       Date assessed:   UEFI 58/68 (3 questions omitted)=73.5% (26.5% deficit)  20    Latex Allergy:  [x]NO      []YES  Preferred Language for Healthcare:   [x]English       []other:    Pain level:  6/10     SUBJECTIVE:  Pt states she is feeling much better compared to LPV, changes to program have helped. Her pain is 6/10 today. OBJECTIVE:              ROM PROM AROM  Comment     L R L R     Flexion   110 p!   118 p!     Abduction   105 p!   115 p!     ER   95 p!   85 p!     IR   55 p!   55 p!     Superior shoulder pain in all positions     Strength L R Comment   Flexion 4-/5 3+/5* * unable to hold d/t p!; p! Superior shoulder   Abduction 4/5 4-/5* *p! Superior shoulder   ER 4/5 4-/5* *p! Lateral shoulder   IR 4/5 4/5     Biceps 4/5 4-/5* *p!  Anterior shoulder   Triceps   N/A        Special Tests Results/Comment   Chago +   Neers Unable to achieve test position d/t ROM deficits   Painful arc Unable to achieve test position d/t ROM deficits   OBriens +         Reflexes/Sensation:               [x]? Dermatomes/Myotomes intact               []? Reflexes equal and normal bilaterally               []? Other:     Joint mobility:               [x]? Normal               []? Hypo              []? Hyper     Palpation: anterior shoulder, LHBT     Functional Mobility/Transfers: Limited, uses motorized WC     Posture: Rounded shoulders     Gait: uses motorized WC      RESTRICTIONS/PRECAUTIONS:     Exercises/Interventions:     Exercise/Equipment Resistance/Repetitions Other comments   Stretching/PROM     Wand     Table Slides 10x10\" flex    UE Lawrence     Pulleys 10x10\" scaption    CBA Attempted-anterior shoulder p! 6/30   BB IR 10x10\"         Isometrics     Retraction          Weight shift     Flexion     Abduction     External Rotation 10x5\"  Ball between pole and arm    Internal Rotation 10x5\" Ball between torso and arm   Biceps     Triceps          PRE's     Flexion     Abduction     External Rotation     Internal Rotation     Shrugs 3x10 1# B    EXT     Reverse Flys     Serratus     Horizontal Abd with ER     Biceps 3x10 1# B    Triceps     Retraction          Cable Column/Theraband     External Rotation Limited by p! After 2nd set   Internal Rotation    Shrugs     Lats     Ext     Flex     Scapular Retraction 3x10 blue    BIC  Limited by p!   TRIC 3x10 green R, blue L    PNF     W ER  P!  By completion        Dynamic Stability          Plyoback          Manual interventions                 Plan for next session: progress as tolerated    Therapeutic Exercise and NMR EXR  [x] (95691) Provided verbal/tactile cueing for activities related to strengthening, flexibility, endurance, ROM  for improvements in scapular, scapulothoracic and UE control with self care, reaching, carrying, lifting, house/yardwork, driving/computer work.    [] (43503) Provided verbal/tactile cueing for activities related to improving balance, coordination, kinesthetic sense, posture, motor skill, proprioception  to assist with  scapular, scapulothoracic and UE control with self care, reaching, carrying, lifting, house/yardwork, driving/computer work. Therapeutic Activities:    [] (11396 or 08663) Provided verbal/tactile cueing for activities related to improving balance, coordination, kinesthetic sense, posture, motor skill, proprioception and motor activation to allow for proper function of scapular, scapulothoracic and UE control with self care, carrying, lifting, driving/computer work. Home Exercise Program:  Scoupon access code: [email or web address]   Initiated 6/17/20. Printed hand out given. Pt demonstrated proper form of each exercise and expressed verbal understanding of frequency and duration. Updated 7/2/20. Printed handout provided.   [x] (97467) Reviewed/Progressed HEP activities related to strengthening, flexibility, endurance, ROM of scapular, scapulothoracic and UE control with self care, reaching, carrying, lifting, house/yardwork, driving/computer work  [] (32280) Reviewed/Progressed HEP activities related to improving balance, coordination, kinesthetic sense, posture, motor skill, proprioception of scapular, scapulothoracic and UE control with self care, reaching, carrying, lifting, house/yardwork, driving/computer work      Manual Treatments:    [] (86558) Provided manual therapy to mobilize soft tissue/joints of cervical/CT, scapular GHJ and UE for the purpose of modulating pain, promoting relaxation,  increasing ROM, reducing/eliminating soft tissue swelling/inflammation/restriction, improving soft tissue extensibility and allowing for proper ROM for normal function with self care, reaching, carrying, lifting, house/yardwork, driving/computer work    Modalities: None    Charges:  Timed Code Treatment Minutes: 40   Total Treatment Minutes: 40   Time in: 10:08  Time out: 10:58    [] EVAL (LOW) 39317 (typically 20 minutes face-to-face)  [] EVAL (MOD) 17887 (typically 30 minutes face-to-face)  [] EVAL (HIGH) 73456 (typically 45 minutes face-to-face)  [] RE-EVAL     [x] XF(57546) x3    [] IONTO  [] NMR (58786) x     [] VASO  [] Manual (19508) x      [] Other:  [] TA x      [] Mech Traction (73494)  [] ES(attended) (58774)      [] ES (un) (00532):     GOALS:  Patient stated goal: be able to sleep on shoulders, be able to lift without pain    []? Progressing: []? Met: []? Not Met: []? Adjusted     Therapist goals for Patient:   Short Term Goals: To be achieved in: 2 weeks 7/1/20  1. Independent in HEP and progression per patient tolerance, in order to prevent re-injury. []? Progressing: []? Met: []? Not Met: []? Adjusted   2. Patient will have a decrease in pain to facilitate improvement in movement, function, and ADLs as indicated by Functional Deficits. []? Progressing: []? Met: []? Not Met: []? Adjusted      Long Term Goals: To be achieved in: 6 weeks 7/29/20  1. Disability index score of 10% or less for the UEFS to assist with reaching prior level of function. []? Progressing: []? Met: []? Not Met: []? Adjusted  2. Patient will demonstrate increased AROM to [facility] to allow for proper joint functioning as indicated by patients Functional Deficits. []? Progressing: []? Met: []? Not Met: []? Adjusted  3. Patient will demonstrate an increase in R RC strength to 4/5 or greater to allow for proper functional mobility as indicated by patients Functional Deficits. []? Progressing: []? Met: []? Not Met: []? Adjusted  4. Patient will return to ADLs, IADLs and functional activities without increased symptoms or restriction. []? Progressing: []? Met: []? Not Met: []? Adjusted  5. Patient will be able to lift 10lb or greater for improved tolerance to ADLs.   []?  Progressing: []? Met: []? Not Met: []? Adjusted  6. Patient will be able to lay on R side without pain for improved sleeping.  []? Progressing: []? Met: []? Not Met: []? Adjusted     Overall Progression Towards Functional goals/ Treatment Progress Update:  [] Patient is progressing as expected towards functional goals listed. [] Progression is slowed due to complexities/Impairments listed. [] Progression has been slowed due to co-morbidities. [x] Plan just implemented, too soon to assess goals progression <30days   [] Goals require adjustment due to lack of progress  [] Patient is not progressing as expected and requires additional follow up with physician  [] Other    Prognosis for POC: [x] Good [] Fair  [] Poor      Patient requires continued skilled intervention: [x] Yes  [] No    Treatment/Activity Tolerance:  [x] Patient able to complete treatment  [] Patient limited by fatigue  [x] Patient limited by pain     [] Patient limited by other medical complications  [] Other: Improved form with tricep pull down this date, only required initial VCs to keep arm at side and only move from elbow. Required VCs for proper arm position on ball during isometric exercises as pt wants to place ball at elbow rather than at forearm. VCs to rotate in vs ADD arm during IR isometric. VCs to avoid wrist flexion at end range of scap rows. Pt reported UT discomfort by completion of bicep curls and shoulder shrugs. Good tolerance to session this ate, progressions are limited secondary to pain and weakness. Pt reported feeling good at end of session, no increase in pain. Pt requires PT follow up to address ROM, strength and functional mobility deficits. PLAN: See eval  [x] Continue per plan of care [] Alter current plan (see comments above)  [] Plan of care initiated [] Hold pending MD visit [] Discharge      Electronically signed by:  Jim Michel, PT, DPT, OCS  Physical Therapist  [id number]  [email or web address]. [employer]    Note: If patient does not return for scheduled/ recommended follow up visits, this note will serve as a discharge from care along with most recent update on progress.

## 2020-07-08 ENCOUNTER — HOSPITAL ENCOUNTER (OUTPATIENT)
Dept: MAMMOGRAPHY | Age: 55
Discharge: HOME | End: 2020-07-08
Payer: COMMERCIAL

## 2020-07-08 ENCOUNTER — TELEPHONE (OUTPATIENT)
Dept: ADMINISTRATIVE | Age: 55
End: 2020-07-08

## 2020-07-08 PROCEDURE — G0279 TOMOSYNTHESIS, MAMMO: HCPCS

## 2020-07-08 NOTE — TELEPHONE ENCOUNTER
Called in requesting results for the following:    What results: Mammogram    Date of service: 7/9/2020    * Arabic

## 2020-07-08 NOTE — TELEPHONE ENCOUNTER
PT MEDICATIONS WHERE SENT TO THE [facility]ONG [facility] PLEASE SEND THE   vitamin D (ERGOCALCIFEROL) 1.25 MG (38836 UT) CAPS capsule [[id number]]     Order Details   Dose: 50,000 Units Route: Oral Frequency: WEEKLY   Dispense Quantity: 4 capsule Refills: 3          PLEASE SEND TO THIS ONE     [id number] - [facility] [place] - [address] - P [phone] - F [phone]

## 2020-07-09 ENCOUNTER — HOSPITAL ENCOUNTER (OUTPATIENT)
Dept: PHYSICAL THERAPY | Age: 55
Setting detail: THERAPIES SERIES
Discharge: HOME | End: 2020-07-09
Payer: COMMERCIAL

## 2020-07-09 PROCEDURE — 97110 THERAPEUTIC EXERCISES: CPT | Performed by: PHYSICAL THERAPIST

## 2020-07-09 RX ORDER — BISACODYL 5 MG
TABLET, DELAYED RELEASE (ENTERIC COATED) ORAL
Qty: 30 TABLET | Refills: 1 | Status: SHIPPED | OUTPATIENT
Start: 2020-07-09 | End: 2021-06-15

## 2020-07-09 RX ORDER — ONDANSETRON 4 MG/1
4 TABLET, FILM COATED ORAL EVERY 8 HOURS PRN
Qty: 30 TABLET | Refills: 2 | Status: SHIPPED | OUTPATIENT
Start: 2020-07-09 | End: 2021-06-15

## 2020-07-09 RX ORDER — DOCUSATE SODIUM 100 MG/1
100 CAPSULE, LIQUID FILLED ORAL 2 TIMES DAILY
Qty: 60 CAPSULE | Refills: 2 | Status: SHIPPED | OUTPATIENT
Start: 2020-07-09 | End: 2021-06-15

## 2020-07-09 RX ORDER — POLYETHYLENE GLYCOL 3350 17 G/DOSE
POWDER (GRAM) ORAL
Qty: 510 G | Refills: 2 | Status: SHIPPED | OUTPATIENT
Start: 2020-07-09 | End: 2021-06-15

## 2020-07-09 RX ORDER — MECLIZINE HCL 12.5 MG/1
12.5 TABLET ORAL 3 TIMES DAILY PRN
Qty: 60 TABLET | Refills: 1 | Status: SHIPPED | OUTPATIENT
Start: 2020-07-09 | End: 2021-06-15

## 2020-07-09 RX ORDER — ERGOCALCIFEROL 1.25 MG/1
50000 CAPSULE ORAL WEEKLY
Qty: 4 CAPSULE | Refills: 3 | Status: SHIPPED | OUTPATIENT
Start: 2020-07-09 | End: 2021-06-15

## 2020-07-09 NOTE — FLOWSHEET NOTE
[facility] [address] [place] [address] [facility]    Physical Therapy Daily Treatment Note  Date:  2020    Patient Name:  Viridiana Saha    :  1965  MRN: [id number]  Restrictions/Precautions:    Medical/Treatment Diagnosis Information:  · Diagnosis: M67.911 (ICD-10-CM) - Tendinopathy of rotator cuff, right  · Treatment Diagnosis: M25.511, R53.1; leading to impaired ADLs and IADLs  Insurance/Certification information:  PT Insurance Information: PT BENEFITS  FACILITY/ [facility]/ %/30 VPCY/ AUTH REQUIRED ONLY AFTER 30TH VISIT / 20 PAG  Physician Information:  Referring Practitioner: Alec Diehl MD  Has the plan of care been signed (Y/N):        []  Yes  [x]  No     Date of Patient follow up with Physician: 7/15/20      Is this a Progress Report:     []  Yes  [x]  No        If Yes:  Date Range for reporting period:  Beginning  Ending    Progress report will be due (10 Rx or 30 days whichever is less):        Recertification will be due (POC Duration  / 90 days whichever is less): 20        Visit # Insurance Allowable Auth Required   5  (20 year total) 30 []  Yes [x]  No        Functional Scale:       Date assessed:   UEFI 58 (3 questions omitted)=73.5% (26.5% deficit)  20    Latex Allergy:  [x]NO      []YES  Preferred Language for Healthcare:   [x]English       []other:    Pain level:  7/10     SUBJECTIVE:  Pt states that her shoulder is feeling better. She has more pain in the morning when she wakes up, typically 8/10 and then the pain decreases as the day goes on, 6/10 is the lowest that her pain goes. Her pain is 7/10 currently. Pt states that laying on her side/putting pressure on the R shoulder is still painful.     OBJECTIVE:              ROM PROM AROM  Comment     L R L R     Flexion   110 p!   118 p!     Abduction   105 p!   115 p!     ER   95 p!   85 p!     IR   55 p!   55 p!     Superior shoulder pain in all positions     Strength L R Comment   Flexion 4-/5 3+/5* * unable to hold d/t p!; p! Superior shoulder   Abduction 4/5 4-/5* *p! Superior shoulder   ER 4/5 4-/5* *p! Lateral shoulder   IR 4/5 4/5     Biceps 4/5 4-/5* *p! Anterior shoulder   Triceps   N/A        Special Tests Results/Comment   Harvey-Jack +   Neers Unable to achieve test position d/t ROM deficits   Painful arc Unable to achieve test position d/t ROM deficits   OBriens +         Reflexes/Sensation:               [x]? Dermatomes/Myotomes intact               []? Reflexes equal and normal bilaterally               []? Other:     Joint mobility:               [x]? Normal               []? Hypo              []? Hyper     Palpation: anterior shoulder, LHBT     Functional Mobility/Transfers: Limited, uses motorized WC     Posture: Rounded shoulders     Gait: uses motorized WC      RESTRICTIONS/PRECAUTIONS:     Exercises/Interventions:     Exercise/Equipment Resistance/Repetitions Other comments   Stretching/PROM     Wand     Table Slides 10x10\" flex    UE Terrell     Pulleys 10x10\" scaption    CBA Attempted-anterior shoulder p! 6/30   BB IR 10x10\"         Isometrics     Retraction          Weight shift     Flexion     Abduction     External Rotation 12x5\"  Ball between pole and arm    Internal Rotation 12x5\" Ball between torso and arm   Biceps     Triceps          PRE's     Flexion     Abduction     External Rotation     Internal Rotation     Shrugs 3x10 1# B    EXT     Reverse Flys     Serratus     Horizontal Abd with ER     Biceps 3x10 1# B    Triceps     Chest press x10 wand         Cable Column/Theraband     External Rotation Limited by p! After 2nd set   Internal Rotation    Shrugs     Lats     Ext     Flex     Scapular Retraction 3x10 blue    BIC  Limited by p!   TRIC 3x10 green R, blue L    PNF     W ER  P!  By completion        Dynamic Stability          Plyoback          Manual interventions                 Plan for next session: progress as tolerated    Therapeutic Exercise and NMR EXR  [x] (94939) Provided verbal/tactile cueing for activities related to strengthening, flexibility, endurance, ROM  for improvements in scapular, scapulothoracic and UE control with self care, reaching, carrying, lifting, house/yardwork, driving/computer work.    [] (37992) Provided verbal/tactile cueing for activities related to improving balance, coordination, kinesthetic sense, posture, motor skill, proprioception  to assist with  scapular, scapulothoracic and UE control with self care, reaching, carrying, lifting, house/yardwork, driving/computer work. Therapeutic Activities:    [] (02602 or 94962) Provided verbal/tactile cueing for activities related to improving balance, coordination, kinesthetic sense, posture, motor skill, proprioception and motor activation to allow for proper function of scapular, scapulothoracic and UE control with self care, carrying, lifting, driving/computer work. Home Exercise Program:  Hi See access code: [email or web address]   Initiated 6/17/20. Printed hand out given. Pt demonstrated proper form of each exercise and expressed verbal understanding of frequency and duration. Updated 7/2/20. Printed handout provided.   [x] (83639) Reviewed/Progressed HEP activities related to strengthening, flexibility, endurance, ROM of scapular, scapulothoracic and UE control with self care, reaching, carrying, lifting, house/yardwork, driving/computer work  [] (94277) Reviewed/Progressed HEP activities related to improving balance, coordination, kinesthetic sense, posture, motor skill, proprioception of scapular, scapulothoracic and UE control with self care, reaching, carrying, lifting, house/yardwork, driving/computer work      Manual Treatments:    [] (60547) Provided manual therapy to mobilize soft tissue/joints of cervical/CT, scapular GHJ and UE for the purpose of modulating pain, promoting relaxation,  increasing ROM, reducing/eliminating soft tissue swelling/inflammation/restriction, improving soft tissue extensibility and allowing for proper ROM for normal function with self care, reaching, carrying, lifting, house/yardwork, driving/computer work    Modalities:  None    Charges:  Timed Code Treatment Minutes: 40   Total Treatment Minutes: 40   Time in: 10:15  Time out: 10:55    [] EVAL (LOW) 17213 (typically 20 minutes face-to-face)  [] EVAL (MOD) 62533 (typically 30 minutes face-to-face)  [] EVAL (HIGH) 41689 (typically 45 minutes face-to-face)  [] RE-EVAL     [x] ED(82579) x3    [] IONTO  [] NMR (84035) x     [] VASO  [] Manual (35619) x      [] Other:  [] TA x      [] Mech Traction (75416)  [] ES(attended) (94019)      [] ES (un) (39816):     GOALS:  Patient stated goal: be able to sleep on shoulders, be able to lift without pain    []? Progressing: []? Met: []? Not Met: []? Adjusted     Therapist goals for Patient:   Short Term Goals: To be achieved in: 2 weeks 7/1/20  1. Independent in HEP and progression per patient tolerance, in order to prevent re-injury. []? Progressing: []? Met: []? Not Met: []? Adjusted   2. Patient will have a decrease in pain to facilitate improvement in movement, function, and ADLs as indicated by Functional Deficits. []? Progressing: []? Met: []? Not Met: []? Adjusted      Long Term Goals: To be achieved in: 6 weeks 7/29/20  1. Disability index score of 10% or less for the UEFS to assist with reaching prior level of function. []? Progressing: []? Met: []? Not Met: []? Adjusted  2. Patient will demonstrate increased AROM to [facility] to allow for proper joint functioning as indicated by patients Functional Deficits. []? Progressing: []? Met: []? Not Met: []? Adjusted  3. Patient will demonstrate an increase in R RC strength to 4/5 or greater to allow for proper functional mobility as indicated by patients Functional Deficits. []? Progressing: []? Met: []? Not Met: []? Adjusted  4.  Patient will return to ADLs, IADLs and limited d/t pain. Pt requires PT follow up to address ROM, strength and functional mobility deficits. PLAN: See eval  [x] Continue per plan of care [] Alter current plan (see comments above)  [] Plan of care initiated [] Hold pending MD visit [] Discharge      Electronically signed by:  Maria Luz Vaughan, PT, DPT, OCS  Physical Therapist  [email or web address]  [email or web address]    Note: If patient does not return for scheduled/ recommended follow up visits, this note will serve as a discharge from care along with most recent update on progress.

## 2020-07-14 ENCOUNTER — OFFICE VISIT (OUTPATIENT)
Dept: ORTHOPEDIC SURGERY | Age: 55
End: 2020-07-14
Payer: COMMERCIAL

## 2020-07-14 VITALS — BODY MASS INDEX: 33.8 KG/M2 | WEIGHT: 223 LBS | TEMPERATURE: 98.4 F | HEIGHT: 68 IN

## 2020-07-14 PROCEDURE — G8428 CUR MEDS NOT DOCUMENT: HCPCS | Performed by: ORTHOPAEDIC SURGERY

## 2020-07-14 PROCEDURE — 3017F COLORECTAL CA SCREEN DOC REV: CPT | Performed by: ORTHOPAEDIC SURGERY

## 2020-07-14 PROCEDURE — 99213 OFFICE O/P EST LOW 20 MIN: CPT | Performed by: ORTHOPAEDIC SURGERY

## 2020-07-14 PROCEDURE — 1036F TOBACCO NON-USER: CPT | Performed by: ORTHOPAEDIC SURGERY

## 2020-07-14 PROCEDURE — 20610 DRAIN/INJ JOINT/BURSA W/O US: CPT | Performed by: ORTHOPAEDIC SURGERY

## 2020-07-14 PROCEDURE — G8417 CALC BMI ABV UP PARAM F/U: HCPCS | Performed by: ORTHOPAEDIC SURGERY

## 2020-07-14 RX ORDER — METHYLPREDNISOLONE ACETATE 40 MG/ML
40 INJECTION, SUSPENSION INTRA-ARTICULAR; INTRALESIONAL; INTRAMUSCULAR; SOFT TISSUE ONCE
Status: COMPLETED | OUTPATIENT
Start: 2020-07-14 | End: 2020-07-14

## 2020-07-14 RX ADMIN — METHYLPREDNISOLONE ACETATE 40 MG: 40 INJECTION, SUSPENSION INTRA-ARTICULAR; INTRALESIONAL; INTRAMUSCULAR; SOFT TISSUE at 12:08

## 2020-07-14 NOTE — PROGRESS NOTES
Chief Complaint  Follow-up (bilateral knees. continued knee pain )      History of Present Illness:  Uriel Gomez is a pleasant 53 y.o. female who presents today for follow up evaluation of bilateral knee pain. She has known bilateral knee osteoarthritis. We have treated her conservatively consistent of bilateral intraarticular cortisone injections which provide about 3 months of relief. She returns today complaining of anterior bilateral knee pain, left worse than the right. Pain is exacerbated with standing or walking. She does get around in a wheelchair. No new injuries reported. Medical History:  Patient's medications, allergies, past medical, surgical, social and family histories were reviewed and updated as appropriate. Pertinent items are noted in HPI  Review of systems reviewed from Patient History Form completed today and available in the patient's chart under the Media tab.               Past Medical History:   Diagnosis Date    Arthritis     TAN (dyspnea on exertion)     Gastroesophageal reflux disease 1/28/2020    Hyperlipidemia     Hypertension     Morbid obesity with body mass index (BMI) of 60.0 to 69.9 in adult ([facility] [address] 7/2/2018    Neuropathy     SHYANN (obstructive sleep apnea)     Primary osteoarthritis of right knee 7/2/2018    Type 2 diabetes mellitus without complication ([facility] [address]     controlled with diet    Urinary incontinence         Past Surgical History:   Procedure Laterality Date    BACK SURGERY      2001    BREAST LUMPECTOMY Bilateral     2015    DILATION AND CURETTAGE OF UTERUS N/A 08/30/2018    GASTRIC BYPASS SURGERY      SLEEVE GASTRECTOMY  10/13/2018       Family History   Problem Relation Age of Onset    Diabetes Mother     Stroke Mother     Osteoarthritis Mother     Heart Disease Father     Other Father     High Blood Pressure Father     Osteoarthritis Father     Diabetes Maternal Aunt     Cancer Maternal Aunt     Diabetes Maternal Grandmother     Cancer Paternal Aunt        Social History     Socioeconomic History    Marital status:      Spouse name: Not on file    Number of children: Not on file    Years of education: Not on file    Highest education level: Not on file   Occupational History    Not on file   Social Needs    Financial resource strain: Not on file    Food insecurity     Worry: Not on file     Inability: Not on file    Transportation needs     Medical: Not on file     Non-medical: Not on file   Tobacco Use    Smoking status: Never Smoker    Smokeless tobacco: Never Used   Substance and Sexual Activity    Alcohol use: No    Drug use: No    Sexual activity: Never   Lifestyle    Physical activity     Days per week: Not on file     Minutes per session: Not on file    Stress: Not on file   Relationships    Social connections     Talks on phone: Not on file     Gets together: Not on file     Attends Cheondoism service: Not on file     Active member of club or organization: Not on file     Attends meetings of clubs or organizations: Not on file     Relationship status: Not on file    Intimate partner violence     Fear of current or ex partner: Not on file     Emotionally abused: Not on file     Physically abused: Not on file     Forced sexual activity: Not on file   Other Topics Concern    Not on file   Social History Narrative    ** Merged History Encounter **            Current Outpatient Medications   Medication Sig Dispense Refill    ondansetron (ZOFRAN) 4 MG tablet TAKE 1 TABLET BY MOUTH EVERY 8 HOURS AS NEEDED FOR NAUSEA OR VOMITING 30 tablet 2    docusate sodium (COLACE) 100 MG capsule TAKE 1 CAPSULE BY MOUTH 2 TIMES DAILY 60 capsule 2    [employer] PURELAX 17 GM/SCOOP powder TAKE 17 G BY MOUTH DAILY 510 g 2    [employer] GENTLE LAXATIVE 5 MG EC tablet TAKE 1 TABLET BY MOUTH DAILY AS NEEDED FOR CONSTIPATION 30 tablet 1    meclizine (ANTIVERT) 12.5 MG tablet TAKE 1 TABLET BY MOUTH 3 TIMES DAILY AS NEEDED FOR DIZZINESS OR NAUSEA 60 tablet 1    vitamin D (ERGOCALCIFEROL) 1.25 MG (38922 UT) CAPS capsule Take 1 capsule by mouth once a week 4 capsule 3    oxyCODONE-acetaminophen (PERCOCET) 5-325 MG per tablet Take 1 tablet by mouth every 8 hours as needed for Pain for up to 30 days. Take lowest dose possible to manage pain 90 tablet 0    bisacodyl (DULCOLAX) 5 MG EC tablet Take 1 tablet by mouth daily as needed for Constipation 30 tablet 1    gabapentin (NEURONTIN) 300 MG capsule Take 1 capsule by mouth 3 times daily for 90 days. Intended supply: 90 days 90 capsule 2    mupirocin (BACTROBAN) 2 % ointment APPLY 3 TIMES DAILY. 22 g 1    loratadine-pseudoephedrine (CLARITIN-D 12 HOUR) 5-120 MG per extended release tablet Take 1 tablet by mouth 2 times daily 20 tablet 0    acetaminophen (TYLENOL) 325 MG tablet Take 2 tablets by mouth 3 times daily as needed for Pain 40 tablet 0    diclofenac sodium (VOLTAREN) 1 % GEL Apply 2 g topically 4 times daily 1 Tube 5    diclofenac sodium (VOLTAREN) 1 % GEL Apply 4 g topically 4 times daily 5 Tube 3    hydrocortisone (ANUSOL-HC) 2.5 % rectal cream Place rectally 2 times daily.  30 g 1    CALCIUM 600/VITAMIN D 600-400 MG-UNIT CHEW       Cholecalciferol (VITAMIN D3) 125 MCG (5000 UT) CAPS EVERY DAY      b complex vitamins capsule TAKE 1 CAPSULE BY MOUTH EVERY DAY      Multiple Vitamins-Iron (DAILY CALISTA MULTIVITAMIN/IRON) TABS       hydrocortisone (ANUSOL-HC) 25 MG suppository Place 1 suppository rectally every 12 hours 12 suppository 1    omeprazole (PRILOSEC) 20 MG delayed release capsule Take 1 capsule by mouth daily 30 capsule 3    Cyanocobalamin (VITAMIN B 12) 500 MCG TABS Take 1 tablet by mouth daily 30 tablet 5    bisacodyl (DULCOLAX) 5 MG EC tablet bisacodyl 5 mg tablet,delayed release      misoprostol (CYTOTEC) 200 MCG tablet misoprostol 200 mcg tablet      Calcium Carb-Cholecalciferol 600-800 MG-UNIT CHEW Take 1 tablet by mouth      chlorhexidine (PERIDEX) 0.12 % solution Skin is intact without erythema or ecchymosis. No gross deformity.      Palpation: Moderate crepitus. Medial joint line tenderness present.     Range of Motion: 0 to 110     Strength: Quadriceps atrophy     Effusion: No effusion or swelling present.      Ligamentous stability: No cruciate or collateral ligament instability.      Neurologic and vascular: Skin is warm and well-perfused. Sensation is intact to light-touch. Radiology:     Pertinent imaging reviewed. No new imaging was obtained during today's visit. Assessment :  Bilateral knee osteoarthritis    Impression:  Encounter Diagnoses   Name Primary?  Primary osteoarthritis of left knee Yes    Patellofemoral arthritis of right knee        Office Procedures:  Orders Placed This Encounter   Procedures    CA ARTHROCENTESIS ASPIR&/INJ MAJOR JT/BURSA W/O US    CA ARTHROCENTESIS ASPIR&/INJ MAJOR JT/BURSA W/O US         Plan: Pertinent imaging was reviewed. The etiology, natural history, and treatment options for the disorder were discussed. The roles of activity medication, antiinflammatories, injections, bracing, physical therapy, and surgical interventions were all described to the patient and questions were answered. I believe she is a candidate for repeat intraarticular cortisone injections as they have provided relief of symptoms in the past. She wishes to proceed with this entity. I will see her back if symptoms persist or worsen. Joshua Ramires is in agreement with this plan. All questions were answered to patient's satisfaction and was encouraged to call with any further questions. Injections are indicated because of persistent pain secondary to knee osteoarthritis with associated synovitis and effusions. Under sterile conditions and after informed consent was obtained the patient was given an injection into both the right and left knee.   40 mg of Depo-Medrol and 4 mL of 1% lidocaine were placed into each knee after it was prepped with chlorhexidine and pretreated with ethyl chloride. The injection resulted in good relief of symptoms. There were no complications. The patient was advised to apply ice if there is any discomfort this evening and to avoid vigorous activities for the next 2 days. The patient was advised advised to call us if there was any erythema, enduration, swelling or increasing pain. Angela LEWIS ATC, am scribing for and in the presence of Dr. Greg Knowles. 07/14/20 12:27 PM Angela Collado ATC        I personally reviewed the patient's pain scale, review of systems, family history, social history, past medical history, allergies and medications. Review of systems was collected today, reviewed and is included in the medical record. It is available under the media tab. I personally performed the services described in this documentation and scribed by Angela Collado ATC. Nora Morton MD  Sports Medicine, Arthroscopic Knee and Shoulder Surgery    This dictation was performed with a verbal recognition program [facility]) and it was checked for errors. It is possible that there are still dictated errors within this office note. If so, please bring any errors to my attention for an addendum. All efforts were made to ensure that this office note is accurate.

## 2020-07-15 ENCOUNTER — OFFICE VISIT (OUTPATIENT)
Dept: ORTHOPEDIC SURGERY | Age: 55
End: 2020-07-15
Payer: COMMERCIAL

## 2020-07-15 ENCOUNTER — HOSPITAL ENCOUNTER (OUTPATIENT)
Dept: PHYSICAL THERAPY | Age: 55
Setting detail: THERAPIES SERIES
Discharge: HOME | End: 2020-07-15
Payer: COMMERCIAL

## 2020-07-15 VITALS — TEMPERATURE: 98 F | HEIGHT: 68 IN | WEIGHT: 223.11 LBS | BODY MASS INDEX: 33.81 KG/M2

## 2020-07-15 PROCEDURE — 1036F TOBACCO NON-USER: CPT | Performed by: ORTHOPAEDIC SURGERY

## 2020-07-15 PROCEDURE — 99213 OFFICE O/P EST LOW 20 MIN: CPT | Performed by: ORTHOPAEDIC SURGERY

## 2020-07-15 PROCEDURE — G8417 CALC BMI ABV UP PARAM F/U: HCPCS | Performed by: ORTHOPAEDIC SURGERY

## 2020-07-15 PROCEDURE — 97110 THERAPEUTIC EXERCISES: CPT | Performed by: PHYSICAL THERAPIST

## 2020-07-15 PROCEDURE — 3017F COLORECTAL CA SCREEN DOC REV: CPT | Performed by: ORTHOPAEDIC SURGERY

## 2020-07-15 PROCEDURE — G8427 DOCREV CUR MEDS BY ELIG CLIN: HCPCS | Performed by: ORTHOPAEDIC SURGERY

## 2020-07-15 NOTE — PROGRESS NOTES
Chief Complaint    Follow-up (right shoulder )      History of Present Illness:  Allan Diaz is a pleasant, 53 y.o., female, here today for follow up of her right shoulder. We have been treating her for impingement and rotator cuff tendonitis. We did administer an intra-articular corticosteroid injection on 6/10/20. This provided significant relief from symptoms. She does continue to experience pain with end range movements, but recent therapy has helped to improve her motions. Pain levels have improved at night as well. Of importance, she does have chronic back pain - her spine doctor recomennded surgery, however they needed her to lose weight prior to an operation. She has to lose 50 lbs more to proceed with her back surgery. She reports no new injuries or setbacks. Pain Assessment  Location of Pain: Shoulder  Location Modifiers: Right  Severity of Pain: 5  Quality of Pain: Aching  Duration of Pain: Persistent  Frequency of Pain: Constant  Aggravating Factors: Other (Comment)(movement)  Relieving Factors: Rest  Result of Injury: No  Work-Related Injury: No  Are there other pain locations you wish to document?: No      Medical History:  Patient's medications, allergies, past medical, surgical, social and family histories were reviewed and updated as appropriate. Review of Systems    Done: 4/27/20       Review of Systems  A 14 point review of systems was completed by the patient on 4/27/20 and is available in the media section of the scanned medical record and was reviewed on 7/15/2020. The review is negative with the exception of those things mentioned in the HPI and Past Medical History    Vital Signs:  Vitals:    07/15/20 1032   Temp: 98 °F (36.7 °C)       General/Appearance: Alert and oriented and in no apparent distress. Skin:  There are no skin lesions, cellulitis, or extreme edema. The patient has warm and well-perfused Bilateral upper extremities with brisk capillary refill.       Right treatment options in detail. We do recommend continuation of conservative treatment. . We will have her  continue in physical therapy at the [facility] office working on both shoulders. A new physical therapy letter was documented in EPIC today. We will see Amel back in 6 weeks and/or as needed. All questions were answered to patient's satisfaction and She was encouraged to call with any further questions or concerns. Tamara Magaña is in agreement with this plan. 7/15/2020  10:51 AM      Dulce Thakkar ATC  [facility] [address] [facility]    During this examination, I, Gagan Sweeney, functioned as a scribe for Dr. Milan Chiu. The history taking and physical examination were performed by Dr. Rachel Velazquez. All counseling during the appointment was performed between the patient and Dr. Rachel Velazquez.   ________________  I, Dr. Milan Chiu, personally performed the services described in this documentation as described by Dulce Thakkar ATC in my presence, and it is both accurate and complete. Saad Velazquez MD, PhD  7/15/2020

## 2020-07-15 NOTE — PLAN OF CARE
The [address] [place] [address] and [facility]    Physical Therapy Daily Treatment Note  Date:  7/15/2020    Patient Name:  Amy Vance    :  1965  MRN: [id number]  Restrictions/Precautions:    Medical/Treatment Diagnosis Information:  · Diagnosis: M67.911 (ICD-10-CM) - Tendinopathy of rotator cuff, right  · Treatment Diagnosis: M25.511, R53.1; leading to impaired ADLs and IADLs  Insurance/Certification information:  PT Insurance Information: PT BENEFITS  FACILITY/ [facility]/ %/30 [facility]/ AUTH REQUIRED ONLY AFTER 30TH VISIT / 20 PAG  Physician Information:  Referring Practitioner: Apollo Marcano MD  Has the plan of care been signed (Y/N):        []  Yes  [x]  No     Date of Patient follow up with Physician: 7/15/20      Is this a Progress Report:     [x]  Yes  []  No   Pt has completed 4 weeks of PT. She exhibits reduced pain with MMT, ROM testing and exercise program. Pt reported pain with flexion MMT but pain free with all other movements. Pt continues to exhibited R RC weakness, but her strength is slightly improved since IE as noted by ability to better hold against PT resistance. Pt demonstrates very good improvement in AROM, reported mild pain with flexion and ABD and reports that pain prevents her from going further with ABD. Significant improvement in UEFI score from 26.25% deficit to 11.25% deficit.      If Yes:  Date Range for reporting period:  Beginning 20  Ending 7/15/20    Progress report will be due (10 Rx or 30 days whichever is less):  23      Recertification will be due (POC Duration  / 90 days whichever is less): 8/15/20/20        Visit # Insurance Allowable Auth Required   6  (25 year total) 30 []  Yes [x]  No        Functional Scale:       Date assessed:   UEFI 58/68 (3 questions omitted)=73.5% (26.5% deficit)  20  UEFI 71/80=88.75% (11.75% deficit)      7/15/20    Latex Allergy:  [x]NO      []YES  Preferred Language for Horizontal Abd with ER     Biceps 3x10 2# B    Triceps     Chest press x10 wand         Cable Column/Theraband     External Rotation Limited by p! After 2nd set   Internal Rotation    Shrugs     Lats     Ext     Flex     Scapular Retraction 3x12 blue    BIC  Limited by p!   TRIC 3x10 green R, blue L    PNF     W ER  P! By completion        Dynamic Stability          [place]          Manual interventions                 Plan for next session: progress as tolerated    Therapeutic Exercise and NMR EXR  [x] (67607) Provided verbal/tactile cueing for activities related to strengthening, flexibility, endurance, ROM  for improvements in scapular, scapulothoracic and UE control with self care, reaching, carrying, lifting, house/yardwork, driving/computer work.    [] (11557) Provided verbal/tactile cueing for activities related to improving balance, coordination, kinesthetic sense, posture, motor skill, proprioception  to assist with  scapular, scapulothoracic and UE control with self care, reaching, carrying, lifting, house/yardwork, driving/computer work. Therapeutic Activities:    [] (43555 or 46864) Provided verbal/tactile cueing for activities related to improving balance, coordination, kinesthetic sense, posture, motor skill, proprioception and motor activation to allow for proper function of scapular, scapulothoracic and UE control with self care, carrying, lifting, driving/computer work. Home Exercise Program:  Moncho Dumont access code: [email or web address]   Initiated 6/17/20. Printed hand out given. Pt demonstrated proper form of each exercise and expressed verbal understanding of frequency and duration. Updated 7/2/20. Printed handout provided.   [x] (41421) Reviewed/Progressed HEP activities related to strengthening, flexibility, endurance, ROM of scapular, scapulothoracic and UE control with self care, reaching, carrying, lifting, house/yardwork, driving/computer work  [] (73382) Reviewed/Progressed HEP activities related to improving balance, coordination, kinesthetic sense, posture, motor skill, proprioception of scapular, scapulothoracic and UE control with self care, reaching, carrying, lifting, house/yardwork, driving/computer work      Manual Treatments:    [] (12458) Provided manual therapy to mobilize soft tissue/joints of cervical/CT, scapular GHJ and UE for the purpose of modulating pain, promoting relaxation,  increasing ROM, reducing/eliminating soft tissue swelling/inflammation/restriction, improving soft tissue extensibility and allowing for proper ROM for normal function with self care, reaching, carrying, lifting, house/yardwork, driving/computer work    Modalities:  None    Charges:  Timed Code Treatment Minutes: 53   Total Treatment Minutes: 60   Time in: 9:15  Time out: 10:15    [] EVAL (LOW) 39873 (typically 20 minutes face-to-face)  [] EVAL (MOD) 05614 (typically 30 minutes face-to-face)  [] EVAL (HIGH) 75597 (typically 45 minutes face-to-face)  [] RE-EVAL     [x] AN(98637) x4    [] IONTO  [] NMR (66758) x     [] VASO  [] Manual (96202) x      [] Other:  [] TA x      [] Mech Traction (53331)  [] ES(attended) (42336)      [] ES (un) (51462):     GOALS:  Patient stated goal: be able to sleep on shoulders, be able to lift without pain    []? Progressing: []? Met: []? Not Met: []? Adjusted     Therapist goals for Patient:   Short Term Goals: To be achieved in: 2 weeks 7/1/20  1. Independent in HEP and progression per patient tolerance, in order to prevent re-injury. []? Progressing: [x]? Met: []? Not Met: []? Adjusted   2. Patient will have a decrease in pain to facilitate improvement in movement, function, and ADLs as indicated by Functional Deficits. []? Progressing: [x]? Met: []? Not Met: []? Adjusted      Long Term Goals: To be achieved in: 6 weeks 7/29/20  1. Disability index score of 10% or less for the UEFS to assist with reaching prior level of function. [x]? Progressing: []? Met: []?  Not Met: []? Adjusted  2. Patient will demonstrate increased AROM to [facility] to allow for proper joint functioning as indicated by patients Functional Deficits. [x]? Progressing: []? Met: []? Not Met: []? Adjusted  3. Patient will demonstrate an increase in R RC strength to 4/5 or greater to allow for proper functional mobility as indicated by patients Functional Deficits. [x]? Progressing: []? Met: []? Not Met: []? Adjusted  4. Patient will return to ADLs, IADLs and functional activities without increased symptoms or restriction. [x]? Progressing: []? Met: []? Not Met: []? Adjusted  5. Patient will be able to lift 10lb or greater for improved tolerance to ADLs. [x]? Progressing: []? Met: []? Not Met: []? Adjusted  6. Patient will be able to lay on R side without pain for improved sleeping. [x]? Progressing: []? Met: []? Not Met: []? Adjusted     Overall Progression Towards Functional goals/ Treatment Progress Update:  [x] Patient is progressing as expected towards functional goals listed. [] Progression is slowed due to complexities/Impairments listed. [] Progression has been slowed due to co-morbidities. [] Plan just implemented, too soon to assess goals progression <30days   [] Goals require adjustment due to lack of progress  [] Patient is not progressing as expected and requires additional follow up with physician  [] Other    Prognosis for POC: [x] Good [] Fair  [] Poor      Patient requires continued skilled intervention: [x] Yes  [] No    Treatment/Activity Tolerance:  [x] Patient able to complete treatment  [] Patient limited by fatigue  [x] Patient limited by pain     [] Patient limited by other medical complications  [] Other: Pt has completed 4 weeks of PT. She exhibits reduced pain with MMT, ROM testing and exercise program. Pt reported pain with flexion MMT but pain free with all other movements.  Pt continues to exhibited R RC weakness, but her strength is slightly improved since IE as noted by ability to better hold against PT resistance. Pt demonstrates very good improvement in AROM, reported mild pain with flexion and ABD and reports that pain prevents her from going further with ABD. Significant improvement in UEFI score from 26.25% deficit to 11.25% deficit. Pt reported R bicep pain with increased to 2lbs, when asked to be more specific with symptoms it was determined that she was feeling muscle fatigue rather than pain. Pt able to tolerate increased reps on scap rows and ER/IR isometrics without increase in pain. Pt would benefit from continued PT to address remaining ROM, strength and functional mobility deficits. PLAN: See eval  [x] Continue per plan of care [] Alter current plan (see comments above)  [] Plan of care initiated [] Hold pending MD visit [] Discharge      Electronically signed by:  Benjamin Landeros, PT, DPT, OCS  Physical Therapist  [email or web address]  [email or web address]    Note: If patient does not return for scheduled/ recommended follow up visits, this note will serve as a discharge from care along with most recent update on progress.

## 2020-07-17 ENCOUNTER — HOSPITAL ENCOUNTER (OUTPATIENT)
Dept: PHYSICAL THERAPY | Age: 55
Setting detail: THERAPIES SERIES
Discharge: HOME | End: 2020-07-17
Payer: COMMERCIAL

## 2020-07-17 PROCEDURE — 97110 THERAPEUTIC EXERCISES: CPT | Performed by: PHYSICAL THERAPIST

## 2020-07-17 NOTE — FLOWSHEET NOTE
The [address] [place] [address] and Sports [facility]    Physical Therapy Daily Treatment Note  Date:  2020    Patient Name:  Antoni Brooks    :  1965  MRN: [id number]  Restrictions/Precautions:    Medical/Treatment Diagnosis Information:  · Diagnosis: M67.911 (ICD-10-CM) - Tendinopathy of rotator cuff, right  · Treatment Diagnosis: M25.511, R53.1; leading to impaired ADLs and IADLs  Insurance/Certification information:  PT Insurance Information: PT BENEFITS  [facility]/ AUTH REQUIRED ONLY AFTER 30TH VISIT / 20 PAG  Physician Information:  Referring Practitioner: Rian Fuentes MD  Has the plan of care been signed (Y/N):        []  Yes  [x]  No     Date of Patient follow up with Physician: 7/15/20      Is this a Progress Report:     []  Yes  [x]  No     If Yes:  Date Range for reporting period:  Beginning   Ending     Progress report will be due (10 Rx or 30 days whichever is less):        Recertification will be due (POC Duration  / 90 days whichever is less): 8/15/20/20        Visit # Insurance Allowable Auth Required   7  (26 year total) 30 []  Yes [x]  No        Functional Scale:       Date assessed:   UEFI 58/68 (3 questions omitted)=73.5% (26.5% deficit)  20  UEFI 71/80=88.75% (11.75% deficit)      7/15/20    Latex Allergy:  [x]NO      []YES  Preferred Language for Healthcare:   [x]English       []other:    Pain level:  5/10     SUBJECTIVE:  Pt arrives late.  not present, did not show up. Pt states her shoulder feels good today. MD appt went very well, he wants her to continue with PT.    OBJECTIVE:              ROM PROM AROM  Comment     L R L R     Flexion   155   152 mild p!     Abduction   150   115 p!     ER   95   85     IR   55   55     Superior shoulder pain in all positions     Strength L R Comment   Flexion 4-/5 4-/5 * p!  Superior shoulder   Abduction 4/5 4-/5    ER 4/5 4-/5    IR 4/5 4/5     Biceps 4/5 4/5 Triceps   N/A        Special Tests Results/Comment   Chago Vang Unable to achieve test position d/t ROM deficits   Painful arc Unable to achieve test position d/t ROM deficits   OBriens - by definition of test; pain with thumb up         Reflexes/Sensation:               [x]? Dermatomes/Myotomes intact               []? Reflexes equal and normal bilaterally               []? Other:     Joint mobility:               [x]? Normal               []? Hypo              []? Hyper     Palpation: anterior shoulder, LHBT     Functional Mobility/Transfers: Limited, uses motorized WC     Posture: Rounded shoulders     Gait: uses motorized WC      RESTRICTIONS/PRECAUTIONS:     Exercises/Interventions:     Exercise/Equipment Resistance/Repetitions Other comments   Stretching/PROM     Wand     Table Slides     UE [place]     Pulleys 10x10\" scaption    CBA Attempted-anterior shoulder p! 6/30   BB IR 10x10\"         Isometrics     Retraction          Weight shift     Flexion     Abduction     External Rotation 15x5\"  Ball between pole and arm    Internal Rotation 15x5\" Ball between torso and arm   Biceps     Triceps          PRE's     Flexion     Abduction     External Rotation     Internal Rotation     Shrugs 3x10 2# B    EXT     Reverse Flys     Serratus     Horizontal Abd with ER     Biceps 3x10 2# B    Triceps     Chest press 3x10 wand         Cable Column/Theraband     External Rotation Limited by p! After 2nd set   Internal Rotation    Shrugs     Lats     Ext     Flex     Scapular Retraction 3x12 blue    BIC  Limited by p!   TRIC 3x15 green R, blue L    PNF     W ER  P!  By completion        Dynamic Stability          Plyoback          Manual interventions                 Plan for next session: progress as tolerated    Therapeutic Exercise and NMR EXR  [x] (74529) Provided verbal/tactile cueing for activities related to strengthening, flexibility, endurance, ROM  for improvements in scapular, scapulothoracic and UE control with self care, reaching, carrying, lifting, house/yardwork, driving/computer work.    [] (55039) Provided verbal/tactile cueing for activities related to improving balance, coordination, kinesthetic sense, posture, motor skill, proprioception  to assist with  scapular, scapulothoracic and UE control with self care, reaching, carrying, lifting, house/yardwork, driving/computer work. Therapeutic Activities:    [] (13000 or 44839) Provided verbal/tactile cueing for activities related to improving balance, coordination, kinesthetic sense, posture, motor skill, proprioception and motor activation to allow for proper function of scapular, scapulothoracic and UE control with self care, carrying, lifting, driving/computer work. Home Exercise Program:  [address] [place] [address] access code: [email or web address]   Initiated 6/17/20. Printed hand out given. Pt demonstrated proper form of each exercise and expressed verbal understanding of frequency and duration. Updated 7/2/20. Printed handout provided.   [x] (33498) Reviewed/Progressed HEP activities related to strengthening, flexibility, endurance, ROM of scapular, scapulothoracic and UE control with self care, reaching, carrying, lifting, house/yardwork, driving/computer work  [] (09334) Reviewed/Progressed HEP activities related to improving balance, coordination, kinesthetic sense, posture, motor skill, proprioception of scapular, scapulothoracic and UE control with self care, reaching, carrying, lifting, house/yardwork, driving/computer work      Manual Treatments:    [] (51897) Provided manual therapy to mobilize soft tissue/joints of cervical/CT, scapular GHJ and UE for the purpose of modulating pain, promoting relaxation,  increasing ROM, reducing/eliminating soft tissue swelling/inflammation/restriction, improving soft tissue extensibility and allowing for proper ROM for normal function with self care, reaching, carrying, lifting, house/yardwork, driving/computer work    Modalities:  None    Charges:  Timed Code Treatment Minutes: 35   Total Treatment Minutes: 35   Time in: 9:23  Time out: 9:58    [] EVAL (LOW) 39379 (typically 20 minutes face-to-face)  [] EVAL (MOD) 69575 (typically 30 minutes face-to-face)  [] EVAL (HIGH) 60018 (typically 45 minutes face-to-face)  [] RE-EVAL     [x] MX(55383) x2    [] IONTO  [] NMR (39856) x     [] VASO  [] Manual ([id number]) x      [] Other:  [] TA x      [] Mech Traction (58580)  [] ES(attended) (76934)      [] ES (un) (92670):     GOALS:  Patient stated goal: be able to sleep on shoulders, be able to lift without pain    []? Progressing: []? Met: []? Not Met: []? Adjusted     Therapist goals for Patient:   Short Term Goals: To be achieved in: 2 weeks 7/1/20  1. Independent in HEP and progression per patient tolerance, in order to prevent re-injury. []? Progressing: [x]? Met: []? Not Met: []? Adjusted   2. Patient will have a decrease in pain to facilitate improvement in movement, function, and ADLs as indicated by Functional Deficits. []? Progressing: [x]? Met: []? Not Met: []? Adjusted      Long Term Goals: To be achieved in: 6 weeks 7/29/20  1. Disability index score of 10% or less for the UEFS to assist with reaching prior level of function. [x]? Progressing: []? Met: []? Not Met: []? Adjusted  2. Patient will demonstrate increased AROM to [facility] to allow for proper joint functioning as indicated by patients Functional Deficits. [x]? Progressing: []? Met: []? Not Met: []? Adjusted  3. Patient will demonstrate an increase in R RC strength to 4/5 or greater to allow for proper functional mobility as indicated by patients Functional Deficits. [x]? Progressing: []? Met: []? Not Met: []? Adjusted  4. Patient will return to ADLs, IADLs and functional activities without increased symptoms or restriction. [x]? Progressing: []? Met: []? Not Met: []? Adjusted  5.  Patient will be able to lift 10lb or greater for improved tolerance to ADLs.   [x]? Progressing: []? Met: []? Not Met: []? Adjusted  6. Patient will be able to lay on R side without pain for improved sleeping. [x]? Progressing: []? Met: []? Not Met: []? Adjusted     Overall Progression Towards Functional goals/ Treatment Progress Update:  [x] Patient is progressing as expected towards functional goals listed. [] Progression is slowed due to complexities/Impairments listed. [] Progression has been slowed due to co-morbidities. [] Plan just implemented, too soon to assess goals progression <30days   [] Goals require adjustment due to lack of progress  [] Patient is not progressing as expected and requires additional follow up with physician  [] Other    Prognosis for POC: [x] Good [] Fair  [] Poor      Patient requires continued skilled intervention: [x] Yes  [] No    Treatment/Activity Tolerance:  [x] Patient able to complete treatment  [] Patient limited by fatigue  [x] Patient limited by pain     [] Patient limited by other medical complications  [] Other: Pt tolerated session well and without increase in shoulder pain. She demonstrated good form with exercise program and did not require VCs for form correction. Pt reported feeling good at end of session. Pt would benefit from continued PT to address remaining ROM, strength and functional mobility deficits. PLAN: See eval  [x] Continue per plan of care [] Alter current plan (see comments above)  [] Plan of care initiated [] Hold pending MD visit [] Discharge      Electronically signed by:  Michelle Vu PT, DPT, OCS  Physical Therapist  [id number]  [email or web address]    Note: If patient does not return for scheduled/ recommended follow up visits, this note will serve as a discharge from care along with most recent update on progress.

## 2020-07-21 ENCOUNTER — HOSPITAL ENCOUNTER (OUTPATIENT)
Dept: PHYSICAL THERAPY | Age: 55
Setting detail: THERAPIES SERIES
Discharge: HOME | End: 2020-07-21
Payer: COMMERCIAL

## 2020-07-21 NOTE — FLOWSHEET NOTE
The [facility]    Physical Therapy  Cancellation/No-show Note  Patient Name:  Miguel Bullock  :  1965   Date:  2020  Cancelled visits to date: 2  No-shows to date: 0    For today's appointment patient:  [x]  Cancelled  []  Rescheduled appointment  []  No-show     Reason given by patient:  []  Patient ill  []  Conflicting appointment   [x]  No transportation    []  Conflict with work  []  No reason given  []  Other:     Comments:  Pt arrived at 1:52 for a 1:15 appt. Unable to be seen d/t COVID restrictions and PT's schedule. Electronically signed by:  Bernie Rodriguez, PT, DPT  Physical Therapist  [id number]  [email or web address]

## 2020-07-21 NOTE — TELEPHONE ENCOUNTER
Pharmacy is calling to requesting a refill of Vitamin D 3 5000 units. They received duplicate scripts for Vitamin D2. They need vitamin D 3.     Send to [address] [facility], [facility] [address] [facility] 6/1/20

## 2020-07-21 NOTE — TELEPHONE ENCOUNTER
Call [facility]. Patient is not taking vitamin D3 5000 units once daily and it needs to be discontinued from her pharmacy profile. The patient is taking vitamin D2 50,000 IU once a week  I already sent a prescription for vitamin D2 on 7/9/2020.

## 2020-07-21 NOTE — TELEPHONE ENCOUNTER
Medication:   Requested Prescriptions     Pending Prescriptions Disp Refills    Cholecalciferol (VITAMIN D3) 125 MCG (5000 UT) CAPS 30 capsule 2     Sig: Take 125 capsules by mouth daily        Last Filled:      Patient Phone Number: [phone] (home)     Last appt: 3/25/2020 06-01-20  Next appt: Visit date not found    Last OARRS:   RX Monitoring 7/2/2020   Attestation -   Periodic Controlled Substance Monitoring Possible medication side effects, risk of tolerance/dependence & alternative treatments discussed. Chronic Pain > 50 MEDD Obtained or confirmed \"Consent for Opioid Use\" on file.        Preferred Pharmacy:   [facility]/pharmacy [address] [place] [address], [address] [place] [address] [address]  Phone: [phone] Fax: [phone] - [address] [facility], [facility] [place] Bliss [phone]  [address] [facility] [address]  Phone: [phone] Fax: [phone]    [facility] [address] [facility] - P [phone] - F [phone]  [place] [address]  Phone: [phone] Fax: [phone]

## 2020-07-24 ENCOUNTER — HOSPITAL ENCOUNTER (OUTPATIENT)
Dept: PHYSICAL THERAPY | Age: 55
Setting detail: THERAPIES SERIES
Discharge: HOME | End: 2020-07-24
Payer: COMMERCIAL

## 2020-07-24 PROCEDURE — 97110 THERAPEUTIC EXERCISES: CPT | Performed by: PHYSICAL THERAPIST

## 2020-07-24 NOTE — FLOWSHEET NOTE
The [address] [place] [address] [facility]    Physical Therapy Daily Treatment Note  Date:  2020    Patient Name:  Mason Huggins    :  1965  MRN: [id number]  Restrictions/Precautions:    Medical/Treatment Diagnosis Information:  · Diagnosis: M67.911 (ICD-10-CM) - Tendinopathy of rotator cuff, right  · Treatment Diagnosis: M25.511, R53.1; leading to impaired ADLs and IADLs  Insurance/Certification information:  PT Insurance Information: PT BENEFITS  FACILITY/ [facility]/ %/30 VPCY/ AUTH REQUIRED ONLY AFTER 30TH VISIT / 20 PAG  Physician Information:  Referring Practitioner: Douglas Ribeiro MD  Has the plan of care been signed (Y/N):        []  Yes  [x]  No     Date of Patient follow up with Physician: 7/15/20      Is this a Progress Report:     []  Yes  [x]  No     If Yes:  Date Range for reporting period:  Beginning   Ending     Progress report will be due (10 Rx or 30 days whichever is less):  3/21/85      Recertification will be due (POC Duration  / 90 days whichever is less): 8/15/20/20        Visit # Insurance Allowable Auth Required   8  (27 year total) 30 []  Yes [x]  No        Functional Scale:       Date assessed:   UEFI 58/68 (3 questions omitted)=73.5% (26.5% deficit)  20  UEFI 71/80=88.75% (11.75% deficit)      7/15/20    Latex Allergy:  [x]NO      []YES  Preferred Language for Healthcare:   [x]English       []other:    Pain level:  5/10     SUBJECTIVE:  Pt and  arrive late. Pt states that her R shoulder is feeling better, she does not have as much pain in the morning. She does report that the pain in her L shoulder is getting worse, thinks it is because her injection is wearing off. OBJECTIVE:              ROM PROM AROM  Comment     L R L R     Flexion   155   152 mild p!     Abduction   150   115 p!     ER   95   85     IR   55   55     Superior shoulder pain in all positions     Strength L R Comment   Flexion 4-/5 4-/5 * p! Superior shoulder   Abduction 4/5 4-/5    ER 4/5 4-/5    IR 4/5 4/5     Biceps 4/5 4/5    Triceps   N/A        Special Tests Results/Comment   Chago Vang Unable to achieve test position d/t ROM deficits   Painful arc Unable to achieve test position d/t ROM deficits   OBriens - by definition of test; pain with thumb up         Reflexes/Sensation:               [x]? Dermatomes/Myotomes intact               []? Reflexes equal and normal bilaterally               []? Other:     Joint mobility:               [x]? Normal               []? Hypo              []? Hyper     Palpation: anterior shoulder, LHBT     Functional Mobility/Transfers: Limited, uses motorized WC     Posture: Rounded shoulders     Gait: uses motorized WC      RESTRICTIONS/PRECAUTIONS:     Exercises/Interventions:     Exercise/Equipment Resistance/Repetitions Other comments   Stretching/PROM     Wand     Table Slides     UE [place]     Pulleys 10x10\" scaption    CBA Attempted-anterior shoulder p! 6/30   BB IR 10x10\"         Isometrics     Retraction          Weight shift     Flexion     Abduction     External Rotation 15x5\"  Ball between pole and arm    Internal Rotation 15x5\" Ball between torso and arm   Biceps     Triceps          PRE's     Flexion     Abduction     External Rotation     Internal Rotation     Shrugs 3x10 2# B    EXT     Reverse Flys     Serratus     Horizontal Abd with ER     Biceps 3x10 2# B    Triceps     Chest press 3x10 wand         Cable Column/Theraband     External Rotation Limited by p! After 2nd set   Internal Rotation    Shrugs     Lats     Ext     Flex     Scapular Retraction 3x12 blue    BIC  Limited by p!   TRIC 3x15 green R, blue L    PNF     W ER  P!  By completion        Dynamic Stability          Plyoback          Manual interventions                 Plan for next session: progress as tolerated    Therapeutic Exercise and NMR EXR  [x] (21314) Provided verbal/tactile cueing for activities related to strengthening, flexibility, endurance, ROM  for improvements in scapular, scapulothoracic and UE control with self care, reaching, carrying, lifting, house/yardwork, driving/computer work.    [] (17434) Provided verbal/tactile cueing for activities related to improving balance, coordination, kinesthetic sense, posture, motor skill, proprioception  to assist with  scapular, scapulothoracic and UE control with self care, reaching, carrying, lifting, house/yardwork, driving/computer work. Therapeutic Activities:    [] (30086 or 73426) Provided verbal/tactile cueing for activities related to improving balance, coordination, kinesthetic sense, posture, motor skill, proprioception and motor activation to allow for proper function of scapular, scapulothoracic and UE control with self care, carrying, lifting, driving/computer work. Home Exercise Program:  Hi See access code: [email or web address]   Initiated 6/17/20. Printed hand out given. Pt demonstrated proper form of each exercise and expressed verbal understanding of frequency and duration. Updated 7/2/20. Printed handout provided.   [x] (92253) Reviewed/Progressed HEP activities related to strengthening, flexibility, endurance, ROM of scapular, scapulothoracic and UE control with self care, reaching, carrying, lifting, house/yardwork, driving/computer work  [] (88483) Reviewed/Progressed HEP activities related to improving balance, coordination, kinesthetic sense, posture, motor skill, proprioception of scapular, scapulothoracic and UE control with self care, reaching, carrying, lifting, house/yardwork, driving/computer work      Manual Treatments:    [] (55395) Provided manual therapy to mobilize soft tissue/joints of cervical/CT, scapular GHJ and UE for the purpose of modulating pain, promoting relaxation,  increasing ROM, reducing/eliminating soft tissue swelling/inflammation/restriction, improving soft tissue extensibility and allowing for proper ROM for normal []? Adjusted  5. Patient will be able to lift 10lb or greater for improved tolerance to ADLs. [x]? Progressing: []? Met: []? Not Met: []? Adjusted  6. Patient will be able to lay on R side without pain for improved sleeping. [x]? Progressing: []? Met: []? Not Met: []? Adjusted     Overall Progression Towards Functional goals/ Treatment Progress Update:  [x] Patient is progressing as expected towards functional goals listed. [] Progression is slowed due to complexities/Impairments listed. [] Progression has been slowed due to co-morbidities. [] Plan just implemented, too soon to assess goals progression <30days   [] Goals require adjustment due to lack of progress  [] Patient is not progressing as expected and requires additional follow up with physician  [] Other    Prognosis for POC: [x] Good [] Fair  [] Poor      Patient requires continued skilled intervention: [x] Yes  [] No    Treatment/Activity Tolerance:  [x] Patient able to complete treatment  [] Patient limited by fatigue  [x] Patient limited by pain     [] Patient limited by other medical complications  [] Other: Pt reported pain with isometric ER at 12th rep, pain is at the anterior shoulder. Pt reported L shoulder pain with chest press but her R shoulder felt good. Pt able to complete all other exercises without increase in shoulder pain. Pt would benefit from continued PT to address remaining ROM, strength and functional mobility deficits. PLAN: See eval  [x] Continue per plan of care [] Alter current plan (see comments above)  [] Plan of care initiated [] Hold pending MD visit [] Discharge      Electronically signed by:  Benjamin Landeros, PT, DPT, OCS  Physical Therapist  [id number]  [email or web address]    Note: If patient does not return for scheduled/ recommended follow up visits, this note will serve as a discharge from care along with most recent update on progress.

## 2020-07-28 ENCOUNTER — APPOINTMENT (OUTPATIENT)
Dept: PHYSICAL THERAPY | Age: 55
End: 2020-07-28
Payer: COMMERCIAL

## 2020-08-03 RX ORDER — OMEPRAZOLE 20 MG/1
CAPSULE, DELAYED RELEASE ORAL
Qty: 30 CAPSULE | Refills: 3 | Status: SHIPPED | OUTPATIENT
Start: 2020-08-03 | End: 2021-06-15

## 2020-08-03 NOTE — TELEPHONE ENCOUNTER
Medication:   Requested Prescriptions     Pending Prescriptions Disp Refills    mupirocin (BACTROBAN) 2 % ointment [Pharmacy Med Name: MUPIROCIN 2% OINTMENT] 22 g 1     Sig: APPLY 3 TIMES DAILY.  omeprazole (PRILOSEC) 20 MG delayed release capsule [Pharmacy Med Name: OMEPRAZOLE DR 20 MG CAPSULE] 30 capsule 3     Sig: TAKE 1 CAPSULE BY MOUTH EVERY DAY     Last Filled: 6. 1.20  Last appt: 6.1.20   Next appt: Visit date not found    Last OARRS:   RX Monitoring 7/2/2020   Attestation -   Periodic Controlled Substance Monitoring Possible medication side effects, risk of tolerance/dependence & alternative treatments discussed. Chronic Pain > 50 MEDD Obtained or confirmed \"Consent for Opioid Use\" on file.

## 2020-08-04 ENCOUNTER — HOSPITAL ENCOUNTER (OUTPATIENT)
Dept: PHYSICAL THERAPY | Age: 55
Setting detail: THERAPIES SERIES
Discharge: HOME | End: 2020-08-04
Payer: COMMERCIAL

## 2020-08-04 PROCEDURE — 97110 THERAPEUTIC EXERCISES: CPT | Performed by: PHYSICAL THERAPIST

## 2020-08-04 NOTE — FLOWSHEET NOTE
Flexion   155   152 mild p!     Abduction   150   115 p!     ER   95   85     IR   55   55     Superior shoulder pain in all positions     Strength L R Comment   Flexion 4-/5 4-/5 * p! Superior shoulder   Abduction 4/5 4-/5    ER 4/5 4-/5    IR 4/5 4/5     Biceps 4/5 4/5    Triceps   N/A        Special Tests Results/Comment   JaymieJack -   Neers Unable to achieve test position d/t ROM deficits   Painful arc Unable to achieve test position d/t ROM deficits   OBriens - by definition of test; pain with thumb up         Reflexes/Sensation:               [x]? Dermatomes/Myotomes intact               []? Reflexes equal and normal bilaterally               []? Other:     Joint mobility:               [x]? Normal               []? Hypo              []? Hyper     Palpation: anterior shoulder, LHBT     Functional Mobility/Transfers: Limited, uses motorized WC     Posture: Rounded shoulders     Gait: uses motorized WC      RESTRICTIONS/PRECAUTIONS:     Exercises/Interventions:     Exercise/Equipment Resistance/Repetitions Other comments   Stretching/PROM     Wand     Table Slides     UE [place]     Pulleys 10x10\" scaption    CBA Attempted-anterior shoulder p! 6/30   BB IR 10x10\"         Isometrics     Retraction          Weight shift     Flexion     Abduction     External Rotation 5x5\" L Ball between pole and arm    Internal Rotation 5x5\" L Ball between torso and arm   Biceps     Triceps          PRE's     Flexion     Abduction     External Rotation     Internal Rotation     Shrugs 3x10 2# B    EXT     Reverse Flys     Serratus     Horizontal Abd with ER     Biceps 3x10 2# B    Triceps     Chest press 3x10 wand, 0#         Cable Column/Theraband     External Rotation x10 R only, red    Internal Rotation x10 R only, blue    Shrugs     Lats     Ext     Flex     Scapular Retraction 3x12 black    BIC  Limited by p!   TRIC 3x15 blue R, blue L    PNF     W ER  P!  By completion        Dynamic Stability          [employer] Manual interventions                 Plan for next session: progress as tolerated    Therapeutic Exercise and NMR EXR  [x] (97471) Provided verbal/tactile cueing for activities related to strengthening, flexibility, endurance, ROM  for improvements in scapular, scapulothoracic and UE control with self care, reaching, carrying, lifting, house/yardwork, driving/computer work.    [] (60519) Provided verbal/tactile cueing for activities related to improving balance, coordination, kinesthetic sense, posture, motor skill, proprioception  to assist with  scapular, scapulothoracic and UE control with self care, reaching, carrying, lifting, house/yardwork, driving/computer work. Therapeutic Activities:    [] (11194 or 20526) Provided verbal/tactile cueing for activities related to improving balance, coordination, kinesthetic sense, posture, motor skill, proprioception and motor activation to allow for proper function of scapular, scapulothoracic and UE control with self care, carrying, lifting, driving/computer work. Home Exercise Program:  [employer] access code: [email or web address]   Initiated 6/17/20. Printed hand out given. Pt demonstrated proper form of each exercise and expressed verbal understanding of frequency and duration. Updated 7/2/20. Printed handout provided.   [x] (17278) Reviewed/Progressed HEP activities related to strengthening, flexibility, endurance, ROM of scapular, scapulothoracic and UE control with self care, reaching, carrying, lifting, house/yardwork, driving/computer work  [] (96909) Reviewed/Progressed HEP activities related to improving balance, coordination, kinesthetic sense, posture, motor skill, proprioception of scapular, scapulothoracic and UE control with self care, reaching, carrying, lifting, house/yardwork, driving/computer work      Manual Treatments:    [] (69412) Provided manual therapy to mobilize soft tissue/joints of cervical/CT, scapular GHJ and UE for the purpose of modulating pain, promoting relaxation,  increasing ROM, reducing/eliminating soft tissue swelling/inflammation/restriction, improving soft tissue extensibility and allowing for proper ROM for normal function with self care, reaching, carrying, lifting, house/yardwork, driving/computer work    Modalities:  None    Charges:  Timed Code Treatment Minutes: 48   Total Treatment Minutes: 48   Time in: 11:00  Time out: 11:48    [] EVAL (LOW) 43824 (typically 20 minutes face-to-face)  [] EVAL (MOD) 02997 (typically 30 minutes face-to-face)  [] EVAL (HIGH) 96490 (typically 45 minutes face-to-face)  [] RE-EVAL     [x] KN(48626) x3    [] IONTO  [] NMR (69274) x     [] VASO  [] Manual (29054) x      [] Other:  [] TA x      [] Mech Traction (03568)  [] ES(attended) (05565)      [] ES (un) (85382):     GOALS:  Patient stated goal: be able to sleep on shoulders, be able to lift without pain    []? Progressing: []? Met: []? Not Met: []? Adjusted     Therapist goals for Patient:   Short Term Goals: To be achieved in: 2 weeks 7/1/20  1. Independent in HEP and progression per patient tolerance, in order to prevent re-injury. []? Progressing: [x]? Met: []? Not Met: []? Adjusted   2. Patient will have a decrease in pain to facilitate improvement in movement, function, and ADLs as indicated by Functional Deficits. []? Progressing: [x]? Met: []? Not Met: []? Adjusted      Long Term Goals: To be achieved in: 6 weeks 7/29/20  1. Disability index score of 10% or less for the UEFS to assist with reaching prior level of function. [x]? Progressing: []? Met: []? Not Met: []? Adjusted  2. Patient will demonstrate increased AROM to [facility] to allow for proper joint functioning as indicated by patients Functional Deficits. [x]? Progressing: []? Met: []? Not Met: []? Adjusted  3. Patient will demonstrate an increase in R RC strength to 4/5 or greater to allow for proper functional mobility as indicated by patients Functional Deficits. [x]? Progressing: []? Met: []? Not Met: []? Adjusted  4. Patient will return to ADLs, IADLs and functional activities without increased symptoms or restriction. [x]? Progressing: []? Met: []? Not Met: []? Adjusted  5. Patient will be able to lift 10lb or greater for improved tolerance to ADLs. [x]? Progressing: []? Met: []? Not Met: []? Adjusted  6. Patient will be able to lay on R side without pain for improved sleeping. [x]? Progressing: []? Met: []? Not Met: []? Adjusted     Overall Progression Towards Functional goals/ Treatment Progress Update:  [x] Patient is progressing as expected towards functional goals listed. [] Progression is slowed due to complexities/Impairments listed. [] Progression has been slowed due to co-morbidities. [] Plan just implemented, too soon to assess goals progression <30days   [] Goals require adjustment due to lack of progress  [] Patient is not progressing as expected and requires additional follow up with physician  [] Other    Prognosis for POC: [x] Good [] Fair  [] Poor      Patient requires continued skilled intervention: [x] Yes  [] No    Treatment/Activity Tolerance:  [x] Patient able to complete treatment  [] Patient limited by fatigue  [x] Patient limited by pain     [] Patient limited by other medical complications  [] Other: Pt able to increase tricep resistance on R, but required VCs to keep upper arm still and only move from elbow. Pt tolerated exercises well on R arm. Reported clicking in her L shoulder with with BB IR. Pain L shoulder with chest press and isometric ER/IR. Pt tolerated reintroduction of isotonic ER and IR for R shoulder, only performed 1x10 as exercises have causes pain in the past, will assess pt status at NPV and increse sets accordingly. Pt would benefit from continued PT to address remaining ROM, strength and functional mobility deficits.                    PLAN: See eval  [x] Continue per plan of care [] Alter current plan (see comments

## 2020-08-04 NOTE — TELEPHONE ENCOUNTER
[facility]  Will see in august.

## 2020-08-04 NOTE — TELEPHONE ENCOUNTER
Rx for Omeprazole refilled. Rx for Bactroban ointment denied with message for patient to contact Prescriber. Bactroban ointment is not a long term medication.

## 2020-08-04 NOTE — TELEPHONE ENCOUNTER
Insurance faxed over, that surgery does not require a PA so we can move forward. Information scanned into media.

## 2020-08-06 ENCOUNTER — HOSPITAL ENCOUNTER (OUTPATIENT)
Dept: PHYSICAL THERAPY | Age: 55
Setting detail: THERAPIES SERIES
Discharge: HOME | End: 2020-08-06
Payer: COMMERCIAL

## 2020-08-06 PROCEDURE — 97110 THERAPEUTIC EXERCISES: CPT | Performed by: PHYSICAL THERAPIST

## 2020-08-06 NOTE — FLOWSHEET NOTE
The [address] [place] [address] [facility]    Physical Therapy Daily Treatment Note  Date:  2020    Patient Name:  Kinjal Francis    :  1965  MRN: [id number]  Restrictions/Precautions:    Medical/Treatment Diagnosis Information:  · Diagnosis: M67.911 (ICD-10-CM) - Tendinopathy of rotator cuff, right  · Treatment Diagnosis: M25.511, R53.1; leading to impaired ADLs and IADLs  Insurance/Certification information:  PT Insurance Information: PT BENEFITS  FACILITY/ [facility]/ %/30 VPC[facility]/ AUTH REQUIRED ONLY AFTER 30TH VISIT / 20 PAG  Physician Information:  Referring Practitioner: Joseph Farley MD  Has the plan of care been signed (Y/N):        []  Yes  [x]  No     Date of Patient follow up with Physician: 7/15/20      Is this a Progress Report:     []  Yes  [x]  No     If Yes:  Date Range for reporting period:  Beginning   Ending     Progress report will be due (10 Rx or 30 days whichever is less):        Recertification will be due (POC Duration  / 90 days whichever is less): 8/15/20/20        Visit # Insurance Allowable Auth Required   10  (29 year total) 30 []  Yes [x]  No        Functional Scale:       Date assessed:   UEFI 58/68 (3 questions omitted)=73.5% (26.5% deficit)  20  UEFI 71/80=88.75% (11.75% deficit)      7/15/20    Latex Allergy:  [x]NO      []YES  Preferred Language for Healthcare:   [x]English       []other:    Pain level:  5/10     SUBJECTIVE:  Pt states that both her R and L shoulder are feeling much better today. She did not have any increase in R shoulder pain following LPV with progression of exercise program.    OBJECTIVE:              ROM PROM AROM  Comment     L R L R     Flexion   155   152 mild p!     Abduction   150   115 p!     ER   95   85     IR   55   55     Superior shoulder pain in all positions     Strength L R Comment   Flexion 4-/5 4-/5 * p!  Superior shoulder   Abduction 4/5 4-/5    ER 4/5 4-/5    IR 4/5 4/5   Biceps 4/5 4/5    Triceps   N/A        Special Tests Results/Comment   [place] -   Neers Unable to achieve test position d/t ROM deficits   Painful arc Unable to achieve test position d/t ROM deficits   OBriens - by definition of test; pain with thumb up         Reflexes/Sensation:               [x]? Dermatomes/Myotomes intact               []? Reflexes equal and normal bilaterally               []? Other:     Joint mobility:               [x]? Normal               []? Hypo              []? Hyper     Palpation: anterior shoulder, LHBT     Functional Mobility/Transfers: Limited, uses motorized WC     Posture: Rounded shoulders     Gait: uses motorized WC      RESTRICTIONS/PRECAUTIONS:     Exercises/Interventions:     Exercise/Equipment Resistance/Repetitions Other comments   Stretching/PROM     Wand     Table Slides     UE [place]     Pulleys 10x10\" scaption    CBA Attempted-anterior shoulder p! 6/30   BB IR 10x10\"         Isometrics     Retraction          Weight shift     Flexion     Abduction     External Rotation 10x5\" L Ball between pole and arm    Internal Rotation 10x5\" L Ball between torso and arm   Biceps     Triceps          PRE's     Flexion     Abduction     External Rotation     Internal Rotation     Shrugs 3x10 2# B    EXT     Reverse Flys     Serratus     Horizontal Abd with ER     Biceps 3x10 2# B    Triceps     Chest press 3x10 wand, 1#         Cable Column/Theraband     External Rotation 2x10 R only, red    Internal Rotation 2x10 R only, blue    Shrugs     Lats     Ext     Flex     Scapular Retraction 3x12 black    BIC  Limited by p!   TRIC 3x15 blue R, blue L    PNF     W ER  P!  By completion        Dynamic Stability          [employer]          Manual interventions                 Plan for next session: progress as tolerated    Therapeutic Exercise and NMR EXR  [x] (58118) Provided verbal/tactile cueing for activities related to strengthening, flexibility, endurance, ROM  for improvements in scapular, scapulothoracic and UE control with self care, reaching, carrying, lifting, house/yardwork, driving/computer work.    [] (22716) Provided verbal/tactile cueing for activities related to improving balance, coordination, kinesthetic sense, posture, motor skill, proprioception  to assist with  scapular, scapulothoracic and UE control with self care, reaching, carrying, lifting, house/yardwork, driving/computer work. Therapeutic Activities:    [] (94949 or 24864) Provided verbal/tactile cueing for activities related to improving balance, coordination, kinesthetic sense, posture, motor skill, proprioception and motor activation to allow for proper function of scapular, scapulothoracic and UE control with self care, carrying, lifting, driving/computer work. Home Exercise Program:  [address] [place] [address] access code: [email or web address]   Initiated 6/17/20. Printed hand out given. Pt demonstrated proper form of each exercise and expressed verbal understanding of frequency and duration. Updated 7/2/20. Printed handout provided.   [x] (19027) Reviewed/Progressed HEP activities related to strengthening, flexibility, endurance, ROM of scapular, scapulothoracic and UE control with self care, reaching, carrying, lifting, house/yardwork, driving/computer work  [] (80794) Reviewed/Progressed HEP activities related to improving balance, coordination, kinesthetic sense, posture, motor skill, proprioception of scapular, scapulothoracic and UE control with self care, reaching, carrying, lifting, house/yardwork, driving/computer work      Manual Treatments:    [] (86426) Provided manual therapy to mobilize soft tissue/joints of cervical/CT, scapular GHJ and UE for the purpose of modulating pain, promoting relaxation,  increasing ROM, reducing/eliminating soft tissue swelling/inflammation/restriction, improving soft tissue extensibility and allowing for proper ROM for normal function with self care, reaching, carrying, lifting, house/yardwork, driving/computer work    Modalities:  None    Charges:  Timed Code Treatment Minutes: 44   Total Treatment Minutes: 44   Time in: 11:00  Time out: 11:44    [] EVAL (LOW) 89985 (typically 20 minutes face-to-face)  [] EVAL (MOD) 00247 (typically 30 minutes face-to-face)  [] EVAL (HIGH) 15785 (typically 45 minutes face-to-face)  [] RE-EVAL     [x] IO(51354) x3    [] IONTO  [] NMR (64806) x     [] VASO  [] Manual (19986) x      [] Other:  [] TA x      [] Mech Traction (40777)  [] ES(attended) (41765)      [] ES (un) (41549):     GOALS:  Patient stated goal: be able to sleep on shoulders, be able to lift without pain    []? Progressing: []? Met: []? Not Met: []? Adjusted     Therapist goals for Patient:   Short Term Goals: To be achieved in: 2 weeks 7/1/20  1. Independent in HEP and progression per patient tolerance, in order to prevent re-injury. []? Progressing: [x]? Met: []? Not Met: []? Adjusted   2. Patient will have a decrease in pain to facilitate improvement in movement, function, and ADLs as indicated by Functional Deficits. []? Progressing: [x]? Met: []? Not Met: []? Adjusted      Long Term Goals: To be achieved in: 6 weeks 7/29/20  1. Disability index score of 10% or less for the UEFS to assist with reaching prior level of function. [x]? Progressing: []? Met: []? Not Met: []? Adjusted  2. Patient will demonstrate increased AROM to [facility] to allow for proper joint functioning as indicated by patients Functional Deficits. [x]? Progressing: []? Met: []? Not Met: []? Adjusted  3. Patient will demonstrate an increase in R RC strength to 4/5 or greater to allow for proper functional mobility as indicated by patients Functional Deficits. [x]? Progressing: []? Met: []? Not Met: []? Adjusted  4. Patient will return to ADLs, IADLs and functional activities without increased symptoms or restriction. [x]? Progressing: []? Met: []? Not Met: []? Adjusted  5.  Patient will be able to lift 10lb or greater for improved tolerance to ADLs. [x]? Progressing: []? Met: []? Not Met: []? Adjusted  6. Patient will be able to lay on R side without pain for improved sleeping. [x]? Progressing: []? Met: []? Not Met: []? Adjusted     Overall Progression Towards Functional goals/ Treatment Progress Update:  [x] Patient is progressing as expected towards functional goals listed. [] Progression is slowed due to complexities/Impairments listed. [] Progression has been slowed due to co-morbidities. [] Plan just implemented, too soon to assess goals progression <30days   [] Goals require adjustment due to lack of progress  [] Patient is not progressing as expected and requires additional follow up with physician  [] Other    Prognosis for POC: [x] Good [] Fair  [] Poor      Patient requires continued skilled intervention: [x] Yes  [] No    Treatment/Activity Tolerance:  [x] Patient able to complete treatment  [] Patient limited by fatigue  [x] Patient limited by pain     [] Patient limited by other medical complications  [] Other: Pt tolerated increased reps of ER/IR isometrics for L shoulder and ER/IR isotonics for R shoulder. Able to complete chest press with addition of weight and without pain. Requires occasional VC for ER form to keep arm at side. Pt would benefit from continued PT to address remaining ROM, strength and functional mobility deficits. PLAN: See eval  [x] Continue per plan of care [] Alter current plan (see comments above)  [] Plan of care initiated [] Hold pending MD visit [] Discharge      Electronically signed by:  Vic Salazar PT, DPT, OCS  Physical Therapist  [phone]  [email or web address]    Note: If patient does not return for scheduled/ recommended follow up visits, this note will serve as a discharge from care along with most recent update on progress.

## 2020-08-07 ENCOUNTER — VIRTUAL VISIT (OUTPATIENT)
Dept: PAIN MANAGEMENT | Age: 55
End: 2020-08-07
Payer: COMMERCIAL

## 2020-08-07 PROBLEM — M25.511 CHRONIC PAIN IN RIGHT SHOULDER: Status: ACTIVE | Noted: 2020-08-07

## 2020-08-07 PROBLEM — G89.29 CHRONIC PAIN IN RIGHT SHOULDER: Status: ACTIVE | Noted: 2020-08-07

## 2020-08-07 PROCEDURE — 99442 PR PHYS/QHP TELEPHONE EVALUATION 11-20 MIN: CPT | Performed by: ANESTHESIOLOGY

## 2020-08-07 RX ORDER — OXYCODONE HYDROCHLORIDE AND ACETAMINOPHEN 5; 325 MG/1; MG/1
1 TABLET ORAL EVERY 8 HOURS PRN
Qty: 90 TABLET | Refills: 0 | Status: SHIPPED | OUTPATIENT
Start: 2020-08-07 | End: 2021-06-16

## 2020-08-07 RX ORDER — GABAPENTIN 300 MG/1
300 CAPSULE ORAL 3 TIMES DAILY
Qty: 90 CAPSULE | Refills: 1 | Status: SHIPPED | OUTPATIENT
Start: 2020-08-07 | End: 2022-10-24

## 2020-08-07 NOTE — PROGRESS NOTES
[address] [facility] [address], [place], [address]  [phone] (E)  [phone] (f)        8/7/20      Gilberto Henriquez is a 53 y.o. female evaluated via TELEPHONE on 8/7/2020 using HIPPA compliant software (Epic/Perfect Serve). Patient declined video visit due to comm issues at his/her end. Consent:  She and/or health care decision maker is aware that that she may receive a bill for this telephone service, depending on her insurance coverage, and has provided verbal consent to proceed: Yes  Due to nature of the virtual visit, evaluation of organ systems was limited to presenting complaints. Chief Complaint   Patient presents with    Chronic Pain     virtual visit due to CoVID19 lock down         Reason for call: shoulder pain    Any changes since last visit:   Pain constant in right shoulder and lower back, achy sharp with out radiation or new weakness. Pain worse with daily activities standing walking lifting and helped by pain medications.   Seen ORTHO (Dr Eduardo Denney) for shoulder and currently in PT      Past Medical History:   Diagnosis Date    Arthritis     TAN (dyspnea on exertion)     Gastroesophageal reflux disease 1/28/2020    Hyperlipidemia     Hypertension     Morbid obesity with body mass index (BMI) of 60.0 to 69.9 in adult ([facility] [address] 7/2/2018    Neuropathy     SHYANN (obstructive sleep apnea)     Primary osteoarthritis of right knee 7/2/2018    Type 2 diabetes mellitus without complication ([facility] [address]     controlled with diet    Urinary incontinence        Past Surgical History:   Procedure Laterality Date    BACK SURGERY      2001    BREAST LUMPECTOMY Bilateral     2015    DILATION AND CURETTAGE OF UTERUS N/A 08/30/2018    GASTRIC BYPASS SURGERY      SLEEVE GASTRECTOMY  10/13/2018       No Known Allergies    Current Outpatient Medications   Medication Sig Dispense Refill    gabapentin (NEURONTIN) 300 MG capsule Take 1 capsule by mouth 3 times daily for 60 days. Intended supply: 90 days 90 capsule 1    oxyCODONE-acetaminophen (PERCOCET) 5-325 MG per tablet Take 1 tablet by mouth every 8 hours as needed for Pain for up to 30 days. Take lowest dose possible to manage pain 90 tablet 0    omeprazole (PRILOSEC) 20 MG delayed release capsule TAKE 1 CAPSULE BY MOUTH EVERY DAY 30 capsule 3    ondansetron (ZOFRAN) 4 MG tablet TAKE 1 TABLET BY MOUTH EVERY 8 HOURS AS NEEDED FOR NAUSEA OR VOMITING 30 tablet 2    docusate sodium (COLACE) 100 MG capsule TAKE 1 CAPSULE BY MOUTH 2 TIMES DAILY 60 capsule 2    [employer] PURELAX 17 GM/SCOOP powder TAKE 17 G BY MOUTH DAILY 510 g 2    CVS GENTLE LAXATIVE 5 MG EC tablet TAKE 1 TABLET BY MOUTH DAILY AS NEEDED FOR CONSTIPATION 30 tablet 1    meclizine (ANTIVERT) 12.5 MG tablet TAKE 1 TABLET BY MOUTH 3 TIMES DAILY AS NEEDED FOR DIZZINESS OR NAUSEA 60 tablet 1    vitamin D (ERGOCALCIFEROL) 1.25 MG (90967 UT) CAPS capsule Take 1 capsule by mouth once a week 4 capsule 3    bisacodyl (DULCOLAX) 5 MG EC tablet Take 1 tablet by mouth daily as needed for Constipation 30 tablet 1    mupirocin (BACTROBAN) 2 % ointment APPLY 3 TIMES DAILY. 22 g 1    loratadine-pseudoephedrine (CLARITIN-D 12 HOUR) 5-120 MG per extended release tablet Take 1 tablet by mouth 2 times daily 20 tablet 0    acetaminophen (TYLENOL) 325 MG tablet Take 2 tablets by mouth 3 times daily as needed for Pain 40 tablet 0    diclofenac sodium (VOLTAREN) 1 % GEL Apply 2 g topically 4 times daily 1 Tube 5    diclofenac sodium (VOLTAREN) 1 % GEL Apply 4 g topically 4 times daily 5 Tube 3    hydrocortisone (ANUSOL-HC) 2.5 % rectal cream Place rectally 2 times daily.  30 g 1    CALCIUM 600/VITAMIN D 600-400 MG-UNIT CHEW       Cholecalciferol (VITAMIN D3) 125 MCG (5000 UT) CAPS EVERY DAY      b complex vitamins capsule TAKE 1 CAPSULE BY MOUTH EVERY DAY      Multiple Vitamins-Iron (DAILY CALISTA MULTIVITAMIN/IRON) TABS       hydrocortisone (ANUSOL-HC) 25 MG suppository Place 1 Controlled Substance Monitoring: Possible medication side effects, risk of tolerance/dependence & alternative treatments discussed. , Assessed functional status., Obtaining appropriate analgesic effect of treatment. Sandra Perdue MD)    - Informed patient that opioid pain medications may not be phoned into the pharmacy or refilled without being seen. Assessment:      ICD-10-CM    1. Chronic pain in right shoulder  M25.511 gabapentin (NEURONTIN) 300 MG capsule    G89.29 oxyCODONE-acetaminophen (PERCOCET) 5-325 MG per tablet     External Referral To Pain Izabela Don MD, Pain Management, [place]   2. Postlaminectomy syndrome of lumbar region  M96.1 gabapentin (NEURONTIN) 300 MG capsule     oxyCODONE-acetaminophen (PERCOCET) 5-325 MG per tablet     External Referral To Pain Izabela Don MD, Pain Management, [place]   3. Polyarthropathy, multiple sites  M13.0 gabapentin (NEURONTIN) 300 MG capsule     oxyCODONE-acetaminophen (PERCOCET) 5-325 MG per tablet     External Referral To Pain Izabela Don MD, Pain Management, [place]   4. H/O diabetic neuropathy  Z86.39 gabapentin (NEURONTIN) 300 MG capsule     External Referral To Hallie Don MD, Pain Management, [place]   5. Chronic pain syndrome  G89.4 gabapentin (NEURONTIN) 300 MG capsule     External Referral To Pain Izabela Don MD, Pain Management, [place]   6. Chronic, continuous use of opioids  F11.90    7. Pain medication agreement  Z02.89 oxyCODONE-acetaminophen (PERCOCET) 5-325 MG per tablet       Plan:     1. Chronic low back pain, unimproved by lumbar fusion in 2001 in [place]; no recent changes. 2. Chronic pain in multiple joints alana shoulders R>L, primary pain issue at this time. 3. Seen ORTHO last month and is currently in PT  4.  She has tried PT and declines spinal injections; continues gabapentin with moderate help. 5. Requesting refill on Percocet to help through PT. Counseled on limitation of opioid. 6. Informed her I am unavailable to continue care - referral pain pain provided. 7. Refilled Percocet 5 mg TID PRN; she understands this is the last script from me. Medication sent by e-script due to continued lock down of this office for CoVID19 outbreak. Discussed opioid weaning protocol (reduce by 1 pill/day/week) for exigencies. Informed that I am available for the next 30-days for emergencies only. Otherwise, care returned to PCP - patient expressed understanding the same. Analgesic Plan:   Continue present regimen: Yes   Adjust dose of present analgesic: No   Switch analgesics: No   Add/Adjust concomitant therapy: No    Follow up:    Care returned to PCP     Documentation:    Details of this discussion including any medical advice provided: as above. I affirm this is a Patient Initiated Episode with an Established Patient who has not had a related appointment within my department in the past 7 days or scheduled within the next 24 hours. Total Time: minutes: 11-20 minutes    This visit was completed VIRTUALLY using Telephone encounter as above. Services were provided through a telephone synchronous discussion virtually to substitute for in-person clinic visit. Patient and provider were located at their individual homes. Cindy Mena MD  Board Certified in Anesthesiology and Pain Medicine    Pursuant to the emergency declaration under the [facility] [place] waiver authority and the Coronavirus Preparedness and Response Supplemental Appropriations Act, this Virtual Visit was conducted with patient's (and/or legal guardian's) consent, to reduce the patient's risk of exposure to COVID-19 and provide necessary medical care.   The patient (and/or legal guardian) has also been advised to contact this office for worsening conditions or problems, and seek emergency medical treatment and/or call 911 if deemed necessary.

## 2020-08-11 ENCOUNTER — OFFICE VISIT (OUTPATIENT)
Dept: ORTHOPEDIC SURGERY | Age: 55
End: 2020-08-11
Payer: COMMERCIAL

## 2020-08-11 VITALS — WEIGHT: 223 LBS | BODY MASS INDEX: 33.8 KG/M2 | HEIGHT: 68 IN

## 2020-08-11 PROCEDURE — 3017F COLORECTAL CA SCREEN DOC REV: CPT | Performed by: ORTHOPAEDIC SURGERY

## 2020-08-11 PROCEDURE — 1036F TOBACCO NON-USER: CPT | Performed by: ORTHOPAEDIC SURGERY

## 2020-08-11 PROCEDURE — 99213 OFFICE O/P EST LOW 20 MIN: CPT | Performed by: ORTHOPAEDIC SURGERY

## 2020-08-11 PROCEDURE — G8417 CALC BMI ABV UP PARAM F/U: HCPCS | Performed by: ORTHOPAEDIC SURGERY

## 2020-08-11 PROCEDURE — 20610 DRAIN/INJ JOINT/BURSA W/O US: CPT | Performed by: ORTHOPAEDIC SURGERY

## 2020-08-11 PROCEDURE — G8427 DOCREV CUR MEDS BY ELIG CLIN: HCPCS | Performed by: ORTHOPAEDIC SURGERY

## 2020-08-11 RX ORDER — LIDOCAINE HYDROCHLORIDE 10 MG/ML
1 INJECTION, SOLUTION EPIDURAL; INFILTRATION; INTRACAUDAL; PERINEURAL
Qty: 1 ML | Refills: 4 | Status: SHIPPED | OUTPATIENT
Start: 2020-08-11 | End: 2020-08-12

## 2020-08-11 RX ORDER — NALOXEGOL OXALATE 25 MG/1
TABLET, FILM COATED ORAL
COMMUNITY
Start: 2020-07-15 | End: 2021-06-15

## 2020-08-11 RX ORDER — MELOXICAM 15 MG/1
15 TABLET ORAL
COMMUNITY
Start: 2020-08-07 | End: 2021-06-15

## 2020-08-11 RX ORDER — METHYLPREDNISOLONE ACETATE 40 MG/ML
80 INJECTION, SUSPENSION INTRA-ARTICULAR; INTRALESIONAL; INTRAMUSCULAR; SOFT TISSUE ONCE
Status: COMPLETED | OUTPATIENT
Start: 2020-08-11 | End: 2020-08-11

## 2020-08-11 RX ORDER — LIDOCAINE HYDROCHLORIDE 10 MG/ML
8 INJECTION, SOLUTION INFILTRATION; PERINEURAL ONCE
Status: COMPLETED | OUTPATIENT
Start: 2020-08-11 | End: 2020-08-11

## 2020-08-11 RX ORDER — METHYLPREDNISOLONE ACETATE 40 MG/ML
40 INJECTION, SUSPENSION INTRA-ARTICULAR; INTRALESIONAL; INTRAMUSCULAR; SOFT TISSUE
Qty: 1 ML | Refills: 4 | Status: SHIPPED | OUTPATIENT
Start: 2020-08-11 | End: 2021-06-16

## 2020-08-11 RX ADMIN — METHYLPREDNISOLONE ACETATE 80 MG: 40 INJECTION, SUSPENSION INTRA-ARTICULAR; INTRALESIONAL; INTRAMUSCULAR; SOFT TISSUE at 09:54

## 2020-08-11 RX ADMIN — LIDOCAINE HYDROCHLORIDE 8 ML: 10 INJECTION, SOLUTION INFILTRATION; PERINEURAL at 09:53

## 2020-08-11 NOTE — PROGRESS NOTES
[facility]  Office Visit  Follow up  Date:  2020    Name:  Darylene Flattery  Address:  [address] [address]    :  1965      Age:   53 y.o.    SSN:  [id number]      Medical Record Number:  [id number]    Chief Complaint:    Follow up for bilateral shoulders    HPI:   Darylene Flattery is a 53 y.o. female who is following up for bilateral shoulders. History of bilateral cuff tendinitis. Patient gets subacromial injections which were helpful. The right shoulder was injected 1 month ago. Right shoulder has been better, no pain currently. Left shoulder acting up, last injection 2 months ago. No associated injuries or falls. Pain at night. Pain with overhead activities. Does physical therapy 2 times a week. Vigilant with doing exercises. Patient is limited to wheelchair when going out and ambulating for long periods given history of lower lumbar surgery and chronic back pain. The patient has had bariatric surgery and currently continues to lose weight. Patient states that she plans on going back to [facility] within the next couple months. She denies any new numbness, tingling, fevers, chills, chest pain, shortness of breath, or any other new significant symptoms.     Pain Assessment  Location of Pain: Shoulder  Location Modifiers: Left, Right  Severity of Pain: 8  Quality of Pain: Sharp  Duration of Pain: Persistent  Frequency of Pain: Constant  Aggravating Factors: (general use)  Limiting Behavior: Yes  Relieving Factors: Rest  Result of Injury: No  Work-Related Injury: No  Are there other pain locations you wish to document?: No    Past History:  Past Medical History:   Diagnosis Date    Arthritis     TAN (dyspnea on exertion)     Gastroesophageal reflux disease 2020    Hyperlipidemia     Hypertension     Morbid obesity with body mass index (BMI) of 60.0 to 69.9 in adult ([facility] [address] 2018    Neuropathy     SHYANN (obstructive sleep apnea)     Primary osteoarthritis of right knee 7/2/2018    Type 2 diabetes mellitus without complication ([facility] [address]     controlled with diet    Urinary incontinence        Past Surgical History:   Procedure Laterality Date    BACK SURGERY      2001    BREAST LUMPECTOMY Bilateral     2015    DILATION AND CURETTAGE OF UTERUS N/A 08/30/2018    GASTRIC BYPASS SURGERY      SLEEVE GASTRECTOMY  10/13/2018       Social History     Tobacco Use    Smoking status: Never Smoker    Smokeless tobacco: Never Used   Substance Use Topics    Alcohol use: No    Drug use: No        Family History:  family history includes Cancer in her maternal aunt and paternal aunt; Diabetes in her maternal aunt, maternal grandmother, and mother; Heart Disease in her father; High Blood Pressure in her father; Osteoarthritis in her father and mother; Other in her father; Stroke in her mother. Current Outpatient Medications:     MOVANTIK 25 MG TABS tablet, TAKE 1 TABLET BY MOUTH EVERY DAY, Disp: , Rfl:     meloxicam (MOBIC) 15 MG tablet, Take 15 mg by mouth, Disp: , Rfl:     methylPREDNISolone acetate (DEPO-MEDROL) 40 MG/ML injection, Inject 1 mL into the muscle every 3 months for 1 dose, Disp: 1 mL, Rfl: 4    lidocaine PF 1 % SOLN injection, Inject 1 mL into the skin every 3 months, Disp: 1 mL, Rfl: 4    gabapentin (NEURONTIN) 300 MG capsule, Take 1 capsule by mouth 3 times daily for 60 days. Intended supply: 90 days, Disp: 90 capsule, Rfl: 1    oxyCODONE-acetaminophen (PERCOCET) 5-325 MG per tablet, Take 1 tablet by mouth every 8 hours as needed for Pain for up to 30 days.  Take lowest dose possible to manage pain, Disp: 90 tablet, Rfl: 0    omeprazole (PRILOSEC) 20 MG delayed release capsule, TAKE 1 CAPSULE BY MOUTH EVERY DAY, Disp: 30 capsule, Rfl: 3    ondansetron (ZOFRAN) 4 MG tablet, TAKE 1 TABLET BY MOUTH EVERY 8 HOURS AS NEEDED FOR NAUSEA OR VOMITING, Disp: 30 tablet, Rfl: 2    docusate sodium (COLACE) 100 MG capsule, TAKE 1 CAPSULE BY MOUTH 2 TIMES DAILY, Disp: 60 capsule, Rfl: 2    CVS PURELAX 17 GM/SCOOP powder, TAKE 17 G BY MOUTH DAILY, Disp: 510 g, Rfl: 2    CVS GENTLE LAXATIVE 5 MG EC tablet, TAKE 1 TABLET BY MOUTH DAILY AS NEEDED FOR CONSTIPATION, Disp: 30 tablet, Rfl: 1    meclizine (ANTIVERT) 12.5 MG tablet, TAKE 1 TABLET BY MOUTH 3 TIMES DAILY AS NEEDED FOR DIZZINESS OR NAUSEA, Disp: 60 tablet, Rfl: 1    vitamin D (ERGOCALCIFEROL) 1.25 MG (46005 UT) CAPS capsule, Take 1 capsule by mouth once a week, Disp: 4 capsule, Rfl: 3    mupirocin (BACTROBAN) 2 % ointment, APPLY 3 TIMES DAILY. , Disp: 22 g, Rfl: 1    loratadine-pseudoephedrine (CLARITIN-D 12 HOUR) 5-120 MG per extended release tablet, Take 1 tablet by mouth 2 times daily, Disp: 20 tablet, Rfl: 0    acetaminophen (TYLENOL) 325 MG tablet, Take 2 tablets by mouth 3 times daily as needed for Pain, Disp: 40 tablet, Rfl: 0    diclofenac sodium (VOLTAREN) 1 % GEL, Apply 2 g topically 4 times daily, Disp: 1 Tube, Rfl: 5    hydrocortisone (ANUSOL-HC) 2.5 % rectal cream, Place rectally 2 times daily. , Disp: 30 g, Rfl: 1    CALCIUM 600/VITAMIN D 600-400 MG-UNIT CHEW, , Disp: , Rfl:     Cholecalciferol (VITAMIN D3) 125 MCG (5000 UT) CAPS, EVERY DAY, Disp: , Rfl:     b complex vitamins capsule, TAKE 1 CAPSULE BY MOUTH EVERY DAY, Disp: , Rfl:     Multiple Vitamins-Iron (DAILY CALISTA MULTIVITAMIN/IRON) TABS, , Disp: , Rfl:     hydrocortisone (ANUSOL-HC) 25 MG suppository, Place 1 suppository rectally every 12 hours, Disp: 12 suppository, Rfl: 1    Cyanocobalamin (VITAMIN B 12) 500 MCG TABS, Take 1 tablet by mouth daily, Disp: 30 tablet, Rfl: 5    misoprostol (CYTOTEC) 200 MCG tablet, misoprostol 200 mcg tablet, Disp: , Rfl:     Calcium Carb-Cholecalciferol 600-800 MG-UNIT CHEW, Take 1 tablet by mouth, Disp: , Rfl:     chlorhexidine (PERIDEX) 0.12 % solution, chlorhexidine gluconate 0.12 % mouthwash, Disp: , Rfl:     nystatin (MYCOSTATIN) 781611 UNIT/GM cream, nystatin 100,000 unit/gram topical cream, Disp: , Rfl:     triamcinolone (KENALOG) 0.1 % cream, triamcinolone acetonide 0.1 % topical cream, Disp: , Rfl:     tiZANidine (ZANAFLEX) 2 MG tablet, tizanidine 2 mg tablet, Disp: , Rfl:     selenium sulfide (SELSUN) 2.5 % lotion, selenium sulfide 2.5 % lotion, Disp: , Rfl:     ranitidine (ZANTAC) 150 MG tablet, ranitidine 150 mg tablet, Disp: , Rfl:     ketoconazole (NIZORAL) 2 % shampoo, ketoconazole 2 % shampoo, Disp: , Rfl:     Misc. Devices [facility]) MISC, Dispense bariatric extra wide wheelchair  Height-5 feet 6 & 3/4 inches Weight- 375 lbs BMI-59.3, Disp: 1 each, Rfl: 0    Scooter MISC, by Does not apply route Requires repair of existing scooter, Disp: 1 each, Rfl: 0      No Known Allergies      Review of Systems: A 14 point review of systems was completed by the patient on 4/27/20  and is available in the media section of the scanned medical record and was reviewed today  The review is negative with the exception of those things mentioned in the HPI    Review of Systems    Patient has had no medical changes since last evaluated      Physical Exam:  Ht 5' 7.52\" (1.715 m)   Wt 223 lb (101.2 kg)   BMI 34.39 kg/m²       General: No acute distress, well nourished  CV: No obvious peripheral edema. Normal peripheral pulses  Neuro: Alert & oriented x 3  Psych: Appropriate mood and affect    Right Shoulder Exam:  Inspection: No gross deformities, no signs of infection. Palpation: tender over supraspinatus insertion anteriorly   Active Range of Motion: Forward Elevation 165, Abduction 165, External Rotation 45  Passive Range of Motion: No restrictions  Strength:  External Rotation 4+/5, Internal Rotation 4+/5  Special Tests:  No Ildefonso muscle deformity.  Negative tc  Neurovascular: Sensation to light touch is intact, no motor deficits, palpable radial pulses 2+      Left Shoulder Exam:  Inspection: No gross deformities, no signs of infection. Palpation: No tenderness to palpation  Active Range of Motion: Forward Elevation 165, Abduction 165, External Rotation 45  Passive Range of Motion: No restrictions  Strength:  External Rotation 4+/5, Internal Rotation 4+/5  Special Tests:  No Ildefonso muscle deformity. Negative tc  Neurovascular: Sensation to light touch is intact, no motor deficits, palpable radial pulses 2+    Radiographic:  No new imaging    Assessment:  Jose J Holman is a 53 y.o. female with:  1. Bilateral rotator cuff tendinitis. Right improved from injection 1 month ago. Left side last injected 2 months ago    Impression:  Encounter Diagnosis   Name Primary?  Rotator cuff tendinitis, left Yes       Office Procedures:  Orders Placed This Encounter   Procedures    MN ARTHROCENTESIS ASPIR&/INJ MAJOR JT/BURSA W/O US     80 mg Depomedrol with 8 cc 1% Lidocaine in 10cc syringe with 25G (22G) needle        Plan:   Pertinent imaging was reviewed. The etiology, natural history, and treatment options for the disorder were discussed. The roles of activity modifications, medications, injections, physical therapy, and surgical interventions were all described to the patient and questions were answered. Continue home physical therapy exercises. Subacromial injections as needed. Patient will likely be heading back to [facility] where injections can be continued as needed every 3 months. Prescriptions provided to patient. Patient to follow-up as needed    Procedure: left Subacromial  injection  After informed consent was provided, the skin was cleansed and prepped. Using a 25 gauge needle an injection with 2 mL of 40 mg/ml Depo-Medrol and 8 mL of 1% lidocaine was injected without difficulty into the subacromial space from a posterior approach. The needle was withdrawn and the puncture site sealed with a Band-Aid. The patient tolerated the procedure well.       Yolanda Pack MD  Orthopaedic Fellow  [facility] and

## 2020-08-12 ENCOUNTER — TELEPHONE (OUTPATIENT)
Dept: PRIMARY CARE CLINIC | Age: 55
End: 2020-08-12

## 2020-08-12 ENCOUNTER — APPOINTMENT (OUTPATIENT)
Dept: PHYSICAL THERAPY | Age: 55
End: 2020-08-12
Payer: COMMERCIAL

## 2020-08-12 ENCOUNTER — TELEPHONE (OUTPATIENT)
Dept: SURGERY | Age: 55
End: 2020-08-12

## 2020-08-12 ENCOUNTER — TELEPHONE (OUTPATIENT)
Dept: ORTHOPEDIC SURGERY | Age: 55
End: 2020-08-12

## 2020-08-12 RX ORDER — METHYLPREDNISOLONE ACETATE 40 MG/ML
40 INJECTION, SUSPENSION INTRA-ARTICULAR; INTRALESIONAL; INTRAMUSCULAR; SOFT TISSUE
Qty: 1 ML | Refills: 4 | Status: SHIPPED | OUTPATIENT
Start: 2020-08-12 | End: 2021-06-03 | Stop reason: ALTCHOICE

## 2020-08-12 RX ORDER — LIDOCAINE HYDROCHLORIDE 10 MG/ML
1 INJECTION, SOLUTION EPIDURAL; INFILTRATION; INTRACAUDAL; PERINEURAL
Qty: 1 ML | Refills: 4 | Status: SHIPPED | OUTPATIENT
Start: 2020-08-12 | End: 2021-06-16

## 2020-08-12 NOTE — TELEPHONE ENCOUNTER
PATIENT STATES THE INJECTION PRESCRIPTION CAN'T BE FILLED BY HER LOCAL PHARMACY.  [address] [facility] [phone]

## 2020-08-12 NOTE — TELEPHONE ENCOUNTER
Patient is requesting a return call regarding her surgery. Patient states she is going out town and needs to know the details prior leaving. Patient had an appointment 8/19 that has been canceled and is requesting appointment be rescheduled. Please advise. Thank you!

## 2020-08-12 NOTE — TELEPHONE ENCOUNTER
Pt is calling. She says that she has paperwork that needs to be filled out in order to get a power wheelchair. Dr. Anton Jaeger feels she should have one but he says Dr. Danica Hawkins needs to be the one to fill out the paperwork. She is asking to be worked in for either [employer] or Friday this week for transportation purposes.     LOV 6/1/20

## 2020-08-14 ENCOUNTER — HOSPITAL ENCOUNTER (OUTPATIENT)
Dept: PHYSICAL THERAPY | Age: 55
Setting detail: THERAPIES SERIES
Discharge: HOME | End: 2020-08-14
Payer: COMMERCIAL

## 2020-08-14 ENCOUNTER — TELEPHONE (OUTPATIENT)
Dept: ORTHOPEDIC SURGERY | Age: 55
End: 2020-08-14

## 2020-08-14 PROCEDURE — 97110 THERAPEUTIC EXERCISES: CPT | Performed by: PHYSICAL THERAPIST

## 2020-08-14 NOTE — FLOWSHEET NOTE
[facility]    [facility] Therapy Daily Treatment Note  Date:  2020    Patient Name:  Joshua Ramires    :  1965  MRN: [id number]  Restrictions/Precautions:    Medical/Treatment Diagnosis Information:  · Diagnosis: M67.911 (ICD-10-CM) - Tendinopathy of rotator cuff, right  · Treatment Diagnosis: M25.511, R53.1; leading to impaired ADLs and IADLs  Insurance/Certification information:  PT Insurance Information: PT BENEFITS  FACILITY/ [facility]/ %/30 VPC[facility]/ AUTH REQUIRED ONLY AFTER 30TH VISIT / 20 PAG  Physician Information:  Referring Practitioner: Rohan Ware MD  Has the plan of care been signed (Y/N):        []  Yes  [x]  No     Date of Patient follow up with Physician: 7/15/20      Is this a Progress Report:     []  Yes  [x]  No     If Yes:  Date Range for reporting period:  Beginning   Ending     Progress report will be due (10 Rx or 30 days whichever is less):        Recertification will be due (POC Duration  / 90 days whichever is less): 8/15/20/20        Visit # Insurance Allowable Auth Required   11  (30 year total) 30 []  Yes [x]  No        Functional Scale:       Date assessed:   UEFI 58/68 (3 questions omitted)=73.5% (26.5% deficit)  20  UEFI 71/80=88.75% (11.75% deficit)      7/15/20    Latex Allergy:  [x]NO      []YES  Preferred Language for Healthcare:   [x]English       []other:    Pain level:  5/10     SUBJECTIVE:  Pt states her R shoulder continues to feel better. Pt reports that her L shoulder continues to be painful, recently saw Dr. Ramon Esqueda and had a cortisone injection in the L.    OBJECTIVE:              ROM PROM AROM  Comment     L R L R     Flexion   155   152 mild p!     Abduction   150   115 p!     ER   95   85     IR   55   55     Superior shoulder pain in all positions     Strength L R Comment   Flexion 4-/5 4-/5 * p!  Superior shoulder   Abduction 4/5 4-/5    ER 4/5 4-/5    IR 4/5 4/5   Biceps 4/5 4/5    Triceps   N/A        Special Tests Results/Comment   Chago -   Neers Unable to achieve test position d/t ROM deficits   Painful arc Unable to achieve test position d/t ROM deficits   OBriens - by definition of test; pain with thumb up         Reflexes/Sensation:               [x]? Dermatomes/Myotomes intact               []? Reflexes equal and normal bilaterally               []? Other:     Joint mobility:               [x]? Normal               []? Hypo              []? Hyper     Palpation: anterior shoulder, LHBT     Functional Mobility/Transfers: Limited, uses motorized WC     Posture: Rounded shoulders     Gait: uses motorized WC      RESTRICTIONS/PRECAUTIONS:     Exercises/Interventions:     Exercise/Equipment Resistance/Repetitions Other comments   Stretching/PROM     Wand     Table Slides     UE [place]     Pulleys 10x10\" scaption    CBA Attempted-anterior shoulder p! 6/30   BB IR 10x10\"         Isometrics     Retraction          Weight shift     Flexion     Abduction     External Rotation 10x5\" L Ball between pole and arm    Internal Rotation 10x5\" L Ball between torso and arm   Biceps     Triceps          PRE's     Flexion     Abduction     External Rotation     Internal Rotation     Shrugs 3x10 2# B    EXT     Reverse Flys     Serratus     Horizontal Abd with ER     Biceps 3x10 2# B    Triceps     Chest press 3x10 wand, 1#         Cable Column/Theraband     External Rotation 3x10 R only, red    Internal Rotation 2x10 R only, blue    Shrugs     Lats     Ext     Flex     Scapular Retraction 3x12 black    BIC  Limited by p!   TRIC 3x15 blue R, blue L    PNF     W ER  P!  By completion        Dynamic Stability          Plyoback          Manual interventions                 Plan for next session: progress as tolerated    Therapeutic Exercise and NMR EXR  [x] (98765) Provided verbal/tactile cueing for activities related to strengthening, flexibility, endurance, ROM  for improvements in scapular, scapulothoracic and UE control with self care, reaching, carrying, lifting, house/yardwork, driving/computer work.    [] (66838) Provided verbal/tactile cueing for activities related to improving balance, coordination, kinesthetic sense, posture, motor skill, proprioception  to assist with  scapular, scapulothoracic and UE control with self care, reaching, carrying, lifting, house/yardwork, driving/computer work. Therapeutic Activities:    [] (97992 or 58158) Provided verbal/tactile cueing for activities related to improving balance, coordination, kinesthetic sense, posture, motor skill, proprioception and motor activation to allow for proper function of scapular, scapulothoracic and UE control with self care, carrying, lifting, driving/computer work. Home Exercise Program:  [address] [place] [address] access code: [email or web address]   Initiated 6/17/20. Printed hand out given. Pt demonstrated proper form of each exercise and expressed verbal understanding of frequency and duration. Updated 7/2/20. Printed handout provided.   [x] (35823) Reviewed/Progressed HEP activities related to strengthening, flexibility, endurance, ROM of scapular, scapulothoracic and UE control with self care, reaching, carrying, lifting, house/yardwork, driving/computer work  [] (26957) Reviewed/Progressed HEP activities related to improving balance, coordination, kinesthetic sense, posture, motor skill, proprioception of scapular, scapulothoracic and UE control with self care, reaching, carrying, lifting, house/yardwork, driving/computer work      Manual Treatments:    [] (88548) Provided manual therapy to mobilize soft tissue/joints of cervical/CT, scapular GHJ and UE for the purpose of modulating pain, promoting relaxation,  increasing ROM, reducing/eliminating soft tissue swelling/inflammation/restriction, improving soft tissue extensibility and allowing for proper ROM for normal function with self care, reaching, carrying, lifting, house/yardwork, driving/computer work    Modalities:  None    Charges:  Timed Code Treatment Minutes: 45   Total Treatment Minutes: 45   Time in: 10:45  Time out: 11:38    [] EVAL (LOW) 47747 (typically 20 minutes face-to-face)  [] EVAL (MOD) 26122 (typically 30 minutes face-to-face)  [] EVAL (HIGH) 77428 (typically 45 minutes face-to-face)  [] RE-EVAL     [x] ZW(55361) x3    [] IONTO  [] NMR (45278) x     [] VASO  [] Manual (31331) x      [] Other:  [] TA x      [] Mech Traction (94579)  [] ES(attended) (39639)      [] ES (un) (17613):     GOALS:  Patient stated goal: be able to sleep on shoulders, be able to lift without pain    []? Progressing: []? Met: []? Not Met: []? Adjusted     Therapist goals for Patient:   Short Term Goals: To be achieved in: 2 weeks 7/1/20  1. Independent in HEP and progression per patient tolerance, in order to prevent re-injury. []? Progressing: [x]? Met: []? Not Met: []? Adjusted   2. Patient will have a decrease in pain to facilitate improvement in movement, function, and ADLs as indicated by Functional Deficits. []? Progressing: [x]? Met: []? Not Met: []? Adjusted      Long Term Goals: To be achieved in: 6 weeks 7/29/20  1. Disability index score of 10% or less for the UEFS to assist with reaching prior level of function. [x]? Progressing: []? Met: []? Not Met: []? Adjusted  2. Patient will demonstrate increased AROM to [facility] to allow for proper joint functioning as indicated by patients Functional Deficits. [x]? Progressing: []? Met: []? Not Met: []? Adjusted  3. Patient will demonstrate an increase in R RC strength to 4/5 or greater to allow for proper functional mobility as indicated by patients Functional Deficits. [x]? Progressing: []? Met: []? Not Met: []? Adjusted  4. Patient will return to ADLs, IADLs and functional activities without increased symptoms or restriction. [x]? Progressing: []? Met: []? Not Met: []? Adjusted  5.  Patient will be able to lift 10lb or greater for care initiated [] Hold pending MD visit [] Discharge      Electronically signed by:  Jasmyne Salter PT, DPT, OCS  Physical Therapist  [email or web address]  [email or web address]    Note: If patient does not return for scheduled/ recommended follow up visits, this note will serve as a discharge from care along with most recent update on progress.

## 2020-08-14 NOTE — TELEPHONE ENCOUNTER
PATIENT CAME INTO OFFICE STATING THAT SHE RECEIVED PAPER PRESCRIPTION FROM DR Roberto Woodward. SHE TOOK TO PHARMACY AND PER PHARMACY, PRESCRIPTION NEEDS TO BE MAIL ORDERED . PATIENT WAS TOLD PRESCRIPTIONS HAVE TO BE ORDERED FROM SUPPLIER WHERE DR GARCIA GETS THIS MEDICATION. SHE WANTS THIS ORDERED, BILLED TO HER INSURANCE AND SENT TO HER HOME ADDRESS. THIS IS PRESCRIPTION WRITTEN FOR PATIENT AT VISIT 8/11/20. SHE RECEIVED THIS EVERY 3 MONTHS. PLEASE CALL PATIENT TO VERIFY AND WITH ANY QUESTIONS.

## 2020-08-27 ENCOUNTER — TELEPHONE (OUTPATIENT)
Dept: PAIN MANAGEMENT | Age: 55
End: 2020-08-27

## 2020-09-04 NOTE — TELEPHONE ENCOUNTER
I lmom for patient at the home number listed to discuss rescheduling the appointment mentioned below in addition to insurance authorization and self pay.

## 2020-09-27 NOTE — LETTER
Physical Therapy Rehabilitation Referral    Patient Name:  Lucas Lynn      YOB: 1965    Diagnosis:    1. Chronic left shoulder pain    2. Chronic right shoulder pain    3. Rotator cuff tendinitis, right    4. Rotator cuff tendinitis, left    5. Arthrosis of left acromioclavicular joint        Precautions: None    [x] Evaluate and Treat    Post Op Instructions:  [] Continuous passive motion (CPM) [] Elbow ROM  [x] Exercise in plane of scapula  []  Strengthening     [] Pulley and instruction   [x] Home exercise program (copy to patient)   [] Sling when arm at risk  [] Sling or brace at all times   [] AAROM: Forward elevation to  140            [] AAROM: External rotation  To  40    [] Isometric external rotator strengthening [] AAROM: internal rotation: up the back  [] Isometric abductor strengthening  [] AAROM: Internal abduction   [] Isometric internal rotator strengthening [] AAROM: cross-body adduction             Stretching:     Strengthening:  [x] Four quadrant (FE, ER, IR, CBA)  [x] Rotator cuff (ER, IR, Abd)  [] Forward Elevation    [] External Rotators     [] External Rotation    [] Internal Rotators  [] Internal Rotation: up/back   [] Abductors     [] Internal Rotation: supine in abduction  [] Sleeper Stretch    [] Flexors  [] Cross-body abduction    [] Extensors  [] Pendulum (FE, Abd/Add, cw/ccw)  [x] Scapular Stabilizers   [] Wall-walking (FE, Abd)        [x] Shoulder shrugs     [] Table slides (FE)                [x] Rhomboid pinch  [] Elbow (flex, ext, pron, sup)        [] Lat.  Pull downs     [] Medial epicondylitis program       [] Forward punch   [] Lateral epicondylitis program       [] Internal rotators     [] Progressive resistive exercises  [] Bench Press        [] Bench press plus  Activities:     [] Lateral pull-downs  [] Rowing     [] Progressive two-hand supine press  [] Stepper/Exercise bike   [] Biceps: curls/supination  [] Swimming  [] Water exercises Modalities:     Return to Sport:  [x] Of Choice      [] Plyometrics  [] Ultrasound     [] Rhythmic stabilization  [] Iontophoresis    [] Core strengthening   [] Moist heat     [] Sports specific program:   [] Massage         [x] Cryotherapy      [] Electrical stimulation     [] Paraffin  [] Whirlpool  [] TENS    [x] Home exercise program (copy to patient). Perform exercises for:   15     minutes    3      times/day  [x] Supervised physical therapy  Frequency: []  1x week  [x] 2x week  [] 3x week  [] Other:   Duration: [] 2 weeks   [] 4 weeks  [x] 6 weeks  [] Other:     Additional Instructions: For bilateral cuff and danni-scap conditioning program. Thank you. Vance Cavazos  [facility]   Email: [email or web address]  Cell: [phone] none

## 2020-10-05 NOTE — PATIENT INSTRUCTIONS
LOV 6-8-20 stretching your therapist has prescribed. Stay as active as you can. Try to get some physical activity every day. What other things can help? You can manage chronic pain by using things other than medicines or physical treatments. For example, you can keep track of your pain in a pain diary. It can help you understand how the things you do affect your pain. Reducing stress and tension can reduce pain. And being more aware of your thought patterns can be helpful. In some cases, shifting how you think about pain can affect how you feel. Here are some options to think about:  · Breathing exercises and meditation. These techniques can help you focus your attention, relax, and get rid of tension. · Guided imagery. This is a series of thoughts and images that can focus your attention away from your pain. · Hypnosis. It's a state of focused concentration that makes you less aware of your surroundings. · Cognitive behavioral therapy. This type of counseling helps you change your thought patterns. · Yoga. Stretching and exercises can reduce stress and improve flexibility. If what you're doing to control your pain isn't working, or if you're feeling depressed, talk to your doctor. He or she can help you change your pain management plan and find resources for emotional support. Where can you learn more? Go to https://ePAR.[employer]. org and sign in to your [employer] account. Enter P119 in the [employer] box to learn more about \"Learning About Managing Chronic Pain. \"     If you do not have an account, please click on the \"Sign Up Now\" link. Current as of: December 11, 2017  Content Version: 11.7  © 7149-9045 [employer]. Care instructions adapted under license by [facility] ([facility]).  If you have questions about a medical condition or this instruction, always ask your healthcare professional. [place] [address] any warranty or liability for your use of this

## 2021-02-25 NOTE — PROCEDURES
Test Date:  2020    Patient: Soy Dalton : 1965 Physician: Lawanda Shen DO   Sex: Female ID#:  Ref Phys: Isa Lloyd MD     Patient Complaints:  Patient is a 53year-old female who presents with numbness bilateral feet Onset few years ago. Patient had left foot in boot for severeal months with swelling of left ankle. Patient History / Exam:  PMH:  diabetes in past, patient had sleeve surgery  + lumbar surgery  L45 diskectomy, no foot surgery PE: reflexes absent, normal strength    NCV & EMG Findings:  Evaluation of the right tibial (AHB) motor nerve showed reduced amplitude (4.3 mV). The left medial plantar (mixed) sensory nerve showed prolonged distal peak latency (6.8 ms). The right medial plantar (mixed) sensory and the left sural sensory nerves showed no response. The right sural sensory nerve showed prolonged distal peak latency (4.2 ms) and decreased conduction velocity (33 m/s). All remaining nerves (as indicated in the following tables) were within normal limits. Needle evaluation of the left extensor hallucis longus and the left extensor digitorum brevis (pedis) muscles showed increased motor unit amplitude, diminished recruitment, and decreased interference pattern. All remaining muscles (as indicated in the following table) showed no evidence of electrical instability. Impression:  Study is consistent with an old Left L5 radiculopathy with an underlying peripheral neuropathy mainly sensory in nature. no evidence of an acut radiculopathy or other lower motor neuron dysfunction.  Thank you.       ___________________________  Lawanda Shen DO        Nerve Conduction Studies  Motor Nerve Results      Latency Amplitude F-Lat Segment Distance CV Comment   Site (ms) Norm (mV) Norm (ms)  (cm) (m/s) Norm    Left Fibular (EDB) Motor   Ankle 3.8  < 6.1 5.2  > 2.0         Bel Fib Head 11.7 - 4.3 -  Bel Fib Head-Ankle 35 44  > 38    Right Fibular (EDB) Motor Nml 0 Nml Nml    Left Gastroc MH Tibial S1-S2 Nml Nml Nml Nml Nml 0 Nml Nml    Left Ext Gloria Long Deep Fibular,  Fibula. .. L5-S1 Nml Nml Nml Incr Nml 0 Reduced 50%    Left EDB Deep Fibular,  Fibula. .. L5-S1 Nml Nml Nml Incr Nml 0 Reduced 50%    Left AHB Medial Plantar,  Tibi. ..  S1-S2 Nml Nml Nml Nml Nml 0 Nml Nml    Left Lumbo Paraspinal (Upper) Rami L1-L2 Nml Nml Nml         Left Lumbo Paraspinal (Mid) Rami L3-L4 Nml Nml Nml         Left Lumbo Paraspinal (Lower) Rami L5-S1 Nml Nml Nml               Electronically signed by Lawanda Shen DO on 5/28/2020 at 12:31 PM chest pain

## 2021-05-28 ENCOUNTER — IMMUNIZATION (OUTPATIENT)
Dept: PRIMARY CARE CLINIC | Age: 56
End: 2021-05-28
Payer: COMMERCIAL

## 2021-05-28 PROCEDURE — 0001A COVID-19, PFIZER VACCINE 30MCG/0.3ML DOSE: CPT | Performed by: FAMILY MEDICINE

## 2021-05-28 PROCEDURE — 91300 COVID-19, PFIZER VACCINE 30MCG/0.3ML DOSE: CPT | Performed by: FAMILY MEDICINE

## 2021-06-01 ENCOUNTER — OFFICE VISIT (OUTPATIENT)
Dept: ORTHOPEDIC SURGERY | Age: 56
End: 2021-06-01
Payer: COMMERCIAL

## 2021-06-01 VITALS — WEIGHT: 209 LBS | BODY MASS INDEX: 32.8 KG/M2 | HEIGHT: 67 IN

## 2021-06-01 DIAGNOSIS — M17.12 PRIMARY OSTEOARTHRITIS OF LEFT KNEE: Primary | ICD-10-CM

## 2021-06-01 DIAGNOSIS — M17.11 PRIMARY OSTEOARTHRITIS OF RIGHT KNEE: ICD-10-CM

## 2021-06-01 DIAGNOSIS — M25.562 PAIN IN BOTH KNEES, UNSPECIFIED CHRONICITY: ICD-10-CM

## 2021-06-01 DIAGNOSIS — M25.561 PAIN IN BOTH KNEES, UNSPECIFIED CHRONICITY: ICD-10-CM

## 2021-06-01 PROCEDURE — 1036F TOBACCO NON-USER: CPT | Performed by: ORTHOPAEDIC SURGERY

## 2021-06-01 PROCEDURE — G8417 CALC BMI ABV UP PARAM F/U: HCPCS | Performed by: ORTHOPAEDIC SURGERY

## 2021-06-01 PROCEDURE — 3017F COLORECTAL CA SCREEN DOC REV: CPT | Performed by: ORTHOPAEDIC SURGERY

## 2021-06-01 PROCEDURE — 99214 OFFICE O/P EST MOD 30 MIN: CPT | Performed by: ORTHOPAEDIC SURGERY

## 2021-06-01 PROCEDURE — 20610 DRAIN/INJ JOINT/BURSA W/O US: CPT | Performed by: ORTHOPAEDIC SURGERY

## 2021-06-01 PROCEDURE — G8427 DOCREV CUR MEDS BY ELIG CLIN: HCPCS | Performed by: ORTHOPAEDIC SURGERY

## 2021-06-01 RX ORDER — METHYLPREDNISOLONE ACETATE 40 MG/ML
40 INJECTION, SUSPENSION INTRA-ARTICULAR; INTRALESIONAL; INTRAMUSCULAR; SOFT TISSUE ONCE
Status: COMPLETED | OUTPATIENT
Start: 2021-06-01 | End: 2021-06-01

## 2021-06-01 RX ADMIN — METHYLPREDNISOLONE ACETATE 40 MG: 40 INJECTION, SUSPENSION INTRA-ARTICULAR; INTRALESIONAL; INTRAMUSCULAR; SOFT TISSUE at 14:10

## 2021-06-01 RX ADMIN — METHYLPREDNISOLONE ACETATE 40 MG: 40 INJECTION, SUSPENSION INTRA-ARTICULAR; INTRALESIONAL; INTRAMUSCULAR; SOFT TISSUE at 14:09
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
## 2021-06-01 NOTE — PROGRESS NOTES
6/1/21 1:19 PM     Lidocaine Injection      NDC: 8875-7508-66    Lot Number: [id number]    Body Part: bilateral knees

## 2021-06-02 ENCOUNTER — OFFICE VISIT (OUTPATIENT)
Dept: ORTHOPEDIC SURGERY | Age: 56
End: 2021-06-02
Payer: COMMERCIAL

## 2021-06-02 VITALS — WEIGHT: 209 LBS | BODY MASS INDEX: 32.8 KG/M2 | HEIGHT: 67 IN

## 2021-06-02 DIAGNOSIS — M75.82 ROTATOR CUFF TENDINITIS, LEFT: Primary | ICD-10-CM

## 2021-06-02 DIAGNOSIS — G89.29 CHRONIC PAIN OF BOTH SHOULDERS: ICD-10-CM

## 2021-06-02 DIAGNOSIS — M25.512 CHRONIC PAIN OF BOTH SHOULDERS: ICD-10-CM

## 2021-06-02 DIAGNOSIS — M25.511 CHRONIC PAIN OF BOTH SHOULDERS: ICD-10-CM

## 2021-06-02 DIAGNOSIS — M75.81 TENDINITIS OF RIGHT ROTATOR CUFF: ICD-10-CM

## 2021-06-02 PROCEDURE — 99213 OFFICE O/P EST LOW 20 MIN: CPT | Performed by: ORTHOPAEDIC SURGERY

## 2021-06-02 PROCEDURE — 1036F TOBACCO NON-USER: CPT | Performed by: ORTHOPAEDIC SURGERY

## 2021-06-02 PROCEDURE — G8417 CALC BMI ABV UP PARAM F/U: HCPCS | Performed by: ORTHOPAEDIC SURGERY

## 2021-06-02 PROCEDURE — 20610 DRAIN/INJ JOINT/BURSA W/O US: CPT | Performed by: ORTHOPAEDIC SURGERY

## 2021-06-02 PROCEDURE — 3017F COLORECTAL CA SCREEN DOC REV: CPT | Performed by: ORTHOPAEDIC SURGERY

## 2021-06-02 PROCEDURE — G8427 DOCREV CUR MEDS BY ELIG CLIN: HCPCS | Performed by: ORTHOPAEDIC SURGERY

## 2021-06-02 ASSESSMENT — ENCOUNTER SYMPTOMS
SHORTNESS OF BREATH: 1
BACK PAIN: 1

## 2021-06-02 NOTE — PROGRESS NOTES
Review of Systems   Constitutional: Positive for unexpected weight change. Respiratory: Positive for shortness of breath. Cardiovascular:        Poor circulation   Gastrointestinal:        Gallbladder   Musculoskeletal: Positive for back pain and neck pain. All other systems reviewed and are negative.

## 2021-06-02 NOTE — PROGRESS NOTES
[facility]  History and Physical  Shoulder Pain    Date:  2021    Name:  Mt Stephens  Address:  [address] [address]    :  1965      Age:   63 y.o.    SSN:  [id number]      Medical Record Number:  [id number]    Reason for Visit:    Follow-up (bilateral shoulder, left>right)      HPI:   Mt Stephens is a 63 y.o. female who presents to our office today for follow up of the bilateral shoulder pain. Patient has been treated conservatively for some time now for chronic rotator cuff tendinitis. Today she reports the left side is worse than the right. She has received corticosteroid injections in the past which have proven helpful. Her last injection was on 2020. After that injection she did receive relief for many months. She was able to take a trip to [facility] for nearly 9 months and just returned a week ago. Patient reports it becomes worse with any sort of movement lifting carrying or pulling or pushing. She presents today in a wheelchair. She does have an Upper sorbian  who was present throughout the entire visit. She uses a walker at home but uses a wheelchair when she is goes to doctors offices. Of note the patient had a gastric sleeve procedure done and so far she has lost nearly 110 pounds. Pain Assessment  Location of Pain: Shoulder  Location Modifiers: Left, Right  Severity of Pain: 9  Quality of Pain: Aching  Duration of Pain: Persistent  Frequency of Pain: Constant  Aggravating Factors: Other (Comment) (mvoement)  Relieving Factors: Rest  Result of Injury: No  Work-Related Injury: No  Are there other pain locations you wish to document?: No    Review of Systems   Constitutional: Positive for unexpected weight change. Respiratory: Positive for shortness of breath.     Cardiovascular:        Poor circulation   Gastrointestinal:        Gallbladder   Musculoskeletal: Positive for back pain and neck

## 2021-06-03 ENCOUNTER — OFFICE VISIT (OUTPATIENT)
Dept: ORTHOPEDIC SURGERY | Age: 56
End: 2021-06-03
Payer: COMMERCIAL

## 2021-06-03 ENCOUNTER — TELEPHONE (OUTPATIENT)
Dept: ORTHOPEDIC SURGERY | Age: 56
End: 2021-06-03

## 2021-06-03 ENCOUNTER — TELEPHONE (OUTPATIENT)
Dept: PRIMARY CARE CLINIC | Age: 56
End: 2021-06-03

## 2021-06-03 VITALS — TEMPERATURE: 98.6 F | BODY MASS INDEX: 33.27 KG/M2 | WEIGHT: 212 LBS | HEIGHT: 67 IN

## 2021-06-03 DIAGNOSIS — M70.61 TROCHANTERIC BURSITIS OF BOTH HIPS: Primary | ICD-10-CM

## 2021-06-03 DIAGNOSIS — Z12.31 ENCOUNTER FOR SCREENING MAMMOGRAM FOR BREAST CANCER: Primary | ICD-10-CM

## 2021-06-03 DIAGNOSIS — M70.62 TROCHANTERIC BURSITIS OF BOTH HIPS: Primary | ICD-10-CM

## 2021-06-03 PROCEDURE — G8427 DOCREV CUR MEDS BY ELIG CLIN: HCPCS | Performed by: ORTHOPAEDIC SURGERY

## 2021-06-03 PROCEDURE — 20610 DRAIN/INJ JOINT/BURSA W/O US: CPT | Performed by: ORTHOPAEDIC SURGERY

## 2021-06-03 PROCEDURE — 1036F TOBACCO NON-USER: CPT | Performed by: ORTHOPAEDIC SURGERY

## 2021-06-03 PROCEDURE — 3017F COLORECTAL CA SCREEN DOC REV: CPT | Performed by: ORTHOPAEDIC SURGERY

## 2021-06-03 PROCEDURE — G8417 CALC BMI ABV UP PARAM F/U: HCPCS | Performed by: ORTHOPAEDIC SURGERY

## 2021-06-03 PROCEDURE — 99213 OFFICE O/P EST LOW 20 MIN: CPT | Performed by: ORTHOPAEDIC SURGERY

## 2021-06-03 RX ORDER — METHYLPREDNISOLONE ACETATE 40 MG/ML
40 INJECTION, SUSPENSION INTRA-ARTICULAR; INTRALESIONAL; INTRAMUSCULAR; SOFT TISSUE ONCE
Status: CANCELLED | OUTPATIENT
Start: 2021-06-03 | End: 2021-06-03

## 2021-06-03 RX ORDER — METHYLPREDNISOLONE ACETATE 40 MG/ML
40 INJECTION, SUSPENSION INTRA-ARTICULAR; INTRALESIONAL; INTRAMUSCULAR; SOFT TISSUE ONCE
Status: COMPLETED | OUTPATIENT
Start: 2021-06-03 | End: 2021-06-03

## 2021-06-03 RX ORDER — ROPIVACAINE HYDROCHLORIDE 5 MG/ML
30 INJECTION, SOLUTION EPIDURAL; INFILTRATION; PERINEURAL ONCE
Status: COMPLETED | OUTPATIENT
Start: 2021-06-03 | End: 2021-06-03

## 2021-06-03 RX ADMIN — ROPIVACAINE HYDROCHLORIDE 30 ML: 5 INJECTION, SOLUTION EPIDURAL; INFILTRATION; PERINEURAL at 14:02

## 2021-06-03 RX ADMIN — ROPIVACAINE HYDROCHLORIDE 30 ML: 5 INJECTION, SOLUTION EPIDURAL; INFILTRATION; PERINEURAL at 14:01

## 2021-06-03 RX ADMIN — METHYLPREDNISOLONE ACETATE 40 MG: 40 INJECTION, SUSPENSION INTRA-ARTICULAR; INTRALESIONAL; INTRAMUSCULAR; SOFT TISSUE at 14:01

## 2021-06-03 RX ADMIN — METHYLPREDNISOLONE ACETATE 40 MG: 40 INJECTION, SUSPENSION INTRA-ARTICULAR; INTRALESIONAL; INTRAMUSCULAR; SOFT TISSUE at 14:00

## 2021-06-03 NOTE — PROGRESS NOTES
Relation: Paternal Aunt          Age of Onset: (Not Specified)      Social History    Socioeconomic History      Marital status:       Spouse name: None      Number of children: None      Years of education: None      Highest education level: None    Occupational History      None    Tobacco Use      Smoking status: Never Smoker      Smokeless tobacco: Never Used    Vaping Use      Vaping Use: Never used    Substance and Sexual Activity      Alcohol use: No      Drug use: No      Sexual activity: Never    Other Topics      Concerns:        None    Social History Narrative      ** Merged History Encounter **           Social Determinants of Health  Financial Resource Strain:       Difficulty of Paying Living Expenses:   Food Insecurity:       Worried About [address] in the Last Year:       Ran Out of Food in the Last Year:   Transportation Needs:       Lack of Transportation (Medical):       Lack of Transportation (Non-Medical):   Physical Activity:       Days of Exercise per Week:       Minutes of Exercise per Session:   Stress:       Feeling of Stress :   Social Connections:       Frequency of Communication with Friends and Family:       Frequency of Social Gatherings with Friends and Family:       Attends Sabianism Services:       Active Member of Clubs or Organizations:       Attends Club or Organization Meetings:       Marital Status:   Intimate Partner Violence:       Fear of Current or Ex-Partner:       Emotionally Abused:       Physically Abused:       Sexually Abused:     Current Outpatient Medications:  lidocaine PF 1 % SOLN injection, Inject 1 mL into the skin every 3 months, Disp: 1 mL, Rfl: 4  MOVANTIK 25 MG TABS tablet, TAKE 1 TABLET BY MOUTH EVERY DAY, Disp: , Rfl:   meloxicam (MOBIC) 15 MG tablet, Take 15 mg by mouth, Disp: , Rfl:   omeprazole (PRILOSEC) 20 MG delayed release capsule, TAKE 1 CAPSULE BY MOUTH EVERY DAY, Disp: 30 capsule, Rfl: 3  ondansetron (ZOFRAN) 4 MG tablet, TAKE 1 TABLET BY MOUTH EVERY 8 HOURS AS NEEDED FOR NAUSEA OR VOMITING, Disp: 30 tablet, Rfl: 2  docusate sodium (COLACE) 100 MG capsule, TAKE 1 CAPSULE BY MOUTH 2 TIMES DAILY, Disp: 60 capsule, Rfl: 2  [employer] PURELAX 17 GM/SCOOP powder, TAKE 17 G BY MOUTH DAILY, Disp: 510 g, Rfl: 2  [employer] GENTLE LAXATIVE 5 MG EC tablet, TAKE 1 TABLET BY MOUTH DAILY AS NEEDED FOR CONSTIPATION, Disp: 30 tablet, Rfl: 1  meclizine (ANTIVERT) 12.5 MG tablet, TAKE 1 TABLET BY MOUTH 3 TIMES DAILY AS NEEDED FOR DIZZINESS OR NAUSEA, Disp: 60 tablet, Rfl: 1  vitamin D (ERGOCALCIFEROL) 1.25 MG (25561 UT) CAPS capsule, Take 1 capsule by mouth once a week, Disp: 4 capsule, Rfl: 3  mupirocin (BACTROBAN) 2 % ointment, APPLY 3 TIMES DAILY. , Disp: 22 g, Rfl: 1   loratadine-pseudoephedrine (CLARITIN-D 12 HOUR) 5-120 MG per extended release tablet, Take 1 tablet by mouth 2 times daily, Disp: 20 tablet, Rfl: 0  acetaminophen (TYLENOL) 325 MG tablet, Take 2 tablets by mouth 3 times daily as needed for Pain, Disp: 40 tablet, Rfl: 0  hydrocortisone (ANUSOL-HC) 2.5 % rectal cream, Place rectally 2 times daily. , Disp: 30 g, Rfl: 1  CALCIUM 600/VITAMIN D 600-400 MG-UNIT CHEW, , Disp: , Rfl:   Cholecalciferol (VITAMIN D3) 125 MCG (5000 UT) CAPS, EVERY DAY, Disp: , Rfl:   b complex vitamins capsule, TAKE 1 CAPSULE BY MOUTH EVERY DAY, Disp: , Rfl:    Multiple Vitamins-Iron (DAILY CALISTA MULTIVITAMIN/IRON) TABS, , Disp: , Rfl:   hydrocortisone (ANUSOL-HC) 25 MG suppository, Place 1 suppository rectally every 12 hours, Disp: 12 suppository, Rfl: 1  Cyanocobalamin (VITAMIN B 12) 500 MCG TABS, Take 1 tablet by mouth daily, Disp: 30 tablet, Rfl: 5  misoprostol (CYTOTEC) 200 MCG tablet, misoprostol 200 mcg tablet, Disp: , Rfl:   Calcium Carb-Cholecalciferol 600-800 MG-UNIT CHEW, Take 1 tablet by mouth, Disp: , Rfl:   chlorhexidine (PERIDEX) 0.12 % solution, chlorhexidine gluconate 0.12 % mouthwash, Disp: , Rfl:   nystatin (MYCOSTATIN) 297717 UNIT/GM cream, nystatin 100,000 unit/gram topical cream, Disp: , Rfl:   triamcinolone (KENALOG) 0.1 % cream, triamcinolone acetonide 0.1 % topical cream, Disp: , Rfl:   tiZANidine (ZANAFLEX) 2 MG tablet, tizanidine 2 mg tablet, Disp: , Rfl:   selenium sulfide (SELSUN) 2.5 % lotion, selenium sulfide 2.5 % lotion, Disp: , Rfl:   ranitidine (ZANTAC) 150 MG tablet, ranitidine 150 mg tablet, Disp: , Rfl:   ketoconazole (NIZORAL) 2 % shampoo, ketoconazole 2 % shampoo, Disp: , Rfl:   Misc. Devices [facility]) MISC, Dispense bariatric extra wide wheelchairHeight-5 feet 6 & 3/4 inchesWeight- 375 lbsBMI-59.3, Disp: 1 each, Rfl: 0  Scooter MISC, by Does not apply route Requires repair of existing scooter, Disp: 1 each, Rfl: 0  gabapentin (NEURONTIN) 300 MG capsule, Take 1 capsule by mouth 3 times daily for 60 days. Intended supply: 90 days, Disp: 90 capsule, Rfl: 1    Current Facility-Administered Medications:  ropivacaine (NAROPIN) 0.5% injection 30 mL, 30 mL, Epidural, Once, Melissa Ding MD  ropivacaine (NAROPIN) 0.5% injection 30 mL, 30 mL, Epidural, Once, Melissa Ding MD        No Known Allergies    VITAL SIGNS:  Temp 98.6 °F (37 °C)   Ht 5' 7\" (1.702 m)   Wt 212 lb (96.2 kg)   BMI 33.20 kg/m²   On examination today she has quite good hip range of motion. On the left she flexes to 100 degrees has about 30 degrees of internal rotation and 60 degrees of external rotation with no groin pain. She is quite tender over the greater trochanter and this area she points to is being area of pain. She has no sciatic notch tenderness. She has negative straight leg raise. Examination of the right hip shows a symmetrical range of motion. Again she has no groin pain with this she has trochanteric tenderness no sciatic notch tenderness negative straight leg raise. A single AP of the pelvis was obtained today. This shows small marginal osteophytes superior lateral acetabulum both sides but not much joint space narrowing.   She has

## 2021-06-03 NOTE — PROGRESS NOTES
Administrations This Visit     methylPREDNISolone acetate (DEPO-MEDROL) injection 40 mg     Admin Date  06/03/2021  14:00 Action  Given Dose  40 mg Route  Intramuscular Site  Other Administered By  Gianni Rust    Ordering Provider: Rosita Regan MD    NDC: 9337-4742-51    Lot#: [id number]    : Lars Gates. Patient Supplied?: No    Comments: Lt GTB           Admin Date  06/03/2021  14:01 Action  Given Dose  40 mg Route  Intramuscular Site  Other Administered By  [facility]    Ordering Provider: Rosita Regan MD    NDC: 7996-5558-42    Lot#: [id number]    : Lars Yajaira.     Patient Supplied?: No    Comments: Rt GTB          ropivacaine (NAROPIN) 0.5% injection 30 mL     Admin Date  06/03/2021  14:01 Action  Given Dose  30 mL Route  Epidural Site  Hip Left Administered By  Gianni Rust    Ordering Provider: Rosita Regan MD    [address]: 28258-323-42    Lot#: [id number]    : [facility]    Patient Supplied?: No           Admin Date  06/03/2021  14:02 Action  Given Dose  30 mL Route  Epidural Site  Hip Right Administered By  Gianni Rust    Ordering Provider: Rosita Regan MD    NDC: 54376-209-59    Lot#: [id number]    : [facility]    Patient Supplied?: No

## 2021-06-03 NOTE — TELEPHONE ENCOUNTER
Helen with [employer] was calling on behalf of patient, she was needing follow up mammogram scheduled but it has been past the 6 months for the follow up. Was unsure if she needed a new order?

## 2021-06-03 NOTE — TELEPHONE ENCOUNTER
Other Patient went to the pharmacy to  prescription and pharmacy called and has not received any medication requests.   [phone]

## 2021-06-08 NOTE — TELEPHONE ENCOUNTER
Call [address] at [facility] central scheduling and call patient. Patient is due for a screening mammogram around 7/8/2021 based on the results of her last mammogram done on 7/8/20. I put an order for a screening annual mammogram in the computer.

## 2021-06-14 LAB — DIABETIC RETINOPATHY: NEGATIVE

## 2021-06-15 ENCOUNTER — OFFICE VISIT (OUTPATIENT)
Dept: PRIMARY CARE CLINIC | Age: 56
End: 2021-06-15
Payer: COMMERCIAL

## 2021-06-15 VITALS
HEART RATE: 68 BPM | BODY MASS INDEX: 33.12 KG/M2 | DIASTOLIC BLOOD PRESSURE: 70 MMHG | WEIGHT: 211 LBS | RESPIRATION RATE: 16 BRPM | SYSTOLIC BLOOD PRESSURE: 118 MMHG | TEMPERATURE: 96.9 F | HEIGHT: 67 IN | OXYGEN SATURATION: 94 %

## 2021-06-15 DIAGNOSIS — N89.8 VAGINAL ITCHING: ICD-10-CM

## 2021-06-15 DIAGNOSIS — N89.8 VAGINAL DISCHARGE: ICD-10-CM

## 2021-06-15 DIAGNOSIS — Z01.419 WOMEN'S ANNUAL ROUTINE GYNECOLOGICAL EXAMINATION: ICD-10-CM

## 2021-06-15 DIAGNOSIS — R82.998 LEUKOCYTES IN URINE: ICD-10-CM

## 2021-06-15 DIAGNOSIS — K21.9 GASTROESOPHAGEAL REFLUX DISEASE, UNSPECIFIED WHETHER ESOPHAGITIS PRESENT: ICD-10-CM

## 2021-06-15 DIAGNOSIS — M96.1 POSTLAMINECTOMY SYNDROME OF LUMBAR REGION: ICD-10-CM

## 2021-06-15 DIAGNOSIS — E11.42 TYPE 2 DIABETES MELLITUS WITH DIABETIC POLYNEUROPATHY, WITHOUT LONG-TERM CURRENT USE OF INSULIN (HCC): Primary | ICD-10-CM

## 2021-06-15 DIAGNOSIS — E11.42 TYPE 2 DIABETES MELLITUS WITH DIABETIC POLYNEUROPATHY, WITHOUT LONG-TERM CURRENT USE OF INSULIN (HCC): ICD-10-CM

## 2021-06-15 DIAGNOSIS — R10.13 EPIGASTRIC ABDOMINAL PAIN: ICD-10-CM

## 2021-06-15 LAB
BILIRUBIN, POC: ABNORMAL
BLOOD URINE, POC: ABNORMAL
CHOLESTEROL, TOTAL: 177 MG/DL (ref 0–199)
CLARITY, POC: CLEAR
COLOR, POC: YELLOW
CREATININE URINE: 57.9 MG/DL (ref 28–259)
GLUCOSE URINE, POC: ABNORMAL
HDLC SERPL-MCNC: 81 MG/DL (ref 40–60)
KETONES, POC: ABNORMAL
LDL CHOLESTEROL CALCULATED: 79 MG/DL
LEUKOCYTE EST, POC: ABNORMAL
MICROALBUMIN UR-MCNC: <1.2 MG/DL
MICROALBUMIN/CREAT UR-RTO: NORMAL MG/G (ref 0–30)
NITRITE, POC: ABNORMAL
PH, POC: 7
PROTEIN, POC: ABNORMAL
SPECIFIC GRAVITY, POC: 1.03
TRIGL SERPL-MCNC: 87 MG/DL (ref 0–150)
UROBILINOGEN, POC: 0.2
VLDLC SERPL CALC-MCNC: 17 MG/DL

## 2021-06-15 PROCEDURE — 99214 OFFICE O/P EST MOD 30 MIN: CPT | Performed by: INTERNAL MEDICINE

## 2021-06-15 PROCEDURE — G8427 DOCREV CUR MEDS BY ELIG CLIN: HCPCS | Performed by: INTERNAL MEDICINE

## 2021-06-15 PROCEDURE — 3044F HG A1C LEVEL LT 7.0%: CPT | Performed by: INTERNAL MEDICINE

## 2021-06-15 PROCEDURE — 3017F COLORECTAL CA SCREEN DOC REV: CPT | Performed by: INTERNAL MEDICINE

## 2021-06-15 PROCEDURE — 81002 URINALYSIS NONAUTO W/O SCOPE: CPT | Performed by: INTERNAL MEDICINE

## 2021-06-15 PROCEDURE — 2022F DILAT RTA XM EVC RTNOPTHY: CPT | Performed by: INTERNAL MEDICINE

## 2021-06-15 PROCEDURE — G8417 CALC BMI ABV UP PARAM F/U: HCPCS | Performed by: INTERNAL MEDICINE

## 2021-06-15 PROCEDURE — 1036F TOBACCO NON-USER: CPT | Performed by: INTERNAL MEDICINE

## 2021-06-15 RX ORDER — OMEPRAZOLE 20 MG/1
20 CAPSULE, DELAYED RELEASE ORAL
Qty: 30 CAPSULE | Refills: 2 | Status: SHIPPED | OUTPATIENT
Start: 2021-06-15 | End: 2021-09-07

## 2021-06-15 SDOH — ECONOMIC STABILITY: FOOD INSECURITY: WITHIN THE PAST 12 MONTHS, YOU WORRIED THAT YOUR FOOD WOULD RUN OUT BEFORE YOU GOT MONEY TO BUY MORE.: NEVER TRUE

## 2021-06-15 SDOH — ECONOMIC STABILITY: FOOD INSECURITY: WITHIN THE PAST 12 MONTHS, THE FOOD YOU BOUGHT JUST DIDN'T LAST AND YOU DIDN'T HAVE MONEY TO GET MORE.: NEVER TRUE

## 2021-06-15 ASSESSMENT — PATIENT HEALTH QUESTIONNAIRE - PHQ9
SUM OF ALL RESPONSES TO PHQ QUESTIONS 1-9: 0
SUM OF ALL RESPONSES TO PHQ QUESTIONS 1-9: 0
1. LITTLE INTEREST OR PLEASURE IN DOING THINGS: 0
2. FEELING DOWN, DEPRESSED OR HOPELESS: 0
SUM OF ALL RESPONSES TO PHQ9 QUESTIONS 1 & 2: 0
SUM OF ALL RESPONSES TO PHQ QUESTIONS 1-9: 0

## 2021-06-15 ASSESSMENT — SOCIAL DETERMINANTS OF HEALTH (SDOH): HOW HARD IS IT FOR YOU TO PAY FOR THE VERY BASICS LIKE FOOD, HOUSING, MEDICAL CARE, AND HEATING?: NOT HARD AT ALL

## 2021-06-15 NOTE — PROGRESS NOTES
Farrah Rivers   Date ofBirth:  1965    Date of Visit:  6/15/2021    Chief Complaint   Patient presents with    Diabetes    Gastroesophageal Reflux    Chronic Pain     states list of meds in computer is what she is taking     Vaginal Itching       HPI   Vika present and Amanda Ferrell present for female genitourinary exam.     Patient has Type 2 Diabetes. Patient is diet controlled. Patient has history of gastric surgery. Patient has neuropathy. Patient has GERD. Patient complains of heartburn and acid reflux for 2 months. Patient states she feels pain in her upper stomach after eating. Patient is not taking any medications. Patient doesn't eat spicy food or tomato based food. Patient doesn't drink caffeine. Patient is not taking any medications. Patient states she was scheduled for an EGD last year and it was cancelled due to Covid-19. Patient complains of vaginal itching x 2 weeks. Patient has vaginal discharge. LMP 2014. Patient has chronic low back pain. Patient needs referral to Pain Management. Patient states she hasn't seen a Doctor in a long time and hasn't been taking any medication. Review of Systems   Constitutional: Negative for appetite change, chills, fatigue, fever and unexpected weight change. HENT: Negative for congestion, postnasal drip, rhinorrhea, sinus pressure, sore throat and trouble swallowing. Eyes: Negative for visual disturbance. Respiratory: Negative for cough, chest tightness, shortness of breath and wheezing. Cardiovascular: Negative for chest pain, palpitations and leg swelling. Gastrointestinal: Positive for abdominal pain. Negative for blood in stool, constipation, diarrhea, nausea and vomiting. Genitourinary: Positive for vaginal discharge. Negative for dysuria, frequency, hematuria and vaginal pain. Musculoskeletal: Positive for back pain and gait problem. Negative for arthralgias and myalgias.    Skin: Negative for rash and wound.   Neurological: Negative for dizziness, tremors, syncope, weakness, light-headedness, numbness and headaches. Psychiatric/Behavioral: Negative for decreased concentration, dysphoric mood and sleep disturbance. The patient is not nervous/anxious. No Known Allergies  No outpatient medications have been marked as taking for the 6/15/21 encounter (Office Visit) with Lucy Longoria MD.         Vitals:    06/15/21 1024   BP: 118/70   Pulse: 68   Resp: 16   Temp: 96.9 °F (36.1 °C)   TempSrc: Temporal   SpO2: 94%   Weight: 211 lb (95.7 kg)   Height: 5' 7\" (1.702 m)     Body mass index is 33.05 kg/m². Physical Exam  Nursing note reviewed. Constitutional:       General: She is not in acute distress. Appearance: Normal appearance. She is well-developed. HENT:      Mouth/Throat:      Pharynx: Oropharynx is clear. Eyes:      General: Lids are normal.      Extraocular Movements: Extraocular movements intact. Conjunctiva/sclera: Conjunctivae normal.      Pupils: Pupils are equal, round, and reactive to light. Neck:      Thyroid: No thyromegaly. Vascular: No carotid bruit. Cardiovascular:      Rate and Rhythm: Normal rate and regular rhythm. Heart sounds: Normal heart sounds, S1 normal and S2 normal. No murmur heard. No friction rub. No gallop. Pulmonary:      Effort: Pulmonary effort is normal. No respiratory distress. Breath sounds: Normal breath sounds. No wheezing, rhonchi or rales. Abdominal:      General: Bowel sounds are normal. There is no distension. Palpations: Abdomen is soft. Tenderness: There is abdominal tenderness in the epigastric area. There is no rebound. Genitourinary:     Comments: Chaperone for Intimate Exam  Chaperone was offered and accepted as part of the rooming process. Chaperone: [employer],  Musculoskeletal:      Cervical back: Neck supple. Right lower leg: No edema. Left lower leg: No edema. Lymphadenopathy:      Head:      Right side of head: No submandibular adenopathy. Left side of head: No submandibular adenopathy. Skin:     Comments: No foot ulcers   Neurological:      Mental Status: She is alert and oriented to person, place, and time. Comments: Bilateral foot monofilament exam normal   Psychiatric:         Mood and Affect: Mood normal.           Results for POC orders placed in visit on 06/15/21   POCT Urinalysis no Micro   Result Value Ref Range    Color, UA yellow     Clarity, UA clear     Glucose, UA POC neg     Bilirubin, UA beg     Ketones, UA neg     Spec Grav, UA 1.030     Blood, UA POC trace     pH, UA 7.0     Protein, UA POC neg     Urobilinogen, UA 0.2     Leukocytes, UA trace     Nitrite, UA small      Lab Review   No visits with results within 6 Month(s) from this visit.    Latest known visit with results is:   Orders Only on 03/26/2020   Component Date Value    WBC 03/26/2020 5.6     RBC 03/26/2020 3.92*    Hemoglobin 03/26/2020 11.4*    Hematocrit 03/26/2020 34.5*    MCV 03/26/2020 88.1     MCH 03/26/2020 29.1     MCHC 03/26/2020 33.1     RDW 03/26/2020 15.7*    Platelets 07/27/2561 246     MPV 03/26/2020 8.3     Neutrophils % 03/26/2020 56.4     Lymphocytes % 03/26/2020 34.0     Monocytes % 03/26/2020 7.1     Eosinophils % 03/26/2020 2.0     Basophils % 03/26/2020 0.5     Neutrophils Absolute 03/26/2020 3.1     Lymphocytes Absolute 03/26/2020 1.9     Monocytes Absolute 03/26/2020 0.4     Eosinophils Absolute 03/26/2020 0.1     Basophils Absolute 03/26/2020 0.0     Sodium 03/26/2020 144     Potassium 03/26/2020 5.0     Chloride 03/26/2020 108     CO2 03/26/2020 26     Anion Gap 03/26/2020 10     Glucose 03/26/2020 94     BUN 03/26/2020 14     CREATININE 03/26/2020 <0.5*    GFR Non- 03/26/2020 >60     GFR  03/26/2020 >60     Calcium 03/26/2020 9.0     Total Protein 03/26/2020 5.9*    Albumin 03/26/2020 3.7  Albumin/Globulin Ratio 03/26/2020 1.7     Total Bilirubin 03/26/2020 0.3     Alkaline Phosphatase 03/26/2020 58     ALT 03/26/2020 15     AST 03/26/2020 15     Globulin 03/26/2020 2.2     Cholesterol, Total 03/26/2020 178     Triglycerides 03/26/2020 67     HDL 03/26/2020 68*    LDL Calculated 03/26/2020 97     VLDL Cholesterol Calcula* 03/26/2020 13          Assessment/Plan     1. Type 2 diabetes mellitus with diabetic polyneuropathy, without long-term current use of insulin ([facility])  -Continue same medications  -Limit carbohydrates to 45 grams with meals and 15 grams with snacks  -monitor blood sugars  -goal for blood sugar fasting or pre-meal  is   -goal for blood sugar 2 hours after a meal is less than 180  -goal for blood sugar at bedtime is less than 150  -Regular aerobic exercise  -  DIABETES FOOT EXAM  - Microalbumin / Creatinine Urine Ratio  - Lipid Panel; Future  - Hemoglobin A1C; Future    2. Epigastric abdominal pain  - Start omeprazole (PRILOSEC) 20 MG delayed release capsule; Take 1 capsule by mouth every morning (before breakfast)  Dispense: 30 capsule; Refill: 2  - US ABDOMEN COMPLETE; Future  - Referral to Desean Webster MD, Gastroenterology, [facility]    3. Gastroesophageal reflux disease, unspecified whether esophagitis present  - flared up  - start omeprazole (PRILOSEC) 20 MG delayed release capsule; Take 1 capsule by mouth every morning (before breakfast)  Dispense: 30 capsule; Refill: 2  -Decrease caffeine, avoid spicy foods, avoid tomato based foods  -Eat small meals instead of large meals  -Wait 2-3 hours after eating before lying down   -Referral to Desean Webster MD, Gastroenterology, [facility]    4. Vaginal discharge  - POCT Urinalysis no Micro  - VAGINAL PATHOGENS PROBE *A    5. Vaginal itching  - POCT Urinalysis no Micro  - VAGINAL PATHOGENS PROBE *A    6.  Postlaminectomy syndrome of lumbar region  -chronic back pain  -Referral to [address] [facility] [address], MD, Pain Management, [place]    7. Leukocytes in urine  - Culture, Urine    8. Women's annual routine gynecological examination  -Referral to Joni Quick MD, Gynecology, [facility]      Discussed medications with patient, who voiced understanding of their use and indications. All questions answered. Return in about 2 weeks (around 6/29/2021) for abdominal pain and GERD.

## 2021-06-15 NOTE — PATIENT INSTRUCTIONS
Diagnosis Orders   1. Type 2 diabetes mellitus with diabetic polyneuropathy, without long-term current use of insulin (HCC)  HM DIABETES FOOT EXAM    Microalbumin / Creatinine Urine Ratio    Lipid Panel    Hemoglobin A1C   2. Epigastric abdominal pain  POCT Urinalysis no Micro    omeprazole (PRILOSEC) 20 MG delayed release capsule    US ABDOMEN COMPLETE    IRA Wynn MD, Gastroenterology, [facility]   3. Gastroesophageal reflux disease, unspecified whether esophagitis present  omeprazole (PRILOSEC) 20 MG delayed release capsule    IRA Wynn MD, Gastroenterology, [facility]   4. Vaginal discharge  POCT Urinalysis no Micro    VAGINAL PATHOGENS PROBE *A   5. Vaginal itching  POCT Urinalysis no Micro    VAGINAL PATHOGENS PROBE *A   6. Postlaminectomy syndrome of lumbar region  Saji Bergman MD, Pain Management, [facility]   7. Leukocytes in urine  Culture, Urine   8. Women's annual routine gynecological examination  Lisa Awad MD, Gynecology, [facility]           Patient Education       ãÑÖ ÌóÒúÑñ Alberto Richey (GERD): ÅÑÔÇÏÇÊ ÇáÑÚÇíÉ  Gastroesophageal Reflux Disease (GERD): Care Instructions  ÅÑÔÇÏÇÊ ÇáÑÚÇíÉ ÇáÎÇÕÉ Èß    íõÚÏ ãÑÖ ÌóÒúÑñ ãóÚöÏöíøñ ãóÑíÆöíø (GERD) åæ ÊÏÝÞ ãÑÊÌÚ áÍãÖ ÇáãÚÏÉ Åáì ÇáãÑÆ. Åä ÇáãÑÆ ÃäÈæÈ íãÊÏ ãä ÇáÍáÞ Åáì ÇáãÚÏÉ. íÞí ÇáÕãÇã Ðæ ÇáÇÊÌÇå ÇáæÇÍÏ ÍãÖ ÇáãÚÏÉ ãä ÇáÊÍÑß NYFUO åÐÇ ÇáÇäÈæÈ. ÚäÏãÇ ÊÚÇäí ãä ãÑÖ ÌóÒúÑñ ãóÚöÏöíøñ ãóÑíÆöíø (GERD)¡ ÝáÇ íÛáÞ åÐÇ ÇáÕãÇã ÈÅÍßÇã ÈãÇ íßÝí. ÅÐÇ ßäÊ ÊÚÇäí ãä ÃÚÑÇÖ ÎÝíÝÉ ãä ãÑÖ óÌóÒúÑñ ãóÚöÏöíøñ ãóÑíÆöíø (GERD)¡ ÈãÇ Ýí Ðáß ÇáÍãæÖÉ¡ ÝÞÏ Êßæä ÞÇÏÑðÇ Ýí ÇáÓíØÑÉ Úáì åÐå DIBTYVL ãä YBTW ÊäÇæá ãÖÇÏÇÊ ÇáÍãæÖÉ Ãæ ÏæÇÁ íÕÑÝ ÈÏæä æÕÝÉ ØÈíÉ. ßãÇ íãßä áÊÛííÑ äÙÇãß ÇáÛÐÇÆí æÝÞÏÇä ÇáæÒä æÛíÑåÇ ãä ÅÌÑÇÁ ÊÛííÑÇÊ Ýí äãØ ÇáÍíÇÉ Ãä ÊõÓÇÚÏ Ýí ÊÞáíá ÇáÃÚÑÇÖ. ÊõÚÏ ÑÚÇíÉ ÇáãÊÇÈÚÉ ÌÒÁðÇ ÃÓÇÓíðÇ Ýí ÚáÇÌß æÓáÇãÊß.  Ari Amber Úáì ÊÑÊíÈ ÌãíÚ ãæÇÚíÏ ÒíÇÑÉ ÇáØÈíÈ CBANTLQBB ÈåÇ¡ [id number] ÈØÈíÈß ÚäÏ ÇáãÚÇäÇÉ ÏãæíÉ. Úáíß ãÑÇÞÈÉ Ãí ÊÛíÑÇÊ ÊØÑÃ Úáì ÕÍÊß ÌíÏðÇ¡ æÇáÍÑÕ Úáì ÇáÇÊÕÇá ÈÇáØÈíÈ Ýí ÇáÍÇáÇÊ ÇáÊÇáíÉ:   áã ÊÊÍÓä ÇáÃÚÑÇÖ áÏíß ÈÚÏ íæãíä.  íÈÏæ æßÃä ÇáØÚÇã íÚáÞ Ýí ÍáÞß Ãæ ÕÏÑß. Ãíä íãßäß ãÚÑÝÉ ÇáãÒíÏ¿  ÇäÊÞÇá Åáì   [email or web address]  ÃÏÎá T36 Ýí ãÑÈÚ ÇáÈÍË áãÚÑÝÉ ÇáãÒíÏ Íæá \"ãÑÖ ÌóÒúÑñ ãóÚöÏöíøñ ãóÑíÆöíø (GERD): ÅÑÔÇÏÇÊ ÇáÑÚÇíÉ. \"  ÓÇÑò DRETVVEM ãä: 15 äíÓÇä 2020               äÓÎÉ ÇáãÍÊæì: 12.8  © 9664-0459 [employer]. Êã ÊÚÏíá ÅÑÔÇÏÇÊ ÇáÑÚÇíÉ ÈãæÌÈ ÊÑÎíÕ ÕÇÏÑ ãä ÇÎÊÕÇÕí ÇáÑÚÇíÉ ÇáÕÍíÉ ÇáÎÇÕ Èß. ÅÐÇ ßÇäÊ áÏíß ÃÓÆáÉ FNIRD ÈÍÇáÉ ãÑÖíÉ Ãæ ÈåÐå ÇáÊÚáíãÇÊ¡ ÝÇÍÑÕ Úáì ÇáÑÌæÚ ÏÇÆãðÇ Åáì ÇÎÊÕÇÕí ÇáÑÚÇíÉ ÇáÕÍíÉ. ÊõÎáí ÔÑßÉ [employer] [id number] Úä Ãí ÖãÇä Ãæ ÇáÊÒÇã íÊÚáÞ [id number] áåÐå [id number].

## 2021-06-16 ENCOUNTER — TELEPHONE (OUTPATIENT)
Dept: ORTHOPEDIC SURGERY | Age: 56
End: 2021-06-16

## 2021-06-16 ENCOUNTER — TELEPHONE (OUTPATIENT)
Dept: PRIMARY CARE CLINIC | Age: 56
End: 2021-06-16

## 2021-06-16 ENCOUNTER — OFFICE VISIT (OUTPATIENT)
Dept: ORTHOPEDIC SURGERY | Age: 56
End: 2021-06-16
Payer: COMMERCIAL

## 2021-06-16 VITALS — HEIGHT: 67 IN | BODY MASS INDEX: 33.12 KG/M2 | WEIGHT: 211 LBS

## 2021-06-16 DIAGNOSIS — M96.1 POSTLAMINECTOMY SYNDROME OF LUMBAR REGION: ICD-10-CM

## 2021-06-16 DIAGNOSIS — G89.29 CHRONIC PAIN IN RIGHT SHOULDER: ICD-10-CM

## 2021-06-16 DIAGNOSIS — Z00.00 ANNUAL PHYSICAL EXAM: Primary | ICD-10-CM

## 2021-06-16 DIAGNOSIS — Z02.89 PAIN MEDICATION AGREEMENT: ICD-10-CM

## 2021-06-16 DIAGNOSIS — M25.511 CHRONIC PAIN IN RIGHT SHOULDER: ICD-10-CM

## 2021-06-16 DIAGNOSIS — M13.0 POLYARTHROPATHY, MULTIPLE SITES: ICD-10-CM

## 2021-06-16 DIAGNOSIS — M67.912 ROTATOR CUFF DISORDER, LEFT: Primary | ICD-10-CM

## 2021-06-16 LAB
CANDIDA SPECIES, DNA PROBE: NORMAL
CHOLESTEROL, TOTAL: 198 MG/DL (ref 0–199)
ESTIMATED AVERAGE GLUCOSE: 105.4 MG/DL
GARDNERELLA VAGINALIS, DNA PROBE: NORMAL
HBA1C MFR BLD: 5.3 %
HDLC SERPL-MCNC: 99 MG/DL (ref 40–60)
LDL CHOLESTEROL CALCULATED: 90 MG/DL
TRICHOMONAS VAGINALIS DNA: NORMAL
TRIGL SERPL-MCNC: 47 MG/DL (ref 0–150)
URINE CULTURE, ROUTINE: NORMAL
VLDLC SERPL CALC-MCNC: 9 MG/DL

## 2021-06-16 PROCEDURE — 20610 DRAIN/INJ JOINT/BURSA W/O US: CPT | Performed by: ORTHOPAEDIC SURGERY

## 2021-06-16 RX ORDER — LIDOCAINE HYDROCHLORIDE 10 MG/ML
5 INJECTION, SOLUTION INFILTRATION; PERINEURAL ONCE
COMMUNITY
End: 2022-10-24 | Stop reason: CLARIF

## 2021-06-16 RX ORDER — METHYLPREDNISOLONE ACETATE 40 MG/ML
40 INJECTION, SUSPENSION INTRA-ARTICULAR; INTRALESIONAL; INTRAMUSCULAR; SOFT TISSUE ONCE
COMMUNITY
End: 2022-10-24 | Stop reason: CLARIF

## 2021-06-16 RX ORDER — ROPIVACAINE HYDROCHLORIDE 5 MG/ML
INJECTION, SOLUTION EPIDURAL; INFILTRATION; PERINEURAL ONCE
COMMUNITY
End: 2022-10-24 | Stop reason: CLARIF

## 2021-06-16 NOTE — TELEPHONE ENCOUNTER
I called [facility] to make a note in their system that the patient requires a female  for future provider appts their system has been updated as well per customer service. I've made a note in her chart on  the comment section within her demographics.

## 2021-06-16 NOTE — TELEPHONE ENCOUNTER
Patient came in to do her lab work, she says that her  yesterday in her visit may have made a mistake in explaining her current med list, she states that she still is on these current medications:    ropivacaine (NAROPIN) 0.5% injection 30 mL   [[id number]]    methylPREDNISolone acetate (DEPO-MEDROL) injection 40 mg   [[id number]]    lidocaine 1 % injection 8 mL   [[id number]]    oxyCODONE-acetaminophen (PERCOCET) 5-325 MG per tablet [[id number]]

## 2021-06-18 ENCOUNTER — IMMUNIZATION (OUTPATIENT)
Dept: PRIMARY CARE CLINIC | Age: 56
End: 2021-06-18
Payer: COMMERCIAL

## 2021-06-18 PROCEDURE — 0002A COVID-19, PFIZER VACCINE 30MCG/0.3ML DOSE: CPT | Performed by: FAMILY MEDICINE

## 2021-06-18 PROCEDURE — 91300 COVID-19, PFIZER VACCINE 30MCG/0.3ML DOSE: CPT | Performed by: FAMILY MEDICINE

## 2021-06-19 ENCOUNTER — HOSPITAL ENCOUNTER (OUTPATIENT)
Dept: ULTRASOUND IMAGING | Age: 56
Discharge: HOME | End: 2021-06-19
Payer: COMMERCIAL

## 2021-06-19 DIAGNOSIS — R10.13 EPIGASTRIC ABDOMINAL PAIN: ICD-10-CM

## 2021-06-19 PROCEDURE — 76700 US EXAM ABDOM COMPLETE: CPT

## 2021-06-23 PROBLEM — N89.8 VAGINAL ITCHING: Status: ACTIVE | Noted: 2021-06-23

## 2021-06-23 PROBLEM — N89.8 VAGINAL DISCHARGE: Status: ACTIVE | Noted: 2021-06-23

## 2021-06-23 PROBLEM — E11.42 TYPE 2 DIABETES MELLITUS WITH DIABETIC POLYNEUROPATHY, WITHOUT LONG-TERM CURRENT USE OF INSULIN (HCC): Status: ACTIVE | Noted: 2021-06-23

## 2021-06-23 ASSESSMENT — ENCOUNTER SYMPTOMS
RHINORRHEA: 0
ABDOMINAL PAIN: 1
TROUBLE SWALLOWING: 0
SINUS PRESSURE: 0
WHEEZING: 0
COUGH: 0
BLOOD IN STOOL: 0
BACK PAIN: 1
NAUSEA: 0
CONSTIPATION: 0
VOMITING: 0
DIARRHEA: 0
SHORTNESS OF BREATH: 0
CHEST TIGHTNESS: 0
SORE THROAT: 0

## 2021-06-24 NOTE — PROGRESS NOTES
History of Present Illness:  Louana Duane returns for follow-up of her left shoulder. We saw her on 6/2/21 and contemplated injecting her left shoulder but she had just received the COVID vaccine a few days earlier. She rescheduled the visit for the injection. Her right shoulder is doing better. She is accompanied by an Romansh . Medical History:  Patient's medications, allergies, past medical, surgical, social and family histories were reviewed and updated as appropriate. Pertinent items are noted in HPI  Review of systems reviewed from Patient History Form dated on 6/2/21 and available in the patient's chart under the Media tab. Vital Signs:  Vitals:     Constitutional: She is in no distress. She has lost a lot of weight since her gastric sleeve procedure. Left shoulder Examination:    Inspection:  Benign. Palpation:  No crepitus. Tender over the the cuff. Active Range of Motion: Elevation to 120 degrees and abduction to 90 degrees. IR diminished. Passive Range of Motion:  Deferred    Strength:  4-/5 ER/IR. Special Tests:  Distal NVI. Intact axillary sensation. Assessment :  Chronic rotator cuff tendinosis left shoulder. Brittany Vyas is a great candidate for an injection today. Impression:  Encounter Diagnosis   Name Primary?  Rotator cuff disorder, left Yes       Office Procedures:  Orders Placed This Encounter   Procedures    MA ARTHROCENTESIS ASPIR&/INJ MAJOR JT/BURSA W/O US     80 mg Depomedrol with 8 cc 1% Lidocaine in 10cc syringe with 25G (22G) needle       Treatment Plan: We went ahead with the injection as we had planned. She can receive injections bilaterally every 3-4 months or as needed. All questions were answered. Risks and benefits of a corticosteroid injection were discussed with Farrah. 80 milligrams of Depo Medrol and 8 CC of 1% lidocaine were injected in the left shoulder glenohumeral joint following chlorhexidine prep.  She  tolerated the procedure well with no immediate adverse sequelae after the injection. Saad Aguilar MD, PhD  6/16/2021

## 2021-09-05 DIAGNOSIS — R10.13 EPIGASTRIC ABDOMINAL PAIN: ICD-10-CM

## 2021-09-05 DIAGNOSIS — K21.9 GASTROESOPHAGEAL REFLUX DISEASE, UNSPECIFIED WHETHER ESOPHAGITIS PRESENT: ICD-10-CM

## 2021-09-07 RX ORDER — OMEPRAZOLE 20 MG/1
CAPSULE, DELAYED RELEASE ORAL
Qty: 90 CAPSULE | Refills: 0 | Status: SHIPPED | OUTPATIENT
Start: 2021-09-07 | End: 2022-10-24 | Stop reason: SDUPTHER

## 2021-09-07 NOTE — TELEPHONE ENCOUNTER
Medication:   Requested Prescriptions     Pending Prescriptions Disp Refills    omeprazole (PRILOSEC) 20 MG delayed release capsule [Pharmacy Med Name: OMEPRAZOLE DR 20 MG CAPSULE] 90 capsule      Sig: TAKE 1 CAPSULE BY MOUTH EVERY DAY IN THE MORNING BEFORE BREAKFAST     Last Filled:  6.15.21  Last appt: 6/15/2021   Next appt: Visit date not found    Last OARRS:   RX Monitoring 8/7/2020   Attestation -   Periodic Controlled Substance Monitoring Possible medication side effects, risk of tolerance/dependence & alternative treatments discussed. ;Assessed functional status. ;Obtaining appropriate analgesic effect of treatment. Chronic Pain > 50 MEDD Obtained or confirmed \"Consent for Opioid Use\" on file.

## 2022-05-11 NOTE — PROGRESS NOTES
7/14/20 11:41 AM     Lidocaine Injection      NDC: [address]-53    Lot Number: [id number]    Body Part: bilateral knees Consent 1/Introductory Paragraph: The rationale for Mohs was explained to the patient and consent was obtained. The risks, benefits and alternatives to therapy were discussed in detail. Specifically, the risks of infection, scarring, bleeding, prolonged wound healing, incomplete removal, allergy to anesthesia, nerve injury and recurrence were addressed. Prior to the procedure, the treatment site was clearly identified and confirmed by the patient. All components of Universal Protocol/PAUSE Rule completed.

## 2022-10-21 NOTE — PROGRESS NOTES
(General Surgery)    Chief Complaint   Patient presents with    Back Pain     Wants  to be  referral to  pain medication        HPI:   Patient is here for a problem visit  Last OV 6/2021 with PCP    BACK PAIN:  H/o herniated disc at L4L5. H/o lumbar surgery in 2001  Her previous Pain Management Dr Lucia Combs has moved out of state. Needs new referral to PM.   Worsening back pain over the past few months and pain radiates down both legs and chronic numbness in both feet. Left is worse than right  Is able to walk with a walker. Sometimes uses a WC. Used to be on gabapentin 300mg TID and this helped some. Has been off of this for several months. Takes otc, but doesn't help. Would like new referral to pain management. Has seen neurosx in the past. Ordered Physical Therapy, states this didn't help. Thinks she may need surgery or injections. GERD:  Controlled on omeprazole  Needs refill    MAMMO  Overdue. Requests order. ROS:  Review of Systems   Constitutional:  Negative for chills, diaphoresis, fatigue and fever. Respiratory:  Negative for cough, shortness of breath and wheezing. Cardiovascular:  Negative for chest pain, palpitations and leg swelling. Gastrointestinal:  Negative for abdominal pain, diarrhea, nausea and vomiting. GERD   Genitourinary:  Negative for difficulty urinating. Musculoskeletal:  Positive for back pain and gait problem. Negative for myalgias. Skin:  Negative for color change and rash. Neurological:  Positive for numbness. Negative for dizziness, weakness, light-headedness and headaches. VITALS:  /86   Pulse 90   Temp 98.6 °F (37 °C) (Oral)   Ht 5' 7\" (1.702 m)   Wt 228 lb (103.4 kg)   SpO2 96%   BMI 35.71 kg/m²      PE:  Physical Exam  Vitals and nursing note reviewed. Constitutional:       General: She is not in acute distress. Appearance: Normal appearance. She is well-developed and normal weight. She is not diaphoretic. Cardiovascular:      Rate and Rhythm: Normal rate and regular rhythm. Heart sounds: Normal heart sounds. No murmur heard. No friction rub. Pulmonary:      Effort: Pulmonary effort is normal. No respiratory distress. Breath sounds: Normal breath sounds. No wheezing, rhonchi or rales. Abdominal:      General: Abdomen is flat. There is no distension. Palpations: Abdomen is soft. Musculoskeletal:      Lumbar back: Deformity (surgical scar noted), tenderness and bony tenderness present. No signs of trauma. Decreased range of motion. Back:    Skin:     General: Skin is warm and dry. Findings: No rash. Neurological:      Mental Status: She is alert and oriented to person, place, and time. Gait: Gait abnormal (able to stand from [facility] and onto exam table with no assistance. ). Psychiatric:         Mood and Affect: Mood normal.         Behavior: Behavior normal.      ASSESSMENT/PLAN:  1. Lumbar radiculopathy  Chronic. Will refer to new PM and spine ortho. Will restart gabapentin at previous dose. Short term rx until she can est with PM.   Will defer imaging to specialists. - Heide Neri MD, Pain Management, [facility]  - gabapentin (NEURONTIN) 300 MG capsule; Take 1 capsule by mouth 3 times daily for 180 days. Intended supply: 30 days  Dispense: 90 capsule; [facility]: [address] [facility], Orthopedic Surgery (Spine), [place]    2. Other spondylosis, lumbar region  - Heide Neri MD, Pain Management, [address] [place] [address] [facility], Orthopedic Surgery (Spine), [facility]    3. Postlaminectomy syndrome of lumbar region  - Heide Neri MD, Pain Management, [address] [place], [place] [id number] daniels, Orthopedic Surgery (Spine), [facility]    4.  Chronic pain syndrome  - Heide Neri MD, Pain Management, [address] [place] [address] Michelle, Orthopedic Surgery (Spine), [facility]    5. Numbness and tingling of both feet  Chronic. Refer to new specialists. Will defer imaging to specialists. - Alison Lowery MD, Pain Management, [address] [facility], [facility] [place], [place], Orthopedic Surgery (Spine), [facility]    6. Gastroesophageal reflux disease, unspecified whether esophagitis present  Restart PPI  - omeprazole (PRILOSEC) 20 MG delayed release capsule; TAKE 1 CAPSULE BY MOUTH EVERY DAY IN THE MORNING BEFORE BREAKFAST  Dispense: 90 capsule; Refill: 0    7. Encounter for screening mammogram for malignant neoplasm of breast  Scheduling info given. - [facility] DIGITAL SCREEN W OR WO CAD BILATERAL; Future    Return in about 4 weeks (around 11/21/2022) for chronic visit, Diabetes. HM:  Overdue for apt with PCP to address chronic problems.    Strongly encouraged to schedule apt with PCP    Electronically signed by YURY Castillo CNP on 10/24/2022 at 3:45 PM

## 2022-10-24 ENCOUNTER — OFFICE VISIT (OUTPATIENT)
Dept: PRIMARY CARE CLINIC | Age: 57
End: 2022-10-24
Payer: COMMERCIAL

## 2022-10-24 VITALS
TEMPERATURE: 98.6 F | OXYGEN SATURATION: 96 % | WEIGHT: 228 LBS | BODY MASS INDEX: 35.79 KG/M2 | SYSTOLIC BLOOD PRESSURE: 134 MMHG | DIASTOLIC BLOOD PRESSURE: 86 MMHG | HEART RATE: 90 BPM | HEIGHT: 67 IN

## 2022-10-24 DIAGNOSIS — M54.16 LUMBAR RADICULOPATHY: Primary | ICD-10-CM

## 2022-10-24 DIAGNOSIS — K21.9 GASTROESOPHAGEAL REFLUX DISEASE, UNSPECIFIED WHETHER ESOPHAGITIS PRESENT: ICD-10-CM

## 2022-10-24 DIAGNOSIS — R20.0 NUMBNESS AND TINGLING OF BOTH FEET: ICD-10-CM

## 2022-10-24 DIAGNOSIS — M47.896 OTHER SPONDYLOSIS, LUMBAR REGION: ICD-10-CM

## 2022-10-24 DIAGNOSIS — M96.1 POSTLAMINECTOMY SYNDROME OF LUMBAR REGION: ICD-10-CM

## 2022-10-24 DIAGNOSIS — R20.2 NUMBNESS AND TINGLING OF BOTH FEET: ICD-10-CM

## 2022-10-24 DIAGNOSIS — G89.4 CHRONIC PAIN SYNDROME: ICD-10-CM

## 2022-10-24 DIAGNOSIS — Z12.31 ENCOUNTER FOR SCREENING MAMMOGRAM FOR MALIGNANT NEOPLASM OF BREAST: ICD-10-CM

## 2022-10-24 PROCEDURE — 99214 OFFICE O/P EST MOD 30 MIN: CPT | Performed by: NURSE PRACTITIONER

## 2022-10-24 PROCEDURE — 1036F TOBACCO NON-USER: CPT | Performed by: NURSE PRACTITIONER

## 2022-10-24 PROCEDURE — G8484 FLU IMMUNIZE NO ADMIN: HCPCS | Performed by: NURSE PRACTITIONER

## 2022-10-24 PROCEDURE — G8427 DOCREV CUR MEDS BY ELIG CLIN: HCPCS | Performed by: NURSE PRACTITIONER

## 2022-10-24 PROCEDURE — G8417 CALC BMI ABV UP PARAM F/U: HCPCS | Performed by: NURSE PRACTITIONER

## 2022-10-24 PROCEDURE — 3017F COLORECTAL CA SCREEN DOC REV: CPT | Performed by: NURSE PRACTITIONER

## 2022-10-24 RX ORDER — OMEPRAZOLE 20 MG/1
CAPSULE, DELAYED RELEASE ORAL
Qty: 90 CAPSULE | Refills: 0 | Status: SHIPPED | OUTPATIENT
Start: 2022-10-24

## 2022-10-24 RX ORDER — GABAPENTIN 300 MG/1
300 CAPSULE ORAL 3 TIMES DAILY
Qty: 90 CAPSULE | Refills: 1 | Status: SHIPPED | OUTPATIENT
Start: 2022-10-24 | End: 2023-04-22

## 2022-10-24 SDOH — ECONOMIC STABILITY: FOOD INSECURITY: WITHIN THE PAST 12 MONTHS, YOU WORRIED THAT YOUR FOOD WOULD RUN OUT BEFORE YOU GOT MONEY TO BUY MORE.: NEVER TRUE

## 2022-10-24 SDOH — ECONOMIC STABILITY: FOOD INSECURITY: WITHIN THE PAST 12 MONTHS, THE FOOD YOU BOUGHT JUST DIDN'T LAST AND YOU DIDN'T HAVE MONEY TO GET MORE.: NEVER TRUE

## 2022-10-24 ASSESSMENT — ENCOUNTER SYMPTOMS
WHEEZING: 0
VOMITING: 0
COUGH: 0
DIARRHEA: 0
SHORTNESS OF BREATH: 0
ABDOMINAL PAIN: 0
NAUSEA: 0
BACK PAIN: 1
COLOR CHANGE: 0

## 2022-10-24 ASSESSMENT — SOCIAL DETERMINANTS OF HEALTH (SDOH): HOW HARD IS IT FOR YOU TO PAY FOR THE VERY BASICS LIKE FOOD, HOUSING, MEDICAL CARE, AND HEATING?: NOT HARD AT ALL

## 2022-10-25 ENCOUNTER — TELEPHONE (OUTPATIENT)
Dept: PRIMARY CARE CLINIC | Age: 57
End: 2022-10-25

## 2022-10-25 NOTE — TELEPHONE ENCOUNTER
She is scheduled with Jourdan Youngblood for her back pain on 11/1. I would like Geovani Álvarez to determine what imaging she needs. Also she sees ortho already for her shoulder, which is who would need to order the MRI of the shoulder.

## 2022-10-25 NOTE — TELEPHONE ENCOUNTER
Patient was seen by MATTHEW Carvalho on 10/24/2022,  that when she called the pain management to schedule an appointment nobody answered but a recording stated that she will need for her to have a MRI done on her shoulder and lower back with #4 and 5. She is in pain and needs to be able to see the pain doctor as soon as possible. Also she would like help setting up the appointment with pain management.      Thank you

## 2022-10-26 ENCOUNTER — OFFICE VISIT (OUTPATIENT)
Dept: ORTHOPEDIC SURGERY | Age: 57
End: 2022-10-26
Payer: COMMERCIAL

## 2022-10-26 VITALS — BODY MASS INDEX: 35.79 KG/M2 | WEIGHT: 228 LBS | HEIGHT: 67 IN

## 2022-10-26 DIAGNOSIS — M17.11 PRIMARY OSTEOARTHRITIS OF RIGHT KNEE: ICD-10-CM

## 2022-10-26 DIAGNOSIS — M17.12 PRIMARY OSTEOARTHRITIS OF LEFT KNEE: ICD-10-CM

## 2022-10-26 DIAGNOSIS — M25.562 PAIN IN BOTH KNEES, UNSPECIFIED CHRONICITY: Primary | ICD-10-CM

## 2022-10-26 DIAGNOSIS — M25.561 PAIN IN BOTH KNEES, UNSPECIFIED CHRONICITY: Primary | ICD-10-CM

## 2022-10-26 PROCEDURE — 20610 DRAIN/INJ JOINT/BURSA W/O US: CPT | Performed by: ORTHOPAEDIC SURGERY

## 2022-10-26 RX ORDER — METHYLPREDNISOLONE ACETATE 40 MG/ML
40 INJECTION, SUSPENSION INTRA-ARTICULAR; INTRALESIONAL; INTRAMUSCULAR; SOFT TISSUE ONCE
Status: COMPLETED | OUTPATIENT
Start: 2022-10-26 | End: 2022-10-26

## 2022-10-26 RX ADMIN — METHYLPREDNISOLONE ACETATE 40 MG: 40 INJECTION, SUSPENSION INTRA-ARTICULAR; INTRALESIONAL; INTRAMUSCULAR; SOFT TISSUE at 17:53

## 2022-10-26 RX ADMIN — METHYLPREDNISOLONE ACETATE 40 MG: 40 INJECTION, SUSPENSION INTRA-ARTICULAR; INTRALESIONAL; INTRAMUSCULAR; SOFT TISSUE at 17:52

## 2022-10-26 NOTE — PROGRESS NOTES
Chief Complaint  Follow-up (Bilateral knees)      History of Present Illness:  Amy Lopez is a pleasant 61 y.o. female presenting today for follow-up of her bilateral knees. She states that she did have good relief after her previous bilateral cortisone injection in June 2021. She states that her left knee has been more bothersome. She has pain with weightbearing and some subjective instability. She was inquiring today about the possibility of gel injections in the future    Pain Assessment  Location of Pain: Knee  Location Modifiers: Left, Right  Aggravating Factors:  (moving)  Limiting Behavior: Yes  Relieving Factors: Rest  Result of Injury: No  Work-Related Injury: No  Are there other pain locations you wish to document?: No    Medical History:  Patient's medications, allergies, past medical, surgical, social and family histories were reviewed and updated as appropriate. Vital Signs: There were no vitals filed for this visit. Constitutional: In no apparent distress. Normal affect. Alert and oriented X3 and is cooperative. Right knee exam    Gait: No use of assistive devices. No antalgic gait. Alignment: normal alignment. Inspection/skin: Skin is intact without erythema or ecchymosis. No gross deformity. Palpation: no crepitus. Tenderness to palpation of the medial joint line bilaterally    Range of Motion: There is full range of motion. Strength: Normal quadriceps development. Effusion: No effusion or swelling present. Ligamentous stability: No cruciate or collateral ligament instability. Neurologic and vascular: Skin is warm and well-perfused. Sensation is intact to light-touch. Special tests: Negative Everett sign. Left knee exam    Gait: No use of assistive devices. No antalgic gait. Alignment: normal alignment. Inspection/skin: Skin is intact without erythema or ecchymosis. No gross deformity. Palpation: no crepitus.  no tender to palpation of the medial joint line    Range of Motion: There is full range of motion. Strength: Normal quadriceps development. Effusion: No effusion or swelling present. Ligamentous stability: No cruciate or collateral ligament instability. Neurologic and vascular: Skin is warm and well-perfused. Sensation is intact to light-touch. Special tests: Negative Everett sign. Radiology:     Radiographs were obtained and reviewed in the office; 4 views: bilateral PA, bilateral Benjamin, bilateral Merchants AND bilateral lateral    Impression: Bilateral tricompartmental OA with severe patellofemoral changes         Assessment : 59-year-old patient with bilateral tricompartmental OA    Impression:  Encounter Diagnosis   Name Primary? Pain in both knees, unspecified chronicity Yes       Office Procedures:  Orders Placed This Encounter   Procedures    XR KNEE RIGHT (MIN 4 VIEWS)     Standing Status:   Future     Number of Occurrences:   1     Standing Expiration Date:   10/25/2023     Order Specific Question:   Reason for exam:     Answer:   pain    XR KNEE LEFT (MIN 4 VIEWS)     Standing Status:   Future     Number of Occurrences:   1     Standing Expiration Date:   10/25/2023     Order Specific Question:   Reason for exam:     Answer:   pain         Plan: Pertinent imaging was reviewed. The etiology, natural history, and treatment options for the disorder were discussed. The roles of activity medication, antiinflammatories, injections, bracing, physical therapy, and surgical interventions were all described to the patient and questions were answered. Medical discussion in clinic today with respect to management of her severe patellofemoral crepitus. She notes that she has made significant strides in weight reduction to help offload her knees stating that she is lost approximately 200 pounds.   She inquired about swimming activities and we certainly support her being active in the pool as much as possible. She did inquire as to the possibility of gel injections at her next visit. We will plan to apply for precertification such that she can get those injections at the next visit. Today she wish to proceed with bilateral cortisone injections  Will also place order for knee brace that will need to be picked up at a later time due to transportation and  issues. Injections are indicated because of persistent pain secondary knee osteoarthritis with associated synovitis and effusions. Under sterile conditions and after informed consent was obtained the patient was given an injection to both the right and left knee 40 mg of Depo-Medrol and 4 mL of 1% lidocaine were placed into each knee after prepped with chlorhexidine and pretreated with ethyl chloride. The injection resulted in good relief of symptoms there were no complications patient was advised to apply ice if there is any discomfort this evening avoid vigorous activities for the next 2 days. The patient was advised to call us if there is any erythema induration and swelling or increased pain. Follow-up on an as-needed basis for further injection, likely gel injection at next visit  Should she not have good relief with these injections, next step would be gel injections as she has had injections in each knee, she has tried physical therapy, antiinflammatories and tylenol without good relief. Mino Mendez is in agreement with this plan. All questions were answered to patient's satisfaction and was encouraged to call with any further questions. Procedures    EUFLEXXA INJ PER DOSE    AR ARTHROCENTESIS ASPIR&/INJ MAJOR JT/BURSA W/O US    AR ARTHROCENTESIS ASPIR&/INJ MAJOR JT/BURSA W/O US    EUFLEXXA INJECTION (For Auth/Precert)    Breg / DJO Economy Hinged Knee Brace     Patient was prescribed an Economy Hinged Knee Brace.  The bilateral knee will require stabilization / immobilization from this semi-rigid / rigid orthosis to improve their function. The orthosis will assist in protecting the affected area, provide functional support and facilitate healing. The patient was educated and fit by a healthcare professional with expert knowledge and specialization in brace application while under the direct supervision of the treating physician. Verbal and written instructions for the use of and application of this item were provided. They were instructed to contact the office immediately should the brace result in increased pain, decreased sensation, increased swelling or worsening of the condition. Roxi Snyder MD [facility]    Orthopaedic Surgeon, Clinical Fellow  [address]   On behalf of      Total time spent for evaluation, education, and development of treatment plan: 31 minutes. This dictation was performed with a verbal recognition program (DRAGON) and it was checked for errors. It is possible that there are still dictated errors within this office note. If so, please bring any errors to my attention for an addendum. All efforts were made to ensure that this office note is accurate. I attest that I met personally with the patient, performed the described exam, reviewed the radiographic studies and medical records associated with this patient and supervised the services that are described above.      Jackie Lopez MD

## 2022-10-26 NOTE — PROGRESS NOTES
10/26/22 4:20 PM     Lidocaine Injection      NDC: 7423-3011-78    Lot Number: [id number]    Body Part: bilateral knees

## 2022-10-27 ENCOUNTER — HOSPITAL ENCOUNTER (OUTPATIENT)
Dept: WOMENS IMAGING | Age: 57
Discharge: HOME | End: 2022-10-27
Payer: COMMERCIAL

## 2022-10-27 DIAGNOSIS — Z12.31 ENCOUNTER FOR SCREENING MAMMOGRAM FOR MALIGNANT NEOPLASM OF BREAST: ICD-10-CM

## 2022-10-27 PROCEDURE — 77067 SCR MAMMO BI INCL CAD: CPT

## 2022-11-01 ENCOUNTER — TELEPHONE (OUTPATIENT)
Dept: ORTHOPEDIC SURGERY | Age: 57
End: 2022-11-01

## 2022-11-01 ENCOUNTER — OFFICE VISIT (OUTPATIENT)
Dept: ORTHOPEDIC SURGERY | Age: 57
End: 2022-11-01
Payer: COMMERCIAL

## 2022-11-01 VITALS — WEIGHT: 227.2 LBS | HEIGHT: 67 IN | BODY MASS INDEX: 35.66 KG/M2

## 2022-11-01 DIAGNOSIS — M17.12 PRIMARY OSTEOARTHRITIS OF LEFT KNEE: Primary | ICD-10-CM

## 2022-11-01 DIAGNOSIS — M54.16 LUMBAR RADICULITIS: ICD-10-CM

## 2022-11-01 DIAGNOSIS — R20.0 BILATERAL LEG NUMBNESS: Primary | ICD-10-CM

## 2022-11-01 DIAGNOSIS — Z98.890 H/O LUMBOSACRAL SPINE SURGERY: ICD-10-CM

## 2022-11-01 DIAGNOSIS — M17.11 PRIMARY OSTEOARTHRITIS OF RIGHT KNEE: ICD-10-CM

## 2022-11-01 DIAGNOSIS — G89.29 CHRONIC LOW BACK PAIN, UNSPECIFIED BACK PAIN LATERALITY, UNSPECIFIED WHETHER SCIATICA PRESENT: ICD-10-CM

## 2022-11-01 DIAGNOSIS — M54.50 CHRONIC LOW BACK PAIN, UNSPECIFIED BACK PAIN LATERALITY, UNSPECIFIED WHETHER SCIATICA PRESENT: ICD-10-CM

## 2022-11-01 PROCEDURE — G8417 CALC BMI ABV UP PARAM F/U: HCPCS | Performed by: PHYSICIAN ASSISTANT

## 2022-11-01 PROCEDURE — 99204 OFFICE O/P NEW MOD 45 MIN: CPT | Performed by: PHYSICIAN ASSISTANT

## 2022-11-01 PROCEDURE — G8484 FLU IMMUNIZE NO ADMIN: HCPCS | Performed by: PHYSICIAN ASSISTANT

## 2022-11-01 PROCEDURE — 1036F TOBACCO NON-USER: CPT | Performed by: PHYSICIAN ASSISTANT

## 2022-11-01 PROCEDURE — G8427 DOCREV CUR MEDS BY ELIG CLIN: HCPCS | Performed by: PHYSICIAN ASSISTANT

## 2022-11-01 PROCEDURE — 3017F COLORECTAL CA SCREEN DOC REV: CPT | Performed by: PHYSICIAN ASSISTANT

## 2022-11-01 NOTE — PROGRESS NOTES
New Patient: Dino Heimlich    11/1/2022     CHIEF COMPLAINT:    Chief Complaint   Patient presents with    New Patient     NP/LOW BACK PAIN       HISTORY OF PRESENT ILLNESS:              The patient is a 61 y.o. female history of DM, peripheral neuropathy, gastric sleeve, SHYANN, hypertension referred by YURY Lam for chronic low back and bilateral leg pain. Visit is performed using [employer] . Patient reports a history of spine surgery in 2001 without benefit. She describes chronic aching and stabbing low back/buttock pain with diffuse bilateral leg pain left greater than right. She reports thigh and foot numbness. Her symptoms are increased with any standing, bending or activity. Some relief with sitting and resting. Conservative care includes PT, prior Percocet, gabapentin, IM steroid. She reports subjective leg weakness, chronic. She has a history of chronic urinary incontinence which she states has resolved since weight loss surgery. She previously saw Dr. Tessa Lopez for pain management and has been referred to Dr. Ivett Wynn. Currently denies any new or progressive extremity weakness. No recent bowel or bladder dysfunction. She also has chronic underlying knee pain actively seeing Dr. Jaylin Salter    The pain assessment was noted & reviewed in the medical record today.      Current/Past Treatment:   Physical Therapy: Yes  Chiropractic:     Injection:   IM steroid, bilateral cortisone knee, Dr. Jaylin Salter  Medications:            NSAIDS: History of gastric sleeve            Muscle relaxer:              Steriods:              Neuropathic medications: Gabapentin 300 3 times daily            Opioids: Prior Percocet and pain management            Other:   Surgery/Consult: 2001 lumbar surgery in [place], [facility]    Work Status/Functionality: N/A    Past Medical History: Medical history form was reviewed today & scanned into the media tab  Past Medical History:   Diagnosis Date    Arthritis TAN (dyspnea on exertion)     Gastroesophageal reflux disease 1/28/2020    Hyperlipidemia     Hypertension     Morbid obesity with body mass index (BMI) of 60.0 to 69.9 in adult ([facility] [address] 7/2/2018    Neuropathy     SHYANN (obstructive sleep apnea)     Primary osteoarthritis of right knee 7/2/2018    Type 2 diabetes mellitus without complication ([facility]     controlled with diet    Urinary incontinence       Past Surgical History:     Past Surgical History:   Procedure Laterality Date    BACK SURGERY      2001    BREAST LUMPECTOMY Bilateral     2015    DILATION AND CURETTAGE OF UTERUS N/A 08/30/2018    GASTRIC BYPASS SURGERY      SLEEVE GASTRECTOMY  10/13/2018     Current Medications:     Current Outpatient Medications:     gabapentin (NEURONTIN) 300 MG capsule, Take 1 capsule by mouth 3 times daily for 180 days. Intended supply: 30 days, Disp: 90 capsule, Rfl: 1    omeprazole (PRILOSEC) 20 MG delayed release capsule, TAKE 1 CAPSULE BY MOUTH EVERY DAY IN THE MORNING BEFORE BREAKFAST, Disp: 90 capsule, Rfl: 0  Allergies:  Patient has no known allergies. Social History:    reports that she has never smoked. She has never used smokeless tobacco. She reports that she does not drink alcohol and does not use drugs.   Family History:   Family History   Problem Relation Age of Onset    Diabetes Mother     Stroke Mother     Osteoarthritis Mother     Heart Disease Father     Other Father     High Blood Pressure Father     Osteoarthritis Father     Diabetes Maternal Aunt     Cancer Maternal Aunt     Diabetes Maternal Grandmother     Cancer Paternal Aunt        REVIEW OF SYSTEMS: Full ROS reviewed & scanned into chart  CONSTITUTIONAL: Denies unexplained weight loss (h/o gastric sleeve), fevers   SKIN: Denies active skin conditions   ENT: Denies dizziness, nosebleeds  RESPIRATORY: Denies difficulty breathing  CARDIOVASCULAR: Denies new chest pain   NEUROLOGICAL: Denies progressive weakness  PSYCHOLOGICAL: Denies anxiety, depression HEMATOLOGIC: Denies blood disorders, cancer  ENDOCRINE: Denies excessive thirst, heat/cold  GI: Denies current nausea, vomiting, diarrhea   : Denies new bowel or bladder incontinence. Urinary incontinence improved after weight loss       PHYSICAL EXAM:    Vitals: Height 5' 7\" (1.702 m), weight 228 lb (103.4 kg), not currently breastfeeding. Pain score 9/10    GENERAL EXAM:  General Apparence: Patient is adequately groomed with no evidence of malnutrition. Orientation: The patient is oriented to time, place and person. Mood & Affect:The patient's mood and affect are appropriate   Lymphatic: The lymphatic examination bilaterally reveals all areas to be without enlargement or induration  Sensation: Sensation is intact without deficit    CERVICAL EXAMINATION:  Inspection: Local inspection shows no step-off or bruising. Cervical alignment is normal.     Strength: 5/5 bilateral upper extremities   Special Tests:      Spurling's, L'Hermitte's & Almaguer's negative bilaterally. Skin:There are no rashes, ulcerations or lesions in right & left upper extremities. Reflexes: Bilaterally triceps, biceps and brachioradialis are 1+. LUMBAR/SACRAL EXAMINATION:  Inspection: Local inspection shows no step-off or bruising. Well-healed lower lumbar scarring. Palpation:   No evidence of tenderness at the midline. She points to her lumbosacral region as to where majority of pain is located. Range of Motion: Able to sit forward flexed extension not assessed  Strength:   Strength testing is 5/5 in all muscle groups tested. Special Tests:   Straight leg raise and crossed SLR negative. Leg length and pelvis level.  0 out of 5 Emiliana's signs. Skin: There are no rashes, ulcerations or lesions. Reflexes: Reflexes are symmetrically trace to 1+ with reinforcement at the patellar and ankle tendons.     Gait & station: Wheelchair  Additional Examinations:   RIGHT LOWER EXTREMITY: Inspection/examination of the right lower extremity does not show any tenderness, deformity or injury. Range of motion is full. There is no gross instability. There are no rashes, ulcerations or lesions. Strength and tone are normal.  LEFT LOWER EXTREMITY:  Inspection/examination of the left lower extremity does not show any tenderness, deformity or injury. Range of motion is full. There is no gross instability. There are no rashes, ulcerations or lesions. Strength and tone are normal.    Diagnostic Testin views lumbar spine 2022 moderate to severe L4-5 DDD, mild endplate irregularity L5, multilevel spondylosis. No high-grade instability     lumbar MRI scan & report independently reviewed showing L4-5 DDD with Modic changes, left hemilaminectomy L5, varying degrees of foraminal stenosis. PCP notes reviewed    Labs reviewed  BUN 25, creatinine 0.5, hemoglobin A1c 5.3        Impression:  1) Chronic low back pain, lumbar radiculitis   2) Chronic diffuse leg pain  3) L4-5 DDD w/varying degrees of foraminal stenosis  4) S/p remote MLL--2001, [place]  5) DM w/ peripheral neuropathy  6) Chronic knee pain--Dr. Ramey Meridian       Plan:   1) We had a long discussion today. We obtained 4 views of her lumbar spine today discussed in detail.   I recommend lumbar MRI to assess for worsening spinal stenosis given chronic worsening back and bilateral leg pain    2) Bilateral LE EMGs    3) PT for above    4) Pain mgt--pending with Dr. Barrett Strickland    5) Discussed concerning signs and symptoms    6) F/u to review L MRI & EMGs            Josue Shine PA-C, MPAS  Board Certified by the [address]

## 2022-11-01 NOTE — TELEPHONE ENCOUNTER
Unable to get ahold of patient, voicemail box is not set up. Patient has new patient paperwork with her and needs to have a copy returned back to our office. She may call back at [phone].

## 2022-11-03 ASSESSMENT — ENCOUNTER SYMPTOMS
EYE DISCHARGE: 0
CHEST TIGHTNESS: 0
ABDOMINAL PAIN: 0
ABDOMINAL DISTENTION: 0
VOMITING: 0
COUGH: 0
BLOOD IN STOOL: 0
WHEEZING: 0
BACK PAIN: 0
COLOR CHANGE: 0
SHORTNESS OF BREATH: 0
FACIAL SWELLING: 0

## 2022-11-03 NOTE — PROGRESS NOTES
Cancer Maternal Aunt     Diabetes Maternal Grandmother     Cancer Paternal Aunt        Allergies   No Known Allergies    Medications:     Home Medications:  Were reviewed and are listed in nursing record. and/or listed below    Prior to Admission medications    Medication Sig Start Date End Date Taking? Authorizing Provider   gabapentin (NEURONTIN) 300 MG capsule Take 1 capsule by mouth 3 times daily for 180 days. Intended supply: 30 days 10/24/22 4/22/23 Yes YURY Espinosa CNP   omeprazole (PRILOSEC) 20 MG delayed release capsule TAKE 1 CAPSULE BY MOUTH EVERY DAY IN THE MORNING BEFORE BREAKFAST 10/24/22  Yes YURY Espinosa CNP        Review of Systems   Constitutional:  Negative for activity change, appetite change, diaphoresis, fatigue, fever and unexpected weight change. HENT:  Negative for congestion, facial swelling, mouth sores and nosebleeds. Eyes:  Negative for discharge and visual disturbance. Respiratory:  Negative for cough, chest tightness, shortness of breath and wheezing. Cardiovascular:  Positive for chest pain and leg swelling. Negative for palpitations. Gastrointestinal:  Negative for abdominal distention, abdominal pain, blood in stool and vomiting. Endocrine: Negative for cold intolerance, heat intolerance and polyuria. Genitourinary:  Negative for difficulty urinating, dysuria, frequency and hematuria. Musculoskeletal:  Negative for back pain, joint swelling, myalgias and neck pain. Skin:  Negative for color change, pallor and rash. Allergic/Immunologic: Negative for immunocompromised state. Neurological:  Negative for dizziness, syncope, weakness, light-headedness, numbness and headaches. Hematological:  Negative for adenopathy. Does not bruise/bleed easily. Psychiatric/Behavioral:  Negative for behavioral problems, confusion, decreased concentration and suicidal ideas. The patient is not nervous/anxious.       Vitals:    11/08/22 1156   BP: 122/80 Pulse: 52    Weight: 238 lb 12.8 oz (108.3 kg)       Vitals:    11/08/22 1156   BP: 122/80   Site: Left Upper Arm   Position: Sitting   Cuff Size: Large Adult   Pulse: 52   Weight: 238 lb 12.8 oz (108.3 kg)       BP Readings from Last 3 Encounters:   11/08/22 122/80   10/24/22 134/86   06/15/21 118/70       Wt Readings from Last 3 Encounters:   11/08/22 238 lb 12.8 oz (108.3 kg)   11/01/22 227 lb 3.2 oz (103.1 kg)   10/26/22 228 lb (103.4 kg)       Physical Exam  Constitutional:       General: She is not in acute distress. Appearance: She is well-developed. She is not diaphoretic. HENT:      Head: Normocephalic and atraumatic. Eyes:      Pupils: Pupils are equal, round, and reactive to light. Neck:      Thyroid: No thyromegaly. Vascular: No JVD. Cardiovascular:      Rate and Rhythm: Normal rate and regular rhythm. Chest Wall: PMI is not displaced. Heart sounds: S1 normal and S2 normal. Murmur heard. Systolic murmur is present with a grade of 2/6. No friction rub. No gallop. Pulmonary:      Effort: Pulmonary effort is normal. No respiratory distress. Breath sounds: Normal breath sounds. No stridor. No wheezing or rales. Chest:      Chest wall: No tenderness. Abdominal:      General: Bowel sounds are normal. There is no distension. Palpations: Abdomen is soft. Tenderness: There is no abdominal tenderness. There is no guarding or rebound. Musculoskeletal:         General: No tenderness. Normal range of motion. Cervical back: Normal range of motion. Lymphadenopathy:      Cervical: No cervical adenopathy. Skin:     General: Skin is warm and dry. Findings: No erythema or rash. Neurological:      Mental Status: She is alert and oriented to person, place, and time. Coordination: Coordination normal.   Psychiatric:         Behavior: Behavior normal.         Thought Content:  Thought content normal.         Judgment: Judgment normal.       Labs: Lab Results   Component Value Date    WBC 8.1 06/15/2021    HGB 11.6 (L) 06/15/2021    HCT 34.6 (L) 06/15/2021    MCV 87.8 06/15/2021     06/15/2021     Lab Results   Component Value Date     06/15/2021    K 4.5 06/15/2021     06/15/2021    CO2 25 06/15/2021    BUN 25 (H) 06/15/2021    CREATININE <0.5 (L) 06/15/2021    GLUCOSE 79 06/15/2021    CALCIUM 8.7 06/15/2021    PROT 6.6 06/15/2021    LABALBU 4.0 06/15/2021    BILITOT <0.2 06/15/2021    ALKPHOS 55 06/15/2021    AST 22 06/15/2021    ALT 17 06/15/2021    LABGLOM >60 06/15/2021    GFRAA >60 06/15/2021    AGRATIO 1.5 06/15/2021    GLOB 2.6 06/15/2021         Lab Results   Component Value Date    CHOL 198 06/16/2021    CHOL 177 06/15/2021    CHOL 178 03/26/2020     Lab Results   Component Value Date    TRIG 47 06/16/2021    TRIG 87 06/15/2021    TRIG 67 03/26/2020     Lab Results   Component Value Date    HDL 99 (H) 06/16/2021    HDL 81 (H) 06/15/2021    HDL 68 (H) 03/26/2020     Lab Results   Component Value Date    LDLCALC 90 06/16/2021    LDLCALC 79 06/15/2021    LDLCALC 97 03/26/2020     Lab Results   Component Value Date    LABVLDL 9 06/16/2021    LABVLDL 17 06/15/2021    LABVLDL 13 03/26/2020     No results found for: CHOLHDLRATIO    No results found for: INR, PROTIME    The ASCVD Risk score (Yoshi DK, et al., 2019) failed to calculate for the following reasons:    Unable to determine if patient is Non-       Imaging:       Last ECG (if available):  Sinus bradycardia, no ischemic changes    Last Monitor/Holter    Last Stress (if available):    Last Cath (if available):    Last TTE/CARLOS(if available): 1/17/20   Summary   Normal left ventricle size, wall thickness, and systolic function with an   estimated ejection fraction of 55%. No regional wall motion abnormalities   are seen. Diastolic filling parameters suggests normal diastolic function. Trivial aortic valve regurgitation.    Mild tricuspid regurgitation. Estimated pulmonary artery systolic pressure is normal at 25 mmHg assuming a   right atrial pressure of 3 mmHg. Last CMR  (if available):      Assessment / Plan:     Precordial pain  Very unlikely to be cardiac in nature. Plan for cardiac CTA    Bilateral leg edema  Mild valvular disease in the past.  Repeat echocardiogram.  Check BNP and renal panel. Currently not on diuretics    May refer her to lymphedema clinic if above work-up is normal    Follow up in 1 months. I had the opportunity to review the clinical symptoms and presentation of [address]. Patient's allergies and medications were reviewed and updated. Patient's past medical, surgical, social and family history were reviewed and updated. Patient's testing including laboratory, ECGs, monitor, imaging (TTE,CARLOS,CMR,cath) were reviewed. All questions and concerns were addressed to the patient/family. Alternatives to my treatment were discussed. The note was completed using EMR. Every effort wasmade to ensure accuracy; however, inadvertent computerized transcription errors may be present. Thank you for allowing me to participate in thecare or [place]ena Martínez MD, [facility], [facility]

## 2022-11-07 ENCOUNTER — HOSPITAL ENCOUNTER (OUTPATIENT)
Dept: PHYSICAL THERAPY | Age: 57
Setting detail: THERAPIES SERIES
Discharge: HOME | End: 2022-11-07
Payer: COMMERCIAL

## 2022-11-07 PROCEDURE — 97110 THERAPEUTIC EXERCISES: CPT

## 2022-11-07 PROCEDURE — 97161 PT EVAL LOW COMPLEX 20 MIN: CPT

## 2022-11-07 NOTE — PLAN OF CARE
LUMBAR EVAL    [facility]     Physical Therapy Certification    Dear  Michelle Tim,    We had the pleasure of evaluating the following patient for physical therapy services at [address] [place] [address]. A summary of our findings can be found in the initial assessment below. This includes our plan of care. If you have any questions or concerns regarding these findings, please do not hesitate to contact me at the office phone number checked above. Thank you for the referral.       Physician Signature:_______________________________Date:__________________  By signing above (or electronic signature), therapists plan is approved by physician      Patient: Antonio Nails   : 1965   MRN: [id number]  Referring Physician:        Evaluation Date: 2022      Medical Diagnosis Information:  Diagnosis: M54.5, G89.29 chronic LBP, M54.16 lumbar radiculitis, Z98.890 h/ lumbosacral spine surgery   Treatment Diagnosis: M54.5 LBP                                         Insurance information:  [employer] [employer]     Precautions/ Contra-indications: none      C-SSRS Triggered by Intake questionnaire (Past 2 wk assessment):   [x] No, Questionnaire did not trigger screening.   [] Yes, Patient intake triggered further evaluation      [] C-SSRS Screening completed  [] PCP notified via Plan of Care  [] Emergency services notified     Latex Allergy:  [x]NO      []YES  Preferred Language for Healthcare:   []English       [x]other: Italian    SUBJECTIVE: Patient stated complaint: Pt has chronic hx of LBP, along with bilat hip pain, bilat knee pain, and radiating pain into bilat LE all the way to ankles, L>R. Pt has hx of spine surgery in  that helped for a few years however since then she has had pain.  Pt notes she was told she could have another surgery after losing weight, going to therapy and trying epidural injections again however pt is unsure of what surgery and cannot remember which surgeon she saw (at least 2 years ago). Pt has tried pain management as well and has previously been prescribed percocet, recently stopped taking it.  Pt presents in w/c, with     Relevant Medical History:(-) latex allergy, (-) adhesive sensitivity, (-) pacemaker, (-) metal/electrical implants, (-) heart history, (-) CA, (-) stroke, (-) seizure, (+) diabetes: II, (-) asthma or SOB, OA  Functional Disability Index/G-Codes:       Pain Scale: /10  Easing factors: rest, lying down  Provocative factors: everything     Type: []Constant   []Intermittent  []Radiating []Localized []other:     Numbness/Tingling: N/T into bilat LE, to ankles, intermittent    Occupation/School: unemployed    Living Status/Prior Level of Function: Independent with ADLs and IADLs,     OBJECTIVE:     ROM  Comments   Lumbar Flex 50 °     Lumbar Ext 0 °       ROM LEFT RIGHT Comments   Lumbar Side Bend 5 °  5 °     Lumbar Rotation      Hip Flexion 90 °  90 °     Hip Abd      Hip ER 50% limited 50% limited    Hip IR 50% limited 50% limited    Hip Extension      Knee Ext      Knee Flex      Hamstring Flex      Piriformis                    Strength LEFT RIGHT Comments   Multifidus      Transverse Ab      Hip Flexors 4/5 4/5    Hip Abductors 3/5 3/5    Hip Extensors 3/5 3/5                   Myotomes Normal Abnormal Comments   Hip flexion (L1-L2) x     Knee extension (L2-L4) x     Dorsiflexion (L4-L5) x     Great Toe Ext (L5) x     Ankle Eversion (S1-S2) x     Ankle PF(S1-S2) x       Dermatomes Normal Abnormal Comments   inguinal area (L1)  NV     anterior mid-thigh (L2) NV     distal ant thigh/med knee (L3) NV     medial lower leg and foot (L4) NV     lateral lower leg and foot (L5) NV     posterior calf (S1) NV     medial calcaneus (S2) NV       Neural dynamic tension testing Normal Abnormal Comments   Slump Test  - Degree of knee flexion:  x     SLR  x     0-30      30-70      Femoral nerve (L2-4) Reflexes Normal Abnormal Comments   S1-2 Seated achilles NV     S1-2 Prone knee bend      L3-4 Patellar tendon NV     C5-6 Biceps      C6 Brachioradialis      C7-8 Triceps      Clonus      Babinski      Almaguer's        Joint mobility:    []Normal    [x]Hypo: lumbar PA, UPA   []Hyper    Palpation: TTP: throughout glute and lumbar paraspinal    Functional Mobility/Transfers: limited with all, slow    Posture: forward posture    Gait: (include devices/WB status) gait not assessed as pt did not have walker with her    Bandages/Dressings/Incisions: NA    Orthopedic Special Tests:    Normal Abnormal N/A Comments   Toe walk         Heel Walk       Fwd Bend-aberrant or innominate mvmt)       Trendelenburg  x     Kemps/Quadrant       Stork       DENISE/Kentrell       Hip scour       SLR       Crossed SLR       Supine to sit       Hip thrust       SI distraction/compression       PA/Spring       Prone Instability test       Prone knee bend       Sacral Spring/thrust                  [x] Patient history, allergies, meds reviewed. Medical chart reviewed. See intake form. Review Of Systems (ROS):  [x]Performed Review of systems (Integumentary, CardioPulmonary, Neurological) by intake and observation. Intake form has been scanned into medical record. Patient has been instructed to contact their primary care physician regarding ROS issues if not already being addressed at this time.       Co-morbidities/Complexities (which will affect course of rehabilitation):   []None           Arthritic conditions   []Rheumatoid arthritis (M05.9)  []Osteoarthritis (M19.91)   Cardiovascular conditions   [x]Hypertension (I10)  []Hyperlipidemia (E78.5)  []Angina pectoris (I20)  []Atherosclerosis (I70)   Musculoskeletal conditions   []Disc pathology   []Congenital spine pathologies   []Prior surgical intervention  []Osteoporosis (M81.8)  []Osteopenia (M85.8)   Endocrine conditions   []Hypothyroid (E03.9)  []Hyperthyroid Gastrointestinal conditions   []Constipation (E26.71)   Metabolic conditions   [x]Morbid obesity (E66.01)  [x]Diabetes type 1(E10.65) or 2 (E11.65)   []Neuropathy (G60.9)     Pulmonary conditions   []Asthma (J45)  []Coughing   []COPD (J44.9)   Psychological Disorders  []Anxiety (F41.9)  []Depression (F32.9)   []Other:   []Other:           Barriers to/and or personal factors that will affect rehab potential:              []Age  []Sex              []Motivation/Lack of Motivation                        [x]Co-Morbidities              []Cognitive Function, education/learning barriers              []Environmental, home barriers              []profession/work barriers  []past PT/medical experience  []other:  Justification: chronic pain, chronic opiod use    Falls Risk Assessment (30 days):   [] Falls Risk assessed and no intervention required.   [x] Falls Risk assessed and Patient requires intervention due to being higher risk   TUG score (>12s at risk):     [] Falls education provided, including     ASSESSMENT:   Functional Impairments:     [x]Noted lumbar/proximal hip hypomobility   []Noted lumbosacral and/or generalized hypermobility   [x]Decreased Lumbosacral/hip/LE functional ROM   [x]Decreased core/proximal hip strength and neuromuscular control    [x]Decreased LE functional strength    []Abnormal reflexes/sensation/myotomal/dermatomal deficits  [x]Reduced balance/proprioceptive control    []other:      Functional Activity Limitations (from functional questionnaire and intake)   [x]Reduced ability to tolerate prolonged functional positions   [x]Reduced ability or difficulty with changes of positions or transfers between positions   [x]Reduced ability to maintain good posture and demonstrate good body mechanics with sitting, bending, and lifting   [x]Reduced ability to sleep   [x] Reduced ability or tolerance with driving and/or computer work   [x]Reduced ability to perform lifting, reaching, carrying tasks   [x]Reduced ability to squat   [x]Reduced ability to forward bend   [x]Reduced ability to ambulate prolonged functional periods/distances/surfaces   [x]Reduced ability to ascend/descend stairs   []other:       Participation Restrictions   [x]Reduced participation in self care activities   [x]Reduced participation in home management activities   [x]Reduced participation in work activities   [x]Reduced participation in social activities. [x]Reduced participation in sport/recreational activities. Classification:   []Signs/symptoms consistent with Lumbar instability/stabilization subgroup. []Signs/symptoms consistent with Lumbar mobilization/manipulation subgroup, myotomes and dermatomes intact. Meets manipulation criteria. []Signs/symptoms consistent with Lumbar direction specific/centralization subgroup   []Signs/symptoms consistent with Lumbar traction subgroup     []Signs/symptoms consistent with lumbar facet dysfunction   [x]Signs/symptoms consistent with lumbar stenosis type dysfunction   []Signs/symptoms consistent with nerve root involvement including myotome & dermatome dysfunction   []Signs/symptoms consistent with post-surgical status including: decreased ROM, strength and function.    []signs/symptoms consistent with pathology which may benefit from Dry needling     []other:      Prognosis/Rehab Potential:      []Excellent   []Good    [x]Fair   []Poor    Tolerance of evaluation/treatment:    []Excellent   [x]Good    []Fair   []Poor     Physical Therapy Evaluation Complexity Justification  [x] A history of present problem with:  [] no personal factors and/or comorbidities that impact the plan of care;  [x]1-2 personal factors and/or comorbidities that impact the plan of care  []3 personal factors and/or comorbidities that impact the plan of care  [x] An examination of body systems using standardized tests and measures addressing any of the following: body structures and functions (impairments), activity limitations, and/or participation restrictions;:  [] a total of 1-2 or more elements   [x] a total of 3 or more elements   [] a total of 4 or more elements   [x] A clinical presentation with:  [x] stable and/or uncomplicated characteristics   [] evolving clinical presentation with changing characteristics  [] unstable and unpredictable characteristics;   [x] Clinical decision making of [x] low, [] moderate, [] high complexity using standardized patient assessment instrument and/or measurable assessment of functional outcome. [x] EVAL (LOW) 46962 (typically 20 minutes face-to-face)  [] EVAL (MOD) 95299 (typically 30 minutes face-to-face)  [] EVAL (HIGH) 54824 (typically 45 minutes face-to-face)  [] RE-EVAL     PLAN: Begin PT focusing on: proximal hip mobilizations, LB mobs, LB core activation, proximal hip activation, and HEP    Frequency/Duration:  1-2 days per week for 12 Weeks:  Interventions:  [x]  Therapeutic exercise including: strength training, ROM, for LE, Glutes and core   [x]  NMR activation and proprioception for glutes , LE and Core   [x]  Manual therapy as indicated for Hip complex, LE and spine to include: Dry Needling/IASTM, STM, PROM, Gr I-IV mobilizations, manipulation. [x]  Modalities as needed that may include: thermal agents, E-stim, Biofeedback, US, iontophoresis as indicated  [x]  Patient education on joint protection, postural re-education, activity modification, progression of HEP. HEP instruction: Refer to [address] [place] [address] access code and exercises on the 1st visit treatment note    GOALS:  Patient stated goal: ? pain    Therapist goals for Patient:   Short Term Goals: To be achieved in: 2 weeks  1. Independent in HEP and progression per patient tolerance, in order to prevent re-injury. [] Progressing: [] Met: [] Not Met: [] Adjusted  2. Patient will have a decrease in pain to facilitate improvement in movement, function, and ADLs as indicated by Functional Deficits.   [] Progressing: [] Met: [] Not Met: [] Adjusted    Long Term Goals: To be achieved in: 12 weeks  1. FOTO >/= D/C score to assist with reaching prior level of function. [] Progressing: [] Met: [] Not Met: [] Adjusted  2. Patient will demonstrate increased AROM to WNL, good LS mobility, good hip ROM to allow for proper joint functioning as indicated by patients Functional Deficits. [] Progressing: [] Met: [] Not Met: [] Adjusted  3. Patient will demonstrate an increase in Strength to good proximal hip and core activation to allow for proper functional mobility as indicated by patients Functional Deficits. [] Progressing: [] Met: [] Not Met: [] Adjusted  4. Patient will return to all functional activities without increased symptoms or restriction. [] Progressing: [] Met: [] Not Met: [] Adjusted  5.  Pt will exhibit normalized gait pattern with AD. (patient specific functional goal)    [] Progressing: [] Met: [] Not Met: [] Adjusted     Electronically signed by:  Lyubov Richmond PT

## 2022-11-07 NOTE — FLOWSHEET NOTE
[facility]      Physical Therapy Treatment Note/ Progress Report:       Date:  2022    Patient Name:  Anais Toro    :  1965  MRN: [id number]  Restrictions/Precautions:    Medical/Treatment Diagnosis Information:  Diagnosis: M54.5, G89.29 chronic LBP, M54.16 lumbar radiculitis, Z98.890 h/ lumbosacral spine surgery  Treatment Diagnosis: M54.5 LBP  Insurance/Certification information:   Nayeli Balderas  Physician Information:   MICHELLE Pratt  Has the plan of care been signed (Y/N):        []  Yes  [x]  No     Date of Patient follow up with Physician: not scheduled    Is this a Progress Report:     []  Yes  [x]  No      If Yes:  Date Range for reporting period:  Beginning:  ------------ Ending:     Progress report will be due (10 Rx or 30 days whichever is less):      Recertification will be due (POC Duration  / 90 days whichever is less): 23      Visit # Insurance Allowable Auth Required   In Person 1 Check NV []  Yes     []  No    Tele Health   []  Yes     []  No    Total 1       Functional Scale: FOTO    Date assessed:  NV      Latex Allergy:  [x]NO      []YES  Preferred Language for Healthcare:   [x]English       []other:    Pain level:  7/10     SUBJECTIVE:  See eval    OBJECTIVE: See eval  Observation:   Test measurements:      RESTRICTIONS/PRECAUTIONS: DM, HTN, obseity    Exercises/Interventions:   Therapeutic Ex (86948) Sets/sec Reps Notes/CUES HEP                                                                                       Manual Intervention ([id number])                                                 NMR re-education (23165)   CUES NEEDED                                                                   Therapeutic Activity (95447)                                          [address] access code: [email or web address]           Therapeutic Exercise and [address]  [] (71373) Provided verbal/tactile cueing for activities related to strengthening, flexibility, endurance, ROM  for improvements in proximal hip and core control with self care, mobility, lifting and ambulation.  [] (83155) Provided verbal/tactile cueing for activities related to improving balance, coordination, kinesthetic sense, posture, motor skill, proprioception  to assist with core control in self care, mobility, lifting, and ambulation. Therapeutic Activities:    [] (61364 or 64727) Provided verbal/tactile cueing for activities related to improving balance, coordination, kinesthetic sense, posture, motor skill, proprioception and motor activation to allow for proper function  with self care and ADLs  [] (25267) Provided training and instruction to the patient for proper core and proximal hip recruitment and positioning with ambulation re-education     Home Exercise Program:    [x] (00006) Reviewed/Progressed HEP activities related to strengthening, flexibility, endurance, ROM of core, proximal hip and LE for functional self-care, mobility, lifting and ambulation   [] (40014) Reviewed/Progressed HEP activities related to improving balance, coordination, kinesthetic sense, posture, motor skill, proprioception of core, proximal hip and LE for self care, mobility, lifting, and ambulation      Manual Treatments:  PROM / STM / Oscillations-Mobs:  G-I, II, III, IV (PA's, Inf., Post.)  [] (90484) Provided manual therapy to mobilize proximal hip and LS spine soft tissue/joints for the purpose of modulating pain, promoting relaxation,  increasing ROM, reducing/eliminating soft tissue swelling/inflammation/restriction, improving soft tissue extensibility and allowing for proper ROM for normal function with self care, mobility, lifting and ambulation. Modalities:     [] GAME READY (VASO)- for significant edema, swelling, pain control.      Charges:  Timed Code Treatment Minutes: 25   Total Treatment Minutes:  45      [x] EVAL (LOW) 14405 (typically 20 minutes face-to-face)  [] EVAL (MOD) 34875 (typically 30 minutes face-to-face)  [] EVAL (HIGH) 52540 (typically 45 minutes face-to-face)  [] RE-EVAL     [x] YW(32820) x   2  [] IONTO  [] NMR (44444) x     [] VASO  [] Manual (26461) x     [] Other:  [] TA x      [] Mech Traction (39999)  [] ES(attended) (77693)      [] ES (un) (59148):       ASSESSMENT:  See eval      GOALS:     Patient stated goal: ? pain     Therapist goals for Patient:   Short Term Goals: To be achieved in: 2 weeks  1. Independent in HEP and progression per patient tolerance, in order to prevent re-injury. [] Progressing: [] Met: [] Not Met: [] Adjusted  2. Patient will have a decrease in pain to facilitate improvement in movement, function, and ADLs as indicated by Functional Deficits. [] Progressing: [] Met: [] Not Met: [] Adjusted     Long Term Goals: To be achieved in: 12 weeks  1. FOTO >/= D/C score to assist with reaching prior level of function. [] Progressing: [] Met: [] Not Met: [] Adjusted  2. Patient will demonstrate increased AROM to WNL, good LS mobility, good hip ROM to allow for proper joint functioning as indicated by patients Functional Deficits. [] Progressing: [] Met: [] Not Met: [] Adjusted  3. Patient will demonstrate an increase in Strength to good proximal hip and core activation to allow for proper functional mobility as indicated by patients Functional Deficits. [] Progressing: [] Met: [] Not Met: [] Adjusted  4. Patient will return to all functional activities without increased symptoms or restriction. [] Progressing: [] Met: [] Not Met: [] Adjusted  5. Pt will exhibit normalized gait pattern with AD. (patient specific functional goal)    [] Progressing: [] Met: [] Not Met: [] Adjusted             Access Code: [email or web address]  URL: [email or web address]  Date: 11/09/2022  Prepared by: Elaine Ruiz    Exercises  Hooklying Single Knee to Chest Stretch - 2 x daily - 7 x weekly - 3 sets - 10 reps  Supine Hip Adduction Isometric with Diane Angeles - 2 x daily - 7 x weekly - 1 sets - 10 reps  Hooklying Clamshell with Resistance - 2 x daily - 7 x weekly - 2-3 sets - 10 reps  Supine Posterior Pelvic Tilt - 2 x daily - 7 x weekly - 2 sets - 10 reps      Overall Progression Towards Functional goals/ Treatment Progress Update:  [] Patient is progressing as expected towards functional goals listed. [] Progression is slowed due to complexities/Impairments listed. [] Progression has been slowed due to co-morbidities. [x] Plan just implemented, too soon to assess goals progression <30days   [] Goals require adjustment due to lack of progress  [] Patient is not progressing as expected and requires additional follow up with physician  [] Other    Prognosis for POC: [x] Good [] Fair  [] Poor      Patient requires continued skilled intervention: [x] Yes  [] No    Treatment/Activity Tolerance:  [x] Patient able to complete treatment  [] Patient limited by fatigue  [] Patient limited by pain    [] Patient limited by other medical complications  [] Other:                    PLAN: See eval  [] Continue per plan of care [] Alter current plan (see comments above)  [x] Plan of care initiated [] Hold pending MD visit [] Discharge    Electronically signed by:  Jarett Rosenthal PT    Note: If patient does not return for scheduled/ recommended follow up visits, this note will serve as a discharge from care along with most recent update on progress.

## 2022-11-08 ENCOUNTER — OFFICE VISIT (OUTPATIENT)
Dept: CARDIOLOGY CLINIC | Age: 57
End: 2022-11-08

## 2022-11-08 VITALS
WEIGHT: 238.8 LBS | DIASTOLIC BLOOD PRESSURE: 80 MMHG | BODY MASS INDEX: 37.4 KG/M2 | HEART RATE: 52 BPM | SYSTOLIC BLOOD PRESSURE: 122 MMHG

## 2022-11-08 DIAGNOSIS — R06.02 SHORTNESS OF BREATH: ICD-10-CM

## 2022-11-08 DIAGNOSIS — R07.9 CHEST PAIN, UNSPECIFIED TYPE: ICD-10-CM

## 2022-11-08 DIAGNOSIS — R60.0 BILATERAL LEG EDEMA: ICD-10-CM

## 2022-11-08 DIAGNOSIS — R00.1 SINUS BRADYCARDIA: ICD-10-CM

## 2022-11-08 DIAGNOSIS — H91.90 HEARING LOSS, UNSPECIFIED HEARING LOSS TYPE, UNSPECIFIED LATERALITY: Primary | ICD-10-CM

## 2022-11-08 DIAGNOSIS — E66.01 MORBID OBESITY WITH BODY MASS INDEX (BMI) OF 60.0 TO 69.9 IN ADULT (HCC): Primary | ICD-10-CM

## 2022-11-08 PROBLEM — R07.2 PRECORDIAL PAIN: Status: ACTIVE | Noted: 2022-11-08

## 2022-11-08 NOTE — ASSESSMENT & PLAN NOTE
Mild valvular disease in the past.  Repeat echocardiogram.  Check BNP and renal panel.   Currently not on diuretics

## 2022-11-09 ENCOUNTER — HOSPITAL ENCOUNTER (OUTPATIENT)
Dept: PHYSICAL THERAPY | Age: 57
Setting detail: THERAPIES SERIES
Discharge: HOME | End: 2022-11-09
Payer: COMMERCIAL

## 2022-11-09 ENCOUNTER — OFFICE VISIT (OUTPATIENT)
Dept: ORTHOPEDIC SURGERY | Age: 57
End: 2022-11-09
Payer: COMMERCIAL

## 2022-11-09 VITALS — HEIGHT: 67 IN | WEIGHT: 238 LBS | BODY MASS INDEX: 37.35 KG/M2

## 2022-11-09 DIAGNOSIS — M75.81 TENDINITIS OF RIGHT ROTATOR CUFF: ICD-10-CM

## 2022-11-09 DIAGNOSIS — M25.512 CHRONIC PAIN OF BOTH SHOULDERS: ICD-10-CM

## 2022-11-09 DIAGNOSIS — G89.29 CHRONIC PAIN OF BOTH SHOULDERS: ICD-10-CM

## 2022-11-09 DIAGNOSIS — M25.511 CHRONIC PAIN OF BOTH SHOULDERS: ICD-10-CM

## 2022-11-09 DIAGNOSIS — M67.912 ROTATOR CUFF DISORDER, LEFT: Primary | ICD-10-CM

## 2022-11-09 LAB
ALBUMIN SERPL-MCNC: 3.9 G/DL (ref 3.4–5)
ANION GAP SERPL CALCULATED.3IONS-SCNC: 10 MMOL/L (ref 3–16)
BUN BLDV-MCNC: 12 MG/DL (ref 7–20)
CALCIUM SERPL-MCNC: 8.4 MG/DL (ref 8.3–10.6)
CHLORIDE BLD-SCNC: 105 MMOL/L (ref 99–110)
CO2: 25 MMOL/L (ref 21–32)
CREAT SERPL-MCNC: <0.5 MG/DL (ref 0.6–1.1)
GFR SERPL CREATININE-BSD FRML MDRD: >60 ML/MIN/{1.73_M2}
GLUCOSE BLD-MCNC: 83 MG/DL (ref 70–99)
PHOSPHORUS: 3.3 MG/DL (ref 2.5–4.9)
POTASSIUM SERPL-SCNC: 4.3 MMOL/L (ref 3.5–5.1)
PRO-BNP: 197 PG/ML (ref 0–124)
SODIUM BLD-SCNC: 140 MMOL/L (ref 136–145)

## 2022-11-09 PROCEDURE — G8427 DOCREV CUR MEDS BY ELIG CLIN: HCPCS | Performed by: ORTHOPAEDIC SURGERY

## 2022-11-09 PROCEDURE — 20610 DRAIN/INJ JOINT/BURSA W/O US: CPT | Performed by: ORTHOPAEDIC SURGERY

## 2022-11-09 PROCEDURE — G8417 CALC BMI ABV UP PARAM F/U: HCPCS | Performed by: ORTHOPAEDIC SURGERY

## 2022-11-09 PROCEDURE — G8484 FLU IMMUNIZE NO ADMIN: HCPCS | Performed by: ORTHOPAEDIC SURGERY

## 2022-11-09 PROCEDURE — 3017F COLORECTAL CA SCREEN DOC REV: CPT | Performed by: ORTHOPAEDIC SURGERY

## 2022-11-09 PROCEDURE — 97110 THERAPEUTIC EXERCISES: CPT

## 2022-11-09 PROCEDURE — 97161 PT EVAL LOW COMPLEX 20 MIN: CPT

## 2022-11-09 PROCEDURE — 99214 OFFICE O/P EST MOD 30 MIN: CPT | Performed by: ORTHOPAEDIC SURGERY

## 2022-11-09 PROCEDURE — 97112 NEUROMUSCULAR REEDUCATION: CPT

## 2022-11-09 PROCEDURE — 1036F TOBACCO NON-USER: CPT | Performed by: ORTHOPAEDIC SURGERY

## 2022-11-09 RX ORDER — LIDOCAINE HYDROCHLORIDE 10 MG/ML
8 INJECTION, SOLUTION INFILTRATION; PERINEURAL ONCE
Status: COMPLETED | OUTPATIENT
Start: 2022-11-09 | End: 2022-11-09

## 2022-11-09 RX ORDER — METHYLPREDNISOLONE ACETATE 40 MG/ML
80 INJECTION, SUSPENSION INTRA-ARTICULAR; INTRALESIONAL; INTRAMUSCULAR; SOFT TISSUE ONCE
Status: COMPLETED | OUTPATIENT
Start: 2022-11-09 | End: 2022-11-09

## 2022-11-09 RX ADMIN — METHYLPREDNISOLONE ACETATE 80 MG: 40 INJECTION, SUSPENSION INTRA-ARTICULAR; INTRALESIONAL; INTRAMUSCULAR; SOFT TISSUE at 17:04

## 2022-11-09 RX ADMIN — LIDOCAINE HYDROCHLORIDE 8 ML: 10 INJECTION, SOLUTION INFILTRATION; PERINEURAL at 17:03

## 2022-11-09 NOTE — PLAN OF CARE
[facility] a Department of [address], [address]  Office: [phone]  Fax:  [phone]                                                        Physical Therapy Certification    Dear Referring Provider (secondary): Clarence Spencer,    We had the pleasure of evaluating the following patient for physical therapy services at [address] [place] [address]. A summary of our findings can be found in the initial assessment below. This includes our plan of care. If you have any questions or concerns regarding these findings, please do not hesitate to contact me at the office phone number checked above. Thank you for the referral.       Physician Signature:_______________________________Date:__________________  By signing above (or electronic signature), therapists plan is approved by physician      Patient: Kailyn Ferro   : 1965   MRN: [id number]  Referring Physician: Referring Provider (secondary): Clarence Spencer      Evaluation Date: 2022      Medical Diagnosis Information:  Diagnosis: M17.11 (ICD-10-CM) - Primary osteoarthritis of right knee; M17.12 (ICD-10-CM) - Primary osteoarthritis of left knee   Treatment Diagnosis: M25.561, M25.562 Bilateral knee pain                                           Precautions/ Contra-indications:   Latex Allergy:  [x]NO      []YES  Preferred Language for Healthcare:   []English       [x]Other:Yakut -  Present 22    C-SSRS Triggered by Intake questionnaire (Past 2 wk assessment):   [x] No, Questionnaire did not trigger screening.   [] Yes, Patient intake triggered further evaluation      [] C-SSRS Screening completed  [] PCP notified via Plan of Care  [] Emergency services notified         SUBJECTIVE:   Per MD note on 10/26: Kailyn Ferro is a pleasant 61 y.o. female presenting today for follow-up of her bilateral knees.   She states that she did have good relief after her previous bilateral cortisone injection in June 2021. She states that her left knee has been more bothersome. She has pain with weightbearing and some subjective instability    Today: Pt was recently seen in PT for her low back. She notes she has been having ongoing knee pain for quite some time. She notes she has pain when she sitting, standing. She notes she can only take about 5-6 steps before she needs to sit. She did have an electric scooter but it is broken so used wheelchair in community. She has a rollator for use at home that she tries to use. She will use the rollator to sit while cooking. She notes some pain in the knees at night that will wake her. She does have back and shoulder pain as well.      Relevant Medical History:lumbar surgery in 2001, diabetic neuropathy  Functional Disability Index: FOTO     Pain Scale: 6-7/10  Easing factors: rest,   Provocative factors: walking, sitting, standing,      Type: []Constant   []Intermittent  []Radiating []Localized []other:     Numbness/Tingling: bilateral numbness/tingling into feet/LE ( diabetic neuropathy, being seen by spine team as well)     Functional Limitations/Impairments: []Sitting []Standing []Walking    []Squatting/bending  []Stairs           []ADL's  []Transfers []Sports/Recreation []Other:    Occupation/School: disabled     Living Status/Prior Level of Function: Independent with ADLs and IADLs,     OBJECTIVE:     Joint mobility:    [x]Normal    []Hypo   []Hyper    Palpation:  mild ttp along medial/lateral joint line     Functional Mobility/Transfers: independent with sit<>stand, onto tx table,     Posture:     Bandages/Dressings/Incisions:     Gait: (include devices/WB status) slow shuffling walk        Flexibility L R Comment   Hamstring 20 30  SLR   Gastroc      ITB      Quad                ROM PROM AROM Overpressure Comment    L R L R L R    Flexion   105; pain 110; pain      Extension   Lacking 5 Lacking 3                               Strength L R Comment   Quad 4- 4-    Hamstring 4 4    Gastroc      Hip flexor 4- 4-    Hip ABD 4- 4-                  Orthopedic Special Tests:   Special Test Results/Comment   Meniscal Click    Crepitus    Flexion Test    Valgus Laxity    Varus Laxity    Lachmans    Drop Back    Homans            Girth L R   Mid Patella     Suprapatellar     5cm above     15cm above                              [x] Patient history, allergies, meds reviewed. Medical chart reviewed. See intake form. Review Of Systems (ROS):  [x]Performed Review of systems (Integumentary, CardioPulmonary, Neurological) by intake and observation. Intake form has been scanned into medical record. Patient has been instructed to contact their primary care physician regarding ROS issues if not already being addressed at this time.       Co-morbidities/Complexities (which will affect course of rehabilitation):   []None           Arthritic conditions   []Rheumatoid arthritis (M05.9)  [x]Osteoarthritis (M19.91)   Cardiovascular conditions   []Hypertension (I10)  []Hyperlipidemia (E78.5)  []Angina pectoris (I20)  []Atherosclerosis (I70)   Musculoskeletal conditions   [x]Disc pathology   []Congenital spine pathologies   [x]Prior surgical intervention  []Osteoporosis (M81.8)  []Osteopenia (M85.8)   Endocrine conditions   []Hypothyroid (E03.9)  []Hyperthyroid Gastrointestinal conditions   []Constipation (U00.69)   Metabolic conditions   [x]Morbid obesity (E66.01)  [x]Diabetes type 1(E10.65) or 2 (E11.65)   [x]Neuropathy (G60.9)     Pulmonary conditions   []Asthma (J45)  []Coughing   []COPD (J44.9)   Psychological Disorders  []Anxiety (F41.9)  []Depression (F32.9)   []Other:   []Other:          Barriers to/and or personal factors that will affect rehab potential:              [x]Age  []Sex              []Motivation/Lack of Motivation                        [x]Co-Morbidities              [x]Cognitive Function, education/learning barriers (language barrier)              []Environmental, home barriers              []profession/work barriers  []past PT/medical experience  []other:      Falls Risk Assessment (30 days):   [x] Falls Risk assessed and no intervention required. [] Falls Risk assessed and Patient requires intervention due to being higher risk   TUG score (>12s at risk):     [] Falls education provided, including           Functional Assessment:   Functional Assessment scale used: FOTO   Score: 22    ASSESSMENT:   Functional Impairments:     []Noted lumbar/proximal hip/LE joint hypomobility   [x]Decreased LE functional ROM   [x]Decreased core/proximal hip strength and neuromuscular control   [x]Decreased LE functional strength   [x]Reduced balance/proprioceptive control   []other:      Functional Activity Limitations (from functional questionnaire and intake)   [x]Reduced ability to tolerate prolonged functional positions   [x]Reduced ability or difficulty with changes of positions or transfers between positions   [x]Reduced ability to maintain good posture and demonstrate good body mechanics with sitting, bending, and lifting   [x]Reduced ability to sleep   [x] Reduced ability or tolerance with driving and/or computer work   [x]Reduced ability to perform lifting, carrying tasks   [x]Reduced ability to squat   []Reduced ability to forward bend   [x]Reduced ability to ambulate prolonged functional periods/distances/surfaces   [x]Reduced ability to ascend/descend stairs   []Reduced ability to run, hop, cut or jump   []other:    Participation Restrictions   [x]Reduced participation in self care activities   [x]Reduced participation in home management activities   []Reduced participation in work activities   []Reduced participation in social activities. []Reduced participation in sport/recreation activities.     Classification :    []Signs/symptoms consistent with post-surgical status including decreased ROM, strength and function. []Signs/symptoms consistent with joint sprain/strain   []Signs/symptoms consistent with patella-femoral syndrome   [x]Signs/symptoms consistent with knee OA/hip OA   []Signs/symptoms consistent with internal derangement of knee/Hip   [x]Signs/symptoms consistent with functional hip weakness/NMR control      []Signs/symptoms consistent with tendinitis/tendinosis    []signs/symptoms consistent with pathology which may benefit from Dry needling      []other:      Prognosis/Rehab Potential:      []Excellent   [x]Good    []Fair   []Poor    Tolerance of evaluation/treatment:    []Excellent   [x]Good    []Fair   []Poor    PLAN  Frequency/Duration:  1-2 days per week for 4-6 Weeks:  Interventions:  [x]  Therapeutic exercise including: strength training, ROM, for Lower extremity and core   [x]  NMR activation and proprioception for LE, Glutes and Core   [x]  Manual therapy as indicated for LE, Hip and spine to include: Dry Needling/IASTM, STM, PROM, Gr I-IV mobilizations, manipulation. [x] Modalities as needed that may include: thermal agents, E-stim, Biofeedback, US, iontophoresis as indicated  [x] Patient education on joint protection, postural re-education, activity modification, progression of HEP. HEP instruction:   Access Code: [facility]  URL: [email or web address]  Date: 11/09/2022  Prepared by: Kevin Gloria    Exercises  Supine Hamstring Stretch with Strap - 1 x daily - 7 x weekly - 5 reps - 5sec hold  Supine Quadricep Sets - 1 x daily - 7 x weekly - 10 reps - 5sec hold  Hooklying Isometric Hip Abduction with Belt - 1 x daily - 7 x weekly - 10 reps - 5sec hold  Supine Hip Adduction Isometric with Ball - 1 x daily - 7 x weekly - 10 reps - 5sec hold  Supine Heel Slide with Strap - 1 x daily - 7 x weekly - 5-10 reps - 5sec hold  Standing Weight Shift Side to Side - 1 x daily - 7 x weekly - 5-10 reps - 5sec hold    GOALS:  Patient stated goal: reduce pain, improve walking     [] Progressing: [] Met: [] Not Met: [] Adjusted    Therapist goals for Patient:   Short Term Goals: To be achieved in: 2 weeks  1. Independent in HEP and progression per patient tolerance, in order to prevent re-injury. [] Progressing: [] Met: [] Not Met: [] Adjusted   2. Patient will have a decrease in pain to facilitate improvement in movement, function, and ADLs as indicated by Functional Deficits. [] Progressing: [] Met: [] Not Met: [] Adjusted    Long Term Goals: To be achieved in: 4-6 weeks  1. Disability index score of 50 or greater FOTO to assist with reaching prior level of function. [] Progressing: [] Met: [] Not Met: [] Adjusted  2. Patient will demonstrate increased AROM to 110+ flex, lacking 3 or less ext  to allow for proper joint functioning as indicated by patients Functional Deficits. [] Progressing: [] Met: [] Not Met: [] Adjusted  3. Patient will demonstrate an increase in Strength to 4/5 or greater to allow for proper functional mobility as indicated by patients Functional Deficits. [] Progressing: [] Met: [] Not Met: [] Adjusted  4. Patient will return to standing for greater than 5 minutes while cooking meals without increased symptoms or restriction.    [] Progressing: [] Met: [] Not Met: [] Adjusted       Physical Therapy Evaluation Complexity Justification  [] A history of present problem with:  [] no personal factors and/or comorbidities that impact the plan of care;  [x]1-2 personal factors and/or comorbidities that impact the plan of care  []3 personal factors and/or comorbidities that impact the plan of care  [] An examination of body systems using standardized tests and measures addressing any of the following: body structures and functions (impairments), activity limitations, and/or participation restrictions;:  [] a total of 1-2 or more elements   [x] a total of 3 or more elements   [] a total of 4 or more elements   [] A clinical presentation with:  [x] stable and/or uncomplicated characteristics   [] evolving clinical presentation with changing characteristics  [] unstable and unpredictable characteristics;   [] Clinical decision making of [] low, [] moderate, [] high complexity using standardized patient assessment instrument and/or measurable assessment of functional outcome.     [x] EVAL (LOW) 51798 (typically 20 minutes face-to-face)  [] EVAL (MOD) 99686 (typically 30 minutes face-to-face)  [] EVAL (HIGH) 84304 (typically 45 minutes face-to-face)  [] RE-EVAL 93208  Electronically signed by:    Nia Ross, PT, DPT, Cert DN

## 2022-11-09 NOTE — PROGRESS NOTES
12 [facility]  History and Physical  Shoulder Pain    Date:  2022    Name:  Krish Bhandari  Address:  [address] [address]    :  1965      Age:   61 y.o.    SSN:  [id number]      Medical Record Number:  [id number]    Reason for Visit:    Shoulder Pain (F/U BILAT SHOULDERS)      HPI:   Krish Bhandari is a 61 y.o. female who presents to our office today for follow up bilateral shoulder pain. Patient had previous MRI in the left which demonstrated left rotator cuff tendinosis in . She was treated conservatively with corticosteroid injection at that time 2021 at her last visit. She presents today with her . She states that her injection lasted approximately 2 months. Denies new injury or inciting event though states her pain has continued to worsen over the past year. She endorses pain with reaching for objects and lifting things. Of note patient has lost a significant amount of weight - over 150# - due to gastric sleeve. She does suffer from chronic back pain and has difficulty with ambulation. She is hoping to remove some of the excess skin. An Welsh  was available throughout the visit. Pain Assessment  Location of Pain: Shoulder  Location Modifiers: Left, Right  Severity of Pain: 9  Quality of Pain: Sharp  Duration of Pain: Persistent  Frequency of Pain: Constant  Aggravating Factors: Other (Comment), Stretching, Straightening, Exercise  Limiting Behavior: Yes  Relieving Factors: Rest  Work-Related Injury: No  Are there other pain locations you wish to document?: No    No notes on file    Review of Systems:  A 14 point review of systems available in the scanned medical record as documented by the patient and reviewed on 2022. The review is negative with the exception of those things mentioned in the History of Present Illness and Past Medical History.       Past Medical History:  Patient's medications, allergies, past medical, surgical, social and family histories were reviewed and updated as appropriate. Allergies:  No Known Allergies    Physical Exam:  There were no vitals filed for this visit. General: Andreas Sullivan is a healthy and well appearing 61 y.o. female who is sitting comfortably in our office in acute distress. Skin:  There are no skin lesions, cellulitis, or extreme edema. The patient has warm and well-perfused Bilateral upper extremities with brisk capillary refill. Eyes: Extra-ocular muscles intact  Mouth: Oral mucosa moist. No perioral lesions  Pulm: Respirations unlabored and regular. Neuro: Alert and oriented x3    Left shoulder Exam:  Inspection: No gross deformities, no signs of infection. Palpation: Mild subacromial crepitance    Active Range of Motion: Forward elevation of 120, abduction of 120, external rotation with elbow at the side 45    Strength: External rotation with resistance with elbow at the side 4/5, internal rotation with resistance with elbow at the side 4/5, Champagne toast testing 4/5, Jobes test 4/5    Special Tests: Positive impingement testing    Neurovascular: Sensation to light touch is intact, no motor deficits, palpable radial pulses 2+    Right shoulder Exam:  Inspection: No gross deformities, no signs of infection. Palpation: Mild subacromial crepitance    Active Range of Motion: Forward elevation of 130, abduction of 120, external rotation with elbow at the side 50    Strength: External rotation with resistance with elbow at the side 4/5, internal rotation with resistance with elbow at the side 5/5, Champagne toast testing 5/5, Jobes test 4/5    Special Tests: Positive impingement testing    Neurovascular: Sensation to light touch is intact, no motor deficits, palpable radial pulses 2+      Radiographic:  No new images obtained, previous imaging reviewed.     Assessment:  Andreas Sullivan is a 61 y.o. female suspected left shoulder rotator cuff tear; right shoulder rotator cuff tendinosis. She remains a poor surgical candidate because she uses her arms for ambulation. Impression:  Encounter Diagnoses   Name Primary? Rotator cuff disorder, left Yes    Chronic pain of both shoulders        Office Procedures:  Orders Placed This Encounter   Procedures    XR SHOULDER RIGHT (MIN 2 VIEWS)     Standing Status:   Future     Number of Occurrences:   1     Standing Expiration Date:   11/9/2023     Order Specific Question:   Reason for exam:     Answer:   PAIN    XR SHOULDER LEFT (MIN 2 VIEWS)     Standing Status:   Future     Number of Occurrences:   1     Standing Expiration Date:   11/9/2023     Order Specific Question:   Reason for exam:     Answer:   PAIN    MRI SHOULDER LEFT WO CONTRAST     Standing Status:   Future     Standing Expiration Date:   11/9/2023     Order Specific Question:   Reason for exam:     Answer:   Migel borja       Plan:   We discussed with [address] [place] [address] her diagnosis of suspected left shoulder rotator cuff tear and right shoulder rotator cuff tendinosis. She was provided with a corticosteroid injection into the right shoulder today. Due to her progressive symptoms on the left we will obtain an MRI to evaluate the rotator cuff as her last MRI was over 2 years ago. The patient will begin physical therapy for up rehab. Focus will be on nonweightbearing ambulation on the left with a quad cane      [address] [place] [address] will follow up after MRI results obtained. She was in agreement with this plan and all questions were answered to the patient's satisfaction. She was encouraged to call with any questions. 11/9/2022  4:46 PM    Greater than 30 minutes were expended during all aspects of today's visit. Wily Schneider D.O. Orthopedic Surgeon, [facility] Fellow    The encounter with the patient was supervised by Dr. Jordon Dodson who personally examined the patient and reviewed the plan.      This dictation was performed with a verbal recognition program (DRAGON) and it was checked for errors. It is possible that there are still dictated errors within this office note. If so, please bring any errors to my attention for an addendum. All efforts were made to ensure that this office note is accurate.  ____________________  I was physically present and personally supervised the Orthopaedic Sports Medicine Fellow in the evaluation and development of a treatment plan for this patient. I personally interviewed the patient and performed a physical examination. In addition, I discussed the patient's condition and treatment options with them. I have also reviewed and agree with the past medical, family and social history unless otherwise noted. All of the patient's questions were answered. Saad Velázquez MD, PhD  11/9/2022

## 2022-11-09 NOTE — FLOWSHEET NOTE
[facility] a Department of [address]  [facility] [address]  Office: [phone]  Fax:  [phone]                                                           Physical Therapy Daily Treatment Note  Date:  2022    Patient Name:  Miriam Richardson    :  1965  MRN: [id number]    Medical/Treatment Diagnosis Information:  Diagnosis: M17.11 (ICD-10-CM) - Primary osteoarthritis of right knee; M17.12 (ICD-10-CM) - Primary osteoarthritis of left knee  Treatment Diagnosis: M25.561, M25.562 Bilateral knee pain  Insurance/Certification information:  PT Insurance Information: [employer] - 20y; [facility] [place] [place] required for TE, NR, DN  Physician Information:  Referring Provider (secondary): Frederick  Has the plan of care been signed (Y/N):        [x]  Yes  []  No     Date of Patient follow up with Physician: not scheduled       Is this a Progress Report:     []  Yes  [x]  No        Progress report will be due (10 Rx or 30 days whichever is less):        Recertification will be due (POC Duration  / 90 days whichever is less):          Visit # Insurance Allowable Auth Required   1 20 visits (auth req for TE, NR, DN) []  Yes []  No        Functional Scale: foto 22    Date assessed:  22      Latex Allergy:  [x]NO      []YES  Preferred Language for Healthcare:   [x]English       []other:    Pain level:  6-7/10     SUBJECTIVE:  See eval    OBJECTIVE: 22  Observation:   Test measurements:       Flexibility L R Comment   Hamstring 20 30  SLR   Gastroc         ITB         Quad                                   ROM PROM AROM Overpressure Comment     L R L R L R     Flexion     105; pain 110; pain         Extension     Lacking 5 Lacking 3                                                    Strength L R Comment   Quad 4- 4-     Hamstring 4 4     Gastroc         Hip flexor 4- 4-     Hip ABD 4- 4- RESTRICTIONS/PRECAUTIONS:     Exercises/Interventions:     Exercise/Equipment Resistance/Repetitions Other comments   Stretching     Hamstring Rope supine 5 x10 sec mindy    Hip Flexion     ITB     Grion     Quad     Inclined Calf     Towel Pull          SLR     Supine     Prone     Abduction     Adducton     SLR+     Clams                    Isometrics     Quad sets 10x10    Hip Adduction 10x10    Hip abduction  10x10                   Patellar Glides     Medial     Superior     Inferior          ROM     Sheet Pulls     Wall Slides     Edge of bed     Flexionator     Extensionator     Hang Weights     Ankle Pumps                              CKC     Calf raises     Wall sits     Step ups     Step up and over     Lateral Step Downs               Mini squats     CC TKE          Lateral band walks     Side Planks     Half moon     Single leg flow          Biodex-balance     Single leg stance Weight shift 5sec x5 each side to tolerance     Plyoback          Stool scoots     SB bridge     SB HS Curls     Planks          PRE     Extension  RANGE: 90/40   Flexion  RANGE: 0/90        Cable Column          Leg Press  RANGE: 70/10        Bike     Treadmill                     Therapeutic Exercise and NMR EXR  [x] (88312) Provided verbal/tactile cueing for activities related to strengthening, flexibility, endurance, ROM for improvements in LE, proximal hip, and core control with self care, mobility, lifting, ambulation. [x] (32143) Provided verbal/tactile cueing for activities related to improving balance, coordination, kinesthetic sense, posture, motor skill, proprioception  to assist with LE, proximal hip, and core control in self care, mobility, lifting, ambulation and eccentric single leg control.      NMR and Therapeutic Activities:    [x] (81116 or 79785) Provided verbal/tactile cueing for activities related to improving balance, coordination, kinesthetic sense, posture, motor skill, proprioception and motor activation to allow for proper function of core, proximal hip and LE with self care and ADLs  [] (66127) Gait Re-education- Provided training and instruction to the patient for proper LE, core and proximal hip recruitment and positioning and eccentric body weight control with ambulation re-education including up and down stairs     Home Exercise Program:    [x] (35635) Reviewed/Progressed HEP activities related to strengthening, flexibility, endurance, ROM of core, proximal hip and LE for functional self-care, mobility, lifting and ambulation/stair navigation   [x] (43612)Reviewed/Progressed HEP activities related to improving balance, coordination, kinesthetic sense, posture, motor skill, proprioception of core, proximal hip and LE for self care, mobility, lifting, and ambulation/stair navigation      Manual Treatments:  PROM / STM / Oscillations-Mobs:  G-I, II, III, IV (PA's, Inf., Post.)  [] (50700) Provided manual therapy to mobilize LE, proximal hip and/or LS spine soft tissue/joints for the purpose of modulating pain, promoting relaxation,  increasing ROM, reducing/eliminating soft tissue swelling/inflammation/restriction, improving soft tissue extensibility and allowing for proper ROM for normal function with self care, mobility, lifting and ambulation. Modalities:      Charges:  Timed Code Treatment Minutes: 24   Total Treatment Minutes: 45     [x] EVAL (LOW) 20221   [] EVAL (MOD) 66561   [] EVAL (HIGH) 78661   [] RE-EVAL   [x] XN(35895) x  1   [] IONTO  [x] NMR (99351) x  1   [] VASO  [] Manual (93684) x      [] Other:  [] TA x      [] Mech Traction (08129)  [] ES(attended) (24406)      [] ES (un) (92133):       HEP instruction:   Access Code: [email or web address]  URL: [email or web address]  Date: 11/09/2022  Prepared by: Kirk Hager    Exercises  Supine Hamstring Stretch with Strap - 1 x daily - 7 x weekly - 5 reps - 5sec hold  Supine Quadricep Sets - 1 x daily - 7 x weekly - 10 reps - 5sec hold  Hooklying Isometric Hip Abduction with Belt - 1 x daily - 7 x weekly - 10 reps - 5sec hold  Supine Hip Adduction Isometric with Ball - 1 x daily - 7 x weekly - 10 reps - 5sec hold  Supine Heel Slide with Strap - 1 x daily - 7 x weekly - 5-10 reps - 5sec hold  Standing Weight Shift Side to Side - 1 x daily - 7 x weekly - 5-10 reps - 5sec hold    GOALS:  Patient stated goal: reduce pain, improve walking     [] Progressing: [] Met: [] Not Met: [] Adjusted    Therapist goals for Patient:   Short Term Goals: To be achieved in: 2 weeks  1. Independent in HEP and progression per patient tolerance, in order to prevent re-injury. [] Progressing: [] Met: [] Not Met: [] Adjusted   2. Patient will have a decrease in pain to facilitate improvement in movement, function, and ADLs as indicated by Functional Deficits. [] Progressing: [] Met: [] Not Met: [] Adjusted    Long Term Goals: To be achieved in: 4-6 weeks  1. Disability index score of 50 or greater FOTO to assist with reaching prior level of function. [] Progressing: [] Met: [] Not Met: [] Adjusted  2. Patient will demonstrate increased AROM to 110+ flex, lacking 3 or less ext  to allow for proper joint functioning as indicated by patients Functional Deficits. [] Progressing: [] Met: [] Not Met: [] Adjusted  3. Patient will demonstrate an increase in Strength to 4/5 or greater to allow for proper functional mobility as indicated by patients Functional Deficits. [] Progressing: [] Met: [] Not Met: [] Adjusted  4. Patient will return to standing for greater than 5 minutes while cooking meals without increased symptoms or restriction. [] Progressing: [] Met: [] Not Met: [] Adjusted    Overall Progression Towards Functional goals/ Treatment Progress Update:  [] Patient is progressing as expected towards functional goals listed. [] Progression is slowed due to complexities/Impairments listed. [] Progression has been slowed due to co-morbidities.   [x] Plan just implemented, too soon to assess goals progression <30days   [] Goals require adjustment due to lack of progress  [] Patient is not progressing as expected and requires additional follow up with physician  [] Other    Prognosis for POC: [x] Good [] Fair  [] Poor      Patient requires continued skilled intervention: [x] Yes  [] No    Treatment/Activity Tolerance:  [x] Patient able to complete treatment  [] Patient limited by fatigue  [] Patient limited by pain     [] Patient limited by other medical complications  [] Other:         Return to Play: (if applicable)   []  Stage 1: Intro to Strength   []  Stage 2: Return to Run and Strength   []  Stage 3: Return to Jump and Strength   []  Stage 4: Dynamic Strength and Agility   []  Stage 5: Sport Specific Training     []  Ready to Return to Play, Meets All Above Stages   []  Not Ready for Return to Sports   Comments:                               PLAN: See eval  [] Continue per plan of care [] Alter current plan (see comments above)  [x] Plan of care initiated [] Hold pending MD visit [] Discharge  Note: If patient does not return for scheduled/ recommended follow up visits, this note will serve as a discharge from care along with most recent update on progress. Reviewed insurance benefits for physical therapy in an outpatient hospital based setting with the patient, including deductible  and allowable visit number.  Pt was informed of possible out of pocket costs, as well as, informed of other service options for continuing supervised sessions without required skilled PT intervention such as the cash based [employer] program.     Electronically signed by:   Skylar Blankenship, PT, DPT, Cert DN

## 2022-11-10 ENCOUNTER — TELEPHONE (OUTPATIENT)
Dept: PRIMARY CARE CLINIC | Age: 57
End: 2022-11-10

## 2022-11-10 NOTE — TELEPHONE ENCOUNTER
Patient called and stated that she needs more powerful medication like percocet to get relief from her severe pain urgently    Currently she is taking    gabapentin (NEURONTIN) 300 MG capsule       She stated that the above medication is not working for her pain.     Please review and advice    Thanks

## 2022-11-11 DIAGNOSIS — R79.89 ELEVATED BRAIN NATRIURETIC PEPTIDE (BNP) LEVEL: Primary | ICD-10-CM

## 2022-11-11 RX ORDER — FUROSEMIDE 20 MG/1
20 TABLET ORAL DAILY
Qty: 90 TABLET | Refills: 1 | Status: SHIPPED | OUTPATIENT
Start: 2022-11-11 | End: 2022-11-17

## 2022-11-11 NOTE — TELEPHONE ENCOUNTER
She was referred to ortho and pain management. She saw Laura Harrison and Dr Akhil Deras. I do not rx percocet for chronic pain. She will need to discuss with ortho or Dr Moncho Loco.

## 2022-11-14 ENCOUNTER — OFFICE VISIT (OUTPATIENT)
Dept: ORTHOPEDIC SURGERY | Age: 57
End: 2022-11-14
Payer: COMMERCIAL

## 2022-11-14 ENCOUNTER — HOSPITAL ENCOUNTER (OUTPATIENT)
Dept: PHYSICAL THERAPY | Age: 57
Setting detail: THERAPIES SERIES
Discharge: HOME | End: 2022-11-14
Payer: COMMERCIAL

## 2022-11-14 VITALS — BODY MASS INDEX: 37.04 KG/M2 | HEIGHT: 67 IN | WEIGHT: 236 LBS

## 2022-11-14 DIAGNOSIS — M25.552 BILATERAL HIP PAIN: ICD-10-CM

## 2022-11-14 DIAGNOSIS — M70.62 TROCHANTERIC BURSITIS OF BOTH HIPS: Primary | ICD-10-CM

## 2022-11-14 DIAGNOSIS — M25.551 BILATERAL HIP PAIN: ICD-10-CM

## 2022-11-14 DIAGNOSIS — M70.61 TROCHANTERIC BURSITIS OF BOTH HIPS: Primary | ICD-10-CM

## 2022-11-14 PROCEDURE — 3017F COLORECTAL CA SCREEN DOC REV: CPT | Performed by: ORTHOPAEDIC SURGERY

## 2022-11-14 PROCEDURE — 1036F TOBACCO NON-USER: CPT | Performed by: ORTHOPAEDIC SURGERY

## 2022-11-14 PROCEDURE — G8427 DOCREV CUR MEDS BY ELIG CLIN: HCPCS | Performed by: ORTHOPAEDIC SURGERY

## 2022-11-14 PROCEDURE — 99214 OFFICE O/P EST MOD 30 MIN: CPT | Performed by: ORTHOPAEDIC SURGERY

## 2022-11-14 PROCEDURE — G8417 CALC BMI ABV UP PARAM F/U: HCPCS | Performed by: ORTHOPAEDIC SURGERY

## 2022-11-14 PROCEDURE — 97110 THERAPEUTIC EXERCISES: CPT

## 2022-11-14 PROCEDURE — G8484 FLU IMMUNIZE NO ADMIN: HCPCS | Performed by: ORTHOPAEDIC SURGERY

## 2022-11-14 RX ORDER — MELOXICAM 15 MG/1
15 TABLET ORAL DAILY
Qty: 30 TABLET | Refills: 0 | Status: SHIPPED | OUTPATIENT
Start: 2022-11-14

## 2022-11-14 NOTE — PROGRESS NOTES
Patient: Saman Rees  : 1965    MRN: [id number]    Date of Visit: 22    Attending Physician: Ese Morton    History of Present Illness  Ms. Susu Hall is a very pleasant 61 y.o. patient with a several year history of progressive BILATERAL hip pain. There is no precipitating event or trauma. The pain is located predominantly in the lateral hips  aggravated by weight bearing. Walking even short distances can be painful. However, it can also awaken the patient at night. She also describes some groin pain on the left side worse with activities. The patient has tried the following interventions without sustained functional improvement:    Structured exercise/physical therapy program  NSAIDs and or tylenol   Cane in the contralateral hand    She currently uses a wheelchair to get around she has electric wheelchair which is currently not working, she rarely uses a walker around the house.     PMH/PSH:  Past Medical History:   Diagnosis Date    Arthritis     TAN (dyspnea on exertion)     Gastroesophageal reflux disease 2020    Hyperlipidemia     Hypertension     Morbid obesity with body mass index (BMI) of 60.0 to 69.9 in [facility]) 2018    Neuropathy     SHYANN (obstructive sleep apnea)     Primary osteoarthritis of right knee 2018    Type 2 diabetes mellitus without complication ([facility] [address]     controlled with diet    Urinary incontinence      Patient Active Problem List   Diagnosis    Postlaminectomy syndrome of lumbar region    Lumbar radiculopathy    SHYANN (obstructive sleep apnea)    Chronic, continuous use of opioids    Morbid obesity with body mass index (BMI) of 60.0 to 69.9 in [facility])    Primary osteoarthritis of right knee    Primary osteoarthritis of left knee    Polyarthropathy, multiple sites    Chronic pain syndrome    Postmenopausal bleeding    Other spondylosis, cervical region    Pain disorder with psychological factors    Palpitations    Sinus bradycardia Dizziness    Gastroesophageal reflux disease    Urinary tract infection symptoms    Vitamin D deficiency    Bilateral leg pain    Cellulitis of lower extremity    Constipation    Hemorrhoids    Numbness and tingling of both feet    Nausea    Epigastric abdominal pain    Bilateral flank pain    Pain medication agreement    Other spondylosis, lumbar region    Stress fracture of left tibia    Left ankle pain    Peroneal tendon tear, left, initial encounter    H/O diabetic neuropathy    Chronic pain in right shoulder    Type 2 diabetes mellitus with diabetic polyneuropathy, without long-term current use of insulin (HCC)    Vaginal discharge    Vaginal itching    Precordial pain    Bilateral leg edema     Past Surgical History:   Procedure Laterality Date    BACK SURGERY      2001    BREAST LUMPECTOMY Bilateral     2015    DILATION AND CURETTAGE OF UTERUS N/A 08/30/2018    GASTRIC BYPASS SURGERY      SLEEVE GASTRECTOMY  10/13/2018       MEDS:  Scheduled Meds:  Continuous Infusions:  PRN Meds:  Current Meds:No current outpatient medications on file. ALLERGIES:  No Known Allergies      Social History:   Social History     Socioeconomic History    Marital status: Single     Spouse name: Not on file    Number of children: Not on file    Years of education: Not on file    Highest education level: Not on file   Social Needs    Financial resource strain: Not on file    Food insecurity - worry: Not on file    Food insecurity - inability: Not on file    Transportation needs - medical: Not on file    Transportation needs - non-medical: Not on file   Occupational History    Not on file   Tobacco Use    Smoking status: Never Smoker    Smokeless tobacco: Never Used   Substance and Sexual Activity    Alcohol use: No     Frequency: Never    Drug use: No    Sexual activity: Not on file   Other Topics Concern    Not on file   Social History Narrative    Not on file         Family History:   No family history on file.       Review of Systems:  No personal history of DVT, PE. 12 point ROS otherwise negative other than reported in HPI. Physical Examination:  Patient is alert and oriented x 3 and appears well nourished and appropriate for today's visit. Hips: Apparent leg lengths are equal.   There is tenderness over greater trochanteric region and pain with log roll BILATERALLY  ROM is NOT limited by pain . (at 90 degrees of flexion)   Right- IR: 10, ER 45;  Left- IR:10, ER 45 . Radiographs: AP/lateral hip and pelvis: Imaging was reviewed with the patient. There are mild degenerative changes of the LEFT hip with joint space narrowing, subchondral sclerosis, and osteophyte formation. There is no radiographic evidence of AVN, fracture, or dislocation. ??   Non Operative Treatment     Assessment and Plan?: The patient has mild degenerative changes of the LEFT hip and bilateral hip bursitis    We discussed the diagnosis and treatment options in detail with the patient. We have recommended non-steroidal anti-inflammatories, physical therapy, activity modification and weight loss. I provided her prescription today for meloxicam we discussed the risks and benefits. Regarding her bilateral hip bursitis I will have her referred to Dr. Melissa Pina who can perform ultrasound-guided injections additionally we will have her left hip injected. We will plan on re-assessing the status in 6-12 months, earlier if symptoms worsen.        ?___________________________   Rika Frederick MD    cc: Deepa Louie MD

## 2022-11-14 NOTE — FLOWSHEET NOTE
Intervention (91795)                                                 NMR re-education (39101)   CUES NEEDED                                                                   Therapeutic Activity (09049)                                          [employer] access code: [email or web address]           Therapeutic Exercise and NMR EXR  [x] (95142) Provided verbal/tactile cueing for activities related to strengthening, flexibility, endurance, ROM  for improvements in proximal hip and core control with self care, mobility, lifting and ambulation.  [] (42529) Provided verbal/tactile cueing for activities related to improving balance, coordination, kinesthetic sense, posture, motor skill, proprioception  to assist with core control in self care, mobility, lifting, and ambulation.      Therapeutic Activities:    [] (04213 or 44371) Provided verbal/tactile cueing for activities related to improving balance, coordination, kinesthetic sense, posture, motor skill, proprioception and motor activation to allow for proper function  with self care and ADLs  [] (33392) Provided training and instruction to the patient for proper core and proximal hip recruitment and positioning with ambulation re-education     Home Exercise Program:    [x] (75768) Reviewed/Progressed HEP activities related to strengthening, flexibility, endurance, ROM of core, proximal hip and LE for functional self-care, mobility, lifting and ambulation   [] (39269) Reviewed/Progressed HEP activities related to improving balance, coordination, kinesthetic sense, posture, motor skill, proprioception of core, proximal hip and LE for self care, mobility, lifting, and ambulation      Manual Treatments:  PROM / STM / Oscillations-Mobs:  G-I, II, III, IV (PA's, Inf., Post.)  [] (02567) Provided manual therapy to mobilize proximal hip and LS spine soft tissue/joints for the purpose of modulating pain, promoting relaxation,  increasing ROM, reducing/eliminating soft tissue swelling/inflammation/restriction, improving soft tissue extensibility and allowing for proper ROM for normal function with self care, mobility, lifting and ambulation. Modalities:  TENS NV   [] GAME READY (VASO)- for significant edema, swelling, pain control. Charges:  Timed Code Treatment Minutes: 40   Total Treatment Minutes:  40      [] EVAL (LOW) 81778 (typically 20 minutes face-to-face)  [] EVAL (MOD) 09959 (typically 30 minutes face-to-face)  [] EVAL (HIGH) 60534 (typically 45 minutes face-to-face)  [] RE-EVAL     [x] BA(92169) x   3  [] IONTO  [] NMR (73601) x     [] VASO  [] Manual (48551) x     [] Other:  [] TA x      [] Mech Traction (64217)  [] ES(attended) (54273)      [] ES (un) (97855):       ASSESSMENT:  Pt tolerated treatment well however requires gross extensive strengthening of core and bilat LE in order to improve functional tolerance. Pt is w/c bound in community and uses walker at home and has hx of opiod use prescribed via pain management, both factors that will slow progression in PT.       GOALS:     Patient stated goal: ? pain     Therapist goals for Patient:   Short Term Goals: To be achieved in: 2 weeks  1. Independent in HEP and progression per patient tolerance, in order to prevent re-injury. [x] Progressing: [] Met: [] Not Met: [] Adjusted  2. Patient will have a decrease in pain to facilitate improvement in movement, function, and ADLs as indicated by Functional Deficits. [x] Progressing: [] Met: [] Not Met: [] Adjusted     Long Term Goals: To be achieved in: 12 weeks  1. FOTO >/= D/C score to assist with reaching prior level of function. [x] Progressing: [] Met: [] Not Met: [] Adjusted  2. Patient will demonstrate increased AROM to WNL, good LS mobility, good hip ROM to allow for proper joint functioning as indicated by patients Functional Deficits. [x] Progressing: [] Met: [] Not Met: [] Adjusted  3.  Patient will demonstrate an increase in Strength to good proximal hip and core activation to allow for proper functional mobility as indicated by patients Functional Deficits. [] Progressing: [] Met: [] Not Met: [] Adjusted  4. Patient will return to all functional activities without increased symptoms or restriction. [x] Progressing: [] Met: [] Not Met: [] Adjusted  5. Pt will exhibit normalized gait pattern with AD. (patient specific functional goal)    [x] Progressing: [] Met: [] Not Met: [] Adjusted             Access Code: [email or web address]  URL: [email or web address]  Date: 11/09/2022  Prepared by: Enrique Hsieh    Exercises  Hooklying Single Knee to Chest Stretch - 2 x daily - 7 x weekly - 3 sets - 10 reps  Supine Hip Adduction Isometric with Ball - 2 x daily - 7 x weekly - 1 sets - 10 reps  Hooklying Clamshell with Resistance - 2 x daily - 7 x weekly - 2-3 sets - 10 reps  Supine Posterior Pelvic Tilt - 2 x daily - 7 x weekly - 2 sets - 10 reps      Overall Progression Towards Functional goals/ Treatment Progress Update:  [] Patient is progressing as expected towards functional goals listed. [] Progression is slowed due to complexities/Impairments listed. [] Progression has been slowed due to co-morbidities.   [x] Plan just implemented, too soon to assess goals progression <30days   [] Goals require adjustment due to lack of progress  [] Patient is not progressing as expected and requires additional follow up with physician  [] Other    Prognosis for POC: [x] Good [] Fair  [] Poor      Patient requires continued skilled intervention: [x] Yes  [] No    Treatment/Activity Tolerance:  [x] Patient able to complete treatment  [] Patient limited by fatigue  [] Patient limited by pain    [] Patient limited by other medical complications  [] Other:                    PLAN: See eval  [x] Continue per plan of care [] Alter current plan (see comments above)  [] Plan of care initiated [] Hold pending MD visit [] Discharge    Electronically signed by:  Thu Verdugo PT    Note: If patient does not return for scheduled/ recommended follow up visits, this note will serve as a discharge from care along with most recent update on progress.

## 2022-11-14 NOTE — TELEPHONE ENCOUNTER
Patient stated she is waiting for results for MRI from ortho before she can get any medication. She does not have any appointments made with Ortho however. Advised she will need to see ortho and follow up with them but patient is requesting if she can get a refill on the gabapentin instead since she has had that prescribed before.

## 2022-11-15 ENCOUNTER — TELEPHONE (OUTPATIENT)
Dept: ORTHOPEDIC SURGERY | Age: 57
End: 2022-11-15

## 2022-11-15 ENCOUNTER — PROCEDURE VISIT (OUTPATIENT)
Dept: CARDIOLOGY CLINIC | Age: 57
End: 2022-11-15
Payer: COMMERCIAL

## 2022-11-15 DIAGNOSIS — R60.0 BILATERAL LEG EDEMA: ICD-10-CM

## 2022-11-15 DIAGNOSIS — R00.1 SINUS BRADYCARDIA: ICD-10-CM

## 2022-11-15 DIAGNOSIS — R07.9 CHEST PAIN, UNSPECIFIED TYPE: ICD-10-CM

## 2022-11-15 LAB
LV EF: 58 %
LVEF MODALITY: NORMAL

## 2022-11-15 PROCEDURE — 93306 TTE W/DOPPLER COMPLETE: CPT | Performed by: INTERNAL MEDICINE

## 2022-11-15 NOTE — TELEPHONE ENCOUNTER
Prescription Refill     Medication Name MELOXICAM  Pharmacy: [address] [place] [address], [address] [place] [address], [address] [facility]  Patient Contact Number:  [phone]    Pt STATES PHARMACY DIDN'T HAVE HER RX. CAN YOU CHECK SO SEE IF IT WAS SENT AND TO THE CORRECT PHARMACY? PLEASE RESEND. PLEASE LET Pt KNOW THAT MED HAS CORRECTLY BEEN SENT @ THE # ABOVE.

## 2022-11-15 NOTE — TELEPHONE ENCOUNTER
Tried calling Pt back about what Pharmacy her medication was sent to. Medication was sent to [facility] on [address] [facility], [address].  Only got a busy signal

## 2022-11-16 ENCOUNTER — HOSPITAL ENCOUNTER (OUTPATIENT)
Dept: PHYSICAL THERAPY | Age: 57
Setting detail: THERAPIES SERIES
Discharge: HOME | End: 2022-11-16
Payer: COMMERCIAL

## 2022-11-16 PROCEDURE — 97140 MANUAL THERAPY 1/> REGIONS: CPT

## 2022-11-16 PROCEDURE — 97110 THERAPEUTIC EXERCISES: CPT

## 2022-11-16 NOTE — FLOWSHEET NOTE
[facility] [address] [facility]      Physical Therapy Treatment Note/ Progress Report:       Date:  2022    Patient Name:  Viola Duane    :  1965  MRN: [id number]  Restrictions/Precautions:    Medical/Treatment Diagnosis Information:  Diagnosis: M54.5, G89.29 chronic LBP, M54.16 lumbar radiculitis, Z98.890 h/ lumbosacral spine surgery  Treatment Diagnosis: M54.5 LBP  Insurance/Certification information:   [employer]aid  Physician Information:   MICHELLE Galeas  Has the plan of care been signed (Y/N):        []  Yes  [x]  No     Date of Patient follow up with Physician: not scheduled    Is this a Progress Report:     []  Yes  [x]  No      If Yes:  Date Range for reporting period:  Beginning:  ------------ Ending:     Progress report will be due (10 Rx or 30 days whichever is less):      Recertification will be due (POC Duration  / 90 days whichever is less): 23      Visit # Insurance Allowable Auth Required   In Person [address] [x]  Yes     []  No    Tele Health   []  Yes     []  No    Total 3       Functional Scale: FOTO    Date assessed:  NV      Latex Allergy:  [x]NO      []YES  Preferred Language for Healthcare:   []English       [x]other: Croatian ( present)    Pain level:  9/10     SUBJECTIVE: Pt reports she is feeling ok. She reports at home she typically uses her rolling walker (with seat). She does not have a quad cane, raised toilet seat or grab bars or seat in shower. Pt was told by Dr. Zahra Garcia she needs to start using a cane because she needs surgery on L shoulder and won't be able to use walker. Pt is only able to tolerate walking and standing x1 min before she has to sit.      OBJECTIVE: See eval  Observation: pt arrives in W/C, non ambulatory in clinic  Test measurements:      RESTRICTIONS/PRECAUTIONS: DM, HTN, obseity    Exercises/Interventions: pt is limited on time, depending on status of transportation service  Therapeutic Ex (26060) Sets/sec Reps Notes/CUES HEP   Hip ADD iso 10\" 10  x   Added 11/14 x   Supine clamshell 10\" 10 blueTB ? black TB NV x   SKTC 10\" 5  bilat x   PPT  5\" 15  x   Supine HS S 10\" 5 Added 11/14 x   Added 11/14 x   Added 11/14, 1#, bilat    Glute squeeze 10\" 10     Bridge 2 10            Pt edu: importance of strengthening in order to tolerate using a quad cane       Manual Intervention ([id number])       STM bilat paraspinals x8'                                         NMR re-education (31046)   CUES NEEDED                                                                   Therapeutic Activity (83076)                                          [employer] access code: [email or web address]           Therapeutic Exercise and NMR EXR  [x] (42423) Provided verbal/tactile cueing for activities related to strengthening, flexibility, endurance, ROM  for improvements in proximal hip and core control with self care, mobility, lifting and ambulation.  [] (78393) Provided verbal/tactile cueing for activities related to improving balance, coordination, kinesthetic sense, posture, motor skill, proprioception  to assist with core control in self care, mobility, lifting, and ambulation.      Therapeutic Activities:    [] (59259 or 23950) Provided verbal/tactile cueing for activities related to improving balance, coordination, kinesthetic sense, posture, motor skill, proprioception and motor activation to allow for proper function  with self care and ADLs  [] (37284) Provided training and instruction to the patient for proper core and proximal hip recruitment and positioning with ambulation re-education     Home Exercise Program:    [x] (04621) Reviewed/Progressed HEP activities related to strengthening, flexibility, endurance, ROM of core, proximal hip and LE for functional self-care, mobility, lifting and ambulation   [] (83984) Reviewed/Progressed HEP activities related to improving balance, coordination, kinesthetic sense, posture, motor skill, proprioception of core, proximal hip and LE for self care, mobility, lifting, and ambulation      Manual Treatments:  PROM / STM / Oscillations-Mobs:  G-I, II, III, IV (PA's, Inf., Post.)  [x] (52822) Provided manual therapy to mobilize proximal hip and LS spine soft tissue/joints for the purpose of modulating pain, promoting relaxation,  increasing ROM, reducing/eliminating soft tissue swelling/inflammation/restriction, improving soft tissue extensibility and allowing for proper ROM for normal function with self care, mobility, lifting and ambulation. Modalities:  TENS NV   [] GAME READY (VASO)- for significant edema, swelling, pain control. Charges:  Timed Code Treatment Minutes: 45   Total Treatment Minutes:  45      [] EVAL (LOW) 15026 (typically 20 minutes face-to-face)  [] EVAL (MOD) 53312 (typically 30 minutes face-to-face)  [] EVAL (HIGH) 36394 (typically 45 minutes face-to-face)  [] RE-EVAL     [x] RM(58402) x   2  [] IONTO  [] NMR (60879) x     [] VASO  [x] Manual (86014) x  1   [] Other:  [] TA x      [] Mech Traction (58915)  [] ES(attended) (23693)      [] ES (un) (43774):       ASSESSMENT:  Pt tolerated treatment well despite high level of pain. Pt exhibits co-morbidities that will limit progress including obesity, DM, peripheral neuropathy and chronic low back pain. Pt is also dealing with bilat hip, knee and shoulder pain. Pt has been told she needs to use a quad cane and transition away from walker in order to have shoulder surgery however pt has not used a cane in many years. PT and pt discussed that in order to do this safely, pt needs to really focus on building strength. GOALS:     Patient stated goal: ? pain     Therapist goals for Patient:   Short Term Goals: To be achieved in: 2 weeks  1. Independent in HEP and progression per patient tolerance, in order to prevent re-injury. [x] Progressing: [] Met: [] Not Met: [] Adjusted  2.  Patient will have a decrease in pain to facilitate improvement in movement, function, and ADLs as indicated by Functional Deficits. [x] Progressing: [] Met: [] Not Met: [] Adjusted     Long Term Goals: To be achieved in: 12 weeks  1. FOTO >/= D/C score to assist with reaching prior level of function. [x] Progressing: [] Met: [] Not Met: [] Adjusted  2. Patient will demonstrate increased AROM to WNL, good LS mobility, good hip ROM to allow for proper joint functioning as indicated by patients Functional Deficits. [x] Progressing: [] Met: [] Not Met: [] Adjusted  3. Patient will demonstrate an increase in Strength to good proximal hip and core activation to allow for proper functional mobility as indicated by patients Functional Deficits. [] Progressing: [] Met: [] Not Met: [] Adjusted  4. Patient will return to all functional activities without increased symptoms or restriction. [x] Progressing: [] Met: [] Not Met: [] Adjusted  5. Pt will exhibit normalized gait pattern with AD. (patient specific functional goal)    [x] Progressing: [] Met: [] Not Met: [] Adjusted             Access Code: [email or web address]  URL: [email or web address]  Date: 11/09/2022  Prepared by: Dinora Ground    Exercises  Hooklying Single Knee to Chest Stretch - 2 x daily - 7 x weekly - 3 sets - 10 reps  Supine Hip Adduction Isometric with Ball - 2 x daily - 7 x weekly - 1 sets - 10 reps  Hooklying Clamshell with Resistance - 2 x daily - 7 x weekly - 2-3 sets - 10 reps  Supine Posterior Pelvic Tilt - 2 x daily - 7 x weekly - 2 sets - 10 reps      Overall Progression Towards Functional goals/ Treatment Progress Update:  [] Patient is progressing as expected towards functional goals listed. [] Progression is slowed due to complexities/Impairments listed. [] Progression has been slowed due to co-morbidities.   [x] Plan just implemented, too soon to assess goals progression <30days   [] Goals require adjustment due to lack of progress  [] Patient is not progressing as expected and requires additional follow up with physician  [] Other    Prognosis for POC: [x] Good [] Fair  [] Poor      Patient requires continued skilled intervention: [x] Yes  [] No    Treatment/Activity Tolerance:  [x] Patient able to complete treatment  [] Patient limited by fatigue  [] Patient limited by pain    [] Patient limited by other medical complications  [] Other:                    PLAN: See eval  [x] Continue per plan of care [] Alter current plan (see comments above)  [] Plan of care initiated [] Hold pending MD visit [] Discharge    Electronically signed by:  Leif Armijo PT    Note: If patient does not return for scheduled/ recommended follow up visits, this note will serve as a discharge from care along with most recent update on progress.

## 2022-11-17 ENCOUNTER — HOSPITAL ENCOUNTER (OUTPATIENT)
Dept: MRI IMAGING | Age: 57
Discharge: HOME | End: 2022-11-17
Payer: COMMERCIAL

## 2022-11-17 ENCOUNTER — TELEPHONE (OUTPATIENT)
Dept: PRIMARY CARE CLINIC | Age: 57
End: 2022-11-17

## 2022-11-17 DIAGNOSIS — R79.89 ELEVATED BRAIN NATRIURETIC PEPTIDE (BNP) LEVEL: ICD-10-CM

## 2022-11-17 DIAGNOSIS — Z98.890 H/O LUMBOSACRAL SPINE SURGERY: ICD-10-CM

## 2022-11-17 DIAGNOSIS — G89.29 CHRONIC LOW BACK PAIN, UNSPECIFIED BACK PAIN LATERALITY, UNSPECIFIED WHETHER SCIATICA PRESENT: ICD-10-CM

## 2022-11-17 DIAGNOSIS — M54.16 LUMBAR RADICULITIS: ICD-10-CM

## 2022-11-17 DIAGNOSIS — M47.896 OTHER SPONDYLOSIS, LUMBAR REGION: ICD-10-CM

## 2022-11-17 DIAGNOSIS — M67.912 ROTATOR CUFF DISORDER, LEFT: ICD-10-CM

## 2022-11-17 DIAGNOSIS — G89.4 CHRONIC PAIN SYNDROME: ICD-10-CM

## 2022-11-17 DIAGNOSIS — M54.16 LUMBAR RADICULOPATHY: Primary | ICD-10-CM

## 2022-11-17 DIAGNOSIS — M96.1 POSTLAMINECTOMY SYNDROME OF LUMBAR REGION: ICD-10-CM

## 2022-11-17 DIAGNOSIS — M54.50 CHRONIC LOW BACK PAIN, UNSPECIFIED BACK PAIN LATERALITY, UNSPECIFIED WHETHER SCIATICA PRESENT: ICD-10-CM

## 2022-11-17 PROCEDURE — 6360000004 HC RX CONTRAST MEDICATION: Performed by: PHYSICIAN ASSISTANT

## 2022-11-17 PROCEDURE — A9579 GAD-BASE MR CONTRAST NOS,1ML: HCPCS | Performed by: PHYSICIAN ASSISTANT

## 2022-11-17 PROCEDURE — 73221 MRI JOINT UPR EXTREM W/O DYE: CPT

## 2022-11-17 PROCEDURE — 72158 MRI LUMBAR SPINE W/O & W/DYE: CPT

## 2022-11-17 RX ORDER — FUROSEMIDE 40 MG/1
40 TABLET ORAL DAILY
Qty: 30 TABLET | Refills: 5 | Status: SHIPPED | OUTPATIENT
Start: 2022-11-17

## 2022-11-17 RX ADMIN — GADOTERIDOL 20 ML: 279.3 INJECTION, SOLUTION INTRAVENOUS at 11:02

## 2022-11-17 NOTE — FLOWSHEET NOTE
[facility] a Department of [address]  [address] [place] [address], [address]  Office: [phone]  Fax:  [phone]                                                           Physical Therapy Daily Treatment Note  Date:  2022    Patient Name:  Juna Dancer    :  1965  MRN: [id number]    Medical/Treatment Diagnosis Information:  Diagnosis: M17.11 (ICD-10-CM) - Primary osteoarthritis of right knee; M17.12 (ICD-10-CM) - Primary osteoarthritis of left knee  Treatment Diagnosis: M25.561, M25.562 Bilateral knee pain  Insurance/Certification information:  PT Insurance Information: Brendan Santino - 20vcy; Pilar Plana required for TE, NR, DN  Physician Information:  Referring Provider (secondary): Jessica Chan  Has the plan of care been signed (Y/N):        [x]  Yes  []  No     Date of Patient follow up with Physician: not scheduled       Is this a Progress Report:     []  Yes  [x]  No        Progress report will be due (10 Rx or 30 days whichever is less):        Recertification will be due (POC Duration  / 90 days whichever is less):          Visit # Insurance Allowable Auth Required   2 for knee   (3 for lumbar at [address] [place] [address]) 20 visits (auth req for TE, NR, DN) []  Yes []  No        Functional Scale: foto 22    Date assessed:  22      Latex Allergy:  [x]NO      []YES  Preferred Language for Healthcare:   [x]English       []other:    Pain level:  6-7/10     SUBJECTIVE:  pt notes she has been doing alright, she feels like her knee dislocates when she is doing exercises and wants to know if there is something to help her with this.      AMN     OBJECTIVE: 22  Observation:   Test measurements:       Flexibility L R Comment   Hamstring 20 30  SLR   Gastroc         ITB         Quad                                   ROM PROM AROM Overpressure Comment     L R L R L R     Flexion     105; pain 110; pain Extension     Lacking 5 Lacking 3                                                    Strength L R Comment   Quad 4- 4-     Hamstring 4 4     Gastroc         Hip flexor 4- 4-     Hip ABD 4- 4-                                  RESTRICTIONS/PRECAUTIONS:     Exercises/Interventions:     Exercise/Equipment Resistance/Repetitions Other comments   Stretching     Hamstring Rope supine 5 x10 sec mindy    Hip Flexion     ITB     Grion     Quad     Inclined Calf     Towel Pull          SLR     Supine     Prone     Abduction X20 red band hooklying     Adducton     SLR+     Clams     LAQ X10 each    Seated marching X10 each          Isometrics     Quad sets 10x10    Hip Adduction 10x10    Hip abduction  10x10                   Patellar Glides     Medial     Superior     Inferior          ROM     Sheet Pulls     Wall Slides     Edge of bed     Flexionator     Extensionator     Hang Weights     Ankle Pumps                              CKC     Calf raises     Wall sits     Step ups     Step up and over     Lateral Step Downs               Mini squats     CC TKE          Lateral band walks     Side Planks     Half moon     Single leg flow          Biodex-balance     Single leg stance Weight shift 5sec x5 each side to tolerance     Plyoback          Stool scoots     SB bridge     SB HS Curls     Planks          PRE     Extension  RANGE: 90/40   Flexion  RANGE: 0/90        Cable Column          Leg Press  RANGE: 70/10        Bike     Treadmill                     Therapeutic Exercise and NMR EXR  [x] (23036) Provided verbal/tactile cueing for activities related to strengthening, flexibility, endurance, ROM for improvements in LE, proximal hip, and core control with self care, mobility, lifting, ambulation.   [x] (77296) Provided verbal/tactile cueing for activities related to improving balance, coordination, kinesthetic sense, posture, motor skill, proprioception  to assist with LE, proximal hip, and core control in self care, mobility, lifting, ambulation and eccentric single leg control. NMR and Therapeutic Activities:    [x] (23625 or 25353) Provided verbal/tactile cueing for activities related to improving balance, coordination, kinesthetic sense, posture, motor skill, proprioception and motor activation to allow for proper function of core, proximal hip and LE with self care and ADLs  [] (29160) Gait Re-education- Provided training and instruction to the patient for proper LE, core and proximal hip recruitment and positioning and eccentric body weight control with ambulation re-education including up and down stairs     Home Exercise Program:    [x] (87020) Reviewed/Progressed HEP activities related to strengthening, flexibility, endurance, ROM of core, proximal hip and LE for functional self-care, mobility, lifting and ambulation/stair navigation   [x] (14382)Reviewed/Progressed HEP activities related to improving balance, coordination, kinesthetic sense, posture, motor skill, proprioception of core, proximal hip and LE for self care, mobility, lifting, and ambulation/stair navigation      Manual Treatments:  PROM / STM / Oscillations-Mobs:  G-I, II, III, IV (PA's, Inf., Post.)  [] (43464) Provided manual therapy to mobilize LE, proximal hip and/or LS spine soft tissue/joints for the purpose of modulating pain, promoting relaxation,  increasing ROM, reducing/eliminating soft tissue swelling/inflammation/restriction, improving soft tissue extensibility and allowing for proper ROM for normal function with self care, mobility, lifting and ambulation.      Modalities:      Charges:  Timed Code Treatment Minutes: 15   Total Treatment Minutes: 40     [] EVAL (LOW) 98780   [] EVAL (MOD) 52551   [] EVAL (HIGH) 85474   [] RE-EVAL   [] FF(37316) x    [] IONTO  [] NMR (55631) x     [] VASO  [] Manual (62398) x      [] Other:  [x] TA x 1     [] Mech Traction (51950)  [] ES(attended) (11685)      [] ES (un) (31840):       HEP instruction: Access Code: [facility]  URL: [email or web address]  Date: 11/09/2022  Prepared by: Jimmy Lubin    Exercises  Supine Hamstring Stretch with Strap - 1 x daily - 7 x weekly - 5 reps - 5sec hold  Supine Quadricep Sets - 1 x daily - 7 x weekly - 10 reps - 5sec hold  Hooklying Isometric Hip Abduction with Belt - 1 x daily - 7 x weekly - 10 reps - 5sec hold  Supine Hip Adduction Isometric with Ball - 1 x daily - 7 x weekly - 10 reps - 5sec hold  Supine Heel Slide with Strap - 1 x daily - 7 x weekly - 5-10 reps - 5sec hold  Standing Weight Shift Side to Side - 1 x daily - 7 x weekly - 5-10 reps - 5sec hold    GOALS:  Patient stated goal: reduce pain, improve walking     [] Progressing: [] Met: [] Not Met: [] Adjusted    Therapist goals for Patient:   Short Term Goals: To be achieved in: 2 weeks  1. Independent in HEP and progression per patient tolerance, in order to prevent re-injury. [] Progressing: [] Met: [] Not Met: [] Adjusted   2. Patient will have a decrease in pain to facilitate improvement in movement, function, and ADLs as indicated by Functional Deficits. [] Progressing: [] Met: [] Not Met: [] Adjusted    Long Term Goals: To be achieved in: 4-6 weeks  1. Disability index score of 50 or greater FOTO to assist with reaching prior level of function. [] Progressing: [] Met: [] Not Met: [] Adjusted  2. Patient will demonstrate increased AROM to 110+ flex, lacking 3 or less ext  to allow for proper joint functioning as indicated by patients Functional Deficits. [] Progressing: [] Met: [] Not Met: [] Adjusted  3. Patient will demonstrate an increase in Strength to 4/5 or greater to allow for proper functional mobility as indicated by patients Functional Deficits. [] Progressing: [] Met: [] Not Met: [] Adjusted  4. Patient will return to standing for greater than 5 minutes while cooking meals without increased symptoms or restriction.    [] Progressing: [] Met: [] Not Met: [] Adjusted    Overall Progression Towards Functional goals/ Treatment Progress Update:  [] Patient is progressing as expected towards functional goals listed. [] Progression is slowed due to complexities/Impairments listed. [] Progression has been slowed due to co-morbidities. [x] Plan just implemented, too soon to assess goals progression <30days   [] Goals require adjustment due to lack of progress  [] Patient is not progressing as expected and requires additional follow up with physician  [] Other    Prognosis for POC: [x] Good [] Fair  [] Poor      Patient requires continued skilled intervention: [x] Yes  [] No    Treatment/Activity Tolerance:  [x] Patient able to complete treatment  [] Patient limited by fatigue  [] Patient limited by pain     [] Patient limited by other medical complications  [] Other: Pt had some difficulty completing activities today as she continued to feel like her knee was shifting and dislocating. MD office contacted about an order for knee braces to assist.     We also had a conversation about transferring to Aquatic PT at the [address] [place] [address] location to continue with strengthening and then transition back to land PT as able. Return to Play: (if applicable)   []  Stage 1: Intro to Strength   []  Stage 2: Return to Run and Strength   []  Stage 3: Return to Jump and Strength   []  Stage 4: Dynamic Strength and Agility   []  Stage 5: Sport Specific Training     []  Ready to Return to Play, Meets All Above Stages   []  Not Ready for Return to Sports   Comments:                               PLAN: See eval  [] Continue per plan of care [] Alter current plan (see comments above)  [x] Plan of care initiated [] Hold pending MD visit [] Discharge  Note: If patient does not return for scheduled/ recommended follow up visits, this note will serve as a discharge from care along with most recent update on progress.     Reviewed insurance benefits for physical therapy in an outpatient hospital based setting with the patient, including deductible  and allowable visit number.  Pt was informed of possible out of pocket costs, as well as, informed of other service options for continuing supervised sessions without required skilled PT intervention such as the cash based [employer] program.     Electronically signed by:   Elmira Chamorro, PT, DPT, Cert DN

## 2022-11-18 ENCOUNTER — HOSPITAL ENCOUNTER (OUTPATIENT)
Dept: PHYSICAL THERAPY | Age: 57
Setting detail: THERAPIES SERIES
Discharge: HOME | End: 2022-11-18
Payer: COMMERCIAL

## 2022-11-18 ENCOUNTER — TELEPHONE (OUTPATIENT)
Dept: ORTHOPEDIC SURGERY | Age: 57
End: 2022-11-18

## 2022-11-18 ENCOUNTER — TELEPHONE (OUTPATIENT)
Dept: CARDIOLOGY CLINIC | Age: 57
End: 2022-11-18

## 2022-11-18 DIAGNOSIS — M89.8X8 MASS OF SPINE: Primary | ICD-10-CM

## 2022-11-18 DIAGNOSIS — Z98.890 H/O LUMBOSACRAL SPINE SURGERY: ICD-10-CM

## 2022-11-18 DIAGNOSIS — M48.062 LUMBAR STENOSIS WITH NEUROGENIC CLAUDICATION: ICD-10-CM

## 2022-11-18 DIAGNOSIS — M54.16 LUMBAR RADICULITIS: ICD-10-CM

## 2022-11-18 PROCEDURE — 97530 THERAPEUTIC ACTIVITIES: CPT

## 2022-11-18 NOTE — TELEPHONE ENCOUNTER
Per your lab result note you wanted her on 20 mg of Lasix, but there is an order for 40 mg.   Please clarify

## 2022-11-18 NOTE — TELEPHONE ENCOUNTER
Patient called regarding the Lasix medication. She was prescribed 40mg of Lasix and she has a 20mg of Lasix. Patient wants to know if she needs to take both bottles or just one bottle and which MG.   Patients wants a call back at [phone].    ffurosemide (LASIX) 40 MG tablet [[id number]]     Order Details  Dose: 40 mg Route: Oral Frequency: DAILY   Dispense Quantity: 30 tablet Refills: 5          Sig: Take 1 tablet by mouth daily

## 2022-11-18 NOTE — TELEPHONE ENCOUNTER
Spoke with pt, gave her Dr Guillermo Lubin recommendations. Pt will contact the office next week if the Lasix is not effective.

## 2022-11-18 NOTE — TELEPHONE ENCOUNTER
Reviewed L MRI w/Dr. Coral Lubin homogeneously enhancing mass lower thoracic canal measuring 1.5 cm; favoring meningioma. Intraosseous lesion T10. Multilevel varying degrees of central and foraminal stenosis. Cholelithiasis. Dr. Yony Wright recommends patient see [facility] for consultation--referral was placed. I discussed these test results in detail with the patient using [employer] language services and an Setswana . All questions were answered. Referral and information for [facility] placed and will also be mailed to the patient. She may discuss cholelithiasis with PCP.           Mehrdad Manzanares PA-C

## 2022-11-18 NOTE — TELEPHONE ENCOUNTER
Call patient. Gabapentin prescribed by Eugene Stone NP has a refill on it. She needs to see a Pain Management for Percocet.  Eugene Stone NP has already ordered a Pain Management referral.

## 2022-11-21 ENCOUNTER — TELEPHONE (OUTPATIENT)
Dept: CARDIOLOGY CLINIC | Age: 57
End: 2022-11-21

## 2022-11-21 ENCOUNTER — APPOINTMENT (OUTPATIENT)
Dept: GENERAL RADIOLOGY | Age: 57
End: 2022-11-21
Payer: COMMERCIAL

## 2022-11-21 ENCOUNTER — HOSPITAL ENCOUNTER (EMERGENCY)
Age: 57
Discharge: HOME | End: 2022-11-21
Attending: EMERGENCY MEDICINE
Payer: COMMERCIAL

## 2022-11-21 ENCOUNTER — HOSPITAL ENCOUNTER (OUTPATIENT)
Dept: CT IMAGING | Age: 57
Discharge: HOME | End: 2022-11-21
Payer: COMMERCIAL

## 2022-11-21 ENCOUNTER — HOSPITAL ENCOUNTER (OUTPATIENT)
Dept: PHYSICAL THERAPY | Age: 57
Setting detail: THERAPIES SERIES
Discharge: HOME | End: 2022-11-21
Payer: COMMERCIAL

## 2022-11-21 VITALS
BODY MASS INDEX: 37.18 KG/M2 | SYSTOLIC BLOOD PRESSURE: 139 MMHG | TEMPERATURE: 97.9 F | HEART RATE: 69 BPM | OXYGEN SATURATION: 100 % | HEIGHT: 67 IN | RESPIRATION RATE: 16 BRPM | WEIGHT: 236.9 LBS | DIASTOLIC BLOOD PRESSURE: 61 MMHG

## 2022-11-21 VITALS — HEART RATE: 45 BPM

## 2022-11-21 DIAGNOSIS — R00.1 SINUS BRADYCARDIA: ICD-10-CM

## 2022-11-21 DIAGNOSIS — R00.1 SINUS BRADYCARDIA: Primary | ICD-10-CM

## 2022-11-21 DIAGNOSIS — R07.9 CHEST PAIN, UNSPECIFIED TYPE: ICD-10-CM

## 2022-11-21 DIAGNOSIS — R60.0 BILATERAL LEG EDEMA: ICD-10-CM

## 2022-11-21 DIAGNOSIS — D64.9 ANEMIA, UNSPECIFIED TYPE: ICD-10-CM

## 2022-11-21 LAB
A/G RATIO: 1.5 (ref 1.1–2.2)
ALBUMIN SERPL-MCNC: 3.5 G/DL (ref 3.4–5)
ALP BLD-CCNC: 58 U/L (ref 40–129)
ALT SERPL-CCNC: 10 U/L (ref 10–40)
ANION GAP SERPL CALCULATED.3IONS-SCNC: 6 MMOL/L (ref 3–16)
AST SERPL-CCNC: 10 U/L (ref 15–37)
BASOPHILS ABSOLUTE: 0 K/UL (ref 0–0.2)
BASOPHILS RELATIVE PERCENT: 0.5 %
BILIRUB SERPL-MCNC: 0.3 MG/DL (ref 0–1)
BUN BLDV-MCNC: 18 MG/DL (ref 7–20)
CALCIUM SERPL-MCNC: 8.5 MG/DL (ref 8.3–10.6)
CHLORIDE BLD-SCNC: 108 MMOL/L (ref 99–110)
CO2: 26 MMOL/L (ref 21–32)
CREAT SERPL-MCNC: <0.5 MG/DL (ref 0.6–1.1)
EKG ATRIAL RATE: 46 BPM
EKG DIAGNOSIS: NORMAL
EKG P AXIS: 36 DEGREES
EKG P-R INTERVAL: 186 MS
EKG Q-T INTERVAL: 468 MS
EKG QRS DURATION: 92 MS
EKG QTC CALCULATION (BAZETT): 409 MS
EKG R AXIS: 5 DEGREES
EKG T AXIS: 26 DEGREES
EKG VENTRICULAR RATE: 46 BPM
EOSINOPHILS ABSOLUTE: 0.1 K/UL (ref 0–0.6)
EOSINOPHILS RELATIVE PERCENT: 1.5 %
GFR SERPL CREATININE-BSD FRML MDRD: >60 ML/MIN/{1.73_M2}
GLUCOSE BLD-MCNC: 91 MG/DL (ref 70–99)
HCT VFR BLD CALC: 32.8 % (ref 36–48)
HEMOGLOBIN: 10.7 G/DL (ref 12–16)
LYMPHOCYTES ABSOLUTE: 1.6 K/UL (ref 1–5.1)
LYMPHOCYTES RELATIVE PERCENT: 34.3 %
MCH RBC QN AUTO: 28.8 PG (ref 26–34)
MCHC RBC AUTO-ENTMCNC: 32.7 G/DL (ref 31–36)
MCV RBC AUTO: 88.1 FL (ref 80–100)
MONOCYTES ABSOLUTE: 0.5 K/UL (ref 0–1.3)
MONOCYTES RELATIVE PERCENT: 11.2 %
NEUTROPHILS ABSOLUTE: 2.5 K/UL (ref 1.7–7.7)
NEUTROPHILS RELATIVE PERCENT: 52.5 %
PDW BLD-RTO: 15.4 % (ref 12.4–15.4)
PLATELET # BLD: 237 K/UL (ref 135–450)
PMV BLD AUTO: 7 FL (ref 5–10.5)
POTASSIUM REFLEX MAGNESIUM: 4.3 MMOL/L (ref 3.5–5.1)
RBC # BLD: 3.72 M/UL (ref 4–5.2)
SODIUM BLD-SCNC: 140 MMOL/L (ref 136–145)
TOTAL PROTEIN: 5.8 G/DL (ref 6.4–8.2)
TROPONIN: <0.01 NG/ML
WBC # BLD: 4.8 K/UL (ref 4–11)

## 2022-11-21 PROCEDURE — 85025 COMPLETE CBC W/AUTO DIFF WBC: CPT

## 2022-11-21 PROCEDURE — 36415 COLL VENOUS BLD VENIPUNCTURE: CPT

## 2022-11-21 PROCEDURE — 93005 ELECTROCARDIOGRAM TRACING: CPT

## 2022-11-21 PROCEDURE — 80053 COMPREHEN METABOLIC PANEL: CPT

## 2022-11-21 PROCEDURE — 6370000000 HC RX 637 (ALT 250 FOR IP): Performed by: STUDENT IN AN ORGANIZED HEALTH CARE EDUCATION/TRAINING PROGRAM

## 2022-11-21 PROCEDURE — 71045 X-RAY EXAM CHEST 1 VIEW: CPT

## 2022-11-21 PROCEDURE — 6360000004 HC RX CONTRAST MEDICATION: Performed by: INTERNAL MEDICINE

## 2022-11-21 PROCEDURE — 99285 EMERGENCY DEPT VISIT HI MDM: CPT

## 2022-11-21 PROCEDURE — 84484 ASSAY OF TROPONIN QUANT: CPT

## 2022-11-21 PROCEDURE — 75574 CT ANGIO HRT W/3D IMAGE: CPT

## 2022-11-21 RX ORDER — NITROGLYCERIN 0.4 MG/1
0.4 TABLET SUBLINGUAL ONCE
Status: COMPLETED | OUTPATIENT
Start: 2022-11-21 | End: 2022-11-21

## 2022-11-21 RX ADMIN — IOPAMIDOL 75 ML: 755 INJECTION, SOLUTION INTRAVENOUS at 13:45

## 2022-11-21 RX ADMIN — NITROGLYCERIN 0.4 MG: 0.4 TABLET SUBLINGUAL at 13:20

## 2022-11-21 ASSESSMENT — PAIN - FUNCTIONAL ASSESSMENT: PAIN_FUNCTIONAL_ASSESSMENT: NONE - DENIES PAIN

## 2022-11-21 NOTE — FLOWSHEET NOTE
Physical Therapy  Cancellation/No-show Note  Patient Name:  Johana Doyle  :  1965   Date:  2022  Cancelled visits to date: 1  No-shows to date: 0    For today's appointment patient:  [x]  Cancelled  []  Rescheduled appointment  []  No-show     Reason given by patient:  [x]  Patient ill (in ED, with low HR)  []  Conflicting appointment  []  No transportation    []  Conflict with work  []  No reason given  []  Other:     Comments:      Electronically signed by:  Lyubov Richmond PT, PT

## 2022-11-21 NOTE — ED PROVIDER NOTES
[address] [facility] RESIDENT NOTE       Date of evaluation: 11/21/2022    Chief Complaint     Bradycardia (Patient was at a CAT Scan and noted to have a HR of 38.)      History of Present Illness     Festus Leventhal is a 61 y.o. female with a history of sinus bradycardia, type 2 diabetes, SHYANN who presents for evaluation of bradycardia    Here from her outpatient radiologist appointment for CT cardiac (ordered for chest pain evaluation)  At this radiology appointment, noted to have bradycardia as low as 38  Due to this, the scan was not obtained and she was instructed to go to the emergency department  Did not receive any medications and did not take any of her home medications this morning  Not had anything to eat or drink this morning  She periodically fasts a few times a week as well    She does have a well-documented history of sinus bradycardia on chart review  She has seen Dr. Kristine Martínez for this    Her sinus bradycardia does not appear to be particularly symptomatic  She describes dozing off and being unsure if she fell asleep or not while watching TV  She denies any classic description of presyncope or syncope including lightheadedness, no symptoms when standing  Is unable to tell when her heart rate is slow  Does not have any chest pain today or in the emergency department    Other than stated above, no associated symptoms or alleviating factors      Review of Systems     A complete ROS was performed and is otherwise negative except as described above.     Past Medical, Surgical, Family, and Social History     She has a past medical history of Arthritis, TAN (dyspnea on exertion), Gastroesophageal reflux disease, Hyperlipidemia, Hypertension, Morbid obesity with body mass index (BMI) of 60.0 to 69.9 in adult ([address] Neuropathy, SHYANN (obstructive sleep apnea), Primary osteoarthritis of right knee, Type 2 diabetes mellitus without complication ([address] and Urinary incontinence. Patient Active Problem List   Diagnosis    Postlaminectomy syndrome of lumbar region    Lumbar radiculopathy    SHYANN (obstructive sleep apnea)    Chronic, continuous use of opioids    Morbid obesity with body mass index (BMI) of 60.0 to 69.9 in adult [facility])    Primary osteoarthritis of right knee    Primary osteoarthritis of left knee    Polyarthropathy, multiple sites    Chronic pain syndrome    Postmenopausal bleeding    Other spondylosis, cervical region    Pain disorder with psychological factors    Palpitations    Sinus bradycardia    Dizziness    Gastroesophageal reflux disease    Urinary tract infection symptoms    Vitamin D deficiency    Bilateral leg pain    Cellulitis of lower extremity    Constipation    Hemorrhoids    Numbness and tingling of both feet    Nausea    Epigastric abdominal pain    Bilateral flank pain    Pain medication agreement    Other spondylosis, lumbar region    Stress fracture of left tibia    Left ankle pain    Peroneal tendon tear, left, initial encounter    H/O diabetic neuropathy    Chronic pain in right shoulder    Type 2 diabetes mellitus with diabetic polyneuropathy, without long-term current use of insulin (HCC)    Vaginal discharge    Vaginal itching    Precordial pain    Bilateral leg edema       She has a past surgical history that includes back surgery; Breast lumpectomy (Bilateral); Dilation and curettage of uterus (N/A, 08/30/2018); Gastric bypass surgery; and Sleeve Gastrectomy (10/13/2018). Her family history includes Cancer in her maternal aunt and paternal aunt; Diabetes in her maternal aunt, maternal grandmother, and mother; Heart Disease in her father; High Blood Pressure in her father; Osteoarthritis in her father and mother; Other in her father; Stroke in her mother. She reports that she has never smoked. She has never used smokeless tobacco. She reports that she does not drink alcohol and does not use drugs.     Medications     Previous Medications    FUROSEMIDE (LASIX) 40 MG TABLET    Take 1 tablet by mouth daily    GABAPENTIN (NEURONTIN) 300 MG CAPSULE    Take 1 capsule by mouth 3 times daily for 180 days. Intended supply: 30 days    MELOXICAM (MOBIC) 15 MG TABLET    Take 1 tablet by mouth daily    OMEPRAZOLE (PRILOSEC) 20 MG DELAYED RELEASE CAPSULE    TAKE 1 CAPSULE BY MOUTH EVERY DAY IN THE MORNING BEFORE BREAKFAST       Allergies     She has No Known Allergies. Physical Exam     INITIAL VITALS: BP: 115/66, Temp: 97.9 °F (36.6 °C), Heart Rate: (!) 43, Resp: 14, SpO2: 99 %     Triage and nursing notes reviewed. General: well-appearing, no acute distress  Head: atraumatic, normocephalic  Eyes: PERRL, EOM intact, sclerae normal  ENT: oropharynx moist and clear, no nasal discharge  Neck: supple, ROM intact, no significant cervical LNA  Resp: breathing comfortably on room air, speaks in full sentences, breath sounds clear bilaterally  Cards: regular rate and rhythm, normal S1 S2 without murmur, rubs, or gallops  Vasc: extremities warm and well-perfused, distal pulses 2+ throughout, cap refill <2s   Abd: soft, non-distended, non-tender without peritoneal signs  Ext/MSK: extremities atraumatic, no peripheral edema  Skin: warm and dry, color appropriate, no rashes on examined skin  Neuro: alert and oriented, responds to questions appropriately, no focal deficit, coordination and strength grossly intact    DiagnosticResults     EKG   Interpreted in conjunction with emergency department attending physician Dr. Carballo Floor  Rhythm: sinus bradycardia  Rate: 45  Axis: normal  Ectopy: none  Conduction: normal  ST Segments: no acute change  T Waves:no acute change  Q Waves: none  Clinical Impression: sinus bradycardia  Comparison:  Prior EKGs with sinus bradycardia in 40-50s    RADIOLOGY:  XR CHEST PORTABLE   Final Result   1. No acute cardiopulmonary findings.           LABS:   Results for orders placed or performed during the hospital encounter of 11/21/22 CMP w/ Reflex to MG   Result Value Ref Range    Sodium 140 136 - 145 mmol/L    Potassium reflex Magnesium 4.3 3.5 - 5.1 mmol/L    Chloride 108 99 - 110 mmol/L    CO2 26 21 - 32 mmol/L    Anion Gap 6 3 - 16    Glucose 91 70 - 99 mg/dL    BUN 18 7 - 20 mg/dL    Creatinine <0.5 (L) 0.6 - 1.1 mg/dL    Est, Glom Filt Rate >60 >60    Calcium 8.5 8.3 - 10.6 mg/dL    Total Protein 5.8 (L) 6.4 - 8.2 g/dL    Albumin 3.5 3.4 - 5.0 g/dL    Albumin/Globulin Ratio 1.5 1.1 - 2.2    Total Bilirubin 0.3 0.0 - 1.0 mg/dL    Alkaline Phosphatase 58 40 - 129 U/L    ALT 10 10 - 40 U/L    AST 10 (L) 15 - 37 U/L   CBC with Auto Differential   Result Value Ref Range    WBC 4.8 4.0 - 11.0 K/uL    RBC 3.72 (L) 4.00 - 5.20 M/uL    Hemoglobin 10.7 (L) 12.0 - 16.0 g/dL    Hematocrit 32.8 (L) 36.0 - 48.0 %    MCV 88.1 80.0 - 100.0 fL    MCH 28.8 26.0 - 34.0 pg    MCHC 32.7 31.0 - 36.0 g/dL    RDW 15.4 12.4 - 15.4 %    Platelets 670 672 - 610 K/uL    MPV 7.0 5.0 - 10.5 fL    Neutrophils % 52.5 %    Lymphocytes % 34.3 %    Monocytes % 11.2 %    Eosinophils % 1.5 %    Basophils % 0.5 %    Neutrophils Absolute 2.5 1.7 - 7.7 K/uL    Lymphocytes Absolute 1.6 1.0 - 5.1 K/uL    Monocytes Absolute 0.5 0.0 - 1.3 K/uL    Eosinophils Absolute 0.1 0.0 - 0.6 K/uL    Basophils Absolute 0.0 0.0 - 0.2 K/uL   Troponin   Result Value Ref Range    Troponin <0.01 <0.01 ng/mL   EKG 12 Lead   Result Value Ref Range    Ventricular Rate 46 BPM    Atrial Rate 46 BPM    P-R Interval 186 ms    QRS Duration 92 ms    Q-T Interval 468 ms    QTc Calculation (Bazett) 409 ms    P Axis 36 degrees    R Axis 5 degrees    T Axis 26 degrees    Diagnosis       EKG performed in ER and to be interpreted by ER physician. Confirmed by MD, ER ([id number]),  Ulysses Council ([id number]) on 11/21/2022 2:32:33 PM       ED BEDSIDE ULTRASOUND:  No results found.     RECENT VITALS:  BP: 139/61, Temp: 97.9 °F (36.6 °C), Heart Rate: 69,Resp: 16, SpO2: 100 %     Procedures     None    ED Course     Nursing Notes, Past Medical Hx, Past Surgical Hx, Social Hx, Allergies, and Family Hx were reviewed. The patient was given the followingmedications:  No orders of the defined types were placed in this encounter. CONSULTS:  [facility] / PLAN     In summary, this is a 61 y.o. female presenting with sinus bradycardia. He has a well-documented history of sinus bradycardia. This is not appear to be particularly symptomatic, the time she describes are bit atypical.  I discussed this with the cardiologist on-call, who did not feel she needed inpatient admission at this time, but recommended close follow-up with Dr. Marcia Carter for evaluation and possible Holter monitor for similar. Patient's work-up today in the emergency department was unrevealing. She did not take any blood pressure medications or other medications that would explain her bradycardia, but again, this appears to be a chronic issue. I instructed the patient to call Dr. Sheyla Saha office in the morning to schedule an appointment. I also sent him a Epic message to help facilitate/ensure close follow-up. I instructed the patient on strict ED return precautions and also recommended that she avoid fasting given her bradycardia and anemia. Hgb 10.6 from 11.7 one year ago, no s/s acute bleeding, discussed with the patient and recommended she follow-up with her primary care doctor as scheduled within the next couple weeks to further discuss. I spoke with her daughter as requested who is a urologist to discuss this. Patient agreeable to discharge, states understands plan and return precautions    This patient was also evaluated by the attending physician. All care plans were discussed and agreed upon. Clinical Impression     1. Sinus bradycardia    2.  Anemia, unspecified type        Disposition     PATIENT REFERRED TO:  Brandon Aguirre MD  \[facility]\"" [address]  [facility] [address]  [phone]    Call   schedule appointment within 1 week for follow-up    [facility] Emergency Department  [address] [place] [address]  Go to   As needed, If symptoms worsen    Berenice Cross MD  [address] [place] [address]  [phone]    Schedule an appointment as soon as possible for a visit in 2 weeks  within 2 weeks for follow-up, discuss hemoglobin    DISCHARGE MEDICATIONS:  New Prescriptions    No medications on file       DISPOSITION Discharge - Pending Orders Complete 11/21/2022 06:58:31 PM       Casandra Langston MD  Resident  11/21/22 1430

## 2022-11-21 NOTE — TELEPHONE ENCOUNTER
Anneliese Ceballos from [facility] called regarding patient blood pressure. Anneliese Ceballos stated her blood pressure is 38 to 40 and the patient states she feels dizzy. Patient is currently at Cardiac CTA and wants to know if she should send her to ER. Mellisa wants a call  back at [phone].

## 2022-11-21 NOTE — PROGRESS NOTES
Pt here for Cardiac CTA test.  Pts heart rate was low throughout procedure - see flow sheet. Administered 0.4mg nitroglycerin sublingual for exam.  Dr. Ivett Phoenix office was called to make aware that pts heart rate was ranging from 38BPM - 60BPM. Pt has no c/o chest pain and is A&O x 3. Pt states she is feeling \"run down\". Dr. Jacqueline Bernstein office recommended pt go to ER for evaluation and work up.

## 2022-11-22 ENCOUNTER — TELEPHONE (OUTPATIENT)
Dept: ORTHOPEDIC SURGERY | Age: 57
End: 2022-11-22

## 2022-11-22 ENCOUNTER — TELEPHONE (OUTPATIENT)
Dept: PRIMARY CARE CLINIC | Age: 57
End: 2022-11-22

## 2022-11-22 ENCOUNTER — OFFICE VISIT (OUTPATIENT)
Dept: ORTHOPEDIC SURGERY | Age: 57
End: 2022-11-22
Payer: COMMERCIAL

## 2022-11-22 DIAGNOSIS — M70.61 TROCHANTERIC BURSITIS OF BOTH HIPS: ICD-10-CM

## 2022-11-22 DIAGNOSIS — M70.62 TROCHANTERIC BURSITIS OF BOTH HIPS: ICD-10-CM

## 2022-11-22 DIAGNOSIS — M67.952 TENDINOPATHY OF LEFT GLUTEUS MEDIUS: ICD-10-CM

## 2022-11-22 DIAGNOSIS — Z98.84 STATUS POST BARIATRIC SURGERY: ICD-10-CM

## 2022-11-22 DIAGNOSIS — E66.01 MORBID OBESITY (HCC): ICD-10-CM

## 2022-11-22 DIAGNOSIS — M67.951 TENDINOPATHY OF RIGHT GLUTEUS MEDIUS: ICD-10-CM

## 2022-11-22 PROCEDURE — 99202 OFFICE O/P NEW SF 15 MIN: CPT | Performed by: INTERNAL MEDICINE

## 2022-11-22 PROCEDURE — 20611 DRAIN/INJ JOINT/BURSA W/US: CPT | Performed by: INTERNAL MEDICINE

## 2022-11-22 PROCEDURE — 1036F TOBACCO NON-USER: CPT | Performed by: INTERNAL MEDICINE

## 2022-11-22 PROCEDURE — 3017F COLORECTAL CA SCREEN DOC REV: CPT | Performed by: INTERNAL MEDICINE

## 2022-11-22 PROCEDURE — G8417 CALC BMI ABV UP PARAM F/U: HCPCS | Performed by: INTERNAL MEDICINE

## 2022-11-22 PROCEDURE — G8484 FLU IMMUNIZE NO ADMIN: HCPCS | Performed by: INTERNAL MEDICINE

## 2022-11-22 PROCEDURE — G8428 CUR MEDS NOT DOCUMENT: HCPCS | Performed by: INTERNAL MEDICINE

## 2022-11-22 RX ORDER — BUPIVACAINE HYDROCHLORIDE 2.5 MG/ML
8 INJECTION, SOLUTION INFILTRATION; PERINEURAL ONCE
Status: COMPLETED | OUTPATIENT
Start: 2022-11-22 | End: 2022-11-22

## 2022-11-22 RX ORDER — BETAMETHASONE SODIUM PHOSPHATE AND BETAMETHASONE ACETATE 3; 3 MG/ML; MG/ML
6 INJECTION, SUSPENSION INTRA-ARTICULAR; INTRALESIONAL; INTRAMUSCULAR; SOFT TISSUE ONCE
Status: COMPLETED | OUTPATIENT
Start: 2022-11-22 | End: 2022-11-22

## 2022-11-22 RX ADMIN — BETAMETHASONE SODIUM PHOSPHATE AND BETAMETHASONE ACETATE 6 MG: 3; 3 INJECTION, SUSPENSION INTRA-ARTICULAR; INTRALESIONAL; INTRAMUSCULAR; SOFT TISSUE at 12:43

## 2022-11-22 RX ADMIN — BUPIVACAINE HYDROCHLORIDE 20 MG: 2.5 INJECTION, SOLUTION INFILTRATION; PERINEURAL at 12:44

## 2022-11-22 RX ADMIN — BUPIVACAINE HYDROCHLORIDE 20 MG: 2.5 INJECTION, SOLUTION INFILTRATION; PERINEURAL at 12:45

## 2022-11-22 NOTE — TELEPHONE ENCOUNTER
MRI LUMBAR SPINE APPROVED. Leeann Yuan # [id number] DATE RANGE 11/01/22-01/30/23     Called, left message, order faxed.  KB

## 2022-11-22 NOTE — TELEPHONE ENCOUNTER
Care Transitions Initial Follow Up Call    Outreach made within 2 business days of discharge: Yes    Patient: Festus Leventhal Patient : 1965   MRN: [id number]  Reason for Admission: There are no discharge diagnoses documented for the most recent discharge. Discharge Date: 22       Spoke with: My chart message    Discharge department/facility: [facility]     Patient has a hospital follow up with cardiology scheduled.      Scheduled appointment with PCP within 7-14 days    Follow Up  Future Appointments   Date Time Provider Leo Santiago   2022  9:45 AM MD Graham Avalos Card [facility]   2022  2:15 PM MD LILLI Salomon Ortho [facility]   2022 10:15 AM Verdia Pitch, PT [facility]   2022 10:30 AM MD SCOT Ortiz [facility] [place] - DYD   2022 10:20 AM Liz Knight KW AUDIO [facility]   2022 11:20 AM Halle Mg DO MHPHYTAY [facility]   2022  8:45 AM Verdia Pitch, PT AMBROCIO [facility]   2022  9:20 AM Maren Chen MD W CHEST ORTH [facility]   2022 11:40 AM Dimitrios Mayorga MD  NEURO Neurology -   12/15/2022 10:45 AM Ori Nolan MD [facility] CRSTVW [facility]   2022 11:30 AM Bret Murcia MD [facility] Pain [facility]       [facility]

## 2022-11-22 NOTE — LETTER
[address]  [address] [facility] [address]  Phone: [phone]  Fax: [phone]           Isi Allen MD      November 27, 2022     Patient: Vanesa Carter   MR Number: [id number]   YOB: 1965   Date of Visit: 11/22/2022       Dear Dr. Chrystine Gottron:    Thank you for referring Kavya Diaz to me for evaluation/treatment. Below are the relevant portions of my assessment and plan of care. Impression: . Encounter Diagnoses   Name Primary?  Trochanteric bursitis of both hips     Tendinopathy of right gluteus medius     Tendinopathy of left gluteus medius     Morbid obesity ([address]     Status post bariatric surgery               Plan:     Postinjection protocol and clinical follow-up with Dr. Aurora Weller as scheduled  Clinical follow-up in 3 weeks and consider ultrasound-guided steroid injection left femoral acetabular joint as requested by Dr. Aurora Weller for working diagnosis of osteoarthritis   Consider advanced imaging of the hip abductor tendon complex bilateral hips as needed      The nature of the finding, probable diagnosis and likely treatment was thoroughly discussed with the patient. The options, risks, complications, alternative treatment as well as some of the differential diagnosis was discussed. The patient was thoroughly informed and all questions were answered. the patient indicated understanding and satisfaction with the discussion.       Orders:        Orders Placed This Encounter   Procedures    US GUIDED NEEDLE PLACEMENT     Standing Status:   Future     Number of Occurrences:   1     Standing Expiration Date:   11/22/2023     Order Specific Question:   Reason for exam:     Answer:   GT CSIs    [facility] - [place] (Ortho & Sports)-OSR     Referral Priority:   Routine     Referral Type:   Eval and Treat     Referral Reason:   Specialty Services Required     Requested Specialty:   Physical Therapist Number of Visits Requested:   1    [place] ARTHROCENTESIS ASPIR&/INJ MAJOR JT/BURSA W/O US           Disclaimer: \"This note was dictated with voice recognition software. Though review and correction are routine, we apologize for any errors. \"             If you have questions, please do not hesitate to call me. I look forward to following Amel along with you.     Sincerely,        Krystle Don MD    CC providers:    No Recipients

## 2022-11-22 NOTE — DISCHARGE INSTR - COC
Continuity of Care Form    Patient Name: Ese Green   :  1965  MRN:  [id number]    Admit date:  2022  Discharge date:  ***    Code Status Order: No Order   Advance Directives:     Admitting Physician:  No admitting provider for patient encounter.   PCP: Louann Cazares MD    Discharging Nurse: [facility] Unit/Room#: A07/A07-07  Discharging Unit Phone Number: ***    Emergency Contact:   Extended Emergency Contact Information  Primary Emergency Contact: [address] Phone: [phone]  Relation: Unknown    Past Surgical History:  Past Surgical History:   Procedure Laterality Date    BACK SURGERY          BREAST LUMPECTOMY Bilateral     2015    DILATION AND CURETTAGE OF UTERUS N/A 2018    GASTRIC BYPASS SURGERY      SLEEVE GASTRECTOMY  10/13/2018       Immunization History:   Immunization History   Administered Date(s) Administered    COVID-19, PFIZER PURPLE top, DILUTE for use, (age 15 y+), 30mcg/0.3mL 2021, 2021    Influenza, AFLURIA (age 1 yrs+), FLUZONE, (age 10 mo+), MDV, 0.5mL 2018    Influenza, FLUARIX, FLULAVAL, FLUZONE (age 10 mo+) AND AFLURIA, (age 1 y+), PF, 0.5mL 2020    Pneumococcal Polysaccharide (Eijjdziou96) 2018    Tdap (Boostrix, Adacel) 2020       Active Problems:  Patient Active Problem List   Diagnosis Code    Postlaminectomy syndrome of lumbar region M96.1    Lumbar radiculopathy M54.16    SHYANN (obstructive sleep apnea) G47.33    Chronic, continuous use of opioids F11.90    Morbid obesity with body mass index (BMI) of 60.0 to 69.9 in adult ([address] E66.01, Z68.44    Primary osteoarthritis of right knee M17.11    Primary osteoarthritis of left knee M17.12    Polyarthropathy, multiple sites M13.0    Chronic pain syndrome G89.4    Postmenopausal bleeding N95.0    Other spondylosis, cervical region M47.892    Pain disorder with psychological factors F45.42    Palpitations R00.2    Sinus bradycardia R00.1    Dizziness R42    Gastroesophageal reflux disease K21.9    Urinary tract infection symptoms R39.9    Vitamin D deficiency E55.9    Bilateral leg pain M79.604, M79.605    Cellulitis of lower extremity L03.119    Constipation K59.00    Hemorrhoids K64.9    Numbness and tingling of both feet R20.0, R20.2    Nausea R11.0    Epigastric abdominal pain R10.13    Bilateral flank pain R10.9    Pain medication agreement Z02.89    Other spondylosis, lumbar region M47.896    Stress fracture of left tibia M84.362A    Left ankle pain M25.572    Peroneal tendon tear, left, initial encounter S86.312A    H/O diabetic neuropathy Z86.39    Chronic pain in right shoulder M25.511, G89.29    Type 2 diabetes mellitus with diabetic polyneuropathy, without long-term current use of insulin ([facility]) E11.42    Vaginal discharge N89.8    Vaginal itching N89.8    Precordial pain R07.2    Bilateral leg edema R60.0       Isolation/Infection:   Isolation            No Isolation          Patient Infection Status       None to display            Nurse Assessment:  Last Vital Signs: /61   Pulse 69   Temp 97.9 °F (36.6 °C) (Oral)   Resp 16   Ht 5' 7\" (1.702 m)   Wt 236 lb 14.4 oz (107.5 kg)   SpO2 100%   BMI 37.10 kg/m²     Last documented pain score (0-10 scale):    Last Weight:   Wt Readings from Last 1 Encounters:   22 236 lb 14.4 oz (107.5 kg)     Mental Status:  {IP PT MENTAL STATUS:73292}    IV Access:  { INDRA IV ACCESS:431623492}    Nursing Mobility/ADLs:  Walking   {P DME NXYL:903887943}  Transfer  {P DME EXRA:839431763}  Bathing  {CHP DME SQXJ:585901782}  Dressing  {CHP DME HJZI:587954141}  Toileting  {P DME HCCI:229676533}  Feeding  {P DME HAJF:528422490}  Med Admin  {P DME BVTN:732477147}  Med Delivery   { INDRA MED Delivery:331816679}    Wound Care Documentation and Therapy:        Elimination:  Continence:    Bowel: {YES / W}  Bladder: {YES / TF:26152}  Urinary Catheter: {Urinary Catheter:098164650}   Colostomy/Ileostomy/Ileal Conduit: {YES / DO:56608}       Date of Last BM: ***  No intake or output data in the 24 hours ending 22 1900  No intake/output data recorded.     Safety Concerns:     [address] Safety Concerns:095823998}    Impairments/Disabilities:      [address] [facility] Impairments/Disabilities:228825400}    Nutrition Therapy:  Current Nutrition Therapy:   [address] [facility] Diet List:879653159}    Routes of Feeding: {CHP DME Other Feedings:402245826}  Liquids: {Slp liquid thickness:52344}  Daily Fluid Restriction: {CHP DME Yes amt example:095446557}  Last Modified Barium Swallow with Video (Video Swallowing Test): {Done Not Done HRSB:359957412}    Treatments at the Time of Hospital Discharge:   Respiratory Treatments: ***  Oxygen Therapy:  {Therapy; copd oxygen:20599}  Ventilator:    { CC Vent KAUN:141842371}    Rehab Therapies: {THERAPEUTIC INTERVENTION:6191309893}  Weight Bearing Status/Restrictions: [address]  Weight Bearin}  Other Medical Equipment (for information only, NOT a DME order):  {EQUIPMENT:591811896}  Other Treatments: ***    Patient's personal belongings (please select all that are sent with patient):  {P DME Belongings:332183427}    RN SIGNATURE:  {Esignature:871777039}    CASE MANAGEMENT/SOCIAL WORK SECTION    Inpatient Status Date: ***    Readmission Risk Assessment Score:  Readmission Risk              Risk of Unplanned Readmission:  0           Discharging to Facility/ Agency   Name:   Address:  Phone:  Fax:    Dialysis Facility (if applicable)   Name:  Address:  Dialysis Schedule:  Phone:  Fax:    / signature: {Esignature:776806719}    PHYSICIAN SECTION    Prognosis: {Prognosis:5661450390}    Condition at Discharge: [address] Patient Condition:995309486}    Rehab Potential (if transferring to Rehab): {Prognosis:7569109667}    Recommended Labs or Other Treatments After Discharge: ***    Physician Certification: I certify the above information and transfer of Ese Green  is necessary for the continuing treatment of the diagnosis listed and that she requires {Admit to Appropriate Level of Care:86029} for {GREATER/LESS:304528945} 30 days.      Update Admission H&P: {CHP DME Changes in USUK:064375936}    PHYSICIAN SIGNATURE:  {Esignature:351106846}

## 2022-11-23 NOTE — PROGRESS NOTES
Chief Complaint:   Chief Complaint   Patient presents with    Hip Pain          History of Present Illness:       Patient is a 61 y.o. female presents with the above complaint. She is seen in consultation at the request of Dr. Destin Sorto for consideration of ultrasound-guided injections bilateral subgluteus ciera bursa. The symptoms are constant  and are being from right to left since the onset. Pain localizeslateral, over greater trochanter and  does not seems to follow  provacative pattern suggestive of greater trochanteric pain syndrome. There are not mechanical symptoms that suggest a labral injury. Pain level 9    The patient denies a pattern of activity related swelling. Treatment to date:Injection lateral hip bilaterally 6/3/2021     There is no prior history of hip trauma. There is no prior history of autoimmune disease, autoimmune disease, or crystal arthropathy.            Past Medical History:        Past Medical History:   Diagnosis Date    Arthritis     TAN (dyspnea on exertion)     Gastroesophageal reflux disease 1/28/2020    Hyperlipidemia     Hypertension     Morbid obesity with body mass index (BMI) of 60.0 to 69.9 in adult [facility]) 7/2/2018    Neuropathy     SHYANN (obstructive sleep apnea)     Primary osteoarthritis of right knee 7/2/2018    Type 2 diabetes mellitus without complication ([facility] [address]     controlled with diet    Urinary incontinence          Past Surgical History:   Procedure Laterality Date    BACK SURGERY      2001    BREAST LUMPECTOMY Bilateral     2015    DILATION AND CURETTAGE OF UTERUS N/A 08/30/2018    GASTRIC BYPASS SURGERY      SLEEVE GASTRECTOMY  10/13/2018         Present Medications:         Current Outpatient Medications   Medication Sig Dispense Refill    furosemide (LASIX) 40 MG tablet Take 1 tablet by mouth daily 30 tablet 5    meloxicam (MOBIC) 15 MG tablet Take 1 tablet by mouth daily 30 tablet 0    gabapentin (NEURONTIN) 300 MG capsule Take 1 capsule by mouth 3 times daily for 180 days. Intended supply: 30 days 90 capsule 1    omeprazole (PRILOSEC) 20 MG delayed release capsule TAKE 1 CAPSULE BY MOUTH EVERY DAY IN THE MORNING BEFORE BREAKFAST 90 capsule 0     No current facility-administered medications for this visit. Allergies:      No Known Allergies     Social History:         Social History     Socioeconomic History    Marital status:      Spouse name: Not on file    Number of children: Not on file    Years of education: Not on file    Highest education level: Not on file   Occupational History    Not on file   Tobacco Use    Smoking status: Never    Smokeless tobacco: Never   Vaping Use    Vaping Use: Never used   Substance and Sexual Activity    Alcohol use: No    Drug use: No    Sexual activity: Never   Other Topics Concern    Not on file   Social History Narrative    ** Merged History Encounter **          Social Determinants of Health     Financial Resource Strain: Low Risk     Difficulty of Paying Living Expenses: Not hard at all   Food Insecurity: No Food Insecurity    Worried About Running Out of Food in the Last Year: Never true    Ran Out of Food in the Last Year: Never true   Transportation Needs: Not on file   Physical Activity: Not on file   Stress: Not on file   Social Connections: Not on file   Intimate Partner Violence: Not on file   Housing Stability: Not on file        Review of Symptoms:    Pertinent items are noted in HPI    Review of systems reviewed from Patient History Form dated on 10/26/2022 and   available in the patient's chart under the Media tab. Vital Signs: There were no vitals filed for this visit. General Exam:     Constitutional: Patient is adequately groomed with no evidence of malnutrition  Mental Status: The patient is oriented to time, place and person. The patient's mood and affect are appropriate. Vascular: Examination reveals no swelling or calf tenderness.   Peripheral pulses are palpable and 2+. Lymphatics: no lymphadenopathy of the inguinal region or lower extremity      Physical Exam: bilateral hip     General: Morbidly obese body habitus no acute distress   Primary Exam:    Inspection: No deformity atrophy appreciable effusion      Palpation: Tenderness over the hip abductor complex greater trochanter bilaterally      Skin: There are no rashes, ulcerations or lesions. Gait: Assist device dependent      Neurovascular - non focal and intact       Additional Comments:        Additional Examinations:                   Office Imaging Results/Procedures PerformedToday:           Office Procedures:     Orders Placed This Encounter   Procedures    US GUIDED NEEDLE PLACEMENT     Standing Status:   Future     Number of Occurrences:   1     Standing Expiration Date:   11/22/2023     Order Specific Question:   Reason for exam:     Answer:   GT CSIs    [facility] (Ortho [facility])-OSR     Referral Priority:   Routine     Referral Type:   Eval and Treat     Referral Reason:   Specialty Services Required     Requested Specialty:   Physical Therapist     Number of Visits Requested:   1    [place] ARTHROCENTESIS ASPIR&/INJ MAJOR JT/BURSA W/O US     Logic E ultrasound  5 MHz      The patient was positioned in  RT        decubitus position. A support was placed between the thighs. The curvilinear probe was placed short axis over the hip abductor complex and the fascial plane between the gluteus medius and gluteus ciera tendons was identified. The visualization of this interface was enhanced with passive range of motion in internal rotation. Sterile prep was performed and topical anesthetic was utilized. A 25-gauge needle was advanced using longitudinal technique subcutaneously at approximate 3-5 mL of 0.5% Marcaine was injected under direct guidance. A 22-gauge needle was then advanced in the same needle tract advancing the needle into the subgluteus ciera space.   Using exchange of syringe technique and 1 mL of Celestone and 3 mL of 0.5% Marcaine was injected And the bursa was visualized to hydrodissect with injectate. This was first performed on the left then performed on the right using the same exact technique. Image stored. Patient tolerated this with minimal discomfort    Technically successful ultrasound guided injection. Other Outside Imaging and Testing Personally Reviewed:                  Assessment   Impression: . Encounter Diagnoses   Name Primary? Trochanteric bursitis of both hips     Tendinopathy of right gluteus medius     Tendinopathy of left gluteus medius     Morbid obesity (HCC)     Status post bariatric surgery               Plan:     Postinjection protocol and clinical follow-up with Dr. Basia Barrios as scheduled  Clinical follow-up in 3 weeks and consider ultrasound-guided steroid injection left femoral acetabular joint as requested by Dr. Basia Barrios for working diagnosis of osteoarthritis   Consider advanced imaging of the hip abductor tendon complex bilateral hips as needed      The nature of the finding, probable diagnosis and likely treatment was thoroughly discussed with the patient. The options, risks, complications, alternative treatment as well as some of the differential diagnosis was discussed. The patient was thoroughly informed and all questions were answered. the patient indicated understanding and satisfaction with the discussion.       Orders:        Orders Placed This Encounter   Procedures    US GUIDED NEEDLE PLACEMENT     Standing Status:   Future     Number of Occurrences:   1     Standing Expiration Date:   11/22/2023     Order Specific Question:   Reason for exam:     Answer:   GT CSIs    [facility] [place] [place] ([facility])-OSR     Referral Priority:   Routine     Referral Type:   Eval and Treat     Referral Reason:   Specialty Services Required     Requested Specialty:   Physical Therapist     Number of Visits Requested:   1    [place] ARTHROCENTESIS ASPIR&/INJ MAJOR JT/BURSA W/O US           Disclaimer: \"This note was dictated with voice recognition software. Though review and correction are routine, we apologize for any errors. \"

## 2022-11-28 ENCOUNTER — OFFICE VISIT (OUTPATIENT)
Dept: CARDIOLOGY CLINIC | Age: 57
End: 2022-11-28

## 2022-11-28 VITALS
WEIGHT: 230.4 LBS | OXYGEN SATURATION: 99 % | SYSTOLIC BLOOD PRESSURE: 104 MMHG | BODY MASS INDEX: 36.09 KG/M2 | HEART RATE: 56 BPM | DIASTOLIC BLOOD PRESSURE: 70 MMHG

## 2022-11-28 DIAGNOSIS — R00.1 SINUS BRADYCARDIA: ICD-10-CM

## 2022-11-28 DIAGNOSIS — R07.2 PRECORDIAL PAIN: ICD-10-CM

## 2022-11-28 DIAGNOSIS — R60.0 BILATERAL LEG EDEMA: ICD-10-CM

## 2022-11-28 DIAGNOSIS — G47.33 OSA (OBSTRUCTIVE SLEEP APNEA): Primary | ICD-10-CM

## 2022-11-28 ASSESSMENT — ENCOUNTER SYMPTOMS
COLOR CHANGE: 0
BLOOD IN STOOL: 0
EYE DISCHARGE: 0
CHEST TIGHTNESS: 0
ABDOMINAL DISTENTION: 0
ABDOMINAL PAIN: 0
BACK PAIN: 0
FACIAL SWELLING: 0
VOMITING: 0
COUGH: 0
WHEEZING: 0
SHORTNESS OF BREATH: 0

## 2022-11-28 NOTE — PROGRESS NOTES
[address] [facility]     Outpatient Cardiology         Chief Complaint   Patient presents with    Bradycardia       HPI     Farrah Nicolas a 61 y.o. female with PMH of HLD, HTN, SHYANN, DM II, palpations. Here for follow up for bradycardia. Bradycardia, noticed during CAT scan. Previously she has been bradycardic without any symptoms. Bilateral lower extremity edema, very mild elevated BNP. Symptoms have completely resolved after increasing Lasix dose to 40 mg daily. Today we discussed taking it daily versus as needed. Chest pain, noncardiac in nature. CTA within normal limits.   We also discussed getting a sleep study as well        PMH  Past Medical History:   Diagnosis Date    Arthritis     TAN (dyspnea on exertion)     Gastroesophageal reflux disease 1/28/2020    Hyperlipidemia     Hypertension     Morbid obesity with body mass index (BMI) of 60.0 to 69.9 in adult ([facility] [address] 7/2/2018    Neuropathy     SHYANN (obstructive sleep apnea)     Primary osteoarthritis of right knee 7/2/2018    Type 2 diabetes mellitus without complication ([facility] [address]     controlled with diet    Urinary incontinence        PSH  Past Surgical History:   Procedure Laterality Date    BACK SURGERY      2001    BREAST LUMPECTOMY Bilateral     2015    DILATION AND CURETTAGE OF UTERUS N/A 08/30/2018    GASTRIC BYPASS SURGERY      SLEEVE GASTRECTOMY  10/13/2018        Social HIstory  Social History     Tobacco Use    Smoking status: Never    Smokeless tobacco: Never   Vaping Use    Vaping Use: Never used   Substance Use Topics    Alcohol use: No    Drug use: No       Family History  Family History   Problem Relation Age of Onset    Diabetes Mother     Stroke Mother     Osteoarthritis Mother     Heart Disease Father     Other Father     High Blood Pressure Father     Osteoarthritis Father     Diabetes Maternal Aunt     Cancer Maternal Aunt     Diabetes Maternal Grandmother     Cancer Paternal Aunt        Allergies   No Known Allergies    Medications:     Home Medications:  Were reviewed and are listed in nursing record. and/or listed below    Prior to Admission medications    Medication Sig Start Date End Date Taking? Authorizing Provider   furosemide (LASIX) 40 MG tablet Take 1 tablet by mouth daily 11/17/22  Yes Juliette Garcia MD   meloxicam PHILIPP ZHANGTess NICOLLE [facility]) 15 MG tablet Take 1 tablet by mouth daily 11/14/22  Yes Adriana Wells MD   gabapentin (NEURONTIN) 300 MG capsule Take 1 capsule by mouth 3 times daily for 180 days. Intended supply: 30 days 10/24/22 4/22/23 Yes YURY Billings CNP   omeprazole (PRILOSEC) 20 MG delayed release capsule TAKE 1 CAPSULE BY MOUTH EVERY DAY IN THE MORNING BEFORE BREAKFAST 10/24/22  Yes YURY Billings CNP        Review of Systems   Constitutional:  Negative for activity change, appetite change, diaphoresis, fatigue, fever and unexpected weight change. HENT:  Negative for congestion, facial swelling, mouth sores and nosebleeds. Eyes:  Negative for discharge and visual disturbance. Respiratory:  Negative for cough, chest tightness, shortness of breath and wheezing. Cardiovascular:  Positive for chest pain and leg swelling. Negative for palpitations. Gastrointestinal:  Negative for abdominal distention, abdominal pain, blood in stool and vomiting. Endocrine: Negative for cold intolerance, heat intolerance and polyuria. Genitourinary:  Negative for difficulty urinating, dysuria, frequency and hematuria. Musculoskeletal:  Negative for back pain, joint swelling, myalgias and neck pain. Skin:  Negative for color change, pallor and rash. Allergic/Immunologic: Negative for immunocompromised state. Neurological:  Negative for dizziness, syncope, weakness, light-headedness, numbness and headaches. Hematological:  Negative for adenopathy. Does not bruise/bleed easily.    Psychiatric/Behavioral:  Negative for behavioral problems, confusion, decreased concentration and suicidal ideas. The patient is not nervous/anxious. Vitals:    11/28/22 0950   BP: 104/70   Pulse: 56   SpO2: 99%    Weight: 230 lb 6.4 oz (104.5 kg)       Vitals:    11/28/22 0950   BP: 104/70   Site: Right Upper Arm   Position: Sitting   Cuff Size: Large Adult   Pulse: 56   SpO2: 99%   Weight: 230 lb 6.4 oz (104.5 kg)       BP Readings from Last 3 Encounters:   11/28/22 104/70   11/21/22 139/61   11/08/22 122/80       Wt Readings from Last 3 Encounters:   11/28/22 230 lb 6.4 oz (104.5 kg)   11/21/22 236 lb 14.4 oz (107.5 kg)   11/14/22 236 lb (107 kg)       Physical Exam  Constitutional:       General: She is not in acute distress. Appearance: She is well-developed. She is not diaphoretic. HENT:      Head: Normocephalic and atraumatic. Eyes:      Pupils: Pupils are equal, round, and reactive to light. Neck:      Thyroid: No thyromegaly. Vascular: No JVD. Cardiovascular:      Rate and Rhythm: Normal rate and regular rhythm. Chest Wall: PMI is not displaced. Heart sounds: S1 normal and S2 normal. Murmur heard. Systolic murmur is present with a grade of 2/6. No friction rub. No gallop. Pulmonary:      Effort: Pulmonary effort is normal. No respiratory distress. Breath sounds: Normal breath sounds. No stridor. No wheezing or rales. Chest:      Chest wall: No tenderness. Abdominal:      General: Bowel sounds are normal. There is no distension. Palpations: Abdomen is soft. Tenderness: There is no abdominal tenderness. There is no guarding or rebound. Musculoskeletal:         General: No tenderness. Normal range of motion. Cervical back: Normal range of motion. Lymphadenopathy:      Cervical: No cervical adenopathy. Skin:     General: Skin is warm and dry. Findings: No erythema or rash. Neurological:      Mental Status: She is alert and oriented to person, place, and time.       Coordination: Coordination normal.   Psychiatric:         Behavior: Behavior normal.         Thought Content: Thought content normal.         Judgment: Judgment normal.       Labs:       Lab Results   Component Value Date    WBC 4.8 11/21/2022    HGB 10.7 (L) 11/21/2022    HCT 32.8 (L) 11/21/2022    MCV 88.1 11/21/2022     11/21/2022     Lab Results   Component Value Date     11/21/2022    K 4.3 11/21/2022     11/21/2022    CO2 26 11/21/2022    BUN 18 11/21/2022    CREATININE <0.5 (L) 11/21/2022    GLUCOSE 91 11/21/2022    CALCIUM 8.5 11/21/2022    PROT 5.8 (L) 11/21/2022    LABALBU 3.5 11/21/2022    BILITOT 0.3 11/21/2022    ALKPHOS 58 11/21/2022    AST 10 (L) 11/21/2022    ALT 10 11/21/2022    LABGLOM >60 11/21/2022    GFRAA >60 06/15/2021    AGRATIO 1.5 11/21/2022    GLOB 2.6 06/15/2021         Lab Results   Component Value Date    CHOL 198 06/16/2021    CHOL 177 06/15/2021    CHOL 178 03/26/2020     Lab Results   Component Value Date    TRIG 47 06/16/2021    TRIG 87 06/15/2021    TRIG 67 03/26/2020     Lab Results   Component Value Date    HDL 99 (H) 06/16/2021    HDL 81 (H) 06/15/2021    HDL 68 (H) 03/26/2020     Lab Results   Component Value Date    LDLCALC 90 06/16/2021    LDLCALC 79 06/15/2021    LDLCALC 97 03/26/2020     Lab Results   Component Value Date    LABVLDL 9 06/16/2021    LABVLDL 17 06/15/2021    LABVLDL 13 03/26/2020     No results found for: CHOLHDLRATIO    No results found for: INR, PROTIME    The ASCVD Risk score (Yoshi DK, et al., 2019) failed to calculate for the following reasons:    Unable to determine if patient is Non-       Imaging:       Last ECG (if available):  Sinus bradycardia, no ischemic changes    Last Monitor/Holter    Last Stress (if available):    Last Cath (if available):    Last TTE/CARLOS(if available): 11/15/22   Summary   Left ventricular cavity size is normal.   Normal left ventricular wall thickness. Left ventricular function is normal with ejection fraction estimated at   55-60%.    No regional wall motion abnormalities are noted. Normal diastolic function. Mild mitral regurgitation is present. Mild tricuspid regurgitation. 1/17/20   Summary   Normal left ventricle size, wall thickness, and systolic function with an   estimated ejection fraction of 55%. No regional wall motion abnormalities   are seen. Diastolic filling parameters suggests normal diastolic function. Trivial aortic valve regurgitation. Mild tricuspid regurgitation. Estimated pulmonary artery systolic pressure is normal at 25 mmHg assuming a   right atrial pressure of 3 mmHg. Last CMR  (if available):      CTA: 11/21/22  Impression   1. Total calcium score 0.   2. Normal coronary arteries. No stenosis. 3. Trace pericardial effusion. 4. Main pulmonary artery enlargement, correlate for possible pulmonary artery hypertension. 5. Right coronary artery dominance. 6. Small hiatal hernia. 7. 7 mm indeterminate pulmonary nodule in the left upper lobe. Assessment / Plan:     Sinus bradycardia   No need for further cardiovascular testing  Asymptomatic. Precordial pain  Noncardiac. Normal CTA    Bilateral leg edema  Resolved with Lasix 40 mg. Today we discussed using Lasix as needed      Follow up in 12 months. I had the opportunity to review the clinical symptoms and presentation of [address]. Patient's allergies and medications were reviewed and updated. Patient's past medical, surgical, social and family history were reviewed and updated. Patient's testing including laboratory, ECGs, monitor, imaging (TTE,CARLOS,CMR,cath) were reviewed. All questions and concerns were addressed to the patient/family. Alternatives to my treatment were discussed. The note was completed using EMR. Every effort wasmade to ensure accuracy; however, inadvertent computerized transcription errors may be present. Thank you for allowing me to participate in thecare or [place] Neena Colvin MD, [facility], [facility]

## 2022-11-29 ENCOUNTER — OFFICE VISIT (OUTPATIENT)
Dept: PULMONOLOGY | Age: 57
End: 2022-11-29
Payer: COMMERCIAL

## 2022-11-29 ENCOUNTER — OFFICE VISIT (OUTPATIENT)
Dept: ORTHOPEDIC SURGERY | Age: 57
End: 2022-11-29

## 2022-11-29 VITALS
DIASTOLIC BLOOD PRESSURE: 76 MMHG | OXYGEN SATURATION: 99 % | BODY MASS INDEX: 36.96 KG/M2 | HEIGHT: 66 IN | WEIGHT: 230 LBS | HEART RATE: 58 BPM | TEMPERATURE: 98.4 F | SYSTOLIC BLOOD PRESSURE: 104 MMHG

## 2022-11-29 VITALS — HEIGHT: 66 IN | WEIGHT: 230 LBS | BODY MASS INDEX: 36.96 KG/M2

## 2022-11-29 DIAGNOSIS — M79.672 LEFT FOOT PAIN: ICD-10-CM

## 2022-11-29 DIAGNOSIS — G47.33 OSA (OBSTRUCTIVE SLEEP APNEA): Primary | ICD-10-CM

## 2022-11-29 DIAGNOSIS — G89.29 CHRONIC PAIN OF LEFT ANKLE: Primary | ICD-10-CM

## 2022-11-29 DIAGNOSIS — E11.42 TYPE 2 DIABETES MELLITUS WITH DIABETIC POLYNEUROPATHY, WITHOUT LONG-TERM CURRENT USE OF INSULIN (HCC): Chronic | ICD-10-CM

## 2022-11-29 DIAGNOSIS — E66.01 CLASS 2 SEVERE OBESITY DUE TO EXCESS CALORIES WITH SERIOUS COMORBIDITY AND BODY MASS INDEX (BMI) OF 37.0 TO 37.9 IN ADULT (HCC): Chronic | ICD-10-CM

## 2022-11-29 DIAGNOSIS — M25.572 CHRONIC PAIN OF LEFT ANKLE: Primary | ICD-10-CM

## 2022-11-29 PROBLEM — K21.9 GASTROESOPHAGEAL REFLUX DISEASE: Chronic | Status: ACTIVE | Noted: 2020-01-28

## 2022-11-29 PROBLEM — E66.812 CLASS 2 SEVERE OBESITY DUE TO EXCESS CALORIES WITH SERIOUS COMORBIDITY AND BODY MASS INDEX (BMI) OF 37.0 TO 37.9 IN ADULT: Chronic | Status: ACTIVE | Noted: 2018-07-02

## 2022-11-29 PROCEDURE — 99204 OFFICE O/P NEW MOD 45 MIN: CPT | Performed by: INTERNAL MEDICINE

## 2022-11-29 PROCEDURE — G8484 FLU IMMUNIZE NO ADMIN: HCPCS | Performed by: INTERNAL MEDICINE

## 2022-11-29 PROCEDURE — 1036F TOBACCO NON-USER: CPT | Performed by: INTERNAL MEDICINE

## 2022-11-29 PROCEDURE — G8427 DOCREV CUR MEDS BY ELIG CLIN: HCPCS | Performed by: INTERNAL MEDICINE

## 2022-11-29 PROCEDURE — 3017F COLORECTAL CA SCREEN DOC REV: CPT | Performed by: INTERNAL MEDICINE

## 2022-11-29 PROCEDURE — 2022F DILAT RTA XM EVC RTNOPTHY: CPT | Performed by: INTERNAL MEDICINE

## 2022-11-29 PROCEDURE — G8417 CALC BMI ABV UP PARAM F/U: HCPCS | Performed by: INTERNAL MEDICINE

## 2022-11-29 PROCEDURE — 3046F HEMOGLOBIN A1C LEVEL >9.0%: CPT | Performed by: INTERNAL MEDICINE

## 2022-11-29 ASSESSMENT — SLEEP AND FATIGUE QUESTIONNAIRES
HOW LIKELY ARE YOU TO NOD OFF OR FALL ASLEEP WHILE WATCHING TV: 3
HOW LIKELY ARE YOU TO NOD OFF OR FALL ASLEEP WHILE SITTING AND READING: 3
HOW LIKELY ARE YOU TO NOD OFF OR FALL ASLEEP WHILE SITTING INACTIVE IN A PUBLIC PLACE: 2
HOW LIKELY ARE YOU TO NOD OFF OR FALL ASLEEP WHEN YOU ARE A PASSENGER IN A CAR FOR AN HOUR WITHOUT A BREAK: 3
HOW LIKELY ARE YOU TO NOD OFF OR FALL ASLEEP WHILE SITTING QUIETLY AFTER LUNCH WITHOUT ALCOHOL: 3
HOW LIKELY ARE YOU TO NOD OFF OR FALL ASLEEP WHILE SITTING AND TALKING TO SOMEONE: 0
HOW LIKELY ARE YOU TO NOD OFF OR FALL ASLEEP WHILE LYING DOWN TO REST IN THE AFTERNOON WHEN CIRCUMSTANCES PERMIT: 3
ESS TOTAL SCORE: 19
NECK CIRCUMFERENCE (INCHES): 14
HOW LIKELY ARE YOU TO NOD OFF OR FALL ASLEEP IN A CAR, WHILE STOPPED FOR A FEW MINUTES IN TRAFFIC: 2
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
## 2022-11-29 NOTE — PROGRESS NOTES
Farrah Rivers         : 1965    Diagnosis: [x] SHYANN (G47.33) [] CSA (G47.31) [] Apnea (G47.30)   Length of Need: [x] 18 Months [] 99 Months [] Other:    Machine (MARCO A!): [] Respironics Dream Station   2   Auto [x] ResMed AirSense     Auto S11 [] Other:     [x]  CPAP () [] Bilevel ()   Mode: [x] Auto [] Spontaneous    Mode: [] Auto [] Spontaneous      Pmin-7  Pmax-17      Comfort Settings:   - Ramp Pressure: 5 cmH2O                                        - Ramp time: 15 min                                     -  Flex/EPR - 3 full time                                    - For ResMed Bilevel (TiMax-4 sec   TiMin- 0.2 sec)     Humidifier: [x] Heated ()        [x] Water chamber replacement ()/ 1 per 6 months        Mask:   [] Nasal () /1 per 3 months [x] Full Face () /1 per 3 months   [] Patient choice -Size and fit mask [x] Patient Choice - Size and fit mask   [] Dispense:  [] Dispense:    [] Headgear () / 1 per 3 months [x] Headgear () / 1 per 3 months   [] Replacement Nasal Cushion ()/2 per month [x] Interface Replacement ()/1 per month   [] Replacement Nasal Pillows ()/2 per month         Tubing: [x] Heated ()/1 per 3 months    [] Standard ()/1 per 3 months [] Other:           Filters: [x] Non-disposable ()/1 per 6 months     [x] Ultra-Fine, Disposable ()/2 per month        Miscellaneous: [] Chin Strap ()/ 1 per 6 months [] O2 bleed-in:       LPM   [] Oximetry on CPAP/Bilevel []  Other:    [x] Modem: ()         Start Order Date: 22    MEDICAL JUSTIFICATION:  I, the undersigned, certify that the above prescribed supplies are medically necessary for this patients wellbeing. In my opinion, the supplies are both reasonable and necessary in reference to accepted standards of medicalpractice in treatment of this patients condition.     Beverly De La Cruz MD      NPI: [id number]       Order Signed Date: 22    Electronically signed by Siomara Jeffries MD on 2022 at 4:54 PM    [address]  1965  [facility] [id number] [phone] (home)   [phone] (mobile)      Insurance Info (confirm with patient if correct):  Payer/Plan Subscr  Sex Relation Sub.  Ins. ID Effective Group Num

## 2022-11-29 NOTE — PROGRESS NOTES
CHIEF COMPLAINT: Left anterior leg/ankle pain/ possible stress fracture distal tibia. HISTORY:  Ms. Cabrera Selina 61 y.o. [address] [facility] female presents today for evaluation of left anterior leg and ankle pain which started Feb 2020, and reported doing leg press with about 100 Lb as an exercise to loose more weight. She is complaining of sharp pain, and swelling with ecchymosis end of Feb 2020. Pain is increase with standing and walking and ankle plantar flexion. Pain is dull achy pain by the end of the day. No radiation and no numbness and tingling sensation. No other complaint. There is no history of ankle injury. She uses a motorized wheelchair for the past 7 years because of her back. She also lost over 200 Lb.      Past Medical History:   Diagnosis Date    Arthritis     TAN (dyspnea on exertion)     Gastroesophageal reflux disease 1/28/2020    Hyperlipidemia     Hypertension     Morbid obesity with body mass index (BMI) of 60.0 to 69.9 in adult ([facility] [address] 7/2/2018    Neuropathy     SHYANN (obstructive sleep apnea)     Primary osteoarthritis of right knee 7/2/2018    Type 2 diabetes mellitus without complication ([facility] [address]     controlled with diet    Urinary incontinence        Past Surgical History:   Procedure Laterality Date    BACK SURGERY      2001    BREAST LUMPECTOMY Bilateral     2015    DILATION AND CURETTAGE OF UTERUS N/A 08/30/2018    GASTRIC BYPASS SURGERY      SLEEVE GASTRECTOMY  10/13/2018       Social History     Socioeconomic History    Marital status:      Spouse name: Not on file    Number of children: Not on file    Years of education: Not on file    Highest education level: Not on file   Occupational History    Not on file   Tobacco Use    Smoking status: Never    Smokeless tobacco: Never   Vaping Use    Vaping Use: Never used   Substance and Sexual Activity    Alcohol use: No    Drug use: No    Sexual activity: Never   Other Topics Concern    Not on file   Social History Narrative    ** Merged History Encounter **          Social Determinants of Health     Financial Resource Strain: Low Risk     Difficulty of Paying Living Expenses: Not hard at all   Food Insecurity: No Food Insecurity    Worried About Running Out of Food in the Last Year: Never true    Ran Out of Food in the Last Year: Never true   Transportation Needs: Not on file   Physical Activity: Not on file   Stress: Not on file   Social Connections: Not on file   Intimate Partner Violence: Not on file   Housing Stability: Not on file       Family History   Problem Relation Age of Onset    Diabetes Mother     Stroke Mother     Osteoarthritis Mother     Heart Disease Father     Other Father     High Blood Pressure Father     Osteoarthritis Father     Diabetes Maternal Aunt     Cancer Maternal Aunt     Diabetes Maternal Grandmother     Cancer Paternal Aunt        Current Outpatient Medications on File Prior to Visit   Medication Sig Dispense Refill    furosemide (LASIX) 40 MG tablet Take 1 tablet by mouth daily 30 tablet 5    meloxicam (MOBIC) 15 MG tablet Take 1 tablet by mouth daily 30 tablet 0    gabapentin (NEURONTIN) 300 MG capsule Take 1 capsule by mouth 3 times daily for 180 days. Intended supply: 30 days 90 capsule 1    omeprazole (PRILOSEC) 20 MG delayed release capsule TAKE 1 CAPSULE BY MOUTH EVERY DAY IN THE MORNING BEFORE BREAKFAST 90 capsule 0     No current facility-administered medications on file prior to visit. Pertinent items are noted in HPI  Review of systems reviewed from Patient History Form dated on 1/27/2020 and available in the patient's chart under the Media tab. No change noted. PHYSICAL EXAMINATION:  Ms. Shirlene Zuluaga is a very pleasant 61 y.o. [address] [facility] female who presents today in no acute distress, awake, alert, and oriented. She is well dressed, nourished and  groomed. Patient with normal affect. Height is  5' 6\" (1.676 m), weight is 230 lb (104.3 kg), Body mass index is 37.12 kg/m².   Resting respiratory rate is 16. Examination of the gait, showed that the patient in a motorized chair. Examination of left ankle showing decrease ROM of ankle joint(5 degree DF) with anterior ankle pain with plantar flexion compare to the other side. She has intact sensation and good pedal pulses. She has good strength in all four planes, including eversion, and has significant tenderness on deep palpation over the anterior ankle and distal tibia, with moderate swelling compared to the other side. The ankles are stable to drawer test bilaterally, equally. IMAGING:  Erika Cart were reviewed, 3 views of the left ankle and foot taken in office today, and showed no acute fracture. IMPRESSION: Left anterior leg/ankle pain/ possible stress fracture distal tibia. PLAN: I discussed with the patient the different treatment options. We recommended stretching exercises of the calf which was taught to the patient today. Since she is continuing to have significant symptoms, we will obtain an MRI of the left ankle in order to further evaluate for a stress fracture. F/U after MRI. Procedures    Breg / Airselect Tall Walking Boot     Patient was prescribed a Tall Walking Boot. The left ankle will require stabilization / immobilization from this semi-rigid / rigid orthosis to improve their function. The orthosis will assist in protecting the affected area, provide functional support and facilitate healing. Patient was instructed to progress ambulation weight bearing as tolerated in the device. The patient was educated and fit by a healthcare professional with expert knowledge and specialization in brace application while under the direct supervision of the physician. Verbal and written instructions for the use of and application of this item were provided.    They were instructed to contact the office immediately should the brace result in increased pain, decreased sensation, increased swelling or worsening of the condition.        Lani Pastor MD
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
## 2022-11-30 ENCOUNTER — HOSPITAL ENCOUNTER (OUTPATIENT)
Dept: PHYSICAL THERAPY | Age: 57
Setting detail: THERAPIES SERIES
Discharge: HOME | End: 2022-11-30
Payer: COMMERCIAL

## 2022-11-30 PROCEDURE — 97530 THERAPEUTIC ACTIVITIES: CPT

## 2022-11-30 PROCEDURE — 97110 THERAPEUTIC EXERCISES: CPT

## 2022-11-30 NOTE — FLOWSHEET NOTE
[address] [facility] a Department of [address]  [address], [address]  Office: [phone]  Fax:  [phone]                                                           Physical Therapy Daily Treatment Note  Date:  2022    Patient Name:  Nikki Martino    :  1965  MRN: [id number]    Medical/Treatment Diagnosis Information:  Diagnosis: M17.11 (ICD-10-CM) - Primary osteoarthritis of right knee; M17.12 (ICD-10-CM) - Primary osteoarthritis of left knee  Treatment Diagnosis: M25.561, M25.562 Bilateral knee pain  Insurance/Certification information:  PT Insurance Information: Anuradha Shiley - 20vcy; Siobhan Foot required for TE, NR, DN  Physician Information:  Referring Provider (secondary): [place]  Has the plan of care been signed (Y/N):        [x]  Yes  []  No     Date of Patient follow up with Physician: not scheduled       Is this a Progress Report:     []  Yes  [x]  No        Progress report will be due (10 Rx or 30 days whichever is less): 15/9/66       Recertification will be due (POC Duration  / 90 days whichever is less):          Visit # Insurance Allowable Auth Required   3 for knee   (3 for lumbar at [address] [place] [address]) 20 visits (auth req for TE, NR, DN) []  Yes []  No        Functional Scale: foto 22    Date assessed:  22      Latex Allergy:  [x]NO      []YES  Preferred Language for Healthcare:   [x]English       []other:    Pain level:  6-7/10     SUBJECTIVE:   Pt notes she got injections into hips and that is feeling better but she wants there to be a permenant solution. She was in the hospital last week as well for low heart and is feeling better from that. She has not yet called to set up aquatic therapy.      [facility] used this session for   NUMBER FOR TRANSPORT: [phone]    OBJECTIVE: 22  Observation:   Test measurements:       Flexibility L R Comment   Hamstring 20 30  SLR   Gastroc ITB         Quad                                   ROM PROM AROM Overpressure Comment     L R L R L R     Flexion     105; pain 110; pain         Extension     Lacking 5 Lacking 3                                                    Strength L R Comment   Quad 4- 4-     Hamstring 4 4     Gastroc         Hip flexor 4- 4-     Hip ABD 4- 4-                                  RESTRICTIONS/PRECAUTIONS:     Exercises/Interventions:     Exercise/Equipment Resistance/Repetitions Other comments   Stretching     Hamstring 5 x10 sec mindy seated with heel on step     Hip Flexion     ITB     Grion     Quad     Inclined Calf 5x10 seated rope     Towel Pull          SLR     Supine     Prone     Abduction X20 red band seated    Adducton     SLR+     Clams     LAQ 2X10 each    Seated marching 2X10 each     Seated hamstring curl  2x10 red band     Isometrics     Quad sets 10x10    Hip Adduction 10x10    Hip abduction  10x10         Ankle PF  2x10 red band seated          Patellar Glides     Medial     Superior     Inferior          ROM     Sheet Pulls     Wall Slides     Edge of bed     Flexionator     Extensionator     Hang Weights     Ankle Pumps                              CKC     Calf raises     Wall sits     Step ups     Step up and over     Lateral Step Downs               Mini squats     CC TKE          Lateral band walks     Side Planks     Half moon     Single leg flow          Biodex-balance     Single leg stance    Plyoback          Stool scoots     SB bridge     SB HS Curls     Planks          PRE     Extension  RANGE: 90/40   Flexion  RANGE: 0/90        Cable Column          Leg Press  RANGE: 70/10        Bike     Treadmill                     Therapeutic Exercise and NMR EXR  [x] (85634) Provided verbal/tactile cueing for activities related to strengthening, flexibility, endurance, ROM for improvements in LE, proximal hip, and core control with self care, mobility, lifting, ambulation.   [x] (61362) Provided verbal/tactile cueing for activities related to improving balance, coordination, kinesthetic sense, posture, motor skill, proprioception  to assist with LE, proximal hip, and core control in self care, mobility, lifting, ambulation and eccentric single leg control. NMR and Therapeutic Activities:    [x] (71908 or 43957) Provided verbal/tactile cueing for activities related to improving balance, coordination, kinesthetic sense, posture, motor skill, proprioception and motor activation to allow for proper function of core, proximal hip and LE with self care and ADLs  [] (88358) Gait Re-education- Provided training and instruction to the patient for proper LE, core and proximal hip recruitment and positioning and eccentric body weight control with ambulation re-education including up and down stairs     Home Exercise Program:    [x] (12374) Reviewed/Progressed HEP activities related to strengthening, flexibility, endurance, ROM of core, proximal hip and LE for functional self-care, mobility, lifting and ambulation/stair navigation   [x] (18127)Reviewed/Progressed HEP activities related to improving balance, coordination, kinesthetic sense, posture, motor skill, proprioception of core, proximal hip and LE for self care, mobility, lifting, and ambulation/stair navigation      Manual Treatments:  PROM / STM / Oscillations-Mobs:  G-I, II, III, IV (PA's, Inf., Post.)  [] (45496) Provided manual therapy to mobilize LE, proximal hip and/or LS spine soft tissue/joints for the purpose of modulating pain, promoting relaxation,  increasing ROM, reducing/eliminating soft tissue swelling/inflammation/restriction, improving soft tissue extensibility and allowing for proper ROM for normal function with self care, mobility, lifting and ambulation.      Modalities:      Charges:  Timed Code Treatment Minutes: 35   Total Treatment Minutes: 40     [] EVAL (LOW) 19778   [] EVAL (MOD) 11562   [] EVAL (HIGH) 55367   [] RE-EVAL   [x] GK(88008) x  1  [] IONTO  [] NMR (24114) x     [] VASO  [] Manual (63316) x      [] Other:  [x] TA x 1     [] Mech Traction (69734)  [] ES(attended) (59718)      [] ES (un) (22665):       HEP instruction:   Access Code: [email or web address]  URL: Need Fixed/  Date: 11/09/2022  Prepared by: Jessica Goyal    Exercises  Supine Hamstring Stretch with Strap - 1 x daily - 7 x weekly - 5 reps - 5sec hold  Supine Quadricep Sets - 1 x daily - 7 x weekly - 10 reps - 5sec hold  Hooklying Isometric Hip Abduction with Belt - 1 x daily - 7 x weekly - 10 reps - 5sec hold  Supine Hip Adduction Isometric with Ball - 1 x daily - 7 x weekly - 10 reps - 5sec hold  Supine Heel Slide with Strap - 1 x daily - 7 x weekly - 5-10 reps - 5sec hold  Standing Weight Shift Side to Side - 1 x daily - 7 x weekly - 5-10 reps - 5sec hold    GOALS:  Patient stated goal: reduce pain, improve walking     [] Progressing: [] Met: [] Not Met: [] Adjusted    Therapist goals for Patient:   Short Term Goals: To be achieved in: 2 weeks  1. Independent in HEP and progression per patient tolerance, in order to prevent re-injury. [] Progressing: [] Met: [] Not Met: [] Adjusted   2. Patient will have a decrease in pain to facilitate improvement in movement, function, and ADLs as indicated by Functional Deficits. [] Progressing: [] Met: [] Not Met: [] Adjusted    Long Term Goals: To be achieved in: 4-6 weeks  1. Disability index score of 50 or greater FOTO to assist with reaching prior level of function. [] Progressing: [] Met: [] Not Met: [] Adjusted  2. Patient will demonstrate increased AROM to 110+ flex, lacking 3 or less ext  to allow for proper joint functioning as indicated by patients Functional Deficits. [] Progressing: [] Met: [] Not Met: [] Adjusted  3. Patient will demonstrate an increase in Strength to 4/5 or greater to allow for proper functional mobility as indicated by patients Functional Deficits.    [] Progressing: [] Met: [] Not Met: [] Adjusted  4. Patient will return to standing for greater than 5 minutes while cooking meals without increased symptoms or restriction. [] Progressing: [] Met: [] Not Met: [] Adjusted    Overall Progression Towards Functional goals/ Treatment Progress Update:  [] Patient is progressing as expected towards functional goals listed. [] Progression is slowed due to complexities/Impairments listed. [] Progression has been slowed due to co-morbidities. [x] Plan just implemented, too soon to assess goals progression <30days   [] Goals require adjustment due to lack of progress  [] Patient is not progressing as expected and requires additional follow up with physician  [] Other    Prognosis for POC: [x] Good [] Fair  [] Poor      Patient requires continued skilled intervention: [x] Yes  [] No    Treatment/Activity Tolerance:  [x] Patient able to complete treatment  [] Patient limited by fatigue  [] Patient limited by pain     [] Patient limited by other medical complications  [] Other: pt able to complete exercises seated for safety. She does note some ankle discomfort on L with hamstring stretch but no pain with calf stretch. We did call [address] [place] [address] to schedule aquatic and they will call patient when  is set up. Continue to progress as tolerated.          Return to Play: (if applicable)   []  Stage 1: Intro to Strength   []  Stage 2: Return to Run and Strength   []  Stage 3: Return to Jump and Strength   []  Stage 4: Dynamic Strength and Agility   []  Stage 5: Sport Specific Training     []  Ready to Return to Play, Meets All Above Stages   []  Not Ready for Return to Sports   Comments:                               PLAN: See eval  [] Continue per plan of care [] Alter current plan (see comments above)  [x] Plan of care initiated [] Hold pending MD visit [] Discharge  Note: If patient does not return for scheduled/ recommended follow up visits, this note will serve as a discharge from care along with most recent update on progress. Reviewed insurance benefits for physical therapy in an outpatient hospital based setting with the patient, including deductible  and allowable visit number.  Pt was informed of possible out of pocket costs, as well as, informed of other service options for continuing supervised sessions without required skilled PT intervention such as the cash based [employer] program.     Electronically signed by:   Santana Grace, PT, DPT, Cert DN

## 2022-12-01 ENCOUNTER — OFFICE VISIT (OUTPATIENT)
Dept: PRIMARY CARE CLINIC | Age: 57
End: 2022-12-01
Payer: COMMERCIAL

## 2022-12-01 VITALS
BODY MASS INDEX: 36.73 KG/M2 | DIASTOLIC BLOOD PRESSURE: 84 MMHG | HEART RATE: 80 BPM | TEMPERATURE: 97.1 F | WEIGHT: 234 LBS | RESPIRATION RATE: 16 BRPM | HEIGHT: 67 IN | SYSTOLIC BLOOD PRESSURE: 128 MMHG

## 2022-12-01 DIAGNOSIS — K80.20 GALLSTONES: ICD-10-CM

## 2022-12-01 DIAGNOSIS — L98.7 EXCESS SKIN: ICD-10-CM

## 2022-12-01 DIAGNOSIS — R51.9 FREQUENT HEADACHES: ICD-10-CM

## 2022-12-01 DIAGNOSIS — K21.9 GASTROESOPHAGEAL REFLUX DISEASE, UNSPECIFIED WHETHER ESOPHAGITIS PRESENT: ICD-10-CM

## 2022-12-01 DIAGNOSIS — E11.42 TYPE 2 DIABETES MELLITUS WITH DIABETIC POLYNEUROPATHY, WITHOUT LONG-TERM CURRENT USE OF INSULIN (HCC): ICD-10-CM

## 2022-12-01 DIAGNOSIS — Z90.3 S/P GASTRIC SLEEVE PROCEDURE: ICD-10-CM

## 2022-12-01 DIAGNOSIS — F42.3 HOARDING BEHAVIOR: ICD-10-CM

## 2022-12-01 DIAGNOSIS — R41.3 MEMORY PROBLEM: ICD-10-CM

## 2022-12-01 DIAGNOSIS — D64.9 ANEMIA, UNSPECIFIED TYPE: ICD-10-CM

## 2022-12-01 DIAGNOSIS — E11.42 TYPE 2 DIABETES MELLITUS WITH DIABETIC POLYNEUROPATHY, WITHOUT LONG-TERM CURRENT USE OF INSULIN (HCC): Primary | ICD-10-CM

## 2022-12-01 DIAGNOSIS — E55.9 VITAMIN D DEFICIENCY: ICD-10-CM

## 2022-12-01 DIAGNOSIS — R10.13 EPIGASTRIC ABDOMINAL PAIN: ICD-10-CM

## 2022-12-01 DIAGNOSIS — Z23 NEED FOR SHINGLES VACCINE: ICD-10-CM

## 2022-12-01 DIAGNOSIS — R45.4 ANGER: ICD-10-CM

## 2022-12-01 DIAGNOSIS — Z01.419 WOMEN'S ANNUAL ROUTINE GYNECOLOGICAL EXAMINATION: ICD-10-CM

## 2022-12-01 DIAGNOSIS — R11.2 NAUSEA AND VOMITING, UNSPECIFIED VOMITING TYPE: ICD-10-CM

## 2022-12-01 LAB
A/G RATIO: 1.7 (ref 1.1–2.2)
ALBUMIN SERPL-MCNC: 4.3 G/DL (ref 3.4–5)
ALP BLD-CCNC: 71 U/L (ref 40–129)
ALT SERPL-CCNC: 10 U/L (ref 10–40)
ANION GAP SERPL CALCULATED.3IONS-SCNC: 10 MMOL/L (ref 3–16)
AST SERPL-CCNC: 11 U/L (ref 15–37)
BILIRUB SERPL-MCNC: 0.3 MG/DL (ref 0–1)
BUN BLDV-MCNC: 16 MG/DL (ref 7–20)
CALCIUM SERPL-MCNC: 9 MG/DL (ref 8.3–10.6)
CHLORIDE BLD-SCNC: 107 MMOL/L (ref 99–110)
CO2: 25 MMOL/L (ref 21–32)
CREAT SERPL-MCNC: <0.5 MG/DL (ref 0.6–1.1)
CREATININE URINE: 87.5 MG/DL (ref 28–259)
FERRITIN: 139.6 NG/ML (ref 15–150)
GFR SERPL CREATININE-BSD FRML MDRD: >60 ML/MIN/{1.73_M2}
GLUCOSE BLD-MCNC: 97 MG/DL (ref 70–99)
HBA1C MFR BLD: 5.1 %
HCT VFR BLD CALC: 38.4 % (ref 36–48)
HEMOGLOBIN: 12.4 G/DL (ref 12–16)
IRON SATURATION: 31 % (ref 15–50)
IRON: 90 UG/DL (ref 37–145)
LIPASE: 32 U/L (ref 13–60)
MCH RBC QN AUTO: 28.6 PG (ref 26–34)
MCHC RBC AUTO-ENTMCNC: 32.2 G/DL (ref 31–36)
MCV RBC AUTO: 88.6 FL (ref 80–100)
MICROALBUMIN UR-MCNC: <1.2 MG/DL
MICROALBUMIN/CREAT UR-RTO: NORMAL MG/G (ref 0–30)
PDW BLD-RTO: 15.7 % (ref 12.4–15.4)
PLATELET # BLD: 295 K/UL (ref 135–450)
PMV BLD AUTO: 7.5 FL (ref 5–10.5)
POTASSIUM SERPL-SCNC: 4.4 MMOL/L (ref 3.5–5.1)
RBC # BLD: 4.34 M/UL (ref 4–5.2)
SODIUM BLD-SCNC: 142 MMOL/L (ref 136–145)
TOTAL IRON BINDING CAPACITY: 287 UG/DL (ref 260–445)
TOTAL PROTEIN: 6.9 G/DL (ref 6.4–8.2)
VITAMIN B-12: 261 PG/ML (ref 211–911)
VITAMIN D 25-HYDROXY: 14 NG/ML
WBC # BLD: 4.6 K/UL (ref 4–11)

## 2022-12-01 PROCEDURE — 3044F HG A1C LEVEL LT 7.0%: CPT | Performed by: INTERNAL MEDICINE

## 2022-12-01 PROCEDURE — 83036 HEMOGLOBIN GLYCOSYLATED A1C: CPT | Performed by: INTERNAL MEDICINE

## 2022-12-01 PROCEDURE — G8484 FLU IMMUNIZE NO ADMIN: HCPCS | Performed by: INTERNAL MEDICINE

## 2022-12-01 PROCEDURE — 3017F COLORECTAL CA SCREEN DOC REV: CPT | Performed by: INTERNAL MEDICINE

## 2022-12-01 PROCEDURE — 1036F TOBACCO NON-USER: CPT | Performed by: INTERNAL MEDICINE

## 2022-12-01 PROCEDURE — 99214 OFFICE O/P EST MOD 30 MIN: CPT | Performed by: INTERNAL MEDICINE

## 2022-12-01 PROCEDURE — G8417 CALC BMI ABV UP PARAM F/U: HCPCS | Performed by: INTERNAL MEDICINE

## 2022-12-01 PROCEDURE — G8427 DOCREV CUR MEDS BY ELIG CLIN: HCPCS | Performed by: INTERNAL MEDICINE

## 2022-12-01 PROCEDURE — 2022F DILAT RTA XM EVC RTNOPTHY: CPT | Performed by: INTERNAL MEDICINE

## 2022-12-01 RX ORDER — LANCETS 30 GAUGE
EACH MISCELLANEOUS
Qty: 100 EACH | Refills: 3 | Status: SHIPPED | OUTPATIENT
Start: 2022-12-01

## 2022-12-01 RX ORDER — PROMETHAZINE HYDROCHLORIDE 25 MG/1
25 TABLET ORAL EVERY 6 HOURS PRN
Qty: 20 TABLET | Refills: 0 | Status: SHIPPED | OUTPATIENT
Start: 2022-12-01

## 2022-12-01 RX ORDER — ZOSTER VACCINE RECOMBINANT, ADJUVANTED 50 MCG/0.5
0.5 KIT INTRAMUSCULAR SEE ADMIN INSTRUCTIONS
Qty: 0.5 ML | Refills: 0 | Status: SHIPPED | OUTPATIENT
Start: 2022-12-01 | End: 2023-05-30

## 2022-12-01 RX ORDER — GLUCOSAMINE HCL/CHONDROITIN SU 500-400 MG
CAPSULE ORAL
Qty: 100 STRIP | Refills: 3 | Status: SHIPPED | OUTPATIENT
Start: 2022-12-01

## 2022-12-01 RX ORDER — ACETAMINOPHEN 500 MG
500 TABLET ORAL 4 TIMES DAILY PRN
Qty: 60 TABLET | Refills: 2 | Status: SHIPPED | OUTPATIENT
Start: 2022-12-01

## 2022-12-01 RX ORDER — OMEPRAZOLE 40 MG/1
40 CAPSULE, DELAYED RELEASE ORAL
Qty: 30 CAPSULE | Refills: 1 | Status: SHIPPED | OUTPATIENT
Start: 2022-12-01

## 2022-12-01 RX ORDER — DIPHENHYDRAMINE HYDROCHLORIDE 25 MG/1
CAPSULE, LIQUID FILLED ORAL
Qty: 1 KIT | Refills: 0 | Status: SHIPPED | OUTPATIENT
Start: 2022-12-01

## 2022-12-01 ASSESSMENT — PATIENT HEALTH QUESTIONNAIRE - PHQ9
7. TROUBLE CONCENTRATING ON THINGS, SUCH AS READING THE NEWSPAPER OR WATCHING TELEVISION: 0
9. THOUGHTS THAT YOU WOULD BE BETTER OFF DEAD, OR OF HURTING YOURSELF: 0
3. TROUBLE FALLING OR STAYING ASLEEP: 0
8. MOVING OR SPEAKING SO SLOWLY THAT OTHER PEOPLE COULD HAVE NOTICED. OR THE OPPOSITE, BEING SO FIGETY OR RESTLESS THAT YOU HAVE BEEN MOVING AROUND A LOT MORE THAN USUAL: 0
6. FEELING BAD ABOUT YOURSELF - OR THAT YOU ARE A FAILURE OR HAVE LET YOURSELF OR YOUR FAMILY DOWN: 0
10. IF YOU CHECKED OFF ANY PROBLEMS, HOW DIFFICULT HAVE THESE PROBLEMS MADE IT FOR YOU TO DO YOUR WORK, TAKE CARE OF THINGS AT HOME, OR GET ALONG WITH OTHER PEOPLE: 0
SUM OF ALL RESPONSES TO PHQ QUESTIONS 1-9: 0
1. LITTLE INTEREST OR PLEASURE IN DOING THINGS: 0
2. FEELING DOWN, DEPRESSED OR HOPELESS: 0
SUM OF ALL RESPONSES TO PHQ QUESTIONS 1-9: 0
SUM OF ALL RESPONSES TO PHQ QUESTIONS 1-9: 0
SUM OF ALL RESPONSES TO PHQ9 QUESTIONS 1 & 2: 0
SUM OF ALL RESPONSES TO PHQ QUESTIONS 1-9: 0
5. POOR APPETITE OR OVEREATING: 0
4. FEELING TIRED OR HAVING LITTLE ENERGY: 0

## 2022-12-01 NOTE — PATIENT INSTRUCTIONS
-Limit carbohydrates to 45 grams with meals and 15 grams with snacks  -monitor blood sugars  -goal for blood sugar fasting or pre-meal  is   -goal for blood sugar 2 hours after a meal is less than 180  -goal for blood sugar at bedtime is less than 150  -Regular aerobic exercise    Referral to Psychology, Dr. Ann Bello

## 2022-12-01 NOTE — PROGRESS NOTES
Farrah JULISA Rivers   Date ofBirth:  1965    Date of Visit:  2022    Chief Complaint   Patient presents with    Mass     Mass on back, patient had MRI of back showed mass on spine. Referral - General     Excess skin surgery referral     Diabetes       HPI  Patient has Type 2 Diabetes. Patient is diet controlled. Patient has history of gastric sleeve surgery in 2018 and significant weight loss. Patient has neuropathy. Patient decreases carbohydrates. Patient has neuropathy. Patient wants paperwork done for diabetic shoes. Patient has GERD. Patient complains of heartburn and acid reflux for 2 months. Patient states she feels pain in her upper stomach after eating. Patient has nausea and vomiting. Patient is not taking any medications. Patient doesn't eat spicy food or tomato based food. Patient doesn't drink caffeine. Patient is taking Omeprazole 20mg once daily. Patient states she was scheduled for an EGD last year and it was cancelled due to Covid-19. Patient has history of gallstones. Patient states she is forgetful and losing memory. Patient complaints of frequent headaches for 2 months. Headaches are sharp and intermittent. Patient feels like she is hoarding and things are piling up around her. Patient is keeping old things and making a mess around her. Patient is keeping old shoes that do not fit and have damage, old clothes, keeps old and damaged foods. Patient keeps the  yogurt container after eating yogurt. Patient states when she gets upset or mad she feels like she wants to throw the phone on the floor or damage something. Patient states her voice gets higher when she wants to fix a problem. Patient had anemia on labs in ER. Patient has vitamin D deficiency. Patient has excess skin stomach and arms since having significant weight loss from gastric sleeve surgery. Patient wants referral to plastic surgery.     Patient states her MRI showed a mass on her back. Reviewed recent MRI Lumbar spine results and it shows a probable meningioma. Patient has been referred to Neurosurgery. Review of Systems   Constitutional:  Negative for appetite change, chills, fatigue and fever. HENT:  Negative for congestion, postnasal drip, rhinorrhea, sinus pressure, sore throat and trouble swallowing. Eyes:  Negative for visual disturbance. Respiratory:  Negative for cough, chest tightness, shortness of breath and wheezing. Cardiovascular:  Negative for chest pain, palpitations and leg swelling. Gastrointestinal:  Positive for abdominal pain, nausea and vomiting. Negative for abdominal distention, blood in stool, constipation and diarrhea. Genitourinary:  Negative for dysuria, frequency and hematuria. Musculoskeletal:  Positive for arthralgias, back pain (chronic) and gait problem. Negative for myalgias. Skin:  Negative for rash and wound. Neurological:  Positive for numbness. Negative for dizziness, tremors, syncope, weakness, light-headedness and headaches. Psychiatric/Behavioral:  Negative for dysphoric mood and sleep disturbance. The patient is not nervous/anxious. No Known Allergies  Outpatient Medications Marked as Taking for the 12/1/22 encounter (Office Visit) with Shawn Marlow MD   Medication Sig Dispense Refill    furosemide (LASIX) 40 MG tablet Take 1 tablet by mouth daily 30 tablet 5    meloxicam (MOBIC) 15 MG tablet Take 1 tablet by mouth daily 30 tablet 0    gabapentin (NEURONTIN) 300 MG capsule Take 1 capsule by mouth 3 times daily for 180 days. Intended supply: 30 days 90 capsule 1    omeprazole (PRILOSEC) 20 MG delayed release capsule TAKE 1 CAPSULE BY MOUTH EVERY DAY IN THE MORNING BEFORE BREAKFAST 90 capsule 0         Vitals:    12/01/22 1039   BP: 128/84   Pulse: 80   Resp: 16   Temp: 97.1 °F (36.2 °C)   Weight: 234 lb (106.1 kg)   Height: 5' 6.5\" (1.689 m)     Body mass index is 37.2 kg/m². Physical Exam  Nursing note reviewed. Constitutional:       General: She is not in acute distress. Appearance: Normal appearance. She is well-developed. Eyes:      General: Lids are normal.      Extraocular Movements: Extraocular movements intact. Conjunctiva/sclera: Conjunctivae normal.      Pupils: Pupils are equal, round, and reactive to light. Neck:      Thyroid: No thyromegaly. Vascular: No carotid bruit. Cardiovascular:      Rate and Rhythm: Normal rate and regular rhythm. Heart sounds: Normal heart sounds, S1 normal and S2 normal. No murmur heard. No friction rub. No gallop. Pulmonary:      Effort: Pulmonary effort is normal. No respiratory distress. Breath sounds: Normal breath sounds. No wheezing, rhonchi or rales. Abdominal:      General: Bowel sounds are normal. There is no distension. Palpations: Abdomen is soft. Tenderness: There is abdominal tenderness in the epigastric area. There is no rebound. Musculoskeletal:      Cervical back: Neck supple. Right lower leg: No edema. Left lower leg: No edema. Lymphadenopathy:      Head:      Right side of head: No submandibular adenopathy. Left side of head: No submandibular adenopathy. Skin:     Comments: No foot ulcers   Neurological:      Mental Status: She is alert and oriented to person, place, and time.       Comments: Bilateral foot monofilament exam abnormal with decreased sensation to monofilament   Psychiatric:         Mood and Affect: Mood normal.         Results for POC orders placed in visit on 12/01/22   POCT glycosylated hemoglobin (Hb A1C)   Result Value Ref Range    Hemoglobin A1C 5.1 %     Lab Review   Admission on 11/21/2022, Discharged on 11/21/2022   Component Date Value    Ventricular Rate 11/21/2022 46     Atrial Rate 11/21/2022 46     P-R Interval 11/21/2022 186     QRS Duration 11/21/2022 92     Q-T Interval 11/21/2022 468     QTc Calculation (Bazett) 11/21/2022 409     P Axis 11/21/2022 36     R Axis 11/21/2022 5     T Flint 11/21/2022 26     Diagnosis 11/21/2022 EKG performed in ER and to be interpreted by ER physician. Confirmed by MD, ER ([id number]),  Carmelo Sam ([id number]) on 11/21/2022 2:32:33 PM     Sodium 11/21/2022 140     Potassium reflex Magnesi* 11/21/2022 4.3     Chloride 11/21/2022 108     CO2 11/21/2022 26     Anion Gap 11/21/2022 6     Glucose 11/21/2022 91     BUN 11/21/2022 18     Creatinine 11/21/2022 <0.5 (A)     Est, Glom Filt Rate 11/21/2022 >60     Calcium 11/21/2022 8.5     Total Protein 11/21/2022 5.8 (A)     Albumin 11/21/2022 3.5     Albumin/Globulin Ratio 11/21/2022 1.5     Total Bilirubin 11/21/2022 0.3     Alkaline Phosphatase 11/21/2022 58     ALT 11/21/2022 10     AST 11/21/2022 10 (A)     WBC 11/21/2022 4.8     RBC 11/21/2022 3.72 (A)     Hemoglobin 11/21/2022 10.7 (A)     Hematocrit 11/21/2022 32.8 (A)     MCV 11/21/2022 88.1     MCH 11/21/2022 28.8     MCHC 11/21/2022 32.7     RDW 11/21/2022 15.4     Platelets 90/60/7631 237     MPV 11/21/2022 7.0     Neutrophils % 11/21/2022 52.5     Lymphocytes % 11/21/2022 34.3     Monocytes % 11/21/2022 11.2     Eosinophils % 11/21/2022 1.5     Basophils % 11/21/2022 0.5     Neutrophils Absolute 11/21/2022 2.5     Lymphocytes Absolute 11/21/2022 1.6     Monocytes Absolute 11/21/2022 0.5     Eosinophils Absolute 11/21/2022 0.1     Basophils Absolute 11/21/2022 0.0     Troponin 11/21/2022 <0.01    Procedure visit on 11/15/2022   Component Date Value    Left Ventricular Ejectio* 11/15/2022 58     LVEF MODALITY 11/15/2022 ECHO    Orders Only on 11/08/2022   Component Date Value    Sodium 11/08/2022 140     Potassium 11/08/2022 4.3     Chloride 11/08/2022 105     CO2 11/08/2022 25     Anion Gap 11/08/2022 10     Glucose 11/08/2022 83     BUN 11/08/2022 12     Creatinine 11/08/2022 <0.5 (A)     Est, Glom Filt Rate 11/08/2022 >60     Calcium 11/08/2022 8.4     Phosphorus 11/08/2022 3.3     Albumin 11/08/2022 3.9     Pro-BNP 11/08/2022 197 (A) EXAMINATION:   COMPLETE ABDOMINAL ULTRASOUND 6/19/2021 8:10 am       COMPARISON:   None. HISTORY:   ORDERING SYSTEM PROVIDED HISTORY: Epigastric abdominal pain   TECHNOLOGIST PROVIDED HISTORY:   Reason for exam:->epigastric abdominal pain       FINDINGS:   LIVER: Liver is enlarged. No focal hepatic abnormality. No intrahepatic   biliary ductal dilatation. BILIARY SYSTEM: Cholelithiasis. Gallbladder wall not thickened. Gallbladder   is dilated. No pericholecystic fluid. Negative sonographic Perdomo's sign. Common bile duct is within normal limits measuring 5 mm. KIDNEYS: The kidneys are unremarkable in appearance without evidence of   hydronephrosis. PANCREAS: Visualized portions of the pancreas are unremarkable. SPLEEN: The spleen is unremarkable in appearance. Spleen is within normal   limits in size. IVC: The IVC is patent. AORTA: Aorta is patent without aneurysm. OTHER: No evidence of ascites. Impression   Cholelithiasis, dilated gallbladder. Otherwise, no evidence for acute   cholecystitis       Enlarged liver           Assessment/Plan     1. Type 2 diabetes mellitus with diabetic polyneuropathy, without long-term current use of insulin ([facility])  -Hemoglobin A1c of 5.1% shows diabetes is very well controlled  -Continue diet control  -Limit carbohydrates to 45 grams with meals and 15 grams with snacks  -monitor blood sugars  -goal for blood sugar fasting or pre-meal  is   -goal for blood sugar 2 hours after a meal is less than 180  -goal for blood sugar at bedtime is less than 150  - POCT glycosylated hemoglobin (Hb A1C)  - Comprehensive Metabolic Panel; Future  - Lipid Panel; Future  - Microalbumin / Creatinine Urine Ratio; Future  - Blood Glucose Monitoring Suppl (BLOOD GLUCOSE MONITOR SYSTEM) w/Device KIT; Dispense glucometer covered by patient's insurance  Dispense: 1 kit; Refill: 0  - blood glucose monitor strips;  Test once daily Dispense: 100 strip; Refill: 3  - Lancets MISC; Use to test blood sugar once daily  Dispense: 100 each; Refill: 3  -  DIABETES FOOT EXAM    2. Gastroesophageal reflux disease, unspecified whether esophagitis present  -not controlled  -Increase omeprazole (PRILOSEC) 40 MG delayed release capsule; Take 1 capsule by mouth every morning (before breakfast)  Dispense: 30 capsule; Refill: 1  -Decrease caffeine, avoid spicy foods, avoid tomato based foods  -Eat small meals instead of large meals  -Wait 2-3 hours after eating before lying down  -Referral to Riley Chahal MD, Gastroenterology, [facility]    3. Epigastric abdominal pain  - US ABDOMEN COMPLETE; Future  -Increase omeprazole (PRILOSEC) 40 MG delayed release capsule; Take 1 capsule by mouth every morning (before breakfast)  Dispense: 30 capsule; Refill: 1  - Lipase; Future  - Comprehensive Metabolic Panel; Future  - CBC; Future  -Referral to Riley Chahal MD, Gastroenterology, [facility]    4. Nausea and vomiting, unspecified vomiting type  - US ABDOMEN COMPLETE; Future  - Comprehensive Metabolic Panel; Future  - CBC; Future  - promethazine (PHENERGAN) 25 MG tablet; Take 1 tablet by mouth every 6 hours as needed for Nausea  Dispense: 20 tablet; Refill: 0    5. Gallstones  - previous abdominal ultrasound shows gallstones  -Avoid fried foods and fatty foods  -Referral to Riley Chahal MD, Gastroenterology, [facility]    6. Frequent headaches  - CT HEAD WO CONTRAST; Future  - acetaminophen (TYLENOL) 500 MG tablet; Take 1 tablet by mouth 4 times daily as needed for Pain  Dispense: 60 tablet; Refill: 2  -Referral [facility] - Henrietta Lyn MD, Neurology, [facility]    7. Vitamin D deficiency  - Vitamin D 25 Hydroxy; Future  - may need vitamin D if low on labs    8. Memory problem  -MMSE score 29 out of 30  - Vitamin B12; Future  -Referral to Mohamud Barrow MD, Neurology, [facility]    9.

## 2022-12-02 ENCOUNTER — OFFICE VISIT (OUTPATIENT)
Dept: ENT CLINIC | Age: 57
End: 2022-12-02

## 2022-12-02 ENCOUNTER — PROCEDURE VISIT (OUTPATIENT)
Dept: AUDIOLOGY | Age: 57
End: 2022-12-02

## 2022-12-02 VITALS
SYSTOLIC BLOOD PRESSURE: 113 MMHG | HEIGHT: 67 IN | DIASTOLIC BLOOD PRESSURE: 74 MMHG | HEART RATE: 54 BPM | WEIGHT: 234 LBS | BODY MASS INDEX: 36.73 KG/M2

## 2022-12-02 DIAGNOSIS — R26.89 IMBALANCE: ICD-10-CM

## 2022-12-02 DIAGNOSIS — H90.3 SENSORINEURAL HEARING LOSS, BILATERAL: Primary | ICD-10-CM

## 2022-12-02 DIAGNOSIS — H91.90 HEARING LOSS, UNSPECIFIED HEARING LOSS TYPE, UNSPECIFIED LATERALITY: Primary | ICD-10-CM

## 2022-12-02 DIAGNOSIS — H93.13 TINNITUS OF BOTH EARS: ICD-10-CM

## 2022-12-02 DIAGNOSIS — H90.3 SENSORINEURAL HEARING LOSS (SNHL) OF BOTH EARS: Primary | ICD-10-CM

## 2022-12-02 ASSESSMENT — ENCOUNTER SYMPTOMS
SINUS PRESSURE: 0
DIARRHEA: 0
VOMITING: 0
BACK PAIN: 0
BLOOD IN STOOL: 0
TROUBLE SWALLOWING: 0
SINUS PAIN: 0
RHINORRHEA: 0
SHORTNESS OF BREATH: 0
VOICE CHANGE: 0
EYE ITCHING: 0
COLOR CHANGE: 0
PHOTOPHOBIA: 0
STRIDOR: 0
WHEEZING: 0
FACIAL SWELLING: 0
SORE THROAT: 0
CHOKING: 0
NAUSEA: 0
EYE DISCHARGE: 0
COUGH: 0
CONSTIPATION: 0

## 2022-12-02 NOTE — Clinical Note
Dr. Nicolle Vilchis,  Please see note from this patient's audiogram.  Please let me know if there is anything further you need.    [facility] [id number] Audiologist

## 2022-12-02 NOTE — PROGRESS NOTES
Corrine Gentile   1965, 61 y.o. female   [id number]       Referring Provider: Annabella Cabrera DO  Referral Type: In an order in [address] [place] [address]    Reason for Visit: Evaluation of suspected change in hearing, tinnitus, or balance. Sonya Little is a 61 y.o. female seen today, 12/2/2022, for an initial audiologic evaluation. AUDIOLOGIC AND OTHER PERTINENT MEDICAL HISTORY:        Farrah Rivers noted concern for decreased hearing bilaterally, difficulty understanding words or letters at times, raises television volume; some fullness in ears at times; some tinnitus when in the pool; unsteadiness; one uncle with ear issues. Farrah Rivers denied otalgia, otorrhea, history of occupational/recreational noise exposure, history of head trauma, and history of ear surgery. IMPRESSIONS:       Today's results are consistent with hearing sensitivity within normal limits to mild sensorineural hearing loss with excellent word recognition for soft conversational speech bilaterally; right ear with reduced TM mobility and left ear with normal middle ear function. Discussed good communication strategies, considerations for amplification such as OTC hearing aids, as she finds her hearing bothersome. Follow medical recommendations from Dr. Sabrina Mendoza. ASSESSMENT AND FINDINGS:       Otoscopy revealed: Clear ear canals bilaterally      RIGHT EAR:  Hearing Sensitivity: Within normal limits to mild sensorineural hearing loss. Speech Recognition Threshold: 25 dBHL  Word Recognition: Excellent (100%), based on NU-6 25-word list at 50 dBHL using recorded speech stimuli. Tympanometry: Normal peak pressure with low compliance, Type As tympanogram, consistent with reduced tympanic membrane mobility. LEFT EAR:  Hearing Sensitivity: Within normal limits to mild sensorineural hearing loss.   Speech Recognition Threshold: 20 dBHL  Word Recognition: Excellent (100%), based on NU-6 25-word list at 50 dBHL using recorded speech stimuli. Tympanometry: Normal peak pressure and compliance, Type A tympanogram, consistent with normal middle ear function. Reliability: Good  Transducer: Inserts    See scanned audiogram dated 12/2/2022 for results. PATIENT EDUCATION:       The following items were discussed with the patient:   - Good Communication Strategies  - Hearing Loss and Hearing Aids    Educational information was shared in the After Visit Summary. RECOMMENDATIONS:                                                                                                                                                                                                                                                                      The following items are recommended based on patient report and results from today's appointment:  - Continue medical follow-up with Rosa M Brandt DO.  - Retest hearing as medically indicated and/or sooner if a change in hearing is noted. - If desired, schedule a Hearing Aid Evaluation (HAE) appointment to discuss hearing aid options. Discussed that she does not meet traditional hearing aid candidacy criteria at this time. - Utilize \"Good Communication Strategies\" as discussed to assist in speech understanding with communication partners. [facility] [place], [place]  Audiologist       Chart CC'd to:  Rosa M Brandt DO      Degree of   Hearing Sensitivity dB Range   Within Normal Limits (WNL) 0 - 20   Mild 20 - 40   Moderate 40 - 55   Moderately-Severe 55 - 70   Severe 70 - 90   Profound 90 +

## 2022-12-02 NOTE — PATIENT INSTRUCTIONS
Good Communication Strategies    Communication can be challenging for anyone, but can be especially difficult for those with some degree of hearing loss. While we may not be able to control every factor that may lead to difficulty with communication, there are Good Communication Strategies that we can all use in our day-to-day lives. Communication takes both parties working together for it to be successful. Tips as a Listener:   Control your environment. It is important to limit the amount of background noise in the room when possible. You should also consider having a good light source in the room to best see the other person. Ask for clarification. Instead of saying \"What?\", you can use parts of what you heard to make a new question. For example, if you heard the word \"Thursday\" but not the rest of the week, you may ask \"What was that about Thursday? \" or \"What did you want to do Thursday? \". This shows the person talking that you are listening and will help them better explain what they are saying. Be an advocate for yourself. If you are hearing but not understanding, tell the other person \"I can hear you, but I need you to slow down when you speak. \"  Or if someone is facing the other direction, say \"I cannot hear you when you are not looking at me when we talk. \"       Tips as a Talker:   - Sit or stand 3 to 6 feet away to maximize audibility         -- It is unrealistic to believe someone else will fully hear your message if you are speaking from across the room or in a different room in the house   - Stay at eye level to help with visual cues   - Make sure you have the persons attention before speaking   - Use facial expressions and gestures to accentuate your message   - Raise your voice slightly (do not scream)   - Speak slowly and distinctly   - Use short, simple sentences   - Rephrase your words if the person is having a hard time understanding you    - To avoid distortion, dont speak directly into a persons ear      Some additional items that may be helpful:   - Remain patient - this is important for both parties   - Write down items that still cannot be heard/understood. You may write with pen/paper or consider typing/texting on a cell phone or smart device. - If background noise is unavoidable, try to keep yourself in a good position in the room. By sitting at a mata on the side of the restaurant (preferably a corner), it will be easier to communicate than if you were sitting at a table in the middle with background noise surrounding you. Keep yourself positioned away from music speakers or heavy foot traffic.   - If you have difficulty with the television, consider these options:      -- Use closed-captioning, which is a setting you can turn on that displays the spoken words in a written form on the screen. There may be a slight delay, but this can help fill in missing information. This can be especially helpful when watching programs with accented speech. -- Consider use of a sound bar or speakers that come from the front of the TV. With modern flat screen TVs, many of them have speakers that come out of the back of the device, which makes sound bounce off the wall behind it, then go into the room. Sound bars can allow the sound to go straight in your direction and can improve sound quality. -- Consider ear level devices to help improve the volume and/or sound quality of the program.  There are devices that work like headphones that you can adjust the volume for your ears while others can have the volume at a more comfortable level, such as \"TV Ears\". Most hearing aids have devices that allow them to connect directly to the TV and improve sound quality. Hearing Loss: Care Instructions  Your Care Instructions      Hearing loss is a sudden or slow decrease in how well you hear. It can range from mild to profound.  Permanent hearing loss can occur with aging, and it can happen when you are exposed long-term to loud noise. Examples include listening to loud music, riding motorcycles, or being around other loud machines. Hearing loss can affect your work and home life. It can make you feel lonely or depressed. You may feel that you have lost your independence. But hearing aids and other devices can help you hear better and feel connected to others. Follow-up care is a key part of your treatment and safety. Be sure to make and go to all appointments, and call your doctor if you are having problems. It's also a good idea to know your test results and keep a list of the medicines you take. How can you care for yourself at home? Avoid loud noises whenever possible. This helps keep your hearing from getting worse. Always wear hearing protection around loud noises. If appropriate, wear hearing aid(s) as directed. It is recommended that hearing aids are worn during all waking hours to keep your brain active and give it access to the sounds it is missing. If you are beginning your process with hearing aid(s), schedule a \"Hearing Aid Evaluation\" with an audiologist to discuss your lifestyle, features of hearing aid technology, and styles of hearing aids available. It is recommended that you contact your insurance company to determine if you have a hearing aid benefit, as this may dictate who you can see for these services. Have hearing tests as your doctor suggests. They can show whether your hearing has changed. Your hearing aid may need to be adjusted. Use other assistive devices as needed. These may include:  Telephone amplifiers and hearing aids that can connect to a television, stereo, radio, or microphone. Devices that use lights or vibrations. These alert you to the doorbell, a ringing telephone, or a baby monitor. Television closed-captioning. This shows the words at the bottom of the screen. Most new TVs can do this. TTY (text telephone).  This lets you type messages back and forth on the telephone instead of talking or listening. These devices are also called TDD. When messages are typed on the keyboard, they are sent over the phone line to a receiving TTY. The message is shown on a monitor. Use pagers, fax machines, text, and email if it is hard for you to communicate by telephone. Try to learn a listening technique called speech-reading. It is not lip-reading. You pay attention to people's gestures, expressions, posture, and tone of voice. These clues can help you understand what a person is saying. Face the person you are talking to, and have him or her face you. Make sure the lighting is good. You need to see the other person's face clearly. Think about counseling if you need help to adjust to your hearing loss. When should you call for help? Watch closely for changes in your health, and be sure to contact your doctor if:    You think your hearing is getting worse. You have new symptoms, such as dizziness or nausea.

## 2022-12-02 NOTE — PATIENT INSTRUCTIONS
After swimming - use Vosol ear drops or equivalent (rubbing alcohol and vinegar mixture) 4-8 drops in each ear.

## 2022-12-02 NOTE — PROGRESS NOTES
[facility]  [address], [address] [facility], [facility] [address]  P: [phone]  F: [phone]       Patient     Noa Downing  1965    ChiefComplaint     Chief Complaint   Patient presents with    New Patient     Patient is here today because she is having trouble hearing the TV, she also has ringing in both ears, when she tries to stand up she will feel off balance       History of Present Illness     Noa Downing is a pleasant 61 y.o. female who presents as a new patient for tinnitus, imbalance, dizziness, hearing loss. Over the past few months, she has noticed more difficulty hearing particularly watching television. Additionally, she has noticed increase of ringing in both ears. She describes as a high-frequency nonpulsatile ringing noise. She used to have a pair of hearing aids that she wore for her hearing. However they are not working anymore and she is hoping to get a new pair. Additionally, over the past few months she has had increased issues with imbalance and dizziness. Whenever she goes from a sitting to standing position, she feels a sensation of motion and lightheadedness. She does have diabetic neuropathy. She also has some joint issues of her lower extremities without any history of replacements. She typically gets around in a wheelchair. Denies any history of ear surgeries. She does have a history of swimmer's ear. She is wondering when he can be done to prevent that. Denies any family history of ear issues.     Past Medical History     Past Medical History:   Diagnosis Date    Arthritis     TAN (dyspnea on exertion)     Gastroesophageal reflux disease 1/28/2020    Hyperlipidemia     Hypertension     Morbid obesity with body mass index (BMI) of 60.0 to 69.9 in adult [facility]) 7/2/2018    Neuropathy     SHYANN (obstructive sleep apnea)     Primary osteoarthritis of right knee 7/2/2018    Type 2 diabetes mellitus without complication ([facility] [address]     controlled with diet Urinary incontinence        Past Surgical History     Past Surgical History:   Procedure Laterality Date    BACK SURGERY      2001    BREAST LUMPECTOMY Bilateral     2015    DILATION AND CURETTAGE OF UTERUS N/A 08/30/2018    GASTRIC BYPASS SURGERY      SLEEVE GASTRECTOMY  10/13/2018       Family History     Family History   Problem Relation Age of Onset    Diabetes Mother     Stroke Mother     Osteoarthritis Mother     Heart Disease Father     Other Father     High Blood Pressure Father     Osteoarthritis Father     Diabetes Maternal Aunt     Cancer Maternal Aunt     Diabetes Maternal Grandmother     Cancer Paternal Aunt        Social History     Social History     Socioeconomic History    Marital status:      Spouse name: Not on file    Number of children: Not on file    Years of education: Not on file    Highest education level: Not on file   Occupational History    Not on file   Tobacco Use    Smoking status: Never    Smokeless tobacco: Never   Vaping Use    Vaping Use: Never used   Substance and Sexual Activity    Alcohol use: No    Drug use: No    Sexual activity: Never   Other Topics Concern    Not on file   Social History Narrative    ** Merged History Encounter **          Social Determinants of Health     Financial Resource Strain: Low Risk     Difficulty of Paying Living Expenses: Not hard at all   Food Insecurity: No Food Insecurity    Worried About Running Out of Food in the Last Year: Never true    Ran Out of Food in the Last Year: Never true   Transportation Needs: Not on file   Physical Activity: Not on file   Stress: Not on file   Social Connections: Not on file   Intimate Partner Violence: Not on file   Housing Stability: Not on file       Allergies     No Known Allergies    Medications     Current Outpatient Medications   Medication Sig Dispense Refill    Blood Glucose Monitoring Suppl (BLOOD GLUCOSE MONITOR SYSTEM) w/Device KIT Dispense glucometer covered by patient's insurance 1 kit 0 blood glucose monitor strips Test once daily 100 strip 3    zoster recombinant adjuvanted vaccine (SHINGRIX) 50 MCG/0.5ML SUSR injection Inject 0.5 mLs into the muscle See Admin Instructions 1 dose now and repeat in 2-6 months 0.5 mL 0    Lancets MISC Use to test blood sugar once daily 100 each 3    acetaminophen (TYLENOL) 500 MG tablet Take 1 tablet by mouth 4 times daily as needed for Pain 60 tablet 2    omeprazole (PRILOSEC) 40 MG delayed release capsule Take 1 capsule by mouth every morning (before breakfast) 30 capsule 1    promethazine (PHENERGAN) 25 MG tablet Take 1 tablet by mouth every 6 hours as needed for Nausea 20 tablet 0    furosemide (LASIX) 40 MG tablet Take 1 tablet by mouth daily 30 tablet 5    meloxicam (MOBIC) 15 MG tablet Take 1 tablet by mouth daily 30 tablet 0    gabapentin (NEURONTIN) 300 MG capsule Take 1 capsule by mouth 3 times daily for 180 days. Intended supply: 30 days 90 capsule 1    omeprazole (PRILOSEC) 20 MG delayed release capsule TAKE 1 CAPSULE BY MOUTH EVERY DAY IN THE MORNING BEFORE BREAKFAST 90 capsule 0     No current facility-administered medications for this visit. Review of Systems     Review of Systems   Constitutional:  Negative for activity change, appetite change, chills, diaphoresis, fatigue, fever and unexpected weight change. HENT:  Positive for hearing loss and tinnitus. Negative for congestion, dental problem, drooling, ear discharge, ear pain, facial swelling, mouth sores, nosebleeds, postnasal drip, rhinorrhea, sinus pressure, sinus pain, sneezing, sore throat, trouble swallowing and voice change. Eyes:  Negative for photophobia, discharge, itching and visual disturbance. Respiratory:  Negative for cough, choking, shortness of breath, wheezing and stridor. Gastrointestinal:  Negative for blood in stool, constipation, diarrhea, nausea and vomiting. Endocrine: Negative for cold intolerance, heat intolerance, polyphagia and polyuria. Musculoskeletal:  Negative for back pain, gait problem, neck pain and neck stiffness. Skin:  Negative for color change, pallor, rash and wound. Neurological:  Positive for dizziness. Negative for syncope, facial asymmetry, speech difficulty, light-headedness, numbness and headaches. Hematological:  Negative for adenopathy. Does not bruise/bleed easily. Psychiatric/Behavioral:  Negative for agitation, confusion and sleep disturbance. PhysicalExam     Vitals:    12/02/22 1108   BP: 113/74   Pulse: 54       Physical Exam  Constitutional:       Appearance: She is well-developed. HENT:      Head: Normocephalic and atraumatic. Not macrocephalic and not microcephalic. No raccoon eyes, Hurtado's sign, abrasion, contusion, right periorbital erythema, left periorbital erythema or laceration. Hair is normal.      Jaw: No trismus. Right Ear: Hearing, tympanic membrane and external ear normal. No decreased hearing noted. No drainage, swelling or tenderness. No middle ear effusion. No mastoid tenderness. Tympanic membrane is not perforated, retracted or bulging. Tympanic membrane has normal mobility. Left Ear: Hearing, tympanic membrane and external ear normal. No decreased hearing noted. No drainage, swelling or tenderness. No middle ear effusion. No mastoid tenderness. Tympanic membrane is not perforated, retracted or bulging. Tympanic membrane has normal mobility. Nose: No nasal deformity, septal deviation, laceration, mucosal edema or rhinorrhea. Right Sinus: No maxillary sinus tenderness or frontal sinus tenderness. Left Sinus: No maxillary sinus tenderness or frontal sinus tenderness. Mouth/Throat:      Mouth: Mucous membranes are not pale, not dry and not cyanotic. No lacerations or oral lesions. Dentition: Normal dentition. No dental caries or dental abscesses. Pharynx: Uvula midline. No oropharyngeal exudate, posterior oropharyngeal erythema or uvula swelling. Tonsils: No tonsillar abscesses. Eyes:      General: Lids are normal.         Right eye: No discharge. Left eye: No discharge. Extraocular Movements:      Right eye: Normal extraocular motion and no nystagmus. Left eye: Normal extraocular motion and no nystagmus. Conjunctiva/sclera:      Right eye: No chemosis or exudate. Left eye: No chemosis or exudate. Neck:      Thyroid: No thyroid mass or thyromegaly. Vascular: Normal carotid pulses. Trachea: No tracheal tenderness or tracheal deviation. Cardiovascular:      Rate and Rhythm: Normal rate and regular rhythm. Pulmonary:      Effort: No tachypnea, bradypnea or respiratory distress. Breath sounds: No stridor. Musculoskeletal:      Right shoulder: Normal range of motion. Cervical back: Neck supple. Lymphadenopathy:      Head:      Right side of head: No submental, submandibular, tonsillar, preauricular, posterior auricular or occipital adenopathy. Left side of head: No submental, submandibular, tonsillar, preauricular, posterior auricular or occipital adenopathy. Cervical: No cervical adenopathy. Right cervical: No superficial, deep or posterior cervical adenopathy. Left cervical: No superficial, deep or posterior cervical adenopathy. Skin:     General: Skin is warm and dry. Findings: No bruising, erythema, laceration, lesion or rash. Neurological:      Mental Status: She is alert and oriented to person, place, and time. Cranial Nerves: No cranial nerve deficit. Comments: Functional neuro exam performed. Croghan-Hallpike negative bilaterally. Cranial nerves II to XII grossly intact. Finger-nose normal.  Romberg negative. Fukuda step test normal.   Psychiatric:         Speech: Speech normal.         Behavior: Behavior normal.         Procedure     Otomicroscopy    An operating microscope was utilized to visualize the external auditory canals using a 4mm speculum.  The external auditory canals are clear. The tympanic membrane is intact. Ossicles appear intact. No fluid visualized in the middle ear. Assessment and Plan     1. Sensorineural hearing loss (SNHL) of both ears  Audiogram performed the office today reveals borderline normal to mild sensorineural hearing loss slightly worse in the high frequencies. Type a tympanograms. Excellent word recognition scores. Patient unfortunately not a candidate for hearing aid. I did recommend that she could try an over-the-counter hearing aid or hearing amplifier. This could help her tinnitus diminish as well as help her understand what is going on the television a little bit better. I do not think any further work-up is necessary at this point. 2. Tinnitus of both ears      3. Imbalance  Patient has been experiencing some imbalance over the past few months as well as some dizziness. She does endorse a sensation of motion that lasted for a few seconds. I believe the patient's history may be slightly confounded due to language barrier. Mini balance testing done in the office today was essentially normal.  I suspect her imbalance and dizziness issues are multifactorial.  There may be an underlying element of orthostatic hypotension. I also suspect presbystasis and diabetic neuropathy contributing. Recommend balance therapy. - PT vestibular rehab; Future    Return in about 6 months (around 6/2/2023). Portions of this note were dictated using Dragon.  There may be linguistic errors secondary to the use of this program.

## 2022-12-05 ENCOUNTER — OFFICE VISIT (OUTPATIENT)
Dept: PSYCHOLOGY | Age: 57
End: 2022-12-05

## 2022-12-05 DIAGNOSIS — R45.4 ANGER: ICD-10-CM

## 2022-12-05 DIAGNOSIS — F41.9 ANXIETY: ICD-10-CM

## 2022-12-05 DIAGNOSIS — F42.3 HOARDING BEHAVIOR: Primary | ICD-10-CM

## 2022-12-05 DIAGNOSIS — E11.42 TYPE 2 DIABETES MELLITUS WITH DIABETIC POLYNEUROPATHY, WITHOUT LONG-TERM CURRENT USE OF INSULIN (HCC): ICD-10-CM

## 2022-12-05 LAB
CHOLESTEROL, TOTAL: 212 MG/DL (ref 0–199)
HDLC SERPL-MCNC: 100 MG/DL (ref 40–60)
LDL CHOLESTEROL CALCULATED: 101 MG/DL
TRIGL SERPL-MCNC: 53 MG/DL (ref 0–150)
VLDLC SERPL CALC-MCNC: 11 MG/DL

## 2022-12-05 NOTE — PROGRESS NOTES
Behavioral Health Consultation  CUAUHTEMOC PERLA Psy.D. Psychologist  12/5/2022  2:30 PM EST      Time spent with Patient: 40 minutes  This is patient's first [facility] appointment. Reason for Consult: hoarding behavior, anger, anxiety  Referring Provider: Louann Cazares MD  [address]  [phone]    Pt provided informed consent for the behavioral health program. Discussed with patient model of service to include the limits of confidentiality (i.e. abuse reporting, suicide intervention, etc.) and short-term intervention focused approach. Pt indicated understanding. Feedback given to PCP. Hungarian Interpreters used during visit per pt's request:   Chinania Dates [id number] (this was disconnected on 's end around 2:45pm)  [place] [id number]    S:  Patient presents with concerns related to hoarding, anxiety, and anger/irritability. Pt reported keeping things after she uses them, including empty cans, containers, plastic bags. Noted she is running out of space in her house and outside storage is building up. Sx have been present for many years and pt reported they have gotten worse over time. Pt further endorsed distress related to getting rid of items adding that she knows that she does not need them as they do not have value. Pt also reported issues with anger, noting that she feels she overracts to situations that anger her. Reported that she will break something, yell. Feels her anger has gotten worse over last 4-5 years. Goals for treatment include improving mood, building coping skills. Pt further endorsed worries about her living situation. Reported feeling unstable in living situation. Is unsure if it is better to live with her daughter in [address] or look for an apartment that is good for people who are disabled and more accessible for wheelchairs.  Pt also noted struggles buying groceries as she does not have a car and relies on medical transportation for appointments. Patient lives alone in one bedroom and bathroom. Patient is disabled, currently unemployed. Caffeine use includes occasional 1-2 cups coffee. Denied cigarette use, denied alcohol use, denied illegal drug use. There is no regular exercise but pt engages in PT for knee, back, shoulder. Patient describes difficulties staying asleep, pt wakes up frequently due to sleep apnea. Patient describes good social support from children (42 and 28 y/o). Older daughter lives in [facility] and 28 y/o lives in [place]. Patient identifies as Adventist. Denied known familial MH history. Provided psychoeducation on anxiety and psychotherapy.       Mental Health History  Psychotropic medications: none   Psychiatric hospitalizations: not assessed this visit  Suicidal ideation/homicidal ideation: pt noted wishing she were dead sometimes but reported she would never harm herself due to Buddhism; denied homicidal ideation   Previous outpatient treatment: denied    O:  MSE:    Attitude: cooperative and friendly  Consciousness: alert  Orientation: oriented to person, place, time, general circumstance  Memory: recent and remote memory intact  Attention/Concentration: intact during session  Speech: normal rate and volume, well-articulated  Mood: \"very bad\"  Affect: congruent and tearful   Perception: within normal limits  Thought Content: within normal limits  Thought Process: logical, coherent and goal-directed  Insight: good  Judgment: intact  Ability to understand instructions: Yes  Ability to respond meaningfully: Yes  Morbid Ideation: passive thoughts of death  Suicide Assessment:  passive thoughts of wishing she were dead sometimes, denied plan/intent  Homicidal Ideation: no    History:    Medications:   Current Outpatient Medications   Medication Sig Dispense Refill    Blood Glucose Monitoring Suppl (BLOOD GLUCOSE MONITOR SYSTEM) w/Device KIT Dispense glucometer covered by patient's insurance 1 kit 0    blood glucose monitor strips Test once daily 100 strip 3    zoster recombinant adjuvanted vaccine (SHINGRIX) 50 MCG/0.5ML SUSR injection Inject 0.5 mLs into the muscle See Admin Instructions 1 dose now and repeat in 2-6 months 0.5 mL 0    Lancets MISC Use to test blood sugar once daily 100 each 3    acetaminophen (TYLENOL) 500 MG tablet Take 1 tablet by mouth 4 times daily as needed for Pain 60 tablet 2    omeprazole (PRILOSEC) 40 MG delayed release capsule Take 1 capsule by mouth every morning (before breakfast) 30 capsule 1    promethazine (PHENERGAN) 25 MG tablet Take 1 tablet by mouth every 6 hours as needed for Nausea 20 tablet 0    furosemide (LASIX) 40 MG tablet Take 1 tablet by mouth daily 30 tablet 5    meloxicam (MOBIC) 15 MG tablet Take 1 tablet by mouth daily 30 tablet 0    gabapentin (NEURONTIN) 300 MG capsule Take 1 capsule by mouth 3 times daily for 180 days. Intended supply: 30 days 90 capsule 1    omeprazole (PRILOSEC) 20 MG delayed release capsule TAKE 1 CAPSULE BY MOUTH EVERY DAY IN THE MORNING BEFORE BREAKFAST 90 capsule 0     No current facility-administered medications for this visit.      Social History:   Social History     Socioeconomic History    Marital status:      Spouse name: Not on file    Number of children: Not on file    Years of education: Not on file    Highest education level: Not on file   Occupational History    Not on file   Tobacco Use    Smoking status: Never    Smokeless tobacco: Never   Vaping Use    Vaping Use: Never used   Substance and Sexual Activity    Alcohol use: No    Drug use: No    Sexual activity: Never   Other Topics Concern    Not on file   Social History Narrative    ** Merged History Encounter **          Social Determinants of Health     Financial Resource Strain: Low Risk     Difficulty of Paying Living Expenses: Not hard at all   Food Insecurity: No Food Insecurity    Worried About [facility] in the Last Year: Never true    [address] [facility] [address] in the Last Year: Never true   Transportation Needs: Not on file   Physical Activity: Not on file   Stress: Not on file   Social Connections: Not on file   Intimate Partner Violence: Not on file   Housing Stability: Not on file     TOBACCO:   reports that she has never smoked. She has never used smokeless tobacco.  ETOH:   reports no history of alcohol use. Family History:   Family History   Problem Relation Age of Onset    Diabetes Mother     Stroke Mother     Osteoarthritis Mother     Heart Disease Father     Other Father     High Blood Pressure Father     Osteoarthritis Father     Diabetes Maternal Aunt     Cancer Maternal Aunt     Diabetes Maternal Grandmother     Cancer Paternal Aunt        A:  Did not administer PHQ9 and GAD7 today due to time limitations. Insight fair, motivation good. PHQ-9 Questionaire 12/1/2022 6/15/2021 1/9/2020 2/28/2019 6/22/2018 3/6/2018 2/26/2018   Little interest or pleasure in doing things 0 0 0 0 1 3 0   Feeling down, depressed, or hopeless 0 0 0 0 1 3 0   Trouble falling or staying asleep, or sleeping too much 0 - - - - 3 -   Feeling tired or having little energy 0 - - - - 3 -   Poor appetite or overeating 0 - - - - 3 -   Feeling bad about yourself - or that you are a failure or have let yourself or your family down 0 - - - - 3 -   Trouble concentrating on things, such as reading the newspaper or watching television 0 - - - - 3 -   Moving or speaking so slowly that other people could have noticed. Or the opposite - being so fidgety or restless that you have been moving around a lot more than usual 0 - - - - 3 -   Thoughts that you would be better off dead, or of hurting yourself in some way 0 - - - - 3 -   PHQ-9 Total Score 0 0 0 0 2 27 0   If you checked off any problems, how difficult have these problems made it for you to do your work, take care of things at home, or get along with other people?  0 - - - - 3 -     PHQ Scores 12/1/2022 6/15/2021 1/9/2020 2/28/2019 6/22/2018 3/6/2018 2/26/2018   PHQ2 Score 0 0 0 0 2 6 0   PHQ9 Score 0 0 0 0 2 27 0       Interpretation of Total Score Depression Severity: 1-4 = Minimal depression, 5-9 = Mild depression, 10-14 = Moderate depression, 15-19 = Moderately severe depression, 20-27 = Severe depression     Diagnosis:  1. Hoarding behavior    2. Anger    3. Anxiety        Past Medical History:      Diagnosis Date    Arthritis     TAN (dyspnea on exertion)     Gastroesophageal reflux disease 1/28/2020    Hyperlipidemia     Hypertension     Morbid obesity with body mass index (BMI) of 60.0 to 69.9 in adult [facility]) 7/2/2018    Neuropathy     SHYANN (obstructive sleep apnea)     Primary osteoarthritis of right knee 7/2/2018    Type 2 diabetes mellitus without complication ([address]     controlled with diet    Urinary incontinence        Plan:  Pt interventions:  Established rapport, Conducted functional assessment, Honor-setting to identify pt's primary goals for [facility] visit / overall health, Supportive techniques, Emphasized self-care as important for managing overall health, and Provided Psychoeducation re: anxiety, behavioral health services    Pt Behavioral Change Plan:   See Pt Instructions.

## 2022-12-05 NOTE — Clinical Note
Kasie Leggett! Can you connect with this pt? I believe she could benefit from community resources. She is disabled, has some problems with her housing, transportation, buying groceries.   Thank you, Shweta Ng

## 2022-12-06 NOTE — PATIENT INSTRUCTIONS
Return to see Dr. Rafael Avila in 2 weeks. Connect with care coordinator. Contact the numbers below if your mood becomes significantly worse, or if you have more serious thoughts of suicide or self harm. [place] Warmline  ([phone] 82 [phone]) 751-WARM (5870)  [employer]. My Pick Box    The [employer] is a peer resource available 24/7. The Warmline provides a safe, confidential place to talk and be heard. [address] [facility]  (007) 686-CARE (208)  National Suicide Prevention Lifeline    (245) 978-TALK (4569)  www.suicidepreventionlifeline. org    Suicide and Crisis Lifeline  Dial [address] [facility]    (107) [place] (641-6100)  Www.[employer]meriWorksline.     [facility] ([facility]): [address]  [address] [facility], [address] [place] [address]    [phone] and Press 1  Text 380077  www. veteranscrisisline. net

## 2022-12-07 ENCOUNTER — TELEPHONE (OUTPATIENT)
Dept: GYNECOLOGY | Age: 57
End: 2022-12-07

## 2022-12-07 ENCOUNTER — TELEPHONE (OUTPATIENT)
Dept: PRIMARY CARE CLINIC | Age: 57
End: 2022-12-07

## 2022-12-07 ENCOUNTER — HOSPITAL ENCOUNTER (OUTPATIENT)
Dept: PHYSICAL THERAPY | Age: 57
Setting detail: THERAPIES SERIES
Discharge: HOME | End: 2022-12-07
Payer: COMMERCIAL

## 2022-12-07 PROCEDURE — 97530 THERAPEUTIC ACTIVITIES: CPT

## 2022-12-07 PROCEDURE — 97110 THERAPEUTIC EXERCISES: CPT

## 2022-12-07 NOTE — FLOWSHEET NOTE
[facility] Rehabilitation a Department of [address]  [address] Crocker [address], [address]  Office: [phone]  Fax:  [phone]                                                           Physical Therapy Daily Treatment Note  Date:  2022    Patient Name:  Amy Lopez    :  1965  MRN: [id number]    Medical/Treatment Diagnosis Information:  Diagnosis: M17.11 (ICD-10-CM) - Primary osteoarthritis of right knee; M17.12 (ICD-10-CM) - Primary osteoarthritis of left knee  Treatment Diagnosis: M25.561, M25.562 Bilateral knee pain  Insurance/Certification information:  PT Insurance Information: Lynda Trinidad - 20vcy; Alee Martinez required for TE, NR, DN  Physician Information:  Referring Provider (secondary): Ilia Briscoe  Has the plan of care been signed (Y/N):        [x]  Yes  []  No     Date of Patient follow up with Physician: not scheduled       Is this a Progress Report:     []  Yes  [x]  No        Progress report will be due (10 Rx or 30 days whichever is less):        Recertification will be due (POC Duration  / 90 days whichever is less):          Visit # Insurance Allowable Auth Required   4 for knee   (3 for lumbar at [address] [place] [address]) 20 visits (Alee Martinez req for TE, NR, DN) []  Yes []  No        Functional Scale: foto 22    Date assessed:  22      Latex Allergy:  [x]NO      []YES  Preferred Language for Healthcare:   [x]English       []other:    Pain level:  6-7/10     SUBJECTIVE:   pt was contacted and scheduled for aquatic therapy at [address]  [facility]. She notes that she still feels like her knees \"dislocate\" when she stands or walks like trying to go to the bathroom. Pt is also wondering if someone is supposed to be ordering her a quad cane that was suggested by Dr. Shaikh Even to start practicing if she has shoulder sx on her left side.      [employer] video on ipad used this session for   NUMBER FOR TRANSPORT: [phone]    OBJECTIVE: 11/9/22  Observation:   Test measurements:       Flexibility L R Comment   Hamstring 20 30  SLR   Gastroc         ITB         Quad                                   ROM PROM AROM Overpressure Comment     L R L R L R     Flexion     105; pain 110; pain         Extension     Lacking 5 Lacking 3                                                    Strength L R Comment   Quad 4- 4-     Hamstring 4 4     Gastroc         Hip flexor 4- 4-     Hip ABD 4- 4-                                  RESTRICTIONS/PRECAUTIONS:     Exercises/Interventions:     Exercise/Equipment Resistance/Repetitions Other comments   Stretching     Hamstring 5 x10 sec mindy seated with heel on step     Hip Flexion     ITB     Grion     Quad     Inclined Calf 5x10 seated rope     Towel Pull          SLR     Supine     Prone     Abduction X20 red band seated    Adducton     SLR+     Clams     LAQ 2X10 each    Seated marching 2X10 each     Seated hamstring curl  2x10 red band     Isometrics     Quad sets    Hip Adduction 10x10    Hip abduction          Ankle PF          Patellar Glides     Medial     Superior     Inferior          ROM     Sheet Pulls     Wall Slides     Edge of bed     Flexionator     Extensionator     Hang Weights     Ankle Pumps                              CKC     Calf raises     Wall sits     Step ups     Step up and over     Lateral Step Downs               Mini squats     CC TKE          Lateral band walks     Side Planks     Half moon     Single leg flow          Biodex-balance     Single leg stance With HHA and CBA    Plyoback          Stool scoots     SB bridge     SB HS Curls     Planks          PRE     Extension  RANGE: 90/40   Flexion  RANGE: 0/90        Cable Column          Leg Press  RANGE: 70/10        Bike     Treadmill                     Therapeutic Exercise and NMR EXR  [x] (88716) Provided verbal/tactile cueing for activities related to strengthening, flexibility, endurance, ROM for improvements in LE, proximal hip, and core control with self care, mobility, lifting, ambulation. [x] (12864) Provided verbal/tactile cueing for activities related to improving balance, coordination, kinesthetic sense, posture, motor skill, proprioception  to assist with LE, proximal hip, and core control in self care, mobility, lifting, ambulation and eccentric single leg control. NMR and Therapeutic Activities:    [x] (15996 or 30783) Provided verbal/tactile cueing for activities related to improving balance, coordination, kinesthetic sense, posture, motor skill, proprioception and motor activation to allow for proper function of core, proximal hip and LE with self care and ADLs  [] (30320) Gait Re-education- Provided training and instruction to the patient for proper LE, core and proximal hip recruitment and positioning and eccentric body weight control with ambulation re-education including up and down stairs     Home Exercise Program:    [x] (44822) Reviewed/Progressed HEP activities related to strengthening, flexibility, endurance, ROM of core, proximal hip and LE for functional self-care, mobility, lifting and ambulation/stair navigation   [x] (39779)Reviewed/Progressed HEP activities related to improving balance, coordination, kinesthetic sense, posture, motor skill, proprioception of core, proximal hip and LE for self care, mobility, lifting, and ambulation/stair navigation      Manual Treatments:  PROM / STM / Oscillations-Mobs:  G-I, II, III, IV (PA's, Inf., Post.)  [] (26281) Provided manual therapy to mobilize LE, proximal hip and/or LS spine soft tissue/joints for the purpose of modulating pain, promoting relaxation,  increasing ROM, reducing/eliminating soft tissue swelling/inflammation/restriction, improving soft tissue extensibility and allowing for proper ROM for normal function with self care, mobility, lifting and ambulation.      Modalities:      Charges:  Timed Code Treatment Minutes: 35   Total Treatment Minutes: 40     [] EVAL (LOW) 66412   [] EVAL (MOD) 63617   [] EVAL (HIGH) 29012   [] RE-EVAL   [x] TQ(36792) x  1  [] IONTO  [] NMR (82934) x     [] VASO  [] Manual (74160) x      [] Other:  [x] TA x 1     [] Mech Traction (28614)  [] ES(attended) (54818)      [] ES (un) (12544):       HEP instruction:   Access Code: [email or web address]  URL: [employer]/  Date: 11/09/2022  Prepared by: Brendon Delgadillo    Exercises  Supine Hamstring Stretch with Strap - 1 x daily - 7 x weekly - 5 reps - 5sec hold  Supine Quadricep Sets - 1 x daily - 7 x weekly - 10 reps - 5sec hold  Hooklying Isometric Hip Abduction with Belt - 1 x daily - 7 x weekly - 10 reps - 5sec hold  Supine Hip Adduction Isometric with Ball - 1 x daily - 7 x weekly - 10 reps - 5sec hold  Supine Heel Slide with Strap - 1 x daily - 7 x weekly - 5-10 reps - 5sec hold  Standing Weight Shift Side to Side - 1 x daily - 7 x weekly - 5-10 reps - 5sec hold    GOALS:  Patient stated goal: reduce pain, improve walking     [] Progressing: [] Met: [] Not Met: [] Adjusted    Therapist goals for Patient:   Short Term Goals: To be achieved in: 2 weeks  1. Independent in HEP and progression per patient tolerance, in order to prevent re-injury. [] Progressing: [] Met: [] Not Met: [] Adjusted   2. Patient will have a decrease in pain to facilitate improvement in movement, function, and ADLs as indicated by Functional Deficits. [] Progressing: [] Met: [] Not Met: [] Adjusted    Long Term Goals: To be achieved in: 4-6 weeks  1. Disability index score of 50 or greater FOTO to assist with reaching prior level of function. [] Progressing: [] Met: [] Not Met: [] Adjusted  2. Patient will demonstrate increased AROM to 110+ flex, lacking 3 or less ext  to allow for proper joint functioning as indicated by patients Functional Deficits. [] Progressing: [] Met: [] Not Met: [] Adjusted  3.  Patient will demonstrate an increase in Strength to 4/5 or greater to allow for proper functional mobility as indicated by patients Functional Deficits. [] Progressing: [] Met: [] Not Met: [] Adjusted  4. Patient will return to standing for greater than 5 minutes while cooking meals without increased symptoms or restriction. [] Progressing: [] Met: [] Not Met: [] Adjusted    Overall Progression Towards Functional goals/ Treatment Progress Update:  [] Patient is progressing as expected towards functional goals listed. [] Progression is slowed due to complexities/Impairments listed. [] Progression has been slowed due to co-morbidities. [x] Plan just implemented, too soon to assess goals progression <30days   [] Goals require adjustment due to lack of progress  [] Patient is not progressing as expected and requires additional follow up with physician  [] Other    Prognosis for POC: [x] Good [] Fair  [] Poor      Patient requires continued skilled intervention: [x] Yes  [] No    Treatment/Activity Tolerance:  [x] Patient able to complete treatment  [] Patient limited by fatigue  [] Patient limited by pain     [] Patient limited by other medical complications  [] Other: pt able to complete exercises today. We had another thorough discussion that strengthening her legs will help with the buckling that occurs at her knees. I did fit her today with wrap around hinge braces for bilateral knees as discussed previously to help with some stability of mindy knees until she is stronger. She did ask if a compression would help but we discussed that it might not give as much stability as the hinge braces provide. I also discussed that for safety I would like her to be a bit stronger before we transition to use of a quad cane. Pt will transfer to [facility] for aquatic therapy, may return to land therapy once she is better conditioned.          Return to Play: (if applicable)   []  Stage 1: Intro to Strength   []  Stage 2: Return to Run and Strength   []  Stage 3: Return to Jump and Strength   []  Stage 4: Dynamic Strength and Agility   []  Stage 5: Sport Specific Training     []  Ready to Return to Play, Meets All Above Stages   []  Not Ready for Return to Sports   Comments:                               PLAN: Transfer to [facility] for aquatic tx   [] Continue per plan of care [] Alter current plan (see comments above)  [x] Plan of care initiated [] Hold pending MD visit [] Discharge  Note: If patient does not return for scheduled/ recommended follow up visits, this note will serve as a discharge from care along with most recent update on progress. Reviewed insurance benefits for physical therapy in an outpatient hospital based setting with the patient, including deductible  and allowable visit number.  Pt was informed of possible out of pocket costs, as well as, informed of other service options for continuing supervised sessions without required skilled PT intervention such as the [facility] program.     Electronically signed by:   Antonette Guevara, PT, DPT, Cert DN

## 2022-12-07 NOTE — TELEPHONE ENCOUNTER
Pt wants each and every referral  from 12/1/22 and 12/2/22 to be faxed to each office so she can make her appts. Pt also wants the Diabetic shoes form faxed and mailed back to her.

## 2022-12-07 NOTE — TELEPHONE ENCOUNTER
Patient phone number is [phone]    Patient called has 2 mass one is  1 1/2 cm mass not sure size of the other one , on her uterous, she saw you several years ago and has not had it checked since since, Cancer in this area runs in her family she would like to have it check asap do you have a spot to fit her in ?

## 2022-12-08 ENCOUNTER — CARE COORDINATION (OUTPATIENT)
Dept: CARE COORDINATION | Age: 57
End: 2022-12-08

## 2022-12-08 LAB — DIABETIC RETINOPATHY: POSITIVE

## 2022-12-08 NOTE — TELEPHONE ENCOUNTER
Referrals have been faxed to each physician office. The diabetic shoe form will be faxed and sent to patient once Dr. Art Mejia has completed it.

## 2022-12-08 NOTE — CARE COORDINATION
Prior to sending this referral, please add both [facility] Social Workers to the patient's care team. The referrals are identified through the care team assignment. Please complete the questions below, and/or provide a narrative description of the identified patient need below and include this smart phrase in your patient encounter.     -This patient is referred this date to [facility] for review, assessment, and consultation as needed regarding the following presenting concern(s). Please bold all that apply. No    [facility] assessment of patient's substance use disorder, if any, is indicated and requested. No   Patient has concerns about apparently deteriorating mental status. No   Patient desires assistance with locating an accessible behavioral health treatment provider. No   Patient has questions/concerns regarding application and/or eligibility for Medicaid. Yes   Patient has questions/concerns regarding application and/or eligibility for a Medicaid Waiver program ([place], Home Care Waiver, Assisted Living Waiver, etc.). No   Patient has questions/concerns about the cost of prescription drugs. No   Patient has questions/concerns regarding Medicare coverage, Including Part D prescription drug coverage. Yes   Patient desires supportive services in the home regarding activities of daily living (homemaking, transferring, bathing, toileting, transportation, shopping, etc.). No   Patient desires information about long-term care in a skilled nursing facility (SNF). Yes   Patient has inadequate and/or unaffordable housing. No   Patient desires assistance with smoking cessation (Note: select this option only if patient is reasonably unable to participate in smoking cessation support groups offered periodically at [facility]). No   Patient desires information about advance directives (Power of  [employer], Living Will, DNR, Guardianship, etc.).     Please provide additional information if needed: (additional needs, household size, monthly income, source of income) Patient need Japanese . She is disabled, has some problems with her housing, transportation, buying groceries these are the social needs for this patient.

## 2022-12-08 NOTE — TELEPHONE ENCOUNTER
Please order patient a pelvic ultrasound and tell her we can do her annual on Monday, 4:30 pm on 1/23/23 in [place]

## 2022-12-08 NOTE — CARE COORDINATION
Ambulatory Care Coordination Note  12/8/2022    ACC: Lenin Lopez RN    ACM completed outreach to patient with the help of Kiswahili  [id number]. Patient said she was interested in getting connected with community resources, ACM will place referral for . Patient said she needed to follow up on forms regarding Diabetic shoes. ACM will follow up with this. Patient also needing to get MRI impressions/ CD faxed over to Dr. Miguel Bernstein prior to her appt. Patient had no other questions or concerns for ACM at this time. Plan:    F/U call 1 week  SW referral  F/U MRI imaging and CD  F/U Diabetic shoes form that needs filled out       Offered patient enrollment in the Remote Patient Monitoring (RPM) program for in-home monitoring: Patient is not eligible for RPM program.    Ambulatory Care Coordination Assessment    Care Coordination Protocol  Referral from Primary Care Provider: No  Week 1 - Initial Assessment     Do you have all of your prescriptions and are they filled?: Yes  Barriers to medication adherence: None  Are you able to afford your medications?: Yes  How often do you have trouble taking your medications the way you have been told to take them?: I always take them as prescribed. Suggested Interventions and Community Resources   Zone Management Tools: In Process                  Prior to Admission medications    Medication Sig Start Date End Date Taking?  Authorizing Provider   Blood Glucose Monitoring Suppl (BLOOD GLUCOSE MONITOR SYSTEM) w/Device KIT Dispense glucometer covered by patient's insurance 12/1/22   Paul Lynch MD   blood glucose monitor strips Test once daily 12/1/22   Paul Lynch MD   zoster recombinant adjuvanted vaccine [facility]) 50 MCG/0.5ML SUSR injection Inject 0.5 mLs into the muscle See Admin Instructions 1 dose now and repeat in 2-6 months 12/1/22 5/30/23  Paul Lynch MD   Lancets MISC Use to test blood sugar once daily 12/1/22   Jamal Jorge MD   acetaminophen (TYLENOL) 500 MG tablet Take 1 tablet by mouth 4 times daily as needed for Pain 12/1/22   Jamal Jorge MD   omeprazole (PRILOSEC) 40 MG delayed release capsule Take 1 capsule by mouth every morning (before breakfast) 12/1/22   Jamal Jorge MD   promethazine (PHENERGAN) 25 MG tablet Take 1 tablet by mouth every 6 hours as needed for Nausea 12/1/22   Jamal Jorge MD   furosemide (LASIX) 40 MG tablet Take 1 tablet by mouth daily 11/17/22   Siobhan Ruano MD   meloxicam [facility]P [facility]) 15 MG tablet Take 1 tablet by mouth daily 11/14/22   Jennifer Nunes MD   gabapentin (NEURONTIN) 300 MG capsule Take 1 capsule by mouth 3 times daily for 180 days.  Intended supply: 30 days 10/24/22 4/22/23  YURY Lee CNP   omeprazole (PRILOSEC) 20 MG delayed release capsule TAKE 1 CAPSULE BY MOUTH EVERY DAY IN THE MORNING BEFORE BREAKFAST 10/24/22   YURY Lee CNP       Future Appointments   Date Time Provider Leo Pamela   12/12/2022  1:30  [facility] RM 1 [facility] ULTRA [employer]   12/12/2022  2:00 PM [facility] RM 1 TJHZ CT [employer]   12/13/2022  9:20 AM Werner Feldman MD W CHEST ORTH [facility]   12/14/2022 11:40 AM Cristina Hurtado MD  NEURO Neurology -   12/14/2022  4:45 PM Hugh Fatima PT TJHZ [facility]   12/15/2022 10:45 AM Sil Feng MD [facility] CRSTVW [facility]   12/15/2022  1:45 PM Garo Guevara PTA WSTZ [facility]   12/16/2022 10:30 AM MD SCOT [facility] [place] - DYD   12/19/2022  8:30 AM Hugh Fatima PT TJHZ MAS [facility]   12/19/2022  1:30 PM Sujey ELLISON PSYCHO [facility]   12/20/2022 11:30 AM Marlene Weiner MD [facility] Sp [facility]   12/20/2022  2:30 PM Garo Earing, PTA WSTZ QC PT [facility]   12/21/2022 10:45 AM Hugh Fatima, PT VIRGIE OWEN PT [facility]   12/22/2022  1:45 PM Garo Earing, PTA WSTZ QC PT [facility]   12/27/2022  1:45 PM Thom Kessler, PTA WSTZ QC PT [facility]   12/28/2022  9:15 AM Margarito Rainey, PT TJHZ MAS PT [facility] HOD   12/29/2022  1:45 PM Rebel Heard, PTA WSTZ QC PT [facility] HOD   1/3/2023  1:45 PM Susanne Honour, PTA WSTZ QC PT [facility] HOD   1/5/2023  1:45 PM Susanne Honour, PTA WSTZ QC PT [facility] HOD   1/10/2023  1:30 PM Susanne Honour, PTA WSTZ QC PT [facility] HOD   1/25/2023 11:15 AM Chary Madrigal MD PLASTICS/REC [facility]   4/5/2023 10:20 AM Blanca Cartagena MD KW SLEEP MED [facility]   12/5/2023 12:00 PM Jim Sandoval MD SCOT CARDIO [facility]

## 2022-12-09 ENCOUNTER — CARE COORDINATION (OUTPATIENT)
Dept: CARE COORDINATION | Age: 57
End: 2022-12-09

## 2022-12-09 NOTE — TELEPHONE ENCOUNTER
Called and spoke to patient,  who would be a NEW to Dr Shirin Duval. I started out informing patient that Dr Shirin Duval that would like to for her to have an Pelvic US done was in the middle of trying to give patient telephone number she was talking to other people while I was talking to her. Then patient kept saying give me the phone number in a rushed tone. I asked patient if this was a good time to speak with her because I needed to tell her more of Dr Shirin Duval message to her. She kept saying I need the number then she said go on and talk. I proceed in reading the message in regarding to the date and time Dr Shirin Duval wanted her to come into the office to be seen. Patient said She doesn't have an earlier I asked patient if she wanted me to check with Dr Shirin Duval for an earlier time. She also wanted to know what time her appointment would be done. I informed her that I didn't know with her been a new patient. Then she answers another call on a another phone and started talking to someone. I informed her to call our office when she is able to talk fully sine she is talking to someone else while I am on call with her. I told patient I am going to end this call and I hung up the phone.

## 2022-12-09 NOTE — TELEPHONE ENCOUNTER
Patient called to register complaint and really wants to speak with our . I spoke with patient in detail, and why she is so angry, tried to explain our policy procedure and other MA did as she was suppose to, she was rushing over me and told me to be quiet til she was through speaking. 1) patient earlier argued she was rushed and not a new patient(actually she is, not seen since 4/17/18) 2) she wouldn't confirm she would not accept or refuse appt. 3) she was talking to other people instead of listening to the MA,     I spoke with her made appt for the NEW PATIENT appt as recommended after trying to get in a word, instructed her to do the test as instructed, gave her number for scheduling, and address of office in [facility]. Patient still wants to register complaint, asked if she needed anything else she said no, just a call back.      Patient can be reached at  [phone]

## 2022-12-09 NOTE — CARE COORDINATION
Initial Contact Social Work Note - Ambulatory  12/9/2022      Date of referral: 12/8/22  Referral received from: [address]  Reason for referral: Community resource needs     Previous SW referral: No  If yes, brief summary of outcome: n/a    Two Identifiers Verified: Yes    Insurance Provider: Thu Bowie    Support System: Adult children (one lives in [place], the [place] [facility]    [place] Status:  n/a    Community Providers: SNAP/Food [place]    ADL Assistance Needed: N/A    Housing/Living Concerns or Home Modification Needs: n/a     Transportation Concern: n/a Has service through insurance    Medication Cost Concern: n/a     Medication Adherence Concern: n/a    Financial Concern(s): Fixed income    Income (only if applicable): SSDI- $994/YT, SSI- $61/mo    Ability to Read/Write: Yes    Advance Care Plan:  Not on file; educated patient    SW reviewed chart and spoke to patient via phone to complete psychosocial assessment. Utilized  service (ID [id number]). SW introduced self, explained role, and verified demographic information. Patient is a 61year old  female who currently resides alone at her apartment located in [facility], [address] [place] [address]. Patient is disabled and receives SSDI and SSI as noted above. She also receives food stamps. Patient notes she is overwhelmed with numerous MD appointments while also trying to get information such as with this worker when she is in the car on her way to appts. Per chart, patient has current concerns regarding anxiety, anger/irritability, and hoarding behaviors. SW did not discuss this at time of call due to time constraint (patient going to MD appt partway through call). Patient reports no history of alcohol and/or illicit drug abuse. Patient stated she is behind on rent at current unit, no eviction proceedings filed as of this date.  NORMAN provided her contact info for DP conference Josie Guzman [phone] to inquire about rent payment assistance. SW offered to assist with call so  can be used. Clarified with her that emergency resources not available to move her into a new place unless she is homeless. She requests list of subsidized/first floor apartments near her current home, in [place], and in the [place] area. SW discussed Access program through [employer] [facility]; she stated she is familiar with this and cannot utilize for grocery trips as they limit the number of bags/space used to what can fit on your lap. Inquired about online grocery order to have them delivered to her and she will look into this through Maico Soriano. SW advised patient that she is ineligible for COA as she is not 60years of age. NORMAN Plan of Care: Mail list of subsidized/accessible housing in areas requested. Provide additional resources for rent payment if needed  Offer counseling resources    SW to follow up with patient in 1 week regarding the aforementioned. SW provided contact information and will remain available for support as needed.      This has been reviewed with the interdisciplinary team.    Vijay HERNANDEZ, [facility]    [phone]

## 2022-12-09 NOTE — CARE COORDINATION
ACM requested that CCSS find out how CD of MRI can be obtained for patients appt with Dr. Benny Duenas. [address] [facility], [address] [facility]    An MRI of Lumbar Spine was completed at [facility] 11/1/2022. Spoke with Lianne Bello who said they can electronically push the images to [facility]/Dr Diaz. She will put all recent ortho imaging so they have it. That was completed while on phone. They do this routinely with [facility].     Routing to [employer]

## 2022-12-10 DIAGNOSIS — R79.89 ELEVATED BRAIN NATRIURETIC PEPTIDE (BNP) LEVEL: ICD-10-CM

## 2022-12-11 DIAGNOSIS — M70.62 TROCHANTERIC BURSITIS OF BOTH HIPS: ICD-10-CM

## 2022-12-11 DIAGNOSIS — M25.551 BILATERAL HIP PAIN: ICD-10-CM

## 2022-12-11 DIAGNOSIS — M25.552 BILATERAL HIP PAIN: ICD-10-CM

## 2022-12-11 DIAGNOSIS — M70.61 TROCHANTERIC BURSITIS OF BOTH HIPS: ICD-10-CM

## 2022-12-12 ENCOUNTER — HOSPITAL ENCOUNTER (OUTPATIENT)
Dept: CT IMAGING | Age: 57
Discharge: HOME | End: 2022-12-12
Payer: COMMERCIAL

## 2022-12-12 ENCOUNTER — TELEPHONE (OUTPATIENT)
Dept: PRIMARY CARE CLINIC | Age: 57
End: 2022-12-12

## 2022-12-12 ENCOUNTER — HOSPITAL ENCOUNTER (OUTPATIENT)
Dept: ULTRASOUND IMAGING | Age: 57
Discharge: HOME | End: 2022-12-12
Payer: COMMERCIAL

## 2022-12-12 DIAGNOSIS — R10.13 EPIGASTRIC ABDOMINAL PAIN: ICD-10-CM

## 2022-12-12 DIAGNOSIS — R51.9 FREQUENT HEADACHES: ICD-10-CM

## 2022-12-12 DIAGNOSIS — R11.2 NAUSEA AND VOMITING, UNSPECIFIED VOMITING TYPE: ICD-10-CM

## 2022-12-12 PROCEDURE — 70450 CT HEAD/BRAIN W/O DYE: CPT

## 2022-12-12 PROCEDURE — 76700 US EXAM ABDOM COMPLETE: CPT

## 2022-12-12 RX ORDER — FUROSEMIDE 40 MG/1
TABLET ORAL
Qty: 90 TABLET | Refills: 3 | Status: SHIPPED | OUTPATIENT
Start: 2022-12-12

## 2022-12-12 RX ORDER — MELOXICAM 15 MG/1
TABLET ORAL
Qty: 30 TABLET | Refills: 0 | OUTPATIENT
Start: 2022-12-12

## 2022-12-12 NOTE — TELEPHONE ENCOUNTER
Patient called back today, she tried to schedule ultrasound appointment, how ever there were no orders, ever put in for it . Called NOMI brown, she had forgot to put in so she put order in for it, patient then said is my appointment documented as 1/23 ? Appointment had not been put in per instructed by DR. Rao,I put appointment in , patient then ask to speak to Nancy , I transferred call to Nancy (Farhad Coleman)

## 2022-12-12 NOTE — TELEPHONE ENCOUNTER
Left:  Eye No diabetic proliferative ----     Right eye: Non Proliferative without macular edema     Results are also I her care everywhere

## 2022-12-12 NOTE — TELEPHONE ENCOUNTER
Requested Prescriptions     Pending Prescriptions Disp Refills    furosemide (LASIX) 40 MG tablet [Pharmacy Med Name: FUROSEMIDE 40 MG TABLET] 90 tablet 3     Sig: TAKE 1 TABLET BY MOUTH EVERY DAY              Last Office Visit: 11/28/2022     Next Office Visit: 12/5/2023

## 2022-12-13 ENCOUNTER — TELEPHONE (OUTPATIENT)
Dept: PRIMARY CARE CLINIC | Age: 57
End: 2022-12-13

## 2022-12-13 ENCOUNTER — OFFICE VISIT (OUTPATIENT)
Dept: ORTHOPEDIC SURGERY | Age: 57
End: 2022-12-13

## 2022-12-13 ENCOUNTER — TELEPHONE (OUTPATIENT)
Dept: CASE MANAGEMENT | Age: 57
End: 2022-12-13

## 2022-12-13 VITALS — WEIGHT: 233.91 LBS | BODY MASS INDEX: 37.59 KG/M2 | HEIGHT: 66 IN

## 2022-12-13 DIAGNOSIS — M16.32 OSTEOARTHRITIS RESULTING FROM LEFT HIP DYSPLASIA: Primary | ICD-10-CM

## 2022-12-13 DIAGNOSIS — M67.952 TENDINOPATHY OF LEFT GLUTEUS MEDIUS: ICD-10-CM

## 2022-12-13 DIAGNOSIS — E66.01 MORBID OBESITY (HCC): ICD-10-CM

## 2022-12-13 DIAGNOSIS — M70.61 TROCHANTERIC BURSITIS OF BOTH HIPS: ICD-10-CM

## 2022-12-13 DIAGNOSIS — M70.62 TROCHANTERIC BURSITIS OF BOTH HIPS: ICD-10-CM

## 2022-12-13 PROBLEM — F42.3 HOARDING BEHAVIOR: Status: ACTIVE | Noted: 2022-12-13

## 2022-12-13 PROBLEM — R51.9 FREQUENT HEADACHES: Status: ACTIVE | Noted: 2022-12-13

## 2022-12-13 PROBLEM — L98.7 EXCESS SKIN: Status: ACTIVE | Noted: 2022-12-13

## 2022-12-13 PROBLEM — K80.20 GALLSTONES: Status: ACTIVE | Noted: 2022-12-13

## 2022-12-13 PROBLEM — D64.9 ANEMIA: Status: ACTIVE | Noted: 2022-12-13

## 2022-12-13 PROBLEM — R11.2 NAUSEA AND VOMITING: Status: ACTIVE | Noted: 2022-12-13

## 2022-12-13 PROBLEM — R41.3 MEMORY PROBLEM: Status: ACTIVE | Noted: 2022-12-13

## 2022-12-13 PROBLEM — R45.4 ANGER: Status: ACTIVE | Noted: 2022-12-13

## 2022-12-13 RX ORDER — BUPIVACAINE HYDROCHLORIDE 2.5 MG/ML
6 INJECTION, SOLUTION INFILTRATION; PERINEURAL ONCE
Status: COMPLETED | OUTPATIENT
Start: 2022-12-13 | End: 2022-12-13

## 2022-12-13 RX ORDER — ROPIVACAINE HYDROCHLORIDE 5 MG/ML
3 INJECTION, SOLUTION EPIDURAL; INFILTRATION; PERINEURAL ONCE
Status: COMPLETED | OUTPATIENT
Start: 2022-12-13 | End: 2022-12-13

## 2022-12-13 RX ORDER — METHYLPREDNISOLONE ACETATE 40 MG/ML
40 INJECTION, SUSPENSION INTRA-ARTICULAR; INTRALESIONAL; INTRAMUSCULAR; SOFT TISSUE ONCE
Status: COMPLETED | OUTPATIENT
Start: 2022-12-13 | End: 2022-12-13

## 2022-12-13 RX ORDER — LIDOCAINE HYDROCHLORIDE 10 MG/ML
5 INJECTION, SOLUTION INFILTRATION; PERINEURAL ONCE
Status: COMPLETED | OUTPATIENT
Start: 2022-12-13 | End: 2022-12-13

## 2022-12-13 RX ADMIN — BUPIVACAINE HYDROCHLORIDE 15 MG: 2.5 INJECTION, SOLUTION INFILTRATION; PERINEURAL at 11:26

## 2022-12-13 RX ADMIN — ROPIVACAINE HYDROCHLORIDE 3 ML: 5 INJECTION, SOLUTION EPIDURAL; INFILTRATION; PERINEURAL at 11:28

## 2022-12-13 RX ADMIN — LIDOCAINE HYDROCHLORIDE 5 ML: 10 INJECTION, SOLUTION INFILTRATION; PERINEURAL at 11:27

## 2022-12-13 RX ADMIN — METHYLPREDNISOLONE ACETATE 40 MG: 40 INJECTION, SUSPENSION INTRA-ARTICULAR; INTRALESIONAL; INTRAMUSCULAR; SOFT TISSUE at 11:27

## 2022-12-13 ASSESSMENT — ENCOUNTER SYMPTOMS
CONSTIPATION: 0
SHORTNESS OF BREATH: 0
DIARRHEA: 0
ABDOMINAL PAIN: 1
SINUS PRESSURE: 0
NAUSEA: 1
WHEEZING: 0
ABDOMINAL DISTENTION: 0
COUGH: 0
RHINORRHEA: 0
BLOOD IN STOOL: 0
VOMITING: 1
SORE THROAT: 0
BACK PAIN: 1
TROUBLE SWALLOWING: 0
CHEST TIGHTNESS: 0

## 2022-12-13 NOTE — TELEPHONE ENCOUNTER
Spoke with patient, let her know the forms for Diabetic shoes have been faxed.  Will put a copy in the mail per patient request.

## 2022-12-13 NOTE — PROGRESS NOTES
Chief Complaint:   Chief Complaint   Patient presents with    Hip Pain     Bilateral hip pain L>R. Here to discuss MRI and injection. History of Present Illness:       Patient is a 61 y.o. female returns follow up for the above complaint. The patient was last seen approximately 3 weeksago. The symptoms show no change since the last visit. The patient has had no further testing for the problem. Status post subgluteus ciera bursa injection ultrasound-guided bilateral hips 11/22/2022    Only self-limited partial response related to the after mentioned injection. Left hip remains more problematic than right hip    She does have a component of pain that localized to the anterior groin on the left    No new injuries no new events    She would like to proceed with intra-articular ultrasound-guided injection of the left hip as a treatment option that was recommended by Dr. Mj Martinez.          Past Medical History:        Past Medical History:   Diagnosis Date    Arthritis     TAN (dyspnea on exertion)     Frequent headaches 12/13/2022    Gastroesophageal reflux disease 1/28/2020    Hyperlipidemia     Hypertension     Morbid obesity with body mass index (BMI) of 60.0 to 69.9 in adult [facility]) 7/2/2018    Neuropathy     SHYANN (obstructive sleep apnea)     Primary osteoarthritis of right knee 7/2/2018    Type 2 diabetes mellitus without complication ([facility] [address]     controlled with diet    Urinary incontinence         Present Medications:         Current Outpatient Medications   Medication Sig Dispense Refill    diclofenac sodium (VOLTAREN) 1 % GEL Apply 2 g topically 4 times daily (Patient not taking: Reported on 12/16/2022) 2 g 0    furosemide (LASIX) 40 MG tablet TAKE 1 TABLET BY MOUTH EVERY DAY 90 tablet 3    Blood Glucose Monitoring Suppl (BLOOD GLUCOSE MONITOR SYSTEM) w/Device KIT Dispense glucometer covered by patient's insurance 1 kit 0    blood glucose monitor strips Test once daily 100 strip 3    zoster recombinant adjuvanted vaccine [facility]) 50 MCG/0.5ML SUSR injection Inject 0.5 mLs into the muscle See Admin Instructions 1 dose now and repeat in 2-6 months (Patient not taking: Reported on 12/16/2022) 0.5 mL 0    Lancets MISC Use to test blood sugar once daily 100 each 3    acetaminophen (TYLENOL) 500 MG tablet Take 1 tablet by mouth 4 times daily as needed for Pain 60 tablet 2    omeprazole (PRILOSEC) 40 MG delayed release capsule Take 1 capsule by mouth every morning (before breakfast) 30 capsule 1    promethazine (PHENERGAN) 25 MG tablet Take 1 tablet by mouth every 6 hours as needed for Nausea 20 tablet 0    meloxicam (MOBIC) 15 MG tablet Take 1 tablet by mouth daily 30 tablet 0    gabapentin (NEURONTIN) 300 MG capsule Take 1 capsule by mouth 3 times daily for 180 days. Intended supply: 30 days 90 capsule 1    omeprazole (PRILOSEC) 20 MG delayed release capsule TAKE 1 CAPSULE BY MOUTH EVERY DAY IN THE MORNING BEFORE BREAKFAST 90 capsule 0     No current facility-administered medications for this visit. Allergies:      No Known Allergies        Review of Systems:    Pertinent items are noted in HPI      Vital Signs: There were no vitals filed for this visit. General Exam:     Constitutional: Patient is adequately groomed with no evidence of malnutrition    Physical Exam:        General: Obese body habitus no acute distress   Primary Exam:    Inspection: No deformity atrophy appreciable effusion      Palpation: No focal tenderness      Range of Motion: Hip flexion 90 to 95 degrees      Skin: There are no rashes, ulcerations or lesions.       Gait: Assist dependent    Neurovascular - non focal and intact     Additional Comments:        Additional Examinations:               Office Imaging Results/Procedures PerformedToday:            Office Procedures:     Orders Placed This Encounter   Procedures    MRI HIP LEFT WO CONTRAST     PATIENT WILL NEED  Korean     Standing Status:   Future Standing Expiration Date:   12/13/2023     Scheduling Instructions:      Wilfred borja, please contact pt to schedule, [address] will obtain auth and fwd to your facility. Pt advised to f/u in clinic 2-3 days after MRI for results. Order Specific Question:   Reason for exam:     Answer:   R/O GLUT TENDON TEAR / AVN / LABRAL     Order Specific Question:   What is the sedation requirement? Answer:   None    US GUIDED NEEDLE PLACEMENT     Standing Status:   Future     Number of Occurrences:   1     Standing Expiration Date:   12/13/2023     Order Specific Question:   Reason for exam:     Answer:   CSI    NM ARTHROCENTESIS ASPIR&/INJ MAJOR JT/BURSA W/O US       Ultrasound-guided intra-articular hip injection  Logic E ultrasound  4 MHz-5MHz    The patient was positioned supine on examination table and the  LT      lower extremity was slightly abducted. The curvilinear probe was positioned in the medial groin region transversely. Diagnostic ultrasound was performed verifying the position of the neurovascular bundle in short axis with [place] confirmation. The probe was then translated laterally and the anterior surface of the femoral head was visualized. The probe was then rotated to coronal oblique position and the femoral acetabular joint and anterior joint recess at the femoral head/neck junction was visualized. The femoral head neck junction was visualized at approximately 5 cm. Image optimization was obtained    The position of the probe was marked. Sterile preparation was performed. The subcutaneous and muscular tissues were anesthetized using 25-gauge 4 cm needle advanced under direct guidance using 5 cc 1% Lidocaine. The needle was then exchanged for a 20-gauge spinal needle and this was advanced in the same needle track under direct guidance using longitudinal technique to the anterior joint recess using 0.25 % marcaine. .  A total of 5cc 1% Lidocaine /3cc of 0.25% Marcaine was utilized. The syringe was exchanged and under direct guidance 1 mL of  Depo-Medrol 3 mL of 0. 25% Marcaine was injected into the anterior joint recess at the femoral acetabular joint. The anterior capsule was visualized to hydrodissect. Needle was withdrawn pressure applied to the puncture wound. Images stored    Positive anesthetic response    Technically successful intra-articular injection of the hip       Other Outside Imaging and Testing Personally Reviewed:      Assessment   Impression: . Encounter Diagnoses   Name Primary? Osteoarthritis resulting from left hip dysplasia Yes    Trochanteric bursitis of both hips     Tendinopathy of left gluteus medius     Morbid obesity (HCC)               Plan:     Postinjection protocol and monitor response to left intra-articular hip injection  MRI evaluation left hip evaluate severity tendinopathy/pathology involving the hip abductor complex  Medical pain management as per previous  Consider advanced imaging of the right hip abductor complex as needed    Given the lack of response to the subgluteus ciera bursa injection it is likely there is advanced pathology involving the hip abductor complex and or a greater component of pain that is of intra-articular etiology. Proceed as outlined above     Orders:        Orders Placed This Encounter   Procedures    MRI HIP LEFT WO CONTRAST     PATIENT WILL NEED  Telugu     Standing Status:   Future     Standing Expiration Date:   12/13/2023     Scheduling Instructions:      Brinda borja, please contact pt to schedule, [address] will obtain auth and fwd to your facility. Pt advised to f/u in clinic 2-3 days after MRI for results. Order Specific Question:   Reason for exam:     Answer:   R/O GLUT TENDON TEAR / AVN / LABRAL     Order Specific Question:   What is the sedation requirement?      Answer:   None    US GUIDED NEEDLE PLACEMENT     Standing Status:   Future     Number of Occurrences:   1     Standing Expiration Date:   12/13/2023     Order Specific Question:   Reason for exam:     Answer:   CSI    IA ARTHROCENTESIS ASPIR&/INJ MAJOR JT/BURSA W/O Sybil Negron MD.      Disclaimer: \"This note was dictated with voice recognition software. Though review and correction are routine, we apologize for any errors. \"

## 2022-12-13 NOTE — TELEPHONE ENCOUNTER
Dr. Silvia Grijalva,    Pt had incidental pulmonary nodule finding on CTA cardiac on 11/21/22 at [facility].  Notes indicate cardiologist asked pt to f/u with her pcp for nodule management. I wanted to make sure you were aware. 7. 7 mm indeterminate pulmonary nodule in the left upper lobe. Following the Fleischner Society 2017 guidelines for single solid nodules 6-8 mm, if patient is low risk, then CT is suggested at 6-12 months from initial scan with subsequent CT at 18-24 months.   If patient is high risk, then CT is suggested at 6-12    months from initial scan and then at 18-24 months.  qzxv        Thank you,  Sarah MOISEN, RN   Lung Nodule Navigator  [facility]  [phone]

## 2022-12-13 NOTE — TELEPHONE ENCOUNTER
Called patient to hear her concerns. Apologized that she felt the way she did. US is scheduled for 12/20.  Patient appreciated my return call

## 2022-12-14 ENCOUNTER — TELEPHONE (OUTPATIENT)
Dept: PHYSICAL THERAPY | Age: 57
End: 2022-12-14

## 2022-12-14 ENCOUNTER — TELEPHONE (OUTPATIENT)
Dept: ORTHOPEDIC SURGERY | Age: 57
End: 2022-12-14

## 2022-12-14 ENCOUNTER — PROCEDURE VISIT (OUTPATIENT)
Dept: NEUROLOGY | Age: 57
End: 2022-12-14
Payer: COMMERCIAL

## 2022-12-14 ENCOUNTER — HOSPITAL ENCOUNTER (OUTPATIENT)
Dept: PHYSICAL THERAPY | Age: 57
Setting detail: THERAPIES SERIES
Discharge: HOME | End: 2022-12-14

## 2022-12-14 DIAGNOSIS — M48.062 LUMBAR STENOSIS WITH NEUROGENIC CLAUDICATION: ICD-10-CM

## 2022-12-14 DIAGNOSIS — Z98.890 H/O LUMBOSACRAL SPINE SURGERY: ICD-10-CM

## 2022-12-14 DIAGNOSIS — M54.16 LUMBAR RADICULITIS: Primary | ICD-10-CM

## 2022-12-14 DIAGNOSIS — R20.0 BILATERAL LEG NUMBNESS: Primary | ICD-10-CM

## 2022-12-14 PROCEDURE — 95909 NRV CNDJ TST 5-6 STUDIES: CPT | Performed by: PSYCHIATRY & NEUROLOGY

## 2022-12-14 PROCEDURE — 95886 MUSC TEST DONE W/N TEST COMP: CPT | Performed by: PSYCHIATRY & NEUROLOGY

## 2022-12-14 NOTE — TELEPHONE ENCOUNTER
Other PATIENT'S INSURANCE IS CALLING PATIENT WOULD LIKE TO CHANGE PT LOCATION AND INSURANCE NEEDS A PRIOR AUTHORIZATION SUBMITTED.  [place] [address] [phone]

## 2022-12-14 NOTE — FLOWSHEET NOTE
Physical Therapy  Cancellation/No-show Note  Patient Name:  Johana Doyle  :  1965   Date:  2022  Cancelled visits to date: 1  No-shows to date: 0    For today's appointment patient:  [x]  Cancelled  []  Rescheduled appointment  []  No-show     Reason given by patient:  []  Patient ill (in ED, with low HR)  []  Conflicting appointment  []  No transportation    []  Conflict with work  []  No reason given  [x]  Other:  refer to telephone encounter   Comments:      Electronically signed by:  Lyubov Richmond PT, PT

## 2022-12-14 NOTE — PROGRESS NOTES
Kenya Rodriguez M.D. [facility]) Physicians/[facility]  Board Certified in [address] [facility] [address] [facility], [address]    EMG / NERVE CONDUCTION STUDY      PATIENT:  Farrah Rivers       DATE OF EM22     YOB: 1965       REASON FOR EMG:   Bilateral leg numbness, low back pain      REFERRING PHYSICIAN:  Sandeep Patel PA-C  [address] [place] [address]     SUMMARY:   Bilateral peroneal motor nerve studies were normal.  Bilateral posterior tibial motor nerve studies were normal.  Bilateral sural sensory nerve studies were normal.  Needle EMG of several muscles in both lower extremities was normal.  However needle EMG of bilateral lumbar paraspinal muscles showed evidence of mild denervation. CLINICAL DIAGNOSIS:  Neuropathy versus radiculopathy        EMG RESULTS:     This patient has isolated denervation in the lumbar paraspinal muscles. This could be as a result of lumbar spinal stenosis. It could also be from postsurgical fibrillations from previous lumbar disc surgery. Clinical correlation is recommended. There is no electrophysiological evidence for peripheral neuropathy in this study. ---------------------------------------------  Kenya Rodriguez M.D.   Electromyographer / Neurologist

## 2022-12-14 NOTE — TELEPHONE ENCOUNTER
Today pt was contacted and advised that because her appt is scheduled in my last visit of the day (4:45 PM), she would need to reschedule due to previous transportation challenges. This clinic will close and my work shift is over at 530. In the past she has had to wait up to 3 hours for her transportation to come back to pick her up. In explaining this to her she became upset that I was having to reschedule her appt again. I again explained to her that I will need to see her earlier in the day in order to not jeopardize her safety in leaving her outside the clinic to wait for transportation after closing. She felt that our  should have known this and not offered her this 4:45 PM appt time and asked that I contact her insurance [facility]) to tell them about this situation. I needed to return to my current patients but did receive a call from Queen Audie requesting a reason why her appt was being rescheduled. I then explained to Queen Audie the same as above. Also to note that this patient has been seen at a different PT office for a different diagnosis, that is 0.6 miles from her house. When I became aware of this, I suggested to the patient she schedule all her PT visits there considering the convenient proximity to her home however pt stated she wanted to continue coming to this office.

## 2022-12-14 NOTE — TELEPHONE ENCOUNTER
Called & spoke with the patient informing her I was returning her call in regards to have her physical therapy location changed. Patient states she is very frustrated with her situation currently, Physical therapy department is unable to have her do the late appointments here due patient's transportation always being late to  the patient after her appointment. Physical therapy is unable to leave the patient alone at the office, states that this is unsafe for patient. Patient states she wants to change provider and location for the PT. Patient wants to go to the [address] [place] [address] PT location with [facility]. Patient is requesting I call PT to have PT call her to schedule her appointments. I voiced understanding and I told the patient I will get in touch with PT to have them give her a call to schedule. Patient voiced understanding.

## 2022-12-15 ENCOUNTER — CARE COORDINATION (OUTPATIENT)
Dept: CARE COORDINATION | Age: 57
End: 2022-12-15

## 2022-12-15 ENCOUNTER — TELEPHONE (OUTPATIENT)
Dept: PHYSICAL THERAPY | Age: 57
End: 2022-12-15

## 2022-12-15 ENCOUNTER — HOSPITAL ENCOUNTER (OUTPATIENT)
Dept: PHYSICAL THERAPY | Age: 57
Setting detail: THERAPIES SERIES
Discharge: HOME | End: 2022-12-15
Payer: COMMERCIAL

## 2022-12-15 ENCOUNTER — OFFICE VISIT (OUTPATIENT)
Dept: ORTHOPEDIC SURGERY | Age: 57
End: 2022-12-15
Payer: COMMERCIAL

## 2022-12-15 VITALS — BODY MASS INDEX: 36.57 KG/M2 | HEIGHT: 67 IN | WEIGHT: 233 LBS

## 2022-12-15 DIAGNOSIS — M75.112 NONTRAUMATIC INCOMPLETE TEAR OF LEFT ROTATOR CUFF: Primary | ICD-10-CM

## 2022-12-15 PROCEDURE — G8427 DOCREV CUR MEDS BY ELIG CLIN: HCPCS | Performed by: ORTHOPAEDIC SURGERY

## 2022-12-15 PROCEDURE — 3017F COLORECTAL CA SCREEN DOC REV: CPT | Performed by: ORTHOPAEDIC SURGERY

## 2022-12-15 PROCEDURE — 99213 OFFICE O/P EST LOW 20 MIN: CPT | Performed by: ORTHOPAEDIC SURGERY

## 2022-12-15 PROCEDURE — G8417 CALC BMI ABV UP PARAM F/U: HCPCS | Performed by: ORTHOPAEDIC SURGERY

## 2022-12-15 PROCEDURE — 1036F TOBACCO NON-USER: CPT | Performed by: ORTHOPAEDIC SURGERY

## 2022-12-15 PROCEDURE — G8484 FLU IMMUNIZE NO ADMIN: HCPCS | Performed by: ORTHOPAEDIC SURGERY

## 2022-12-15 PROCEDURE — 97113 AQUATIC THERAPY/EXERCISES: CPT | Performed by: CHIROPRACTOR

## 2022-12-15 NOTE — PROGRESS NOTES
Chief Complaint    Shoulder Pain (F/U BILAT SHOULDERS)      History of Present Illness:  Johana Doyle is a 61 y.o. female who presents to our office today for follow up bilateral shoulder pain and to review the results of a recent left shoulder MRI. The patient previously underwent an MRI on the left in 2020, which demonstrated left rotator cuff tendinosis  She was treated conservatively with corticosteroid injection at that time 6/2/2021 at her last visit. She presents today with her . She states that her injection lasted approximately 2 months. She denies new injury or inciting event though states her pain has continued to worsen over the past year. She endorses pain with reaching for objects and lifting things. She had a cortisone injection for her right shoulder at last visit. She states that it did provide some relief though incomplete. She states that she was not able to get into physical therapy as of yet. Of note patient has lost a significant amount of weight - over 150# - due to gastric sleeve. She does suffer from chronic back pain and has difficulty with ambulation. She is hoping to remove some of the excess skin. An Pashto  was available throughout the visit    Pain Assessment  Location of Pain: Shoulder  Location Modifiers: Right, Left  Severity of Pain: 9  Quality of Pain: Aching, Dull, Sharp, Throbbing  Duration of Pain: Persistent  Frequency of Pain: Constant  Aggravating Factors: Other (Comment)  Limiting Behavior: Yes  Relieving Factors: Rest  Work-Related Injury: No  Are there other pain locations you wish to document?: No      Medical History:  Patient's medications, allergies, past medical, surgical, social and family histories were reviewed and updated as appropriate.     No notes on file    Review of Systems  A 14 point review of systems was completed by the patient and is available in the media section of the scanned medical record and was reviewed on 12/15/2022. The review is negative with the exception of those things mentioned in the HPI and Past Medical History    Vital Signs: There were no vitals filed for this visit. General/Appearance: Alert and oriented and in no apparent distress. Skin:  There are no skin lesions, cellulitis, or extreme edema. The patient has warm and well-perfused Bilateral upper extremities with brisk capillary refill. Left shoulder Exam:  Inspection: No gross deformities, no signs of infection. Palpation: Mild subacromial crepitance     Active Range of Motion: Forward elevation of 120, abduction of 120, external rotation with elbow at the side 45     Strength: External rotation with resistance with elbow at the side 4/5, internal rotation with resistance with elbow at the side 4/5, Champagne toast testing 4/5, Jobes test 4/5     Special Tests: Positive impingement testing     Neurovascular: Sensation to light touch is intact, no motor deficits, palpable radial pulses 2+     Right shoulder Exam:  Inspection: No gross deformities, no signs of infection. Palpation: Mild subacromial crepitance     Active Range of Motion: Forward elevation of 130, abduction of 120, external rotation with elbow at the side 50     Strength: External rotation with resistance with elbow at the side 4/5, internal rotation with resistance with elbow at the side 5/5, Champagne toast testing 5/5, Jobes test 4/5     Special Tests: Positive impingement testing     Neurovascular: Sensation to light touch is intact, no motor deficits, palpable radial pulses 2+     Radiology:     No new XR obtained at this time. MRI -   CONCLUSION:   1. Moderate cuff tendinosis. Slit-like 6mm interstitial laminar fraying anterior fibers    infraspinatus tendon. Mild bursitis. 2. Moderate glenohumeral arthropathy. Worn labrum. Regions of intermediate grade chondromalacia    inferior glenoid. Pseudocysts and marrow reaction. 3. AC arthropathy.  Low lying acromion. Lateral arch narrowing. Assessment :  Ms. Nikki Martino is a pleasant, 61 y.o. patient who is presenting with a partial-thickness cuff tear. Conservative treatment is most appropriate at this time. Impression:  Encounter Diagnosis   Name Primary? Nontraumatic incomplete tear of left rotator cuff Yes       Office Procedures:  Orders Placed This Encounter   Procedures    [facility]- [address]  (Ortho & Sports Performance)-[facility]     Referral Priority:   Routine     Referral Type:   Eval and Treat     Referral Reason:   Specialty Services Required     Requested Specialty:   Physical Therapist     Number of Visits Requested:   1         Treatment Plan:  Given the partial thickness nature of her tear . We recommend this patient continue in physical therapy. A new physical therapy letter was documented in [facility] today. We did also carry out an intraarticular cortisone injection today in clinic on the left shoulder in hopes that it might help in reducing symptoms such that she can participate in therapy with more comfort . Procedure: The indications and risks of steroid injection as well as treatment alternatives were discussed with the patient who consented to the procedure. Under sterile conditions and after informed consent was obtained, using posterior approach the patient was given an injection into the left shoulder glenohumeral joint. 2mL 40 mg of Depo-Medrol  and 4 mL of 0.5% ropivacaine (Naropin) were placed in the shoulder after it was prepped with chlorhexidine. This resulted in good relief of symptoms. There were no complications. The patient was advised to ice the shoulder this evening and to avoid vigorous activities for the next 2 days. They were advised to call us if there was any erythema, enduration, swelling or increasing pain. We will see Amel back in 4-6  weeks and/or as needed.  All questions were answered to patient's satisfaction and She was encouraged to call with any further questions or concerns. Irena Piedra is in agreement with this plan. 12/15/2022  11:38 AM    Eual Boas, MD [facility]    Orthopaedic Surgeon, Clinical Fellow  [facility]     The encounter with the patient was supervised by Dr. Doretha Pierce who personally examined the patient and reviewed the plan. This dictation was performed with a verbal recognition program (DRAGON) and it was checked for errors. It is possible that there are still dictated errors within this office note. If so, please bring any errors to my attention for an addendum. All efforts were made to ensure that this office note is accurate.  ____________________  I was physically present and personally supervised the Orthopaedic Sports Medicine Fellow in the evaluation and development of a treatment plan for this patient. I personally interviewed the patient and performed a physical examination. In addition, I discussed the patient's condition and treatment options with them. I have also reviewed and agree with the past medical, family and social history unless otherwise noted. All of the patient's questions were answered. Saad Pierce MD, PhD  12/15/2022

## 2022-12-15 NOTE — CARE COORDINATION
ACM did not make outreach d/t patient having appt today. AC will outreach on 12/30/22 d/t upcoming appts everyday.

## 2022-12-15 NOTE — FLOWSHEET NOTE
[facility] Performance and Rehabilitation a Department of [address]  [address] [place] [address], [address]  Office: [phone]  Fax:  [phone]                                                     Physical Therapy Daily Treatment Note  Date:  12/15/2022    Patient Name:  Matt Caballero    :  1965  MRN: [id number]      Medical/Treatment Diagnosis Information:  Diagnosis: M17.11 (ICD-10-CM) - Primary osteoarthritis of right knee; M17.12 (ICD-10-CM) - Primary osteoarthritis of left knee  Treatment Diagnosis: M25.561, M25.562 Bilateral knee pain    Insurance/Certification information:  PT Insurance Information: [employer] - [employer]vcy; [employer] required for TE, NR, DN  Physician Information:  Referring Provider (secondary): Joanna Vincent    Has the plan of care been signed (Y/N):        [x]  Yes  []  No     Date of Patient follow up with Physician: not scheduled       Is this a Progress Report:     []  Yes  [x]  No        Progress report will be due (10 Rx or 30 days whichever is less):        Recertification will be due (POC Duration  / 90 days whichever is less):          Visit # Insurance Allowable Auth Required   5 for knee   (3 for lumbar at [address] [place] [address]) 20 visits (55 [employer] [place] req for TE, NR, DN) []  Yes []  No        Functional Scale: foto 22    Date assessed:  22      Latex Allergy:  [x]NO      []YES  Preferred Language for Healthcare:   []English       [x]other: Croatian    Pain level:  7-8/10     SUBJECTIVE:   pt was contacted and scheduled for aquatic therapy at [address]  [facility]. She notes that she still feels like her knees \"dislocate\" when she stands or walks like trying to go to the bathroom. Pt is also wondering if someone is supposed to be ordering her a quad cane that was suggested by Dr. Charline Paul to start practicing if she has shoulder sx on her left side. 12/15: Pt reports pain in bilateral knees as well as back.   Pt reports knee braces given are the wrong size or one is the incorrect size. Pt appears to understand and can converse in [place] however  was present and utilized as well. OBJECTIVE: 11/9/22  Observation:   Test measurements:       Flexibility L R Comment   Hamstring 20 30  SLR   Gastroc         ITB         Quad                                   ROM PROM AROM Overpressure Comment     L R L R L R     Flexion     105; pain 110; pain         Extension     Lacking 5 Lacking 3                                                    Strength L R Comment   Quad 4- 4-     Hamstring 4 4     Gastroc         Hip flexor 4- 4-     Hip ABD 4- 4-                                Key  B= Belt DB= Dumbells T= Theratube   G=Gloves N= Noodles W= Weights   P= Paddles WW=Water Walker K= Kickboard     **Pt to PT in [facility] W/C accompanied by . Transfers:   W/C to  [address] with CGA, Lift into pool      % Immersion: 75%            Ambulation:   Exercises UE:      Forwards 2+1 with HHA for balance if needed Shoulder Shrugs     Lateral  Shoulder circles     Marching    Scapular Retraction      Retro   Rolling      Cariocas  Push Downs 10     IR/ER 10     Punching    Stretching:  Rowing 10   Gastroc/Soleus   Elbow Flex/Ext 10   Hamstring   Shldr Flex/Ext     Knee flex stretch   Shldr Abd/Add    Piriformis   Horiz Abd/Add     Hip flexor    Arm Circles     SKTC   PNF Diagonals    DKTC  UE oscillations f/b, s/s    ITB   Wall Push-ups    Quad  Figure 8's    Mid back   Buoy pull/push downs    UT  Tband rows    Rhom  Tband lats    Post Shoulder  Lumbar Rot w/ paddles    Pec   Small ball pull downs                   diagonals             Cervical:       AROM Flex       AROM Extension     LEs exercises:  bilat AROM Side Bending    Marching  10 AROM Rotation    Heel Raises  10 Chin tucks    Toe Raises   Chin nods     Squats  10      Hamstring Curls  10      Hip Flexion  10 Balance:      Hip Abduction 10 SLS    Hip Circles  Tandem stance    TA set  NBOS eyes open    Glut Set  NBOS eyes closed    Hip Extension  Hand to Opposite Knee    Hip Adduction    Box Step     Hip IR   Noodle Stance     Hip ER  Stop/Go Gait    Fig 8's  Switch Gait                Seated/ LIFTCHAIR bilat Functional:    Ankle Pumps 10 Step up forward    Ankle circles  Step up lateral    Knee flex & ext 10 Step down    Hip Abd & Adduction 10 Alorton squats    Bicycle  10 Crate Lifts    Add Set with ball  Lunges forward    LX stab with med ball throws  Lunges lateral    Ankle INV  Lunges retro    Ankle EV  Lower ab curl with noodle      Upper ab curl with ball      Med ball straight lifts      Med ball diagonal lifts      Hydrorider          Noodle:      Leg Press  Deep Water:    Noodle hang at wall  Jog    Noodle hang deep water  Jumping Jacks    Noodle Bicycle  Heel to toe      Hand to opposite knee      Cross country skier      Rocking Horse      Exercises/Interventions:  [employer] not performed today, only aquatics    Exercise/Equipment Resistance/Repetitions Other comments   Stretching     Hamstring 5 x10 sec mindy seated with heel on step     Hip Flexion     ITB     Grion     Quad     Inclined Calf 5x10 seated rope     Towel Pull          SLR     Supine     Prone     Abduction X20 red band seated    Adducton     SLR+     Clams     LAQ 2X10 each    Seated marching 2X10 each     Seated hamstring curl  2x10 red band     Isometrics     Quad sets    Hip Adduction 10x10    Hip abduction          Ankle PF          Patellar Glides     Medial     Superior     Inferior          ROM     Sheet Pulls     Wall Slides     Edge of bed     Flexionator     Extensionator     Hang Weights     Ankle Pumps                              CKC     Calf raises     Wall sits     Step ups     Step up and over     Lateral Step Downs               Mini squats     CC TKE          Lateral band walks     Side Planks     Half moon     Single leg flow          Biodex-balance     Single leg stance With HHA and CBA    Plyoback          Stool scoots     SB bridge     SB HS Curls     Planks          PRE     Extension  RANGE: 90/40   Flexion  RANGE: 0/90        Cable Column          Leg Press  RANGE: 70/10        Bike     Treadmill                   Therapeutic Exercise and NMR EXR  [x] (19784) Provided verbal/tactile cueing for activities related to strengthening, flexibility, endurance, ROM for improvements in LE, proximal hip, and core control with self care, mobility, lifting, ambulation. [x] (30263) Provided verbal/tactile cueing for activities related to improving balance, coordination, kinesthetic sense, posture, motor skill, proprioception  to assist with LE, proximal hip, and core control in self care, mobility, lifting, ambulation and eccentric single leg control.      NMR and Therapeutic Activities:    [x] (32123 or 92646) Provided verbal/tactile cueing for activities related to improving balance, coordination, kinesthetic sense, posture, motor skill, proprioception and motor activation to allow for proper function of core, proximal hip and LE with self care and ADLs  [] (01810) Gait Re-education- Provided training and instruction to the patient for proper LE, core and proximal hip recruitment and positioning and eccentric body weight control with ambulation re-education including up and down stairs     Home Exercise Program:    [x] (31002) Reviewed/Progressed HEP activities related to strengthening, flexibility, endurance, ROM of core, proximal hip and LE for functional self-care, mobility, lifting and ambulation/stair navigation   [x] (63425)Reviewed/Progressed HEP activities related to improving balance, coordination, kinesthetic sense, posture, motor skill, proprioception of core, proximal hip and LE for self care, mobility, lifting, and ambulation/stair navigation      Manual Treatments:  PROM / STM / Oscillations-Mobs:  G-I, II, III, IV (PA's, Inf., Post.)  [] (68149) Provided manual therapy to mobilize LE, proximal hip and/or LS spine soft tissue/joints for the purpose of modulating pain, promoting relaxation,  increasing ROM, reducing/eliminating soft tissue swelling/inflammation/restriction, improving soft tissue extensibility and allowing for proper ROM for normal function with self care, mobility, lifting and ambulation. Individual Aquatic Therapy:  [x] (80983) Provided verbal and tactile cueing for strengthening, flexibility, ROM using the therapeutic properties of water (buoyancy, resistance) for improvements in core control, mobility and ambulation     Aquatic Group:  [] (89152) Provided intermittent verbal and tactile cueing for strengthening, endurance, flexibility and ROM for 2-4 individuals that do not require one-on one patient contact by the therapist     Modalities:      Charges:  Timed Code Treatment Minutes: Total Treatment Minutes: Individual Aquatic Minutes: 45   Aquatic Group Minutes:      [] EVAL (LOW) 03967   [] EVAL (MOD) 99134   [] EVAL (HIGH) 73142   [] RE-EVAL   [] KG(76060) x  1  [] IONTO  [] NMR (70689) x     [] VASO  [] Manual (34872) x     [] Other:  [] TA x 1     [] Mech Traction (67456)  [] ES(attended) (08271)     [] ES (un) (96621):   [x] Aquatic (29515) x 3 [] Aquatic Group (52656) x    HEP instruction:   Access Code: [email or web address]  URL: [email or web address]  Date: 11/09/2022  Prepared by: Cindy Acuña    Exercises  Supine Hamstring Stretch with Strap - 1 x daily - 7 x weekly - 5 reps - 5sec hold  Supine Quadricep Sets - 1 x daily - 7 x weekly - 10 reps - 5sec hold  Hooklying Isometric Hip Abduction with Belt - 1 x daily - 7 x weekly - 10 reps - 5sec hold  Supine Hip Adduction Isometric with Ball - 1 x daily - 7 x weekly - 10 reps - 5sec hold  Supine Heel Slide with Strap - 1 x daily - 7 x weekly - 5-10 reps - 5sec hold  Standing Weight Shift Side to Side - 1 x daily - 7 x weekly - 5-10 reps - 5sec hold    GOALS:  Patient stated goal: reduce pain, improve walking     [] Progressing: [] Met: [] Not Met: [] Adjusted    Therapist goals for Patient:   Short Term Goals: To be achieved in: 2 weeks  1. Independent in HEP and progression per patient tolerance, in order to prevent re-injury. [] Progressing: [] Met: [] Not Met: [] Adjusted   2. Patient will have a decrease in pain to facilitate improvement in movement, function, and ADLs as indicated by Functional Deficits. [] Progressing: [] Met: [] Not Met: [] Adjusted    Long Term Goals: To be achieved in: 4-6 weeks  1. Disability index score of 50 or greater FOTO to assist with reaching prior level of function. [] Progressing: [] Met: [] Not Met: [] Adjusted  2. Patient will demonstrate increased AROM to 110+ flex, lacking 3 or less ext  to allow for proper joint functioning as indicated by patients Functional Deficits. [] Progressing: [] Met: [] Not Met: [] Adjusted  3. Patient will demonstrate an increase in Strength to 4/5 or greater to allow for proper functional mobility as indicated by patients Functional Deficits. [] Progressing: [] Met: [] Not Met: [] Adjusted  4. Patient will return to standing for greater than 5 minutes while cooking meals without increased symptoms or restriction. [] Progressing: [] Met: [] Not Met: [] Adjusted    Overall Progression Towards Functional goals/ Treatment Progress Update:  [] Patient is progressing as expected towards functional goals listed. [] Progression is slowed due to complexities/Impairments listed. [] Progression has been slowed due to co-morbidities.   [x] Plan just implemented, too soon to assess goals progression <30days   [] Goals require adjustment due to lack of progress  [] Patient is not progressing as expected and requires additional follow up with physician  [] Other      Patient requires continued skilled intervention: [x] Yes  [] No    Treatment/Activity Tolerance:  [x] Patient able to complete treatment  [] Patient limited by fatigue  [] Patient limited by pain     [] Patient limited by other medical complications  [] Other:                 ASSESSMENT:12/15: Pt tolerated exercises as instructed with multiple verbal cues with and without  assistance. Pt noted LB soreness after standing with LE exercises and gait for about 20 min but dec after hanging on the wall/ dec WB. Will continue with skilled instruction for strength, dec pain, and inc function for ADL's. Pain 5-6/10 after aquatics. Plan for Next Session:  Gradually increase gait and exercise with an emphasis on posture, proper exercise tech, and no increase pain. Therapist will educate patient on lumbopelvic stabilization with exercise and ADL's. Add: inc seated x 15, other UE and LE exer as tolerated. PLAN:   [] Continue per plan of care [] Alter current plan (see comments above)  [x] Plan of care initiated [] Hold pending MD visit [] Discharge      Electronically signed by: Barb Cordon, PTA [id number]      Note: If patient does not return for scheduled/ recommended follow up visits, this note will serve as a discharge from care along with most recent update on progress.

## 2022-12-15 NOTE — LETTER
Physical Therapy Rehabilitation Referral    Patient Name:  Hetal Hanson      YOB: 1965    Diagnosis:    Tendinopathy of bilateral rotator cuffs    Precautions:     [x] Evaluate and Treat    Post Op Instructions:  [] Continuous passive motion (CPM)   [] Elbow AROM  [x] Exercise in plane of scapula  []  Strengthening     [] Pulley and instruction   [x] Home exercise program (copy to patient)   [] Sling when arm at risk  [] Sling or brace at all times   [x] AAROM: Forward elevation to full            [x] AAROM: External rotation to full    [] Isometric external rotator strengthening [x] AAROM: internal rotation: up the back  [x] Isometric abductor strengthening  [x] AAROM: Internal abduction   [] Isometric internal rotator strengthening [x] AAROM: cross-body adduction             Stretching:     Strengthening:  [x] Four quadrant (FE, ER, IR, CBA)  [x] Rotator cuff (ER, IR, Abd)  [x] Forward Elevation    [] External Rotators     [x] External Rotation    [] Internal Rotators  [x] Internal Rotation: up/back   [] Abductors     [x] Internal Rotation: supine in abduction  [x] Sleeper Stretch    [] Flexors  [x] Cross-body abduction    [] Extensors  [x] Pendulum (FE, Abd/Add, cw/ccw)  [x] Scapular Stabilizers   [x] Wall-walking (FE, Abd)        [x] Shoulder shrugs     [] Table slides (FE)                [x] Rhomboid pinch  [] Elbow (flex, ext, pron, sup)        [] Lat.  Pull downs     [] Medial epicondylitis program       [] Forward punch   [] Lateral epicondylitis program       [] Internal rotators     [] Progressive resistive exercises  [] Bench Press        [x] Bench press plus  Activities:     [] Lateral pull-downs  [] Rowing     [x] Progressive two-hand supine press  [] Stepper/Exercise bike   [x] Biceps: curls/supination  [] Swimming  [] Water exercises    Modalities:     Return to Sport:  [x] Of Choice      [] Plyometrics  [] Ultrasound     [] Rhythmic stabilization  [] Iontophoresis    [x] Core strengthening   [] Moist heat     [] Sports specific program:   [] Massage         [x] Cryotherapy      [] Electrical stimulation     [] Paraffin  [] Whirlpool  [] TENS    [x] Home exercise program (copy to patient). Perform exercises for:   15     minutes    3      times/day  [x] Supervised physical therapy  Frequency: []  1x week  [x] 2x week  [] 3x week  [] Other:   Duration: [] 2 weeks   [] 4 weeks  [x] 6 weeks  [] Other:     Additional Instructions:   Please also include bilateral lower extremity strengthening, core strengthening, gait training and transfers due to deconditioning.     Eual Boas, MD [facility]    Orthopaedic Surgeon, Clinical Fellow  [address] [facility]   On behalf of Dr Doretha Pierce

## 2022-12-15 NOTE — TELEPHONE ENCOUNTER
As a treating therapist for this patient, I called to explain that I think it is most beneficial for her to be seen in aquatic tx d/t her deconditioning and multiple orthopedic conditions she is currently being seen for. Pt was frustrated thinking the \"decision had been made for her\". I explained further that during our sessions for her knee pain it has been difficult to progress safely and that I made a clinical judgement that it would be more beneficial for her to begin with aquatic tx and then she can transition back to land tx when appropriate. Pt was originally being seen in both the [place] PT clinic for her lumbar dx and then at [facility] PT clinic for knee OA, needing medical transport to get to these locations. I had discussed with the patient during our sessions that I thought aquatic tx would be more beneficial d/t the complaints of weakness and pain in the clinic, as well as, trying to streamline the process so she can be treated for multiple body parts in one setting vs several locations. At end of call, pt requested a letter stating this information be given to her. I stated I would put it in her chart.

## 2022-12-16 ENCOUNTER — OFFICE VISIT (OUTPATIENT)
Dept: PRIMARY CARE CLINIC | Age: 57
End: 2022-12-16
Payer: COMMERCIAL

## 2022-12-16 VITALS
TEMPERATURE: 97.1 F | OXYGEN SATURATION: 99 % | WEIGHT: 234 LBS | HEART RATE: 56 BPM | SYSTOLIC BLOOD PRESSURE: 122 MMHG | DIASTOLIC BLOOD PRESSURE: 80 MMHG | BODY MASS INDEX: 36.73 KG/M2 | RESPIRATION RATE: 16 BRPM | HEIGHT: 67 IN

## 2022-12-16 DIAGNOSIS — G93.0 ARACHNOID CYST: ICD-10-CM

## 2022-12-16 DIAGNOSIS — R11.2 NAUSEA AND VOMITING, UNSPECIFIED VOMITING TYPE: Primary | ICD-10-CM

## 2022-12-16 DIAGNOSIS — K80.20 GALLSTONES: ICD-10-CM

## 2022-12-16 DIAGNOSIS — R10.13 EPIGASTRIC ABDOMINAL PAIN: ICD-10-CM

## 2022-12-16 DIAGNOSIS — E55.9 VITAMIN D DEFICIENCY: ICD-10-CM

## 2022-12-16 DIAGNOSIS — E78.2 MIXED HYPERLIPIDEMIA: ICD-10-CM

## 2022-12-16 DIAGNOSIS — E11.42 TYPE 2 DIABETES MELLITUS WITH DIABETIC POLYNEUROPATHY, WITHOUT LONG-TERM CURRENT USE OF INSULIN (HCC): ICD-10-CM

## 2022-12-16 DIAGNOSIS — I67.9 CEREBROVASCULAR SMALL VESSEL DISEASE: ICD-10-CM

## 2022-12-16 PROCEDURE — 3044F HG A1C LEVEL LT 7.0%: CPT | Performed by: INTERNAL MEDICINE

## 2022-12-16 PROCEDURE — G8484 FLU IMMUNIZE NO ADMIN: HCPCS | Performed by: INTERNAL MEDICINE

## 2022-12-16 PROCEDURE — G8417 CALC BMI ABV UP PARAM F/U: HCPCS | Performed by: INTERNAL MEDICINE

## 2022-12-16 PROCEDURE — 1036F TOBACCO NON-USER: CPT | Performed by: INTERNAL MEDICINE

## 2022-12-16 PROCEDURE — 3017F COLORECTAL CA SCREEN DOC REV: CPT | Performed by: INTERNAL MEDICINE

## 2022-12-16 PROCEDURE — 2022F DILAT RTA XM EVC RTNOPTHY: CPT | Performed by: INTERNAL MEDICINE

## 2022-12-16 PROCEDURE — 99214 OFFICE O/P EST MOD 30 MIN: CPT | Performed by: INTERNAL MEDICINE

## 2022-12-16 PROCEDURE — G8427 DOCREV CUR MEDS BY ELIG CLIN: HCPCS | Performed by: INTERNAL MEDICINE

## 2022-12-16 RX ORDER — ERGOCALCIFEROL 1.25 MG/1
50000 CAPSULE ORAL WEEKLY
Qty: 4 CAPSULE | Refills: 3 | Status: SHIPPED | OUTPATIENT
Start: 2022-12-16

## 2022-12-16 NOTE — PROGRESS NOTES
Farrah EGAN Omarangelita   Date ofBirth:  1965    Date of Visit:  12/16/2022    Chief Complaint   Patient presents with    Headache    Gastroesophageal Reflux    Abdominal Pain    Nausea    Emesis       HPI    Video Millie Quintanilla #[id number]     Patient has nausea, vomiting, abdominal pain, and headaches. Patient had labs and imaging done. Patient presents for results and plans. Review of Systems   Constitutional:  Negative for appetite change, chills, fatigue and fever. HENT:  Negative for congestion, postnasal drip, rhinorrhea, sinus pressure, sore throat and trouble swallowing. Eyes:  Negative for visual disturbance. Respiratory:  Negative for cough, chest tightness, shortness of breath and wheezing. Cardiovascular:  Negative for chest pain, palpitations and leg swelling. Gastrointestinal:  Positive for abdominal pain, nausea and vomiting. Negative for abdominal distention, blood in stool, constipation and diarrhea. Genitourinary:  Negative for dysuria, frequency and hematuria. Musculoskeletal:  Positive for arthralgias, back pain (chronic) and gait problem. Negative for myalgias. Skin:  Negative for rash and wound. Neurological:  Positive for numbness and headaches. Negative for dizziness, tremors, syncope, weakness and light-headedness. Psychiatric/Behavioral:  Negative for dysphoric mood and sleep disturbance. The patient is not nervous/anxious.       No Known Allergies  Outpatient Medications Marked as Taking for the 12/16/22 encounter (Office Visit) with Melania Vazquez MD   Medication Sig Dispense Refill    vitamin D (ERGOCALCIFEROL) 1.25 MG (90020 UT) CAPS capsule Take 1 capsule by mouth once a week 4 capsule 3    furosemide (LASIX) 40 MG tablet TAKE 1 TABLET BY MOUTH EVERY DAY 90 tablet 3    Blood Glucose Monitoring Suppl (BLOOD GLUCOSE MONITOR SYSTEM) w/Device KIT Dispense glucometer covered by patient's insurance 1 kit 0    blood glucose monitor strips Test once daily 100 strip 3    Lancets MISC Use to test blood sugar once daily 100 each 3    acetaminophen (TYLENOL) 500 MG tablet Take 1 tablet by mouth 4 times daily as needed for Pain 60 tablet 2    omeprazole (PRILOSEC) 40 MG delayed release capsule Take 1 capsule by mouth every morning (before breakfast) 30 capsule 1    promethazine (PHENERGAN) 25 MG tablet Take 1 tablet by mouth every 6 hours as needed for Nausea 20 tablet 0    meloxicam (MOBIC) 15 MG tablet Take 1 tablet by mouth daily 30 tablet 0    gabapentin (NEURONTIN) 300 MG capsule Take 1 capsule by mouth 3 times daily for 180 days. Intended supply: 30 days 90 capsule 1    omeprazole (PRILOSEC) 20 MG delayed release capsule TAKE 1 CAPSULE BY MOUTH EVERY DAY IN THE MORNING BEFORE BREAKFAST 90 capsule 0         Vitals:    12/16/22 1014   BP: 122/80   Pulse: 56   Resp: 16   Temp: 97.1 °F (36.2 °C)   SpO2: 99%   Weight: 234 lb (106.1 kg)   Height: 5' 7.25\" (1.708 m)     Body mass index is 36.38 kg/m². Physical Exam  Nursing note reviewed. Constitutional:       General: She is not in acute distress. Appearance: Normal appearance. She is well-developed. Eyes:      General: Lids are normal.      Extraocular Movements: Extraocular movements intact. Conjunctiva/sclera: Conjunctivae normal.      Pupils: Pupils are equal, round, and reactive to light. Neck:      Thyroid: No thyromegaly. Vascular: No carotid bruit. Cardiovascular:      Rate and Rhythm: Normal rate and regular rhythm. Heart sounds: Normal heart sounds, S1 normal and S2 normal. No murmur heard. No friction rub. No gallop. Pulmonary:      Effort: Pulmonary effort is normal. No respiratory distress. Breath sounds: Normal breath sounds. No wheezing, rhonchi or rales. Abdominal:      General: Bowel sounds are normal. There is no distension. Palpations: Abdomen is soft. Tenderness: There is abdominal tenderness in the epigastric area. There is no rebound. Musculoskeletal:      Cervical back: Neck supple. Right lower leg: No edema. Left lower leg: No edema. Lymphadenopathy:      Head:      Right side of head: No submandibular adenopathy. Left side of head: No submandibular adenopathy. Neurological:      Mental Status: She is alert and oriented to person, place, and time. Psychiatric:         Mood and Affect: Mood normal.         No results found for this visit on 12/16/22.   Lab Review   Abstract on 12/12/2022   Component Date Value    Diabetic Retinopathy 12/08/2022 Positive    Orders Only on 12/05/2022   Component Date Value    Vitamin B1,Whole Blood 12/05/2022 70    Orders Only on 12/05/2022   Component Date Value    Cholesterol, Total 12/05/2022 212 (A)     Triglycerides 12/05/2022 53     HDL 12/05/2022 100 (A)     LDL Calculated 12/05/2022 101 (A)     VLDL Cholesterol Calcula* 12/05/2022 11    Orders Only on 12/01/2022   Component Date Value    Vitamin A 12/01/2022 0.57     Vitamin A, Interp 12/01/2022 Normal     RETINYL PALMITATE 12/01/2022 0.02    Orders Only on 12/01/2022   Component Date Value    Folate 12/01/2022 7.30    Orders Only on 12/01/2022   Component Date Value    Rejected Test 12/01/2022 80,388     Reason for Rejection 12/01/2022 see below    Orders Only on 12/01/2022   Component Date Value    Lipase 12/01/2022 32.0     Ferritin 12/01/2022 139.6     Vitamin B-12 12/01/2022 261     Iron 12/01/2022 90     TIBC 12/01/2022 287     Iron Saturation 12/01/2022 31     WBC 12/01/2022 4.6     RBC 12/01/2022 4.34     Hemoglobin 12/01/2022 12.4     Hematocrit 12/01/2022 38.4     MCV 12/01/2022 88.6     MCH 12/01/2022 28.6     MCHC 12/01/2022 32.2     RDW 12/01/2022 15.7 (A)     Platelets 66/68/7820 295     MPV 12/01/2022 7.5     Vit D, 25-Hydroxy 12/01/2022 14.0 (A)     Sodium 12/01/2022 142     Potassium 12/01/2022 4.4     Chloride 12/01/2022 107     CO2 12/01/2022 25     Anion Gap 12/01/2022 10     Glucose 12/01/2022 97     BUN 12/01/2022 16     Creatinine 12/01/2022 <0.5 (A)     Est, Glom Filt Rate 12/01/2022 >60     Calcium 12/01/2022 9.0     Total Protein 12/01/2022 6.9     Albumin 12/01/2022 4.3     Albumin/Globulin Ratio 12/01/2022 1.7     Total Bilirubin 12/01/2022 0.3     Alkaline Phosphatase 12/01/2022 71     ALT 12/01/2022 10     AST 12/01/2022 11 (A)     Microalbumin, Random Uri* 12/01/2022 <1.20     Creatinine, Ur 12/01/2022 87.5     Microalbumin Creatinine * 12/01/2022 see below    Office Visit on 12/01/2022   Component Date Value    Hemoglobin A1C 12/01/2022 5.1    Admission on 11/21/2022, Discharged on 11/21/2022   Component Date Value    Ventricular Rate 11/21/2022 46     Atrial Rate 11/21/2022 46     P-R Interval 11/21/2022 186     QRS Duration 11/21/2022 92     Q-T Interval 11/21/2022 468     QTc Calculation (Bazett) 11/21/2022 409     P Axis 11/21/2022 36     R Axis 11/21/2022 5     T [place] 11/21/2022 26     Diagnosis 11/21/2022 EKG performed in ER and to be interpreted by ER physician. Confirmed by MD, ER ([id number]),  Jewell Angela ([id number]) on 11/21/2022 2:32:33 PM     Sodium 11/21/2022 140     Potassium reflex Magnesi* 11/21/2022 4.3     Chloride 11/21/2022 108     CO2 11/21/2022 26     Anion Gap 11/21/2022 6     Glucose 11/21/2022 91     BUN 11/21/2022 18     Creatinine 11/21/2022 <0.5 (A)     Est, Glom Filt Rate 11/21/2022 >60     Calcium 11/21/2022 8.5     Total Protein 11/21/2022 5.8 (A)     Albumin 11/21/2022 3.5     Albumin/Globulin Ratio 11/21/2022 1.5     Total Bilirubin 11/21/2022 0.3     Alkaline Phosphatase 11/21/2022 58     ALT 11/21/2022 10     AST 11/21/2022 10 (A)     WBC 11/21/2022 4.8     RBC 11/21/2022 3.72 (A)     Hemoglobin 11/21/2022 10.7 (A)     Hematocrit 11/21/2022 32.8 (A)     MCV 11/21/2022 88.1     MCH 11/21/2022 28.8     MCHC 11/21/2022 32.7     RDW 11/21/2022 15.4     Platelets 57/20/4378 237     MPV 11/21/2022 7.0     Neutrophils % 11/21/2022 52.5     Lymphocytes % 11/21/2022 34.3 Monocytes % 11/21/2022 11.2     Eosinophils % 11/21/2022 1.5     Basophils % 11/21/2022 0.5     Neutrophils Absolute 11/21/2022 2.5     Lymphocytes Absolute 11/21/2022 1.6     Monocytes Absolute 11/21/2022 0.5     Eosinophils Absolute 11/21/2022 0.1     Basophils Absolute 11/21/2022 0.0     Troponin 11/21/2022 <0.01    Procedure visit on 11/15/2022   Component Date Value    Left Ventricular Ejectio* 11/15/2022 58     LVEF MODALITY 11/15/2022 ECHO    There may be more visits with results that are not included. PROCEDURE: CT HEAD WO CONTRAST  12/12/22       INDICATION: Frequent headaches       COMPARISON: 2/7/2019       TECHNICAL FACTORS: Multiplanar contiguous spiral axial scanning  Skull base to high convexities with multi-reformatted images. CT radiation dose optimization techniques (automated exposure control, use of a iterative reconstruction techniques, or adjustment of the mA or KV according to the patients size) were used to limit patient radiation dose. FINDINGS:   Noncontrast axial images reveal midline ventricles. Ventricular size and cortical sulci are prominent compatible with mild generalized atrophic changes. .       Stable middle cranial fossa arachnoid cyst anterior the right temporal lobe, stable measuring 17 x 31 mm. No additional extra-axial masses or fluid collections. No evidence of acute intracranial hemorrhage. No masses        Suspect remote lacunar changes, chronic small vessel disease posterior left basal ganglion. Posterior fossa is within normal limits       Paranasal sinuses are unremarkable. Orbits: Normal    Mastoids and Temporal Bones: Normal       Skull base and Bony calvarium remains intact, no destructive lesions           Impression   1. Stable appearance of right middle cranial fossa arachnoid cyst.   2. Mild atrophic changes   3. Remote chronic small vessel disease changes in the left basal ganglion posteriorly.          EXAM: ULTRASOUND ABDOMEN, COMPLETE  12/12/22       INDICATION: Epigastric abdominal pain, Nausea and vomiting, unspecified vomiting type, Pain       COMPARISON:  None       FINDINGS:       LIVER:   *  Size: Mildly enlarged. *  Echogenicity: Heterogeneous echotexture, no masses, no intrahepatic duct dilatation   *  Mass/Cyst: None. *  Vessels: Normal portal flow. ,   *  Duplex: No       BILE DUCTS:   *  Intrahepatic ducts: Normal.   *  Common bile duct diameter: 11 mm., Dilated       GALLBLADDER:   *  Gallstones/sludge: Intraluminal reflectors, posterior acoustical shadowing, cholelithiasis. *  Gallbladder wall thickening: None. *  Pericholecystic fluid: None. *  Sonographic Perdomo sign: None. PANCREAS: Overlying bowel gas obscures visualization . SPLEEN: 11.2 cm. Normal appearing. RIGHT KIDNEY: 11.6 cm length. Normal. Benign simple cyst measuring 2.9 x 2.5 cm       LEFT KIDNEY: 11.5 cm length. Normal. Benign simple cyst measuring 11 x 10 mm       ABDOMINAL AORTA AND IVC: Bowel gas obscures visualization ASCITES: None. OTHER: None           Impression       1. Cholelithiasis, cannot exclude choledocholithiasis with dilated common bile duct. MRCP is recommended for further evaluation             Assessment/Plan     1. Nausea and vomiting, unspecified vomiting type  -Abdominal ultrasound shows gallstones  -Promethazine 25 mg every 6 hours as needed  -Referral to Scot Campbell MD, General Surgery, [facility]    2. Gallstones  -Abdominal ultrasound shows gallstones  -Avoid fried foods and fatty foods  -Referral to Scot Campbell MD, General Surgery, [facility]    3. Epigastric abdominal pain  -Abdominal ultrasound shows gallstones  -Referral to Dr. Lidya Renae    4. Cerebrovascular small vessel disease  -Head CT scan shows remote chronic small vessel disease  -Referral to Neurology    5.  Arachnoid cyst  -Head CT scan shows right middle cranial fossa arachnoid cyst  -Referral to Neurology    6. Type 2 diabetes mellitus with diabetic polyneuropathy, without long-term current use of insulin ([facility])  -Hemoglobin A1c of 5.1% shows diabetes is well controlled  -Continue diet control  -Limit carbohydrates to 45 grams with meals and 15 grams with snacks  -Referral to IRA Gonzales MD, (Anterior Segment Reconstruction, Comprehensive and Surgical Ophthalmology) [facility]    7. Mixed hyperlipidemia  -LDL is near goal  -Low fat, low cholesterol diet  -Regular aerobic exercise    8. Vitamin D deficiency  -Vitamin D is very low  - vitamin D (ERGOCALCIFEROL) 1.25 MG (83073 UT) CAPS capsule; Take 1 capsule by mouth once a week  Dispense: 4 capsule; Refill: 3      Discussed medications with patient, who voiced understanding of their use and indications. All questions answered. Return in about 4 weeks (around 1/13/2023) for headaches, nausea, and vomiting. Sallyanne Chain

## 2022-12-19 ENCOUNTER — OFFICE VISIT (OUTPATIENT)
Dept: PSYCHOLOGY | Age: 57
End: 2022-12-19
Payer: COMMERCIAL

## 2022-12-19 ENCOUNTER — HOSPITAL ENCOUNTER (OUTPATIENT)
Dept: PHYSICAL THERAPY | Age: 57
Setting detail: THERAPIES SERIES
Discharge: HOME | End: 2022-12-19
Payer: COMMERCIAL

## 2022-12-19 ENCOUNTER — TELEPHONE (OUTPATIENT)
Dept: PHYSICAL THERAPY | Age: 57
End: 2022-12-19

## 2022-12-19 DIAGNOSIS — R45.4 ANGER: Primary | ICD-10-CM

## 2022-12-19 DIAGNOSIS — F42.3 HOARDING BEHAVIOR: ICD-10-CM

## 2022-12-19 DIAGNOSIS — F41.9 ANXIETY: ICD-10-CM

## 2022-12-19 PROCEDURE — 90834 PSYTX W PT 45 MINUTES: CPT

## 2022-12-19 PROCEDURE — 1036F TOBACCO NON-USER: CPT

## 2022-12-19 NOTE — Clinical Note
[employer]! This pt had a few things she wanted me to follow up with you on.   -She said she has not yet received resources regarding low-income housing -Reported she called [employer] to help pay bills multiple times but no one answered, heard they are not accepting newcomers until January -Is interested in [employer] or a similar program -Her power wheelchair is broken (are there any resources to help with this?) -She is wondering if she has the correct phone numbers for you and the [employer] These are the numbers she provided me: [phone] ([employer]); [phone] ()  Sorry this is a list. Let me know if you have any questions!  Thank you, Shweta Ng

## 2022-12-19 NOTE — TELEPHONE ENCOUNTER
This morning Farrah called the [facility] office in a 3-way call with [employer] with questions regarding her appointments. Per Ean Sourav was unsure whether her appt scheduled today at 8:30 AM had been cancelled. I told her it had not been but that she cannot be rescheduled today as I have no availability. I also told Milo Giang that Guille Smith had expressed that she did not want to continue coming to the [facility] office for PT for her low back but not to cancel her appointments yet until a different PT was found to treat her. I explained to Milo Giang that I spoke with Eladia Mcginnis, a physical therapist at our [facility] location, who recently treated Guille Smith for bilateral knee OA. Nolvia informed me that Farrah has been scheduled for aquatic PT and that land-based PT is not warranted at this time. Christene Dubin and I agreed that aquatic PT would be a great way to stream line treatment for both diagnoses and the most beneficial course of treatment at this time. I informed Milo Giang of this conversation. However, pt does not wish to proceed in this manner and still wants to continue the treatment plan as established on 11/7/22 for her low back pain. Pt stated she will come to the [facility] clinic to be treated for her low back.

## 2022-12-19 NOTE — PATIENT INSTRUCTIONS
Return to see Dr. Joaquin Queen in 2 weeks. Provider will follow up with  regarding pt concerns. Begin engaging in deep breathing/slow-paced breathing exercises to aid in anxiety/anger. Relaxation:  Diaphragmatic Breathing               Sit in a comfortable position    Place one hand on your stomach and the other on your chest    Try to breathe so that only your stomach rises and falls    As you inhale, concentrate on your chest remaining relatively still while your stomach rises. It may be helpful for you to imagine that your pants are too big and you need to push your stomach out to hold them up. When exhaling, allow your stomach to fall in and the air to fully escape. Inhale slowly. You may choose to hold the air in for about a second. Exhale slowly. Don't push the air out, but just let the natural pressure of your body slowly move it out. It is normal for this healthy method of breathing to feel a little awkward at first. With practice, it will feel more natural.    Get your mind on your side    One other important factor in getting relaxed is your mind. Your mind and body are connected. The mind influences the body and the body influences your mind. What you do with your mind when you are trying to relax is very important. You can think about:  Neutral things (e.g., counting, saying a word like \"calm\" or \"relax\")  Pleasant things (e.g., imagining a pleasant place)    It is recommended that you practice 2 times per day, 10 minutes each time.

## 2022-12-19 NOTE — FLOWSHEET NOTE
Physical Therapy  Cancellation/No-show Note  Patient Name:  Yannick Burden  :  1965   Date:  2022  Cancelled visits to date: 3  No-shows to date: 0    For today's appointment patient:  []  Cancelled  []  Rescheduled appointment  [x]  No-show but called     Reason given by patient:  []  Patient ill (in ED, with low HR)  []  Conflicting appointment  []  No transportation    []  Conflict with work  []  No reason given  [x]  Other:  Pt did not arrive for PT appt this visit, claiming she thought this appt had been cancelled. It had not.     Comments:      Electronically signed by:  Kiran Garza, PT, PT

## 2022-12-19 NOTE — PROGRESS NOTES
Behavioral Health Consultation  CUAUHTEMOC PERLA Psy.D. Psychologist  2022  1:15 PM EST      Time spent with Patient: 45 minutes  This is patient's second  [facility] appointment. Reason for Consult:    Chief Complaint   Patient presents with    Anxiety    Other     Anger  Hoarding behavior     Referring Provider: Shakira Morel MD  [facility], [facility] [address] [facility] [place]  [phone]    Feedback given to PCP. Slovak  used: Nicole Cross [id number]    S:  Pt described her mood as \"about the same. \" Noted anger and anxiety levels have remained the same as well. Discussed that much of her anger, anxiety, and stress are related to making appointments, arranging transportation, coping with health concerns. Pt described frustration with PT services, as she does not think aqua therapy will help with her back issues. Pt noted when she feels too stressed or becomes overwhelmed, she will stop going to doctor's appointments or stop taking medications. She reported she is currently taking her medications. Pt also shared that her spine doctor requested surgery, and one of the possible side effects was death. She elaborated that she \"would not care because she is so stressed. \" Pt endorsed passive thoughts of dying, however denied active suicidal ideation or thoughts of harming self and denied having plan/intent, identifying her Voodoo as a primary protective factor. Discussed safety interventions to deal with suicidal thoughts or if suicidal thoughts worsen including going to nearest ER, 911. Discussed relaxation strategies and self-care activities. Pt reported she continues to clean, cook. Praying, fasting and worshipping helps her most with anger. She noted she was previously swimming at [facility] to [facility], however her membership . Also found this to be good social support. Noted she is concerned with resuming membership as getting there would be stressful as her power wheelchair is broken.  Pt added that she used to play piano and had cats, birds but cannot afford these anymore. Discussed the stress response and engaging in deep breathing exercises to decrease sympathetic activation. Pt discussed concerns regarding community resources. Provider will reach out to SW regarding these concerns.      O:  MSE:    Appearance: good hygiene   Attitude: cooperative and friendly  Consciousness: alert  Orientation: oriented to person, place, time, general circumstance  Memory: recent and remote memory intact  Attention/Concentration: intact during session  Psychomotor Activity:normal  Eye Contact: normal  Speech: normal rate and volume, well-articulated  Mood: \"the same\"  Affect:  frustrated  Perception: within normal limits  Thought Content: within normal limits  Thought Process: logical, coherent and goal-directed  Insight: good  Judgment: intact  Ability to understand instructions: Yes  Ability to respond meaningfully: Yes  Morbid Ideation: passive thoughts of death  Suicide Assessment:  passive thoughts of dying, denied suicidal plan/intent  Homicidal Ideation: no    History:    Medications:   Current Outpatient Medications   Medication Sig Dispense Refill    vitamin D (ERGOCALCIFEROL) 1.25 MG (14056 UT) CAPS capsule Take 1 capsule by mouth once a week 4 capsule 3    diclofenac sodium (VOLTAREN) 1 % GEL Apply 2 g topically 4 times daily (Patient not taking: Reported on 12/16/2022) 2 g 0    furosemide (LASIX) 40 MG tablet TAKE 1 TABLET BY MOUTH EVERY DAY 90 tablet 3    Blood Glucose Monitoring Suppl (BLOOD GLUCOSE MONITOR SYSTEM) w/Device KIT Dispense glucometer covered by patient's insurance 1 kit 0    blood glucose monitor strips Test once daily 100 strip 3    zoster recombinant adjuvanted vaccine (SHINGRIX) 50 MCG/0.5ML SUSR injection Inject 0.5 mLs into the muscle See Admin Instructions 1 dose now and repeat in 2-6 months (Patient not taking: Reported on 12/16/2022) 0.5 mL 0    Lancets MISC Use to test blood sugar once daily 100 each 3    acetaminophen (TYLENOL) 500 MG tablet Take 1 tablet by mouth 4 times daily as needed for Pain 60 tablet 2    omeprazole (PRILOSEC) 40 MG delayed release capsule Take 1 capsule by mouth every morning (before breakfast) 30 capsule 1    promethazine (PHENERGAN) 25 MG tablet Take 1 tablet by mouth every 6 hours as needed for Nausea 20 tablet 0    meloxicam (MOBIC) 15 MG tablet Take 1 tablet by mouth daily 30 tablet 0    gabapentin (NEURONTIN) 300 MG capsule Take 1 capsule by mouth 3 times daily for 180 days. Intended supply: 30 days 90 capsule 1    omeprazole (PRILOSEC) 20 MG delayed release capsule TAKE 1 CAPSULE BY MOUTH EVERY DAY IN THE MORNING BEFORE BREAKFAST 90 capsule 0     No current facility-administered medications for this visit. Social History:   Social History     Socioeconomic History    Marital status:      Spouse name: Not on file    Number of children: Not on file    Years of education: Not on file    Highest education level: Not on file   Occupational History    Not on file   Tobacco Use    Smoking status: Never    Smokeless tobacco: Never   Vaping Use    Vaping Use: Never used   Substance and Sexual Activity    Alcohol use: No    Drug use: No    Sexual activity: Never   Other Topics Concern    Not on file   Social History Narrative    ** Merged History Encounter **          Social Determinants of Health     Financial Resource Strain: Low Risk     Difficulty of Paying Living Expenses: Not hard at all   Food Insecurity: No Food Insecurity    Worried About Running Out of Food in the Last Year: Never true    Ran Out of Food in the Last Year: Never true   Transportation Needs: Not on file   Physical Activity: Not on file   Stress: Not on file   Social Connections: Not on file   Intimate Partner Violence: Not on file   Housing Stability: Not on file     TOBACCO:   reports that she has never smoked.  She has never used smokeless tobacco.  ETOH:   reports no history of alcohol use. Family History:   Family History   Problem Relation Age of Onset    Diabetes Mother     Stroke Mother     Osteoarthritis Mother     Heart Disease Father     Other Father     High Blood Pressure Father     Osteoarthritis Father     Diabetes Maternal Aunt     Cancer Maternal Aunt     Diabetes Maternal Grandmother     Cancer Paternal Aunt        A:  Did not administer PHQ9 and GAD7 due to time limitations. Patient engaged and cooperative. Insight and motivation are fair to good. Diagnosis:    1. Anger    2. Anxiety    3. Hoarding behavior        Past Medical History      Diagnosis Date    Arthritis     TAN (dyspnea on exertion)     Frequent headaches 12/13/2022    Gastroesophageal reflux disease 1/28/2020    Hyperlipidemia     Hypertension     Morbid obesity with body mass index (BMI) of 60.0 to 69.9 in adult [facility]) 7/2/2018    Neuropathy     SHYANN (obstructive sleep apnea)     Primary osteoarthritis of right knee 7/2/2018    Type 2 diabetes mellitus without complication ([facility] [address]     controlled with diet    Urinary incontinence        Plan:  Pt interventions:  Discussed self-care (sleep, nutrition, rewarding activities, social support, exercise), Supportive techniques, Provided Psychoeducation re: stress response/anxiety, and Emphasized importance of regular practice of relaxation strategies to target / promote anxiety, stress, anger management    Pt Behavioral Change Plan:   See Pt Instructions.

## 2022-12-20 ENCOUNTER — CARE COORDINATION (OUTPATIENT)
Dept: CARE COORDINATION | Age: 57
End: 2022-12-20

## 2022-12-20 ENCOUNTER — HOSPITAL ENCOUNTER (OUTPATIENT)
Dept: PHYSICAL THERAPY | Age: 57
Setting detail: THERAPIES SERIES
Discharge: HOME | End: 2022-12-20
Payer: COMMERCIAL

## 2022-12-20 ENCOUNTER — HOSPITAL ENCOUNTER (OUTPATIENT)
Dept: ULTRASOUND IMAGING | Age: 57
Discharge: HOME | End: 2022-12-20
Payer: COMMERCIAL

## 2022-12-20 DIAGNOSIS — R10.2 PELVIC PAIN IN FEMALE: ICD-10-CM

## 2022-12-20 PROCEDURE — 76830 TRANSVAGINAL US NON-OB: CPT

## 2022-12-20 PROCEDURE — 76856 US EXAM PELVIC COMPLETE: CPT

## 2022-12-20 NOTE — FLOWSHEET NOTE
[facility], [facility]  [address] [facility] [place], [facility]  Phone: [phone]   Fax:     [phone]    Physical Therapy  Cancellation/No-show Note  Patient Name:  Blaze Wakefield  :  1965   Date:  2022  Cancelled visits to date: 1  No-shows to date: 0    Patient status for today's appointment patient:  [x]  Cancelled  []  Rescheduled appointment  []  No-show     Reason given by patient:  []  Patient ill  []  Conflicting appointment  [x]  No transportation    []  Conflict with work  []  No reason given  []  Other:     Comments:      Phone call information:   []  Phone call made today to patient at _ time at number provided:      [x]  Patient answered, conversation as follows: used  #[id number], Gordon Mejia, to assist in telling the pt to bring in incorrect knee brace on Thursday in order to exchange for correct size, 3XL.     []  Patient did not answer, message left as follows:  []  Phone call not made today    Electronically signed by:  Clarence Quinones, PTA [id number]

## 2022-12-20 NOTE — CARE COORDINATION
SW spoke to patient via phone/ [phone] to follow up on community resources. Patient stated she was on her way to an MD appt, but was able to briefly talk. SW informed her that a list of housing would be sent to her via mail tomorrow. SW also included information regarding [employer]. SW to reach out to local [facility] to obtain more information regarding possible rent payment assistance. SW ensured that patient has this worker's correct contact number and will reach out to her again next week to confirm receipt of mailed information.      Rosi HERNANDEZ, [place]     [phone]

## 2022-12-21 ENCOUNTER — HOSPITAL ENCOUNTER (OUTPATIENT)
Dept: PHYSICAL THERAPY | Age: 57
Setting detail: THERAPIES SERIES
Discharge: HOME | End: 2022-12-21
Payer: COMMERCIAL

## 2022-12-21 ENCOUNTER — APPOINTMENT (OUTPATIENT)
Dept: PHYSICAL THERAPY | Age: 57
End: 2022-12-21
Payer: COMMERCIAL

## 2022-12-21 PROCEDURE — 97110 THERAPEUTIC EXERCISES: CPT

## 2022-12-21 NOTE — FLOWSHEET NOTE
[facility]      Physical Therapy Treatment Note/ Progress Report:       Date:  2022    Patient Name:  Araseli Lu    :  1965  MRN: [id number]  Restrictions/Precautions:    Medical/Treatment Diagnosis Information:  Diagnosis: M54.5, G89.29 chronic LBP, M54.16 lumbar radiculitis, Z98.890 h/ lumbosacral spine surgery  Treatment Diagnosis: M54.5 LBP  Insurance/Certification information:   Latonya Kaplan  Physician Information:   MICHELLE Ma  Has the plan of care been signed (Y/N):        []  Yes  [x]  No     Date of Patient follow up with Physician: not scheduled    Is this a Progress Report:     []  Yes  [x]  No      If Yes:  Date Range for reporting period:  Beginning:  ------------ Ending:     Progress report will be due (10 Rx or 30 days whichever is less):      Recertification will be due (POC Duration  / 90 days whichever is less): 23      Visit # Insurance Allowable Auth Required   In Person 3 Check NV []  Yes     []  No    Tele Health   []  Yes     []  No    Total 3       Functional Scale: FOTO    Date assessed:  NV      Latex Allergy:  [x]NO      []YES  Preferred Language for Healthcare:   []English       [x]other: Georgian ( present)    Pain level:  9/10     SUBJECTIVE: Pt arrives to clinic frustrated about lack of care over the last month. Pt reports her pain is very high today. Per pt the aquatic PT told her to stop doing her HEP.      Pt is brought by a transportation service, has been in clinic for >1 hour waiting for transportation to return to pick her up; needs to be scheduled early    OBJECTIVE: See eval  Observation: pt arrives in W/C, non ambulatory in clinic; boot on L foot, knee brace R knee  Test measurements:      RESTRICTIONS/PRECAUTIONS: DM, HTN, obseity    Exercises/Interventions:   Therapeutic Ex (53741) Sets/sec Reps Notes/CUES HEP   Hip ADD iso 10\" 10  x   Added 14 x   Supine clamshell 3 10 blueTB x   SKTC 10\" 5  bilat x   PPT  5\" 15  x   Supine HS S 10\" 5 Added 11/14 x   Added 11/14 x   Added 11/14, 1#, bilat                  Pt edu: lengthy conversation with pt to build on phone conversations re: continuity of orthopedic treatment, aquatic PT, plan to treat the low back, importance of HEP x15'  Pt visibly frustrated throughout this conversation    Manual Intervention ([phone])                                                 NMR re-education (24930)   CUES NEEDED                                                                   Therapeutic Activity ()                                          [employer] access code: [email or web address]           Therapeutic Exercise and NMR EXR  [x] (68851) Provided verbal/tactile cueing for activities related to strengthening, flexibility, endurance, ROM  for improvements in proximal hip and core control with self care, mobility, lifting and ambulation.  [] (28769) Provided verbal/tactile cueing for activities related to improving balance, coordination, kinesthetic sense, posture, motor skill, proprioception  to assist with core control in self care, mobility, lifting, and ambulation.      Therapeutic Activities:    [] (51142 or 98166) Provided verbal/tactile cueing for activities related to improving balance, coordination, kinesthetic sense, posture, motor skill, proprioception and motor activation to allow for proper function  with self care and ADLs  [] (45897) Provided training and instruction to the patient for proper core and proximal hip recruitment and positioning with ambulation re-education     Home Exercise Program:    [x] (87978) Reviewed/Progressed HEP activities related to strengthening, flexibility, endurance, ROM of core, proximal hip and LE for functional self-care, mobility, lifting and ambulation   [] (57183) Reviewed/Progressed HEP activities related to improving balance, coordination, kinesthetic sense, posture, motor skill, proprioception of core, proximal hip and LE for self care, mobility, lifting, and ambulation      Manual Treatments:  PROM / STM / Oscillations-Mobs:  G-I, II, III, IV (PA's, Inf., Post.)  [] (57652) Provided manual therapy to mobilize proximal hip and LS spine soft tissue/joints for the purpose of modulating pain, promoting relaxation,  increasing ROM, reducing/eliminating soft tissue swelling/inflammation/restriction, improving soft tissue extensibility and allowing for proper ROM for normal function with self care, mobility, lifting and ambulation. Modalities:     [] GAME READY (VASO)- for significant edema, swelling, pain control. Charges:  Timed Code Treatment Minutes: 40   Total Treatment Minutes:  40      [] EVAL (LOW) 15682 (typically 20 minutes face-to-face)  [] EVAL (MOD) 96467 (typically 30 minutes face-to-face)  [] EVAL (HIGH) 17814 (typically 45 minutes face-to-face)  [] RE-EVAL     [x] QN(48487) x   3  [] IONTO  [] NMR (42990) x     [] VASO  [] Manual (67633) x     [] Other:  [] TA x      [] Mech Traction (80296)  [] ES(attended) (76439)      [] ES (un) (70675):       ASSESSMENT:  Pt tolerated treatment well however requires gross extensive strengthening of core and bilat LE in order to improve functional tolerance. Pt is w/c bound in community and uses walker at home and has hx of opiod use prescribed via pain management, both factors that will slow progression in PT. Pt also is upset and frustrated re: multiple PTs trying to express to her that aquatic PT would be the best course of treatment for her going forward. Pt is resistant to this recommendation and wishes to continue land based PT for her low back pain. Also to note that MRI reveals a mass in thoracic spine for which she was referred to [facility]. GOALS:     Patient stated goal: ? pain     Therapist goals for Patient:   Short Term Goals: To be achieved in: 2 weeks  1.  Independent in HEP and progression per patient tolerance, in order to prevent re-injury. [x] Progressing: [] Met: [] Not Met: [] Adjusted  2. Patient will have a decrease in pain to facilitate improvement in movement, function, and ADLs as indicated by Functional Deficits. [x] Progressing: [] Met: [] Not Met: [] Adjusted     Long Term Goals: To be achieved in: 12 weeks  1. FOTO >/= D/C score to assist with reaching prior level of function. [x] Progressing: [] Met: [] Not Met: [] Adjusted  2. Patient will demonstrate increased AROM to WNL, good LS mobility, good hip ROM to allow for proper joint functioning as indicated by patients Functional Deficits. [x] Progressing: [] Met: [] Not Met: [] Adjusted  3. Patient will demonstrate an increase in Strength to good proximal hip and core activation to allow for proper functional mobility as indicated by patients Functional Deficits. [] Progressing: [] Met: [] Not Met: [] Adjusted  4. Patient will return to all functional activities without increased symptoms or restriction. [x] Progressing: [] Met: [] Not Met: [] Adjusted  5. Pt will exhibit normalized gait pattern with AD. (patient specific functional goal)    [x] Progressing: [] Met: [] Not Met: [] Adjusted             Access Code: [email or web address]  URL: [email or web address]  Date: 11/09/2022  Prepared by: Briana Dunlap    Exercises  Hooklying Single Knee to Chest Stretch - 2 x daily - 7 x weekly - 3 sets - 10 reps  Supine Hip Adduction Isometric with Ball - 2 x daily - 7 x weekly - 1 sets - 10 reps  Hooklying Clamshell with Resistance - 2 x daily - 7 x weekly - 2-3 sets - 10 reps  Supine Posterior Pelvic Tilt - 2 x daily - 7 x weekly - 2 sets - 10 reps      Overall Progression Towards Functional goals/ Treatment Progress Update:  [] Patient is progressing as expected towards functional goals listed. [] Progression is slowed due to complexities/Impairments listed. [] Progression has been slowed due to co-morbidities.   [x] Plan just implemented, too soon to assess goals progression <30days   [] Goals require adjustment due to lack of progress  [] Patient is not progressing as expected and requires additional follow up with physician  [] Other    Prognosis for POC: [x] Good [] Fair  [] Poor      Patient requires continued skilled intervention: [x] Yes  [] No    Treatment/Activity Tolerance:  [x] Patient able to complete treatment  [] Patient limited by fatigue  [] Patient limited by pain    [] Patient limited by other medical complications  [] Other:                    PLAN: See eval  [x] Continue per plan of care [] Alter current plan (see comments above)  [] Plan of care initiated [] Hold pending MD visit [] Discharge    Electronically signed by:  Rosalia Dodson PT    Note: If patient does not return for scheduled/ recommended follow up visits, this note will serve as a discharge from care along with most recent update on progress.

## 2022-12-22 ENCOUNTER — HOSPITAL ENCOUNTER (OUTPATIENT)
Dept: PHYSICAL THERAPY | Age: 57
Setting detail: THERAPIES SERIES
Discharge: HOME | End: 2022-12-22
Payer: COMMERCIAL

## 2022-12-22 PROCEDURE — 97113 AQUATIC THERAPY/EXERCISES: CPT

## 2022-12-22 NOTE — FLOWSHEET NOTE
[facility] a Department of [address]  Ximena Samanoon [address], [address]  Office: [phone]  Fax:  [phone]                                                     Physical Therapy Daily Treatment Note  Date:  2022    Patient Name:  Kaylie Annad    :  1965  MRN: [id number]      Medical/Treatment Diagnosis Information:  Diagnosis: M17.11 (ICD-10-CM) - Primary osteoarthritis of right knee; M17.12 (ICD-10-CM) - Primary osteoarthritis of left knee  Treatment Diagnosis: M25.561, M25.562 Bilateral knee pain    Insurance/Certification information:  PT Insurance Information: Kiran Antunez - 20vcy; Soumya Bonilla required for TE, NR, DN  Physician Information:  Referring Provider (secondary): Michelle White    Has the plan of care been signed (Y/N):        [x]  Yes  []  No     Date of Patient follow up with Physician: not scheduled       Is this a Progress Report:     []  Yes  [x]  No        Progress report will be due (10 Rx or 30 days whichever is less): 24/3/06       Recertification will be due (POC Duration  / 90 days whichever is less):          Visit # Insurance Allowable Auth Required   5 for knee   (3 for lumbar at [address] [place] [address]) 20 visits (Soumya Bonilla req for TE, NR, DN) []  Yes []  No        Functional Scale: foto 22    Date assessed:  22      Latex Allergy:  [x]NO      []YES  Preferred Language for Healthcare:   []English       [x]other: Greenlandic    Pain level:  7-8/10     SUBJECTIVE:   pt was contacted and scheduled for aquatic therapy at [facility]. She notes that she still feels like her knees \"dislocate\" when she stands or walks like trying to go to the bathroom. Pt is also wondering if someone is supposed to be ordering her a quad cane that was suggested by Dr. Carlos Eduardo Castillo to start practicing if she has shoulder sx on her left side. 12/15: Pt reports pain in bilateral knees as well as back.   Pt reports knee braces given are the wrong size or one is the incorrect size. Pt appears to understand and can converse in [place] however  was present and utilized as well.   12/22: Pt c/o inc pain after last visit making walking difficult and inc left ankle pain. Pt also c/o left shoulder pain with ADL's. OBJECTIVE: 11/9/22  Observation:   Test measurements:       Flexibility L R Comment   Hamstring 20 30  SLR   Gastroc         ITB         Quad                                   ROM PROM AROM Overpressure Comment     L R L R L R     Flexion     105; pain 110; pain         Extension     Lacking 5 Lacking 3                                                    Strength L R Comment   Quad 4- 4-     Hamstring 4 4     Gastroc         Hip flexor 4- 4-     Hip ABD 4- 4-                                Key  B= Belt DB= Dumbells T= Theratube   G=Gloves N= Noodles W= Weights   P= Paddles WW=Water Walker K= Kickboard     **Pt to PT in [facility] W/C with left walking boot on.    Transfers:   W/C to  [facility] [address] with CGA, Lift into pool      % Immersion: 75%            Ambulation:   Exercises UE:      Forwards 1 with HHA for balance if needed Shoulder Shrugs     Lateral  Shoulder circles     Marching    Scapular Retraction      Retro   Rolling  5    Cariocas  Push Downs 5     IR/ER 5     Punching    Stretching:  Rowing 5   Gastroc/Soleus   Elbow Flex/Ext 5   Hamstring  30 sec R/L  Shldr Flex/Ext     Knee flex stretch   Shldr Abd/Add    Piriformis   Horiz Abd/Add     Hip flexor    Arm Circles  5   SKTC   PNF Diagonals    DKTC  UE oscillations f/b, s/s    ITB   Wall Push-ups    Quad  Figure 8's    Mid back   Buoy pull/push downs    UT  Tband rows    Rhom  Tband lats    Post Shoulder  Lumbar Rot w/ paddles    Pec   Small ball pull downs                   diagonals             Cervical:       AROM Flex       AROM Extension     LEs exercises:  bilat AROM Side Bending    Marching 5 AROM Rotation    Heel Raises  Chin tucks    Toe Raises  Chin nods Squats 5      Hamstring Curls 5      Hip Flexion  Balance:      Hip Abduction 5 SLS    Hip Circles  Tandem stance    TA set  NBOS eyes open 30 sec   Glut Set  NBOS eyes closed    Hip Extension  Hand to Opposite Knee    Hip Adduction    Box Step     Hip IR   Noodle Stance     Hip ER  Stop/Go Gait    Fig 8's  Switch Gait      Foot on step bal 30 sec R/L         Seated/ LIFTCHAIR bilat Functional:    Ankle Pumps 10 Step up forward    Ankle circles 10 as able  Step up lateral    Knee flex & ext 10 Step down    Hip Abd & Adduction 10 Mallard squats    Bicycle  10 Crate Lifts    Add Set with ball 10 Lunges forward    LX stab with med ball throws  Lunges lateral    Ankle INV  Lunges retro    Ankle EV  Lower ab curl with noodle      Upper ab curl with ball      Med ball straight lifts      Med ball diagonal lifts      Hydrorider      Foot onto/off of step 5 R/L   Noodle:      Leg Press  Deep Water:    Noodle hang at wall  Jog    Noodle hang deep water  Jumping Jacks    Noodle Bicycle  Heel to toe      Hand to opposite knee      Cross country skier      Rocking Horse      Exercises/Interventions:  Proxsys not performed today, only aquatics    Exercise/Equipment Resistance/Repetitions Other comments   Stretching     Hamstring 5 x10 sec mindy seated with heel on step     Hip Flexion     ITB     Grion     Quad     Inclined Calf 5x10 seated rope     Towel Pull          SLR     Supine     Prone     Abduction X20 red band seated    Adducton     SLR+     Clams     LAQ 2X10 each    Seated marching 2X10 each     Seated hamstring curl  2x10 red band     Isometrics     Quad sets    Hip Adduction 10x10    Hip abduction          Ankle PF          Patellar Glides     Medial     Superior     Inferior          ROM     Sheet Pulls     Wall Slides     Edge of bed     Flexionator     Extensionator     Hang Weights     Ankle Pumps                              CKC     Calf raises     Wall sits     Step ups     Step up and over Lateral Step Downs               Mini squats     CC TKE          Lateral band walks     Side Planks     Half moon     Single leg flow          Biodex-balance     Single leg stance With HHA and CBA    Plyoback          Stool scoots     SB bridge     SB HS Curls     Planks          PRE     Extension  RANGE: 90/40   Flexion  RANGE: 0/90        Cable Column          Leg Press  RANGE: 70/10        Bike     Treadmill                   Therapeutic Exercise and NMR EXR  [x] (32640) Provided verbal/tactile cueing for activities related to strengthening, flexibility, endurance, ROM for improvements in LE, proximal hip, and core control with self care, mobility, lifting, ambulation. [x] (78501) Provided verbal/tactile cueing for activities related to improving balance, coordination, kinesthetic sense, posture, motor skill, proprioception  to assist with LE, proximal hip, and core control in self care, mobility, lifting, ambulation and eccentric single leg control.      NMR and Therapeutic Activities:    [x] (69866 or 37968) Provided verbal/tactile cueing for activities related to improving balance, coordination, kinesthetic sense, posture, motor skill, proprioception and motor activation to allow for proper function of core, proximal hip and LE with self care and ADLs  [] (25458) Gait Re-education- Provided training and instruction to the patient for proper LE, core and proximal hip recruitment and positioning and eccentric body weight control with ambulation re-education including up and down stairs     Home Exercise Program:    [x] (51390) Reviewed/Progressed HEP activities related to strengthening, flexibility, endurance, ROM of core, proximal hip and LE for functional self-care, mobility, lifting and ambulation/stair navigation   [x] (21405)Reviewed/Progressed HEP activities related to improving balance, coordination, kinesthetic sense, posture, motor skill, proprioception of core, proximal hip and LE for self care, mobility, lifting, and ambulation/stair navigation      Manual Treatments:  PROM / STM / Oscillations-Mobs:  G-I, II, III, IV (PA's, Inf., Post.)  [] (72281) Provided manual therapy to mobilize LE, proximal hip and/or LS spine soft tissue/joints for the purpose of modulating pain, promoting relaxation,  increasing ROM, reducing/eliminating soft tissue swelling/inflammation/restriction, improving soft tissue extensibility and allowing for proper ROM for normal function with self care, mobility, lifting and ambulation. Individual Aquatic Therapy:  [x] (91109) Provided verbal and tactile cueing for strengthening, flexibility, ROM using the therapeutic properties of water (buoyancy, resistance) for improvements in core control, mobility and ambulation     Aquatic Group:  [] (72810) Provided intermittent verbal and tactile cueing for strengthening, endurance, flexibility and ROM for 2-4 individuals that do not require one-on one patient contact by the therapist     Modalities:      Charges:  Timed Code Treatment Minutes: Total Treatment Minutes: Individual Aquatic Minutes: 45   Aquatic Group Minutes:      [] EVAL (LOW) 08385   [] EVAL (MOD) 29336   [] EVAL (HIGH) 25612   [] RE-EVAL   [] UQ(72163) x  1  [] IONTO  [] NMR (06042) x     [] VASO  [] Manual (29823) x     [] Other:  [] TA x 1     [] Mech Traction (73727)  [] ES(attended) (69374)     [] ES (un) (71926):   [x] Aquatic (60728) x 3 [] Aquatic Group (38457) x    HEP instruction:   Access Code: [email or web address]  URL: [email or web address]  Date: 11/09/2022  Prepared by: Ayo Funez    Exercises  Supine Hamstring Stretch with Strap - 1 x daily - 7 x weekly - 5 reps - 5sec hold  Supine Quadricep Sets - 1 x daily - 7 x weekly - 10 reps - 5sec hold  Hooklying Isometric Hip Abduction with Belt - 1 x daily - 7 x weekly - 10 reps - 5sec hold  Supine Hip Adduction Isometric with Ball - 1 x daily - 7 x weekly - 10 reps - 5sec hold  Supine Heel Slide with Strap - 1 x daily - 7 x weekly - 5-10 reps - 5sec hold  Standing Weight Shift Side to Side - 1 x daily - 7 x weekly - 5-10 reps - 5sec hold    GOALS:  Patient stated goal: reduce pain, improve walking     [] Progressing: [] Met: [] Not Met: [] Adjusted    Therapist goals for Patient:   Short Term Goals: To be achieved in: 2 weeks  1. Independent in HEP and progression per patient tolerance, in order to prevent re-injury. [] Progressing: [] Met: [] Not Met: [] Adjusted   2. Patient will have a decrease in pain to facilitate improvement in movement, function, and ADLs as indicated by Functional Deficits. [] Progressing: [] Met: [] Not Met: [] Adjusted    Long Term Goals: To be achieved in: 4-6 weeks  1. Disability index score of 50 or greater FOTO to assist with reaching prior level of function. [] Progressing: [] Met: [] Not Met: [] Adjusted  2. Patient will demonstrate increased AROM to 110+ flex, lacking 3 or less ext  to allow for proper joint functioning as indicated by patients Functional Deficits. [] Progressing: [] Met: [] Not Met: [] Adjusted  3. Patient will demonstrate an increase in Strength to 4/5 or greater to allow for proper functional mobility as indicated by patients Functional Deficits. [] Progressing: [] Met: [] Not Met: [] Adjusted  4. Patient will return to standing for greater than 5 minutes while cooking meals without increased symptoms or restriction. [] Progressing: [] Met: [] Not Met: [] Adjusted    Overall Progression Towards Functional goals/ Treatment Progress Update:  [] Patient is progressing as expected towards functional goals listed. [] Progression is slowed due to complexities/Impairments listed. [] Progression has been slowed due to co-morbidities.   [x] Plan just implemented, too soon to assess goals progression <30days   [] Goals require adjustment due to lack of progress  [] Patient is not progressing as expected and requires additional follow up with physician  [] Other      Patient requires continued skilled intervention: [x] Yes  [] No    Treatment/Activity Tolerance:  [x] Patient able to complete treatment  [] Patient limited by fatigue  [] Patient limited by pain     [] Patient limited by other medical complications  [] Other:                 ASSESSMENT:12/15: Pt tolerated exercises as instructed with multiple verbal cues with and without  assistance. Pt noted LB soreness after standing with LE exercises and gait for about 20 min but dec after hanging on the wall/ dec WB. Will continue with skilled instruction for strength, dec pain, and inc function for ADL's. Pain 5-6/10 after aquatics. 12/22: Pt tolerated therapy with minimal c/o LB, left shoulder soreness. Treatment was alternated between seated and standing exercises. Gait dec to 1 lap. Once pt exits pool via [address], pt needs assistance wheeling w/c to room, room set up, calling transportation. Will progress slowly to attempt to dec chance for inc pain. Plan for Next Session:  Slowly increase reps and exercises per pt tolerance. Continue with LE and UE x 5 each. PLAN:   [x] Continue per plan of care [] Alter current plan (see comments above)  [x] Plan of care initiated [] Hold pending MD visit [] Discharge      Electronically signed by: Lawanda Cruz, PTA [id number]      Note: If patient does not return for scheduled/ recommended follow up visits, this note will serve as a discharge from care along with most recent update on progress.

## 2022-12-27 ENCOUNTER — HOSPITAL ENCOUNTER (OUTPATIENT)
Dept: PHYSICAL THERAPY | Age: 57
Setting detail: THERAPIES SERIES
Discharge: HOME | End: 2022-12-27
Payer: COMMERCIAL

## 2022-12-27 PROCEDURE — 97113 AQUATIC THERAPY/EXERCISES: CPT

## 2022-12-27 NOTE — FLOWSHEET NOTE
[facility] a Department of [address]  [address], [address]  Office: [phone]  Fax:  [phone]                                                     Physical Therapy Daily Treatment Note  Date:  2022    Patient Name:  Travis Rushing    :  1965  MRN: [id number]      Medical/Treatment Diagnosis Information:  Diagnosis: M17.11 (ICD-10-CM) - Primary osteoarthritis of right knee; M17.12 (ICD-10-CM) - Primary osteoarthritis of left knee  Treatment Diagnosis: M25.561, M25.562 Bilateral knee pain    Insurance/Certification information:  PT Insurance Information: Misa Parker - [employer]vcy; Jose Rafael Bhakta required for TE, NR, DN  Physician Information:  Referring Provider (secondary): Trevor Jones    Has the plan of care been signed (Y/N):        [x]  Yes  []  No     Date of Patient follow up with Physician: not scheduled       Is this a Progress Report:     []  Yes  [x]  No        Progress report will be due (10 Rx or 30 days whichever is less):        Recertification will be due (POC Duration  / 90 days whichever is less):          Visit # Insurance Allowable Auth Required   6 for knee   (3 for lumbar at [address] [place] [address]) 20 visits (Mantilla Yony req for TE, NR, DN) []  Yes []  No        Functional Scale: foto 22    Date assessed:  22      Latex Allergy:  [x]NO      []YES  Preferred Language for Healthcare:   []English       [x]other: Kazakh    Pain level:  810     SUBJECTIVE:   pt was contacted and scheduled for aquatic therapy at [facility]. She notes that she still feels like her knees \"dislocate\" when she stands or walks like trying to go to the bathroom. Pt is also wondering if someone is supposed to be ordering her a quad cane that was suggested by Dr. Kerwin Montague to start practicing if she has shoulder sx on her left side. 12/15: Pt reports pain in bilateral knees as well as back.   Pt reports knee braces given are the wrong size or one is the incorrect size. Pt appears to understand and can converse in [employer] [address] however  was present and utilized as well.   12/22: Pt c/o inc pain after last visit making walking difficult and inc left ankle pain. Pt also c/o left shoulder pain with ADL's.   12/27: No  present for today's session. Patient reports tolerating last session well, and that she had decreased pain for a few hours after therapy. Pain on left side is greater than the right side. Patient states that she loves to swim and wants to get back to swimming. Reports increased pain in her knees when she does the breast stroke, and is unable to perform freestyle because she is unable to raise her arm that high. Wants to know what other types of \"swimming\" she could do    OBJECTIVE: 11/9/22  Observation:   Test measurements:       Flexibility L R Comment   Hamstring 20 30  SLR   Gastroc         ITB         Quad                                   ROM PROM AROM Overpressure Comment     L R L R L R     Flexion     105; pain 110; pain         Extension     Lacking 5 Lacking 3                                                    Strength L R Comment   Quad 4- 4-     Hamstring 4 4     Gastroc         Hip flexor 4- 4-     Hip ABD 4- 4-                                Key  B= Belt DB= Dumbells T= Theratube   G=Gloves N= Noodles W= Weights   P= Paddles WW=Water Walker K= Kickboard     **Pt to PT in wide W/C with left walking boot on.    Transfers:   W/C to [address] with CGA, Lift into pool      % Immersion: 75%            Ambulation:  Laps Exercises UE:      Forwards 1 with HHA for balance Shoulder Shrugs     Lateral  Shoulder circles 5    Marching    Scapular Retraction  5    Retro   Rolling  5    Cariocas  Push Downs 5     IR/ER 5     Punching    Stretching:  Rowing 5   Gastroc/Soleus   Elbow Flex/Ext 5   Hamstring  30 sec R/L  Shldr Flex/Ext 5   Knee flex stretch   Shldr Abd/Add    Piriformis   Horiz Abd/Add Hip flexor    Arm Circles  SKTC   PNF Diagonals    DKTC  UE oscillations f/b, s/s    ITB   Wall Push-ups    Quad  Figure 8's    Mid back   Buoy pull/push downs    UT  Tband rows    Rhom  Tband lats    Post Shoulder  Lumbar Rot w/ paddles    Pec   Small ball pull downs                   diagonals             Cervical:       AROM Flex       AROM Extension     LEs exercises:  bilat AROM Side Bending    Marching 5 AROM Rotation    Heel Raises  Chin tucks    Toe Raises  Chin nods     Squats 5      Hamstring Curls 5      Hip Flexion 5 Balance:      Hip Abduction 5 SLS    Hip Circles 5 Tandem stance    TA set  NBOS eyes open 30 sec   Glut Set  NBOS eyes closed    Hip Extension  Hand to Opposite Knee    Hip Adduction    Box Step     Hip IR   Noodle Stance     Hip ER  Stop/Go Gait    Fig 8's  Switch Gait      Foot on step bal 30 sec R/L         Seated/ LIFTCHAIR bilat Functional:    Ankle Pumps 10 Step up forward    Ankle circles 10 as able  Step up lateral    Knee flex & ext 10 Step down    Hip Abd & Adduction 10 [place] squats    Bicycle  10 Crate Lifts    Add Set with ball 10 Lunges forward    LX stab with med ball throws  Lunges lateral    Ankle INV  Lunges retro    Ankle EV  Lower ab curl with noodle      Upper ab curl with ball      Med ball straight lifts      Med ball diagonal lifts      Hydrorider      Foot onto/off of step 5 R/L   Noodle:      Leg Press  Deep Water:    Noodle hang at wall  Jog    Noodle hang deep water  Jumping Jacks    Noodle Bicycle  Heel to toe      Hand to opposite knee      Cross country skier      Rocking Horse      Exercises/Interventions:  [employer] not performed today, only aquatics    Exercise/Equipment Resistance/Repetitions Other comments   Stretching     Hamstring 5 x10 sec mindy seated with heel on step     Hip Flexion     ITB     Grion     Quad     Inclined Calf 5x10 seated rope     Towel Pull          SLR     Supine     Prone     Abduction X20 red band seated    Adducton SLR+     Clams     LAQ 2X10 each    Seated marching 2X10 each     Seated hamstring curl  2x10 red band     Isometrics     Quad sets    Hip Adduction 10x10    Hip abduction          Ankle PF          Patellar Glides     Medial     Superior     Inferior          ROM     Sheet Pulls     Wall Slides     Edge of bed     Flexionator     Extensionator     Hang Weights     Ankle Pumps                              CKC     Calf raises     Wall sits     Step ups     Step up and over     Lateral Step Downs               Mini squats     CC TKE          Lateral band walks     Side Planks     Half moon     Single leg flow          Biodex-balance     Single leg stance With HHA and CBA    Plyoback          Stool scoots     SB bridge     SB HS Curls     Planks          PRE     Extension  RANGE: 90/40   Flexion  RANGE: 0/90        Cable Column          Leg Press  RANGE: 70/10        Bike     Treadmill                   Therapeutic Exercise and NMR EXR  [x] (30909) Provided verbal/tactile cueing for activities related to strengthening, flexibility, endurance, ROM for improvements in LE, proximal hip, and core control with self care, mobility, lifting, ambulation. [x] (08164) Provided verbal/tactile cueing for activities related to improving balance, coordination, kinesthetic sense, posture, motor skill, proprioception  to assist with LE, proximal hip, and core control in self care, mobility, lifting, ambulation and eccentric single leg control.      NMR and Therapeutic Activities:    [x] (47432 or 20975) Provided verbal/tactile cueing for activities related to improving balance, coordination, kinesthetic sense, posture, motor skill, proprioception and motor activation to allow for proper function of core, proximal hip and LE with self care and ADLs  [] (22728) Gait Re-education- Provided training and instruction to the patient for proper LE, core and proximal hip recruitment and positioning and eccentric body weight control with ambulation re-education including up and down stairs     Home Exercise Program:    [x] (85032) Reviewed/Progressed HEP activities related to strengthening, flexibility, endurance, ROM of core, proximal hip and LE for functional self-care, mobility, lifting and ambulation/stair navigation   [x] (23087)Reviewed/Progressed HEP activities related to improving balance, coordination, kinesthetic sense, posture, motor skill, proprioception of core, proximal hip and LE for self care, mobility, lifting, and ambulation/stair navigation      Manual Treatments:  PROM / STM / Oscillations-Mobs:  G-I, II, III, IV (PA's, Inf., Post.)  [] (21872) Provided manual therapy to mobilize LE, proximal hip and/or LS spine soft tissue/joints for the purpose of modulating pain, promoting relaxation,  increasing ROM, reducing/eliminating soft tissue swelling/inflammation/restriction, improving soft tissue extensibility and allowing for proper ROM for normal function with self care, mobility, lifting and ambulation. Individual Aquatic Therapy:  [x] (51665) Provided verbal and tactile cueing for strengthening, flexibility, ROM using the therapeutic properties of water (buoyancy, resistance) for improvements in core control, mobility and ambulation     Aquatic Group:  [] (16196) Provided intermittent verbal and tactile cueing for strengthening, endurance, flexibility and ROM for 2-4 individuals that do not require one-on one patient contact by the therapist     Modalities:      Charges:  Timed Code Treatment Minutes: Total Treatment Minutes:      Individual Aquatic Minutes: 45   Aquatic Group Minutes:      [] EVAL (LOW) 07336   [] EVAL (MOD) 25574   [] EVAL (HIGH) 72816   [] RE-EVAL   [] MZ(19635) x  1  [] IONTO  [] NMR (08011) x     [] VASO  [] Manual (10880) x     [] Other:  [] TA x 1     [] Mech Traction (23631)  [] ES(attended) (92942)     [] ES (un) (87341):   [x] Aquatic (52011) x 3 [] Aquatic Group (12867) x    HEP instruction: Access Code: [facility]  URL: [email or web address]  Date: 11/09/2022  Prepared by: Olivia Finder    Exercises  Supine Hamstring Stretch with Strap - 1 x daily - 7 x weekly - 5 reps - 5sec hold  Supine Quadricep Sets - 1 x daily - 7 x weekly - 10 reps - 5sec hold  Hooklying Isometric Hip Abduction with Belt - 1 x daily - 7 x weekly - 10 reps - 5sec hold  Supine Hip Adduction Isometric with Ball - 1 x daily - 7 x weekly - 10 reps - 5sec hold  Supine Heel Slide with Strap - 1 x daily - 7 x weekly - 5-10 reps - 5sec hold  Standing Weight Shift Side to Side - 1 x daily - 7 x weekly - 5-10 reps - 5sec hold    GOALS:  Patient stated goal: reduce pain, improve walking     [] Progressing: [] Met: [] Not Met: [] Adjusted    Therapist goals for Patient:   Short Term Goals: To be achieved in: 2 weeks  1. Independent in HEP and progression per patient tolerance, in order to prevent re-injury. [] Progressing: [] Met: [] Not Met: [] Adjusted   2. Patient will have a decrease in pain to facilitate improvement in movement, function, and ADLs as indicated by Functional Deficits. [] Progressing: [] Met: [] Not Met: [] Adjusted    Long Term Goals: To be achieved in: 4-6 weeks  1. Disability index score of 50 or greater FOTO to assist with reaching prior level of function. [] Progressing: [] Met: [] Not Met: [] Adjusted  2. Patient will demonstrate increased AROM to 110+ flex, lacking 3 or less ext  to allow for proper joint functioning as indicated by patients Functional Deficits. [] Progressing: [] Met: [] Not Met: [] Adjusted  3. Patient will demonstrate an increase in Strength to 4/5 or greater to allow for proper functional mobility as indicated by patients Functional Deficits. [] Progressing: [] Met: [] Not Met: [] Adjusted  4. Patient will return to standing for greater than 5 minutes while cooking meals without increased symptoms or restriction.    [] Progressing: [] Met: [] Not Met: [] Adjusted    Overall Progression Towards Functional goals/ Treatment Progress Update:  [] Patient is progressing as expected towards functional goals listed. [] Progression is slowed due to complexities/Impairments listed. [] Progression has been slowed due to co-morbidities. [x] Plan just implemented, too soon to assess goals progression <30days   [] Goals require adjustment due to lack of progress  [] Patient is not progressing as expected and requires additional follow up with physician  [] Other      Patient requires continued skilled intervention: [x] Yes  [] No    Treatment/Activity Tolerance:  [x] Patient able to complete treatment  [] Patient limited by fatigue  [] Patient limited by pain     [] Patient limited by other medical complications  [] Other:                 ASSESSMENT:12/15: Pt tolerated exercises as instructed with multiple verbal cues with and without  assistance. Pt noted LB soreness after standing with LE exercises and gait for about 20 min but dec after hanging on the wall/ dec WB. Will continue with skilled instruction for strength, dec pain, and inc function for ADL's. Pain 5-6/10 after aquatics. 12/22: Pt tolerated therapy with minimal c/o LB, left shoulder soreness. Treatment was alternated between seated and standing exercises. Gait dec to 1 lap. Once pt exits pool via [address] [place] [address] [address], pt needs assistance wheeling w/c to room, room set up, calling transportation. Will progress slowly to attempt to dec chance for inc pain. 12/27: Patient tolerated therapy without complaints. Four seated rest breaks were taken throughout session. 2 UE and 2 LE exercises were added. Continue with slow progressions to avoid increased pain after therapy. Assistance required to wheel patient to changing room and setting up shower. Transportation needed to be called for patient     Plan for Next Session:  Slowly increase reps and exercises per pt tolerance. Continue with LE and UE x 5 each. PLAN:   [x] Continue per plan of care [] Alter current plan (see comments above)  [x] Plan of care initiated [] Hold pending MD visit [] Discharge      Electronically signed by: Lubna Coley, PTA [id number]    Note: If patient does not return for scheduled/ recommended follow up visits, this note will serve as a discharge from care along with most recent update on progress.

## 2022-12-28 ENCOUNTER — CARE COORDINATION (OUTPATIENT)
Dept: CARE COORDINATION | Age: 57
End: 2022-12-28

## 2022-12-28 ENCOUNTER — APPOINTMENT (OUTPATIENT)
Dept: PHYSICAL THERAPY | Age: 57
End: 2022-12-28
Payer: COMMERCIAL

## 2022-12-28 ENCOUNTER — HOSPITAL ENCOUNTER (OUTPATIENT)
Dept: PHYSICAL THERAPY | Age: 57
Setting detail: THERAPIES SERIES
Discharge: HOME | End: 2022-12-28
Payer: COMMERCIAL

## 2022-12-28 PROCEDURE — 97110 THERAPEUTIC EXERCISES: CPT

## 2022-12-28 PROCEDURE — G0283 ELEC STIM OTHER THAN WOUND: HCPCS

## 2022-12-28 NOTE — FLOWSHEET NOTE
[facility]      Physical Therapy Treatment Note/ Progress Report:       Date:  2022    Patient Name:  Anais Toro    :  1965  MRN: [id number]  Restrictions/Precautions:    Medical/Treatment Diagnosis Information:  Diagnosis: M54.5, G89.29 chronic LBP, M54.16 lumbar radiculitis, Z98.890 h/ lumbosacral spine surgery  Treatment Diagnosis: M54.5 LBP  Insurance/Certification information:   Tish Tsang  Physician Information:   MICHELLE Pratt  Has the plan of care been signed (Y/N):        []  Yes  [x]  No     Date of Patient follow up with Physician: not scheduled    Is this a Progress Report:     []  Yes  [x]  No      If Yes:  Date Range for reporting period:  Beginning:  ------------ Ending:     Progress report will be due (10 Rx or 30 days whichever is less): 35     Recertification will be due (POC Duration  / 90 days whichever is less): 23      Visit # Insurance Allowable Auth Required   In Person 4 Check [place] []  Yes     []  No    Tele Health   []  Yes     []  No    Total 4       Functional Scale: FOTO    Date assessed:  NV      Latex Allergy:  [x]NO      []YES  Preferred Language for Healthcare:   []English       [x]other: Croatian ( present)    Pain level:  8/10     SUBJECTIVE: Pt arrives to clinic 30 min late due to transportation issues. Pt and PT agreed to have a shortened session today. Pt reports her back feels better today.      Pt is brought by a transportation service, has been in clinic for >1 hour waiting for transportation to return to pick her up; needs to be scheduled early    OBJECTIVE: See eval  Observation: pt arrives in W/C, non ambulatory in clinic; boot on L foot, no knee brace R knee  Test measurements:      RESTRICTIONS/PRECAUTIONS: DM, HTN, obseity    Exercises/Interventions:   Therapeutic Ex (66839) Sets/sec Reps Notes/CUES HEP   Hip ADD iso 10\" 10  x   Added 11/14 x   Supine clamshell 3 10 blueTB x   SKTC  10\"   5   Bilat  Tried, ? pain X  x   PPT  5\" 15  x   Seated HS S 30\" 3 Added 11/14, bilat x   Added 11/14 x   Added 11/14, 1#, bilat    LTR 3 10             Pt visibly frustrated throughout this conversation    Manual Intervention ([id number])                                                 NMR re-education (04940)   CUES NEEDED                                                                   Therapeutic Activity (65666)                                          [employer] access code: [email or web address]           Therapeutic Exercise and NMR EXR  [x] (29378) Provided verbal/tactile cueing for activities related to strengthening, flexibility, endurance, ROM  for improvements in proximal hip and core control with self care, mobility, lifting and ambulation.  [] (42970) Provided verbal/tactile cueing for activities related to improving balance, coordination, kinesthetic sense, posture, motor skill, proprioception  to assist with core control in self care, mobility, lifting, and ambulation.      Therapeutic Activities:    [] (23863 or 23043) Provided verbal/tactile cueing for activities related to improving balance, coordination, kinesthetic sense, posture, motor skill, proprioception and motor activation to allow for proper function  with self care and ADLs  [] (06326) Provided training and instruction to the patient for proper core and proximal hip recruitment and positioning with ambulation re-education     Home Exercise Program:    [x] (86689) Reviewed/Progressed HEP activities related to strengthening, flexibility, endurance, ROM of core, proximal hip and LE for functional self-care, mobility, lifting and ambulation   [] (04427) Reviewed/Progressed HEP activities related to improving balance, coordination, kinesthetic sense, posture, motor skill, proprioception of core, proximal hip and LE for self care, mobility, lifting, and ambulation      Manual Treatments:  PROM / STM / Oscillations-Mobs:  G-I, II, III, IV (Donna, Inf., Post.)  [] (08947) Provided manual therapy to mobilize proximal hip and LS spine soft tissue/joints for the purpose of modulating pain, promoting relaxation,  increasing ROM, reducing/eliminating soft tissue swelling/inflammation/restriction, improving soft tissue extensibility and allowing for proper ROM for normal function with self care, mobility, lifting and ambulation. Modalities:  Electrical stimulation for pain control. Waveform: Interferential; Cycle Time: Continuous; Frequency: 80/150 Hz; Amplitude: 29 Volts; Treatment time: 10 min. [] GAME READY (VASO)- for significant edema, swelling, pain control. Charges:  Timed Code Treatment Minutes: 30   Total Treatment Minutes:  40      [] EVAL (LOW) 60329 (typically 20 minutes face-to-face)  [] EVAL (MOD) 96307 (typically 30 minutes face-to-face)  [] EVAL (HIGH) 84229 (typically 45 minutes face-to-face)  [] RE-EVAL     [x] CR(88720) x   2  [] IONTO  [] NMR (79502) x     [] VASO  [] Manual (55945) x     [] Other:  [] TA x      [] Mech Traction (61132)  [] ES(attended) (22235)      [x] ES (un) (50989):       ASSESSMENT:  Pt tolerated treatment well however requires gross extensive strengthening of core and bilat LE in order to improve functional tolerance. Pt is w/c bound in community and uses walker at home and has hx of opiod use prescribed via pain management, both factors that will slow progression in PT. Pt also is upset and frustrated re: multiple PTs trying to express to her that aquatic PT would be the best course of treatment for her going forward. Pt is resistant to this recommendation and wishes to continue land based PT for her low back pain. Also to note that MRI reveals a mass in thoracic spine for which she was referred to [facility]. GOALS:     Patient stated goal: ? pain     Therapist goals for Patient:   Short Term Goals: To be achieved in: 2 weeks  1.  Independent in HEP and progression per patient tolerance, in order to prevent re-injury. [x] Progressing: [] Met: [] Not Met: [] Adjusted  2. Patient will have a decrease in pain to facilitate improvement in movement, function, and ADLs as indicated by Functional Deficits. [x] Progressing: [] Met: [] Not Met: [] Adjusted     Long Term Goals: To be achieved in: 12 weeks  1. FOTO >/= D/C score to assist with reaching prior level of function. [x] Progressing: [] Met: [] Not Met: [] Adjusted  2. Patient will demonstrate increased AROM to WNL, good LS mobility, good hip ROM to allow for proper joint functioning as indicated by patients Functional Deficits. [x] Progressing: [] Met: [] Not Met: [] Adjusted  3. Patient will demonstrate an increase in Strength to good proximal hip and core activation to allow for proper functional mobility as indicated by patients Functional Deficits. [] Progressing: [] Met: [] Not Met: [] Adjusted  4. Patient will return to all functional activities without increased symptoms or restriction. [x] Progressing: [] Met: [] Not Met: [] Adjusted  5. Pt will exhibit normalized gait pattern with AD. (patient specific functional goal)    [x] Progressing: [] Met: [] Not Met: [] Adjusted             Access Code: [email or web address]  URL: [email or web address]  Date: 11/09/2022  Prepared by: Stewart Shea    Exercises  Hooklying Single Knee to Chest Stretch - 2 x daily - 7 x weekly - 3 sets - 10 reps  Supine Hip Adduction Isometric with Ball - 2 x daily - 7 x weekly - 1 sets - 10 reps  Hooklying Clamshell with Resistance - 2 x daily - 7 x weekly - 2-3 sets - 10 reps  Supine Posterior Pelvic Tilt - 2 x daily - 7 x weekly - 2 sets - 10 reps      Overall Progression Towards Functional goals/ Treatment Progress Update:  [] Patient is progressing as expected towards functional goals listed. [] Progression is slowed due to complexities/Impairments listed. [] Progression has been slowed due to co-morbidities.   [x] Plan just implemented, too soon to assess goals progression <30days   [] Goals require adjustment due to lack of progress  [] Patient is not progressing as expected and requires additional follow up with physician  [] Other    Prognosis for POC: [] Good [x] Fair  [] Poor      Patient requires continued skilled intervention: [x] Yes  [] No    Treatment/Activity Tolerance:  [x] Patient able to complete treatment  [] Patient limited by fatigue  [] Patient limited by pain    [] Patient limited by other medical complications  [] Other:                    PLAN: See eval  [x] Continue per plan of care [] Alter current plan (see comments above)  [] Plan of care initiated [] Hold pending MD visit [] Discharge    Electronically signed by:  Carla Cochran PT    Note: If patient does not return for scheduled/ recommended follow up visits, this note will serve as a discharge from care along with most recent update on progress.

## 2022-12-28 NOTE — CARE COORDINATION
SW placed follow-up call to patient using interpretor [phone]. Patient stated she has not received mailed housing list yet. SW advised it may be delayed due to holiday. SW informed her that [facility] is not accepting calls/referrals at this time due to holiday, they will resume on 1/4. SW left message with Rebecca Burt on this date to inquire about additional rent assistance resources that may be available. SW to touch base with patient again next week.       Lexii HERNANDEZ, [place]     [phone]

## 2022-12-29 ENCOUNTER — HOSPITAL ENCOUNTER (OUTPATIENT)
Dept: PHYSICAL THERAPY | Age: 57
Setting detail: THERAPIES SERIES
Discharge: HOME | End: 2022-12-29
Payer: COMMERCIAL

## 2022-12-29 PROCEDURE — 97113 AQUATIC THERAPY/EXERCISES: CPT

## 2022-12-29 NOTE — FLOWSHEET NOTE
[facility] a Department of [address]  Ximena Samanoon [address], [address]  Office: [phone]  Fax:  [phone]                                                     Physical Therapy Daily Treatment Note  Date:  2022    Patient Name:  Phuong Good    :  1965  MRN: [id number]      Medical/Treatment Diagnosis Information:  Diagnosis: M17.11 (ICD-10-CM) - Primary osteoarthritis of right knee; M17.12 (ICD-10-CM) - Primary osteoarthritis of left knee  Treatment Diagnosis: M25.561, M25.562 Bilateral knee pain    Insurance/Certification information:  PT Insurance Information: Connor Elias - 20vcy; Елена Nolen required for TE, NR, DN  Physician Information:  Referring Provider (secondary): [place]    Has the plan of care been signed (Y/N):        [x]  Yes  []  No     Date of Patient follow up with Physician: not scheduled       Is this a Progress Report:     []  Yes  [x]  No        Progress report will be due (10 Rx or 30 days whichever is less): 78       Recertification will be due (POC Duration  / 90 days whichever is less):          Visit # Insurance Allowable Auth Required   7 for knee   (3 for lumbar at [address] [place] [address]) 20 visits (Елена Nolen req for TE, NR, DN) []  Yes []  No        Functional Scale: foto 22    Date assessed:  22      Latex Allergy:  [x]NO      []YES  Preferred Language for Healthcare:   []English       [x]other: Turkmen    Pain level:  8/10     SUBJECTIVE:   pt was contacted and scheduled for aquatic therapy at [facility]. She notes that she still feels like her knees \"dislocate\" when she stands or walks like trying to go to the bathroom. Pt is also wondering if someone is supposed to be ordering her a quad cane that was suggested by Dr. Alona Livingston to start practicing if she has shoulder sx on her left side. 12/15: Pt reports pain in bilateral knees as well as back.   Pt reports knee braces given are the wrong size or one is the incorrect size. Pt appears to understand and can converse in Lorel Beam however  was present and utilized as well.   12/22: Pt c/o inc pain after last visit making walking difficult and inc left ankle pain. Pt also c/o left shoulder pain with ADL's.   12/27: No  present for today's session. Patient reports tolerating last session well, and that she had decreased pain for a few hours after therapy. Pain on left side is greater than the right side. Patient states that she loves to swim and wants to get back to swimming. Reports increased pain in her knees when she does the breast stroke, and is unable to perform freestyle because she is unable to raise her arm that high. Wants to know what other types of \"swimming\" she could do  12/29:  present for today's session. Tolerated last session well. Does fine if she doesn't have to stand too long. Taking seated breaks works well. Patient reports that her knee braces are too big, and needs to go back to the XXL    OBJECTIVE: 11/9/22  Observation:   Test measurements:       Flexibility L R Comment   Hamstring 20 30  SLR   Gastroc         ITB         Quad                                   ROM PROM AROM Overpressure Comment     L R L R L R     Flexion     105; pain 110; pain         Extension     Lacking 5 Lacking 3                                                    Strength L R Comment   Quad 4- 4-     Hamstring 4 4     Gastroc         Hip flexor 4- 4-     Hip ABD 4- 4-                                Key  B= Belt DB= Dumbells T= Theratube   G=Gloves N= Noodles W= Weights   P= Paddles WW=Water Walker K= Kickboard     **Pt to PT in wide W/C with left walking boot on.    Transfers:   W/C to Liftchair with CGA, Lift into pool      % Immersion: 75%            Ambulation:  Laps Exercises UE:      Forwards 2 with SBA Shoulder Shrugs     Lateral  Shoulder circles 5    Marching    Scapular Retraction  5    Retro Rolling  5    Cariocas  Push Downs 5     IR/ER 5     Punching    Stretching:  Rowing 5   Gastroc/Soleus   Elbow Flex/Ext 5   Hamstring  30 sec R/L  Shldr Flex/Ext 5   Knee flex stretch   Shldr Abd/Add    Piriformis   Horiz Abd/Add     Hip flexor    Arm Circles  SKTC   PNF Diagonals    DKTC  UE oscillations f/b, s/s    ITB   Wall Push-ups    Quad  Figure 8's    Mid back   Buoy pull/push downs    UT  Tband rows    Rhom  Tband lats    Post Shoulder  Lumbar Rot w/ paddles    Pec   Small ball pull downs                   diagonals             Cervical:       AROM Flex       AROM Extension     LEs exercises:  bilat AROM Side Bending    Marching 5 AROM Rotation    Heel Raises  Chin tucks    Toe Raises  Chin nods     Squats 5      Hamstring Curls 5      Hip Flexion 5 Balance:      Hip Abduction 5 SLS    Hip Circles 5 Tandem stance    TA set  NBOS eyes open 30 sec   Glut Set  NBOS eyes closed    Hip Extension  Hand to Opposite Knee    Hip Adduction    Box Step     Hip IR   Noodle Stance     Hip ER  Stop/Go Gait    Fig 8's  Switch Gait      Foot on step bal 30 sec R/L         Seated/ LIFTCHAIR bilat Functional:    Ankle Pumps 15 Step up forward    Ankle circles 10 as able  Step up lateral    Knee flex & ext 15 Step down    Hip Abd & Adduction 15 [place] squats    Bicycle  15 Crate Lifts    Add Set with ball 10 Lunges forward    LX stab with med ball throws  Lunges lateral    Ankle INV  Lunges retro    Ankle EV  Lower ab curl with noodle      Upper ab curl with ball      Med ball straight lifts      Med ball diagonal lifts      Hydrorider      Foot onto/off of step 5 R/L   Noodle:      Leg Press  Deep Water:    Noodle hang at wall  Jog    Noodle hang deep water  Jumping Jacks    Noodle Bicycle  Heel to toe      Hand to opposite knee      Cross country skier      Rocking Horse      Exercises/Interventions:  Citus Data not performed today, only aquatics    Exercise/Equipment Resistance/Repetitions Other comments Stretching     Hamstring 5 x10 sec mindy seated with heel on step     Hip Flexion     ITB     Grion     Quad     Inclined Calf 5x10 seated rope     Towel Pull          SLR     Supine     Prone     Abduction X20 red band seated    Adducton     SLR+     Clams     LAQ 2X10 each    Seated marching 2X10 each     Seated hamstring curl  2x10 red band     Isometrics     Quad sets    Hip Adduction 10x10    Hip abduction          Ankle PF          Patellar Glides     Medial     Superior     Inferior          ROM     Sheet Pulls     Wall Slides     Edge of bed     Flexionator     Extensionator     Hang Weights     Ankle Pumps                              CKC     Calf raises     Wall sits     Step ups     Step up and over     Lateral Step Downs               Mini squats     CC TKE          Lateral band walks     Side Planks     Half moon     Single leg flow          Biodex-balance     Single leg stance With HHA and CBA    Plyoback          Stool scoots     SB bridge     SB HS Curls     Planks          PRE     Extension  RANGE: 90/40   Flexion  RANGE: 0/90        Cable Column          Leg Press  RANGE: 70/10        Bike     Treadmill                   Therapeutic Exercise and NMR EXR  [x] (75560) Provided verbal/tactile cueing for activities related to strengthening, flexibility, endurance, ROM for improvements in LE, proximal hip, and core control with self care, mobility, lifting, ambulation. [x] (75948) Provided verbal/tactile cueing for activities related to improving balance, coordination, kinesthetic sense, posture, motor skill, proprioception  to assist with LE, proximal hip, and core control in self care, mobility, lifting, ambulation and eccentric single leg control.      NMR and Therapeutic Activities:    [x] (73938 or 03425) Provided verbal/tactile cueing for activities related to improving balance, coordination, kinesthetic sense, posture, motor skill, proprioception and motor activation to allow for proper function of core, proximal hip and LE with self care and ADLs  [] (70916) Gait Re-education- Provided training and instruction to the patient for proper LE, core and proximal hip recruitment and positioning and eccentric body weight control with ambulation re-education including up and down stairs     Home Exercise Program:    [x] (31260) Reviewed/Progressed HEP activities related to strengthening, flexibility, endurance, ROM of core, proximal hip and LE for functional self-care, mobility, lifting and ambulation/stair navigation   [x] (46470)Reviewed/Progressed HEP activities related to improving balance, coordination, kinesthetic sense, posture, motor skill, proprioception of core, proximal hip and LE for self care, mobility, lifting, and ambulation/stair navigation      Manual Treatments:  PROM / STM / Oscillations-Mobs:  G-I, II, III, IV (PA's, Inf., Post.)  [] (45219) Provided manual therapy to mobilize LE, proximal hip and/or LS spine soft tissue/joints for the purpose of modulating pain, promoting relaxation,  increasing ROM, reducing/eliminating soft tissue swelling/inflammation/restriction, improving soft tissue extensibility and allowing for proper ROM for normal function with self care, mobility, lifting and ambulation. Individual Aquatic Therapy:  [x] (83250) Provided verbal and tactile cueing for strengthening, flexibility, ROM using the therapeutic properties of water (buoyancy, resistance) for improvements in core control, mobility and ambulation     Aquatic Group:  [] (96788) Provided intermittent verbal and tactile cueing for strengthening, endurance, flexibility and ROM for 2-4 individuals that do not require one-on one patient contact by the therapist     Modalities:      Charges:  Timed Code Treatment Minutes: Total Treatment Minutes:      Individual Aquatic Minutes: 50   Aquatic Group Minutes:           0     [] EVAL (LOW) 02658   [] EVAL (MOD) 33380   [] EVAL (HIGH) 60672   [] RE-EVAL   [] FP(41345) x 1  [] IONTO  [] NMR (87382) x     [] VASO  [] Manual (49164) x     [] Other:  [] TA x 1     [] Mech Traction (63002)  [] ES(attended) (02172)     [] ES (un) (74790):   [x] Aquatic (15566) x 3 [] Aquatic Group (81368) x    HEP instruction:   Access Code: [email or web address]  URL: [email or web address]  Date: 11/09/2022  Prepared by: Kevin Gloria    Exercises  Supine Hamstring Stretch with Strap - 1 x daily - 7 x weekly - 5 reps - 5sec hold  Supine Quadricep Sets - 1 x daily - 7 x weekly - 10 reps - 5sec hold  Hooklying Isometric Hip Abduction with Belt - 1 x daily - 7 x weekly - 10 reps - 5sec hold  Supine Hip Adduction Isometric with Ball - 1 x daily - 7 x weekly - 10 reps - 5sec hold  Supine Heel Slide with Strap - 1 x daily - 7 x weekly - 5-10 reps - 5sec hold  Standing Weight Shift Side to Side - 1 x daily - 7 x weekly - 5-10 reps - 5sec hold    GOALS:  Patient stated goal: reduce pain, improve walking     [] Progressing: [] Met: [] Not Met: [] Adjusted    Therapist goals for Patient:   Short Term Goals: To be achieved in: 2 weeks  1. Independent in HEP and progression per patient tolerance, in order to prevent re-injury. [] Progressing: [] Met: [] Not Met: [] Adjusted   2. Patient will have a decrease in pain to facilitate improvement in movement, function, and ADLs as indicated by Functional Deficits. [] Progressing: [] Met: [] Not Met: [] Adjusted    Long Term Goals: To be achieved in: 4-6 weeks  1. Disability index score of 50 or greater FOTO to assist with reaching prior level of function. [] Progressing: [] Met: [] Not Met: [] Adjusted  2. Patient will demonstrate increased AROM to 110+ flex, lacking 3 or less ext  to allow for proper joint functioning as indicated by patients Functional Deficits. [] Progressing: [] Met: [] Not Met: [] Adjusted  3.  Patient will demonstrate an increase in Strength to 4/5 or greater to allow for proper functional mobility as indicated by patients Functional Deficits. [] Progressing: [] Met: [] Not Met: [] Adjusted  4. Patient will return to standing for greater than 5 minutes while cooking meals without increased symptoms or restriction. [] Progressing: [] Met: [] Not Met: [] Adjusted    Overall Progression Towards Functional goals/ Treatment Progress Update:  [] Patient is progressing as expected towards functional goals listed. [] Progression is slowed due to complexities/Impairments listed. [] Progression has been slowed due to co-morbidities. [x] Plan just implemented, too soon to assess goals progression <30days   [] Goals require adjustment due to lack of progress  [] Patient is not progressing as expected and requires additional follow up with physician  [] Other      Patient requires continued skilled intervention: [x] Yes  [] No    Treatment/Activity Tolerance:  [x] Patient able to complete treatment  [] Patient limited by fatigue  [] Patient limited by pain     [] Patient limited by other medical complications  [] Other:                 ASSESSMENT:12/15: Pt tolerated exercises as instructed with multiple verbal cues with and without  assistance. Pt noted LB soreness after standing with LE exercises and gait for about 20 min but dec after hanging on the wall/ dec WB. Will continue with skilled instruction for strength, dec pain, and inc function for ADL's. Pain 5-6/10 after aquatics. 12/22: Pt tolerated therapy with minimal c/o LB, left shoulder soreness. Treatment was alternated between seated and standing exercises. Gait dec to 1 lap. Once pt exits pool via [address], pt needs assistance wheeling w/c to room, room set up, calling transportation. Will progress slowly to attempt to dec chance for inc pain. 12/27: Patient tolerated therapy without complaints. Four seated rest breaks were taken throughout session. 2 UE and 2 LE exercises were added. Continue with slow progressions to avoid increased pain after therapy.  Assistance required to wheel patient to changing room and setting up shower. Transportation needed to be called for patient  12/29: Repetitive cues to only do instructed number of reps and to stop an exercise because she would keep on going. Educated on the importance of sticking with the numbers we give her, especially since she was so sore after her first aquatic session. Gave recommendations of modified back stroke and side stroke that patient could possibly try when she was able to get back to swimming, as that is something she verbalized wanting to get back into. Plan for Next Session:  Slowly increase reps and exercises per pt tolerance. Continue with LE and UE x 5 each. Add remaining UE ex as tolerated     PLAN:   [x] Continue per plan of care [] Alter current plan (see comments above)  [x] Plan of care initiated [] Hold pending MD visit [] Discharge      Electronically signed by: Thom Kessler, PTA [id number]    Note: If patient does not return for scheduled/ recommended follow up visits, this note will serve as a discharge from care along with most recent update on progress.

## 2022-12-30 ENCOUNTER — TELEPHONE (OUTPATIENT)
Dept: ORTHOPEDIC SURGERY | Age: 57
End: 2022-12-30

## 2022-12-30 NOTE — TELEPHONE ENCOUNTER
Subject: MEMBER COMPLAINT FU  CALLER: Karey Taylor FROM [place]  Contact Number:  [phone]      SYLWIA FROM [facility] IS FOLLOWING UP ON A MEMBER COMPLAINT AND NEEDS TO SPEAK WITH A PRACTICE MANAGER ASAP. WILL ONLY BE IN FOR ANOTHER HALF HOUR TODAY. CONTACT AT NUMBER LISTED TO DISCUSS.

## 2022-12-31 PROBLEM — E78.2 MIXED HYPERLIPIDEMIA: Status: ACTIVE | Noted: 2022-12-31

## 2022-12-31 PROBLEM — G93.0 ARACHNOID CYST: Status: ACTIVE | Noted: 2022-12-31

## 2022-12-31 PROBLEM — I67.9 CEREBROVASCULAR SMALL VESSEL DISEASE: Status: ACTIVE | Noted: 2022-12-31

## 2023-01-03 ENCOUNTER — HOSPITAL ENCOUNTER (OUTPATIENT)
Dept: PHYSICAL THERAPY | Age: 58
Setting detail: THERAPIES SERIES
Discharge: HOME | End: 2023-01-03
Payer: COMMERCIAL

## 2023-01-03 ENCOUNTER — CARE COORDINATION (OUTPATIENT)
Dept: CARE COORDINATION | Age: 58
End: 2023-01-03

## 2023-01-03 ENCOUNTER — TELEPHONE (OUTPATIENT)
Dept: ORTHOPEDIC SURGERY | Age: 58
End: 2023-01-03

## 2023-01-03 PROCEDURE — 97113 AQUATIC THERAPY/EXERCISES: CPT

## 2023-01-03 NOTE — FLOWSHEET NOTE
[facility] Performance and Rehabilitation a Department of [address]  [address], [address]  Office: [phone]  Fax:  [phone]                                                     Physical Therapy Daily Treatment Note  Date:  1/3/2023    Patient Name:  Krystal Brar    :  1965  MRN: [id number]      Medical/Treatment Diagnosis Information:  Diagnosis: M17.11 (ICD-10-CM) - Primary osteoarthritis of right knee; M17.12 (ICD-10-CM) - Primary osteoarthritis of left knee  Treatment Diagnosis: M25.561, M25.562 Bilateral knee pain    Insurance/Certification information:  PT Insurance Information: [facility]; Betty Lou required for TE, NR, DN  Physician Information:  Referring Provider (secondary): Conor Morales    Has the plan of care been signed (Y/N):        [x]  Yes  []  No     Date of Patient follow up with Physician: not scheduled       Is this a Progress Report:     []  Yes  [x]  No        Progress report will be due (10 Rx or 30 days whichever is less):        Recertification will be due (POC Duration  / 90 days whichever is less):          Visit # Insurance Allowable Auth Required   9 for knee   (3 for lumbar at [address] [place] [address]) 20 visits (Betty Lou req for TE, NR, DN) []  Yes []  No        Functional Scale: foto 22    Date assessed:  22      Latex Allergy:  [x]NO      []YES  Preferred Language for Healthcare:   []English       [x]other: Icelandic    Pain level:  7/10 bilat knees     SUBJECTIVE:   pt was contacted and scheduled for aquatic therapy at [facility]. She notes that she still feels like her knees \"dislocate\" when she stands or walks like trying to go to the bathroom. Pt is also wondering if someone is supposed to be ordering her a quad cane that was suggested by Dr. Chandrakant Esparza to start practicing if she has shoulder sx on her left side. 12/15: Pt reports pain in bilateral knees as well as back.   Pt reports knee braces given are the wrong size or one is the incorrect size. Pt appears to understand and can converse in [place] however  was present and utilized as well.   12/22: Pt c/o inc pain after last visit making walking difficult and inc left ankle pain. Pt also c/o left shoulder pain with ADL's.   12/27: No  present for today's session. Patient reports tolerating last session well, and that she had decreased pain for a few hours after therapy. Pain on left side is greater than the right side. Patient states that she loves to swim and wants to get back to swimming. Reports increased pain in her knees when she does the breast stroke, and is unable to perform freestyle because she is unable to raise her arm that high. Wants to know what other types of \"swimming\" she could do  12/29:  present for today's session. Tolerated last session well. Does fine if she doesn't have to stand too long. Taking seated breaks works well. Patient reports that her knee braces are too big, and needs to go back to the XXL  1/3/23: Pt notes trying to walk more with rolling walker at home but notices pain in (anterior) left ankle with standing or walking. Pt reports knee braces fall down so needs to exchange for 2XL.      OBJECTIVE: 11/9/22  Observation:   Test measurements:       Flexibility L R Comment   Hamstring 20 30  SLR   Gastroc         ITB         Quad                                   ROM PROM AROM Overpressure Comment     L R L R L R     Flexion     105; pain 110; pain         Extension     Lacking 5 Lacking 3                                                    Strength L R Comment   Quad 4- 4-     Hamstring 4 4     Gastroc         Hip flexor 4- 4-     Hip ABD 4- 4-                                Key  B= Belt DB= Dumbells T= Theratube   G=Gloves N= Noodles W= Weights   P= Paddles WW=Water Walker K= Kickboard     **Pt to PT in wide W/C    Transfers:   W/C to [facility] with CGA, Lift into pool Lorena Lennox exer: chest press                         flex 10  10    % Immersion: 75%            Ambulation:  Laps Exercises UE:      Forwards 1 + 1 + 1 with SBA Shoulder Shrugs     Lateral  Shoulder circles 5    Marching    Scapular Retraction  5    Retro   Rolling  5    Cariocas  Push Downs 5     IR/ER 5     Punching 5   Stretching:  Rowing 5   Gastroc/Soleus   Elbow Flex/Ext 5   Hamstring   re-add next visit Shldr Flex/Ext 5   Knee flex stretch   Shldr Abd/Add 5   Piriformis   Horiz Abd/Add  5   Hip flexor    Arm Circles  5   SKTC   PNF Diagonals    DKTC  UE oscillations f/b, s/s    ITB   Wall Push-ups    Quad  Figure 8's    Mid back   Buoy pull/push downs    UT  Tband rows    Rhom  Tband lats    Post Shoulder  Lumbar Rot w/ paddles    Pec   Small ball pull downs                   diagonals             Cervical:       AROM Flex       AROM Extension     LEs exercises:  bilat AROM Side Bending    Marching 5 AROM Rotation    Heel Raises 5 Chin tucks    Toe Raises  Chin nods     Squats 5      Hamstring Curls 5      Hip Flexion 5 Balance:      Hip Abduction 5 SLS    Hip Circles 5 Tandem stance    TA set  NBOS eyes open 30 sec   Glut Set  NBOS eyes closed    Hip Extension  Hand to Opposite Knee    Hip Adduction    Box Step     Hip IR   Noodle Stance     Hip ER  Stop/Go Gait    Fig 8's  Switch Gait      Foot on step bal 30 sec R/L         Seated/ LIFTCHAIR bilat Functional:    Ankle Pumps 10 Step up forward    Ankle circles  Step up lateral    Knee flex & ext 2 x 10 Step down    Hip Abd & Adduction 2 x 10 Shallow Water squats    Bicycle  2 x 10 Crate Lifts    Add Set with ball 2 x 10 Lunges forward    Knee ext with ball 5 R/L Lunges lateral    Ankle INV  Lunges retro    Ankle EV  Lower ab curl with noodle      Upper ab curl with ball      Med ball straight lifts      Med ball diagonal lifts      Hydrorider      Foot onto/off of step 5 R/L  Fwd and Lat   Noodle:      Leg Press  Deep Water:    Noodle hang at wall  Jog    Noodle hang deep water  Jumping Jacks    Noodle Bicycle  Heel to toe      Hand to opposite knee      Cross country skier      Rocking Horse      Exercises/Interventions:  [employer] not performed today, only aquatics    Exercise/Equipment Resistance/Repetitions Other comments   Stretching     Hamstring 5 x10 sec mindy seated with heel on step     Hip Flexion     ITB     Grion     Quad     Inclined Calf 5x10 seated rope     Towel Pull          SLR     Supine     Prone     Abduction X20 red band seated    Adducton     SLR+     Clams     LAQ 2X10 each    Seated marching 2X10 each     Seated hamstring curl  2x10 red band     Isometrics     Quad sets    Hip Adduction 10x10    Hip abduction          Ankle PF          Patellar Glides     Medial     Superior     Inferior          ROM     Sheet Pulls     Wall Slides     Edge of bed     Flexionator     Extensionator     Hang Weights     Ankle Pumps                              CKC     Calf raises     Wall sits     Step ups     Step up and over     Lateral Step Downs               Mini squats     CC TKE          Lateral band walks     Side Planks     Half moon     Single leg flow          Biodex-balance     Single leg stance With HHA and CBA    Plyoback          Stool scoots     SB bridge     SB HS Curls     Planks          PRE     Extension  RANGE: 90/40   Flexion  RANGE: 0/90        Cable Column          Leg Press  RANGE: 70/10        Bike     Treadmill                   Therapeutic Exercise and NMR EXR  [x] ([id number]) Provided verbal/tactile cueing for activities related to strengthening, flexibility, endurance, ROM for improvements in LE, proximal hip, and core control with self care, mobility, lifting, ambulation.   [x] (05783) Provided verbal/tactile cueing for activities related to improving balance, coordination, kinesthetic sense, posture, motor skill, proprioception  to assist with LE, proximal hip, and core control in self care, mobility, lifting, ambulation and eccentric single leg control. NMR and Therapeutic Activities:    [x] (44523 or 70545) Provided verbal/tactile cueing for activities related to improving balance, coordination, kinesthetic sense, posture, motor skill, proprioception and motor activation to allow for proper function of core, proximal hip and LE with self care and ADLs  [] (83800) Gait Re-education- Provided training and instruction to the patient for proper LE, core and proximal hip recruitment and positioning and eccentric body weight control with ambulation re-education including up and down stairs     Home Exercise Program:    [x] (92891) Reviewed/Progressed HEP activities related to strengthening, flexibility, endurance, ROM of core, proximal hip and LE for functional self-care, mobility, lifting and ambulation/stair navigation   [x] (52178)Reviewed/Progressed HEP activities related to improving balance, coordination, kinesthetic sense, posture, motor skill, proprioception of core, proximal hip and LE for self care, mobility, lifting, and ambulation/stair navigation      Manual Treatments:  PROM / STM / Oscillations-Mobs:  G-I, II, III, IV (PA's, Inf., Post.)  [] (13903) Provided manual therapy to mobilize LE, proximal hip and/or LS spine soft tissue/joints for the purpose of modulating pain, promoting relaxation,  increasing ROM, reducing/eliminating soft tissue swelling/inflammation/restriction, improving soft tissue extensibility and allowing for proper ROM for normal function with self care, mobility, lifting and ambulation.      Individual Aquatic Therapy:  [x] (88909) Provided verbal and tactile cueing for strengthening, flexibility, ROM using the therapeutic properties of water (buoyancy, resistance) for improvements in core control, mobility and ambulation     Aquatic Group:  [] (09268) Provided intermittent verbal and tactile cueing for strengthening, endurance, flexibility and ROM for 2-4 individuals that do not require one-on one patient contact by the therapist     Modalities:      Charges:  Timed Code Treatment Minutes: Total Treatment Minutes: Individual Aquatic Minutes: 50   Aquatic Group Minutes:           0     [] EVAL (LOW) 62010   [] EVAL (MOD) 24227   [] EVAL (HIGH) 83589   [] RE-EVAL   [] GB(76250) x  1  [] IONTO  [] NMR (13213) x     [] VASO  [] Manual (21930) x     [] Other:  [] TA x 1     [] Mech Traction (55608)  [] ES(attended) (01701)     [] ES (un) (71603):   [x] Aquatic (53277) x 3 [] Aquatic Group (43996) x    HEP instruction:   Access Code: [email or web address]  URL: [employer]/  Date: 11/09/2022  Prepared by: Naomi Martin    Exercises  Supine Hamstring Stretch with Strap - 1 x daily - 7 x weekly - 5 reps - 5sec hold  Supine Quadricep Sets - 1 x daily - 7 x weekly - 10 reps - 5sec hold  Hooklying Isometric Hip Abduction with Belt - 1 x daily - 7 x weekly - 10 reps - 5sec hold  Supine Hip Adduction Isometric with Ball - 1 x daily - 7 x weekly - 10 reps - 5sec hold  Supine Heel Slide with Strap - 1 x daily - 7 x weekly - 5-10 reps - 5sec hold  Standing Weight Shift Side to Side - 1 x daily - 7 x weekly - 5-10 reps - 5sec hold    GOALS:  Patient stated goal: reduce pain, improve walking     [] Progressing: [] Met: [] Not Met: [] Adjusted    Therapist goals for Patient:   Short Term Goals: To be achieved in: 2 weeks  1. Independent in HEP and progression per patient tolerance, in order to prevent re-injury. [] Progressing: [] Met: [] Not Met: [] Adjusted   2. Patient will have a decrease in pain to facilitate improvement in movement, function, and ADLs as indicated by Functional Deficits. [] Progressing: [] Met: [] Not Met: [] Adjusted    Long Term Goals: To be achieved in: 4-6 weeks  1. Disability index score of 50 or greater FOTO to assist with reaching prior level of function. [] Progressing: [] Met: [] Not Met: [] Adjusted  2.  Patient will demonstrate increased AROM to 110+ flex, lacking 3 or less ext  to allow for proper joint functioning as indicated by patients Functional Deficits. [] Progressing: [] Met: [] Not Met: [] Adjusted  3. Patient will demonstrate an increase in Strength to 4/5 or greater to allow for proper functional mobility as indicated by patients Functional Deficits. [] Progressing: [] Met: [] Not Met: [] Adjusted  4. Patient will return to standing for greater than 5 minutes while cooking meals without increased symptoms or restriction. [] Progressing: [] Met: [] Not Met: [] Adjusted    Overall Progression Towards Functional goals/ Treatment Progress Update:  [] Patient is progressing as expected towards functional goals listed. [] Progression is slowed due to complexities/Impairments listed. [] Progression has been slowed due to co-morbidities. [x] Plan just implemented, too soon to assess goals progression <30days   [] Goals require adjustment due to lack of progress  [] Patient is not progressing as expected and requires additional follow up with physician  [] Other      Patient requires continued skilled intervention: [x] Yes  [] No    Treatment/Activity Tolerance:  [x] Patient able to complete treatment  [] Patient limited by fatigue  [] Patient limited by pain     [] Patient limited by other medical complications  [] Other:                 ASSESSMENT:12/15: Pt tolerated exercises as instructed with multiple verbal cues with and without  assistance. Pt noted LB soreness after standing with LE exercises and gait for about 20 min but dec after hanging on the wall/ dec WB. Will continue with skilled instruction for strength, dec pain, and inc function for ADL's. Pain 5-6/10 after aquatics. 12/22: Pt tolerated therapy with minimal c/o LB, left shoulder soreness. Treatment was alternated between seated and standing exercises. Gait dec to 1 lap. Once pt exits pool via [address], pt needs assistance wheeling w/c to room, room set up, calling transportation.  Will progress slowly to attempt to dec chance for inc pain. 12/27: Patient tolerated therapy without complaints. Four seated rest breaks were taken throughout session. 2 UE and 2 LE exercises were added. Continue with slow progressions to avoid increased pain after therapy. Assistance required to wheel patient to changing room and setting up shower. Transportation needed to be called for patient  12/29: Repetitive cues to only do instructed number of reps and to stop an exercise because she would keep on going. Educated on the importance of sticking with the numbers we give her, especially since she was so sore after her first aquatic session. Gave recommendations of modified back stroke and side stroke that patient could possibly try when she was able to get back to swimming, as that is something she verbalized wanting to get back into. 1/3/23: Discussed with pt importance of amb in her home with wheeled walker several times a day vs not moving during the day. Explained this along with exercise can inc strength and continue maintaining ROM. Plan for Next Session:  Slowly increase reps and exercises per pt tolerance. Continue with LE and UE x 5 each. Add remaining UE ex as tolerated     PLAN:   [x] Continue per plan of care [] Alter current plan (see comments above)  [x] Plan of care initiated [] Hold pending MD visit [] Discharge      Electronically signed by: [facility], PTA  [id number]    Note: If patient does not return for scheduled/ recommended follow up visits, this note will serve as a discharge from care along with most recent update on progress.

## 2023-01-03 NOTE — TELEPHONE ENCOUNTER
[facility] called they have the script for the lft hip. They need the script for the lft ank.  Their fax number is [phone]

## 2023-01-03 NOTE — TELEPHONE ENCOUNTER
After further review, referenced patient is not a patient of provider. It appears as if it was routed to incorrect pool.

## 2023-01-03 NOTE — CARE COORDINATION
ACM did not outreach to patient d/t PT appt today. AC will plan outreach on 1/9/23 d/t no follow up appts scheduled that date.

## 2023-01-04 ENCOUNTER — CARE COORDINATION (OUTPATIENT)
Dept: CARE COORDINATION | Age: 58
End: 2023-01-04

## 2023-01-04 ENCOUNTER — HOSPITAL ENCOUNTER (OUTPATIENT)
Dept: PHYSICAL THERAPY | Age: 58
Setting detail: THERAPIES SERIES
Discharge: HOME | End: 2023-01-04
Payer: COMMERCIAL

## 2023-01-04 PROCEDURE — G0283 ELEC STIM OTHER THAN WOUND: HCPCS

## 2023-01-04 PROCEDURE — 97110 THERAPEUTIC EXERCISES: CPT

## 2023-01-04 PROCEDURE — 97112 NEUROMUSCULAR REEDUCATION: CPT

## 2023-01-04 NOTE — CARE COORDINATION
SW placed call to [facility] [phone] to inquire about rent assistance. They do not have funding at this time and do not know a date when any would become available. SW placed call to [address] [phone] and left a voicemail requesting a return call. Outgoing message now states they will not be accepting referrals until 1/11 due to illness. SW spoke to patient using interpretor service to inform of the aforementioned. Patient confirmed she received mailed housing lists and asked questions for clarification regarding them. Patient asked for more housing options in [place] and SW explained that cost of living is higher there and those are the lower rent options that could be found. Patient asked if this worker could help her complete housing applications and stated she can do so with use of interpretor. SW provided patient with her contact number once again. Patient had to end call due to being at MD appt. Will continue conversation at a later date.      Judith HERNANDEZ, [place]     [phone]

## 2023-01-04 NOTE — FLOWSHEET NOTE
[facility]      Physical Therapy Treatment Note/ Progress Report:       Date:  2023    Patient Name:  Lyla Jerry    :  1965  MRN: [id number]  Restrictions/Precautions:    Medical/Treatment Diagnosis Information:  Diagnosis: M54.5, G89.29 chronic LBP, M54.16 lumbar radiculitis, Z98.890 h/ lumbosacral spine surgery  Treatment Diagnosis: M54.5 LBP  Insurance/Certification information:   Eliazar Sinclair  Physician Information:   Vernie Dubin, PA  Has the plan of care been signed (Y/N):        []  Yes  [x]  No     Date of Patient follow up with Physician: not scheduled    Is this a Progress Report:     []  Yes  [x]  No      If Yes:  Date Range for reporting period:  Beginning:  ------------ Ending:     Progress report will be due (10 Rx or 30 days whichever is less):      Recertification will be due (POC Duration  / 90 days whichever is less): 23      Visit # Insurance Allowable Auth Required   In Person 5 Check NV []  Yes     []  No    Tele Health   []  Yes     []  No    Total 5       Functional Scale: FOTO    Date assessed:  NV      Latex Allergy:  [x]NO      []YES  Preferred Language for Healthcare:   []English       [x]other: Japanese ( present)    Pain level:  6-7/10     SUBJECTIVE: Pt reports her low back does feel better. Pt's goal is ability to sit to/from stand off commode.      Pt is brought by a transportation service, has been in clinic for >1 hour waiting for transportation to return to pick her up; needs to be scheduled early    OBJECTIVE: See sharon  Observation: pt arrives in W/C, non ambulatory in clinic; boot on L foot, no knee brace R knee  Test measurements:    Note: pt has used a w/c primarily for the past 7 years, had a power w/c however that is broken and she is trying to work with insurance to get a new one; pt is in the process of scheduling a surgery to remove mass from her thoracic spine ([facility]), per pt, is benign    RESTRICTIONS/PRECAUTIONS: DM, HTN, obseity    Exercises/Interventions:   Therapeutic Ex (27740) Sets/sec Reps Notes/CUES HEP   Hip ADD iso 10\" 10  x   Added 11/14 x   Supine clamshell 3 10 blueTB x   10\"   5   Bilat  Tried, ? pain X  x   PPT  5\" 15  x   Seated HS S 30\" 2 Added 11/14, bilat x   Mini squat 2 10 Added 11/14 x   Added 11/14, 1#, bilat    LTR 3 10  x   LAQ 5\" 10 Bilat, added 1/4 x   Standing hip ABD 2 5 Bilat, added 1/4    Pt edu: knee brace exchange (PT to coordinate with DME), strengthening prognosis, power w/c discussion  Pt visibly frustrated throughout this conversation    Manual Intervention ([phone])                                                 NMR re-education (15790)   CUES NEEDED                                                                   Therapeutic Activity (35538)                                          [facility] access code: [email or web address]           Therapeutic Exercise and NMR EXR  [x] (72847) Provided verbal/tactile cueing for activities related to strengthening, flexibility, endurance, ROM  for improvements in proximal hip and core control with self care, mobility, lifting and ambulation.  [] (65031) Provided verbal/tactile cueing for activities related to improving balance, coordination, kinesthetic sense, posture, motor skill, proprioception  to assist with core control in self care, mobility, lifting, and ambulation.      Therapeutic Activities:    [] (96255 or 10182) Provided verbal/tactile cueing for activities related to improving balance, coordination, kinesthetic sense, posture, motor skill, proprioception and motor activation to allow for proper function  with self care and ADLs  [] (17071) Provided training and instruction to the patient for proper core and proximal hip recruitment and positioning with ambulation re-education     Home Exercise Program:    [x] (76245) Reviewed/Progressed HEP activities related to strengthening, flexibility, endurance, ROM of core, proximal hip and LE for functional self-care, mobility, lifting and ambulation   [] (31435) Reviewed/Progressed HEP activities related to improving balance, coordination, kinesthetic sense, posture, motor skill, proprioception of core, proximal hip and LE for self care, mobility, lifting, and ambulation      Manual Treatments:  PROM / STM / Oscillations-Mobs:  G-I, II, III, IV (PA's, Inf., Post.)  [] (38305) Provided manual therapy to mobilize proximal hip and LS spine soft tissue/joints for the purpose of modulating pain, promoting relaxation,  increasing ROM, reducing/eliminating soft tissue swelling/inflammation/restriction, improving soft tissue extensibility and allowing for proper ROM for normal function with self care, mobility, lifting and ambulation. Modalities:  Electrical stimulation for pain control. Waveform: Interferential; Cycle Time: Continuous; Frequency: 80/150 Hz; Amplitude: 29 Volts; Treatment time: 10 min. With heat     [] GAME READY (VASO)- for significant edema, swelling, pain control. Charges:  Timed Code Treatment Minutes: 40   Total Treatment Minutes:  50      [] EVAL (LOW) 24158 (typically 20 minutes face-to-face)  [] EVAL (MOD) 75272 (typically 30 minutes face-to-face)  [] EVAL (HIGH) 37491 (typically 45 minutes face-to-face)  [] RE-EVAL     [x] MX(59199) x   2  [] IONTO  [x] NMR (40255) x  1   [] VASO  [] Manual (68828) x     [] Other:  [] TA x      [] Mech Traction (16223)  [] ES(attended) (34751)      [x] ES (un) (70462):       ASSESSMENT:  Pt tolerated treatment well however requires gross extensive strengthening of core and bilat LE in order to improve functional tolerance. Pt is w/c bound in community and uses walker at home and has hx of opiod use prescribed via pain management, both factors that will slow progression in PT.  Pt also is upset and frustrated re: multiple PTs trying to express to her that aquatic PT would be the best course of treatment for her going forward. Pt is resistant to this recommendation and wishes to continue land based PT for her low back pain. Also to note that MRI reveals a mass in thoracic spine for which she was referred to [facility]. GOALS:     Patient stated goal: ? pain     Therapist goals for Patient:   Short Term Goals: To be achieved in: 2 weeks  1. Independent in HEP and progression per patient tolerance, in order to prevent re-injury. [x] Progressing: [] Met: [] Not Met: [] Adjusted  2. Patient will have a decrease in pain to facilitate improvement in movement, function, and ADLs as indicated by Functional Deficits. [x] Progressing: [] Met: [] Not Met: [] Adjusted     Long Term Goals: To be achieved in: 12 weeks  1. FOTO >/= D/C score to assist with reaching prior level of function. [x] Progressing: [] Met: [] Not Met: [] Adjusted  2. Patient will demonstrate increased AROM to WNL, good LS mobility, good hip ROM to allow for proper joint functioning as indicated by patients Functional Deficits. [x] Progressing: [] Met: [] Not Met: [] Adjusted  3. Patient will demonstrate an increase in Strength to good proximal hip and core activation to allow for proper functional mobility as indicated by patients Functional Deficits. [] Progressing: [] Met: [] Not Met: [] Adjusted  4. Patient will return to all functional activities without increased symptoms or restriction. [x] Progressing: [] Met: [] Not Met: [] Adjusted  5. Pt will exhibit normalized gait pattern with AD. (patient specific functional goal)    [x] Progressing: [] Met: [] Not Met: [] Adjusted             Access Code: [email or web address]  URL: [email or web address]  Date: 11/09/2022  Prepared by: Sherie Kruger    Exercises  Hooklying Single Knee to Chest Stretch - 2 x daily - 7 x weekly - 3 sets - 10 reps  Supine Hip Adduction Isometric with Ball - 2 x daily - 7 x weekly - 1 sets - 10 reps  Hooklying Clamshell with Resistance - 2 x daily - 7 x weekly - 2-3 sets - 10 reps  Supine Posterior Pelvic Tilt - 2 x daily - 7 x weekly - 2 sets - 10 reps      Overall Progression Towards Functional goals/ Treatment Progress Update:  [] Patient is progressing as expected towards functional goals listed. [] Progression is slowed due to complexities/Impairments listed. [] Progression has been slowed due to co-morbidities. [x] Plan just implemented, too soon to assess goals progression <30days   [] Goals require adjustment due to lack of progress  [] Patient is not progressing as expected and requires additional follow up with physician  [] Other    Prognosis for POC: [] Good [x] Fair  [] Poor      Patient requires continued skilled intervention: [x] Yes  [] No    Treatment/Activity Tolerance:  [x] Patient able to complete treatment  [] Patient limited by fatigue  [] Patient limited by pain    [] Patient limited by other medical complications  [] Other:                    PLAN: See eval  [x] Continue per plan of care [] Alter current plan (see comments above)  [] Plan of care initiated [] Hold pending MD visit [] Discharge    Electronically signed by:  Ingrid Mckinley PT    Note: If patient does not return for scheduled/ recommended follow up visits, this note will serve as a discharge from care along with most recent update on progress.

## 2023-01-05 ENCOUNTER — HOSPITAL ENCOUNTER (OUTPATIENT)
Dept: PHYSICAL THERAPY | Age: 58
Setting detail: THERAPIES SERIES
Discharge: HOME | End: 2023-01-05
Payer: COMMERCIAL

## 2023-01-05 ENCOUNTER — OFFICE VISIT (OUTPATIENT)
Dept: ORTHOPEDIC SURGERY | Age: 58
End: 2023-01-05

## 2023-01-05 ENCOUNTER — TELEPHONE (OUTPATIENT)
Dept: ORTHOPEDIC SURGERY | Age: 58
End: 2023-01-05

## 2023-01-05 VITALS — BODY MASS INDEX: 36.71 KG/M2 | HEIGHT: 67 IN | WEIGHT: 233.91 LBS

## 2023-01-05 DIAGNOSIS — R20.2 PARESTHESIA OF RIGHT ARM: ICD-10-CM

## 2023-01-05 DIAGNOSIS — G89.29 CHRONIC PAIN OF LEFT ANKLE: Primary | ICD-10-CM

## 2023-01-05 DIAGNOSIS — R20.2 PARESTHESIA OF LEFT ARM: ICD-10-CM

## 2023-01-05 DIAGNOSIS — M50.30 DDD (DEGENERATIVE DISC DISEASE), CERVICAL: ICD-10-CM

## 2023-01-05 DIAGNOSIS — M47.812 CERVICAL SPONDYLOSIS: ICD-10-CM

## 2023-01-05 DIAGNOSIS — M25.572 CHRONIC PAIN OF LEFT ANKLE: Primary | ICD-10-CM

## 2023-01-05 DIAGNOSIS — M54.2 CERVICAL PAIN (NECK): ICD-10-CM

## 2023-01-05 PROCEDURE — 97113 AQUATIC THERAPY/EXERCISES: CPT

## 2023-01-05 NOTE — PROGRESS NOTES
Chief Complaint:   Chief Complaint   Patient presents with    Neck Pain          History of Present Illness:       Patient is a 61 y.o. female returns follow up for the above complaint. The symptoms of neck pain started worsening approximately 2 months ago unprompted any specific injury or event. Symptoms are now are notable with the neck positioned in flexion when reading or using her mobile phone. Concurrently she is experiencing numbness involving the upper extremities bilaterally radiating into the hands. The neck pain is location: Posterior and bilateral  severity: 8 out of 10 and is worsened by static positioning in flexion. The patient has not noted new onset or progressive weakness of the upper extremites. The neck pain : arm pain is 50 : 50 and follows a C8 dermatomal pattern. Treatment to date has included physical therapy in the remote past and symptoms have improved with this treatment at that time. The patient denies history of neck trauma. There is not history or orthopaedic cervical spine surgery. Work up to date has included:  None . The patient has no prior history of autoimmune disease, inflammatory arthropathy or crystal arthropathy.             Past Medical History:        Past Medical History:   Diagnosis Date    Arthritis     TAN (dyspnea on exertion)     Frequent headaches 12/13/2022    Gastroesophageal reflux disease 1/28/2020    Hyperlipidemia     Hypertension     Morbid obesity with body mass index (BMI) of 60.0 to 69.9 in adult [facility]) 7/2/2018    Neuropathy     SHYANN (obstructive sleep apnea)     Primary osteoarthritis of right knee 7/2/2018    Type 2 diabetes mellitus without complication ([facility] [address]     controlled with diet    Urinary incontinence         Present Medications:         Current Outpatient Medications   Medication Sig Dispense Refill    vitamin D (ERGOCALCIFEROL) 1.25 MG (89520 UT) CAPS capsule Take 1 capsule by mouth once a week 4 capsule 3    diclofenac sodium (VOLTAREN) 1 % GEL Apply 2 g topically 4 times daily (Patient not taking: Reported on 12/16/2022) 2 g 0    furosemide (LASIX) 40 MG tablet TAKE 1 TABLET BY MOUTH EVERY DAY 90 tablet 3    Blood Glucose Monitoring Suppl (BLOOD GLUCOSE MONITOR SYSTEM) w/Device KIT Dispense glucometer covered by patient's insurance 1 kit 0    blood glucose monitor strips Test once daily 100 strip 3    zoster recombinant adjuvanted vaccine [facility]) 50 MCG/0.5ML SUSR injection Inject 0.5 mLs into the muscle See Admin Instructions 1 dose now and repeat in 2-6 months (Patient not taking: Reported on 12/16/2022) 0.5 mL 0    Lancets MISC Use to test blood sugar once daily 100 each 3    acetaminophen (TYLENOL) 500 MG tablet Take 1 tablet by mouth 4 times daily as needed for Pain 60 tablet 2    omeprazole (PRILOSEC) 40 MG delayed release capsule Take 1 capsule by mouth every morning (before breakfast) 30 capsule 1    promethazine (PHENERGAN) 25 MG tablet Take 1 tablet by mouth every 6 hours as needed for Nausea 20 tablet 0    meloxicam (MOBIC) 15 MG tablet Take 1 tablet by mouth daily 30 tablet 0    gabapentin (NEURONTIN) 300 MG capsule Take 1 capsule by mouth 3 times daily for 180 days. Intended supply: 30 days 90 capsule 1    omeprazole (PRILOSEC) 20 MG delayed release capsule TAKE 1 CAPSULE BY MOUTH EVERY DAY IN THE MORNING BEFORE BREAKFAST 90 capsule 0     No current facility-administered medications for this visit. Allergies:      No Known Allergies        Review of Systems:    Pertinent items are noted in HPI    Review of systems reviewed from Patient History Form dated on today's date activity and   available in the patient's chart under the Media tab. Vital Signs: There were no vitals filed for this visit. General Exam:     Constitutional: Patient is adequately groomed with no evidence of malnutrition  Mental Status: The patient is oriented to time, place and person.   The patient's mood and affect are appropriate. Vascular: Examination reveals no swelling or calf tenderness. Peripheral pulses are palpable and 2+. Lymphatics: no lymphadenopathy of the cervical or axillary regions or upper extremity     Physical Exam:  Cervical spine  neck     General: Obese body habitus   Primary Exam:    Inspection: No deformity atrophy appreciable curvature      Palpation: No focal trigger point tenderness      Range of Motion: Mild restriction with flexion extension and near full range with rotation low-grade discomfort all ranges      Strength: Normal      Special Tests: Negative Almaguer sign      Skin: There are no rashes, ulcerations or lesions. Gait: Non-ataxic      Reflex intact upper     Additional Comments:        Additional Examinations:          Neurologic -Light touch sensation and manual muscle testing is normal C5-C8 . Biceps and triceps reflexes are symmetric and +2. Office Imaging Results/Procedures PerformedToday:      Radiology:      X-rays obtained and reviewed in office:   Views 2 views cervical spine   Location cervical spine   Impression normal alignment on the AP projection mild multilevel facet arthropathy mid cervical lateral projection demonstrates mild multilevel spondylosis no areas of focal events intervertebral disc space narrowing. Office Procedures:     Orders Placed This Encounter   Procedures    XR CERVICAL SPINE (2-3 VIEWS)     Standing Status:   Future     Number of Occurrences:   1     Standing Expiration Date:   1/5/2024     Order Specific Question:   Reason for exam:     Answer:   pain    Morena Lemon MD (Inpatient and Outpatient EMG), [facility]     Referral Priority:   Routine     Referral Type:   Eval and Treat     Referral Reason:   Specialty Services Required     Referred to Provider:   Ainsley Ma MD     Requested Specialty:   Neurology     Number of Visits Requested:   1           Other Outside Imaging and Testing Personally Reviewed:     No results found. Assessment   Impression: . Encounter Diagnoses   Name Primary? Paresthesia of left arm     Paresthesia of right arm     DDD (degenerative disc disease), cervical     Cervical spondylosis     Cervical pain (neck)               Plan: Activity modification cervical spine precautions  Cervical spine stabilization exercises  EMG bilateral upper extremities evaluate for bilateral radiculopathy/entrapment neuropathy  Ergonomic adjustments to minimize neck strain and local measures for symptom control  Continue gabapentin and meloxicam       Orders:        Orders Placed This Encounter   Procedures    XR CERVICAL SPINE (2-3 VIEWS)     Standing Status:   Future     Number of Occurrences:   1     Standing Expiration Date:   1/5/2024     Order Specific Question:   Reason for exam:     Answer:   pain    Kar Thompson MD (Inpatient and Outpatient EMG), [facility]     Referral Priority:   Routine     Referral Type:   Eval and Treat     Referral Reason:   Specialty Services Required     Referred to Provider:   Sergey Blood MD     Requested Specialty:   Neurology     Number of Visits Requested:   1         Lubna Fatima MD.      Irving Robertson: \"This note was dictated with voice recognition software. Though review and correction are routine, we apologize for any errors. \"

## 2023-01-05 NOTE — FLOWSHEET NOTE
[facility] Performance and Rehabilitation a Department of [address]  [address] [place] [address], [address]  Office: [phone]  Fax:  [phone]                                                     Physical Therapy Daily Treatment Note  Date:  2023    Patient Name:  Graylin Moritz    :  1965  MRN: [id number]      Medical/Treatment Diagnosis Information:  Diagnosis: M17.11 (ICD-10-CM) - Primary osteoarthritis of right knee; M17.12 (ICD-10-CM) - Primary osteoarthritis of left knee  Treatment Diagnosis: M25.561, M25.562 Bilateral knee pain    Insurance/Certification information:  PT Insurance Information: [employer] - 20vcy; Boy Hurley required for TE, NR, DN  Physician Information:  Referring Provider (secondary): [place]    Has the plan of care been signed (Y/N):        [x]  Yes  []  No     Date of Patient follow up with Physician: not scheduled       Is this a Progress Report:     []  Yes  [x]  No        Progress report will be due (10 Rx or 30 days whichever is less):        Recertification will be due (POC Duration  / 90 days whichever is less):          Visit # Insurance Allowable Auth Required   10 for knee   (3 for lumbar at [address] [place] [address]) 20 visits (Boy Hurley req for TE, NR, DN) []  Yes []  No        Functional Scale: foto 22    Date assessed:  22      Latex Allergy:  [x]NO      []YES  Preferred Language for Healthcare:   []English       [x]other: Georgian    Pain level:  7/10 bilat knees     SUBJECTIVE:   pt was contacted and scheduled for aquatic therapy at [facility]. She notes that she still feels like her knees \"dislocate\" when she stands or walks like trying to go to the bathroom. Pt is also wondering if someone is supposed to be ordering her a quad cane that was suggested by Dr. Baltazar Correa to start practicing if she has shoulder sx on her left side. 12/15: Pt reports pain in bilateral knees as well as back.   Pt reports knee braces given are the wrong size or one is the incorrect size. Pt appears to understand and can converse in [address] however  was present and utilized as well.   12/22: Pt c/o inc pain after last visit making walking difficult and inc left ankle pain. Pt also c/o left shoulder pain with ADL's.   12/27: No  present for today's session. Patient reports tolerating last session well, and that she had decreased pain for a few hours after therapy. Pain on left side is greater than the right side. Patient states that she loves to swim and wants to get back to swimming. Reports increased pain in her knees when she does the breast stroke, and is unable to perform freestyle because she is unable to raise her arm that high. Wants to know what other types of \"swimming\" she could do  12/29:  present for today's session. Tolerated last session well. Does fine if she doesn't have to stand too long. Taking seated breaks works well. Patient reports that her knee braces are too big, and needs to go back to the XXL  1/3/23: Pt notes trying to walk more with rolling walker at home but notices pain in (anterior) left ankle with standing or walking. Pt reports knee braces fall down so needs to exchange for 2XL. 1/5: Pt arrived (in w/c) to PT wearing both 3XL knee braces saying the brace rep said they are the correct size and just need to have the straps tighter. Pt reports pain is her knees is better but c/o left ankle pain.      OBJECTIVE: 11/9/22  Observation:   Test measurements:       Flexibility L R Comment   Hamstring 20 30  SLR   Gastroc         ITB         Quad                                   ROM PROM AROM Overpressure Comment     L R L R L R     Flexion     105; pain 110; pain         Extension     Lacking 5 Lacking 3                                                    Strength L R Comment   Quad 4- 4-     Hamstring 4 4     Gastroc         Hip flexor 4- 4-     Hip ABD 4- 4- Key  B= Belt DB= Dumbells T= Theratube   G=Gloves N= Noodles W= Weights   P= Paddles WW=Water Walker K= Kickboard     **Pt to PT in wide W/C and left foot in walking boot. Transfers:   W/C to [address] with CGA, Lift into pool [place] exer: chest press                         Flex/ ext 10  10 / 10    % Immersion: 75%            Ambulation:  Laps Exercises UE:      Forwards 1 + 1 + 1 with SBA Shoulder Shrugs     Lateral Along the rail 2 x  Shoulder circles 5    Marching    Scapular Retraction  5    Retro   Rolling  5    [place]as  Push Downs 5     IR/ER 5     Punching 5   Stretching:  Rowing 5   Gastroc/Soleus   Elbow Flex/Ext 5   Hamstring   re-add next visit Shldr Flex/Ext 5   Knee flex stretch   Shldr Abd/Add 5   Piriformis   Horiz Abd/Add  5   Hip flexor    Arm Circles  5   SKTC   PNF Diagonals    DKTC  UE oscillations f/b, s/s    ITB   Wall Push-ups    Quad  Figure 8's    Mid back   Buoy pull/push downs    UT  Tband rows    Rhom  Tband lats    Post Shoulder  Lumbar Rot w/ paddles    Pec   Small ball pull downs                   diagonals             Cervical:       AROM Flex       AROM Extension     LEs exercises:  bilat AROM Side Bending    Marching 5 AROM Rotation    Heel Raises Chin tucks    Toe Raises  Chin nods     Squats 5      Hamstring Curls 5      Hip Flexion 5 Balance:      Hip Abduction 5 SLS    Hip Circles 5 Tandem stance    TA set  NBOS eyes open 30 sec   Glut Set  NBOS eyes closed    Hip Extension  Hand to Opposite Knee    Hip Adduction    Box Step     Hip IR   Noodle Stance     Hip ER  Stop/Go Gait    Fig 8's  Switch Gait    Hip clock 3 R/L Foot on step bal 30 sec R/L         Seated/ LIFTCHAIR bilat Functional:    Ankle Pumps 2 x 10 Step up forward    Ankle circles 10 gentle Step up lateral    Knee flex & ext 2 x 10 Step down    Hip Abd & Adduction 2 x 10 Shallow Water squats    Bicycle  2 x 10 Crate Lifts    Add Set with ball 2 x 10 Lunges forward    Knee ext with ball 5 R/L Lunges lateral    Ankle INV  Lunges retro    Ankle EV  Lower ab curl with noodle    Knee ext with ball 10  Upper ab curl with ball      Med ball straight lifts      Med ball diagonal lifts      Hydrorider      Foot onto/off of step 2 x 5 R/L  Fwd and 5 x Lat   Noodle:      Leg Press  Deep Water:    Noodle hang at wall  Jog    Noodle hang deep water  Jumping Jacks    Noodle Bicycle  Heel to toe      Hand to opposite knee      Cross country skier      Rocking Horse      Exercises/Interventions:  CDW [place] not performed today, only aquatics    Exercise/Equipment Resistance/Repetitions Other comments   Stretching     Hamstring 5 x10 sec mindy seated with heel on step     Hip Flexion     ITB     Grion     Quad     Inclined Calf 5x10 seated rope     Towel Pull          SLR     Supine     Prone     Abduction X20 red band seated    Adducton     SLR+     Clams     LAQ 2X10 each    Seated marching 2X10 each     Seated hamstring curl  2x10 red band     Isometrics     Quad sets    Hip Adduction 10x10    Hip abduction          Ankle PF          Patellar Glides     Medial     Superior     Inferior          ROM     Sheet Pulls     Wall Slides     Edge of bed     Flexionator     Extensionator     Hang Weights     Ankle Pumps                              CKC     Calf raises     Wall sits     Step ups     Step up and over     Lateral Step Downs               Mini squats     CC TKE          Lateral band walks     Side Planks     Half moon     Single leg flow          Biodex-balance     Single leg stance With HHA and CBA    Plyoback          Stool scoots     SB bridge     SB HS Curls     Planks          PRE     Extension  RANGE: 90/40   Flexion  RANGE: 0/90        Cable Column          Leg Press  RANGE: 70/10        Bike     Treadmill                   Therapeutic Exercise and NMR EXR  [x] ([id number]) Provided verbal/tactile cueing for activities related to strengthening, flexibility, endurance, ROM for improvements in LE, proximal hip, and core control with self care, mobility, lifting, ambulation. [x] (14048) Provided verbal/tactile cueing for activities related to improving balance, coordination, kinesthetic sense, posture, motor skill, proprioception  to assist with LE, proximal hip, and core control in self care, mobility, lifting, ambulation and eccentric single leg control. NMR and Therapeutic Activities:    [x] (91981 or 69584) Provided verbal/tactile cueing for activities related to improving balance, coordination, kinesthetic sense, posture, motor skill, proprioception and motor activation to allow for proper function of core, proximal hip and LE with self care and ADLs  [] (48997) Gait Re-education- Provided training and instruction to the patient for proper LE, core and proximal hip recruitment and positioning and eccentric body weight control with ambulation re-education including up and down stairs     Home Exercise Program:    [x] (52401) Reviewed/Progressed HEP activities related to strengthening, flexibility, endurance, ROM of core, proximal hip and LE for functional self-care, mobility, lifting and ambulation/stair navigation   [x] (17412)Reviewed/Progressed HEP activities related to improving balance, coordination, kinesthetic sense, posture, motor skill, proprioception of core, proximal hip and LE for self care, mobility, lifting, and ambulation/stair navigation      Manual Treatments:  PROM / STM / Oscillations-Mobs:  G-I, II, III, IV (PA's, Inf., Post.)  [] (33202) Provided manual therapy to mobilize LE, proximal hip and/or LS spine soft tissue/joints for the purpose of modulating pain, promoting relaxation,  increasing ROM, reducing/eliminating soft tissue swelling/inflammation/restriction, improving soft tissue extensibility and allowing for proper ROM for normal function with self care, mobility, lifting and ambulation.      Individual Aquatic Therapy:  [x] (65615) Provided verbal and tactile cueing for strengthening, flexibility, ROM using the therapeutic properties of water (buoyancy, resistance) for improvements in core control, mobility and ambulation     Aquatic Group:  [] (99972) Provided intermittent verbal and tactile cueing for strengthening, endurance, flexibility and ROM for 2-4 individuals that do not require one-on one patient contact by the therapist     Modalities:      Charges:  Timed Code Treatment Minutes: Total Treatment Minutes: Individual Aquatic Minutes: 50   Aquatic Group Minutes:           0     [] EVAL (LOW) 18646   [] EVAL (MOD) 30680   [] EVAL (HIGH) 72663   [] RE-EVAL   [] VY(12864) x  1  [] IONTO  [] NMR (69819) x     [] VASO  [] Manual (99625) x     [] Other:  [] TA x 1     [] Mech Traction (23464)  [] ES(attended) (81420)     [] ES (un) (01792):   [x] Aquatic (93451) x 3 [] Aquatic Group (49696) x    HEP instruction:   Access Code: [email or web address]  URL: [email or web address]  Date: 11/09/2022  Prepared by: Brent Varner    Exercises  Supine Hamstring Stretch with Strap - 1 x daily - 7 x weekly - 5 reps - 5sec hold  Supine Quadricep Sets - 1 x daily - 7 x weekly - 10 reps - 5sec hold  Hooklying Isometric Hip Abduction with Belt - 1 x daily - 7 x weekly - 10 reps - 5sec hold  Supine Hip Adduction Isometric with Ball - 1 x daily - 7 x weekly - 10 reps - 5sec hold  Supine Heel Slide with Strap - 1 x daily - 7 x weekly - 5-10 reps - 5sec hold  Standing Weight Shift Side to Side - 1 x daily - 7 x weekly - 5-10 reps - 5sec hold    GOALS:  Patient stated goal: reduce pain, improve walking     [] Progressing: [] Met: [] Not Met: [] Adjusted    Therapist goals for Patient:   Short Term Goals: To be achieved in: 2 weeks  1. Independent in HEP and progression per patient tolerance, in order to prevent re-injury. [] Progressing: [] Met: [] Not Met: [] Adjusted   2. Patient will have a decrease in pain to facilitate improvement in movement, function, and ADLs as indicated by Functional Deficits.     [] Progressing: [] Met: [] Not Met: [] Adjusted    Long Term Goals: To be achieved in: 4-6 weeks  1. Disability index score of 50 or greater FOTO to assist with reaching prior level of function. [] Progressing: [] Met: [] Not Met: [] Adjusted  2. Patient will demonstrate increased AROM to 110+ flex, lacking 3 or less ext  to allow for proper joint functioning as indicated by patients Functional Deficits. [] Progressing: [] Met: [] Not Met: [] Adjusted  3. Patient will demonstrate an increase in Strength to 4/5 or greater to allow for proper functional mobility as indicated by patients Functional Deficits. [] Progressing: [] Met: [] Not Met: [] Adjusted  4. Patient will return to standing for greater than 5 minutes while cooking meals without increased symptoms or restriction. [] Progressing: [] Met: [] Not Met: [] Adjusted    Overall Progression Towards Functional goals/ Treatment Progress Update:  [] Patient is progressing as expected towards functional goals listed. [] Progression is slowed due to complexities/Impairments listed. [] Progression has been slowed due to co-morbidities. [x] Plan just implemented, too soon to assess goals progression <30days   [] Goals require adjustment due to lack of progress  [] Patient is not progressing as expected and requires additional follow up with physician  [] Other      Patient requires continued skilled intervention: [x] Yes  [] No    Treatment/Activity Tolerance:  [x] Patient able to complete treatment  [] Patient limited by fatigue  [] Patient limited by pain     [] Patient limited by other medical complications  [] Other:                 ASSESSMENT:12/15: Pt tolerated exercises as instructed with multiple verbal cues with and without  assistance. Pt noted LB soreness after standing with LE exercises and gait for about 20 min but dec after hanging on the wall/ dec WB.   Will continue with skilled instruction for strength, dec pain, and inc function for ADL's. Pain 5-6/10 after aquatics. 12/22: Pt tolerated therapy with minimal c/o LB, left shoulder soreness. Treatment was alternated between seated and standing exercises. Gait dec to 1 lap. Once pt exits pool via [address], pt needs assistance wheeling w/c to room, room set up, calling transportation. Will progress slowly to attempt to dec chance for inc pain. 12/27: Patient tolerated therapy without complaints. Four seated rest breaks were taken throughout session. 2 UE and 2 LE exercises were added. Continue with slow progressions to avoid increased pain after therapy. Assistance required to wheel patient to changing room and setting up shower. Transportation needed to be called for patient  12/29: Repetitive cues to only do instructed number of reps and to stop an exercise because she would keep on going. Educated on the importance of sticking with the numbers we give her, especially since she was so sore after her first aquatic session. Gave recommendations of modified back stroke and side stroke that patient could possibly try when she was able to get back to swimming, as that is something she verbalized wanting to get back into. 1/3/23: Discussed with pt importance of amb in her home with wheeled walker several times a day vs not moving during the day. Explained this along with exercise can inc strength and continue maintaining ROM. 1/5: Educated pt (with assistance of ) on plan to d/c next treatment including 30 day pass to fitness center and aquatic HEP packet with exercises highlighted that she has done thus far. Pt questions answered. Plan for Next Session:  Issue pt 30 day pass and aquatic packet.       PLAN:   [x] Continue per plan of care [] Alter current plan (see comments above)  [x] Plan of care initiated [] Hold pending MD visit [] Discharge      Electronically signed by: Cassidy Duarte, PTA  [id number]    Note: If patient does not return for scheduled/ recommended follow up visits, this note will serve as a discharge from care along with most recent update on progress.

## 2023-01-05 NOTE — TELEPHONE ENCOUNTER
MRI AUTH AND ORDER STATES RIGHT ANKLE. SWITCHED TO LEFT    Placed new order for LEFT ANKLE MRI. Armando Unger should remain as INSURANCE approved ANY Jt. Spoke to pre-cert team who stated Ariel Rubinstein and we are good. They are code specific not side. DX code was for left side already so everything is fine. \"

## 2023-01-06 ENCOUNTER — OFFICE VISIT (OUTPATIENT)
Dept: PSYCHOLOGY | Age: 58
End: 2023-01-06
Payer: COMMERCIAL

## 2023-01-06 ENCOUNTER — TELEPHONE (OUTPATIENT)
Dept: PRIMARY CARE CLINIC | Age: 58
End: 2023-01-06

## 2023-01-06 DIAGNOSIS — R45.4 ANGER: Primary | ICD-10-CM

## 2023-01-06 DIAGNOSIS — F41.9 ANXIETY: ICD-10-CM

## 2023-01-06 PROCEDURE — 90832 PSYTX W PT 30 MINUTES: CPT

## 2023-01-06 PROCEDURE — 1036F TOBACCO NON-USER: CPT

## 2023-01-06 NOTE — TELEPHONE ENCOUNTER
----- Message from Vito Armstrong sent at 1/6/2023  2:59 PM EST -----  Subject: Message to Provider    QUESTIONS  Information for Provider? Pt would like a callback to discuss letter she   received for pre-op and to schedule appt. \"Order a1c ID [id number] no   results found for HGBA1C\"  ---------------------------------------------------------------------------  --------------  Justina Tavares INFO  [phone]; OK to leave message on voicemail  ---------------------------------------------------------------------------  --------------  SCRIPT ANSWERS  Relationship to Patient? Self  Do you have questions for your provider that need to be answered prior to   scheduling your pre-op appointment?  Yes

## 2023-01-06 NOTE — PROGRESS NOTES
Behavioral Health Consultation  CUAUHTEMOC PERLA Psy.D. Psychologist  1/6/2023  11:25 AM EST      Time spent with Patient: 30 minutes  This is patient's third  [facility] appointment. Reason for Consult:    Chief Complaint   Patient presents with    Other     anger    Anxiety     Referring Provider: Noemí Cartagena MD  [facility] [place] [address] [address] [facility] [address]  [phone]    Feedback given to SW. Greek  used: Serena #[id number]    S:  Pt reported she received housing list but is unsure which housing would fit best with her. Noted having some difficulties with this paperwork due to language barriers. Pt described her mood as \"a little better,\" expressing she has more hope regarding the future. Pt noted her anxiety has improved some as well. She noted spine surgery is scheduled for February. Is worried about how she is going to take care of herself after surgery. Also noted she received information for [employer] program but has not called yet. She noted electrical scooter is broken, she called insurance and is waiting on them to fix it. Reviewed stress management strategies and benefits for mood. Reviewed breathing exercises.      O:  MSE:    Appearance: good hygiene   Attitude: cooperative and friendly  Consciousness: alert  Orientation: oriented to person, place, time, general circumstance  Memory: recent and remote memory intact  Attention/Concentration: intact during session  Psychomotor Activity:normal  Eye Contact: normal  Speech: normal rate and volume, well-articulated  Mood: \"a little better\"  Affect:  mildly dysphoric  Perception: within normal limits  Thought Content: within normal limits  Thought Process: logical, coherent and goal-directed  Insight: good  Judgment: intact  Ability to understand instructions: Yes  Ability to respond meaningfully: Yes  Morbid Ideation: no   Suicide Assessment: no suicidal ideation, plan, or intent  Homicidal Ideation: no    History:    Medications: Current Outpatient Medications   Medication Sig Dispense Refill    vitamin D (ERGOCALCIFEROL) 1.25 MG (04148 UT) CAPS capsule Take 1 capsule by mouth once a week 4 capsule 3    diclofenac sodium (VOLTAREN) 1 % GEL Apply 2 g topically 4 times daily (Patient not taking: Reported on 12/16/2022) 2 g 0    furosemide (LASIX) 40 MG tablet TAKE 1 TABLET BY MOUTH EVERY DAY 90 tablet 3    Blood Glucose Monitoring Suppl (BLOOD GLUCOSE MONITOR SYSTEM) w/Device KIT Dispense glucometer covered by patient's insurance 1 kit 0    blood glucose monitor strips Test once daily 100 strip 3    zoster recombinant adjuvanted vaccine [facility]) 50 MCG/0.5ML SUSR injection Inject 0.5 mLs into the muscle See Admin Instructions 1 dose now and repeat in 2-6 months (Patient not taking: Reported on 12/16/2022) 0.5 mL 0    Lancets MISC Use to test blood sugar once daily 100 each 3    acetaminophen (TYLENOL) 500 MG tablet Take 1 tablet by mouth 4 times daily as needed for Pain 60 tablet 2    omeprazole (PRILOSEC) 40 MG delayed release capsule Take 1 capsule by mouth every morning (before breakfast) 30 capsule 1    promethazine (PHENERGAN) 25 MG tablet Take 1 tablet by mouth every 6 hours as needed for Nausea 20 tablet 0    meloxicam (MOBIC) 15 MG tablet Take 1 tablet by mouth daily 30 tablet 0    gabapentin (NEURONTIN) 300 MG capsule Take 1 capsule by mouth 3 times daily for 180 days. Intended supply: 30 days 90 capsule 1    omeprazole (PRILOSEC) 20 MG delayed release capsule TAKE 1 CAPSULE BY MOUTH EVERY DAY IN THE MORNING BEFORE BREAKFAST 90 capsule 0     No current facility-administered medications for this visit.      Social History:   Social History     Socioeconomic History    Marital status:      Spouse name: Not on file    Number of children: Not on file    Years of education: Not on file    Highest education level: Not on file   Occupational History    Not on file   Tobacco Use    Smoking status: Never    Smokeless tobacco: Never Vaping Use    Vaping Use: Never used   Substance and Sexual Activity    Alcohol use: No    Drug use: No    Sexual activity: Never   Other Topics Concern    Not on file   Social History Narrative    ** Merged History Encounter **          Social Determinants of Health     Financial Resource Strain: Low Risk     Difficulty of Paying Living Expenses: Not hard at all   Food Insecurity: No Food Insecurity    Worried About Running Out of Food in the Last Year: Never true    Ran Out of Food in the Last Year: Never true   Transportation Needs: Not on file   Physical Activity: Not on file   Stress: Not on file   Social Connections: Not on file   Intimate Partner Violence: Not on file   Housing Stability: Not on file     TOBACCO:   reports that she has never smoked. She has never used smokeless tobacco.  ETOH:   reports no history of alcohol use. Family History:   Family History   Problem Relation Age of Onset    Diabetes Mother     Stroke Mother     Osteoarthritis Mother     Heart Disease Father     Other Father     High Blood Pressure Father     Osteoarthritis Father     Diabetes Maternal Aunt     Cancer Maternal Aunt     Diabetes Maternal Grandmother     Cancer Paternal Aunt        A:  Patient engaged and cooperative. Denied suicidal ideation. Insight and motivation are good. PHQ-9 Questionaire 12/1/2022 6/15/2021 1/9/2020 2/28/2019 6/22/2018 3/6/2018 2/26/2018   Little interest or pleasure in doing things 0 0 0 0 1 3 0   Feeling down, depressed, or hopeless 0 0 0 0 1 3 0   Trouble falling or staying asleep, or sleeping too much 0 - - - - 3 -   Feeling tired or having little energy 0 - - - - 3 -   Poor appetite or overeating 0 - - - - 3 -   Feeling bad about yourself - or that you are a failure or have let yourself or your family down 0 - - - - 3 -   Trouble concentrating on things, such as reading the newspaper or watching television 0 - - - - 3 -   Moving or speaking so slowly that other people could have noticed. Or the opposite - being so fidgety or restless that you have been moving around a lot more than usual 0 - - - - 3 -   Thoughts that you would be better off dead, or of hurting yourself in some way 0 - - - - 3 -   PHQ-9 Total Score 0 0 0 0 2 27 0   If you checked off any problems, how difficult have these problems made it for you to do your work, take care of things at home, or get along with other people? 0 - - - - 3 -     PHQ Scores 12/1/2022 6/15/2021 1/9/2020 2/28/2019 6/22/2018 3/6/2018 2/26/2018   PHQ2 Score 0 0 0 0 2 6 0   PHQ9 Score 0 0 0 0 2 27 0       Interpretation of Total Score Depression Severity: 1-4 = Minimal depression, 5-9 = Mild depression, 10-14 = Moderate depression, 15-19 = Moderately severe depression, 20-27 = Severe depression     Diagnosis:    1. Anger    2. Anxiety        Past Medical History      Diagnosis Date    Arthritis     TAN (dyspnea on exertion)     Frequent headaches 12/13/2022    Gastroesophageal reflux disease 1/28/2020    Hyperlipidemia     Hypertension     Morbid obesity with body mass index (BMI) of 60.0 to 69.9 in adult [facility]) 7/2/2018    Neuropathy     SHYANN (obstructive sleep apnea)     Primary osteoarthritis of right knee 7/2/2018    Type 2 diabetes mellitus without complication ([facility] [address]     controlled with diet    Urinary incontinence        Plan:  Pt interventions:  Discussed and set plan for behavioral activation, Discussed self-care (sleep, nutrition, rewarding activities, social support, exercise), Supportive techniques, Emphasized self-care as important for managing overall health, and Emphasized importance of regular practice of relaxation strategies to target / promote relaxation/stress and anxiety management    Pt Behavioral Change Plan:   See Pt Instructions.

## 2023-01-06 NOTE — Clinical Note
Ivon Rubi! Another question regarding Amel. She is having some difficulties with the housing paperwork due to language barrier. She is unsure what some of the terms mean and subsequently does not know which housing would be the best fit for her. Pt is wondering if it is possible to meet with you F2F with help of a  to review this paperwork? Thank you for all of your help!  Shweta Ng

## 2023-01-08 DIAGNOSIS — E55.9 VITAMIN D DEFICIENCY: ICD-10-CM

## 2023-01-09 RX ORDER — ERGOCALCIFEROL 1.25 MG/1
CAPSULE ORAL
Qty: 4 CAPSULE | Refills: 3 | OUTPATIENT
Start: 2023-01-09

## 2023-01-09 NOTE — TELEPHONE ENCOUNTER
Medication:   Requested Prescriptions     Pending Prescriptions Disp Refills    vitamin D (ERGOCALCIFEROL) 1.25 MG (39691 UT) CAPS capsule [Pharmacy Med Name: VITAMIN D2 1.25MG(50,000 UNIT)] 4 capsule 3     Sig: TAKE 1 CAPSULE BY MOUTH ONE TIME PER WEEK     Last Filled:  12.16.22    Last appt: 12/16/2022   Next appt: 2/3/2023    Last OARRS:   RX Monitoring 8/7/2020   Attestation -   Periodic Controlled Substance Monitoring Possible medication side effects, risk of tolerance/dependence & alternative treatments discussed. ;Assessed functional status. ;Obtaining appropriate analgesic effect of treatment. Chronic Pain > 50 MEDD Obtained or confirmed \"Consent for Opioid Use\" on file.

## 2023-01-09 NOTE — TELEPHONE ENCOUNTER
Patient called to check the status of the question regarding her letter that she has received from her insurance company. She would like to have a call to discuss this letter.

## 2023-01-10 ENCOUNTER — HOSPITAL ENCOUNTER (OUTPATIENT)
Dept: PHYSICAL THERAPY | Age: 58
Setting detail: THERAPIES SERIES
Discharge: HOME | End: 2023-01-10
Payer: COMMERCIAL

## 2023-01-10 PROCEDURE — 97113 AQUATIC THERAPY/EXERCISES: CPT

## 2023-01-10 NOTE — FLOWSHEET NOTE
[facility] a Department of [address]  [address], [address]  Office: [phone]  Fax:  [phone]                                                     Physical Therapy Daily Treatment Note  Date:  1/10/2023    Patient Name:  Amy Lopez    :  1965  MRN: [id number]      Medical/Treatment Diagnosis Information:  Diagnosis: M17.11 (ICD-10-CM) - Primary osteoarthritis of right knee; M17.12 (ICD-10-CM) - Primary osteoarthritis of left knee  Treatment Diagnosis: M25.561, M25.562 Bilateral knee pain    Insurance/Certification information:  PT Insurance Information: Lynda Trinidad - 20vcy; Alee Martinez required for TE, NR, DN  Physician Information:  Referring Provider (secondary): [place]    Has the plan of care been signed (Y/N):        [x]  Yes  []  No     Date of Patient follow up with Physician: not scheduled       Is this a Progress Report:     []  Yes  [x]  No        Progress report will be due (10 Rx or 30 days whichever is less):        Recertification will be due (POC Duration  / 90 days whichever is less):          Visit # Insurance Allowable Auth Required   11 for knee   (3 for lumbar at [address] [place] [address]) 20 visits (Alee Martinez req for TE, NR, DN) []  Yes []  No        Functional Scale: foto 22    Date assessed:  22      Latex Allergy:  [x]NO      []YES  Preferred Language for Healthcare:   []English       [x]other: Turkmen    Pain level:  7/10 bilat knees     SUBJECTIVE:   pt was contacted and scheduled for aquatic therapy at [facility]. She notes that she still feels like her knees \"dislocate\" when she stands or walks like trying to go to the bathroom. Pt is also wondering if someone is supposed to be ordering her a quad cane that was suggested by Dr. Shaikh Even to start practicing if she has shoulder sx on her left side. 12/15: Pt reports pain in bilateral knees as well as back.   Pt reports knee braces given are the wrong size or one is the incorrect size. Pt appears to understand and can converse in [place] however  was present and utilized as well.   12/22: Pt c/o inc pain after last visit making walking difficult and inc left ankle pain. Pt also c/o left shoulder pain with ADL's.   12/27: No  present for today's session. Patient reports tolerating last session well, and that she had decreased pain for a few hours after therapy. Pain on left side is greater than the right side. Patient states that she loves to swim and wants to get back to swimming. Reports increased pain in her knees when she does the breast stroke, and is unable to perform freestyle because she is unable to raise her arm that high. Wants to know what other types of \"swimming\" she could do  12/29:  present for today's session. Tolerated last session well. Does fine if she doesn't have to stand too long. Taking seated breaks works well. Patient reports that her knee braces are too big, and needs to go back to the XXL  1/3/23: Pt notes trying to walk more with rolling walker at home but notices pain in (anterior) left ankle with standing or walking. Pt reports knee braces fall down so needs to exchange for 2XL. 1/5: Pt arrived (in w/c) to PT wearing both 3XL knee braces saying the brace rep said they are the correct size and just need to have the straps tighter. Pt reports pain is her knees is better but c/o left ankle pain. 1/10: Pt arrived 40 min late due to transportation. Pt reports continued difficulty getting the braces tight enough to stay on. They continue to slide down when walking.     OBJECTIVE: 11/9/22  Observation:   Test measurements:       Flexibility L R Comment   Hamstring 20 30  SLR   Gastroc         ITB         Quad                                   ROM PROM AROM Overpressure Comment     L R L R L R     Flexion     105; pain 110; pain         Extension     Lacking 5 Lacking 3 Strength L R Comment   Quad 4- 4-     Hamstring 4 4     Gastroc         Hip flexor 4- 4-     Hip ABD 4- 4-                                Key  B= Belt DB= Dumbells T= Theratube   G=Gloves N= Noodles W= Weights   P= Paddles WW=Water Walker K= Kickboard     **Pt to PT in wide W/C  ** Pt arrived 40 min late and requested to leave about 20 min later for another appt. Transfers:   W/C to  [address] with CGA, Lift into pool Cane exer: chest press                         Flex/ ext  % Immersion: 75%            Ambulation:  Laps Exercises UE:      Forwards 2 + 1 with SBA Shoulder Shrugs     Lateral Along the rail 1 x  Shoulder circles     Marching    Scapular Retraction     Retro   Rolling  5    Cariocas  Push Downs 5     IR/ER 5     Punching 5   Stretching:  Rowing 5   Gastroc/Soleus   Elbow Flex/Ext 5   Hamstring   re-add next visit Shldr Flex/Ext 5   Knee flex stretch   Shldr Abd/Add 5   Piriformis   Horiz Abd/Add  5   Hip flexor    Arm Circles  5   SKTC   PNF Diagonals    DKTC  UE oscillations f/b, s/s    ITB   Wall Push-ups    Quad  Figure 8's    Mid back   Buoy pull/push downs    UT  Tband rows    Rhom  Tband lats    Post Shoulder  Lumbar Rot w/ paddles    Pec   Small ball pull downs                   diagonals             Cervical:       AROM Flex       AROM Extension     LEs exercises:  bilat AROM Side Bending    Marching 5 AROM Rotation    Heel Raises Chin tucks    Toe Raises  Chin nods     Squats 5      Hamstring Curls 5      Hip Flexion 5 Balance:      Hip Abduction 5 SLS    Hip Circles 5 Tandem stance    TA set  NBOS eyes open Glut Set  NBOS eyes closed    Hip Extension  Hand to Opposite Knee    Hip Adduction    Box Step     Hip IR   Noodle Stance     Hip ER  Stop/Go Gait    Fig 8's  Switch Gait    Hip clock Foot on step bal       Seated/ LIFTCHAIR bilat Functional:    Ankle Pumps Step up forward    Ankle circles Step up lateral    Knee flex & ext Step down    Hip Abd & Adduction Caroline squats    Bicycle  Crate Lifts    Add Set with ball Lunges forward    Knee ext with ball Lunges lateral    Ankle INV  Lunges retro    Ankle EV  Lower ab curl with noodle    Knee ext with ball Upper ab curl with ball      Med ball straight lifts      Med ball diagonal lifts      Hydrorider      Foot onto/off of step Noodle:      Leg Press  Deep Water:    Noodle hang at wall  Jog    Noodle hang deep water  Jumping Jacks    Noodle Bicycle  Heel to toe      Hand to opposite knee      Cross country skier      Rocking Horse      Exercises/Interventions:  CDW [facility] not performed today, only aquatics    Exercise/Equipment Resistance/Repetitions Other comments   Stretching     Hamstring 5 x10 sec mindy seated with heel on step     Hip Flexion     ITB     Grion     Quad     Inclined Calf 5x10 seated rope     Towel Pull          SLR     Supine     Prone     Abduction X20 red band seated    Adducton     SLR+     Clams     LAQ 2X10 each    Seated marching 2X10 each     Seated hamstring curl  2x10 red band     Isometrics     Quad sets    Hip Adduction 10x10    Hip abduction          Ankle PF          Patellar Glides     Medial     Superior     Inferior          ROM     Sheet Pulls     Wall Slides     Edge of bed     Flexionator     Extensionator     Hang Weights     Ankle Pumps                              CKC     Calf raises     Wall sits     Step ups     Step up and over     Lateral Step Downs               Mini squats     CC TKE          Lateral band walks     Side Planks     Half moon     Single leg flow          Biodex-balance     Single leg stance With HHA and CBA    Plyoback          Stool scoots     SB bridge     SB HS Curls     Planks          PRE     Extension  RANGE: 90/40   Flexion  RANGE: 0/90        Cable Column          Leg Press  RANGE: 70/10        Bike     Treadmill                   Therapeutic Exercise and NMR EXR  [x] (77737) Provided verbal/tactile cueing for activities related to strengthening, flexibility, endurance, ROM for improvements in LE, proximal hip, and core control with self care, mobility, lifting, ambulation. [x] (85370) Provided verbal/tactile cueing for activities related to improving balance, coordination, kinesthetic sense, posture, motor skill, proprioception  to assist with LE, proximal hip, and core control in self care, mobility, lifting, ambulation and eccentric single leg control. NMR and Therapeutic Activities:    [x] (71609 or 80431) Provided verbal/tactile cueing for activities related to improving balance, coordination, kinesthetic sense, posture, motor skill, proprioception and motor activation to allow for proper function of core, proximal hip and LE with self care and ADLs  [] (57466) Gait Re-education- Provided training and instruction to the patient for proper LE, core and proximal hip recruitment and positioning and eccentric body weight control with ambulation re-education including up and down stairs     Home Exercise Program:    [x] (85100) Reviewed/Progressed HEP activities related to strengthening, flexibility, endurance, ROM of core, proximal hip and LE for functional self-care, mobility, lifting and ambulation/stair navigation   [x] (99007)Reviewed/Progressed HEP activities related to improving balance, coordination, kinesthetic sense, posture, motor skill, proprioception of core, proximal hip and LE for self care, mobility, lifting, and ambulation/stair navigation      Manual Treatments:  PROM / STM / Oscillations-Mobs:  G-I, II, III, IV (PA's, Inf., Post.)  [] (82610) Provided manual therapy to mobilize LE, proximal hip and/or LS spine soft tissue/joints for the purpose of modulating pain, promoting relaxation,  increasing ROM, reducing/eliminating soft tissue swelling/inflammation/restriction, improving soft tissue extensibility and allowing for proper ROM for normal function with self care, mobility, lifting and ambulation. Individual Aquatic Therapy:  [x] (10663) Provided verbal and tactile cueing for strengthening, flexibility, ROM using the therapeutic properties of water (buoyancy, resistance) for improvements in core control, mobility and ambulation     Aquatic Group:  [] (06256) Provided intermittent verbal and tactile cueing for strengthening, endurance, flexibility and ROM for 2-4 individuals that do not require one-on one patient contact by the therapist     Modalities:      Charges:  Timed Code Treatment Minutes: Total Treatment Minutes: Individual Aquatic Minutes: 25   Aquatic Group Minutes:      [] EVAL (LOW) 58027   [] EVAL (MOD) 88112   [] EVAL (HIGH) 18939   [] RE-EVAL   [] VG(63765) x  1  [] IONTO  [] NMR (65055) x     [] VASO  [] Manual (22588) x     [] Other:  [] TA x 1     [] Mech Traction (99701)  [] ES(attended) (95452)     [] ES (un) (30309):   [x] Aquatic (74647) x 2 [] Aquatic Group (87467) x    HEP instruction:   Access Code: [email or web address]  URL: [email or web address]  Date: 11/09/2022  Prepared by: Celso Méndez    Exercises  Supine Hamstring Stretch with Strap - 1 x daily - 7 x weekly - 5 reps - 5sec hold  Supine Quadricep Sets - 1 x daily - 7 x weekly - 10 reps - 5sec hold  Hooklying Isometric Hip Abduction with Belt - 1 x daily - 7 x weekly - 10 reps - 5sec hold  Supine Hip Adduction Isometric with Ball - 1 x daily - 7 x weekly - 10 reps - 5sec hold  Supine Heel Slide with Strap - 1 x daily - 7 x weekly - 5-10 reps - 5sec hold  Standing Weight Shift Side to Side - 1 x daily - 7 x weekly - 5-10 reps - 5sec hold    GOALS:  Patient stated goal: reduce pain, improve walking     [] Progressing: [] Met: [] Not Met: [] Adjusted    Therapist goals for Patient:   Short Term Goals: To be achieved in: 2 weeks  1. Independent in HEP and progression per patient tolerance, in order to prevent re-injury. [] Progressing: [] Met: [] Not Met: [] Adjusted   2.  Patient will have a decrease in pain to facilitate improvement in movement, function, and ADLs as indicated by Functional Deficits. [] Progressing: [] Met: [] Not Met: [] Adjusted    Long Term Goals: To be achieved in: 4-6 weeks  1. Disability index score of 50 or greater FOTO to assist with reaching prior level of function. [] Progressing: [] Met: [] Not Met: [] Adjusted  2. Patient will demonstrate increased AROM to 110+ flex, lacking 3 or less ext  to allow for proper joint functioning as indicated by patients Functional Deficits. [] Progressing: [] Met: [] Not Met: [] Adjusted  3. Patient will demonstrate an increase in Strength to 4/5 or greater to allow for proper functional mobility as indicated by patients Functional Deficits. [] Progressing: [] Met: [] Not Met: [] Adjusted  4. Patient will return to standing for greater than 5 minutes while cooking meals without increased symptoms or restriction. [] Progressing: [] Met: [] Not Met: [] Adjusted    Overall Progression Towards Functional goals/ Treatment Progress Update:  [] Patient is progressing as expected towards functional goals listed. [] Progression is slowed due to complexities/Impairments listed. [] Progression has been slowed due to co-morbidities. [x] Plan just implemented, too soon to assess goals progression <30days   [] Goals require adjustment due to lack of progress  [] Patient is not progressing as expected and requires additional follow up with physician  [] Other      Patient requires continued skilled intervention: [x] Yes  [] No    Treatment/Activity Tolerance:  [x] Patient able to complete treatment  [] Patient limited by fatigue  [] Patient limited by pain     [] Patient limited by other medical complications  [] Other:                 ASSESSMENT:12/15: Pt tolerated exercises as instructed with multiple verbal cues with and without  assistance.   Pt noted LB soreness after standing with LE exercises and gait for about 20 min but dec after hanging on the wall/ dec WB. Will continue with skilled instruction for strength, dec pain, and inc function for ADL's. Pain 5-6/10 after aquatics. 12/22: Pt tolerated therapy with minimal c/o LB, left shoulder soreness. Treatment was alternated between seated and standing exercises. Gait dec to 1 lap. Once pt exits pool via [address], pt needs assistance wheeling w/c to room, room set up, calling transportation. Will progress slowly to attempt to dec chance for inc pain. 12/27: Patient tolerated therapy without complaints. Four seated rest breaks were taken throughout session. 2 UE and 2 LE exercises were added. Continue with slow progressions to avoid increased pain after therapy. Assistance required to wheel patient to changing room and setting up shower. Transportation needed to be called for patient  12/29: Repetitive cues to only do instructed number of reps and to stop an exercise because she would keep on going. Educated on the importance of sticking with the numbers we give her, especially since she was so sore after her first aquatic session. Gave recommendations of modified back stroke and side stroke that patient could possibly try when she was able to get back to swimming, as that is something she verbalized wanting to get back into. 1/3/23: Discussed with pt importance of amb in her home with wheeled walker several times a day vs not moving during the day. Explained this along with exercise can inc strength and continue maintaining ROM. 1/5: Educated pt (with assistance of ) on plan to d/c next treatment including 30 day pass to fitness center and aquatic HEP packet with exercises highlighted that she has done thus far. Pt questions answered. 1/10: Pt to PT with only about 25 min for treatment as she needed to get to another appt. No  today. Pt was given written copy of aquatic HEP and FC 30 day pass.   Questions were answered last treatment with  present to assist.  Pt performed exercises per flow sheet however unable to complete all due to time. Plan for Next Session:  Pt has completed aquatic therapy as scheduled. PLAN:   [] Continue per plan of care [] Alter current plan (see comments above)  [] Plan of care initiated [] Hold pending MD visit [] Discharge      Electronically signed by: Florencia Roldan, PTA  [id number]    Note: If patient does not return for scheduled/ recommended follow up visits, this note will serve as a discharge from care along with most recent update on progress.

## 2023-01-10 NOTE — TELEPHONE ENCOUNTER
Spoke with patient, told her that the letter from insurance can be disregarded because her A1C result was given to her during the phone call. Patient also asked about a follow up appointment which was scheduled for her. Along with a pre op exam that was scheduled for 2/3/23. She requested a new referral to neurology due to Dr. Tina Webber not being in her network, I recommended that patient reach out to insurance to find a physician that is covered and then call the office back with their information so a referral can be written. Patient verbalized understanding.

## 2023-01-11 ENCOUNTER — HOSPITAL ENCOUNTER (OUTPATIENT)
Dept: PHYSICAL THERAPY | Age: 58
Setting detail: THERAPIES SERIES
Discharge: HOME | End: 2023-01-11
Payer: COMMERCIAL

## 2023-01-11 PROCEDURE — G0283 ELEC STIM OTHER THAN WOUND: HCPCS

## 2023-01-11 PROCEDURE — 97110 THERAPEUTIC EXERCISES: CPT

## 2023-01-11 NOTE — PATIENT INSTRUCTIONS
Return to see Dr. Jessica Ashley in 2-4 weeks. Continue engagement in stress management strategies. Follow up with social work. STRESS MANAGEMENT STRATEGIES    Recognize Stress:  Learning to recognize when your body is reacting to stress and identifying our stressors are the first steps in managing stress. Take a Break:  A change of pace, no matter how short, gives us a new outlook on old problems. Take a vacation 20 minutes a day - enjoy a change from the daily routine. Learn to Relax:  Under stress, the muscles in our bodies stay tight. One of the most effective ways to combat tensions is deep muscle relaxation. Other techniques that produce muscle and mental relaxation are yoga, prayer, and deep breathing. Be Nutritionally Aware:  Good nutrition is vital to optimum health, and is especially critical when we are under unusual stress, or going through a major life change. Exercise Regularly:  Just like nutrition, exercise is imperative for maintaining good fitness. Whatever you enjoy - swimming, walking, jogging, aerobic exercise - will help you let off steam and work out stress. Plan your Work:  Tension and anxiety really build up when our work seems endless. Plan your work to use time and energy more efficiently. Take one thing at a time. Talk it Over: This may be the most important thing you can do for yourself if you cant get a handle on things. Find a good listener. Just as a pressure relief valve allows steam to flow out of a pressure cooker and keeps it from blowing up, so talking allows stress to flow out of the body and keeps us from blowing up. Accept What You Cannot Change:  If the problem is beyond your control at this time, try your best to accept it until you can change it. It beats spinning your wheels and getting nowhere. Evaluate Your Perceptions:  What we think is sometimes what we feel.   If we constantly think unrealistic or alarming thoughts about ourselves or other folks, then our stress level is increased.    Relax Unrealistic Standards:  When we set unrealistic standards for ourselves, we usually can never reach them.  If we do, we burn out quickly.  Set reasonable goals and standards.    Reward Yourself:  Find ways to reward yourself when you’ve completed a minor or major task.  We cannot always depend on others to recognize us, so we must develop our own reward system.    Become Assertive:  Take steps to solve problems instead of feeling helpless.  Distinguishing assertiveness (respecting others’ rights and your rights) from aggressiveness and passivity can do much to resolve internal stress.    Rediscover Humor:  Learn to laugh at yourself and your situation!    Increase Pleasurable Activities:  Take time to participate in fun, pleasurable, activities on a regular basis.

## 2023-01-12 ENCOUNTER — CARE COORDINATION (OUTPATIENT)
Dept: CARE COORDINATION | Age: 58
End: 2023-01-12

## 2023-01-12 NOTE — CARE COORDINATION
SW spoke to patient via interpretor [phone] to touch base regarding housing lists. SW clarified features listed on COA list and ensured she will not be placed into a locked dementia unit at a facility. SW to place calls to housing in desired areas to inquire about openings and have applications mailed to her. Reiterated that apartments found in [place] area and provided on the list are what were available in her price range as cost of living in that area is high. SW informed her that another message was left for the [facility], but otherwise there are not resources available to assist with rent payment. Patient asked about this worker's ability to help on a long-term basis and SW stated she will continue to assist short-term while in the program. Explained that this worker cannot help patient move into a unit, but can assist with process/resources as needed. Patient also inquired about [address] [employer] and SW explained that the wait list is only open at certain times and can take months to years before being chosen in the [employer] system. Patient stated her emergency food stamp benefit will be coming to an end. SW to continue to provide assistance with housing search and other resources as needed.       Monica HERNANDEZ, [facility]     [phone]

## 2023-01-12 NOTE — CARE COORDINATION
SW contacted below apartments on behalf of patient:    [address]  [phone] *Left VM    [address] *Left VM    [facility] [address]  [phone]  Studio units available 2/3, 3/10, *online only michael: elida. [address], [address] [place] [address]  [phone] *not taking apps due to upcoming renovations    [facility] ([facility])   [place] [address], [facility], [address]  [phone] *Left VM

## 2023-01-13 ENCOUNTER — CARE COORDINATION (OUTPATIENT)
Dept: CARE COORDINATION | Age: 58
End: 2023-01-13

## 2023-01-13 NOTE — CARE COORDINATION
Ambulatory Care Coordination Note  1/13/2023    ACC: Baron Awais RN    ACM completed outreach to patient with using  # [id number]. ACM completed follow up call with patient who said she ws not able to get Diabetic shoes. ACM will follow up with [place] [address] clinic for denial reason as patient was not able to provide this information. Patient also concerned related to OTC medications that are not covered under her insurance. ACM advised patient to call [facility] manager to discuss plan details for obtaining coverage for OTC medications. Patient said that Neurology at [facility] was not able to schedule her for an appt. Patient does have a scheduled appt for EMG at [facility]. ACM will look into other referral options for follow up for memory impairment per patient report. ACM called and spoke to [facility] who said that patient needed to have detailed documentation from PCP expalining evidence of callus formation with the diagnosis of peripheral Neuropathy. ACM will update provider as patient has an upcoming appt at the end of January. ACM placed call to [facility] Neurology who confirmed Dr. Vianey Mcallister is not accepting any new patients at this time. ACM placed call to [facility]rology with Dr. Сергей Oneil who is accepting new patient referrals. ACM confirmed with Yvonne that insurance is accepted. Yvonne said yes since patient is active with a [facility]) PCP the Remi Nixer will be accepted. ACM will have office fax new referral to [phone]. Plan:    F/U call 3 weeks  [facility] referral  Updated clinicals faxed to [place] [address] clinic for Diabetic shoes  Complete enrollment     Offered patient enrollment in the Remote Patient Monitoring (RPM) program for in-home monitoring: Patient is not eligible for RPM program.    Lab Results       None            Care Coordination Interventions    Referral from Primary Care Provider: No  Suggested Interventions and Community Resources  Social Work:  In Process (Comment: SW referral)  Other Services: In Process (Comment: [facility])  Transportation Support: In Process  Zone Management Tools: In Process (Comment: DM zone mangement tool)          Goals Addressed    None         Prior to Admission medications    Medication Sig Start Date End Date Taking? Authorizing Provider   vitamin D (ERGOCALCIFEROL) 1.25 MG (92873 UT) CAPS capsule Take 1 capsule by mouth once a week 12/16/22   Cassie Puri MD   diclofenac sodium (VOLTAREN) 1 % GEL Apply 2 g topically 4 times daily  Patient not taking: Reported on 12/16/2022 12/15/22   Joselin Espitia MD   furosemide (LASIX) 40 MG tablet TAKE 1 TABLET BY MOUTH EVERY DAY 12/12/22   Radha Vee MD   Blood Glucose Monitoring Suppl (BLOOD GLUCOSE MONITOR SYSTEM) w/Device KIT Dispense glucometer covered by patient's insurance 12/1/22   Cassie Puri MD   blood glucose monitor strips Test once daily 12/1/22   Cassie Puri MD   zoster recombinant adjuvanted vaccine [facility]) 50 MCG/0.5ML SUSR injection Inject 0.5 mLs into the muscle See Admin Instructions 1 dose now and repeat in 2-6 months  Patient not taking: Reported on 12/16/2022 12/1/22 5/30/23  Cassie Puri MD   Lancets MISC Use to test blood sugar once daily 12/1/22   Cassie Puri MD   acetaminophen (TYLENOL) 500 MG tablet Take 1 tablet by mouth 4 times daily as needed for Pain 12/1/22   Cassie Puri MD   omeprazole (PRILOSEC) 40 MG delayed release capsule Take 1 capsule by mouth every morning (before breakfast) 12/1/22   Cassie Puri MD   promethazine (PHENERGAN) 25 MG tablet Take 1 tablet by mouth every 6 hours as needed for Nausea 12/1/22   Cassie Puri MD   meloxicam (MOBIC) 15 MG tablet Take 1 tablet by mouth daily 11/14/22   Kisha Alexandre MD   gabapentin (NEURONTIN) 300 MG capsule Take 1 capsule by mouth 3 times daily for 180 days.  Intended supply: 30 days 10/24/22 4/22/23  YURY Schneider - CNP omeprazole (PRILOSEC) 20 MG delayed release capsule TAKE 1 CAPSULE BY MOUTH EVERY DAY IN THE MORNING BEFORE BREAKFAST 10/24/22   Antonio Davila, APRN - CNP       Future Appointments   Date Time Provider Leo Santiago   1/23/2023  4:30 PM MD Ozzie Tur[place]wood GYN [facility]   1/25/2023  8:30 AM Franck Harada, PT [facility]   1/25/2023 11:15 AM Joseph Dmuas MD PLASTICS/REC [facility]   1/25/2023  2:40 PM Ellyn Sahu MD  [facility] Neurology -   1/27/2023  9:30  [address], PSYD SCOT PSYCHO [facility]   1/27/2023 10:30 AM MD SCOT [facility] [place] - DYD   2/1/2023  9:15 AM Franck Harada, PT [facility] MAS PT [facility] H[facility]   2/3/2023  1:15 PM MD SCOT [facility] [place] - DYD   2/8/2023  9:15 AM Franck Harada, PT [facility] MAS PT [facility]   2/15/2023  8:30 AM Franck Harada, PT [facility] MAS PT [facility]   2/15/2023 11:00 AM [facility] MRI RM 2 [facility] MRI [facility]   4/5/2023 10:20 AM Jade Velazquez MD  SLEEP MED [facility]   12/5/2023 12:00 PM MD SCOT P[place] CARDIO [facility]

## 2023-01-14 DIAGNOSIS — M70.61 TROCHANTERIC BURSITIS OF BOTH HIPS: ICD-10-CM

## 2023-01-14 DIAGNOSIS — M25.551 BILATERAL HIP PAIN: ICD-10-CM

## 2023-01-14 DIAGNOSIS — M25.552 BILATERAL HIP PAIN: ICD-10-CM

## 2023-01-14 DIAGNOSIS — M70.62 TROCHANTERIC BURSITIS OF BOTH HIPS: ICD-10-CM

## 2023-01-16 RX ORDER — MELOXICAM 15 MG/1
TABLET ORAL
Qty: 30 TABLET | Refills: 0 | OUTPATIENT
Start: 2023-01-16

## 2023-01-19 DIAGNOSIS — K21.9 GASTROESOPHAGEAL REFLUX DISEASE, UNSPECIFIED WHETHER ESOPHAGITIS PRESENT: ICD-10-CM

## 2023-01-19 DIAGNOSIS — R10.13 EPIGASTRIC ABDOMINAL PAIN: ICD-10-CM

## 2023-01-19 DIAGNOSIS — E55.9 VITAMIN D DEFICIENCY: ICD-10-CM

## 2023-01-19 RX ORDER — OMEPRAZOLE 40 MG/1
40 CAPSULE, DELAYED RELEASE ORAL
Qty: 90 CAPSULE | Refills: 1 | Status: SHIPPED | OUTPATIENT
Start: 2023-01-19

## 2023-01-19 RX ORDER — ERGOCALCIFEROL 1.25 MG/1
CAPSULE ORAL
Qty: 4 CAPSULE | Refills: 5 | Status: SHIPPED | OUTPATIENT
Start: 2023-01-19

## 2023-01-19 RX ORDER — OMEPRAZOLE 20 MG/1
CAPSULE, DELAYED RELEASE ORAL
Qty: 90 CAPSULE | Refills: 0 | OUTPATIENT
Start: 2023-01-19

## 2023-01-19 NOTE — TELEPHONE ENCOUNTER
Medication:   Requested Prescriptions     Pending Prescriptions Disp Refills    omeprazole (PRILOSEC) 20 MG delayed release capsule [Pharmacy Med Name: OMEPRAZOLE DR 20 MG CAPSULE] 90 capsule 0     Sig: TAKE 1 CAPSULE BY MOUTH EVERY DAY IN THE MORNING BEFORE BREAKFAST        Last Filled:      Patient Phone Number: [phone] (home)     Last appt: 12/16/2022   Next appt: 1/27/2023    Last OARRS:   RX Monitoring 8/7/2020   Attestation -   Periodic Controlled Substance Monitoring Possible medication side effects, risk of tolerance/dependence & alternative treatments discussed. ;Assessed functional status. ;Obtaining appropriate analgesic effect of treatment. Chronic Pain > 50 MEDD Obtained or confirmed \"Consent for Opioid Use\" on file. Preferred Pharmacy:   [facility]pharmacy [facility] [address] [place] [address] [address] [place] [address] Ext  [address] [facility] [address]  Phone: [phone] Fax: [phone] - [address] [facility] [place], [facility] 30  [address] [facility] [address]  Phone: [phone] Fax: [phone]    [facility] [address], [facility] 72 - [address] [facility] 163 [facility] [address] [place] [address]  Phone: [phone] Fax: [phone]  Medication:   Requested Prescriptions     Pending Prescriptions Disp Refills    omeprazole (PRILOSEC) 20 MG delayed release capsule [Pharmacy Med Name: OMEPRAZOLE DR 20 MG CAPSULE] 90 capsule 0     Sig: TAKE 1 CAPSULE BY MOUTH EVERY DAY IN THE MORNING BEFORE BREAKFAST        Last Filled:      Patient Phone Number: [phone] (home)     Last appt: 12/16/2022   Next appt: 1/27/2023    Last OARRS:   RX Monitoring 8/7/2020   Attestation -   Periodic Controlled Substance Monitoring Possible medication side effects, risk of tolerance/dependence & alternative treatments discussed. ;Assessed functional status. ;Obtaining appropriate analgesic effect of treatment. Chronic Pain > 50 MEDD Obtained or confirmed \"Consent for Opioid Use\" on file.        Preferred Pharmacy:   [facility] [address] [place] [address], [address] [place] [address] [address]  Phone: [phone] Fax: [phone] - Des[address]on [address]  [address] [facility] [address]  Phone: [phone] Fax: [phone]    [id number] [address] - [address] [facility] [phone] - F [phone]  [address] [place] [address] Road [address]  Phone: [phone] Fax: [phone]

## 2023-01-19 NOTE — TELEPHONE ENCOUNTER
Medication:   Requested Prescriptions     Pending Prescriptions Disp Refills    vitamin D (ERGOCALCIFEROL) 1.25 MG (67152 UT) CAPS capsule [Pharmacy Med Name: VITAMIN D2 1.25MG(50,000 UNIT)] 12 capsule 1     Sig: TAKE 1 CAPSULE BY MOUTH ONE TIME PER WEEK     Last Filled:  12/12/2022    Last appt: 12/16/2022   Next appt: 1/27/2023    Last OARRS:   RX Monitoring 8/7/2020   Attestation -   Periodic Controlled Substance Monitoring Possible medication side effects, risk of tolerance/dependence & alternative treatments discussed. ;Assessed functional status. ;Obtaining appropriate analgesic effect of treatment. Chronic Pain > 50 MEDD Obtained or confirmed \"Consent for Opioid Use\" on file.

## 2023-01-19 NOTE — TELEPHONE ENCOUNTER
Spoke with patient. I informed her that Omeprazole  20mg was likely denied because the dose was increased to Omeprazole 40mg on 12/1/22. Prescription for Omeprazole 40mg refilled.

## 2023-01-19 NOTE — TELEPHONE ENCOUNTER
Refill requests have been sent to provider and are pending.     ----- Message from Kwame Deluna sent at 1/19/2023  4:23 PM EST -----  Subject: Medication Problem    Medication: omeprazole (PRILOSEC) 20 MG delayed release capsule  Dosage: TAKE 1 CAPSULE BY MOUTH EVERY DAY IN THE MORNING BEFORE BREAKFAST  Ordering Provider: Batool Carvalho     Question/Problem: Pharmacy said PCP rejected the refill. Please call pt to   advise.        [facility] [address]    ---------------------------------------------------------------------------  --------------  Irineo MONAHAN  [phone]; OK to leave message on voicemail  ---------------------------------------------------------------------------  --------------    SCRIPT ANSWERS  Relationship to Patient: Self

## 2023-01-20 ENCOUNTER — CARE COORDINATION (OUTPATIENT)
Dept: CARE COORDINATION | Age: 58
End: 2023-01-20

## 2023-01-20 NOTE — CARE COORDINATION
SW placed call to patient using interpretor service ([phone]) to follow up regarding housing. Spoke to patient for 1 hour 20 minutes. SW explained that affordable options are limited in [id number] [place] [address] areas given fixed income. Stated that multiple calls to apartments were made and this worker could not get an answer or application is online only. Provided contact information for [employer] website. Also provided contact number for [address] [place] [address]. Advised that waitlist is closed, similar to Stephan. Patient needed extensive explanation as to how to apply for Section 8 assistance and current barrier in waitlists being closed. Patient inquired about moving company assistance, this worker provided [employer]. Patient expressed concern that she would not be able to move prior to surgery. SW encouraged her to wait to move due to recovery and needing to remain local for appointments prior to and post surgery. Advised that this worker will collaborate with psychologist on establishing with agency such as [facility] or [address] [place] [address] for ongoing mental health and case management support to assist with community needs. SW to contact Marivel Bearden  Meeta Larson [phone] to check on status of power wheelchair repair.       Laine HERNANDEZ, [facility]     [phone]

## 2023-01-23 ENCOUNTER — OFFICE VISIT (OUTPATIENT)
Dept: GYNECOLOGY | Age: 58
End: 2023-01-23
Payer: COMMERCIAL

## 2023-01-23 VITALS
RESPIRATION RATE: 17 BRPM | DIASTOLIC BLOOD PRESSURE: 74 MMHG | SYSTOLIC BLOOD PRESSURE: 124 MMHG | BODY MASS INDEX: 46.72 KG/M2 | HEIGHT: 60 IN | WEIGHT: 238 LBS | HEART RATE: 76 BPM

## 2023-01-23 DIAGNOSIS — D21.9 FIBROID: ICD-10-CM

## 2023-01-23 DIAGNOSIS — Z78.0 MENOPAUSE: ICD-10-CM

## 2023-01-23 DIAGNOSIS — Z01.419 WELL WOMAN EXAM WITH ROUTINE GYNECOLOGICAL EXAM: Primary | ICD-10-CM

## 2023-01-23 PROCEDURE — 99396 PREV VISIT EST AGE 40-64: CPT | Performed by: OBSTETRICS & GYNECOLOGY

## 2023-01-23 PROCEDURE — G8484 FLU IMMUNIZE NO ADMIN: HCPCS | Performed by: OBSTETRICS & GYNECOLOGY

## 2023-01-23 RX ORDER — LANOLIN ALCOHOL/MO/W.PET/CERES
1000 CREAM (GRAM) TOPICAL DAILY
COMMUNITY

## 2023-01-23 RX ORDER — M-VIT,TX,IRON,MINS/CALC/FOLIC 27MG-0.4MG
1 TABLET ORAL DAILY
COMMUNITY

## 2023-01-23 RX ORDER — OXYCODONE HYDROCHLORIDE AND ACETAMINOPHEN 5; 325 MG/1; MG/1
1 TABLET ORAL EVERY 8 HOURS PRN
COMMUNITY

## 2023-01-23 ASSESSMENT — ENCOUNTER SYMPTOMS
EYES NEGATIVE: 1
RESPIRATORY NEGATIVE: 1
BACK PAIN: 1
ALLERGIC/IMMUNOLOGIC NEGATIVE: 1
GASTROINTESTINAL NEGATIVE: 1

## 2023-01-24 NOTE — PROGRESS NOTES
Subjective:      Patient ID: Kalli Willams is a 61 y.o. female. Patient is here for annual. Patient wants to know about ultrasound results. Review of Systems   Constitutional: Negative. HENT: Negative. Eyes: Negative. Respiratory: Negative. Cardiovascular: Negative. Gastrointestinal: Negative. Endocrine: Negative. Genitourinary: Negative. Musculoskeletal:  Positive for arthralgias and back pain. Skin: Negative. Allergic/Immunologic: Negative. Neurological: Negative. Hematological: Negative. Psychiatric/Behavioral: Negative.        Date of Birth 1965  Past Medical History:   Diagnosis Date    Arthritis     TAN (dyspnea on exertion)     Frequent headaches 2022    Gastroesophageal reflux disease 2020    Hx of degenerative disc disease     Hyperlipidemia     Hypertension     Morbid obesity with body mass index (BMI) of 60.0 to 69.9 in adult ([facility] 2018    Neuropathy     SHYANN (obstructive sleep apnea)     Primary osteoarthritis of right knee 2018    Type 2 diabetes mellitus without complication ([facility]     controlled with diet    Urinary incontinence      Past Surgical History:   Procedure Laterality Date    BACK SURGERY      2001    BREAST LUMPECTOMY Bilateral         DILATION AND CURETTAGE OF UTERUS N/A 2018    GASTRIC BYPASS SURGERY      SLEEVE GASTRECTOMY  10/13/2018     OB History    Para Term  AB Living   2 2 2 0 0 2   SAB IAB Ectopic Molar Multiple Live Births   0 0 0 0          # Outcome Date GA Lbr Bryan/2nd Weight Sex Delivery Anes PTL Lv   2 Term  37w0d   F Vag-Spont      1 Term  37w0d   F Vag-Spont        Social History     Socioeconomic History    Marital status:      Spouse name: Not on file    Number of children: Not on file    Years of education: Not on file    Highest education level: Not on file   Occupational History    Not on file   Tobacco Use    Smoking status: Never    Smokeless tobacco: Never Vaping Use    Vaping Use: Never used   Substance and Sexual Activity    Alcohol use: No    Drug use: No    Sexual activity: Never   Other Topics Concern    Not on file   Social History Narrative    ** Merged History Encounter **          Social Determinants of Health     Financial Resource Strain: Low Risk     Difficulty of Paying Living Expenses: Not hard at all   Food Insecurity: No Food Insecurity    Worried About Running Out of Food in the Last Year: Never true    Ran Out of Food in the Last Year: Never true   Transportation Needs: Not on file   Physical Activity: Not on file   Stress: Not on file   Social Connections: Not on file   Intimate Partner Violence: Not on file   Housing Stability: Not on file     No Known Allergies  Outpatient Medications Marked as Taking for the 1/23/23 encounter (Office Visit) with Arash Rosales MD   Medication Sig Dispense Refill    oxyCODONE-acetaminophen (PERCOCET) 5-325 MG per tablet Take 1 tablet by mouth every 8 hours as needed for Pain.      vitamin B-12 (CYANOCOBALAMIN) 1000 MCG tablet Take 1,000 mcg by mouth daily      Multiple Vitamins-Minerals (THERAPEUTIC MULTIVITAMIN-MINERALS) tablet Take 1 tablet by mouth daily      vitamin D (ERGOCALCIFEROL) 1.25 MG (57958 UT) CAPS capsule TAKE 1 CAPSULE BY MOUTH ONE TIME PER WEEK 4 capsule 5    omeprazole (PRILOSEC) 40 MG delayed release capsule Take 1 capsule by mouth every morning (before breakfast) 90 capsule 1    diclofenac sodium (VOLTAREN) 1 % GEL Apply 2 g topically 4 times daily 2 g 0    furosemide (LASIX) 40 MG tablet TAKE 1 TABLET BY MOUTH EVERY DAY 90 tablet 3    Blood Glucose Monitoring Suppl (BLOOD GLUCOSE MONITOR SYSTEM) w/Device KIT Dispense glucometer covered by patient's insurance 1 kit 0    blood glucose monitor strips Test once daily 100 strip 3    zoster recombinant adjuvanted vaccine (SHINGRIX) 50 MCG/0.5ML SUSR injection Inject 0.5 mLs into the muscle See Admin Instructions 1 dose now and repeat in 2-6 months 0.5 mL 0    Lancets MISC Use to test blood sugar once daily 100 each 3    acetaminophen (TYLENOL) 500 MG tablet Take 1 tablet by mouth 4 times daily as needed for Pain 60 tablet 2    promethazine (PHENERGAN) 25 MG tablet Take 1 tablet by mouth every 6 hours as needed for Nausea 20 tablet 0    meloxicam (MOBIC) 15 MG tablet Take 1 tablet by mouth daily 30 tablet 0    gabapentin (NEURONTIN) 300 MG capsule Take 1 capsule by mouth 3 times daily for 180 days. Intended supply: 30 days 90 capsule 1     Family History   Problem Relation Age of Onset    Diabetes Mother     Stroke Mother     Osteoarthritis Mother     Heart Disease Father     Other Father     High Blood Pressure Father     Osteoarthritis Father     Diabetes Maternal Aunt     Cancer Maternal Aunt     Diabetes Maternal Grandmother     Cancer Paternal Aunt      /74 (Site: Left Upper Arm, Position: Sitting, Cuff Size: Large Adult)   Pulse 76   Resp 17   Ht 5' (1.524 m)   Wt 238 lb (108 kg)   BMI 46.48 kg/m²       Objective:   Physical Exam  Constitutional:       General: She is not in acute distress. Appearance: Normal appearance. She is well-developed and normal weight. She is not diaphoretic. HENT:      Head: Normocephalic and atraumatic. Nose: Nose normal.      Mouth/Throat:      Mouth: Mucous membranes are moist.      Pharynx: Oropharynx is clear. Eyes:      Extraocular Movements: Extraocular movements intact. Neck:      Thyroid: No thyromegaly. Cardiovascular:      Rate and Rhythm: Normal rate and regular rhythm. Heart sounds: Normal heart sounds. No murmur heard. No friction rub. No gallop. Pulmonary:      Effort: Pulmonary effort is normal. No respiratory distress. Breath sounds: Normal breath sounds. No wheezing or rales. Chest:   Breasts:     Right: Normal. No swelling, bleeding, inverted nipple, mass, nipple discharge, skin change or tenderness.       Left: Normal. No swelling, bleeding, inverted nipple, mass, nipple discharge, skin change or tenderness. Abdominal:      General: Abdomen is flat. Bowel sounds are normal. There is no distension. Palpations: Abdomen is soft. There is no hepatomegaly or mass. Tenderness: There is no abdominal tenderness. There is no guarding or rebound. Hernia: No hernia is present. There is no hernia in the left inguinal area. Genitourinary:     General: Normal vulva. Exam position: Lithotomy position. Pubic Area: No rash. Labia:         Right: No rash, tenderness, lesion or injury. Left: No rash, tenderness, lesion or injury. Urethra: No prolapse, urethral pain, urethral swelling or urethral lesion. Vagina: Normal. No signs of injury and foreign body. No vaginal discharge, erythema, tenderness or bleeding. Cervix: No cervical motion tenderness, discharge, friability, lesion, erythema, cervical bleeding or eversion. Uterus: Not deviated, not enlarged, not fixed, not tender and no uterine prolapse. Adnexa:         Right: No mass, tenderness or fullness. Left: No mass, tenderness or fullness. Rectum: Normal. Guaiac result negative. No mass, tenderness, anal fissure, external hemorrhoid or internal hemorrhoid. Normal anal tone. Comments: Normal urethral meatus, nl urethra, nl bladder. Musculoskeletal:         General: No swelling, tenderness, deformity or signs of injury. Normal range of motion. Cervical back: Normal range of motion and neck supple. No rigidity. Lymphadenopathy:      Cervical: No cervical adenopathy. Lower Body: No right inguinal adenopathy. No left inguinal adenopathy. Skin:     General: Skin is warm and dry. Coloration: Skin is not jaundiced or pale. Findings: No bruising, erythema, lesion or rash. Neurological:      General: No focal deficit present. Mental Status: She is alert and oriented to person, place, and time.       Deep Tendon Reflexes: Reflexes are normal and symmetric.   Psychiatric:         Mood and Affect: Mood normal.         Behavior: Behavior normal.         Thought Content: Thought content normal.         Judgment: Judgment normal.       Assessment:      Annual  Menopause  fibroid      Plan:      Pap, calcium, exercise, mammogram, hemocult negative  And 3. Discussed fibroid stable and slightly smaller, endometrial lining normal. Told patient to watch for any bleeding or changes in symptoms.         Manisha Rao MD

## 2023-01-25 ENCOUNTER — OFFICE VISIT (OUTPATIENT)
Dept: SURGERY | Age: 58
End: 2023-01-25
Payer: COMMERCIAL

## 2023-01-25 ENCOUNTER — CARE COORDINATION (OUTPATIENT)
Dept: CARE COORDINATION | Age: 58
End: 2023-01-25

## 2023-01-25 ENCOUNTER — PROCEDURE VISIT (OUTPATIENT)
Dept: NEUROLOGY | Age: 58
End: 2023-01-25
Payer: COMMERCIAL

## 2023-01-25 ENCOUNTER — APPOINTMENT (OUTPATIENT)
Dept: PHYSICAL THERAPY | Age: 58
End: 2023-01-25
Payer: COMMERCIAL

## 2023-01-25 VITALS
SYSTOLIC BLOOD PRESSURE: 121 MMHG | OXYGEN SATURATION: 97 % | HEIGHT: 67 IN | WEIGHT: 247 LBS | DIASTOLIC BLOOD PRESSURE: 76 MMHG | TEMPERATURE: 99 F | HEART RATE: 61 BPM | BODY MASS INDEX: 38.77 KG/M2

## 2023-01-25 DIAGNOSIS — E66.9 CLASS 2 OBESITY WITH BODY MASS INDEX (BMI) OF 39.0 TO 39.9 IN ADULT, UNSPECIFIED OBESITY TYPE, UNSPECIFIED WHETHER SERIOUS COMORBIDITY PRESENT: ICD-10-CM

## 2023-01-25 DIAGNOSIS — R20.0 BILATERAL HAND NUMBNESS: Primary | ICD-10-CM

## 2023-01-25 DIAGNOSIS — L98.7 EXCESS SKIN OF ABDOMEN: Primary | ICD-10-CM

## 2023-01-25 PROCEDURE — 95911 NRV CNDJ TEST 9-10 STUDIES: CPT | Performed by: PSYCHIATRY & NEUROLOGY

## 2023-01-25 PROCEDURE — G8427 DOCREV CUR MEDS BY ELIG CLIN: HCPCS | Performed by: SURGERY

## 2023-01-25 PROCEDURE — G8484 FLU IMMUNIZE NO ADMIN: HCPCS | Performed by: SURGERY

## 2023-01-25 PROCEDURE — 99214 OFFICE O/P EST MOD 30 MIN: CPT | Performed by: SURGERY

## 2023-01-25 PROCEDURE — 1036F TOBACCO NON-USER: CPT | Performed by: SURGERY

## 2023-01-25 PROCEDURE — G8417 CALC BMI ABV UP PARAM F/U: HCPCS | Performed by: SURGERY

## 2023-01-25 PROCEDURE — 3017F COLORECTAL CA SCREEN DOC REV: CPT | Performed by: SURGERY

## 2023-01-25 PROCEDURE — 95886 MUSC TEST DONE W/N TEST COMP: CPT | Performed by: PSYCHIATRY & NEUROLOGY

## 2023-01-25 NOTE — CARE COORDINATION
NORMAN placed call to patient using interpretor service. Advised her that psychologist will discuss community mental health options at upcoming appointment;  will be able to assist her with ongoing search for housing. Encouraged her again to wait to move until after procedure due to follow-ups. Patient wishes to seek Section 8 in [place], reiterated the aforementioned. NORMAN stated she is still waiting on a call back from [facility]  regarding power wheelchair repair/replacement. Received call back from a different  stating someone is covering for primary CM Belinda Hernandez [phone]. Was instructed to call Robe Black [phone]. Spoke to Sharan who reviewed patient's chart notes and could not see any information stated regarding status of chair/what is broken. NORMAN spoke to patient who stated she obtained the chair in [place], it was inspected and she was told it would cost $2500 to repair the chair. Sharan advised to try to obtain new chair given this information. NORMAN confirmed with Latonya Turpin [phone] that they will send someone to patient's home to complete chair evaluation once order is received. Can fax order and any clinical notes to [phone]. Notes pertaining to patient's diagnoses which impair her mobility will be helpful. If possible, patient requests a chair with adjustable head and foot rests due to physical limitations. Will route to MD and MAGGIE.      Stu HERNANDEZ, [facility]     [phone]

## 2023-01-25 NOTE — PROGRESS NOTES
[address]     Consultation requested by: Hugh Marie MD      CC: Body contouring     CONSULTATION WITH     HPI: 53 y.o. female with a PMHx as delineated below who presents in consultation for body contouring. She has undergone a sleeve gastrectomy (10/18) at [facility] losing approximately 200 lbs. Since that time, she has had problems with her excess skin. She has had rashes requiring prescription creams from her PCP with intermittent benefit. She notes that exercise is difficult as she is having some pain from the pannus, especially with the weight. She additionally notes problems with excess skin from her arms as well as medial thighs as well. Since her last evaluation, she was lost to follow-up due to [place]. She gained approximately 20 lbs and states that she had previously been down to 200 lbs.      Bariatric surgery: Yes / date: 10/18 (Type: sleeve gastrectomy)     Max weight (lbs): 420  Lowest weight (lbs): 224  Current (lbs): 247 (224)  Weight change in the past 3 months: Yes  Max BMI: 59  Current BMI: 35.9     Last Mammogram: 1/20     Current bra size: 40D  Desired bra size: More full  Breast feeding: no future plans        History of DVT/PE: No  Excess skin cover genitals: Yes     Previous body contouring procedures: No             Smoking: No       Pregnancy / Miscarriage: 2/0     Past Medical History        Past Medical History:   Diagnosis Date    Arthritis      TAN (dyspnea on exertion)      Gastroesophageal reflux disease 1/28/2020    Hyperlipidemia      Hypertension      Morbid obesity with body mass index (BMI) of 60.0 to 69.9 in adult ([address] 7/2/2018    Neuropathy      SHYANN (obstructive sleep apnea)      Primary osteoarthritis of right knee 7/2/2018    Type 2 diabetes mellitus without complication ([facility]       controlled with diet    Urinary incontinence        HgbA1c 5.1 (12/22)     Past Surgical History         Past Surgical History: Procedure Laterality Date    BACK SURGERY         2001    BREAST LUMPECTOMY Bilateral       2015    DILATION AND CURETTAGE OF UTERUS N/A 08/30/2018    GASTRIC BYPASS SURGERY        SLEEVE GASTRECTOMY   10/13/2018         Social History               Socioeconomic History    Marital status:        Spouse name: Not on file    Number of children: Not on file    Years of education: Not on file    Highest education level: Not on file   Occupational History    Not on file   Social Needs    Financial resource strain: Not on file    Food insecurity:       Worry: Not on file       Inability: Not on file    Transportation needs:       Medical: Not on file       Non-medical: Not on file   Tobacco Use    Smoking status: Never Smoker    Smokeless tobacco: Never Used   Substance and Sexual Activity    Alcohol use: No    Drug use: No    Sexual activity: Never   Lifestyle    Physical activity:       Days per week: Not on file       Minutes per session: Not on file    Stress: Not on file   Relationships    Social connections:       Talks on phone: Not on file       Gets together: Not on file       Attends Faith service: Not on file       Active member of club or organization: Not on file       Attends meetings of clubs or organizations: Not on file       Relationship status: Not on file    Intimate partner violence:       Fear of current or ex partner: Not on file       Emotionally abused: Not on file       Physically abused: Not on file       Forced sexual activity: Not on file   Other Topics Concern    Not on file   Social History Narrative     ** Merged History Encounter **               Family History         Family History   Problem Relation Age of Onset    Diabetes Mother      Stroke Mother      Osteoarthritis Mother      Heart Disease Father      Other Father      High Blood Pressure Father      Osteoarthritis Father      Diabetes Maternal Aunt      Cancer Maternal Aunt      Diabetes Maternal Grandmother Cancer Paternal Aunt              Allergy: No Known Allergies     ROS History obtained from the patient  General ROS: negative  Psychological ROS: negative  Ophthalmic ROS: negative  Neurological ROS: negative  ENT ROS: negative  Allergy and Immunology ROS: negative  Hematological and Lymphatic ROS: negative  Endocrine ROS: negative  Respiratory ROS: negative  Cardiovascular ROS: negative  Gastrointestinal ROS: See HPI  Genito-Urinary ROS: negative  Musculoskeletal ROS: negative   Skin ROS: See HPI.      EXAM     BP (!) 148/84   Pulse 78   Temp 98 °F (36.7 °C)   Resp 16   Ht 5' 7\" (1.702 m)   Wt 229 lb (103.9 kg)   SpO2 99%   BMI 35.87 kg/m²      GEN: NAD, pleasant, obese/healthy  CVS: RRR  PULM: No respiratory distress  HEENT: PERRLA/EOMI; dentition fair, hearing appears within normal limits  NECK: Supple with trachea in midline, no masses  ABD: soft/NT/obese               Excess skin noted below pubis when standing                      Saeed noted from the lower abdomen extending onto the thigh  EXT: No lymphedema noted, but grade 3C excess skin in the upper arms and excess skin in both the medial and lateral thighs  NEURO: No focal deficits, no obvious CN deficits  BREAST: Left larger than Right              R                      Ptosis ndgndrndanddndend:nd nd2nd Palpable masses: No                                      Nipple retraction: No                                      Palpable axillary lymphadenopathy: No                                      SN-N: 29.6 cm                                      N-IMF: 9.4 cm                                      Breast width: 16 cm                                      Nipple asymmetry                            L                      Ptosis ndgndrndanddndend:nd nd2nd Palpable masses: No                                      Nipple retraction: No                                      Palpable axillary lymphadenopathy: No SN-N: 31.9 cm                                      N-IMF: 10.1 cm                                      Breast width: 16 cm                                      Nipple asymmetry                IMP: 53 y.o. female with panniculitis, breast ptosis, and excess skin   PLAN: Would benefit from panniculectomy for functional improvement with concomittant abdominoplasty with rectus plication and umbilical transposition. At the same time, would benefit from mastopexy +/- augmentation. Once she has healed from this, would perform brachioplasty with staged thighplasties. She is going to work to decrease her weight back down to goal (~ BMI 30). She asked for medical assistance, thus she was referred to Dr. Priscilla Rayo (cannot see Dr. Amber Fuller as she does not have a computer) for medical weight loss. She will return in 6 months and once the above criteria have been met, will submit to insurance for her first stage of contouring. The complexity of body contouring procedures and wound healing physiology were discussed with the patient. She was counseled on ideal medical optimization (nutrition, weight stability, etc.). We also discussed the pros & cons of staging for multiple procedures including controlling tension from various sites and postoperative care managment. Clinical photos were obtained. The risks, benefits, alternatives, outcomes, personnel involved were discussed and all questions were answered in a satisfactory manner according to the patient. Specifically,the risks including, but not limited to: bleeding possibly requiring transfusion orreoperation, infection, seroma, nonhealing of wounds, poor cosmetic outcome, scarring, VTE (DVT/PE), and death were discussed.        Fuentes Rico MD  [address] [place] [address] Reconstructive Surgery  [phone]  01/25/23

## 2023-01-25 NOTE — PROGRESS NOTES
Sherin Bae M.D. [facility]) Physicians/[facility]  Board Certified in [address], [address] [place] [address]    EMG / NERVE CONDUCTION STUDY      PATIENT:  Farrah Rivers       DATE OF EM23     YOB: 1965       REASON FOR EMG:   Bilateral arm numbness      REFERRING PHYSICIAN:  MD Arnoldo Mackay [facility]  [address]     SUMMARY:   The right median sensory nerve study had a prolonged distal latency. The left median sensory nerve study was normal.  Bilateral median motor nerve studies were normal.  Bilateral ulnar motor and sensory nerve studies were normal.  The left radial sensory nerve study was normal.  Needle EMG of several muscles in both upper extremities was normal.      CLINICAL DIAGNOSIS:  Unspecified neuropathy        EMG RESULTS:     This patient has a mild right median nerve lesion at the wrist.  (Carpal tunnel syndrome). The left median sensory distal latency is borderline and hence a very early and mild carpal tunnel syndrome on the left cannot be totally ruled out.        ---------------------------------------------  Sherin Bae M.D.   Electromyographer / Neurologist

## 2023-01-25 NOTE — Clinical Note
Very nice patient who would benefit from medical weight loss after her previous bariatric surgery to get her back down to her goal of 200 lbs. Will see her again in 6 months to discuss contouring. Thanks!  Axel Jeff

## 2023-01-27 ENCOUNTER — OFFICE VISIT (OUTPATIENT)
Dept: PRIMARY CARE CLINIC | Age: 58
End: 2023-01-27
Payer: COMMERCIAL

## 2023-01-27 ENCOUNTER — TELEPHONE (OUTPATIENT)
Dept: PRIMARY CARE CLINIC | Age: 58
End: 2023-01-27

## 2023-01-27 ENCOUNTER — CARE COORDINATION (OUTPATIENT)
Dept: CARE COORDINATION | Age: 58
End: 2023-01-27

## 2023-01-27 ENCOUNTER — OFFICE VISIT (OUTPATIENT)
Dept: PSYCHOLOGY | Age: 58
End: 2023-01-27
Payer: COMMERCIAL

## 2023-01-27 VITALS
HEART RATE: 54 BPM | SYSTOLIC BLOOD PRESSURE: 124 MMHG | OXYGEN SATURATION: 98 % | BODY MASS INDEX: 39.21 KG/M2 | WEIGHT: 244 LBS | HEIGHT: 66 IN | DIASTOLIC BLOOD PRESSURE: 84 MMHG

## 2023-01-27 DIAGNOSIS — E55.9 VITAMIN D DEFICIENCY: ICD-10-CM

## 2023-01-27 DIAGNOSIS — R19.7 DIARRHEA, UNSPECIFIED TYPE: ICD-10-CM

## 2023-01-27 DIAGNOSIS — E11.42 DIABETIC POLYNEUROPATHY ASSOCIATED WITH TYPE 2 DIABETES MELLITUS (HCC): ICD-10-CM

## 2023-01-27 DIAGNOSIS — F41.9 ANXIETY: ICD-10-CM

## 2023-01-27 DIAGNOSIS — K21.9 GASTROESOPHAGEAL REFLUX DISEASE, UNSPECIFIED WHETHER ESOPHAGITIS PRESENT: ICD-10-CM

## 2023-01-27 DIAGNOSIS — R45.4 ANGER: Primary | ICD-10-CM

## 2023-01-27 DIAGNOSIS — K59.00 CONSTIPATION, UNSPECIFIED CONSTIPATION TYPE: Primary | ICD-10-CM

## 2023-01-27 PROCEDURE — 90834 PSYTX W PT 45 MINUTES: CPT

## 2023-01-27 PROCEDURE — 99214 OFFICE O/P EST MOD 30 MIN: CPT | Performed by: INTERNAL MEDICINE

## 2023-01-27 PROCEDURE — 1036F TOBACCO NON-USER: CPT

## 2023-01-27 PROCEDURE — 1036F TOBACCO NON-USER: CPT | Performed by: INTERNAL MEDICINE

## 2023-01-27 PROCEDURE — G8417 CALC BMI ABV UP PARAM F/U: HCPCS | Performed by: INTERNAL MEDICINE

## 2023-01-27 PROCEDURE — 2022F DILAT RTA XM EVC RTNOPTHY: CPT | Performed by: INTERNAL MEDICINE

## 2023-01-27 PROCEDURE — G8482 FLU IMMUNIZE ORDER/ADMIN: HCPCS | Performed by: INTERNAL MEDICINE

## 2023-01-27 PROCEDURE — 3046F HEMOGLOBIN A1C LEVEL >9.0%: CPT | Performed by: INTERNAL MEDICINE

## 2023-01-27 PROCEDURE — 3017F COLORECTAL CA SCREEN DOC REV: CPT | Performed by: INTERNAL MEDICINE

## 2023-01-27 PROCEDURE — G8427 DOCREV CUR MEDS BY ELIG CLIN: HCPCS | Performed by: INTERNAL MEDICINE

## 2023-01-27 RX ORDER — ERGOCALCIFEROL 1.25 MG/1
50000 CAPSULE ORAL WEEKLY
Qty: 4 CAPSULE | Refills: 5 | Status: SHIPPED | OUTPATIENT
Start: 2023-01-27 | End: 2023-02-15 | Stop reason: SDUPTHER

## 2023-01-27 RX ORDER — OMEPRAZOLE 20 MG/1
20 CAPSULE, DELAYED RELEASE ORAL 2 TIMES DAILY
Qty: 60 CAPSULE | Refills: 5 | Status: SHIPPED | OUTPATIENT
Start: 2023-01-27

## 2023-01-27 RX ORDER — LOPERAMIDE HYDROCHLORIDE 2 MG/1
2 CAPSULE ORAL 4 TIMES DAILY PRN
Qty: 30 CAPSULE | Refills: 0 | Status: SHIPPED | OUTPATIENT
Start: 2023-01-27

## 2023-01-27 RX ORDER — DOCUSATE SODIUM 100 MG/1
100 CAPSULE, LIQUID FILLED ORAL 2 TIMES DAILY PRN
Qty: 60 CAPSULE | Refills: 0 | Status: SHIPPED | OUTPATIENT
Start: 2023-01-27

## 2023-01-27 ASSESSMENT — PATIENT HEALTH QUESTIONNAIRE - PHQ9
SUM OF ALL RESPONSES TO PHQ9 QUESTIONS 1 & 2: 0
2. FEELING DOWN, DEPRESSED OR HOPELESS: 0
SUM OF ALL RESPONSES TO PHQ QUESTIONS 1-9: 0
1. LITTLE INTEREST OR PLEASURE IN DOING THINGS: 0
SUM OF ALL RESPONSES TO PHQ QUESTIONS 1-9: 0

## 2023-01-27 NOTE — PROGRESS NOTES
Behavioral Health Consultation  CUAUHTEMOC PERLA Psy.D. Psychologist  1/27/2023  9:30 AM EST      Time spent with Patient: 40 minutes  This is patient's fourth  [facility] appointment. Reason for Consult:    Chief Complaint   Patient presents with    Other     Anger     Anxiety     Referring Provider: Rachael Crawford MD  [place] [address] [address] [facility]  [phone]    Feedback given to PCP. Arabic : Robina Horn #[id number]    S:  Pt reported \"no progress\" since our last visit. Noted she has not found apartments that are low income and wheelchair accessible. Electric wheelchair needs maintenance and she has not found anyone to fix it yet. Mood has been worse. Reported her surgery is scheduled for next month. Fearful about lack of assistance during recovery period. When asked what helps with mood, she initially noted that nothing helped. Reported she does not have time for fun due to doctor's visits and applying for services. However, she reported talking with children helps mood some. She speaks with them mostly on the weekends. Has thoughts about moving to [place] to be closer to daughter who lives there. Pt finds hope through her relationship with God. Discussed benefits of long-term behavioral health services and care/case management. Provided information on [facility] and encouraged her to follow up with them.      O:  MSE:    Appearance: adequate hygiene  Attitude: cooperative and friendly  Consciousness: alert  Orientation: oriented to person, place, time, general circumstance  Memory: recent and remote memory intact  Attention/Concentration: intact during session  Psychomotor Activity:normal  Eye Contact: normal  Speech: normal rate and volume, well-articulated  Mood: \"getting worse\"  Affect:  mildly dysphoric and frustrated  Perception: within normal limits  Thought Content:  depressive content at times  Thought Process: logical, coherent and goal-directed  Insight: good  Judgment: intact  Ability to understand instructions: Yes  Ability to respond meaningfully: Yes  Morbid Ideation:  pt reported if she begins feeling hopeless in the future, she will quit taking care of self by not taking medications, going to doctor's appointments    Suicide Assessment: no suicidal ideation, plan, or intent  Homicidal Ideation: no    Discussed safety interventions to deal with suicidal thoughts or if suicidal thoughts worsen including going to ER, calling 911, calling crisis hotlines. Pt identified her primary protective factor as her relationship with God. History:    Medications:   Current Outpatient Medications   Medication Sig Dispense Refill    docusate sodium (COLACE) 100 MG capsule Take 1 capsule by mouth 2 times daily as needed for Constipation As needed for constipation 60 capsule 0    loperamide (RA ANTI-DIARRHEAL) 2 MG capsule Take 1 capsule by mouth 4 times daily as needed for Diarrhea 30 capsule 0    omeprazole (PRILOSEC) 20 MG delayed release capsule Take 1 capsule by mouth in the morning and 1 capsule in the evening. 60 capsule 5    vitamin D (ERGOCALCIFEROL) 1.25 MG (41960 UT) CAPS capsule Take 1 capsule by mouth once a week 4 capsule 5    oxyCODONE-acetaminophen (PERCOCET) 5-325 MG per tablet Take 1 tablet by mouth every 8 hours as needed for Pain.  (Patient not taking: Reported on 1/27/2023)      vitamin B-12 (CYANOCOBALAMIN) 1000 MCG tablet Take 1,000 mcg by mouth daily      Multiple Vitamins-Minerals (THERAPEUTIC MULTIVITAMIN-MINERALS) tablet Take 1 tablet by mouth daily      vitamin D (ERGOCALCIFEROL) 1.25 MG (67033 UT) CAPS capsule TAKE 1 CAPSULE BY MOUTH ONE TIME PER WEEK 4 capsule 5    omeprazole (PRILOSEC) 40 MG delayed release capsule Take 1 capsule by mouth every morning (before breakfast) 90 capsule 1    diclofenac sodium (VOLTAREN) 1 % GEL Apply 2 g topically 4 times daily 2 g 0    furosemide (LASIX) 40 MG tablet TAKE 1 TABLET BY MOUTH EVERY DAY 90 tablet 3    Blood Glucose Monitoring Suppl (BLOOD GLUCOSE MONITOR SYSTEM) w/Device KIT Dispense glucometer covered by patient's insurance 1 kit 0    blood glucose monitor strips Test once daily 100 strip 3    zoster recombinant adjuvanted vaccine (SHINGRIX) 50 MCG/0.5ML SUSR injection Inject 0.5 mLs into the muscle See Admin Instructions 1 dose now and repeat in 2-6 months 0.5 mL 0    Lancets MISC Use to test blood sugar once daily 100 each 3    acetaminophen (TYLENOL) 500 MG tablet Take 1 tablet by mouth 4 times daily as needed for Pain 60 tablet 2    promethazine (PHENERGAN) 25 MG tablet Take 1 tablet by mouth every 6 hours as needed for Nausea 20 tablet 0    meloxicam (MOBIC) 15 MG tablet Take 1 tablet by mouth daily 30 tablet 0    gabapentin (NEURONTIN) 300 MG capsule Take 1 capsule by mouth 3 times daily for 180 days. Intended supply: 30 days 90 capsule 1     No current facility-administered medications for this visit.      Social History:   Social History     Socioeconomic History    Marital status:      Spouse name: Not on file    Number of children: Not on file    Years of education: Not on file    Highest education level: Not on file   Occupational History    Not on file   Tobacco Use    Smoking status: Never    Smokeless tobacco: Never   Vaping Use    Vaping Use: Never used   Substance and Sexual Activity    Alcohol use: No    Drug use: No    Sexual activity: Never   Other Topics Concern    Not on file   Social History Narrative    ** Merged History Encounter **          Social Determinants of Health     Financial Resource Strain: Low Risk     Difficulty of Paying Living Expenses: Not hard at all   Food Insecurity: No Food Insecurity    Worried About Running Out of Food in the Last Year: Never true    Ran Out of Food in the Last Year: Never true   Transportation Needs: Not on file   Physical Activity: Not on file   Stress: Not on file   Social Connections: Not on file   Intimate Partner Violence: Not on file   Housing Stability: Not on file     TOBACCO:   reports that she has never smoked. She has never used smokeless tobacco.  ETOH:   reports no history of alcohol use. Family History:   Family History   Problem Relation Age of Onset    Diabetes Mother     Stroke Mother     Osteoarthritis Mother     Heart Disease Father     Other Father     High Blood Pressure Father     Osteoarthritis Father     Diabetes Maternal Aunt     Cancer Maternal Aunt     Diabetes Maternal Grandmother     Cancer Paternal Aunt        A:  Patient engaged and cooperative. Insight and motivation are good. Did not administer PHQ9 and GAD7. PHQ-9 Questionaire 1/27/2023 12/1/2022 6/15/2021 1/9/2020 2/28/2019 6/22/2018 3/6/2018   Little interest or pleasure in doing things 0 0 0 0 0 1 3   Feeling down, depressed, or hopeless 0 0 0 0 0 1 3   Trouble falling or staying asleep, or sleeping too much - 0 - - - - 3   Feeling tired or having little energy - 0 - - - - 3   Poor appetite or overeating - 0 - - - - 3   Feeling bad about yourself - or that you are a failure or have let yourself or your family down - 0 - - - - 3   Trouble concentrating on things, such as reading the newspaper or watching television - 0 - - - - 3   Moving or speaking so slowly that other people could have noticed.  Or the opposite - being so fidgety or restless that you have been moving around a lot more than usual - 0 - - - - 3   Thoughts that you would be better off dead, or of hurting yourself in some way - 0 - - - - 3   PHQ-9 Total Score 0 0 0 0 0 2 27   If you checked off any problems, how difficult have these problems made it for you to do your work, take care of things at home, or get along with other people? - 0 - - - - 3     PHQ Scores 1/27/2023 12/1/2022 6/15/2021 1/9/2020 2/28/2019 6/22/2018 3/6/2018   PHQ2 Score 0 0 0 0 0 2 6   PHQ9 Score 0 0 0 0 0 2 27       Interpretation of Total Score Depression Severity: 1-4 = Minimal depression, 5-9 = Mild depression, 10-14 = Moderate depression, 15-19 = Moderately severe depression, 20-27 = Severe depression       Diagnosis:    1. Anger    2. Anxiety        Past Medical History      Diagnosis Date    Arthritis     TAN (dyspnea on exertion)     Frequent headaches 12/13/2022    Gastroesophageal reflux disease 01/28/2020    Hx of degenerative disc disease     Hyperlipidemia     Hypertension     Morbid obesity with body mass index (BMI) of 60.0 to 69.9 in adult [facility]) 07/02/2018    Neuropathy     SHYANN (obstructive sleep apnea)     Primary osteoarthritis of right knee 07/02/2018    Type 2 diabetes mellitus without complication ([facility] [address]     controlled with diet    Urinary incontinence        Plan:  Pt interventions:  Trained in strategies for increasing balanced thinking, Trained in relaxation strategies, Discussed self-care (sleep, nutrition, rewarding activities, social support, exercise), Discussed benefits of referral for specialty care, Supportive techniques, and Emphasized self-care as important for managing overall health    Pt Behavioral Change Plan:   See Pt Instructions.

## 2023-01-27 NOTE — CARE COORDINATION
Received voicemail from [facility] case  [id number] [phone] inquiring about contact information for patient's physical therapy office; she has been working on trying to get evaluation documentation completed in order for patient to obtain power WC. Stated she has spoken with PCP office, but needs appropriate paperwork for mobility assessment. Returned call and left information on Summer's secure voicemail.  Provided contact number for [facility] office and encouraged her to call back for additional coordination of care to obtain [phone] [facility]     [phone]

## 2023-01-27 NOTE — TELEPHONE ENCOUNTER
Caro starr Antler called and needs a script for a power wheelchair.  Pls send script to [facility] Fax # [phone]

## 2023-01-27 NOTE — PROGRESS NOTES
Farrah EGAN Omarangelita   Date ofBirth:  1965    Date of Visit:  1/27/2023    Chief Complaint   Patient presents with    Results     Pt want to discuss about different doctor she saw( gyn,neurologist ,Josephine0 E Ronaldo Robison etc... Constipation     due to percocet med       hospitals  Video  [place]  ID #[id number]     Has constipation since starting Percocet. Pain Management will change Percocet to another medication. Having a BM but with pain and difficulty hard to have. Urge to have BM but couldn't push it out. Pain while having and after BM  Sometimes liquid diarrhea. Past 2 days 4-5 liquid BM's. Patient was unable to see GI because her ride came 10 minutes late and by the time she got to the clinic it was too late to see the Doctor. Wheelchair delay to fix it. Needs new Electric wheelchair that is adjustable  Bruna from [facility] faxed paperwork  Patient needs appointment for mobility evaluation. Needs foot exam for diabetic shoes. Patient states she has foot care once a month and has calluses cared for. Patient has GERD. Patient need refill for Omeprazole. Patient needs refills for vitamin D sent to [address] [place] [address]. Review of Systems   Constitutional:  Negative for appetite change, chills, fatigue and fever. HENT:  Negative for congestion, postnasal drip, rhinorrhea, sinus pressure, sore throat and trouble swallowing. Eyes:  Negative for visual disturbance. Respiratory:  Negative for cough, chest tightness, shortness of breath and wheezing. Cardiovascular:  Negative for chest pain, palpitations and leg swelling. Gastrointestinal:  Positive for constipation, diarrhea, nausea and vomiting. Negative for abdominal distention, abdominal pain and blood in stool. Genitourinary:  Negative for dysuria, frequency and hematuria. Musculoskeletal:  Positive for arthralgias, back pain and gait problem. Negative for myalgias. Skin:  Negative for rash and wound. Neurological:  Positive for numbness. Negative for dizziness, tremors, syncope, weakness, light-headedness and headaches. Psychiatric/Behavioral:  Negative for dysphoric mood and sleep disturbance. The patient is not nervous/anxious. No Known Allergies  Outpatient Medications Marked as Taking for the 1/27/23 encounter (Office Visit) with Dorys Reid MD   Medication Sig Dispense Refill    docusate sodium (COLACE) 100 MG capsule Take 1 capsule by mouth 2 times daily as needed for Constipation As needed for constipation 60 capsule 0    loperamide (RA ANTI-DIARRHEAL) 2 MG capsule Take 1 capsule by mouth 4 times daily as needed for Diarrhea 30 capsule 0    omeprazole (PRILOSEC) 20 MG delayed release capsule Take 1 capsule by mouth in the morning and 1 capsule in the evening.  60 capsule 5    [DISCONTINUED] vitamin D (ERGOCALCIFEROL) 1.25 MG (37664 UT) CAPS capsule Take 1 capsule by mouth once a week 4 capsule 5    vitamin B-12 (CYANOCOBALAMIN) 1000 MCG tablet Take 1,000 mcg by mouth daily      Multiple Vitamins-Minerals (THERAPEUTIC MULTIVITAMIN-MINERALS) tablet Take 1 tablet by mouth daily      vitamin D (ERGOCALCIFEROL) 1.25 MG (93925 UT) CAPS capsule TAKE 1 CAPSULE BY MOUTH ONE TIME PER WEEK 4 capsule 5    [DISCONTINUED] omeprazole (PRILOSEC) 40 MG delayed release capsule Take 1 capsule by mouth every morning (before breakfast) 90 capsule 1    diclofenac sodium (VOLTAREN) 1 % GEL Apply 2 g topically 4 times daily 2 g 0    furosemide (LASIX) 40 MG tablet TAKE 1 TABLET BY MOUTH EVERY DAY 90 tablet 3    Blood Glucose Monitoring Suppl (BLOOD GLUCOSE MONITOR SYSTEM) w/Device KIT Dispense glucometer covered by patient's insurance 1 kit 0    blood glucose monitor strips Test once daily 100 strip 3    zoster recombinant adjuvanted vaccine (SHINGRIX) 50 MCG/0.5ML SUSR injection Inject 0.5 mLs into the muscle See Admin Instructions 1 dose now and repeat in 2-6 months 0.5 mL 0    Lancets MISC Use to test blood sugar once daily 100 each 3 acetaminophen (TYLENOL) 500 MG tablet Take 1 tablet by mouth 4 times daily as needed for Pain 60 tablet 2    [DISCONTINUED] promethazine (PHENERGAN) 25 MG tablet Take 1 tablet by mouth every 6 hours as needed for Nausea 20 tablet 0    meloxicam (MOBIC) 15 MG tablet Take 1 tablet by mouth daily 30 tablet 0    gabapentin (NEURONTIN) 300 MG capsule Take 1 capsule by mouth 3 times daily for 180 days. Intended supply: 30 days 90 capsule 1         Vitals:    01/27/23 1036   BP: 124/84   Site: Right Upper Arm   Position: Sitting   Cuff Size: Medium Adult   Pulse: 54   SpO2: 98%   Weight: 244 lb (110.7 kg)   Height: 5' 6\" (1.676 m)     Body mass index is 39.38 kg/m². Physical Exam  Nursing note reviewed. Constitutional:       General: She is not in acute distress. Appearance: Normal appearance. She is well-developed. Eyes:      General: Lids are normal.      Conjunctiva/sclera: Conjunctivae normal.      Pupils: Pupils are equal, round, and reactive to light. Neck:      Thyroid: No thyromegaly. Vascular: No carotid bruit. Cardiovascular:      Rate and Rhythm: Normal rate and regular rhythm. Heart sounds: Normal heart sounds, S1 normal and S2 normal. No murmur heard. Pulmonary:      Effort: Pulmonary effort is normal.      Breath sounds: Normal breath sounds. No wheezing, rhonchi or rales. Abdominal:      General: Bowel sounds are normal. There is no distension. Palpations: Abdomen is soft. Tenderness: There is no abdominal tenderness. There is no rebound. Lymphadenopathy:      Head:      Right side of head: No submandibular adenopathy. Left side of head: No submandibular adenopathy. Skin:     Comments: No foot ulcers, calluses have been treated and no significant callus on exam   Neurological:      Comments: Bilateral foot monofilament exam abnormal and patient unable to feel monofilament         No results found for this visit on 01/27/23.   Lab Review   Abstract on 12/12/2022 Component Date Value    Diabetic Retinopathy 12/08/2022 Positive    Orders Only on 12/05/2022   Component Date Value    Vitamin B1,Whole Blood 12/05/2022 70    Orders Only on 12/05/2022   Component Date Value    Cholesterol, Total 12/05/2022 212 (A)     Triglycerides 12/05/2022 53     HDL 12/05/2022 100 (A)     LDL Calculated 12/05/2022 101 (A)     VLDL Cholesterol Calcula* 12/05/2022 11    Orders Only on 12/01/2022   Component Date Value    Vitamin A 12/01/2022 0.57     Vitamin A, Interp 12/01/2022 Normal     RETINYL PALMITATE 12/01/2022 0.02    Orders Only on 12/01/2022   Component Date Value    Folate 12/01/2022 7.30    Orders Only on 12/01/2022   Component Date Value    Rejected Test 12/01/2022 80,388     Reason for Rejection 12/01/2022 see below    Orders Only on 12/01/2022   Component Date Value    Lipase 12/01/2022 32.0     Ferritin 12/01/2022 139.6     Vitamin B-12 12/01/2022 261     Iron 12/01/2022 90     TIBC 12/01/2022 287     Iron Saturation 12/01/2022 31     WBC 12/01/2022 4.6     RBC 12/01/2022 4.34     Hemoglobin 12/01/2022 12.4     Hematocrit 12/01/2022 38.4     MCV 12/01/2022 88.6     MCH 12/01/2022 28.6     MCHC 12/01/2022 32.2     RDW 12/01/2022 15.7 (A)     Platelets 58/13/6752 295     MPV 12/01/2022 7.5     Vit D, 25-Hydroxy 12/01/2022 14.0 (A)     Sodium 12/01/2022 142     Potassium 12/01/2022 4.4     Chloride 12/01/2022 107     CO2 12/01/2022 25     Anion Gap 12/01/2022 10     Glucose 12/01/2022 97     BUN 12/01/2022 16     Creatinine 12/01/2022 <0.5 (A)     Est, Glom Filt Rate 12/01/2022 >60     Calcium 12/01/2022 9.0     Total Protein 12/01/2022 6.9     Albumin 12/01/2022 4.3     Albumin/Globulin Ratio 12/01/2022 1.7     Total Bilirubin 12/01/2022 0.3     Alkaline Phosphatase 12/01/2022 71     ALT 12/01/2022 10     AST 12/01/2022 11 (A)     Microalbumin, Random Uri* 12/01/2022 <1.20     Creatinine, Ur 12/01/2022 87.5     Microalbumin Creatinine * 12/01/2022 see below    Office Visit on 12/01/2022   Component Date Value    Hemoglobin A1C 12/01/2022 5.1    Admission on 11/21/2022, Discharged on 11/21/2022   Component Date Value    Ventricular Rate 11/21/2022 46     Atrial Rate 11/21/2022 46     P-R Interval 11/21/2022 186     QRS Duration 11/21/2022 92     Q-T Interval 11/21/2022 468     QTc Calculation (Bazett) 11/21/2022 409     P Axis 11/21/2022 36     R Axis 11/21/2022 5     T [place] 11/21/2022 26     Diagnosis 11/21/2022 EKG performed in ER and to be interpreted by ER physician. Confirmed by MD, ER ([id number]),  Ike Khalil ([id number]) on 11/21/2022 2:32:33 PM     Sodium 11/21/2022 140     Potassium reflex Magnesi* 11/21/2022 4.3     Chloride 11/21/2022 108     CO2 11/21/2022 26     Anion Gap 11/21/2022 6     Glucose 11/21/2022 91     BUN 11/21/2022 18     Creatinine 11/21/2022 <0.5 (A)     Est, Glom Filt Rate 11/21/2022 >60     Calcium 11/21/2022 8.5     Total Protein 11/21/2022 5.8 (A)     Albumin 11/21/2022 3.5     Albumin/Globulin Ratio 11/21/2022 1.5     Total Bilirubin 11/21/2022 0.3     Alkaline Phosphatase 11/21/2022 58     ALT 11/21/2022 10     AST 11/21/2022 10 (A)     WBC 11/21/2022 4.8     RBC 11/21/2022 3.72 (A)     Hemoglobin 11/21/2022 10.7 (A)     Hematocrit 11/21/2022 32.8 (A)     MCV 11/21/2022 88.1     MCH 11/21/2022 28.8     MCHC 11/21/2022 32.7     RDW 11/21/2022 15.4     Platelets 23/85/7851 237     MPV 11/21/2022 7.0     Neutrophils % 11/21/2022 52.5     Lymphocytes % 11/21/2022 34.3     Monocytes % 11/21/2022 11.2     Eosinophils % 11/21/2022 1.5     Basophils % 11/21/2022 0.5     Neutrophils Absolute 11/21/2022 2.5     Lymphocytes Absolute 11/21/2022 1.6     Monocytes Absolute 11/21/2022 0.5     Eosinophils Absolute 11/21/2022 0.1     Basophils Absolute 11/21/2022 0.0     Troponin 11/21/2022 <0.01    Procedure visit on 11/15/2022   Component Date Value    Left Ventricular Ejectio* 11/15/2022 58     LVEF MODALITY 11/15/2022 ECHO    There may be more visits with results that are not included. Assessment/Plan     1. Constipation, unspecified constipation type  - due to Percocet  - stop Percocet and follow up with Pain Management for another medication  - start docusate sodium (COLACE) 100 MG capsule; Take 1 capsule by mouth 2 times daily as needed for Constipation As needed for constipation  Dispense: 60 capsule; Refill: 0  - high fiber diet  - Amb External Referral To Gastroenterology    2. Diarrhea, unspecified type  - loperamide (RA ANTI-DIARRHEAL) 2 MG capsule; Take 1 capsule by mouth 4 times daily as needed for Diarrhea  Dispense: 30 capsule; Refill: 0  - Amb External Referral To Gastroenterology    3. Gastroesophageal reflux disease, unspecified whether esophagitis present  - stable  - omeprazole (PRILOSEC) 20 MG delayed release capsule; Take 1 capsule by mouth in the morning and 1 capsule in the evening. Dispense: 60 capsule; Refill: 5  - Amb External Referral To Gastroenterology    4. Diabetic polyneuropathy associated with type 2 diabetes mellitus ([facility]  - foot exam done  - request new paperwork for diabetic shoes    5. Vitamin D deficiency  - refills for vitamin D sent to [facility]      Discussed medications with patient, who voiced understanding of their use and indications. All questions answered. Return in about 3 weeks (around 2/17/2023) for mobility evaluation.

## 2023-01-27 NOTE — TELEPHONE ENCOUNTER
Jamila Lira from [employer] called stating she received a cover letter with pts demographics, she didn't receive a prescription. Jamila Lira said she sent the face to face criteria page, she was wondering if the visit took place. If completed and sign and dated she needs the chart notes and she needs the prescription.      Please advise    [phone]

## 2023-01-30 ENCOUNTER — APPOINTMENT (OUTPATIENT)
Dept: PHYSICAL THERAPY | Age: 58
End: 2023-01-30
Payer: COMMERCIAL

## 2023-01-30 PROCEDURE — 97140 MANUAL THERAPY 1/> REGIONS: CPT

## 2023-01-30 PROCEDURE — 97110 THERAPEUTIC EXERCISES: CPT

## 2023-01-30 NOTE — TELEPHONE ENCOUNTER
Jenna Adler from [address] called and stated she is going to refax the Criteria page for . If  Has not received by noon please call Jenna Adler at # [phone].

## 2023-01-30 NOTE — FLOWSHEET NOTE
[facility]      Physical Therapy Treatment Note/ Progress Report:       Date:  2023    Patient Name:  Anais Toro    :  1965  MRN: [id number]  Restrictions/Precautions:    Medical/Treatment Diagnosis Information:  Diagnosis: M54.5, G89.29 chronic LBP, M54.16 lumbar radiculitis, Z98.890 h/ lumbosacral spine surgery  Treatment Diagnosis: M54.5 LBP  Insurance/Certification information:   Nayeli Balderas  Physician Information:   MICHELLE Pratt  Has the plan of care been signed (Y/N):        [x]  Yes  []  No     Date of Patient follow up with Physician: not scheduled    Is this a Progress Report:     []  Yes  [x]  No      If Yes:  Date Range for reporting period:  Beginning:  ------------ Ending:     Progress report will be due (10 Rx or 30 days whichever is less):      Recertification will be due (POC Duration  / 90 days whichever is less): 23      Visit # Insurance Allowable Auth Required   In Person 3 Check NV []  Yes     []  No    Tele Health   []  Yes     []  No    Total 6       Functional Scale: FOTO    Date assessed:  NV      Latex Allergy:  [x]NO      []YES  Preferred Language for Healthcare:   []English       [x]other: Indonesian ( present)    Pain level:  8/10     SUBJECTIVE: Pt reports she is in more pain today because she has been without pain medication as they are trying to change her medication. Pt states she can only walk 5 steps without having to sit down.      Pt is brought by a transportation service, has been in clinic for >1 hour waiting for transportation to return to pick her up; needs to be scheduled early    OBJECTIVE: See eval  Observation:  pt arrives in W/C, non ambulatory in clinic; boot on L foot, bilat knee braces; no control with stand to sit  Test measurements:    Note: pt has used a w/c primarily for the past 7 years, had a power w/c however that is broken and she is trying to work with insurance to get a new one; surgery scheduled for 2/22/23, per pt she will be in rehab for 3 months    RESTRICTIONS/PRECAUTIONS: DM, HTN, obseity    Exercises/Interventions:   Therapeutic Ex (49320) Sets/sec Reps Notes/CUES HEP   Hip ADD iso 10\" 10  x   bridge 1 10 Added 1/11, ? pain    Added 11/14 x   Supine clamshell 3 10 blueTB x   10\"   5   Bilat  Tried, ? pain X  x   PPT  5\" 15  x   Seated HS S 30\" 2 Added 11/14, bilat x   Added 11/14 x   Added 11/14, 1#, bilat    LTR 1 15  x   LAQ 5\" 2x10 Bilat, added 1/4 x   Standing hip ABD 1 10 Bilat, added 1/4    Pt edu: extensive discussion with pt re: power w/c rx and nature of recovery after any back surgery x15'      Manual Intervention (09924)                                                 NMR re-education (32063)   CUES NEEDED                                                                   Therapeutic Activity (61089)                                          "[employer] access code: [email or web address]           Therapeutic Exercise and NMR EXR  [x] (06039) Provided verbal/tactile cueing for activities related to strengthening, flexibility, endurance, ROM  for improvements in proximal hip and core control with self care, mobility, lifting and ambulation.  [] (76707) Provided verbal/tactile cueing for activities related to improving balance, coordination, kinesthetic sense, posture, motor skill, proprioception  to assist with core control in self care, mobility, lifting, and ambulation.      Therapeutic Activities:    [] (41590 or 98744) Provided verbal/tactile cueing for activities related to improving balance, coordination, kinesthetic sense, posture, motor skill, proprioception and motor activation to allow for proper function  with self care and ADLs  [] (72104) Provided training and instruction to the patient for proper core and proximal hip recruitment and positioning with ambulation re-education     Home Exercise Program:    [x] (74775) Reviewed/Progressed HEP activities related to strengthening, flexibility, endurance, ROM of core, proximal hip and LE for functional self-care, mobility, lifting and ambulation   [] (50606) Reviewed/Progressed HEP activities related to improving balance, coordination, kinesthetic sense, posture, motor skill, proprioception of core, proximal hip and LE for self care, mobility, lifting, and ambulation      Manual Treatments:  PROM / STM / Oscillations-Mobs:  G-I, II, III, IV (PA's, Inf., Post.)  [] (95484) Provided manual therapy to mobilize proximal hip and LS spine soft tissue/joints for the purpose of modulating pain, promoting relaxation,  increasing ROM, reducing/eliminating soft tissue swelling/inflammation/restriction, improving soft tissue extensibility and allowing for proper ROM for normal function with self care, mobility, lifting and ambulation. Modalities:  Electrical stimulation for pain control. Waveform: Interferential; Cycle Time: Continuous; Frequency: 80/150 Hz; Amplitude: 29 Volts; Treatment time: 10 min. With heat     [] GAME READY (VASO)- for significant edema, swelling, pain control. Charges:  Timed Code Treatment Minutes: 35   Total Treatment Minutes:  45      [] EVAL (LOW) 80345 (typically 20 minutes face-to-face)  [] EVAL (MOD) 05245 (typically 30 minutes face-to-face)  [] EVAL (HIGH) 94263 (typically 45 minutes face-to-face)  [] RE-EVAL     [x] QI(49146) x   2  [] IONTO  [x] NMR (44653) x  1   [] VASO  [] Manual (81059) x     [] Other:  [] TA x      [] Mech Traction (76237)  [] ES(attended) (52627)      [x] ES (un) (93602):       ASSESSMENT:  Pt exhibited ? tolerance to standing ex today exhibiting ? fatigue and pain. Pt continues to require gross extensive strengthening of core and bilat LE in order to improve functional tolerance. Pt is w/c bound in community and uses walker at home and has hx of opiod use prescribed via pain management, both factors that will slow progression in PT.  Also to note that MRI reveals a mass in thoracic spine for which she will need a surgery to remove it. To note: pt is trying to have a power w/c replaced and is requesting that PT help in this endeavor      GOALS:     Patient stated goal: ? pain     Therapist goals for Patient:   Short Term Goals: To be achieved in: 2 weeks  1. Independent in HEP and progression per patient tolerance, in order to prevent re-injury. [x] Progressing: [] Met: [] Not Met: [] Adjusted  2. Patient will have a decrease in pain to facilitate improvement in movement, function, and ADLs as indicated by Functional Deficits. [x] Progressing: [] Met: [] Not Met: [] Adjusted     Long Term Goals: To be achieved in: 12 weeks  1. FOTO >/= D/C score to assist with reaching prior level of function. [x] Progressing: [] Met: [] Not Met: [] Adjusted  2. Patient will demonstrate increased AROM to WNL, good LS mobility, good hip ROM to allow for proper joint functioning as indicated by patients Functional Deficits. [x] Progressing: [] Met: [] Not Met: [] Adjusted  3. Patient will demonstrate an increase in Strength to good proximal hip and core activation to allow for proper functional mobility as indicated by patients Functional Deficits. [] Progressing: [] Met: [] Not Met: [] Adjusted  4. Patient will return to all functional activities without increased symptoms or restriction. [x] Progressing: [] Met: [] Not Met: [] Adjusted  5. Pt will exhibit normalized gait pattern with AD. (patient specific functional goal)    [x] Progressing: [] Met: [] Not Met: [] Adjusted             Access Code: [email or web address]  URL: [email or web address]  Date: 11/09/2022  Prepared by: Elaine Ruiz    Exercises  Hooklying Single Knee to Chest Stretch - 2 x daily - 7 x weekly - 3 sets - 10 reps  Supine Hip Adduction Isometric with Ball - 2 x daily - 7 x weekly - 1 sets - 10 reps  Hooklying Clamshell with Resistance - 2 x daily - 7 x weekly - 2-3 sets - 10 reps  Supine Posterior Pelvic Tilt - 2 x daily - 7 x weekly - 2 sets - 10 reps      Overall Progression Towards Functional goals/ Treatment Progress Update:  [] Patient is progressing as expected towards functional goals listed.    [x] Progression is slowed due to complexities/Impairments listed.  [] Progression has been slowed due to co-morbidities.  [] Plan just implemented, too soon to assess goals progression <30days   [] Goals require adjustment due to lack of progress  [] Patient is not progressing as expected and requires additional follow up with physician  [] Other    Prognosis for POC: [] Good [x] Fair  [] Poor      Patient requires continued skilled intervention: [x] Yes  [] No    Treatment/Activity Tolerance:  [x] Patient able to complete treatment  [] Patient limited by fatigue  [] Patient limited by pain    [] Patient limited by other medical complications  [] Other:                    PLAN: See eval  [x] Continue per plan of care [] Alter current plan (see comments above)  [] Plan of care initiated [] Hold pending MD visit [] Discharge    Electronically signed by:  Mercy Torrez PT    Note: If patient does not return for scheduled/ recommended follow up visits, this note will serve as a discharge from care along with most recent update on progress.

## 2023-02-01 ENCOUNTER — HOSPITAL ENCOUNTER (OUTPATIENT)
Dept: PHYSICAL THERAPY | Age: 58
Setting detail: THERAPIES SERIES
Discharge: HOME | End: 2023-02-01
Payer: COMMERCIAL

## 2023-02-01 ENCOUNTER — OFFICE VISIT (OUTPATIENT)
Dept: PRIMARY CARE CLINIC | Age: 58
End: 2023-02-01

## 2023-02-01 ENCOUNTER — TELEPHONE (OUTPATIENT)
Dept: PRIMARY CARE CLINIC | Age: 58
End: 2023-02-01

## 2023-02-01 ENCOUNTER — CARE COORDINATION (OUTPATIENT)
Dept: CARE COORDINATION | Age: 58
End: 2023-02-01

## 2023-02-01 VITALS
TEMPERATURE: 97.9 F | OXYGEN SATURATION: 99 % | BODY MASS INDEX: 39.83 KG/M2 | WEIGHT: 246.8 LBS | HEART RATE: 75 BPM | DIASTOLIC BLOOD PRESSURE: 78 MMHG | SYSTOLIC BLOOD PRESSURE: 118 MMHG

## 2023-02-01 DIAGNOSIS — M47.892 OTHER SPONDYLOSIS, CERVICAL REGION: ICD-10-CM

## 2023-02-01 DIAGNOSIS — G89.29 CHRONIC PAIN IN LEFT SHOULDER: ICD-10-CM

## 2023-02-01 DIAGNOSIS — M54.16 LUMBAR RADICULOPATHY: Primary | ICD-10-CM

## 2023-02-01 DIAGNOSIS — M96.1 POSTLAMINECTOMY SYNDROME OF LUMBAR REGION: ICD-10-CM

## 2023-02-01 DIAGNOSIS — M17.12 PRIMARY OSTEOARTHRITIS OF LEFT KNEE: ICD-10-CM

## 2023-02-01 DIAGNOSIS — M13.0 POLYARTHROPATHY, MULTIPLE SITES: ICD-10-CM

## 2023-02-01 DIAGNOSIS — R26.9 GAIT DISORDER: ICD-10-CM

## 2023-02-01 DIAGNOSIS — G62.9 NEUROPATHY: ICD-10-CM

## 2023-02-01 DIAGNOSIS — M47.896 OTHER SPONDYLOSIS, LUMBAR REGION: ICD-10-CM

## 2023-02-01 DIAGNOSIS — M17.11 PRIMARY OSTEOARTHRITIS OF RIGHT KNEE: ICD-10-CM

## 2023-02-01 DIAGNOSIS — M25.512 CHRONIC PAIN IN LEFT SHOULDER: ICD-10-CM

## 2023-02-01 DIAGNOSIS — E66.01 CLASS 2 SEVERE OBESITY DUE TO EXCESS CALORIES WITH SERIOUS COMORBIDITY AND BODY MASS INDEX (BMI) OF 37.0 TO 37.9 IN ADULT (HCC): Chronic | ICD-10-CM

## 2023-02-01 DIAGNOSIS — R60.0 BILATERAL LEG EDEMA: ICD-10-CM

## 2023-02-01 DIAGNOSIS — G89.4 CHRONIC PAIN SYNDROME: ICD-10-CM

## 2023-02-01 PROCEDURE — 97112 NEUROMUSCULAR REEDUCATION: CPT

## 2023-02-01 PROCEDURE — 97110 THERAPEUTIC EXERCISES: CPT

## 2023-02-01 SDOH — ECONOMIC STABILITY: HOUSING INSECURITY
IN THE LAST 12 MONTHS, WAS THERE A TIME WHEN YOU DID NOT HAVE A STEADY PLACE TO SLEEP OR SLEPT IN A SHELTER (INCLUDING NOW)?: NO

## 2023-02-01 SDOH — ECONOMIC STABILITY: FOOD INSECURITY: WITHIN THE PAST 12 MONTHS, THE FOOD YOU BOUGHT JUST DIDN'T LAST AND YOU DIDN'T HAVE MONEY TO GET MORE.: OFTEN TRUE

## 2023-02-01 SDOH — ECONOMIC STABILITY: INCOME INSECURITY: HOW HARD IS IT FOR YOU TO PAY FOR THE VERY BASICS LIKE FOOD, HOUSING, MEDICAL CARE, AND HEATING?: VERY HARD

## 2023-02-01 SDOH — ECONOMIC STABILITY: FOOD INSECURITY: WITHIN THE PAST 12 MONTHS, YOU WORRIED THAT YOUR FOOD WOULD RUN OUT BEFORE YOU GOT MONEY TO BUY MORE.: OFTEN TRUE

## 2023-02-01 ASSESSMENT — ENCOUNTER SYMPTOMS
SHORTNESS OF BREATH: 0
NAUSEA: 0
SORE THROAT: 0
ABDOMINAL PAIN: 0
COUGH: 0
VOMITING: 0
DIARRHEA: 0
BACK PAIN: 1

## 2023-02-01 NOTE — CARE COORDINATION
Acm did not make outreach as patient has follow up appt with PCP. ACM will make outreach at a later date.

## 2023-02-01 NOTE — PROGRESS NOTES
Farrah Rivers (:  1965) is a 61 y.o. female,Established patient, here for evaluation of the following chief complaint(s):  Medication Refill (Patient believes she needs a electric wheelchair. )      ASSESSMENT/PLAN:  1. Lumbar radiculopathy  -     DME Order for Wheel Chair as OP  2. Postlaminectomy syndrome of lumbar region  -     DME Order for Wheel Chair as OP  3. Class 2 severe obesity due to excess calories with serious comorbidity and body mass index (BMI) of 37.0 to 37.9 in [facility])  -     DME Order for Wheel Chair as OP  4. Primary osteoarthritis of right knee  -     DME Order for Wheel Chair as OP  5. Primary osteoarthritis of left knee  -     DME Order for Wheel Chair as OP  6. Polyarthropathy, multiple sites  -     DME Order for Wheel Chair as OP  7. Chronic pain in left shoulder  -     DME Order for Wheel Chair as OP  8. Other spondylosis, lumbar region  -     DME Order for Wheel Chair as OP  9. Other spondylosis, cervical region  -     DME Order for Wheel Chair as OP  10. Neuropathy  -     DME Order for Wheel Chair as OP  11. Gait disorder  -     DME Order for Wheel Chair as OP  12. Chronic pain syndrome  -     DME Order for Wheel Chair as OP  13. Bilateral leg edema    Return if symptoms worsen or fail to improve. Patient was very adamant about getting \"paperwork\" that has to be filled out to get the wheelchair as she wants to make sure that everything is \"correct\" in the chart. Advised her that I would be completing my note and fax it over to the rehab facilitator along with the DME orders for an electrical power wheelchair. Patient was given a copy of provider's note for filing purposes and to be aware of the diagnosis made in order for her to get the wheelchair. Did advise patient that my information is mostly based on her past diagnoses in her chart as this is the first time I have seen her.   At the end of the visit patient did report \"pain in both her breasts\", discussed that she had an EKG done in November which reviewed, normal sinus rhythm without any evidence of ischemic changes. Patient reported that she does not think that \"it is her heart\" and thinks it could be something in her breast, she did have a mammogram in October 2022 which was \"stable\" and \"nothing was wrong\". Advised patient that she needs to follow-up regarding this at her next appointment.   Patient agreeable to plan and demonstrates understanding    SUBJECTIVE/OBJECTIVE:  HPI    Patient presents for evaluation of a new power wheelchair  Face-to-face encounter today for power mobility Evaluation  - Broken one at home - \"not working\" for the last 2 years   - got it in 2013 or 2015, not sure about the exact date  - \"off balance\"   - she has been asking to be repaired for 2 years, $2500 to be fixed  - also needs parts to be added to that wheelchair- they cannot be added to her current wheelchair\"   - \"cannot be adjusted\"  - part that needs to be added is to \"help stretch my legs\", currently wheelchair was giving pain in her knees due to arthritis  - currently using wheelchair for all movement around the house including bathroom, other rooms in the house and kitchen  - right now using walker and a manual wheelchair \"so difficult\" to move around    - lives by herself   - uses the wheelchair at home  - the manual wheelchair - all joint has psoriasis, the left shoulder has Ligament tear and \"really hard for me to push myself\" in the manual wheelchair, \"unable to carry things\"  - \"Also suffering for transportation\" as \"all the drivers complain to push me\" \"because im heavy\" and \"this makes me feel really bad\"   - waiting for a long time for doctors and other, \"impossible in the scooterer\" and need the power wheelchair with parts where \"I can extend my legs\" as they are painful if not stretched  - Back of the chair, foot rest and the cup flores - needs to be in the chair to be \"helpful\" for me   - the previous wheelchair that is \"broken\" she was able to use it and has the ablity to understand how to use it to help her  - helped her get from one place in the house to another, on time to the bathroom and to get food, easy to maneuver   - old wheelchair \"was working very well\" but \"now broken\" and also the part she is requesting could not be added to that old wheelchair  - Chronic joint and muscle pain - discussed PT or OT in the past - \"did all of those\" including PT and injection  - now going for surgery \"remove mass in T10 and T11\"   - then \"problems with L1-L5\" and another surgery will be schedule \"to fix that\"  - also a \"brain tumor removal\" ?  - has one later this month, pre-op with Dr Nithin Avila in 2 days   - will need a wheelchair for ambulation now and after the surgery as she lives by herself    Review of Systems   Constitutional:  Negative for chills and fever. HENT:  Negative for congestion and sore throat. Respiratory:  Negative for cough and shortness of breath. Cardiovascular:  Positive for leg swelling (chronic, b/l LE). Gastrointestinal:  Negative for abdominal pain, diarrhea, nausea and vomiting. Genitourinary:  Negative for dysuria and urgency. Musculoskeletal:  Positive for arthralgias, back pain, gait problem, joint swelling, myalgias and neck pain. All chronic problems listed   Neurological:  Negative for light-headedness and headaches. Psychiatric/Behavioral:  Negative for sleep disturbance. The patient is not nervous/anxious. Physical Exam  Constitutional:       General: She is not in acute distress. Appearance: She is obese. Comments: In a manual wheelchair   HENT:      Head: Normocephalic and atraumatic. Right Ear: External ear normal.      Left Ear: External ear normal.      Nose: Nose normal. No congestion or rhinorrhea. Eyes:      General:         Right eye: No discharge. Left eye: No discharge. Extraocular Movements: Extraocular movements intact. Conjunctiva/sclera: Conjunctivae normal.   Cardiovascular:      Rate and Rhythm: Normal rate and regular rhythm. Heart sounds: Normal heart sounds. No murmur heard. Pulmonary:      Effort: Pulmonary effort is normal.      Breath sounds: Normal breath sounds. No wheezing. Musculoskeletal:         General: Swelling and tenderness present. Cervical back: Normal range of motion and neck supple. Right lower leg: Edema present. Left lower leg: Edema present. Skin:     General: Skin is warm and dry. Neurological:      Mental Status: She is alert and oriented to person, place, and time. Psychiatric:         Mood and Affect: Mood normal.         Behavior: Behavior normal.         An electronic signature was used to authenticate this note.     --Art Sanchez MD

## 2023-02-01 NOTE — CARE COORDINATION
SW spoke to Phillip Miller -[phone] about obtaining power wheelchair for patient. Cinthiazackery Tovar spoke to [address] [place] [address] supervisor Summer and patient was directed to make appointment with PCP to assess need for [facility], appointment is this afternoon. SW to follow-up with patient and Radha Ricardo supervisor as appropriate to continue to facilitate obtaining chair.      Pedrito HERNANDEZ, [place]     [phone]

## 2023-02-01 NOTE — FLOWSHEET NOTE
[facility]      Physical Therapy Treatment Note/ Progress Report:       Date:  2023    Patient Name:  Aliza Mendes    :  1965  MRN: [id number]  Restrictions/Precautions:    Medical/Treatment Diagnosis Information:  Diagnosis: M54.5, G89.29 chronic LBP, M54.16 lumbar radiculitis, Z98.890 h/ lumbosacral spine surgery  Treatment Diagnosis: M54.5 LBP  Insurance/Certification information:   Jose Solis  Physician Information:   MICHELLE Wilhelm  Has the plan of care been signed (Y/N):        [x]  Yes  []  No     Date of Patient follow up with Physician: not scheduled    Is this a Progress Report:     []  Yes  [x]  No      If Yes:  Date Range for reporting period:  Beginning:  ------------ Ending:     Progress report will be due (10 Rx or 30 days whichever is less): 13/3/12     Recertification will be due (POC Duration  / 90 days whichever is less): 23      Visit # Insurance Allowable Auth Required   In Person 4 Check NV []  Yes     []  No    Tele Health   []  Yes     []  No    Total 7       Functional Scale: FOTO    Date assessed:  NV      Latex Allergy:  [x]NO      []YES  Preferred Language for Healthcare:   []English       [x]other: Kiswahili ( present)    Pain level:  7/10     SUBJECTIVE: Pt is 10 min late to appt (due to transportation) . Pt reports her pain is a little better today than Monday. She feels better when she does exercises.      Pt is brought by a transportation service, has been in clinic for >1 hour waiting for transportation to return to pick her up; needs to be scheduled early    OBJECTIVE: See eval  Observation:  pt arrives in W/C, non ambulatory in clinic; boot on L foot, bilat knee braces; no control with stand to sit  Test measurements:    Note: pt has used a w/c primarily for the past 7 years, had a power w/c however that is broken and she is trying to work with insurance to get a new one; surgery scheduled for 2/22/23, per pt she will be in rehab for 3 months    RESTRICTIONS/PRECAUTIONS: DM, HTN, obseity    Exercises/Interventions:   Therapeutic Ex (25861) Sets/sec Reps Notes/CUES HEP   Hip ADD iso 10\" 10  x   bridge 1 10 Added 1/11, ? pain    Added 11/14 x   Supine clamshell 3 10 blueTB x   10\"   5   Bilat  Tried, ? pain X  x   PPT  5\" 15  x   Seated HS S 30\" 2 Added 11/14, bilat x   Added 11/14 x   Added 11/14, 1#, bilat    LTR 1 15  x   LAQ 5\" 2x10 2#, ^2/1; Bilat x   Standing hip ABD 1 10 Bilat, added 1/4    Pt edu: continued discussion re: power w/c x15'      Manual Intervention (36595)                                                 NMR re-education (41600)   CUES NEEDED                                                                   Therapeutic Activity (47292)                                          Gnammo access code: [email or web address]           Therapeutic Exercise and NMR EXR  [x] (84569) Provided verbal/tactile cueing for activities related to strengthening, flexibility, endurance, ROM  for improvements in proximal hip and core control with self care, mobility, lifting and ambulation.  [] (52190) Provided verbal/tactile cueing for activities related to improving balance, coordination, kinesthetic sense, posture, motor skill, proprioception  to assist with core control in self care, mobility, lifting, and ambulation.      Therapeutic Activities:    [] (31740 or 88769) Provided verbal/tactile cueing for activities related to improving balance, coordination, kinesthetic sense, posture, motor skill, proprioception and motor activation to allow for proper function  with self care and ADLs  [] (51978) Provided training and instruction to the patient for proper core and proximal hip recruitment and positioning with ambulation re-education     Home Exercise Program:    [x] (59202) Reviewed/Progressed HEP activities related to strengthening, flexibility, endurance, ROM of core, proximal hip and LE for functional self-care, mobility, lifting and ambulation   [] (61766) Reviewed/Progressed HEP activities related to improving balance, coordination, kinesthetic sense, posture, motor skill, proprioception of core, proximal hip and LE for self care, mobility, lifting, and ambulation      Manual Treatments:  PROM / STM / Oscillations-Mobs:  G-I, II, III, IV (PA's, Inf., Post.)  [] (63701) Provided manual therapy to mobilize proximal hip and LS spine soft tissue/joints for the purpose of modulating pain, promoting relaxation,  increasing ROM, reducing/eliminating soft tissue swelling/inflammation/restriction, improving soft tissue extensibility and allowing for proper ROM for normal function with self care, mobility, lifting and ambulation. Modalities:     [] GAME READY (VASO)- for significant edema, swelling, pain control. Charges:  Timed Code Treatment Minutes: 40   Total Treatment Minutes:  40      [] EVAL (LOW) 89885 (typically 20 minutes face-to-face)  [] EVAL (MOD) 56436 (typically 30 minutes face-to-face)  [] EVAL (HIGH) 69396 (typically 45 minutes face-to-face)  [] RE-EVAL     [x] OK(50694) x   2  [] IONTO  [x] NMR (49042) x  1   [] VASO  [] Manual (55237) x     [] Other:  [] TA x      [] Mech Traction (35968)  [] ES(attended) (36185)      [] ES (un) (20685):       ASSESSMENT:  Pt exhibited improved tolerance to PT this visit and able to complete additional strengthening exercises today. PT and pt continue to discuss power w/c. Pt exhibits a very decreased tolerance to all functional activities. Pt exhibits significant functional strength and balance deficits as well and is a fall risk. Pt will benefit from continue PT to progress strength and stability. To note: pt is trying to have a power w/c replaced and is requesting that PT help in this endeavor. PT spoke with  and Anita Holes:     Patient stated goal: ? pain     Therapist goals for Patient:   Short Term Goals:  To be achieved in: 2 weeks  1. Independent in HEP and progression per patient tolerance, in order to prevent re-injury.   [x] Progressing: [] Met: [] Not Met: [] Adjusted  2. Patient will have a decrease in pain to facilitate improvement in movement, function, and ADLs as indicated by Functional Deficits.  [x] Progressing: [] Met: [] Not Met: [] Adjusted     Long Term Goals: To be achieved in: 12 weeks  1. FOTO >/= D/C score to assist with reaching prior level of function.   [x] Progressing: [] Met: [] Not Met: [] Adjusted  2. Patient will demonstrate increased AROM to WNL, good LS mobility, good hip ROM to allow for proper joint functioning as indicated by patients Functional Deficits.   [x] Progressing: [] Met: [] Not Met: [] Adjusted  3. Patient will demonstrate an increase in Strength to good proximal hip and core activation to allow for proper functional mobility as indicated by patients Functional Deficits.   [] Progressing: [] Met: [] Not Met: [] Adjusted  4. Patient will return to all functional activities without increased symptoms or restriction.   [x] Progressing: [] Met: [] Not Met: [] Adjusted  5. Pt will exhibit normalized gait pattern with AD. (patient specific functional goal)    [x] Progressing: [] Met: [] Not Met: [] Adjusted             Access Code: [email or web address]  URL: [email or web address]  Date: 11/09/2022  Prepared by: Mercy Torrez    Exercises  Hooklying Single Knee to Chest Stretch - 2 x daily - 7 x weekly - 3 sets - 10 reps  Supine Hip Adduction Isometric with Ball - 2 x daily - 7 x weekly - 1 sets - 10 reps  Hooklying Clamshell with Resistance - 2 x daily - 7 x weekly - 2-3 sets - 10 reps  Supine Posterior Pelvic Tilt - 2 x daily - 7 x weekly - 2 sets - 10 reps      Overall Progression Towards Functional goals/ Treatment Progress Update:  [] Patient is progressing as expected towards functional goals listed.    [x] Progression is slowed due to complexities/Impairments listed.  [] Progression has been  slowed due to co-morbidities. [] Plan just implemented, too soon to assess goals progression <30days   [] Goals require adjustment due to lack of progress  [] Patient is not progressing as expected and requires additional follow up with physician  [] Other    Prognosis for POC: [] Good [x] Fair  [] Poor      Patient requires continued skilled intervention: [x] Yes  [] No    Treatment/Activity Tolerance:  [x] Patient able to complete treatment  [] Patient limited by fatigue  [] Patient limited by pain    [] Patient limited by other medical complications  [] Other:                    PLAN: See eval  [x] Continue per plan of care [] Alter current plan (see comments above)  [] Plan of care initiated [] Hold pending MD visit [] Discharge    Electronically signed by:  Sang Burrows PT    Note: If patient does not return for scheduled/ recommended follow up visits, this note will serve as a discharge from care along with most recent update on progress.

## 2023-02-01 NOTE — TELEPHONE ENCOUNTER
Pt states that information that interperter gave was wrong electric wheelchair has not been broken since [id number] that is when she got it.  The wheelchair has only been broken for last two years she would like that changed in her chart

## 2023-02-02 ENCOUNTER — CARE COORDINATION (OUTPATIENT)
Dept: CARE COORDINATION | Age: 58
End: 2023-02-02

## 2023-02-02 NOTE — CARE COORDINATION
Ambulatory Care Coordination Note  2/2/2023    ACC: Elijah Quinteros, KENNEDY    ACM completed follow up call with patient with  [phone]. Patient said she had a follow up yesterday in regards to electric wheelchair. Patient said during her last follow up appt with Dr. Misti Clay that her feet were assessed during that appt. ACM said once note was completed it would be faxed to [facility] clinic for Diabetic Shoes. Patient thanked ACM at this time and had no other concerns. Plan:    F/U call 1 week   SW following for Power scooter  Fax 1/27/23 office note to [facility] for Diabetic shoes    Offered patient enrollment in the Remote Patient Monitoring (RPM) program for in-home monitoring: Patient is not eligible for RPM program.    Lab Results       None            Care Coordination Interventions    Referral from Primary Care Provider: No  Suggested Interventions and Community Resources  Social Work: In Process (Comment: SW referral)  Other Services: In Process (Comment: [facility])  Transportation Support: In Process  Zone Management Tools: In Process (Comment: DM zone mangement tool)          Goals Addressed    None         Prior to Admission medications    Medication Sig Start Date End Date Taking? Authorizing Provider   docusate sodium (COLACE) 100 MG capsule Take 1 capsule by mouth 2 times daily as needed for Constipation As needed for constipation 1/27/23   Clarissa Chiu MD   loperamide (RA ANTI-DIARRHEAL) 2 MG capsule Take 1 capsule by mouth 4 times daily as needed for Diarrhea 1/27/23   Clarissa Chiu MD   omeprazole (PRILOSEC) 20 MG delayed release capsule Take 1 capsule by mouth in the morning and 1 capsule in the evening.  1/27/23   Clarissa Chiu MD   vitamin D (ERGOCALCIFEROL) 1.25 MG (02562 UT) CAPS capsule Take 1 capsule by mouth once a week 1/27/23   Clarissa Chiu MD   vitamin B-12 (CYANOCOBALAMIN) 1000 MCG tablet Take 1,000 mcg by mouth daily    Historical Provider, MD Multiple Vitamins-Minerals (THERAPEUTIC MULTIVITAMIN-MINERALS) tablet Take 1 tablet by mouth daily    Historical Provider, MD   vitamin D (ERGOCALCIFEROL) 1.25 MG (04224 UT) CAPS capsule TAKE 1 CAPSULE BY MOUTH ONE TIME PER WEEK 1/19/23   Deepa Louie MD   omeprazole (PRILOSEC) 40 MG delayed release capsule Take 1 capsule by mouth every morning (before breakfast) 1/19/23   Deepa Louie MD   diclofenac sodium (VOLTAREN) 1 % GEL Apply 2 g topically 4 times daily 12/15/22   Kalli Brower MD   furosemide (LASIX) 40 MG tablet TAKE 1 TABLET BY MOUTH EVERY DAY 12/12/22   Gabbie Galeas MD   Blood Glucose Monitoring Suppl (BLOOD GLUCOSE MONITOR SYSTEM) w/Device KIT Dispense glucometer covered by patient's insurance 12/1/22   Deepa Louie MD   blood glucose monitor strips Test once daily 12/1/22   Deepa Louie MD   zoster recombinant adjuvanted vaccine [facility]) 50 MCG/0.5ML SUSR injection Inject 0.5 mLs into the muscle See Admin Instructions 1 dose now and repeat in 2-6 months 12/1/22 5/30/23  Deepa Louie MD   Lancets MISC Use to test blood sugar once daily 12/1/22   Deepa Louie MD   acetaminophen (TYLENOL) 500 MG tablet Take 1 tablet by mouth 4 times daily as needed for Pain 12/1/22   Deepa Louie MD   promethazine (PHENERGAN) 25 MG tablet Take 1 tablet by mouth every 6 hours as needed for Nausea 12/1/22   Deepa Louie MD   meloxicam (MOBIC) 15 MG tablet Take 1 tablet by mouth daily 11/14/22   Evelina Simpson MD   gabapentin (NEURONTIN) 300 MG capsule Take 1 capsule by mouth 3 times daily for 180 days.  Intended supply: 30 days 10/24/22 4/22/23  YURY Russell - CNP       Future Appointments   Date Time Provider Leo Santiago   2/3/2023  1:15 PM MD SCOT [facility] [place] - [facility]   2/8/2023  9:15 AM TO Friedman MAS PT [facility]   2/13/2023  1:45 PM Elmira Her MD [facility]   2/15/2023  8:30 AM Ainsley Janus,  [address] MAS PT [facility] H[facility]   2/15/2023 11:00 AM [address] MRI RM 2 [facility] MRI [facility]   2/27/2023  7:30 AM MD SCOT [facility] [place] - DYD   2/28/2023  9:30 AM Lianne Snell MD HEALTHY WT [facility]   4/5/2023 10:20 AM Safia Mirza MD KW SLEEP MED [facility]   7/24/2023 11:00 AM Jerry Vega MD PLASTICS/REC [facility]   12/5/2023 12:00 PM MD SCOT Webb CARDIO [facility]   1/29/2024 10:00 AM Peace Masters MD [place] GYN [facility]

## 2023-02-03 ENCOUNTER — TELEPHONE (OUTPATIENT)
Dept: PRIMARY CARE CLINIC | Age: 58
End: 2023-02-03

## 2023-02-03 ENCOUNTER — TELEPHONE (OUTPATIENT)
Dept: CARDIOLOGY CLINIC | Age: 58
End: 2023-02-03

## 2023-02-03 ENCOUNTER — HOSPITAL ENCOUNTER (OUTPATIENT)
Dept: GENERAL RADIOLOGY | Age: 58
Discharge: HOME | End: 2023-02-03
Payer: COMMERCIAL

## 2023-02-03 ENCOUNTER — OFFICE VISIT (OUTPATIENT)
Dept: PRIMARY CARE CLINIC | Age: 58
End: 2023-02-03
Payer: COMMERCIAL

## 2023-02-03 VITALS
BODY MASS INDEX: 38.61 KG/M2 | TEMPERATURE: 96.9 F | DIASTOLIC BLOOD PRESSURE: 84 MMHG | HEIGHT: 67 IN | HEART RATE: 64 BPM | SYSTOLIC BLOOD PRESSURE: 134 MMHG | OXYGEN SATURATION: 98 % | WEIGHT: 246 LBS | RESPIRATION RATE: 16 BRPM

## 2023-02-03 DIAGNOSIS — R07.9 CHEST PAIN, UNSPECIFIED TYPE: ICD-10-CM

## 2023-02-03 DIAGNOSIS — Z01.818 PRE-OP EXAM: ICD-10-CM

## 2023-02-03 DIAGNOSIS — E78.2 MIXED HYPERLIPIDEMIA: ICD-10-CM

## 2023-02-03 DIAGNOSIS — E11.42 TYPE 2 DIABETES MELLITUS WITH DIABETIC POLYNEUROPATHY, WITHOUT LONG-TERM CURRENT USE OF INSULIN (HCC): ICD-10-CM

## 2023-02-03 DIAGNOSIS — M89.8X8 MASS OF SPINE: Primary | ICD-10-CM

## 2023-02-03 DIAGNOSIS — I67.9 CEREBROVASCULAR SMALL VESSEL DISEASE: ICD-10-CM

## 2023-02-03 DIAGNOSIS — K21.9 GASTROESOPHAGEAL REFLUX DISEASE, UNSPECIFIED WHETHER ESOPHAGITIS PRESENT: ICD-10-CM

## 2023-02-03 LAB
ANION GAP SERPL CALCULATED.3IONS-SCNC: 9 MMOL/L (ref 3–16)
APTT: 34.3 SEC (ref 23–34.3)
BUN BLDV-MCNC: 19 MG/DL (ref 7–20)
CALCIUM SERPL-MCNC: 9 MG/DL (ref 8.3–10.6)
CHLORIDE BLD-SCNC: 101 MMOL/L (ref 99–110)
CO2: 27 MMOL/L (ref 21–32)
CREAT SERPL-MCNC: <0.5 MG/DL (ref 0.6–1.1)
GFR SERPL CREATININE-BSD FRML MDRD: >60 ML/MIN/{1.73_M2}
GLUCOSE BLD-MCNC: 78 MG/DL (ref 70–99)
HCT VFR BLD CALC: 37.9 % (ref 36–48)
HEMOGLOBIN: 12.4 G/DL (ref 12–16)
INR BLD: 1.03 (ref 0.87–1.14)
MCH RBC QN AUTO: 28.7 PG (ref 26–34)
MCHC RBC AUTO-ENTMCNC: 32.8 G/DL (ref 31–36)
MCV RBC AUTO: 87.5 FL (ref 80–100)
PDW BLD-RTO: 13.7 % (ref 12.4–15.4)
PLATELET # BLD: 294 K/UL (ref 135–450)
PMV BLD AUTO: 7.6 FL (ref 5–10.5)
POTASSIUM SERPL-SCNC: 4.2 MMOL/L (ref 3.5–5.1)
PROTHROMBIN TIME: 13.4 SEC (ref 11.7–14.5)
RBC # BLD: 4.33 M/UL (ref 4–5.2)
SODIUM BLD-SCNC: 137 MMOL/L (ref 136–145)
WBC # BLD: 7.9 K/UL (ref 4–11)

## 2023-02-03 PROCEDURE — 93000 ELECTROCARDIOGRAM COMPLETE: CPT | Performed by: INTERNAL MEDICINE

## 2023-02-03 PROCEDURE — G8427 DOCREV CUR MEDS BY ELIG CLIN: HCPCS | Performed by: INTERNAL MEDICINE

## 2023-02-03 PROCEDURE — 2022F DILAT RTA XM EVC RTNOPTHY: CPT | Performed by: INTERNAL MEDICINE

## 2023-02-03 PROCEDURE — 3046F HEMOGLOBIN A1C LEVEL >9.0%: CPT | Performed by: INTERNAL MEDICINE

## 2023-02-03 PROCEDURE — 71046 X-RAY EXAM CHEST 2 VIEWS: CPT

## 2023-02-03 PROCEDURE — 99214 OFFICE O/P EST MOD 30 MIN: CPT | Performed by: INTERNAL MEDICINE

## 2023-02-03 PROCEDURE — 3017F COLORECTAL CA SCREEN DOC REV: CPT | Performed by: INTERNAL MEDICINE

## 2023-02-03 PROCEDURE — 1036F TOBACCO NON-USER: CPT | Performed by: INTERNAL MEDICINE

## 2023-02-03 PROCEDURE — G8482 FLU IMMUNIZE ORDER/ADMIN: HCPCS | Performed by: INTERNAL MEDICINE

## 2023-02-03 PROCEDURE — G8417 CALC BMI ABV UP PARAM F/U: HCPCS | Performed by: INTERNAL MEDICINE

## 2023-02-03 RX ORDER — BUPRENORPHINE 10 UG/H
PATCH TRANSDERMAL
COMMUNITY
Start: 2023-01-31

## 2023-02-03 ASSESSMENT — ENCOUNTER SYMPTOMS
VOMITING: 0
RHINORRHEA: 0
BLOOD IN STOOL: 0
TROUBLE SWALLOWING: 0
CHEST TIGHTNESS: 0
COUGH: 0
WHEEZING: 0
SORE THROAT: 0
SINUS PRESSURE: 0
SHORTNESS OF BREATH: 0
ABDOMINAL PAIN: 0

## 2023-02-03 NOTE — TELEPHONE ENCOUNTER
CARDIAC CLEARANCE     What type of procedure are you having? Back surgery    Which physician is performing your procedure? Dr Sanford Chapman    When is your procedure scheduled for? 2/22/23    Where are you having this procedure? [facility]    Are you taking Blood Thinners? no   If so what? (Name/dose/frequesncy)     Does the surgeon want you to stop your blood thinner? If so for how long?     Phone Number and Contact Name for Physicians office:    Jeremiah Sanchez [phone]    Fax number to send information:    [phone]

## 2023-02-03 NOTE — TELEPHONE ENCOUNTER
Spoke with James from [facility]. She said for the diabetic shoes the patient has to have peripheral neuropathy with evidence callus formation for her to be eligible. Or she can use another diagnosis as long as there is documentation to send with it for medicare. If you want to fill out the form for another diagnosis we will need to call them back for a new form.

## 2023-02-03 NOTE — PATIENT INSTRUCTIONS
-Avoid anti-inflammatories, aspirin, and fish oil for 7 days prior to surgery  -Nothing to eat or drink after midnight prior to surgery

## 2023-02-03 NOTE — LETTER
[address] [place] [address]  Phone: [phone]  Fax: [phone]    Marylen Life, MD         February 3, 2023     Patient: Darrell Lynch   YOB: 1965   Date of Visit: 2/3/2023       To Whom It May Concern: It is my medical opinion that Hurshel Libman requires a disability parking placard for the following reasons:  She cannot walk without assistance from another person or the use of an assistance device (cane, crutch, prosthetic device, wheelchair, etc.). Duration of need: 5 years    If you have any questions or concerns, please don't hesitate to call.     Sincerely,        Marylen Life, MD

## 2023-02-03 NOTE — PROGRESS NOTES
Farrah Rivers   YOB: 1965    Date of Visit:  2/3/2023    Chief Complaint   Patient presents with    Pre-op Exam     2/22/23, Yue Rod. Waqas Lazaro MD, T10 T11 LAMINECTOMY, POSSIBLE FACETECTOMY, POSSIBLE PEDICLE SCREW FIXATION FOR REMOVAL OF THORACIC MASS, dx: M89.8, [phone]       [facility]  Video Kinyarwanda  Judy ID# [id number]    Patient presents for a preoperative history and physical exam. Patient is scheduled for a  T10 T11 LAMINECTOMY, POSSIBLE FACETECTOMY, POSSIBLE PEDICLE SCREW FIXATION FOR REMOVAL OF THORACIC MASS to be done on 2/22/23 by Dr. Angle Das for thoracic mass. Patient states she had chest pain day before yesterday for a few seconds and reoccured 3 times middle of chest between breast like squeezing. Chest pain is under breast and radiates to back. Patient has a Cardiologist.    Patient has Type 2 diabetes with neuropathy. Patient is diet controlled. Patient has GERD. Patient takes Omeprazole 20mg 2 times daily. Review of Systems   Constitutional:  Negative for appetite change, chills, fatigue and fever. HENT:  Negative for congestion, ear pain, postnasal drip, rhinorrhea, sinus pressure, sneezing, sore throat and trouble swallowing. Eyes:  Negative for visual disturbance. Respiratory:  Negative for cough, chest tightness, shortness of breath and wheezing. Cardiovascular:  Positive for chest pain. Negative for palpitations and leg swelling. Gastrointestinal:  Positive for constipation, diarrhea and nausea. Negative for abdominal distention, abdominal pain, blood in stool and vomiting. Genitourinary:  Negative for dysuria, frequency and hematuria. Musculoskeletal:  Positive for arthralgias, back pain and gait problem. Negative for myalgias. Skin:  Negative for rash and wound. Neurological:  Positive for numbness and headaches. Negative for dizziness, tremors, seizures, syncope, facial asymmetry, weakness and light-headedness. Hematological:  Negative for adenopathy. Bruises/bleeds easily. Psychiatric/Behavioral:  Negative for dysphoric mood and sleep disturbance. The patient is not nervous/anxious. Past Medical History:   Diagnosis Date    Arthritis     Chronic pain     TAN (dyspnea on exertion)     Frequent headaches 12/13/2022    Gallbladder disorder     Gastroesophageal reflux disease 01/28/2020    Hx of degenerative disc disease     Hyperlipidemia     Hypertension     Morbid obesity with body mass index (BMI) of 60.0 to 69.9 in adult ([facility] [address] 07/02/2018    Neuropathy     SHYANN (obstructive sleep apnea)     UNABLE TO USE MACHINE    Poor circulation     Primary osteoarthritis of right knee 07/02/2018    Type 2 diabetes mellitus without complication ([facility] [address]     controlled with diet    Urinary incontinence     Vitamin D deficiency 1/28/2020    Wears glasses        Past Surgical History:   Procedure Laterality Date    BACK SURGERY      2001    BREAST LUMPECTOMY Bilateral     2015    DILATION AND CURETTAGE OF UTERUS N/A 08/30/2018    GASTRIC BYPASS SURGERY      SLEEVE GASTRECTOMY  10/13/2018       Outpatient Medications Marked as Taking for the 2/3/23 encounter (Office Visit) with Jayesh Renae MD   Medication Sig Dispense Refill    buprenorphine (BUPRENEX) 10 MCG/HR PTWK APPLY 1 PATCH TOPICALLY ONCE A WEEK      docusate sodium (COLACE) 100 MG capsule Take 1 capsule by mouth 2 times daily as needed for Constipation As needed for constipation 60 capsule 0    loperamide (RA ANTI-DIARRHEAL) 2 MG capsule Take 1 capsule by mouth 4 times daily as needed for Diarrhea 30 capsule 0    omeprazole (PRILOSEC) 20 MG delayed release capsule Take 1 capsule by mouth in the morning and 1 capsule in the evening.  60 capsule 5    vitamin D (ERGOCALCIFEROL) 1.25 MG (34349 UT) CAPS capsule Take 1 capsule by mouth once a week 4 capsule 5    vitamin B-12 (CYANOCOBALAMIN) 1000 MCG tablet Take 1,000 mcg by mouth daily      Multiple Vitamins-Minerals (THERAPEUTIC MULTIVITAMIN-MINERALS) tablet Take 1 tablet by mouth daily      vitamin D (ERGOCALCIFEROL) 1.25 MG (22565 UT) CAPS capsule TAKE 1 CAPSULE BY MOUTH ONE TIME PER WEEK 4 capsule 5    diclofenac sodium (VOLTAREN) 1 % GEL Apply 2 g topically 4 times daily 2 g 0    furosemide (LASIX) 40 MG tablet TAKE 1 TABLET BY MOUTH EVERY DAY 90 tablet 3    Blood Glucose Monitoring Suppl (BLOOD GLUCOSE MONITOR SYSTEM) w/Device KIT Dispense glucometer covered by patient's insurance 1 kit 0    blood glucose monitor strips Test once daily 100 strip 3    zoster recombinant adjuvanted vaccine (SHINGRIX) 50 MCG/0.5ML SUSR injection Inject 0.5 mLs into the muscle See Admin Instructions 1 dose now and repeat in 2-6 months 0.5 mL 0    Lancets MISC Use to test blood sugar once daily 100 each 3    acetaminophen (TYLENOL) 500 MG tablet Take 1 tablet by mouth 4 times daily as needed for Pain 60 tablet 2    [DISCONTINUED] promethazine (PHENERGAN) 25 MG tablet Take 1 tablet by mouth every 6 hours as needed for Nausea 20 tablet 0    meloxicam (MOBIC) 15 MG tablet Take 1 tablet by mouth daily 30 tablet 0    gabapentin (NEURONTIN) 300 MG capsule Take 1 capsule by mouth 3 times daily for 180 days.  Intended supply: 30 days 90 capsule 1         No Known Allergies    Social History     Tobacco Use    Smoking status: Never    Smokeless tobacco: Never   Substance Use Topics    Alcohol use: No       Family History   Problem Relation Age of Onset    Diabetes Mother     Stroke Mother     Osteoarthritis Mother     Heart Disease Father     Other Father     High Blood Pressure Father     Osteoarthritis Father     Diabetes Maternal Aunt     Cancer Maternal Aunt     Diabetes Maternal Grandmother     Cancer Paternal Aunt        Immunization History   Administered Date(s) Administered    COVID-19, PFIZER Bivalent BOOSTER, DO NOT Dilute, (age 12y+), IM, 27 mcg/0.3 mL 11/22/2022    COVID-19, PFIZER PURPLE top, DILUTE for use, (age 15 y+), 30mcg/0.3mL 05/28/2021, 06/18/2021    Influenza, AFLURIA (age 1 yrs+), FLUZONE, (age 10 mo+), MDV, 0.5mL 08/27/2018    Influenza, FLUARIX, FLULAVAL, FLUZONE (age 10 mo+) AND AFLURIA, (age 1 y+), PF, 0.5mL 08/27/2018, 01/09/2020, 11/22/2022    Pneumococcal Polysaccharide ([id number]) 05/03/2018    Pneumococcal Vaccine 05/03/2018    Tdap (Boostrix, Adacel) 01/09/2020    Zoster Recombinant (Shingrix) 12/19/2022       Vitals:    02/03/23 1339   BP: 134/84   Pulse: 64   Resp: 16   Temp: 96.9 °F (36.1 °C)   SpO2: 98%   Weight: 246 lb (111.6 kg)   Height: 5' 6.5\" (1.689 m)     Body mass index is 39.11 kg/m². Physical Exam  Nursing note reviewed. Constitutional:       General: She is not in acute distress. Appearance: Normal appearance. She is well-developed. HENT:      Head: Normocephalic and atraumatic. Right Ear: Tympanic membrane and ear canal normal.      Left Ear: Tympanic membrane and ear canal normal.   Eyes:      General: Lids are normal.      Extraocular Movements: Extraocular movements intact. Conjunctiva/sclera: Conjunctivae normal.      Pupils: Pupils are equal, round, and reactive to light. Neck:      Thyroid: No thyromegaly. Vascular: No carotid bruit. Cardiovascular:      Rate and Rhythm: Normal rate and regular rhythm. Heart sounds: Normal heart sounds, S1 normal and S2 normal. No murmur heard. No friction rub. No gallop. Pulmonary:      Effort: Pulmonary effort is normal. No respiratory distress. Breath sounds: Normal breath sounds. No wheezing, rhonchi or rales. Abdominal:      General: Bowel sounds are normal. There is no distension. Palpations: Abdomen is soft. Tenderness: There is no abdominal tenderness. Musculoskeletal:      Cervical back: Neck supple. Right lower leg: No edema. Left lower leg: No edema. Lymphadenopathy:      Head:      Right side of head: No submandibular adenopathy. Left side of head: No submandibular adenopathy. Neurological:      Mental Status: She is alert and oriented to person, place, and time. Cranial Nerves: No cranial nerve deficit.    Psychiatric:         Mood and Affect: Mood normal.         Lab Review   Abstract on 12/12/2022   Component Date Value    Diabetic Retinopathy 12/08/2022 Positive    Orders Only on 12/05/2022   Component Date Value    Vitamin B1,Whole Blood 12/05/2022 70    Orders Only on 12/05/2022   Component Date Value    Cholesterol, Total 12/05/2022 212 (A)     Triglycerides 12/05/2022 53     HDL 12/05/2022 100 (A)     LDL Calculated 12/05/2022 101 (A)     VLDL Cholesterol Calcula* 12/05/2022 11    Orders Only on 12/01/2022   Component Date Value    Vitamin A 12/01/2022 0.57     Vitamin A, Interp 12/01/2022 Normal     RETINYL PALMITATE 12/01/2022 0.02    Orders Only on 12/01/2022   Component Date Value    Folate 12/01/2022 7.30    Orders Only on 12/01/2022   Component Date Value    Rejected Test 12/01/2022 80,388     Reason for Rejection 12/01/2022 see below    Orders Only on 12/01/2022   Component Date Value    Lipase 12/01/2022 32.0     Ferritin 12/01/2022 139.6     Vitamin B-12 12/01/2022 261     Iron 12/01/2022 90     TIBC 12/01/2022 287     Iron Saturation 12/01/2022 31     WBC 12/01/2022 4.6     RBC 12/01/2022 4.34     Hemoglobin 12/01/2022 12.4     Hematocrit 12/01/2022 38.4     MCV 12/01/2022 88.6     MCH 12/01/2022 28.6     MCHC 12/01/2022 32.2     RDW 12/01/2022 15.7 (A)     Platelets 06/20/3439 295     MPV 12/01/2022 7.5     Vit D, 25-Hydroxy 12/01/2022 14.0 (A)     Sodium 12/01/2022 142     Potassium 12/01/2022 4.4     Chloride 12/01/2022 107     CO2 12/01/2022 25     Anion Gap 12/01/2022 10     Glucose 12/01/2022 97     BUN 12/01/2022 16     Creatinine 12/01/2022 <0.5 (A)     Est, Glom Filt Rate 12/01/2022 >60     Calcium 12/01/2022 9.0     Total Protein 12/01/2022 6.9     Albumin 12/01/2022 4.3     Albumin/Globulin Ratio 12/01/2022 1.7     Total Bilirubin 12/01/2022 0.3 Alkaline Phosphatase 12/01/2022 71     ALT 12/01/2022 10     AST 12/01/2022 11 (A)     Microalbumin, Random Uri* 12/01/2022 <1.20     Creatinine, Ur 12/01/2022 87.5     Microalbumin Creatinine * 12/01/2022 see below    Office Visit on 12/01/2022   Component Date Value    Hemoglobin A1C 12/01/2022 5.1    Admission on 11/21/2022, Discharged on 11/21/2022   Component Date Value    Ventricular Rate 11/21/2022 46     Atrial Rate 11/21/2022 46     P-R Interval 11/21/2022 186     QRS Duration 11/21/2022 92     Q-T Interval 11/21/2022 468     QTc Calculation (Bazett) 11/21/2022 409     P Axis 11/21/2022 36     R Axis 11/21/2022 5     T [place] 11/21/2022 26     Diagnosis 11/21/2022 EKG performed in ER and to be interpreted by ER physician. Confirmed by MD, ER ([id number]),  Messi Fowler ([id number]) on 11/21/2022 2:32:33 PM     Sodium 11/21/2022 140     Potassium reflex Magnesi* 11/21/2022 4.3     Chloride 11/21/2022 108     CO2 11/21/2022 26     Anion Gap 11/21/2022 6     Glucose 11/21/2022 91     BUN 11/21/2022 18     Creatinine 11/21/2022 <0.5 (A)     Est, Glom Filt Rate 11/21/2022 >60     Calcium 11/21/2022 8.5     Total Protein 11/21/2022 5.8 (A)     Albumin 11/21/2022 3.5     Albumin/Globulin Ratio 11/21/2022 1.5     Total Bilirubin 11/21/2022 0.3     Alkaline Phosphatase 11/21/2022 58     ALT 11/21/2022 10     AST 11/21/2022 10 (A)     WBC 11/21/2022 4.8     RBC 11/21/2022 3.72 (A)     Hemoglobin 11/21/2022 10.7 (A)     Hematocrit 11/21/2022 32.8 (A)     MCV 11/21/2022 88.1     MCH 11/21/2022 28.8     MCHC 11/21/2022 32.7     RDW 11/21/2022 15.4     Platelets 27/56/3996 237     MPV 11/21/2022 7.0     Neutrophils % 11/21/2022 52.5     Lymphocytes % 11/21/2022 34.3     Monocytes % 11/21/2022 11.2     Eosinophils % 11/21/2022 1.5     Basophils % 11/21/2022 0.5     Neutrophils Absolute 11/21/2022 2.5     Lymphocytes Absolute 11/21/2022 1.6     Monocytes Absolute 11/21/2022 0.5     Eosinophils Absolute 11/21/2022 0.1     Basophils Absolute 11/21/2022 0.0     Troponin 11/21/2022 <0.01    Procedure visit on 11/15/2022   Component Date Value    Left Ventricular Ejectio* 11/15/2022 58     LVEF MODALITY 11/15/2022 ECHO    There may be more visits with results that are not included. No results found for this visit on 02/03/23. Assessment/Plan     1. Mass of spine  -Preoperative history and physical done   -Patient is in satisfactory condition for a T10 T11 LAMINECTOMY, POSSIBLE FACETECTOMY, POSSIBLE PEDICLE SCREW FIXATION FOR REMOVAL OF THORACIC MASS to be done on 2/22/23 by Dr. Amber Putnam     2. Pre-op exam  -Preoperative history and physical done   -Patient is in satisfactory condition for a T10 T11 LAMINECTOMY, POSSIBLE FACETECTOMY, POSSIBLE PEDICLE SCREW FIXATION FOR REMOVAL OF THORACIC MASS to be done on 2/22/23 by Dr. Amber Putnam   -Cardiac clearance to be provided by patient's Cardiologist, Dr. Car Roca  - EKG 12 lead done and is ok for surgery  - Basic Metabolic Panel; Future  - CBC; Future  - Protime-INR; Future  - APTT; Future  -Continue same medications  -Avoid anti-inflammatories, aspirin, and fish oil for 7 days prior to surgery  -Tylenol 650mg 3 times daily if needed for pain  -Nothing to eat or drink after midnight prior to surgery    3. Chest pain, unspecified type  - XR CHEST (2 VW); Future  - EKG 12 lead done and is ok for surgery  - Cardiac clearance to be provided by patient's Cardiologist, Dr. Car Roca    4. Type 2 diabetes mellitus with diabetic polyneuropathy, without long-term current use of insulin (HCC)  - stable   - most recent Hemoglobin A1c is 5.1% on 12/1/22  - continue diet control    5. Gastroesophageal reflux disease, unspecified whether esophagitis present  -stable  -Continue Omeprazole 20mg 2 times daily  -Decrease caffeine, avoid spicy foods, avoid tomato based foods  -Eat small meals instead of large meals  -Wait 2-3 hours after eating before lying down    6.  Mixed hyperlipidemia  -lipid panel shows LDL is near goal  -Low fat, low cholesterol diet  -Regular aerobic exercise    7. Cerebrovascular small vessel disease  -remote changes on recent Head CT   -patient referred to Neurology          Return if symptoms worsen or fail to improve.

## 2023-02-06 ENCOUNTER — ANTI-COAG VISIT (OUTPATIENT)
Dept: PRIMARY CARE CLINIC | Age: 58
End: 2023-02-06

## 2023-02-07 ENCOUNTER — TELEPHONE (OUTPATIENT)
Dept: ORTHOPEDIC SURGERY | Age: 58
End: 2023-02-07

## 2023-02-07 NOTE — TELEPHONE ENCOUNTER
Other PATIENT CALLED STATES THAT SHE WOULD LIKE AN ORDER  FOR PHYSICAL THERAPY TO BE SUBMITTED TO [facility]. PATIENT WOULD LIKE A CALL ONCE THIS IS DONE. SHE IS SCHEDULED FOR PT TOMORROW BUT I ADVISED PATIENT THAT THIS REQUEST MAY NOT BE COMPLETED IN THAT AMOUNT OF TIME.  PLS CALL PATIENT TO ADVISE [phone]    [facility]  PHONE [phone]  [address] [facility] [phone]

## 2023-02-08 ENCOUNTER — CARE COORDINATION (OUTPATIENT)
Dept: CARE COORDINATION | Age: 58
End: 2023-02-08

## 2023-02-08 ENCOUNTER — HOSPITAL ENCOUNTER (OUTPATIENT)
Dept: PHYSICAL THERAPY | Age: 58
Setting detail: THERAPIES SERIES
Discharge: HOME | End: 2023-02-08
Payer: COMMERCIAL

## 2023-02-08 NOTE — FLOWSHEET NOTE
Physical Therapy  Cancellation/No-show Note  Patient Name:  Leora Montano  :  1965   Date:  2023  Cancelled visits to date: 4  No-shows to date: 0    For today's appointment patient:  [x]  Cancelled  []  Rescheduled appointment  []  No-show but called     Reason given by patient:  []  Patient ill (in ED, with low HR)  []  Conflicting appointment  [x]  No transportation    []  Conflict with work  []  No reason given  []  Other:  Pt did not arrive for PT appt this visit, claiming she thought this appt had been cancelled. It had not.     Comments:      Electronically signed by:  Philly Mclaughlin, PT, PT

## 2023-02-08 NOTE — CARE COORDINATION
NORMAN confirmed with [facility] that order and documentation was faxed to [facility]; Nancy Berman in power equipment department is following. Patient had appointment for chair assessment in her home yesterday, 2/7 at 1 PM.   SW left voicemail for Nancy Berman [phone] and requested return call to further help facilitate process. SW confirmed with patient that she had assessment completed yesterday. She is concerned about 4-6 month wait time for chair to be built. Requesting loner chair in the meantime. SW informed her that she will inquire with Nancy Berman about this. Patient inquired about assistance with housing, SW reiterated previous discussions and information regarding limited accessibility to section 8 waitlist at this time. Provided her with contact numbers again for [place] and [address] [place] [address] offices. Also provided contact number for [facility] as she has not heard back from them since initial contact regarding counseling services. Informed patient that this will be last outreach. Will help facilitate power WC process as needed with [facility] and have them reach out to patient directly. 1130 Received call back from Nancy Berman [phone] who confirmed aforementioned information and she will continue to facilitate documentation between PCP. She will contact patient if a loner power chair or manual chair is available. Nancy Berman stated their inventory is low and loner power chairs are scarce, it is possible a manual may be available if patient needs one. Will sign off at this time.         Niall HERNANDEZ, [facility]     [phone]

## 2023-02-10 ENCOUNTER — CARE COORDINATION (OUTPATIENT)
Dept: CARE COORDINATION | Age: 58
End: 2023-02-10

## 2023-02-10 DIAGNOSIS — R51.9 FREQUENT HEADACHES: Primary | ICD-10-CM

## 2023-02-10 DIAGNOSIS — R41.3 MEMORY PROBLEM: ICD-10-CM

## 2023-02-10 DIAGNOSIS — G93.0 ARACHNOID CYST: ICD-10-CM

## 2023-02-10 DIAGNOSIS — I67.9 CEREBROVASCULAR SMALL VESSEL DISEASE: ICD-10-CM

## 2023-02-10 NOTE — CARE COORDINATION
Ambulatory Care Coordination Note  2/10/2023    Patient Current Location:  Home: [address] [place] [id number]     ACM contacted the patient by telephone. Verified name and  with patient as identifiers. Provided introduction to self, and explanation of the ACM role.     Challenges to be reviewed by the provider   Additional needs identified to be addressed with provider: Yes  Notes on foot to be completed from last visit 23  [facility] Referral for Dr. Negron             Method of communication with provider: chart routing.    ACM: Bharti Jarquin, RN    ACM completed follow up call with patient with help from . Patient provided information on foot care agency [facility] that come to her home to provide foot care. ACM will call for documentation to be sent to office to be faxed to [facility]. Patient was frustrated and did not understand why she was being denied for Diabetic shoes that this has not happened before. ACM said she is waiting on completion of documentation from last office visit and Insurance guidelines can change on approval. Patient was becoming agitated on the phone with ACM and did not understand. ACM said she would continue to work on gathering all documentation needed to send to  Clinic.     Plan:    F/U with Provider.   [facility] documentation faxed to office  Fax documentation to  Clinic     Offered patient enrollment in the Remote Patient Monitoring (RPM) program for in-home monitoring: Patient is not eligible for RPM program.    Lab Results       None            Care Coordination Interventions    Referral from Primary Care Provider: No  Suggested Interventions and Community Resources  Social Work: In Process (Comment:  referral)  Other Services: In Process (Comment: [facility])  Transportation Support: In Process  Zone Management Tools: In Process (Comment: DM zone mangement tool)          Goals Addressed    None    Future Appointments   Date Time Provider Leo Santiago   2/13/2023  1:45 PM Cintia Vega MD [facility] DRFLD [facility]   2/15/2023  8:30 AM Shanita Higgins, PT [facility] PT [facility]   2/15/2023 11:00 AM [facility] MRI RM 2 [facility] MRI [facility]   2/16/2023  3:00 PM Sharlene Meza, APRN - CNP R BANK PAIN [facility]   2/27/2023  7:30 AM MD SCOT [facility] PC [place] - DYD   2/28/2023  9:30 AM Fide Hernandez MD HEALTHY WT [facility]   4/5/2023 10:20 AM Taty Glass MD KW SLEEP MED [facility]   7/24/2023 11:00 AM Cee Wang MD PLASTICS/REC [facility]   12/5/2023 12:00 PM MD SCOT Loyd CARDIO [facility]   1/29/2024 10:00 AM Digna Roman MD [facility] GYN [facility]

## 2023-02-13 ENCOUNTER — OFFICE VISIT (OUTPATIENT)
Dept: ORTHOPEDIC SURGERY | Age: 58
End: 2023-02-13

## 2023-02-13 ENCOUNTER — TELEPHONE (OUTPATIENT)
Dept: PRIMARY CARE CLINIC | Age: 58
End: 2023-02-13

## 2023-02-13 VITALS — WEIGHT: 248 LBS | HEIGHT: 67 IN | BODY MASS INDEX: 38.92 KG/M2

## 2023-02-13 DIAGNOSIS — M17.11 PRIMARY OSTEOARTHRITIS OF RIGHT KNEE: ICD-10-CM

## 2023-02-13 DIAGNOSIS — M17.12 PRIMARY OSTEOARTHRITIS OF LEFT KNEE: Primary | ICD-10-CM

## 2023-02-13 RX ORDER — METHYLPREDNISOLONE ACETATE 40 MG/ML
40 INJECTION, SUSPENSION INTRA-ARTICULAR; INTRALESIONAL; INTRAMUSCULAR; SOFT TISSUE ONCE
Status: COMPLETED | OUTPATIENT
Start: 2023-02-13 | End: 2023-02-13

## 2023-02-13 RX ORDER — POLYETHYLENE GLYCOL-3350 AND ELECTROLYTES 236; 6.74; 5.86; 2.97; 22.74 G/274.31G; G/274.31G; G/274.31G; G/274.31G; G/274.31G
POWDER, FOR SOLUTION ORAL
COMMUNITY
Start: 2023-02-10

## 2023-02-13 RX ADMIN — METHYLPREDNISOLONE ACETATE 40 MG: 40 INJECTION, SUSPENSION INTRA-ARTICULAR; INTRALESIONAL; INTRAMUSCULAR; SOFT TISSUE at 18:18

## 2023-02-13 RX ADMIN — METHYLPREDNISOLONE ACETATE 40 MG: 40 INJECTION, SUSPENSION INTRA-ARTICULAR; INTRALESIONAL; INTRAMUSCULAR; SOFT TISSUE at 18:17

## 2023-02-13 NOTE — PROGRESS NOTES
2/13/23 2:28 PM     Lidocaine Injection      NDC: 5481-7051-84    Lot Number: [id number]    Body Part: bilateral knees

## 2023-02-13 NOTE — PROGRESS NOTES
Chief complaint bilateral knee pain. Follow-up patient. Patient is a 28-year-old female who comes in today with her . She has a history of bilateral knee osteoarthritis. She was last seen on October 26, 2022 where she was diagnosed with arthritis. Patient was given bilateral knee injections which she reports were helpful. She comes in today for follow-up evaluation and to discuss other treatment options. Specifically she had questions with regard to lubricant therapy. She reports that she has pain associated with standing and walking. She also has difficulty bending her knee. She feels like the injections helped her.     Pain Assessment  Location of Pain: Knee  Location Modifiers: Right, Left  Severity of Pain: 9  Quality of Pain: Sharp  Aggravating Factors: Standing (SITTING)  Limiting Behavior: Yes  Relieving Factors: Rest  Result of Injury: No  Work-Related Injury: No  Are there other pain locations you wish to document?: No    Past Medical History:   Diagnosis Date    Arthritis     Chronic pain     TAN (dyspnea on exertion)     Frequent headaches 12/13/2022    Gallbladder disorder     Gastroesophageal reflux disease 01/28/2020    Hx of degenerative disc disease     Hyperlipidemia     Hypertension     Morbid obesity with body mass index (BMI) of 60.0 to 69.9 in adult ([address] 07/02/2018    Neuropathy     SHYANN (obstructive sleep apnea)     Poor circulation     Primary osteoarthritis of right knee 07/02/2018    Type 2 diabetes mellitus without complication ([address]     controlled with diet    Urinary incontinence         Past Surgical History:   Procedure Laterality Date    BACK SURGERY      2001    BREAST LUMPECTOMY Bilateral     2015    DILATION AND CURETTAGE OF UTERUS N/A 08/30/2018    GASTRIC BYPASS SURGERY      SLEEVE GASTRECTOMY  10/13/2018       Family History   Problem Relation Age of Onset    Diabetes Mother     Stroke Mother     Osteoarthritis Mother     Heart Disease Father     Other Father     High Blood Pressure Father     Osteoarthritis Father     Diabetes Maternal Aunt     Cancer Maternal Aunt     Diabetes Maternal Grandmother     Cancer Paternal Aunt        Social History     Socioeconomic History    Marital status:      Spouse name: None    Number of children: None    Years of education: None    Highest education level: None   Tobacco Use    Smoking status: Never    Smokeless tobacco: Never   Vaping Use    Vaping Use: Never used   Substance and Sexual Activity    Alcohol use: No    Drug use: No    Sexual activity: Never   Social History Narrative    ** Merged History Encounter **          Social Determinants of Health     Financial Resource Strain: High Risk    Difficulty of Paying Living Expenses: Very hard   Food Insecurity: Food Insecurity Present    Worried About Running Out of Food in the Last Year: Often true    Ran Out of Food in the Last Year: Often true   Transportation Needs: Unmet Transportation Needs    Lack of Transportation (Non-Medical): Yes   Housing Stability: Unknown    Unstable Housing in the Last Year: No       Current Outpatient Medications   Medication Sig Dispense Refill    diclofenac sodium (VOLTAREN) 1 % GEL Apply 4 g topically 4 times daily 100 g 1    GAVILYTE-G 236 g solution       buprenorphine (BUPRENEX) 10 MCG/HR PTWK APPLY 1 PATCH TOPICALLY ONCE A WEEK      docusate sodium (COLACE) 100 MG capsule Take 1 capsule by mouth 2 times daily as needed for Constipation As needed for constipation 60 capsule 0    loperamide (RA ANTI-DIARRHEAL) 2 MG capsule Take 1 capsule by mouth 4 times daily as needed for Diarrhea 30 capsule 0    omeprazole (PRILOSEC) 20 MG delayed release capsule Take 1 capsule by mouth in the morning and 1 capsule in the evening.  60 capsule 5    vitamin D (ERGOCALCIFEROL) 1.25 MG (20290 UT) CAPS capsule Take 1 capsule by mouth once a week 4 capsule 5    vitamin B-12 (CYANOCOBALAMIN) 1000 MCG tablet Take 1,000 mcg by mouth daily      Multiple Vitamins-Minerals (THERAPEUTIC MULTIVITAMIN-MINERALS) tablet Take 1 tablet by mouth daily      vitamin D (ERGOCALCIFEROL) 1.25 MG (09089 UT) CAPS capsule TAKE 1 CAPSULE BY MOUTH ONE TIME PER WEEK 4 capsule 5    diclofenac sodium (VOLTAREN) 1 % GEL Apply 2 g topically 4 times daily 2 g 0    furosemide (LASIX) 40 MG tablet TAKE 1 TABLET BY MOUTH EVERY DAY 90 tablet 3    Blood Glucose Monitoring Suppl (BLOOD GLUCOSE MONITOR SYSTEM) w/Device KIT Dispense glucometer covered by patient's insurance 1 kit 0    blood glucose monitor strips Test once daily 100 strip 3    zoster recombinant adjuvanted vaccine (SHINGRIX) 50 MCG/0.5ML SUSR injection Inject 0.5 mLs into the muscle See Admin Instructions 1 dose now and repeat in 2-6 months 0.5 mL 0    Lancets MISC Use to test blood sugar once daily 100 each 3    acetaminophen (TYLENOL) 500 MG tablet Take 1 tablet by mouth 4 times daily as needed for Pain 60 tablet 2    promethazine (PHENERGAN) 25 MG tablet Take 1 tablet by mouth every 6 hours as needed for Nausea 20 tablet 0    meloxicam (MOBIC) 15 MG tablet Take 1 tablet by mouth daily 30 tablet 0    gabapentin (NEURONTIN) 300 MG capsule Take 1 capsule by mouth 3 times daily for 180 days. Intended supply: 30 days 90 capsule 1     Current Facility-Administered Medications   Medication Dose Route Frequency Provider Last Rate Last Admin    methylPREDNISolone acetate (DEPO-MEDROL) injection 40 mg  40 mg Intra-artICUlar Once Katya Lucas MD           No Known Allergies    Vital signs:  Ht 5' 7\" (1.702 m)   Wt 248 lb (112.5 kg)   BMI 38.84 kg/m²      Constitution: Patient cooperative with examination today. BMI 38, in no acute distress. Neuro: Alert & oriented x 3,  no focal motor or sensory deficits noted. Eyes: sclera clear, atraumatic  Ears: Normal external ear  Mouth:  No perioral lesions  Pulm: Respirations unlabored and regular  Pulse: Extremities well-perfused.   2+ peripheral pulses   Skin: Warm, no ulcerations        Right knee exam    Examination of the right knee shows normal alignment and full range of motion. The quadriceps are well-developed. Small effusion is present. No soft tissue swelling is noted. The patient has no tenderness to palpation about the joint. There is a negative Everett sign. The Lachman sign is negative. The anterior and posterior drawer signs are negative. The cruciate and collateral ligaments are intact. The patient has a negative hyperflexion test.  No bursal swelling is present. There is mild crepitus. There is no pain with compression of the patella. The patella apprehension sign is negative. Q angles are within normal limits. There is no pretibial edema. Pulses are symmetric. Sensation is intact to light-touch. Left knee exam    Examination of the left knee shows normal alignment and full range of motion. The quadriceps are well-developed. Trace effusion is present. No soft tissue swelling is noted. The patient has no tenderness to palpation about the joint. There is a negative Everett sign. The Lachman sign is negative. The anterior and posterior drawer signs are negative. The cruciate and collateral ligaments are intact. The patient has a negative hyperflexion test.  No bursal swelling is present. There is mild crepitus. There is slight pain with compression of the patella. The patella apprehension sign is negative. Q angles are within normal limits. There is no pretibial edema. Pulses are symmetric. Sensation is intact to light-touch. Long conversation with patient today regarding treatment options. She has high-grade arthritis. She has significant obesity. She is a very poor surgical candidate so I do not think this is an option for her. We talked about the treatments that were available to her. She has had some success with diclofenac gel in the past and has asked for a prescription.   We gave her prescription for diclofenac gel.  Also after discussing her treatment options we move forward with repeating her steroid injections. Bilateral knee steroid injections: The indications for and risks of steroid injection as well as treatment alternatives were discussed with the patient who consented to the procedure. Under sterile conditions and after informed consent was obtained the patient was given an injection into Each knee. 80 mg of Depo-Medrol and 4 mL of 1% lidocaine were placed in the knees bilaterally after they was prepped with chlorhexidine. This resulted in good relief of symptoms. There were no complications. The patient was advised to ice there knees this evening and to avoid vigorous activities for the next 2 days. They were advised to call us if there was any erythema, enduration, swelling or increasing pain. I do believe that Euflexxa would be a reasonable option for her given that she is not a surgical candidate and she has high-grade osteoarthritis. We will put in a request for approval for bilateral Euflexxa injections and we will see her back once these have been approved. Time spent reviewing the patients medical records, performing the history and physical exam, reviewing diagnostic studies, developing and communicating the treatment plan and answering questions was: 38 minutes        I personally reviewed the patient's pain scale, review of systems, family history, social history, past medical history, allergies and medications. Review of systems was collected today, reviewed and is included in the medical record. It is available under the media tab. Anastasiia Alexis MD  Sports Medicine, Knee and Shoulder Surgery    This dictation was performed with a verbal recognition program [facility]) and it was checked for errors. It is possible that there are still dictated errors within this office note. If so, please bring any errors to my attention for an addendum.   All efforts were made to ensure that this office note is accurate.

## 2023-02-14 NOTE — PROGRESS NOTES
2-14 (8:14) An Belarusian  was requested for 2-22 @[facility]. [facility] language job #[id number]/ LD

## 2023-02-15 ENCOUNTER — APPOINTMENT (OUTPATIENT)
Dept: PHYSICAL THERAPY | Age: 58
End: 2023-02-15
Payer: COMMERCIAL

## 2023-02-15 ENCOUNTER — HOSPITAL ENCOUNTER (OUTPATIENT)
Dept: MRI IMAGING | Age: 58
Discharge: HOME | End: 2023-02-15
Payer: COMMERCIAL

## 2023-02-15 ENCOUNTER — TELEPHONE (OUTPATIENT)
Dept: PRIMARY CARE CLINIC | Age: 58
End: 2023-02-15

## 2023-02-15 DIAGNOSIS — M89.8X8 MASS OF SPINE: ICD-10-CM

## 2023-02-15 PROCEDURE — 6360000004 HC RX CONTRAST MEDICATION: Performed by: NEUROLOGICAL SURGERY

## 2023-02-15 PROCEDURE — 72157 MRI CHEST SPINE W/O & W/DYE: CPT

## 2023-02-15 PROCEDURE — A9579 GAD-BASE MR CONTRAST NOS,1ML: HCPCS | Performed by: NEUROLOGICAL SURGERY

## 2023-02-15 RX ORDER — VITAMIN B COMPLEX
1 CAPSULE ORAL DAILY
Status: ON HOLD | COMMUNITY
End: 2023-02-24 | Stop reason: HOSPADM

## 2023-02-15 RX ADMIN — GADOTERIDOL 20 ML: 279.3 INJECTION, SOLUTION INTRAVENOUS at 12:26

## 2023-02-15 NOTE — PROGRESS NOTES
[facility] PRE-SURGICAL TESTING INSTRUCTIONS                      PRIOR TO PROCEDURE DATE:    1. PLEASE FOLLOW ANY INSTRUCTIONS GIVEN TO YOU PER YOUR SURGEON. 2. Arrange for someone to drive you home and be with you for the first 24 hours after discharge for your safety after your procedure for which you received sedation. Ensure it is someone we can share information with regarding your discharge. NOTE: At this time ONLY 2 ADULTS may accompany you   One person ENCOURAGED to stay at hospital entire time if outpatient surgery      3. You must contact your surgeon for instructions IF:  You are taking any blood thinners, aspirin, anti-inflammatory or vitamins. There is a change in your physical condition such as a cold, fever, rash, cuts, sores, or any other infection, especially near your surgical site. 4. Do not drink alcohol the day before or day of your procedure. Do not use any recreational marijuana at least 24 hours or street drugs (heroin, cocaine) at minimum 5 days prior to your procedure. 5. A Pre-Surgical History and Physical MUST be completed WITHIN 30 DAYS OR LESS prior to your procedure. by your Physician or an Urgent Care        THE DAY OF YOUR PROCEDURE:  1. Follow instructions for ARRIVAL TIME as DIRECTED BY YOUR SURGEON. 2. Enter the MAIN entrance from [employer] and follow the signs to the free Parking [employer] or [employer] (offered free of charge 7 am-5pm). 3. Enter the Main Entrance of the hospital (do not enter from the lower level of the parking garage). Upon entrance, check in with the  at the surgical information desk on your LEFT. Bring your insurance card and photo ID to register      4. DO NOT EAT ANYTHING 8 hours prior to arrival for surgery. You may have up to 8 ounces of water 4 hours prior to your arrival for surgery.    NOTE: ALL Gastric, Bariatric & Bowel surgery patients - you MUST follow your surgeon's instructions regarding eating/ drinking as you will have very specific instructions to follow. If you did not receive these, call your surgeon's office immediately. 5. MEDICATIONS:  Take the following medications with a SMALL sip of water: GABAPENTIN AND OMEPRAZOLE AND IF NEEDED PERCOCET; REMINDED SHE WILL BE TAKING MEDICATIONS ON EMPTY STOMACH  Use your usual dose of inhalers the morning of surgery. BRING your rescue inhaler with you to hospital.   Anesthesia does NOT want you to take insulin the morning of surgery. They will control your blood sugar while you are at the hospital. Please contact your ordering physician for instructions regarding your insulin the night before your procedure. If you have an insulin pump, please keep it set on basal rate. Bariatric patient's call your surgeon if on diabetic medications as some may need to be stopped 1 week prior to surgery    6. Do not swallow additional water when brushing teeth. No gum, candy, mints, or ice chips. Refrain from smoking or at least decrease the amount on day of surgery. 7. Morning of surgery:   Take a shower with an antibacterial soap (i.e., Safeguard or Dial) OR your physician may have instructed you to use Hibiclens. Dress in loose, comfortable clothing appropriate for redressing after your procedure. Do not wear jewelry (including body piercings), make-up (especially NO eye make-up), fingernail polish (NO toenail polish if foot/leg surgery), lotion, powders, or metal hairclips. Do not shave or wax for 72 hours prior to procedure near your operative site. Shaving with a razor can irritate your skin and make it easier to develop an infection. On the day of your procedure, any hair that needs to be removed near the surgical site will be 'clipped' by a healthcare worker using a special clipper designed to avoid skin irritation. 8. Dentures, glasses, or contacts will need to be removed before your procedure.  Bring cases for your glasses, contacts, dentures, or hearing aids to protect them while you are in surgery. 9. If you use a CPAP, please bring it with you on the day of your procedure. 10. We recommend that valuable personal belongings such as cash, cell phones, e-tablets, or jewelry, be left at home during your stay. The hospital will not be responsible for valuables that are not secured in the hospital safe. However, if your insurance requires a co-pay, you may want to bring a method of payment, i.e., Check or credit card, if you wish to pay your co-pay the day of surgery. 11. If you are to stay overnight, you may bring a bag with personal items. Please have any large items you may need brought in by your family after your arrival to your hospital room. 12. If you have a Living Will or Durable Power of , please bring a copy on the day of your procedure. How we keep you safe and work to prevent surgical site infections:   1. Health care workers should always check your ID bracelet to verify your name and birth date. You will be asked many times to state your name, date of birth, and allergies. 2. Health care workers should always clean their hands with soap or alcohol gel before providing care to you. It is okay to ask anyone if they cleaned their hands before they touch you. 3. You will be actively involved in verifying the type of procedure you are having and ensuring the correct surgical site. This will be confirmed multiple times prior to your procedure. Do NOT lilo your surgery site UNLESS instructed to by your surgeon. 4. When you are in the operating room, your surgical site will be cleansed with a special soap, and in most cases, you will be given an antibiotic before the surgery begins. What to expect AFTER your procedure? 1. Immediately following your procedure, your will be taken to the PACU for the first phase of your recovery.   Your nurse will help you recover from any potential side effects of anesthesia, such as extreme drowsiness, changes in your vital signs or breathing patterns. Nausea, headache, muscle aches, or sore throat may also occur after anesthesia. Your nurse will help you manage these potential side effects. 2. For comfort and safety, arrange to have someone at home with you for the first 24 hours after discharge. 3. You and your family will be given written instructions about your diet, activity, dressing care, medications, and return visits. 4. Once at home, should issues with nausea, pain, or bleeding occur, or should you notice any signs of infection, you should call your surgeon. 5. Always clean your hands before and after caring for your wound. Do not let your family touch your surgery site without cleaning their hands. 6. Narcotic pain medications can cause significant constipation. You may want to add a stool softener to your postoperative medication schedule or speak to your surgeon on how best to manage this SIDE EFFECT. SPECIAL INSTRUCTIONS PATIENT ACKNOWLEDGES UNDERSTANDING OF PRE OP INSTRUCTIONS. Thank you for allowing us to care for you. We strive to exceed your expectations in the delivery of care and service provided to you and your family. If you need to contact the [place] [address] staff for any reason, please call us at [phone]    Instructions reviewed with patient during preadmission testing phone interview.   Mei Lowery RN.2/15/2023 .3:52 PM      ADDITIONAL EDUCATIONAL INFORMATION REVIEWED PER PHONE WITH YOU AND/OR YOUR FAMILY:  Yes Hibiclens® Bathing Instructions PATIENT TO FOLLOW  INSTRUCTIONS GIVEN TO HER BY SURGEON

## 2023-02-15 NOTE — TELEPHONE ENCOUNTER
Kellen from [facility] regarding pre-op not being signed and pt is going in surgery this Wednesday 2/22    Please advise    [phone]

## 2023-02-15 NOTE — PROGRESS NOTES
Place patient label inside box (if no patient label, complete below)  Name:  :    MR#:   Nikko Tolentino / Horace Mendoza StrTess  I (we), JARROD LUCIO (Patient Name) authorize MD Ila Timmons MD (Provider / Muna Hopper) and/or such assistants as may be selected by him/her, to perform the following operation/procedure(s): T10 T11 LAMINETOMY, POSSIBLE FACETECTOMY, POSSIBLE PEDICLE SCREW FIXATION FOR REMOVAL OF THORACIC MASS       Note: If unable to obtain consent prior to an emergent procedure, document the emergent reason in the medical record. This procedure has been explained to my (our) satisfaction and included in the explanation was: The intended benefit, nature, and extent of the procedure to be performed; The significant risks involved and the probability of success; Alternative procedures and methods of treatment; The dangers and probable consequences of such alternatives (including no procedure or treatment); The expected consequences of the procedure on my future health; Whether other qualified individuals would be performing important surgical tasks and/or whether  would be present to advise or support the procedure. I (we) understand that there are other risks of infection and other serious complications in the pre-operative/procedural and postoperative/procedural stages of my (our) care. I (we) have asked all of the questions which I (we) thought were important in deciding whether or not to undergo treatment or diagnosis. These questions have been answered to my (our) satisfaction. I (we) understand that no assurance can be given that the procedure will be a success, and no guarantee or warranty of success has been given to me (us).     It has been explained to me (us) that during the course of the operation/procedure, unforeseen conditions may be revealed that necessitate extension of the original procedure(s) or different procedure(s) than those set forth in Paragraph 1. I (we) authorize and request that the above-named physician, his/her assistants or his/her designees, perform procedures as necessary and desirable if deemed to be in my (our) best interest.     Revised 8/2/2021                                                                          Page 1 of 2       I acknowledge that health care personnel may be observing this procedure for the purpose of medical education or other specified purposes as may be necessary as requested and/or approved by my (our) physician. I (we) consent to the disposal by the hospital Pathologist of the removed tissue, parts or organs in accordance with hospital policy. I do ____ do not ____ consent to the use of a local infiltration pain blocking agent that will be used by my provider/surgical provider to help alleviate pain during my procedure. I do ____ do not ____ consent to an emergent blood transfusion in the case of a life-threatening situation that requires blood components to be administered. This consent is valid for 24 hours from the beginning of the procedure. This patient does ____ or does not ____ currently have a DNR status/order. If DNR order is in place, obtain Addendum to the Surgical Consent for ALL Patients with a DNR Order to address danni-operative status for limited intervention or DNR suspension.      I have read and fully understand the above Consent for Operation/Procedure and that all blanks were completed before I signed the consent.   _____________________________       _____________________      ____/____am/pm  Signature of Patient or legal representative      Printed Name / Relationship            Date / Time   ____________________________       _____________________      ____/____am/pm  Witness to Signature                                    Printed Name                    Date / Time    If patient is unable to sign or is a minor, complete the following)  Patient is a minor, ____ years of age, or unable to sign because:   ______________________________________________________________________________________________    If a phone consent is obtained, consent will be documented by using two health care professionals, each affirming that the consenting party has no questions and gives consent for the procedure discussed with the physician/provider.   _____________________          ____________________       _____/_____am/pm   2nd witness to phone consent        Printed name           Date / Time    Informed Consent:  I have provided the explanation described above in section 1 to the patient and/or legal representative.  I have provided the patient and/or legal representative with an opportunity to ask any questions about the proposed operation/procedure.   ___________________________          ____________________         ____/____am/pm  Provider / Proceduralist                            Printed name            Date / Time  Revised 8/2/2021                                                                      Page 2 of 2

## 2023-02-15 NOTE — PROGRESS NOTES
H&P DONE 2/3/23 BY DR Harry Ribeiro IN [employer] ENCOUNTERS NOT SIGNED. I CONTACTED THE OFFICE [phone] SPOKE WITH KELVIN AND MESSAGE TAKEN. -DB

## 2023-02-16 PROBLEM — M89.8X8 MASS OF SPINE: Status: ACTIVE | Noted: 2023-02-16

## 2023-02-16 PROBLEM — R07.9 CHEST PAIN: Status: ACTIVE | Noted: 2023-02-16

## 2023-02-16 PROBLEM — E11.42 DIABETIC POLYNEUROPATHY ASSOCIATED WITH TYPE 2 DIABETES MELLITUS (HCC): Status: ACTIVE | Noted: 2023-02-16

## 2023-02-16 ASSESSMENT — ENCOUNTER SYMPTOMS
SORE THROAT: 0
CHEST TIGHTNESS: 0
COUGH: 0
RHINORRHEA: 0
DIARRHEA: 1
CONSTIPATION: 1
NAUSEA: 1
CONSTIPATION: 1
NAUSEA: 1
BACK PAIN: 1
ABDOMINAL PAIN: 0
VOMITING: 1
SINUS PRESSURE: 0
DIARRHEA: 1
SHORTNESS OF BREATH: 0
ABDOMINAL DISTENTION: 0
TROUBLE SWALLOWING: 0
ABDOMINAL DISTENTION: 0
BACK PAIN: 1
WHEEZING: 0
BLOOD IN STOOL: 0

## 2023-02-16 NOTE — TELEPHONE ENCOUNTER
Spoke with Jovon Flannery at [employer] and informed her that everything is in [place] and signed off. She checked and they have everything they need.

## 2023-02-17 ENCOUNTER — CARE COORDINATION (OUTPATIENT)
Dept: CARE COORDINATION | Age: 58
End: 2023-02-17

## 2023-02-17 NOTE — CARE COORDINATION
ACM contacted [facility] and spoke to Michelle Tanner who will be sending over patients visit summary. FORRESTM will fax to [facility] once received.

## 2023-02-21 ENCOUNTER — CLINICAL DOCUMENTATION (OUTPATIENT)
Dept: OTHER | Facility: CLINIC | Age: 58
End: 2023-02-21

## 2023-02-21 ENCOUNTER — ANESTHESIA EVENT (OUTPATIENT)
Dept: OPERATING ROOM | Age: 58
End: 2023-02-21
Payer: COMMERCIAL

## 2023-02-22 ENCOUNTER — APPOINTMENT (OUTPATIENT)
Dept: CT IMAGING | Age: 58
End: 2023-02-22
Attending: NEUROLOGICAL SURGERY
Payer: COMMERCIAL

## 2023-02-22 ENCOUNTER — HOSPITAL ENCOUNTER (INPATIENT)
Age: 58
LOS: 8 days | Discharge: NURSING, REHAB OR LONG-TERM CARE | End: 2023-03-02
Attending: NEUROLOGICAL SURGERY | Admitting: NEUROLOGICAL SURGERY
Payer: COMMERCIAL

## 2023-02-22 ENCOUNTER — ANESTHESIA (OUTPATIENT)
Dept: OPERATING ROOM | Age: 58
End: 2023-02-22
Payer: COMMERCIAL

## 2023-02-22 DIAGNOSIS — M48.9 MASS OF THORACIC VERTEBRA: ICD-10-CM

## 2023-02-22 DIAGNOSIS — Z98.1 S/P SPINAL FUSION: Primary | ICD-10-CM

## 2023-02-22 LAB
ABO/RH: NORMAL
ANTIBODY SCREEN: NORMAL
GLUCOSE BLD-MCNC: 106 MG/DL (ref 70–99)
GLUCOSE BLD-MCNC: 87 MG/DL (ref 70–99)
PERFORMED ON: ABNORMAL
PERFORMED ON: NORMAL

## 2023-02-22 PROCEDURE — 7100000001 HC PACU RECOVERY - ADDTL 15 MIN: Performed by: NEUROLOGICAL SURGERY

## 2023-02-22 PROCEDURE — 94150 VITAL CAPACITY TEST: CPT

## 2023-02-22 PROCEDURE — 6360000002 HC RX W HCPCS: Performed by: NEUROLOGICAL SURGERY

## 2023-02-22 PROCEDURE — 8E0WXBF COMPUTER ASSISTED PROCEDURE OF TRUNK REGION, WITH FLUOROSCOPY: ICD-10-PCS | Performed by: NEUROLOGICAL SURGERY

## 2023-02-22 PROCEDURE — 6360000002 HC RX W HCPCS: Performed by: ANESTHESIOLOGY

## 2023-02-22 PROCEDURE — 94799 UNLISTED PULMONARY SVC/PX: CPT

## 2023-02-22 PROCEDURE — 6360000002 HC RX W HCPCS: Performed by: NURSE PRACTITIONER

## 2023-02-22 PROCEDURE — 7100000000 HC PACU RECOVERY - FIRST 15 MIN: Performed by: NEUROLOGICAL SURGERY

## 2023-02-22 PROCEDURE — 3700000000 HC ANESTHESIA ATTENDED CARE: Performed by: NEUROLOGICAL SURGERY

## 2023-02-22 PROCEDURE — 2720000010 HC SURG SUPPLY STERILE: Performed by: NEUROLOGICAL SURGERY

## 2023-02-22 PROCEDURE — 2580000003 HC RX 258: Performed by: NEUROLOGICAL SURGERY

## 2023-02-22 PROCEDURE — 6370000000 HC RX 637 (ALT 250 FOR IP): Performed by: NURSE PRACTITIONER

## 2023-02-22 PROCEDURE — 86850 RBC ANTIBODY SCREEN: CPT

## 2023-02-22 PROCEDURE — 2709999900 HC NON-CHARGEABLE SUPPLY: Performed by: NEUROLOGICAL SURGERY

## 2023-02-22 PROCEDURE — 6360000002 HC RX W HCPCS: Performed by: NURSE ANESTHETIST, CERTIFIED REGISTERED

## 2023-02-22 PROCEDURE — 88341 IMHCHEM/IMCYTCHM EA ADD ANTB: CPT

## 2023-02-22 PROCEDURE — 2500000003 HC RX 250 WO HCPCS: Performed by: NURSE ANESTHETIST, CERTIFIED REGISTERED

## 2023-02-22 PROCEDURE — 88307 TISSUE EXAM BY PATHOLOGIST: CPT

## 2023-02-22 PROCEDURE — C1713 ANCHOR/SCREW BN/BN,TIS/BN: HCPCS | Performed by: NEUROLOGICAL SURGERY

## 2023-02-22 PROCEDURE — 3600000014 HC SURGERY LEVEL 4 ADDTL 15MIN: Performed by: NEUROLOGICAL SURGERY

## 2023-02-22 PROCEDURE — 2580000003 HC RX 258: Performed by: ANESTHESIOLOGY

## 2023-02-22 PROCEDURE — 77011 CT SCAN FOR LOCALIZATION: CPT

## 2023-02-22 PROCEDURE — 2580000003 HC RX 258: Performed by: NURSE ANESTHETIST, CERTIFIED REGISTERED

## 2023-02-22 PROCEDURE — 88360 TUMOR IMMUNOHISTOCHEM/MANUAL: CPT

## 2023-02-22 PROCEDURE — 2500000003 HC RX 250 WO HCPCS: Performed by: ANESTHESIOLOGY

## 2023-02-22 PROCEDURE — 2500000003 HC RX 250 WO HCPCS: Performed by: NEUROLOGICAL SURGERY

## 2023-02-22 PROCEDURE — 3700000001 HC ADD 15 MINUTES (ANESTHESIA): Performed by: NEUROLOGICAL SURGERY

## 2023-02-22 PROCEDURE — C9290 INJ, BUPIVACAINE LIPOSOME: HCPCS | Performed by: NEUROLOGICAL SURGERY

## 2023-02-22 PROCEDURE — 00BX0ZZ EXCISION OF THORACIC SPINAL CORD, OPEN APPROACH: ICD-10-PCS | Performed by: NEUROLOGICAL SURGERY

## 2023-02-22 PROCEDURE — 86900 BLOOD TYPING SEROLOGIC ABO: CPT

## 2023-02-22 PROCEDURE — 0RG6071 FUSION OF THORACIC VERTEBRAL JOINT WITH AUTOLOGOUS TISSUE SUBSTITUTE, POSTERIOR APPROACH, POSTERIOR COLUMN, OPEN APPROACH: ICD-10-PCS | Performed by: NEUROLOGICAL SURGERY

## 2023-02-22 PROCEDURE — 2700000000 HC OXYGEN THERAPY PER DAY

## 2023-02-22 PROCEDURE — 2500000003 HC RX 250 WO HCPCS

## 2023-02-22 PROCEDURE — 2580000003 HC RX 258: Performed by: NURSE PRACTITIONER

## 2023-02-22 PROCEDURE — A4217 STERILE WATER/SALINE, 500 ML: HCPCS | Performed by: NEUROLOGICAL SURGERY

## 2023-02-22 PROCEDURE — 88342 IMHCHEM/IMCYTCHM 1ST ANTB: CPT

## 2023-02-22 PROCEDURE — C1762 CONN TISS, HUMAN(INC FASCIA): HCPCS | Performed by: NEUROLOGICAL SURGERY

## 2023-02-22 PROCEDURE — 3600000004 HC SURGERY LEVEL 4 BASE: Performed by: NEUROLOGICAL SURGERY

## 2023-02-22 PROCEDURE — 2060000000 HC ICU INTERMEDIATE R&B

## 2023-02-22 PROCEDURE — 86901 BLOOD TYPING SEROLOGIC RH(D): CPT

## 2023-02-22 PROCEDURE — 00NX0ZZ RELEASE THORACIC SPINAL CORD, OPEN APPROACH: ICD-10-PCS | Performed by: NEUROLOGICAL SURGERY

## 2023-02-22 PROCEDURE — 94761 N-INVAS EAR/PLS OXIMETRY MLT: CPT

## 2023-02-22 DEVICE — ROD SPNL L45MM DIA5.5MM TI PRELORDOSED MEDIALIZED POST: Type: IMPLANTABLE DEVICE | Status: FUNCTIONAL

## 2023-02-22 DEVICE — GRAFT BNE MED 6.25 CC MTRX FIBERGRAFT BG: Type: IMPLANTABLE DEVICE | Status: FUNCTIONAL

## 2023-02-22 DEVICE — SET SCR SPNL L25MM DIA5.5MM TI FOR 5.5MM ROD EXPEDIUM: Type: IMPLANTABLE DEVICE | Status: FUNCTIONAL

## 2023-02-22 DEVICE — SCREW SPNL L40MM DIA6MM TI SGL INNR POLYAX FOR 5.5MM ROD: Type: IMPLANTABLE DEVICE | Status: FUNCTIONAL

## 2023-02-22 RX ORDER — REMIFENTANIL HYDROCHLORIDE 1 MG/ML
INJECTION, POWDER, LYOPHILIZED, FOR SOLUTION INTRAVENOUS CONTINUOUS PRN
Status: DISCONTINUED | OUTPATIENT
Start: 2023-02-22 | End: 2023-02-22 | Stop reason: SDUPTHER

## 2023-02-22 RX ORDER — MIDAZOLAM HYDROCHLORIDE 1 MG/ML
INJECTION INTRAMUSCULAR; INTRAVENOUS PRN
Status: DISCONTINUED | OUTPATIENT
Start: 2023-02-22 | End: 2023-02-22 | Stop reason: SDUPTHER

## 2023-02-22 RX ORDER — FENTANYL CITRATE 50 UG/ML
25 INJECTION, SOLUTION INTRAMUSCULAR; INTRAVENOUS EVERY 5 MIN PRN
Status: DISCONTINUED | OUTPATIENT
Start: 2023-02-22 | End: 2023-02-22 | Stop reason: HOSPADM

## 2023-02-22 RX ORDER — OXYCODONE HYDROCHLORIDE 5 MG/1
10 TABLET ORAL EVERY 4 HOURS PRN
Status: DISCONTINUED | OUTPATIENT
Start: 2023-02-22 | End: 2023-02-27

## 2023-02-22 RX ORDER — DIAZEPAM 5 MG/1
5 TABLET ORAL EVERY 6 HOURS PRN
Status: DISCONTINUED | OUTPATIENT
Start: 2023-02-22 | End: 2023-03-02 | Stop reason: HOSPADM

## 2023-02-22 RX ORDER — LABETALOL HYDROCHLORIDE 5 MG/ML
10 INJECTION, SOLUTION INTRAVENOUS
Status: DISCONTINUED | OUTPATIENT
Start: 2023-02-22 | End: 2023-02-22 | Stop reason: HOSPADM

## 2023-02-22 RX ORDER — PANTOPRAZOLE SODIUM 40 MG/1
40 TABLET, DELAYED RELEASE ORAL
Status: DISCONTINUED | OUTPATIENT
Start: 2023-02-23 | End: 2023-03-02 | Stop reason: HOSPADM

## 2023-02-22 RX ORDER — PROPOFOL 10 MG/ML
INJECTION, EMULSION INTRAVENOUS PRN
Status: DISCONTINUED | OUTPATIENT
Start: 2023-02-22 | End: 2023-02-22 | Stop reason: SDUPTHER

## 2023-02-22 RX ORDER — SODIUM CHLORIDE 9 MG/ML
INJECTION, SOLUTION INTRAVENOUS CONTINUOUS
Status: DISCONTINUED | OUTPATIENT
Start: 2023-02-22 | End: 2023-02-23

## 2023-02-22 RX ORDER — FAMOTIDINE 10 MG/ML
INJECTION, SOLUTION INTRAVENOUS
Status: COMPLETED
Start: 2023-02-22 | End: 2023-02-22

## 2023-02-22 RX ORDER — SODIUM CHLORIDE 9 MG/ML
INJECTION, SOLUTION INTRAVENOUS PRN
Status: DISCONTINUED | OUTPATIENT
Start: 2023-02-22 | End: 2023-02-22 | Stop reason: HOSPADM

## 2023-02-22 RX ORDER — SUCCINYLCHOLINE/SOD CL,ISO/PF 200MG/10ML
SYRINGE (ML) INTRAVENOUS PRN
Status: DISCONTINUED | OUTPATIENT
Start: 2023-02-22 | End: 2023-02-22 | Stop reason: SDUPTHER

## 2023-02-22 RX ORDER — ONDANSETRON 2 MG/ML
INJECTION INTRAMUSCULAR; INTRAVENOUS PRN
Status: DISCONTINUED | OUTPATIENT
Start: 2023-02-22 | End: 2023-02-22 | Stop reason: SDUPTHER

## 2023-02-22 RX ORDER — OXYCODONE HYDROCHLORIDE 5 MG/1
5 TABLET ORAL EVERY 4 HOURS PRN
Status: DISCONTINUED | OUTPATIENT
Start: 2023-02-22 | End: 2023-02-27

## 2023-02-22 RX ORDER — EPHEDRINE SULFATE 50 MG/ML
INJECTION INTRAVENOUS PRN
Status: DISCONTINUED | OUTPATIENT
Start: 2023-02-22 | End: 2023-02-22 | Stop reason: SDUPTHER

## 2023-02-22 RX ORDER — NALOXONE HYDROCHLORIDE 0.4 MG/ML
0.2 INJECTION, SOLUTION INTRAMUSCULAR; INTRAVENOUS; SUBCUTANEOUS PRN
Status: DISCONTINUED | OUTPATIENT
Start: 2023-02-22 | End: 2023-03-02 | Stop reason: HOSPADM

## 2023-02-22 RX ORDER — SODIUM CHLORIDE 0.9 % (FLUSH) 0.9 %
5-40 SYRINGE (ML) INJECTION PRN
Status: DISCONTINUED | OUTPATIENT
Start: 2023-02-22 | End: 2023-03-02 | Stop reason: HOSPADM

## 2023-02-22 RX ORDER — SODIUM CHLORIDE 0.9 % (FLUSH) 0.9 %
5-40 SYRINGE (ML) INJECTION EVERY 12 HOURS SCHEDULED
Status: DISCONTINUED | OUTPATIENT
Start: 2023-02-22 | End: 2023-03-02 | Stop reason: HOSPADM

## 2023-02-22 RX ORDER — LIDOCAINE HYDROCHLORIDE 20 MG/ML
INJECTION, SOLUTION INTRAVENOUS PRN
Status: DISCONTINUED | OUTPATIENT
Start: 2023-02-22 | End: 2023-02-22 | Stop reason: SDUPTHER

## 2023-02-22 RX ORDER — GABAPENTIN 300 MG/1
300 CAPSULE ORAL 3 TIMES DAILY
Status: DISCONTINUED | OUTPATIENT
Start: 2023-02-22 | End: 2023-03-02 | Stop reason: HOSPADM

## 2023-02-22 RX ORDER — PROCHLORPERAZINE EDISYLATE 5 MG/ML
10 INJECTION INTRAMUSCULAR; INTRAVENOUS EVERY 6 HOURS PRN
Status: COMPLETED | OUTPATIENT
Start: 2023-02-22 | End: 2023-02-25

## 2023-02-22 RX ORDER — METHOCARBAMOL 750 MG/1
750 TABLET, FILM COATED ORAL EVERY 6 HOURS SCHEDULED
Status: DISCONTINUED | OUTPATIENT
Start: 2023-02-23 | End: 2023-03-02 | Stop reason: HOSPADM

## 2023-02-22 RX ORDER — HYDRALAZINE HYDROCHLORIDE 20 MG/ML
10 INJECTION INTRAMUSCULAR; INTRAVENOUS
Status: DISCONTINUED | OUTPATIENT
Start: 2023-02-22 | End: 2023-02-22 | Stop reason: HOSPADM

## 2023-02-22 RX ORDER — ONDANSETRON 2 MG/ML
4 INJECTION INTRAMUSCULAR; INTRAVENOUS
Status: DISCONTINUED | OUTPATIENT
Start: 2023-02-22 | End: 2023-02-22 | Stop reason: HOSPADM

## 2023-02-22 RX ORDER — OXYCODONE HYDROCHLORIDE 5 MG/1
10 TABLET ORAL PRN
Status: DISCONTINUED | OUTPATIENT
Start: 2023-02-22 | End: 2023-02-22 | Stop reason: HOSPADM

## 2023-02-22 RX ORDER — PROCHLORPERAZINE EDISYLATE 5 MG/ML
5 INJECTION INTRAMUSCULAR; INTRAVENOUS
Status: DISCONTINUED | OUTPATIENT
Start: 2023-02-22 | End: 2023-02-22 | Stop reason: HOSPADM

## 2023-02-22 RX ORDER — DEXAMETHASONE SODIUM PHOSPHATE 4 MG/ML
INJECTION, SOLUTION INTRA-ARTICULAR; INTRALESIONAL; INTRAMUSCULAR; INTRAVENOUS; SOFT TISSUE PRN
Status: DISCONTINUED | OUTPATIENT
Start: 2023-02-22 | End: 2023-02-22 | Stop reason: SDUPTHER

## 2023-02-22 RX ORDER — PROPOFOL 10 MG/ML
INJECTION, EMULSION INTRAVENOUS CONTINUOUS PRN
Status: DISCONTINUED | OUTPATIENT
Start: 2023-02-22 | End: 2023-02-22 | Stop reason: SDUPTHER

## 2023-02-22 RX ORDER — ACETAMINOPHEN 500 MG
1000 TABLET ORAL EVERY 6 HOURS SCHEDULED
Status: DISCONTINUED | OUTPATIENT
Start: 2023-02-22 | End: 2023-03-02 | Stop reason: HOSPADM

## 2023-02-22 RX ORDER — VANCOMYCIN HYDROCHLORIDE 1 G/20ML
INJECTION, POWDER, LYOPHILIZED, FOR SOLUTION INTRAVENOUS PRN
Status: DISCONTINUED | OUTPATIENT
Start: 2023-02-22 | End: 2023-02-22 | Stop reason: ALTCHOICE

## 2023-02-22 RX ORDER — MAGNESIUM HYDROXIDE 1200 MG/15ML
LIQUID ORAL CONTINUOUS PRN
Status: DISCONTINUED | OUTPATIENT
Start: 2023-02-22 | End: 2023-02-22 | Stop reason: HOSPADM

## 2023-02-22 RX ORDER — ONDANSETRON 2 MG/ML
4 INJECTION INTRAMUSCULAR; INTRAVENOUS EVERY 6 HOURS PRN
Status: DISCONTINUED | OUTPATIENT
Start: 2023-02-22 | End: 2023-03-02 | Stop reason: HOSPADM

## 2023-02-22 RX ORDER — SODIUM CHLORIDE 9 MG/ML
INJECTION, SOLUTION INTRAVENOUS PRN
Status: DISCONTINUED | OUTPATIENT
Start: 2023-02-22 | End: 2023-03-02 | Stop reason: HOSPADM

## 2023-02-22 RX ORDER — SODIUM CHLORIDE, SODIUM LACTATE, POTASSIUM CHLORIDE, CALCIUM CHLORIDE 600; 310; 30; 20 MG/100ML; MG/100ML; MG/100ML; MG/100ML
INJECTION, SOLUTION INTRAVENOUS CONTINUOUS PRN
Status: DISCONTINUED | OUTPATIENT
Start: 2023-02-22 | End: 2023-02-22 | Stop reason: SDUPTHER

## 2023-02-22 RX ORDER — SODIUM CHLORIDE, SODIUM LACTATE, POTASSIUM CHLORIDE, CALCIUM CHLORIDE 600; 310; 30; 20 MG/100ML; MG/100ML; MG/100ML; MG/100ML
INJECTION, SOLUTION INTRAVENOUS CONTINUOUS
Status: DISCONTINUED | OUTPATIENT
Start: 2023-02-22 | End: 2023-02-22 | Stop reason: HOSPADM

## 2023-02-22 RX ORDER — OXYCODONE HYDROCHLORIDE 5 MG/1
5 TABLET ORAL PRN
Status: DISCONTINUED | OUTPATIENT
Start: 2023-02-22 | End: 2023-02-22 | Stop reason: HOSPADM

## 2023-02-22 RX ORDER — ONDANSETRON 4 MG/1
4 TABLET, ORALLY DISINTEGRATING ORAL EVERY 8 HOURS PRN
Status: DISCONTINUED | OUTPATIENT
Start: 2023-02-22 | End: 2023-03-02 | Stop reason: HOSPADM

## 2023-02-22 RX ORDER — SODIUM CHLORIDE 0.9 % (FLUSH) 0.9 %
5-40 SYRINGE (ML) INJECTION EVERY 12 HOURS SCHEDULED
Status: DISCONTINUED | OUTPATIENT
Start: 2023-02-22 | End: 2023-02-22 | Stop reason: HOSPADM

## 2023-02-22 RX ORDER — FUROSEMIDE 40 MG/1
40 TABLET ORAL DAILY
Status: DISCONTINUED | OUTPATIENT
Start: 2023-02-22 | End: 2023-03-02 | Stop reason: HOSPADM

## 2023-02-22 RX ORDER — FENTANYL CITRATE 50 UG/ML
INJECTION, SOLUTION INTRAMUSCULAR; INTRAVENOUS PRN
Status: DISCONTINUED | OUTPATIENT
Start: 2023-02-22 | End: 2023-02-22 | Stop reason: SDUPTHER

## 2023-02-22 RX ORDER — SODIUM CHLORIDE 0.9 % (FLUSH) 0.9 %
5-40 SYRINGE (ML) INJECTION PRN
Status: DISCONTINUED | OUTPATIENT
Start: 2023-02-22 | End: 2023-02-22 | Stop reason: HOSPADM

## 2023-02-22 RX ADMIN — LIDOCAINE HYDROCHLORIDE 100 MG: 20 INJECTION INTRAVENOUS at 08:40

## 2023-02-22 RX ADMIN — SODIUM CHLORIDE, SODIUM LACTATE, POTASSIUM CHLORIDE, AND CALCIUM CHLORIDE: .6; .31; .03; .02 INJECTION, SOLUTION INTRAVENOUS at 11:34

## 2023-02-22 RX ADMIN — REMIFENTANIL HYDROCHLORIDE 0.2 MCG/KG/MIN: 1 INJECTION, POWDER, LYOPHILIZED, FOR SOLUTION INTRAVENOUS at 08:59

## 2023-02-22 RX ADMIN — CEFAZOLIN 2000 MG: 2 INJECTION, POWDER, FOR SOLUTION INTRAMUSCULAR; INTRAVENOUS at 16:39

## 2023-02-22 RX ADMIN — FENTANYL CITRATE 100 MCG: 50 INJECTION, SOLUTION INTRAMUSCULAR; INTRAVENOUS at 10:05

## 2023-02-22 RX ADMIN — PROPOFOL 80 MG: 10 INJECTION, EMULSION INTRAVENOUS at 08:53

## 2023-02-22 RX ADMIN — PROPOFOL 120 MCG/KG/MIN: 10 INJECTION, EMULSION INTRAVENOUS at 08:42

## 2023-02-22 RX ADMIN — MIDAZOLAM HYDROCHLORIDE 2 MG: 2 INJECTION, SOLUTION INTRAMUSCULAR; INTRAVENOUS at 08:35

## 2023-02-22 RX ADMIN — SODIUM CHLORIDE: 9 INJECTION, SOLUTION INTRAVENOUS at 16:36

## 2023-02-22 RX ADMIN — ACETAMINOPHEN 1000 MG: 500 TABLET ORAL at 13:52

## 2023-02-22 RX ADMIN — GABAPENTIN 300 MG: 300 CAPSULE ORAL at 19:57

## 2023-02-22 RX ADMIN — SODIUM CHLORIDE, PRESERVATIVE FREE 10 ML: 5 INJECTION INTRAVENOUS at 19:57

## 2023-02-22 RX ADMIN — SODIUM CHLORIDE, SODIUM LACTATE, POTASSIUM CHLORIDE, AND CALCIUM CHLORIDE: .6; .31; .03; .02 INJECTION, SOLUTION INTRAVENOUS at 08:35

## 2023-02-22 RX ADMIN — GABAPENTIN 300 MG: 300 CAPSULE ORAL at 16:39

## 2023-02-22 RX ADMIN — METHOCARBAMOL 1000 MG: 100 INJECTION, SOLUTION INTRAMUSCULAR; INTRAVENOUS at 22:55

## 2023-02-22 RX ADMIN — OXYCODONE 10 MG: 5 TABLET ORAL at 19:21

## 2023-02-22 RX ADMIN — FUROSEMIDE 40 MG: 40 TABLET ORAL at 16:39

## 2023-02-22 RX ADMIN — DEXAMETHASONE SODIUM PHOSPHATE 4 MG: 4 INJECTION, SOLUTION INTRAMUSCULAR; INTRAVENOUS at 09:04

## 2023-02-22 RX ADMIN — HYDROMORPHONE HYDROCHLORIDE 0.5 MG: 1 INJECTION, SOLUTION INTRAMUSCULAR; INTRAVENOUS; SUBCUTANEOUS at 16:51

## 2023-02-22 RX ADMIN — PROPOFOL 120 MG: 10 INJECTION, EMULSION INTRAVENOUS at 08:41

## 2023-02-22 RX ADMIN — Medication 10 MG: at 13:50

## 2023-02-22 RX ADMIN — ACETAMINOPHEN 1000 MG: 500 TABLET ORAL at 16:51

## 2023-02-22 RX ADMIN — CEFAZOLIN 2000 MG: 2 INJECTION, POWDER, FOR SOLUTION INTRAMUSCULAR; INTRAVENOUS at 23:43

## 2023-02-22 RX ADMIN — METHOCARBAMOL 1000 MG: 100 INJECTION, SOLUTION INTRAMUSCULAR; INTRAVENOUS at 13:40

## 2023-02-22 RX ADMIN — SODIUM CHLORIDE, POTASSIUM CHLORIDE, SODIUM LACTATE AND CALCIUM CHLORIDE: 600; 310; 30; 20 INJECTION, SOLUTION INTRAVENOUS at 07:15

## 2023-02-22 RX ADMIN — HYDROMORPHONE HYDROCHLORIDE 0.5 MG: 1 INJECTION, SOLUTION INTRAMUSCULAR; INTRAVENOUS; SUBCUTANEOUS at 20:34

## 2023-02-22 RX ADMIN — Medication 120 MG: at 08:42

## 2023-02-22 RX ADMIN — OXYCODONE 10 MG: 5 TABLET ORAL at 15:37

## 2023-02-22 RX ADMIN — HYDROMORPHONE HYDROCHLORIDE 0.5 MG: 1 INJECTION, SOLUTION INTRAMUSCULAR; INTRAVENOUS; SUBCUTANEOUS at 13:17

## 2023-02-22 RX ADMIN — SODIUM CHLORIDE, PRESERVATIVE FREE 20 MG: 5 INJECTION INTRAVENOUS at 13:50

## 2023-02-22 RX ADMIN — FENTANYL CITRATE 100 MCG: 50 INJECTION, SOLUTION INTRAMUSCULAR; INTRAVENOUS at 08:53

## 2023-02-22 RX ADMIN — HYDROMORPHONE HYDROCHLORIDE 0.5 MG: 1 INJECTION, SOLUTION INTRAMUSCULAR; INTRAVENOUS; SUBCUTANEOUS at 23:43

## 2023-02-22 RX ADMIN — HYDROMORPHONE HYDROCHLORIDE 0.5 MG: 1 INJECTION, SOLUTION INTRAMUSCULAR; INTRAVENOUS; SUBCUTANEOUS at 12:55

## 2023-02-22 RX ADMIN — ONDANSETRON 4 MG: 2 INJECTION INTRAMUSCULAR; INTRAVENOUS at 09:04

## 2023-02-22 RX ADMIN — EPHEDRINE SULFATE 15 MG: 50 INJECTION INTRAVENOUS at 09:16

## 2023-02-22 RX ADMIN — HYDROMORPHONE HYDROCHLORIDE 0.5 MG: 1 INJECTION, SOLUTION INTRAMUSCULAR; INTRAVENOUS; SUBCUTANEOUS at 12:33

## 2023-02-22 RX ADMIN — DIAZEPAM 5 MG: 5 TABLET ORAL at 19:57

## 2023-02-22 RX ADMIN — ONDANSETRON 4 MG: 2 INJECTION INTRAMUSCULAR; INTRAVENOUS at 19:21

## 2023-02-22 RX ADMIN — DIAZEPAM 5 MG: 5 TABLET ORAL at 13:50

## 2023-02-22 RX ADMIN — HYDROMORPHONE HYDROCHLORIDE 0.5 MG: 1 INJECTION, SOLUTION INTRAMUSCULAR; INTRAVENOUS; SUBCUTANEOUS at 12:25

## 2023-02-22 RX ADMIN — SODIUM CHLORIDE, SODIUM LACTATE, POTASSIUM CHLORIDE, AND CALCIUM CHLORIDE: .6; .31; .03; .02 INJECTION, SOLUTION INTRAVENOUS at 09:26

## 2023-02-22 RX ADMIN — CEFAZOLIN 2000 MG: 2 INJECTION, POWDER, FOR SOLUTION INTRAMUSCULAR; INTRAVENOUS at 08:47

## 2023-02-22 ASSESSMENT — PAIN SCALES - GENERAL
PAINLEVEL_OUTOF10: 10
PAINLEVEL_OUTOF10: 10
PAINLEVEL_OUTOF10: 9
PAINLEVEL_OUTOF10: 9
PAINLEVEL_OUTOF10: 8
PAINLEVEL_OUTOF10: 10
PAINLEVEL_OUTOF10: 10
PAINLEVEL_OUTOF10: 9
PAINLEVEL_OUTOF10: 10
PAINLEVEL_OUTOF10: 9
PAINLEVEL_OUTOF10: 10
PAINLEVEL_OUTOF10: 10
PAINLEVEL_OUTOF10: 8
PAINLEVEL_OUTOF10: 8
PAINLEVEL_OUTOF10: 10
PAINLEVEL_OUTOF10: 4
PAINLEVEL_OUTOF10: 8
PAINLEVEL_OUTOF10: 10

## 2023-02-22 ASSESSMENT — PAIN DESCRIPTION - ORIENTATION
ORIENTATION: LOWER;MID
ORIENTATION: MID
ORIENTATION: POSTERIOR
ORIENTATION: LOWER
ORIENTATION: MID;LOWER
ORIENTATION: MID

## 2023-02-22 ASSESSMENT — PAIN DESCRIPTION - ONSET
ONSET: ON-GOING
ONSET: UNABLE TO COMMUNICATE
ONSET: ON-GOING

## 2023-02-22 ASSESSMENT — PAIN DESCRIPTION - DESCRIPTORS
DESCRIPTORS: ACHING
DESCRIPTORS: STABBING;SHARP
DESCRIPTORS: ACHING
DESCRIPTORS: ACHING
DESCRIPTORS: SHARP;STABBING
DESCRIPTORS: ACHING
DESCRIPTORS: SORE

## 2023-02-22 ASSESSMENT — PAIN DESCRIPTION - LOCATION
LOCATION: BACK

## 2023-02-22 ASSESSMENT — PAIN DESCRIPTION - PAIN TYPE
TYPE: SURGICAL PAIN;CHRONIC PAIN
TYPE: SURGICAL PAIN
TYPE: SURGICAL PAIN
TYPE: ACUTE PAIN;SURGICAL PAIN
TYPE: CHRONIC PAIN
TYPE: SURGICAL PAIN
TYPE: ACUTE PAIN;SURGICAL PAIN
TYPE: SURGICAL PAIN;CHRONIC PAIN

## 2023-02-22 ASSESSMENT — PAIN - FUNCTIONAL ASSESSMENT
PAIN_FUNCTIONAL_ASSESSMENT: PREVENTS OR INTERFERES WITH MANY ACTIVE NOT PASSIVE ACTIVITIES
PAIN_FUNCTIONAL_ASSESSMENT: ACTIVITIES ARE NOT PREVENTED
PAIN_FUNCTIONAL_ASSESSMENT: PREVENTS OR INTERFERES WITH ALL ACTIVE AND SOME PASSIVE ACTIVITIES
PAIN_FUNCTIONAL_ASSESSMENT: PREVENTS OR INTERFERES SOME ACTIVE ACTIVITIES AND ADLS
PAIN_FUNCTIONAL_ASSESSMENT: PREVENTS OR INTERFERES WITH MANY ACTIVE NOT PASSIVE ACTIVITIES

## 2023-02-22 ASSESSMENT — PAIN DESCRIPTION - DIRECTION: RADIATING_TOWARDS: DOWN BIL LEGS

## 2023-02-22 ASSESSMENT — PAIN DESCRIPTION - FREQUENCY
FREQUENCY: CONTINUOUS

## 2023-02-22 ASSESSMENT — LIFESTYLE VARIABLES: SMOKING_STATUS: 0

## 2023-02-22 NOTE — ANESTHESIA POSTPROCEDURE EVALUATION
Department of Anesthesiology  Postprocedure Note    Patient: Vanesa Carter  MRN: [id number]  YOB: 1965  Date of evaluation: 2/22/2023      Procedure Summary     Date: 02/22/23 Room / Location: [facility]    Anesthesia Start: 4171 Anesthesia Stop: 1574    Procedure: T10-T11 LAMINECTOMY, FACETECTOMY, PEDICLE SCREW FIXATION FOR REMOVAL OF THORACIC MASS (Spine Thoracic) Diagnosis:       Mass of thoracic vertebra      (Mass of thoracic vertebra [M48.9])    Surgeons: Argelia Clemente. Alen Archer MD Responsible Provider: Arpit Reyna DO    Anesthesia Type: general ASA Status: 3          Anesthesia Type: No value filed.     Óscar Phase I: Óscar Score: 8    Óscar Phase II:        Anesthesia Post Evaluation    Patient location during evaluation: PACU  Patient participation: complete - patient participated  Level of consciousness: awake and alert  Airway patency: patent  Nausea & Vomiting: no nausea and no vomiting  Complications: no  Cardiovascular status: hemodynamically stable  Respiratory status: acceptable  Hydration status: euvolemic  Multimodal analgesia pain management approach

## 2023-02-22 NOTE — ANESTHESIA PRE PROCEDURE
Department of Anesthesiology  Preprocedure Note       Name:  Jami Fontenot   Age:  61 y.o.  :  1965                                          MRN:  [id number]         Date:  2023      Surgeon: Sasha Turner):  Claude Alan. MD Anitha Ralph MD    Procedure: Procedure(s):  T10-T11 LAMINECTOMY, POSSIBLE FACETECTOMY, POSSIBLE PEDICLE SCREW FIXATION FOR REMOVAL OF THORACIC MASS    Medications prior to admission:   Prior to Admission medications    Medication Sig Start Date End Date Taking? Authorizing Provider   oxyCODONE-Acetaminophen (PERCOCET PO) Take by mouth as needed 5/325 mg   Yes Historical Provider, MD   b complex vitamins capsule Take 1 capsule by mouth daily   Yes Historical Provider, MD   diclofenac sodium (VOLTAREN) 1 % GEL APPLY 2G TOPICALLY 4 TIMES A DAY 23   Edita Schwartz MD   GAVILYTE-G 236 g solution  2/10/23   Historical Provider, MD   buprenorphine (BUPRENEX) 10 MCG/HR PTWK APPLY 1 PATCH TOPICALLY ONCE A WEEK 23   Historical Provider, MD   docusate sodium (COLACE) 100 MG capsule Take 1 capsule by mouth 2 times daily as needed for Constipation As needed for constipation 23   Otila Sandhoff, MD   loperamide (RA ANTI-DIARRHEAL) 2 MG capsule Take 1 capsule by mouth 4 times daily as needed for Diarrhea 23   Otila Sandhoff, MD   omeprazole (PRILOSEC) 20 MG delayed release capsule Take 1 capsule by mouth in the morning and 1 capsule in the evening.  23   Otila Sandhoff, MD   vitamin B-12 (CYANOCOBALAMIN) 1000 MCG tablet Take 1,000 mcg by mouth daily    Historical Provider, MD   Multiple Vitamins-Minerals (THERAPEUTIC MULTIVITAMIN-MINERALS) tablet Take 1 tablet by mouth daily    Historical Provider, MD   vitamin D (ERGOCALCIFEROL) 1.25 MG (82669 UT) CAPS capsule TAKE 1 CAPSULE BY MOUTH ONE TIME PER WEEK 23   Otila Sandhoff, MD   furosemide (LASIX) 40 MG tablet TAKE 1 TABLET BY MOUTH EVERY DAY 22   Lisa London MD   Blood Glucose Monitoring Suppl (BLOOD GLUCOSE MONITOR SYSTEM) w/Device KIT Dispense glucometer covered by patient's insurance 12/1/22   Peyton Beach MD   blood glucose monitor strips Test once daily 12/1/22   Peyton Beach MD   zoster recombinant adjuvanted vaccine [facility]) 50 MCG/0.5ML SUSR injection Inject 0.5 mLs into the muscle See Admin Instructions 1 dose now and repeat in 2-6 months 12/1/22 5/30/23  Peyton Beach MD   Lancets MISC Use to test blood sugar once daily 12/1/22   Peyton Beach MD   acetaminophen (TYLENOL) 500 MG tablet Take 1 tablet by mouth 4 times daily as needed for Pain 12/1/22   Peyton Beach MD   meloxicam PHILIPP VALVERDE [facility]) 15 MG tablet Take 1 tablet by mouth daily 11/14/22   Ulysses Chandler MD   gabapentin (NEURONTIN) 300 MG capsule Take 1 capsule by mouth 3 times daily for 180 days. Intended supply: 30 days 10/24/22 4/22/23  YURY Celis - CNP       Current medications:    Current Facility-Administered Medications   Medication Dose Route Frequency Provider Last Rate Last Admin    ceFAZolin (ANCEF) 2,000 mg in sodium chloride 0.9 % 50 mL IVPB (mini-bag)  2,000 mg IntraVENous Once Mayo Garibay MD        lactated ringers IV soln infusion   IntraVENous Continuous Fadumo Vera  mL/hr at 02/22/23 0715 New Bag at 02/22/23 0715       Allergies:     Allergies   Allergen Reactions    Pork-Derived Products      Holiness reasons -- no pork products of any kind       Problem List:    Patient Active Problem List   Diagnosis Code    Postlaminectomy syndrome of lumbar region M96.1    Lumbar radiculopathy M54.16    SHYANN (obstructive sleep apnea) G47.33    Chronic, continuous use of opioids F11.90    Class 2 severe obesity due to excess calories with serious comorbidity and body mass index (BMI) of 37.0 to 37.9 in adult ([facility] [address] E66.01, Z68.37    Primary osteoarthritis of right knee M17.11    Primary osteoarthritis of left knee M17.12    Polyarthropathy, multiple sites M13.0    Chronic pain syndrome G89.4    Postmenopausal bleeding N95.0    Other spondylosis, cervical region M47.892    Pain disorder with psychological factors F45.42    Palpitations R00.2    Sinus bradycardia R00.1    Dizziness R42    Gastroesophageal reflux disease K21.9    Urinary tract infection symptoms R39.9    Vitamin D deficiency E55.9    Bilateral leg pain M79.604, M79.605    Cellulitis of lower extremity L03.119    Constipation K59.00    Hemorrhoids K64.9    Numbness and tingling of both feet R20.0, R20.2    Nausea R11.0    Epigastric abdominal pain R10.13    Bilateral flank pain R10.9    Pain medication agreement Z02.89    Other spondylosis, lumbar region M47.896    Stress fracture of left tibia M84.362A    Left ankle pain M25.572    Peroneal tendon tear, left, initial encounter S80.46A    H/O diabetic neuropathy Z86.39    Chronic pain in right shoulder M25.511, G89.29    Type 2 diabetes mellitus with diabetic polyneuropathy, without long-term current use of insulin ([facility]) E11.42    Vaginal discharge N89.8    Vaginal itching N89.8    Precordial pain R07.2    Bilateral leg edema R60.0    Nausea and vomiting R11.2    Gallstones K80.20    Frequent headaches R51.9    Memory problem R41.3    Anger R45.4    Hoarding behavior F42.3    Anemia D64.9    Excess skin L98.7    Cerebrovascular small vessel disease I67.9    Arachnoid cyst G93.0    Mixed hyperlipidemia E78.2    Diarrhea R19.7    Gait disorder R26.9    Neuropathy G62.9    Diabetic polyneuropathy associated with type 2 diabetes mellitus (HCC) E11.42    Chest pain R07.9    Mass of spine M89.8X8       Past Medical History:        Diagnosis Date    Arthritis     Chronic pain     TAN (dyspnea on exertion)     Frequent headaches 12/13/2022    Gallbladder disorder     Gastroesophageal reflux disease 01/28/2020    Hx of blood clots     in legs -- 13 years ago    Hx of degenerative disc disease     Hyperlipidemia     Hypertension     Morbid obesity with body mass index (BMI) of 60.0 to 69.9 in adult [facility]) 07/02/2018    Neuropathy     SHYANN (obstructive sleep apnea)     machine broke down, awaiting new machine at the end of June 2023    Poor circulation     Primary osteoarthritis of right knee 07/02/2018    Type 2 diabetes mellitus without complication ([address]     controlled with diet    Urinary incontinence     Vitamin D deficiency 01/28/2020    Wears glasses        Past Surgical History:        Procedure Laterality Date    BACK SURGERY      2001 -- L4 and L5 -- no metal or implants    BREAST LUMPECTOMY Bilateral     2015    COLONOSCOPY      DILATION AND CURETTAGE OF UTERUS N/A 08/30/2018    GASTRIC BYPASS SURGERY      SLEEVE GASTRECTOMY  10/13/2018       Social History:    Social History     Tobacco Use    Smoking status: Never    Smokeless tobacco: Never   Substance Use Topics    Alcohol use: Never                                Counseling given: Not Answered      Vital Signs (Current):   Vitals:    02/13/23 1516 02/22/23 0555   BP:  133/82   Pulse:  62   Resp:  18   Temp:  97.9 °F (36.6 °C)   TempSrc:  Temporal   SpO2:  99%   Weight: 246 lb (111.6 kg) 241 lb (109.3 kg)   Height: 5' 6.5\" (1.689 m) 5' 7\" (1.702 m)                                              BP Readings from Last 3 Encounters:   02/22/23 133/82   02/03/23 134/84   02/01/23 118/78       NPO Status: Time of last liquid consumption: 1700                        Time of last solid consumption: 2345                        Date of last liquid consumption: 02/21/23                        Date of last solid food consumption: 02/22/23    BMI:   Wt Readings from Last 3 Encounters:   02/22/23 241 lb (109.3 kg)   02/13/23 248 lb (112.5 kg)   02/03/23 246 lb (111.6 kg)     Body mass index is 37.75 kg/m².     CBC:   Lab Results   Component Value Date/Time    WBC 7.9 02/03/2023 03:05 PM    RBC 4.33 02/03/2023 03:05 PM    HGB 12.4 02/03/2023 03:05 PM    HCT 37.9 02/03/2023 03:05 PM    MCV 87.5 02/03/2023 03:05 PM    RDW 13.7 02/03/2023 03:05 PM     02/03/2023 03:05 PM       CMP:   Lab Results   Component Value Date/Time     02/03/2023 03:05 PM    K 4.2 02/03/2023 03:05 PM    K 4.3 11/21/2022 03:40 PM     02/03/2023 03:05 PM    CO2 27 02/03/2023 03:05 PM    BUN 19 02/03/2023 03:05 PM    CREATININE <0.5 02/03/2023 03:05 PM    GFRAA >60 06/15/2021 12:32 PM    AGRATIO 1.7 12/01/2022 12:23 PM    LABGLOM >60 02/03/2023 03:05 PM    GLUCOSE 78 02/03/2023 03:05 PM    PROT 6.9 12/01/2022 12:23 PM    CALCIUM 9.0 02/03/2023 03:05 PM    BILITOT 0.3 12/01/2022 12:23 PM    ALKPHOS 71 12/01/2022 12:23 PM    AST 11 12/01/2022 12:23 PM    ALT 10 12/01/2022 12:23 PM       POC Tests:   Recent Labs     02/22/23  0716   POCGLU 87       Coags:   Lab Results   Component Value Date/Time    PROTIME 13.4 02/03/2023 03:05 PM    INR 1.03 02/03/2023 03:05 PM    APTT 34.3 02/03/2023 03:05 PM       HCG (If Applicable):   Lab Results   Component Value Date    PREGTESTUR Negative 08/30/2018        ABGs: No results found for: PHART, PO2ART, YDC1HST, JZP2QYL, BEART, L7YPLNDH     Type & Screen (If Applicable):  No results found for: LABABO, LABRH    Drug/Infectious Status (If Applicable):  No results found for: HIV, HEPCAB    COVID-19 Screening (If Applicable): No results found for: COVID19        Anesthesia Evaluation  Patient summary reviewed and Nursing notes reviewed no history of anesthetic complications:   Airway: Mallampati: I  TM distance: >3 FB   Neck ROM: full  Mouth opening: > = 3 FB   Dental:          Pulmonary: breath sounds clear to auscultation  (+) sleep apnea:      (-) not a current smoker                           Cardiovascular:  Exercise tolerance: good (>4 METS),   (+) hypertension:, TAN:,         Rhythm: regular  Rate: normal                    Neuro/Psych:   (+) headaches:,             GI/Hepatic/Renal:   (+) GERD:,          ROS comment: S/p gastric sleeve. Endo/Other:    (+) DiabetesType II DM, well controlled, , : arthritis: OA., .                 Abdominal:   (+) obese,           Vascular: Other Findings:           Anesthesia Plan      general     ASA 3       Induction: intravenous. MIPS: Prophylactic antiemetics administered. Anesthetic plan and risks discussed with patient. Plan discussed with CRNA.                     Rossi Rodriguez, [place]   2/22/2023

## 2023-02-22 NOTE — PROGRESS NOTES
Patient received from the OR to pacu #16 post T10-T11 LAMINECTOMY, FACETECTOMY, PEDICLE SCREW FIXATION FOR REMOVAL OF THORACIC MASS of Dr. Delmis Butt. Placed on PACU monitoring equipment. Report given per CRNA and OR RN. Per report, patient required light BP support intra op, had exparel, band is on. On arrival, patient is crying, complaining of surgical pain 10/10. Is speaking in [place]. Accu check 106.

## 2023-02-22 NOTE — PROGRESS NOTES
Pt to hospitals for back surgery. Pt speaks [place] as first language, but also speaks [place].  here at bedside for all of admission, Dayton Foster, #[id number], phone [phone]. Pt is oriented X 4; speech clear; breathing easily on RA; arrived via medical transportation via her own wheelchair, and lives alone and states her only family is her daughter Zackary King, who is a surgeon in [facility]. Pt was able to transfer from [facility] to toilet, and also from [facility] to bed. States she uses a walker at home. This RN scrubbed pt's back with CHG wipes. Pt voided in hospitals, and also prayed in hospitals per her Denominational requirements before this RN was able to start admission process; pt states she prays 5 times daily. Pt also reported she is a very difficult stick. Warmed pt and another RN, Cathryn Puente, obtained #22 in right forearm and was able to draw blood for lab. This RN called PICC nurse, and KENNEDY Reid was able to come to bedside and placed a #20 IV in right forearm and #20 IV in left forearm, using ultrasound for both. Glc is 87, and pt states she does not take any medications for her diabetes. Pt had very many questions regarding the procedure. Informed this RN that she did talk with Dr. Dyanna Leyden using an  prior to arrival to [facility], but still questioned most questions that this RN asked. Josefina hatfield came to bedside to talk with pt regarding procedure, and Dr. Tabby Montague came to bedside to talk with pt and nellie a picture for pt to help with her understanding of the surgery. Dr. Tabby Montague okay'd this RN placing social work consult due to pt's mobility issues and the fact she lives alone. Pt now in surgery. Ancef 2 g IVPB to OR with pt.

## 2023-02-22 NOTE — FLOWSHEET NOTE
Patient dozing now. When awakened, states that  pain is \"a little better\" but rates 8/10 which is her baseline level of pain at home.

## 2023-02-22 NOTE — FLOWSHEET NOTE
PACU Transfer Note    Vitals:    02/22/23 1500   BP: 130/76   Pulse: 66   Resp: 13   Temp: 98.6 °F (37 °C)   SpO2: 100%       In: 2779 [P.O.:120; I.V.:2659]  Out: 890 [Urine:790]    Pain assessment:  present - adequately treated, patient lives with pain 8/10 and uses oral pain meds and patches at baseline  Pain Level: 8 (sleeping off and on now)    Report given to Receiving unit Dewayne BENAVIDES here at bedside in the pacu. Transferred with all belongings including personal WC to ready room in bed per pacu transporter.     2/22/2023 3:13 PM

## 2023-02-22 NOTE — PROGRESS NOTES
4 Eyes Admission Assessment     I agree as the admission nurse that 2 RN's have performed a thorough Head to Toe Skin Assessment on the patient. ALL assessment sites listed below have been assessed on admission. Areas assessed by both nurses:   [x]   Head, Face, and Ears   [x]   Shoulders, Back, and Chest  [x]   Arms, Elbows, and Hands   [x]   Coccyx, Sacrum, and Ischium  [x]   Legs, Feet, and Heels        Does the Patient have Skin Breakdown?    Blanchable redness to buttocks          Abdirashid Prevention initiated:  Yes   Wound Care Orders initiated:  No      C nurse consulted for Pressure Injury (Stage 3,4, Unstageable, DTI, NWPT, and Complex wounds) or Abdirashid score 18 or lower:  No      Nurse 1 eSignature: Electronically signed by Aminata Bruce RN on 2/22/23 at 3:35 PM EST    **SHARE this note so that the co-signing nurse is able to place an eSignature**    Nurse 2 eSignature: Electronically signed by Pa Dove RN on 2/22/23 at 6:57 PM EST

## 2023-02-22 NOTE — PROGRESS NOTES
Place patient label inside box (if no patient label, complete below)  Name:  :    MR#:   Pasquale Cover / PROCEDURE  I (we), JARROD LUCIO (Patient Name) authorize MD Ila Pierre MD (Provider / Addie Riggs) and/or such assistants as may be selected by him/her, to perform the following operation/procedure(s): T10 T11 LAMINECTOMY, POSSIBLE FACETECTOMY, POSSIBLE PEDICLE SCREW FIXATION FOR REMOVAL OF THORACIC MASS       Note: If unable to obtain consent prior to an emergent procedure, document the emergent reason in the medical record. This procedure has been explained to my (our) satisfaction and included in the explanation was: The intended benefit, nature, and extent of the procedure to be performed; The significant risks involved and the probability of success; Alternative procedures and methods of treatment; The dangers and probable consequences of such alternatives (including no procedure or treatment); The expected consequences of the procedure on my future health; Whether other qualified individuals would be performing important surgical tasks and/or whether  would be present to advise or support the procedure. I (we) understand that there are other risks of infection and other serious complications in the pre-operative/procedural and postoperative/procedural stages of my (our) care. I (we) have asked all of the questions which I (we) thought were important in deciding whether or not to undergo treatment or diagnosis. These questions have been answered to my (our) satisfaction. I (we) understand that no assurance can be given that the procedure will be a success, and no guarantee or warranty of success has been given to me (us).     It has been explained to me (us) that during the course of the operation/procedure, unforeseen conditions may be revealed that necessitate extension of the original procedure(s) or different procedure(s) than those set forth in Paragraph 1. I (we) authorize and request that the above-named physician, his/her assistants or his/her designees, perform procedures as necessary and desirable if deemed to be in my (our) best interest.     Revised 8/2/2021                                                                          Page 1 of 2       I acknowledge that health care personnel may be observing this procedure for the purpose of medical education or other specified purposes as may be necessary as requested and/or approved by my (our) physician. I (we) consent to the disposal by the hospital Pathologist of the removed tissue, parts or organs in accordance with hospital policy. I do ____ do not ____ consent to the use of a local infiltration pain blocking agent that will be used by my provider/surgical provider to help alleviate pain during my procedure. I do ____ do not ____ consent to an emergent blood transfusion in the case of a life-threatening situation that requires blood components to be administered. This consent is valid for 24 hours from the beginning of the procedure. This patient does ____ or does not ____ currently have a DNR status/order. If DNR order is in place, obtain Addendum to the Surgical Consent for ALL Patients with a DNR Order to address danni-operative status for limited intervention or DNR suspension.      I have read and fully understand the above Consent for Operation/Procedure and that all blanks were completed before I signed the consent.   _____________________________       _____________________      ____/____am/pm  Signature of Patient or legal representative      Printed Name / Relationship            Date / Time   ____________________________       _____________________      ____/____am/pm  Witness to Signature                                    Printed Name                    Date / Time    If patient is unable to sign or is a minor, complete the following)  Patient is a minor, ____ years of age, or unable to sign because:   ______________________________________________________________________________________________    If a phone consent is obtained, consent will be documented by using two health care professionals, each affirming that the consenting party has no questions and gives consent for the procedure discussed with the physician/provider.   _____________________          ____________________       _____/_____am/pm   2nd witness to phone consent        Printed name           Date / Time    Informed Consent:  I have provided the explanation described above in section 1 to the patient and/or legal representative.  I have provided the patient and/or legal representative with an opportunity to ask any questions about the proposed operation/procedure.   ___________________________          ____________________         ____/____am/pm  Provider / Proceduralist                            Printed name            Date / Time  Revised 8/2/2021                                                                      Page 2 of 2

## 2023-02-22 NOTE — PROGRESS NOTES
Pt admitted to room 5517. VSS. Four eye complete and in chart. Pt repositioned in bed. All fall precautions in place. Call light within reach. Denies further needs.

## 2023-02-23 LAB
A/G RATIO: 1.3 (ref 1.1–2.2)
ALBUMIN SERPL-MCNC: 3.5 G/DL (ref 3.4–5)
ALP BLD-CCNC: 63 U/L (ref 40–129)
ALT SERPL-CCNC: 8 U/L (ref 10–40)
ANION GAP SERPL CALCULATED.3IONS-SCNC: 9 MMOL/L (ref 3–16)
APTT: 29 SEC (ref 23–34.3)
AST SERPL-CCNC: 14 U/L (ref 15–37)
BASOPHILS ABSOLUTE: 0 K/UL (ref 0–0.2)
BASOPHILS RELATIVE PERCENT: 0.3 %
BILIRUB SERPL-MCNC: 0.4 MG/DL (ref 0–1)
BUN BLDV-MCNC: 10 MG/DL (ref 7–20)
CALCIUM SERPL-MCNC: 8.4 MG/DL (ref 8.3–10.6)
CHLORIDE BLD-SCNC: 101 MMOL/L (ref 99–110)
CO2: 29 MMOL/L (ref 21–32)
CREAT SERPL-MCNC: <0.5 MG/DL (ref 0.6–1.1)
EOSINOPHILS ABSOLUTE: 0 K/UL (ref 0–0.6)
EOSINOPHILS RELATIVE PERCENT: 0.5 %
GFR SERPL CREATININE-BSD FRML MDRD: >60 ML/MIN/{1.73_M2}
GLUCOSE BLD-MCNC: 109 MG/DL (ref 70–99)
HCT VFR BLD CALC: 34.6 % (ref 36–48)
HEMOGLOBIN: 11.4 G/DL (ref 12–16)
INR BLD: 1.06 (ref 0.87–1.14)
LYMPHOCYTES ABSOLUTE: 1.9 K/UL (ref 1–5.1)
LYMPHOCYTES RELATIVE PERCENT: 20.7 %
MCH RBC QN AUTO: 28.7 PG (ref 26–34)
MCHC RBC AUTO-ENTMCNC: 33 G/DL (ref 31–36)
MCV RBC AUTO: 87 FL (ref 80–100)
MONOCYTES ABSOLUTE: 1 K/UL (ref 0–1.3)
MONOCYTES RELATIVE PERCENT: 11.2 %
NEUTROPHILS ABSOLUTE: 6.1 K/UL (ref 1.7–7.7)
NEUTROPHILS RELATIVE PERCENT: 67.3 %
PDW BLD-RTO: 13.7 % (ref 12.4–15.4)
PLATELET # BLD: 201 K/UL (ref 135–450)
PMV BLD AUTO: 7.2 FL (ref 5–10.5)
POTASSIUM REFLEX MAGNESIUM: 3.7 MMOL/L (ref 3.5–5.1)
PROTHROMBIN TIME: 13.7 SEC (ref 11.7–14.5)
RBC # BLD: 3.98 M/UL (ref 4–5.2)
SODIUM BLD-SCNC: 139 MMOL/L (ref 136–145)
TOTAL PROTEIN: 6.1 G/DL (ref 6.4–8.2)
WBC # BLD: 9 K/UL (ref 4–11)

## 2023-02-23 PROCEDURE — 97162 PT EVAL MOD COMPLEX 30 MIN: CPT

## 2023-02-23 PROCEDURE — 94150 VITAL CAPACITY TEST: CPT

## 2023-02-23 PROCEDURE — APPNB30 APP NON BILLABLE TIME 0-30 MINS: Performed by: NURSE PRACTITIONER

## 2023-02-23 PROCEDURE — 97166 OT EVAL MOD COMPLEX 45 MIN: CPT

## 2023-02-23 PROCEDURE — 6370000000 HC RX 637 (ALT 250 FOR IP)

## 2023-02-23 PROCEDURE — 85730 THROMBOPLASTIN TIME PARTIAL: CPT

## 2023-02-23 PROCEDURE — 97530 THERAPEUTIC ACTIVITIES: CPT

## 2023-02-23 PROCEDURE — 80053 COMPREHEN METABOLIC PANEL: CPT

## 2023-02-23 PROCEDURE — 94799 UNLISTED PULMONARY SVC/PX: CPT

## 2023-02-23 PROCEDURE — 99024 POSTOP FOLLOW-UP VISIT: CPT | Performed by: NURSE PRACTITIONER

## 2023-02-23 PROCEDURE — 36415 COLL VENOUS BLD VENIPUNCTURE: CPT

## 2023-02-23 PROCEDURE — 6360000002 HC RX W HCPCS: Performed by: NURSE PRACTITIONER

## 2023-02-23 PROCEDURE — 97116 GAIT TRAINING THERAPY: CPT

## 2023-02-23 PROCEDURE — 2580000003 HC RX 258: Performed by: NURSE PRACTITIONER

## 2023-02-23 PROCEDURE — 2700000000 HC OXYGEN THERAPY PER DAY

## 2023-02-23 PROCEDURE — 85610 PROTHROMBIN TIME: CPT

## 2023-02-23 PROCEDURE — 85025 COMPLETE CBC W/AUTO DIFF WBC: CPT

## 2023-02-23 PROCEDURE — 97535 SELF CARE MNGMENT TRAINING: CPT

## 2023-02-23 PROCEDURE — 6370000000 HC RX 637 (ALT 250 FOR IP): Performed by: NURSE PRACTITIONER

## 2023-02-23 PROCEDURE — 6360000002 HC RX W HCPCS: Performed by: INTERNAL MEDICINE

## 2023-02-23 PROCEDURE — 94761 N-INVAS EAR/PLS OXIMETRY MLT: CPT

## 2023-02-23 PROCEDURE — 2060000000 HC ICU INTERMEDIATE R&B

## 2023-02-23 RX ORDER — LIDOCAINE 4 G/G
1 PATCH TOPICAL DAILY
Status: DISCONTINUED | OUTPATIENT
Start: 2023-02-23 | End: 2023-03-02 | Stop reason: HOSPADM

## 2023-02-23 RX ORDER — SCOLOPAMINE TRANSDERMAL SYSTEM 1 MG/1
1 PATCH, EXTENDED RELEASE TRANSDERMAL
Status: DISCONTINUED | OUTPATIENT
Start: 2023-02-23 | End: 2023-03-02 | Stop reason: HOSPADM

## 2023-02-23 RX ORDER — CALCIUM CARBONATE 200(500)MG
500 TABLET,CHEWABLE ORAL 3 TIMES DAILY PRN
Status: DISCONTINUED | OUTPATIENT
Start: 2023-02-23 | End: 2023-03-02 | Stop reason: HOSPADM

## 2023-02-23 RX ADMIN — METHOCARBAMOL 750 MG: 750 TABLET ORAL at 22:19

## 2023-02-23 RX ADMIN — ACETAMINOPHEN 1000 MG: 500 TABLET ORAL at 05:46

## 2023-02-23 RX ADMIN — PROCHLORPERAZINE EDISYLATE 10 MG: 5 INJECTION, SOLUTION INTRAMUSCULAR; INTRAVENOUS at 11:09

## 2023-02-23 RX ADMIN — ANTACID TABLETS 500 MG: 500 TABLET, CHEWABLE ORAL at 22:19

## 2023-02-23 RX ADMIN — HYDROMORPHONE HYDROCHLORIDE 0.5 MG: 1 INJECTION, SOLUTION INTRAMUSCULAR; INTRAVENOUS; SUBCUTANEOUS at 22:19

## 2023-02-23 RX ADMIN — METHOCARBAMOL 750 MG: 750 TABLET ORAL at 18:56

## 2023-02-23 RX ADMIN — HYDROMORPHONE HYDROCHLORIDE 0.5 MG: 1 INJECTION, SOLUTION INTRAMUSCULAR; INTRAVENOUS; SUBCUTANEOUS at 15:26

## 2023-02-23 RX ADMIN — HYDROMORPHONE HYDROCHLORIDE 0.5 MG: 1 INJECTION, SOLUTION INTRAMUSCULAR; INTRAVENOUS; SUBCUTANEOUS at 11:43

## 2023-02-23 RX ADMIN — METHOCARBAMOL 1000 MG: 100 INJECTION, SOLUTION INTRAMUSCULAR; INTRAVENOUS at 05:49

## 2023-02-23 RX ADMIN — SODIUM CHLORIDE, PRESERVATIVE FREE 10 ML: 5 INJECTION INTRAVENOUS at 20:34

## 2023-02-23 RX ADMIN — GABAPENTIN 300 MG: 300 CAPSULE ORAL at 14:32

## 2023-02-23 RX ADMIN — HYDROMORPHONE HYDROCHLORIDE 0.5 MG: 1 INJECTION, SOLUTION INTRAMUSCULAR; INTRAVENOUS; SUBCUTANEOUS at 08:13

## 2023-02-23 RX ADMIN — PANTOPRAZOLE SODIUM 40 MG: 40 TABLET, DELAYED RELEASE ORAL at 05:46

## 2023-02-23 RX ADMIN — PROCHLORPERAZINE EDISYLATE 10 MG: 5 INJECTION, SOLUTION INTRAMUSCULAR; INTRAVENOUS at 20:34

## 2023-02-23 RX ADMIN — PROCHLORPERAZINE EDISYLATE 10 MG: 5 INJECTION, SOLUTION INTRAMUSCULAR; INTRAVENOUS at 01:41

## 2023-02-23 RX ADMIN — GABAPENTIN 300 MG: 300 CAPSULE ORAL at 20:33

## 2023-02-23 RX ADMIN — METHOCARBAMOL 750 MG: 750 TABLET ORAL at 14:32

## 2023-02-23 RX ADMIN — GABAPENTIN 300 MG: 300 CAPSULE ORAL at 08:11

## 2023-02-23 RX ADMIN — HYDROMORPHONE HYDROCHLORIDE 0.5 MG: 1 INJECTION, SOLUTION INTRAMUSCULAR; INTRAVENOUS; SUBCUTANEOUS at 18:56

## 2023-02-23 RX ADMIN — OXYCODONE 10 MG: 5 TABLET ORAL at 20:33

## 2023-02-23 RX ADMIN — HYDROMORPHONE HYDROCHLORIDE 0.5 MG: 1 INJECTION, SOLUTION INTRAMUSCULAR; INTRAVENOUS; SUBCUTANEOUS at 04:45

## 2023-02-23 RX ADMIN — OXYCODONE 10 MG: 5 TABLET ORAL at 05:46

## 2023-02-23 RX ADMIN — SODIUM CHLORIDE, PRESERVATIVE FREE 10 ML: 5 INJECTION INTRAVENOUS at 08:12

## 2023-02-23 RX ADMIN — ACETAMINOPHEN 1000 MG: 500 TABLET ORAL at 11:08

## 2023-02-23 RX ADMIN — ACETAMINOPHEN 1000 MG: 500 TABLET ORAL at 22:19

## 2023-02-23 RX ADMIN — CEFAZOLIN 2000 MG: 2 INJECTION, POWDER, FOR SOLUTION INTRAMUSCULAR; INTRAVENOUS at 08:22

## 2023-02-23 RX ADMIN — ACETAMINOPHEN 1000 MG: 500 TABLET ORAL at 18:56

## 2023-02-23 ASSESSMENT — PAIN SCALES - GENERAL
PAINLEVEL_OUTOF10: 8
PAINLEVEL_OUTOF10: 9
PAINLEVEL_OUTOF10: 8
PAINLEVEL_OUTOF10: 9
PAINLEVEL_OUTOF10: 9
PAINLEVEL_OUTOF10: 5
PAINLEVEL_OUTOF10: 4
PAINLEVEL_OUTOF10: 10
PAINLEVEL_OUTOF10: 5
PAINLEVEL_OUTOF10: 8
PAINLEVEL_OUTOF10: 7
PAINLEVEL_OUTOF10: 10
PAINLEVEL_OUTOF10: 8
PAINLEVEL_OUTOF10: 5
PAINLEVEL_OUTOF10: 4
PAINLEVEL_OUTOF10: 10
PAINLEVEL_OUTOF10: 8
PAINLEVEL_OUTOF10: 10
PAINLEVEL_OUTOF10: 10
PAINLEVEL_OUTOF10: 9
PAINLEVEL_OUTOF10: 10

## 2023-02-23 ASSESSMENT — PAIN DESCRIPTION - ONSET
ONSET: ON-GOING

## 2023-02-23 ASSESSMENT — PAIN DESCRIPTION - DESCRIPTORS
DESCRIPTORS: ACHING
DESCRIPTORS: ACHING
DESCRIPTORS: ACHING;DISCOMFORT
DESCRIPTORS: ACHING;DISCOMFORT
DESCRIPTORS: ACHING
DESCRIPTORS: ACHING;DISCOMFORT
DESCRIPTORS: DISCOMFORT;ACHING
DESCRIPTORS: ACHING;DISCOMFORT
DESCRIPTORS: ACHING;DISCOMFORT
DESCRIPTORS: ACHING

## 2023-02-23 ASSESSMENT — PAIN DESCRIPTION - LOCATION
LOCATION: BACK

## 2023-02-23 ASSESSMENT — PAIN DESCRIPTION - PAIN TYPE
TYPE: SURGICAL PAIN

## 2023-02-23 ASSESSMENT — PAIN DESCRIPTION - FREQUENCY
FREQUENCY: CONTINUOUS

## 2023-02-23 ASSESSMENT — PAIN DESCRIPTION - ORIENTATION
ORIENTATION: MID

## 2023-02-23 ASSESSMENT — PAIN - FUNCTIONAL ASSESSMENT
PAIN_FUNCTIONAL_ASSESSMENT: ACTIVITIES ARE NOT PREVENTED
PAIN_FUNCTIONAL_ASSESSMENT: PREVENTS OR INTERFERES SOME ACTIVE ACTIVITIES AND ADLS

## 2023-02-23 NOTE — PLAN OF CARE
Problem: Pain  Goal: Verbalizes/displays adequate comfort level or baseline comfort level  2/23/2023 0317 by Reggie Lara RN  Outcome: Progressing  Note: Pt. Managing pain per MAR. Problem: Safety - Adult  Goal: Free from fall injury  2/23/2023 0317 by Reggie Lara RN  Outcome: Progressing  Note: All fall precautions in place and call light is within reach.

## 2023-02-23 NOTE — PROGRESS NOTES
NEUROSURGERY POST-OP PROGRESS NOTE    Patient Name: Beryle Draft YOB: 1965   Sex: Female Age: 61 yrs     Medical Record Number: [id number] Acct Number: [de-identified]   Room Number: 1452/5396-72 Hospital Day: Hospital Day: 2     Interval History:  Post-operative Day# 1 s/p  T10-T11 LAMINECTOMY, FACETECTOMY, PEDICLE SCREW FIXATION FOR REMOVAL OF THORACIC MASS     Subjective:  patient resting in chair, she has severe incisional pain and was nauseated overnight. Objective:    VITAL SIGNS   /68   Pulse 50   Temp 98.6 °F (37 °C) (Oral)   Resp 16   Ht 5' 7\" (1.702 m)   Wt 241 lb (109.3 kg)   LMP 02/19/2018   SpO2 99%   BMI 37.75 kg/m²    Height Height: 5' 7\" (170.2 cm)   Weight Weight: 241 lb (109.3 kg)        Allergies Allergies   Allergen Reactions    Pork-Derived Products      [employer] reasons -- no pork products of any kind      NPO Status ADULT DIET;  Regular; NO PORK FOR [employer] REASONS   Isolation No active isolations     LABS   Basic Metabolic Profile Recent Labs     02/23/23  0837         CO2 29   BUN 10   CREATININE <0.5*   GLUCOSE 109*      Complete Blood Count Recent Labs     02/23/23  0837   WBC 9.0   RBC 3.98*      Coagulation Studies Recent Labs     02/23/23  0837   INR 1.06        MEDICATIONS   Inpatient Medications     furosemide, 40 mg, Oral, Daily    gabapentin, 300 mg, Oral, TID    sodium chloride flush, 5-40 mL, IntraVENous, 2 times per day    acetaminophen, 1,000 mg, Oral, 4 times per day    pantoprazole, 40 mg, Oral, QAM AC    [COMPLETED] methocarbamol IVPB, 1,000 mg, IntraVENous, Q8H **FOLLOWED BY** methocarbamol, 750 mg, Oral, 4 times per day   Infusions    sodium chloride      sodium chloride 100 mL/hr at 02/22/23 1636      Antibiotics   Recent Abx Admin                     ceFAZolin (ANCEF) 2,000 mg in sodium chloride 0.9 % 50 mL IVPB (mini-bag) (mg) 2,000 mg New Bag 02/23/23 0822     2,000 mg New Bag 02/22/23 2343     2,000 mg New Bag  1639 vancomycin (VANCOCIN) injection (mg) 1,000 mg Given 02/22/23 1146    ceFAZolin (ANCEF) 1,000 mg in sod chloride IRR soln 0.9 % 1,000 mL (mL) 1,001 mL Given 02/22/23 1004                     Neurologic Exam:    Mental status: awake/alert, oriented x 4    Musculoskeletal:   Gait: Not tested   Tone: normal   Sensory: chronic paraesthesias to both legs  Motor strength:    Right  Left    Right  Left    Deltoid  5 5  Hip Flex  4 4   Biceps  5 5  Knee Extensors  4 4   Triceps  5 5  Knee Flexors  4 4   Wrist Ext  5 5  Ankle Dorsiflex. 5 5   Wrist Flex  5 5  Ankle Plantarflex. 5 5   Handgrip  5 5  Ext Rafael Longus  5 5   Thumb Ext  5 5         Incision: staples PEPE c/d/i    Respiratory:  Unlabored respiratory pattern    Abdomen:   Soft, ND   Last BM preop    Cardiovascular:  Warm, well perfused    Assessment   62 yo female POD 1 s/p T10-T11 LAMINECTOMY, FACETECTOMY, PEDICLE SCREW FIXATION FOR REMOVAL OF THORACIC MASS with Dr. Turk Friends:  Neurologic exam frequency: q 4   Mobility: PT/OT, activity as tolerated   Diet: ADAT  Antibiotics: ancef complete   [place]: remove   DVT Prophylaxis: SCDs, unable to have lovenox/heparin 2/2 pork derivative   GI Prophylaxis: protonix  Bowel Regimen: senokot, glycolax   Pain control: tylenol, dilaudid, Roxicodone, add lidocaine patch   Robaxin/valium for spasms   Incisional Care: cleanse daily with soap/water, pat dry with clean towel and paint with CHG swab, patient may shower  Scopolamine patch added for nausea   Hospitalist consult for post op medical management   Dispo Planning: continue care on 5T, SW following for discharge plans as patient lacks support system locally     Patient was seen and examined with Dr. Alen Archer who agrees with above assessment and plan. Electronically signed by:  YURY Morales NP, 2/23/2023 9:46 AM   Neurosurgery Nurse Practitioner  [phone]

## 2023-02-23 NOTE — PLAN OF CARE
Problem: Chronic Conditions and Co-morbidities  Goal: Patient's chronic conditions and co-morbidity symptoms are monitored and maintained or improved  Outcome: Progressing     Problem: Discharge Planning  Goal: Discharge to home or other facility with appropriate resources  Outcome: Progressing     Problem: Pain  Goal: Verbalizes/displays adequate comfort level or baseline comfort level  2/23/2023 1031 by Gerardo Sevilla RN  Outcome: Progressing     Problem: Safety - Adult  Goal: Free from fall injury  2/23/2023 1031 by Gerardo Sevilla RN  Outcome: Progressing

## 2023-02-23 NOTE — PROGRESS NOTES
Physical Therapy  [facility]  Physical Therapy Initial Assessment/Treatment    Name: Viridiana Hidalgo  : 1965  MRN: [id number]  Date of Service: 2023    Discharge Recommendations:    Viridiana Hidalgo scored a 15/24 on the AM-PAC short mobility form. Current research shows that an AM-PAC score of 17 or less is typically not associated with a discharge to the patient's home setting. Based on the patient's AM-PAC score and their current functional mobility deficits, it is recommended that the patient have 3-5 sessions per week of Physical Therapy at d/c to increase the patient's independence. Please see assessment section for further patient specific details. If patient discharges prior to next session this note will serve as a discharge summary. Please see below for the latest assessment towards goals. PT Equipment Recommendations  Equipment Needed: No (defer)      Patient Diagnosis(es): The encounter diagnosis was Mass of thoracic vertebra. Past Medical History:  has a past medical history of Arthritis, Avoids pork and pork products due to cultural beliefs, Chronic pain, TAN (dyspnea on exertion), Frequent headaches, Gallbladder disorder, Gastroesophageal reflux disease, Hx of blood clots, Hx of degenerative disc disease, Hyperlipidemia, Hypertension, Morbid obesity with body mass index (BMI) of 60.0 to 69.9 in adult ([facility] [address] Neuropathy, SHYANN (obstructive sleep apnea), Poor circulation, Primary osteoarthritis of right knee, Type 2 diabetes mellitus without complication ([facility] [address] Urinary incontinence, Vitamin D deficiency, and Wears glasses. Past Surgical History:  has a past surgical history that includes back surgery; Breast lumpectomy (Bilateral); Dilation and curettage of uterus (N/A, 2018); Gastric bypass surgery; Sleeve Gastrectomy (10/13/2018); Colonoscopy; and laminectomy (N/A, 2023).     Assessment   Assessment: pt presents from home where she normally lives alone, uses manual and power wheelchair primarily but states she is also able to ambulate short distances with her walker but is limited by pain. She is currently needing min A for bed mobility with cues for logroll technique, CGA to SBA to stand and transfer with walker, able to ambulate up to 10ft today with RW and CGA/SBA before needing rest break due to pain. Pt reports she has no family in the area - her daughter lives in [place] and is unable to stay with her. PT rec 24hr assist currently, would likely need continued IP PT at [place]. PT will f/u. Treatment Diagnosis: gait difficulty  Therapy Prognosis: Good  Decision Making: Medium Complexity  Requires PT Follow-Up: Yes  Activity Tolerance  Activity Tolerance: Patient tolerated evaluation without incident;Patient tolerated treatment well;Patient limited by pain  Activity Tolerance Comments: RN notified of pain during session. Plan   Physcial Therapy Plan  General Plan: 5-7 times per week  Current Treatment Recommendations: Strengthening, Balance training, Functional mobility training, Transfer training, Gait training, Stair training, Patient/Caregiver education & training, Equipment evaluation, education, & procurement, Therapeutic activities, Endurance training, Safety education & training  Safety Devices  Type of Devices: Call light within reach, Chair alarm in place, Left in chair, Nurse notified, Gait belt     Restrictions  Position Activity Restriction  Spinal Precautions: No Bending, No Lifting, No Twisting  Other position/activity restrictions: up with assistance     Subjective   Pain: surgical pain - not rated, nurse informed of pt request for medications  General  Chart Reviewed: Yes  Patient assessed for rehabilitation services?: Yes  Additional Pertinent Hx: T10-T11 LAMINECTOMY, FACETECTOMY, PEDICLE SCREW FIXATION FOR REMOVAL OF THORACIC MASS (Spine Thoracic)  Referring Practitioner:  YURY Still CNP  Referral Date : 02/22/23  Diagnosis: mass of thoracic vertebra  Follows Commands: Within Functional Limits  General Comment  Comments: pt cleared to see for therapy by RN. Subjective  Subjective: pt semisupine in bed on entry, pleasant and agreeable to therapy. Pt speaks English during session, stating she will only need  if something is unclear. Not used this session. C/o pain in back with mobility, does not rate, RN notified during session of request for medication. Social/Functional History  Social/Functional History  Lives With: Alone  Type of Home: House (looking for new wc accessible apt)  Home Layout: One level  Home Access: Level entry  Bathroom Toilet: Standard (sink beside toilet)  Home Equipment: Jaja Flaming, Rollator  Has the patient had two or more falls in the past year or any fall with injury in the past year?: No  ADL Assistance: Independent (sponge baths at sink in wheelchair, would complete dressing IND)  Homemaking Assistance: Independent  Ambulation Assistance: Independent (states she is only able to walk ~5ft before needing to sit down using rollator. Will also use wc or scoot on seat of rollator)  Transfer Assistance: Independent (would use walker to transfer, can stand for short periods of time)  Active : No  Patient's  Info: medical transport  Additional Comments: Was previously going to OP PT. Daughter is urology surgeon in [place], currently pregnant and unable to leave [place] to assist pt.   Vision/Hearing  Vision  Vision: Within Functional Limits  Hearing  Hearing: Within functional limits    Cognition   Orientation  Orientation Level: Oriented X4  Cognition  Overall Cognitive Status: WNL     Objective   Heart Rate: 54  Heart Rate Source: Monitor  BP: 121/85  BP Location: Left upper arm  BP Method: Automatic  Patient Position: Semi fowlers  MAP (Calculated): 97  Resp: 15  SpO2: 100 %  O2 Device: Nasal cannula        Gross Assessment  AROM: Within functional limits  Strength: Generally decreased, functional (grossly 3/5, limited by pain)  Tone: Normal  Sensation: Intact                 Balance  Sitting: Intact (both static and dynamic)  Standing: Impaired (CGA w/ RW (clothing management, grooming, toileting hygiene))  Gait  Overall Level of Assistance: Contact-guard assistance (10' x 4 using RW (chair > chair > 3in1 over toilet > chair > chair)  Bed mobility  Rolling to Left: Minimal assistance (cues for logroll)  Supine to Sit: Minimal assistance (via log roll)  Scooting: Stand by assistance (to EOB)  Transfers  Sit to Stand: Contact guard assistance;Stand by assistance (to RW from bed, recliner, toilet)  Stand to Sit: Contact guard assistance;Stand by assistance  Ambulation  Surface: Level tile  Device: Rolling Walker  Assistance: Contact guard assistance;Stand by assistance  Gait Deviations: Slow Rebecca  Distance: 10ftx4, req'd seated rest break after 10ft               AM-PAC Score  AM-PAC Inpatient Mobility Raw Score : 15 (02/23/23 1137)  AM-PAC Inpatient T-Scale Score : 39.45 (02/23/23 1137)  Mobility Inpatient CMS 0-100% Score: 57.7 (02/23/23 1137)  Mobility Inpatient CMS G-Code Modifier : CK (02/23/23 1137)          Goals  Short Term Goals  Time Frame for Short Term Goals: discharge  Short Term Goal 1: pt will perform supine<>sit via logroll mod I  Short Term Goal 2: pt will perform STS to RW mod I  Short Term Goal 3: pt will amb 30ft with RW mod I       Education  Patient Education  Education Given To: Patient  Education Provided: Role of Therapy;Plan of Care;Transfer Training;Precautions  Education Method: Verbal;Demonstration  Barriers to Learning: None  Education Outcome: Verbalized understanding;Continued education needed      Therapy Time   Individual Concurrent Group Co-treatment   Time In 0903         Time Out 1011         Minutes 68             Timed Code Treatment Minutes: 53    Total Treatment Minutes:68  If patient is discharged prior to next treatment, this note will serve as the discharge summary.     Bc Arriaza PT, DPT, NCS, CSRS

## 2023-02-23 NOTE — PROGRESS NOTES
Occupational Therapy  Facility/Department: [facility] 5T ORTHO/NEURO  Occupational Therapy Initial Assessment and Treatment      Name: Tiarra Mina  : 1965  MRN: [id number]  Date of Service: 2023    Discharge Recommendations:  Tiarra Mina scored a 16/24 on the AM-PAC ADL Inpatient form. Current research shows that an AM-PAC score of 17 or less is typically not associated with a discharge to the patient's home setting. Based on the patient's AM-PAC score and their current ADL deficits, it is recommended that the patient have 3-5 sessions per week of Occupational Therapy at d/c to increase the patient's independence. Please see assessment section for further patient specific details. If patient discharges prior to next session this note will serve as a discharge summary. Please see below for the latest assessment towards goals. OT Equipment Recommendations  Equipment Needed:  (defer recommendations to discharge facility; if home - reacher sock aide, LH shoehorn/sponge, toilet safety frame, shower chair)       Patient Diagnosis(es): The encounter diagnosis was Mass of thoracic vertebra. Past Medical History:  has a past medical history of Arthritis, Avoids pork and pork products due to cultural beliefs, Chronic pain, TAN (dyspnea on exertion), Frequent headaches, Gallbladder disorder, Gastroesophageal reflux disease, Hx of blood clots, Hx of degenerative disc disease, Hyperlipidemia, Hypertension, Morbid obesity with body mass index (BMI) of 60.0 to 69.9 in adult ([facility] [address] Neuropathy, SHYANN (obstructive sleep apnea), Poor circulation, Primary osteoarthritis of right knee, Type 2 diabetes mellitus without complication ([facility] [address] Urinary incontinence, Vitamin D deficiency, and Wears glasses. Past Surgical History:  has a past surgical history that includes back surgery; Breast lumpectomy (Bilateral); Dilation and curettage of uterus (N/A, 2018);  Gastric bypass surgery; Sleeve Gastrectomy (10/13/2018); Colonoscopy; and laminectomy (N/A, 2/22/2023). Treatment Diagnosis: impaired ADLs/transfers s/p T10-T11 LAMINECTOMY, FACETECTOMY, PEDICLE SCREW FIXATION FOR REMOVAL OF THORACIC MASS      Assessment   Performance deficits / Impairments: Decreased functional mobility ; Decreased ADL status; Decreased endurance  Assessment: Seen POD#1 s/p T10-T11 LAMINECTOMY, FACETECTOMY, PEDICLE SCREW FIXATION FOR REMOVAL OF THORACIC MASS. Pt is CGA for functional transfers and mobility using RW. Does require Max/Dependent assist for LB dressing (will need instruction in use of AE to comply w/ spinal precautions). At baseline, pt lives alone and reports independence w/ ADLs, IADLs and primarily w/c level at home. Pt lacks a social support system and may benefit from IP OT upon discharge - to maximize functional status and safety for safe return home alone. If discharged home, recommend 24 hr A (does not have), HHOT, reacher, sock aid, LH shoehorn/sponge, shower chair, toilet safety frame. Continue per POC.   Treatment Diagnosis: impaired ADLs/transfers s/p T10-T11 LAMINECTOMY, FACETECTOMY, PEDICLE SCREW FIXATION FOR REMOVAL OF THORACIC MASS  Prognosis: Good  Decision Making: Medium Complexity  REQUIRES OT FOLLOW-UP: Yes  Activity Tolerance  Activity Tolerance: Patient Tolerated treatment well  Activity Tolerance Comments: ambulates ~10' intervals and then requires seated rest break; tolerated standing at sink level x ~5-7 minutes (to wash out used emesis bags)        Plan   Occupational Therapy Plan  Times Per Week: 5-7  Current Treatment Recommendations: Strengthening, Balance training, Functional mobility training, Endurance training, Safety education & training, Self-Care / ADL, Home management training     Restrictions  Position Activity Restriction  Spinal Precautions: No Bending, No Lifting, No Twisting  Other position/activity restrictions: up with assistance    Subjective   General  Chart Reviewed: Yes  Patient assessed for rehabilitation services?: Yes  Additional Pertinent Hx: 61 y.o. F to OR 2/22 for T10-T11 LAMINECTOMY, FACETECTOMY, PEDICLE SCREW FIXATION FOR REMOVAL OF THORACIC MASS. PMH: Chronic Pain, DDD, HTN, Hyperlipidemia, Morbid Obesity s/p Sleeve Gastrectomy, SHYANN, DM, Urinary Incontinence, Vit D Deficiency, Back Surgery (2001). Family / Caregiver Present: No  Referring Practitioner: YURY Sosa - CNP  Diagnosis: Mass of Thoracic Vertebra    Subjective  Subjective: In bed on arrival. Declines need for . \"If I don't understand something, I will ask for . \"    surgical pain - not rated, nurse informed of pt request for medications    Social/Functional History  Social/Functional History  Lives With: Alone  Type of Home: House (looking for new wc accessible apt)  Home Layout: One level  Home Access: Level entry  Bathroom Toilet: Standard (sink beside toilet)  Home Equipment: Ezequiel File, Rollator  Has the patient had two or more falls in the past year or any fall with injury in the past year?: No  ADL Assistance: Independent (sponge baths at sink in wheelchair, would complete dressing IND)  Homemaking Assistance: Independent  Ambulation Assistance: Independent (states she is only able to walk ~5ft before needing to sit down using rollator. Will also use wc or scoot on seat of rollator)  Transfer Assistance: Independent (would use walker to transfer, can stand for short periods of time)  Active : No  Patient's  Info: medical transport  Additional Comments: Was previously going to OP PT. Daughter is urology surgeon in [place], currently pregnant and unable to leave [place] to assist pt.        Objective                Balance  Sitting: Intact (both static and dynamic)  Standing: Impaired (CGA w/ RW (clothing management, grooming, toileting hygiene))    Gait  Overall Level of Assistance: Contact-guard assistance (10' x 4 using RW (chair > chair > 3in1 over toilet > chair > chair)    Toilet Transfers  Toilet - Technique: Ambulating (using RW)  Equipment Used: Standard bedside commode (over toilet)  Toilet Transfer: Contact guard assistance    AROM:  (LUE: shoulder flex 0, elbow to hand WFL; RUE: should flex ~30, elbow to hand WFL)      ADL  Feeding: Independent  Grooming: Contact guard assistance  Grooming Skilled Clinical Factors: washing hands at sink level standing  UE Bathing: Stand by assistance;Setup  UE Bathing Skilled Clinical Factors: seated  LE Bathing: Maximum assistance  LE Bathing Skilled Clinical Factors: to wash lower legs/feet; able to wash beneath pannus and perform frontal hygiene seated w/ SBA; posterior pericare in standing CGA  UE Dressing Skilled Clinical Factors: gown change  LE Dressing: Dependent/Total  LE Dressing Skilled Clinical Factors: w/o AE  Toileting: Contact guard assistance  Toileting Skilled Clinical Factors: (+) void on toilet          Bed mobility  Rolling to Left: Minimal assistance  Supine to Sit: Minimal assistance (via log roll)  Scooting: Stand by assistance (to EOB)    Transfers  Sit to stand: Contact guard assistance (multiple transfers from bed, chairs, toilet)  Stand to sit: Contact guard assistance    Vision  Vision: Within Functional Limits  Hearing  Hearing: Within functional limits  Cognition  Overall Cognitive Status: WFL  Orientation  Overall Orientation Status: Within Functional Limits  Orientation Level: Oriented X4                    Education Given To: Patient  Education Provided: Role of Therapy;Plan of Care;Precautions; ADL Adaptive Strategies;Transfer Training  Education Method: Verbal  Barriers to Learning: None  Education Outcome: Verbalized understanding;Continued education needed                    Pt seen by OT for eval and treat.  Treatment included: bed mobility, functional transfer/mobility, ADL, pt education                                                      AM-PAC Score AM-PAC Inpatient Daily Activity Raw Score: 16 (02/23/23 1016)  AM-PAC Inpatient ADL T-Scale Score : 35.96 (02/23/23 1016)  ADL Inpatient CMS 0-100% Score: 53.32 (02/23/23 1016)  ADL Inpatient CMS G-Code Modifier : CK (02/23/23 1016)           Goals  Short Term Goals  Time Frame for Short Term Goals: Discharge  Short Term Goal 1: 3in1 transfer w/ Supervision  Short Term Goal 2: LB dressing using AE w/ Supervision  Short Term Goal 3: increase functional standing tolerance to 10 minutes  Short Term Goal 4: supine to sit via log roll w/ Supervision  Patient Goals   Patient goals : to be independent       Therapy Time   Individual Concurrent Group Co-treatment   Time In 0904         Time Out 1012         Minutes 68          Timed Code Treatment Minutes:   53    Total Treatment Minutes:  541 Rivas Cooper OTR/L #[id number]

## 2023-02-23 NOTE — CARE COORDINATION
Patient was seen again and updated office visit and paperwork has been sent to [facility]. The issue is patient does not have callus on exam, previous foot ulceration, pre-ulcerative callus, foot deformity, poor circulation, or amputation which are some of the requirements for the diabetic shoes. You can also qualify if you have peripheral neuropathy with evidence of callus formation but she did not have calluses on her recent exam. I have tried to explain this to the patient. I did update the Neurology referral in the chart and patient can schedule with Dr. Claribel Bass. I will let her know.

## 2023-02-23 NOTE — PROGRESS NOTES
Pt. Oriented x4. VSS on 2L nasal cannula with exception to low heart rate. 2L is supplemental due to pt. Having sleep apnea. Pt. Managing pain per MAR. Pt. Was unable to tolerate PO diet and fluids, had multiple episodes of emesis. Nausea was unrelieved by Zofran, Compazine PRN ordered. Incision CDI. All fall precautions in place and call light is within reach.

## 2023-02-23 NOTE — PROGRESS NOTES
Advance Care Planning     Advance Care Planning Inpatient Note  Spiritual Care Department    Today's Date: 2/23/2023  Unit: [facility] 5T ORTHO/NEURO    Received request from patient. Upon review of chart and communication with care team, patient's decision making abilities are not in question. . Patient was/were present in the room during visit. Goals of ACP Conversation:  Discuss advance care planning documents    Health Care Decision Makers:     No healthcare decision makers have been documented. Click here to complete [place] including selection of the Healthcare Decision Maker Relationship (ie \"Primary\")  Summary:  No Decision Maker named by patient at this time    PT will be considering a HCPOA. I have provided her with both, Pashto and English versions of the [employer]. Advance Care Planning Documents (Patient Wishes):  None     Assessment:    PT seems to be in good spirits.      Interventions:  Provided education on documents for clarity and greater understanding    Care Preferences Communicated:   No    Outcomes/Plan:  ACP Discussion: Postponed    Electronically signed by Ellen Denney [facility] on 2/23/2023 at 12:19 PM

## 2023-02-23 NOTE — CONSULTS
Clinical Pharmacy Progress Note    Admit date: 2/22/2023     Subjective/Objective:  60 yo F with PMH of GERD, HTN, DDD, OA, DM; admitted s/p T10-T11 laminectomy. Pharmacy has been consulted to make pain medication recommendations by nursing staff. Home Pain Regimen:  Buprenorphine 10 mcg/hr patch weekly  Gabapentin 300mg TID     2/23: Spoke with patient at bedside who was drowsy during our conversation. She states she takes a Buprenorphine 10mcg/hr patch weekly at home. Was previously on Oxycodone-APAP but this was stopped when she was started on buprenorphine patches. Pt sees patient specialist as outpatient. Reports that pain is \"severe\" but was falling asleep after telling me this. Most recent pain score was 10/10. Patient's stated pain goal: 0    Current pain regimen:   Medication  Home med? Amount used yesterday    Acetaminophen 1000mg TID Yes  3g (3 doses)   Gabapentin 300mg TID  Yes  3 doses   Hydromorphone 0.25-0.5mg IV q3h PRN No  3 mg IV (6 doses x 0.5mg)   Oxycodone IR 5-10mg q4h PRN No  30mg (3 doses)   Methocarbamol , now 750mg PO q6h No  Changed to PO today; scheduled q6h     Morphine Equivalent Daily Dose (MEDD):   Date MEDD (mg)   2/22 165 mg           ASSESSMENT/PLAN:  1)  Pain Management:   Agree with scheduled Acetaminophen, continuing home gabapentin and methocarbamol to encourage use of multimodal analgesia. Pt was on Buprenorphine patch at home - does not have patch on currently and has no one to bring patch from home. Unclear when pt was taken off. Given drowsiness today, would not recommend increasing doses of hydromorphone or oxycodone today. Would encourage primary team to speak with outpatient pain specialist to establish discharge plan. Buprenorphine is partial opioid agonist -- there is a potential for buprenorphine to precipitate withdraw if patients receiving opioids. Will notify nsgy of above recommendations.  Will continue to monitor and assist with adjustments to regimen as needed. Please call with any questions.   Jareth Valle PharmD, BCPS  Wireless: [phone]   2/23/2023 11:38 AM

## 2023-02-23 NOTE — CARE COORDINATION
Case Management Assessment  Initial Evaluation    Date/Time of Evaluation: 2/23/2023 3:31 PM  Assessment Completed by: Cristhian Trivedi RN    If patient is discharged prior to next notation, then this note serves as note for discharge by case management. Patient Name: Rochelle Steen                   YOB: 1965  Diagnosis: Mass of thoracic vertebra [M48.9]                   Date / Time: 2/22/2023  5:30 AM    Patient Admission Status: Inpatient   Readmission Risk (Low < 19, Mod (19-27), High > 27): Readmission Risk Score: 10    Current PCP: Silvino Suero MD  PCP verified by CM? Yes    Chart Reviewed: Yes      History Provided by: Patient  Patient Orientation: Alert and Oriented    Patient Cognition: Alert    Hospitalization in the last 30 days (Readmission):  No    If yes, Readmission Assessment in  Navigator will be completed. Advance Directives:      Code Status: Full Code   Patient's Primary Decision Maker is: Legal Next of Kin      Discharge Planning:    Patient lives with: Alone Type of Home: Apartment  Primary Care Giver: Self  Patient Support Systems include: Children   Current Financial resources: Medicare  Current community resources:    Current services prior to admission:              Current DME:              Type of Home Care services:       ADLS  Prior functional level: Independent in ADLs/IADLs  Current functional level:      PT AM-PAC: 15 /24  OT AM-PAC: 16 /24    Family can provide assistance at DC: No  Would you like Case Management to discuss the discharge plan with any other family members/significant others, and if so, who?  No  Plans to Return to Present Housing: Unknown at present  Other Identified Issues/Barriers to RETURNING to current housing: may need SNF  Potential Assistance needed at discharge: Emmanuel Aguirre            Potential DME:    Patient expects to discharge to: Mireille Garcia  for transportation at discharge: Self    Financial    Payor: Niraj Martinez / Plan: Niraj Martinez DUAL / Product Type: *No Product type* /     Does insurance require precert for SNF: Yes    Potential assistance Purchasing Medications:    Meds-to-Beds request: Yes      [id number] - [place], [place] - [address] [facility], [address] [place] [address]  Phone: [phone] Fax: Eric [address], [address]  [address] [address]  Phone: [phone] Fax: [phone]    [facility] [address] [place] [address],  [address] [phone] Saleem Carbon [address]  [address] [facility] [address]  Phone: [phone] Fax: [phone]      Notes:    Factors facilitating achievement of predicted outcomes: Family support    Barriers to discharge: Decreased endurance    Additional Case Management Notes:   Patient is from home alone, usually able to transfer self in and out of wheelchair. Patient is agreeable to short SNF stay. Will need a pre-cert once accepted. The Plan for Transition of Care is related to the following treatment goals of Mass of thoracic vertebra [O42.2]    IF APPLICABLE: The Patient and/or patient representative Amel and her family were provided with a choice of provider and agrees with the discharge plan. Freedom of choice list with basic dialogue that supports the patient's individualized plan of care/goals and shares the quality data associated with the providers was provided to: Patient   Patient Representative Name:       The Patient and/or Patient Representative Agree with the Discharge Plan?  Yes    Severiano Cranker, RN  Case Management Department  Ph: [phone] Fax: [phone]

## 2023-02-23 NOTE — H&P
Hospital Medicine History & Physical      PCP: Matty Tejeda MD    Date of Admission: 2/22/2023    Date of Service: Pt seen/examined on  2/23/23 and Admitted to Inpatient with expected LOS greater than two midnights due to medical therapy. Chief Complaint:        History Of Present Illness: The patient is a 61 y.o. female who presents to [facility] with   PMH of HTN , DLP , SHYANN , DM type 2   Is s/p  T10-T11 LAMINECTOMY, FACETECTOMY, PEDICLE SCREW FIXATION FOR REMOVAL OF THORACIC MASS   Surgery on 2/22/23    Patient sitting in chair  C/o nausea   C/o pain over operative site     Denies sob or fever or chills       Past Medical History:        Diagnosis Date    Arthritis     Avoids pork and pork products due to cultural beliefs     Chronic pain     TAN (dyspnea on exertion)     Frequent headaches 12/13/2022    Gallbladder disorder     Gastroesophageal reflux disease 01/28/2020    Hx of blood clots     in legs -- 13 years ago    Hx of degenerative disc disease     Hyperlipidemia     Hypertension     Morbid obesity with body mass index (BMI) of 60.0 to 69.9 in adult ([address] 07/02/2018    Neuropathy     SHYANN (obstructive sleep apnea)     machine broke down, awaiting new machine at the end of June 2023    Poor circulation     Primary osteoarthritis of right knee 07/02/2018    Type 2 diabetes mellitus without complication ([address]     controlled with diet    Urinary incontinence     Vitamin D deficiency 01/28/2020    Wears glasses        Past Surgical History:        Procedure Laterality Date    BACK SURGERY      2001 -- L4 and L5 -- no metal or implants    BREAST LUMPECTOMY Bilateral     2015    COLONOSCOPY      DILATION AND CURETTAGE OF UTERUS N/A 08/30/2018    GASTRIC BYPASS SURGERY      LAMINECTOMY N/A 2/22/2023    T10-T11 LAMINECTOMY, FACETECTOMY, PEDICLE SCREW FIXATION FOR REMOVAL OF THORACIC MASS performed by Rose Dai.  Alen Archer MD at [address]  10/13/2018       Medications Prior to Admission:    Prior to Admission medications    Medication Sig Start Date End Date Taking? Authorizing Provider   b complex vitamins capsule Take 1 capsule by mouth daily   Yes Historical Provider, MD   diclofenac sodium (VOLTAREN) 1 % GEL APPLY 2G TOPICALLY 4 TIMES A DAY 2/17/23   Kamila Thompson MD   GAVILYTE-G 236 g solution  2/10/23   Historical Provider, MD   buprenorphine (BUPRENEX) 10 MCG/HR PTWK APPLY 1 PATCH TOPICALLY ONCE A WEEK 1/31/23   Historical Provider, MD   docusate sodium (COLACE) 100 MG capsule Take 1 capsule by mouth 2 times daily as needed for Constipation As needed for constipation 1/27/23   Shaina Fox MD   loperamide (RA ANTI-DIARRHEAL) 2 MG capsule Take 1 capsule by mouth 4 times daily as needed for Diarrhea 1/27/23   Shaina Fox MD   omeprazole (PRILOSEC) 20 MG delayed release capsule Take 1 capsule by mouth in the morning and 1 capsule in the evening.  1/27/23   Shaina Fox MD   vitamin B-12 (CYANOCOBALAMIN) 1000 MCG tablet Take 1,000 mcg by mouth daily    Historical Provider, MD   Multiple Vitamins-Minerals (THERAPEUTIC MULTIVITAMIN-MINERALS) tablet Take 1 tablet by mouth daily    Historical Provider, MD   vitamin D (ERGOCALCIFEROL) 1.25 MG (47683 UT) CAPS capsule TAKE 1 CAPSULE BY MOUTH ONE TIME PER WEEK 1/19/23   Shaina Fox MD   furosemide (LASIX) 40 MG tablet TAKE 1 TABLET BY MOUTH EVERY DAY 12/12/22   Prem Kim MD   Blood Glucose Monitoring Suppl (BLOOD GLUCOSE MONITOR SYSTEM) w/Device KIT Dispense glucometer covered by patient's insurance 12/1/22   Shaina Fox MD   blood glucose monitor strips Test once daily 12/1/22   Shaina Fox MD   zoster recombinant adjuvanted vaccine [facility]) 50 MCG/0.5ML SUSR injection Inject 0.5 mLs into the muscle See Admin Instructions 1 dose now and repeat in 2-6 months 12/1/22 5/30/23  Shaina Fox MD   Lancets MISC Use to test blood sugar once daily 12/1/22   Shaina Fox MD acetaminophen (TYLENOL) 500 MG tablet Take 1 tablet by mouth 4 times daily as needed for Pain 12/1/22   Gabriella Krishna MD   meloxicam [facility]) 15 MG tablet Take 1 tablet by mouth daily 11/14/22   Adelina Fermin MD   gabapentin (NEURONTIN) 300 MG capsule Take 1 capsule by mouth 3 times daily for 180 days. Intended supply: 30 days 10/24/22 4/22/23  YURY Houston - CNP       Allergies:  Pork-derived products    Social History:  The patient currently lives     TOBACCO:   reports that she has never smoked. She has never used smokeless tobacco.  ETOH:   reports no history of alcohol use. Family History:  Reviewed in detail and Positive as follows:        Problem Relation Age of Onset    Diabetes Mother     Stroke Mother     Osteoarthritis Mother     Heart Disease Father     Other Father     High Blood Pressure Father     Osteoarthritis Father     Diabetes Maternal Aunt     Cancer Maternal Aunt     Diabetes Maternal Grandmother     Cancer Paternal Aunt        REVIEW OF SYSTEMS:   Positive and negative as noted in the HPI. All other systems reviewed and negative. PHYSICAL EXAM:    /85   Pulse 54   Temp 98.5 °F (36.9 °C) (Oral)   Resp 16   Ht 5' 7\" (1.702 m)   Wt 241 lb (109.3 kg)   LMP 02/19/2018   SpO2 99%   BMI 37.75 kg/m²     General appearance: No apparent distress appears stated age and cooperative. HEENT Normal cephalic, atraumatic without obvious deformity. Pupils equal, round, and reactive to light. Extra ocular muscles intact. Conjunctivae/corneas clear. Neck: Supple, No jugular venous distention/bruits. Trachea midline without thyromegaly or adenopathy with full range of motion. Lungs: Clear to auscultation, bilaterally without Rales/Wheezes/Rhonchi with good respiratory effort.   Heart: Regular rate and rhythm with Normal S1/S2 without murmurs, rubs or gallops, point of maximum impulse non-displaced  Abdomen: Soft, non-tender or non-distended without rigidity or guarding and positive bowel sounds all four quadrants. Extremities: No clubbing, cyanosis, or edema bilaterally. Full range of motion without deformity and normal gait intact. Skin: Skin color, texture, turgor normal.  No rashes or lesions. Neurologic: Alert and oriented X 3, neurovascularly intact with sensory/motor intact upper extremities/lower extremities, bilaterally. Cranial nerves: II-XII intact, grossly non-focal.  Mental status: Alert, oriented, thought content appropriate. Capillary refill is brisk  Peripheral pulses 2 +         The following labs reviewed personally:   CBC   Recent Labs     02/23/23  0837   WBC 9.0   HGB 11.4*   HCT 34.6*         RENAL  Recent Labs     02/23/23  0837      K 3.7      CO2 29   BUN 10   CREATININE <0.5*     LFT'S  Recent Labs     02/23/23  0837   AST 14*   ALT 8*   BILITOT 0.4   ALKPHOS 63     COAG  Recent Labs     02/23/23  0837   INR 1.06     CARDIAC ENZYMES  No results for input(s): CKTOTAL, CKMB, CKMBINDEX, TROPONINI in the last 72 hours.     U/A:    Lab Results   Component Value Date/Time    NITRITE small 06/15/2021 11:46 AM    COLORU yellow 06/15/2021 11:46 AM    COLORU YELLOW 01/09/2020 08:08 PM    WBCUA 50 01/09/2020 08:08 PM    RBCUA 5 01/09/2020 08:08 PM    BACTERIA RARE 08/21/2018 02:28 PM    CLARITYU clear 06/15/2021 11:46 AM    CLARITYU Clear 01/09/2020 08:08 PM    SPECGRAV 1.030 06/15/2021 11:46 AM    SPECGRAV 1.029 01/09/2020 08:08 PM    LEUKOCYTESUR trace 06/15/2021 11:46 AM    LEUKOCYTESUR LARGE 01/09/2020 08:08 PM    BLOODU trace 06/15/2021 11:46 AM    BLOODU TRACE 01/09/2020 08:08 PM    GLUCOSEU neg 06/15/2021 11:46 AM    GLUCOSEU Negative 01/09/2020 08:08 PM    AMORPHOUS 2+ 01/28/2019 04:34 PM       ABG  No results found for: KOBY Manriquez Parthenia Martes, [place]        Active Hospital Problems    Diagnosis Date Noted    Mass of thoracic vertebra [M48.9] 02/22/2023     Priority: Medium ASSESSMENT/PLAN:      Thoracic mass  s/p  T10-T11 LAMINECTOMY, FACETECTOMY, PEDICLE SCREW FIXATION FOR REMOVAL OF THORACIC MASS       Neurologic exam every 4 hr    PT and OT    Post operative pain control : Dilaudid prn, oxycodone prn    Nausea/vomiting : zofran prn , compazine prn     Stop IVF    GERD : PPI        DVT Prophylaxis: allergic to heparin , lovenox   SCDs  Diet: ADULT DIET; Regular; NO PORK FOR [employer] REASONS  Code Status: Full Code  PT/OT Eval Status:         Dispo - TBD       Deng Banerjee MD    Thank you Berry Tavarez MD for the opportunity to be involved in this patient's care. If you have any questions or concerns please feel free to contact me at [phone].

## 2023-02-23 NOTE — PROGRESS NOTES
Pt A&O, VSS. Pt complains of pain which is managed per mar. Surgical site CDI, PEPE. Pt up x1 walker gb assist to the bathroom voiding adequately. All fall and safety precautions in place, bed locked and in lowest position, call light and belongings within reach. Pt denies further needs at this time.

## 2023-02-24 ENCOUNTER — CARE COORDINATION (OUTPATIENT)
Dept: CARE COORDINATION | Age: 58
End: 2023-02-24

## 2023-02-24 PROCEDURE — 6360000002 HC RX W HCPCS: Performed by: NURSE PRACTITIONER

## 2023-02-24 PROCEDURE — 94150 VITAL CAPACITY TEST: CPT

## 2023-02-24 PROCEDURE — 2580000003 HC RX 258: Performed by: NURSE PRACTITIONER

## 2023-02-24 PROCEDURE — 97530 THERAPEUTIC ACTIVITIES: CPT

## 2023-02-24 PROCEDURE — 2700000000 HC OXYGEN THERAPY PER DAY

## 2023-02-24 PROCEDURE — 99024 POSTOP FOLLOW-UP VISIT: CPT | Performed by: NURSE PRACTITIONER

## 2023-02-24 PROCEDURE — 94761 N-INVAS EAR/PLS OXIMETRY MLT: CPT

## 2023-02-24 PROCEDURE — 2060000000 HC ICU INTERMEDIATE R&B

## 2023-02-24 PROCEDURE — APPNB30 APP NON BILLABLE TIME 0-30 MINS: Performed by: NURSE PRACTITIONER

## 2023-02-24 PROCEDURE — 6370000000 HC RX 637 (ALT 250 FOR IP): Performed by: NURSE PRACTITIONER

## 2023-02-24 PROCEDURE — 97116 GAIT TRAINING THERAPY: CPT

## 2023-02-24 PROCEDURE — 6360000002 HC RX W HCPCS: Performed by: INTERNAL MEDICINE

## 2023-02-24 RX ORDER — LIDOCAINE 4 G/G
1 PATCH TOPICAL DAILY
Qty: 7 EACH | Refills: 0 | Status: SHIPPED | OUTPATIENT
Start: 2023-02-25 | End: 2023-02-25 | Stop reason: SDUPTHER

## 2023-02-24 RX ORDER — METHOCARBAMOL 750 MG/1
750 TABLET, FILM COATED ORAL EVERY 6 HOURS PRN
Qty: 40 TABLET | Refills: 0 | Status: SHIPPED | OUTPATIENT
Start: 2023-02-24 | End: 2023-03-06

## 2023-02-24 RX ORDER — DIAZEPAM 5 MG/1
5 TABLET ORAL EVERY 8 HOURS PRN
Qty: 30 TABLET | Refills: 0 | Status: SHIPPED | OUTPATIENT
Start: 2023-02-24 | End: 2023-03-06

## 2023-02-24 RX ORDER — OXYCODONE HYDROCHLORIDE 5 MG/1
5-10 TABLET ORAL EVERY 6 HOURS PRN
Qty: 42 TABLET | Refills: 0 | Status: SHIPPED | OUTPATIENT
Start: 2023-02-24 | End: 2023-03-02 | Stop reason: HOSPADM

## 2023-02-24 RX ADMIN — METHOCARBAMOL 750 MG: 750 TABLET ORAL at 18:02

## 2023-02-24 RX ADMIN — GABAPENTIN 300 MG: 300 CAPSULE ORAL at 14:02

## 2023-02-24 RX ADMIN — SODIUM CHLORIDE, PRESERVATIVE FREE 10 ML: 5 INJECTION INTRAVENOUS at 08:54

## 2023-02-24 RX ADMIN — OXYCODONE 10 MG: 5 TABLET ORAL at 08:49

## 2023-02-24 RX ADMIN — SODIUM CHLORIDE, PRESERVATIVE FREE 10 ML: 5 INJECTION INTRAVENOUS at 20:57

## 2023-02-24 RX ADMIN — HYDROMORPHONE HYDROCHLORIDE 0.5 MG: 1 INJECTION, SOLUTION INTRAMUSCULAR; INTRAVENOUS; SUBCUTANEOUS at 05:40

## 2023-02-24 RX ADMIN — PANTOPRAZOLE SODIUM 40 MG: 40 TABLET, DELAYED RELEASE ORAL at 05:40

## 2023-02-24 RX ADMIN — METHOCARBAMOL 750 MG: 750 TABLET ORAL at 12:57

## 2023-02-24 RX ADMIN — HYDROMORPHONE HYDROCHLORIDE 0.5 MG: 1 INJECTION, SOLUTION INTRAMUSCULAR; INTRAVENOUS; SUBCUTANEOUS at 09:54

## 2023-02-24 RX ADMIN — HYDROMORPHONE HYDROCHLORIDE 0.5 MG: 1 INJECTION, SOLUTION INTRAMUSCULAR; INTRAVENOUS; SUBCUTANEOUS at 14:02

## 2023-02-24 RX ADMIN — HYDROMORPHONE HYDROCHLORIDE 0.5 MG: 1 INJECTION, SOLUTION INTRAMUSCULAR; INTRAVENOUS; SUBCUTANEOUS at 18:02

## 2023-02-24 RX ADMIN — FUROSEMIDE 40 MG: 40 TABLET ORAL at 08:50

## 2023-02-24 RX ADMIN — HYDROMORPHONE HYDROCHLORIDE 0.5 MG: 1 INJECTION, SOLUTION INTRAMUSCULAR; INTRAVENOUS; SUBCUTANEOUS at 21:54

## 2023-02-24 RX ADMIN — GABAPENTIN 300 MG: 300 CAPSULE ORAL at 20:55

## 2023-02-24 RX ADMIN — PROCHLORPERAZINE EDISYLATE 10 MG: 5 INJECTION, SOLUTION INTRAMUSCULAR; INTRAVENOUS at 17:06

## 2023-02-24 RX ADMIN — OXYCODONE 10 MG: 5 TABLET ORAL at 20:55

## 2023-02-24 RX ADMIN — OXYCODONE 10 MG: 5 TABLET ORAL at 12:57

## 2023-02-24 RX ADMIN — PROCHLORPERAZINE EDISYLATE 10 MG: 5 INJECTION, SOLUTION INTRAMUSCULAR; INTRAVENOUS at 10:17

## 2023-02-24 RX ADMIN — GABAPENTIN 300 MG: 300 CAPSULE ORAL at 08:50

## 2023-02-24 RX ADMIN — ACETAMINOPHEN 1000 MG: 500 TABLET ORAL at 12:57

## 2023-02-24 RX ADMIN — OXYCODONE 10 MG: 5 TABLET ORAL at 04:42

## 2023-02-24 RX ADMIN — HYDROMORPHONE HYDROCHLORIDE 0.5 MG: 1 INJECTION, SOLUTION INTRAMUSCULAR; INTRAVENOUS; SUBCUTANEOUS at 01:48

## 2023-02-24 RX ADMIN — ACETAMINOPHEN 1000 MG: 500 TABLET ORAL at 18:02

## 2023-02-24 RX ADMIN — METHOCARBAMOL 750 MG: 750 TABLET ORAL at 04:42

## 2023-02-24 RX ADMIN — OXYCODONE 10 MG: 5 TABLET ORAL at 17:03

## 2023-02-24 ASSESSMENT — PAIN DESCRIPTION - FREQUENCY
FREQUENCY: CONTINUOUS

## 2023-02-24 ASSESSMENT — PAIN SCALES - GENERAL
PAINLEVEL_OUTOF10: 9
PAINLEVEL_OUTOF10: 5
PAINLEVEL_OUTOF10: 0
PAINLEVEL_OUTOF10: 10
PAINLEVEL_OUTOF10: 9
PAINLEVEL_OUTOF10: 10
PAINLEVEL_OUTOF10: 9
PAINLEVEL_OUTOF10: 8
PAINLEVEL_OUTOF10: 10
PAINLEVEL_OUTOF10: 8
PAINLEVEL_OUTOF10: 10
PAINLEVEL_OUTOF10: 0
PAINLEVEL_OUTOF10: 10
PAINLEVEL_OUTOF10: 5

## 2023-02-24 ASSESSMENT — PAIN - FUNCTIONAL ASSESSMENT
PAIN_FUNCTIONAL_ASSESSMENT: PREVENTS OR INTERFERES SOME ACTIVE ACTIVITIES AND ADLS

## 2023-02-24 ASSESSMENT — PAIN DESCRIPTION - LOCATION
LOCATION: BACK

## 2023-02-24 ASSESSMENT — PAIN DESCRIPTION - DESCRIPTORS
DESCRIPTORS: ACHING
DESCRIPTORS: ACHING;DISCOMFORT
DESCRIPTORS: SHARP
DESCRIPTORS: ACHING;DISCOMFORT
DESCRIPTORS: ACHING;DISCOMFORT
DESCRIPTORS: ACHING
DESCRIPTORS: ACHING
DESCRIPTORS: SHARP
DESCRIPTORS: DISCOMFORT;ACHING
DESCRIPTORS: ACHING;DISCOMFORT
DESCRIPTORS: ACHING;DISCOMFORT

## 2023-02-24 ASSESSMENT — PAIN DESCRIPTION - ORIENTATION
ORIENTATION: MID
ORIENTATION: UPPER
ORIENTATION: MID

## 2023-02-24 ASSESSMENT — PAIN DESCRIPTION - PAIN TYPE
TYPE: SURGICAL PAIN

## 2023-02-24 ASSESSMENT — PAIN DESCRIPTION - ONSET
ONSET: ON-GOING

## 2023-02-24 ASSESSMENT — PAIN SCALES - WONG BAKER: WONGBAKER_NUMERICALRESPONSE: 0

## 2023-02-24 NOTE — PLAN OF CARE
Problem: Chronic Conditions and Co-morbidities  Goal: Patient's chronic conditions and co-morbidity symptoms are monitored and maintained or improved  Outcome: Progressing     Problem: Discharge Planning  Goal: Discharge to home or other facility with appropriate resources  Outcome: Progressing     Problem: Pain  Goal: Verbalizes/displays adequate comfort level or baseline comfort level  2/24/2023 0749 by Aruna Hall RN  Outcome: Progressing     Problem: Safety - Adult  Goal: Free from fall injury  2/24/2023 0749 by Aruna Hall RN  Outcome: Progressing     Problem: ABCDS Injury Assessment  Goal: Absence of physical injury  Outcome: Progressing

## 2023-02-24 NOTE — CARE COORDINATION
CM spoke with patient at bedside. She is agreeable to [facility]. CM sent referral, spoke with Wes Carranza at [facility], who states they can accept and will start pre-cert.       Sangeetha Reeves RN, BSN,    Ortho/Neuro   [phone]

## 2023-02-24 NOTE — PROGRESS NOTES
Clinical Pharmacy Progress Note    Admit date: 2/22/2023     Subjective/Objective:  62 yo F with PMH of GERD, HTN, DDD, OA, DM; admitted s/p T10-T11 laminectomy. Pharmacy has been consulted to make pain medication recommendations by nursing staff. Home Pain Regimen:  Buprenorphine 10 mcg/hr patch weekly  Gabapentin 300mg TID     2/23: Spoke with patient at bedside who was drowsy during our conversation. She states she takes a Buprenorphine 10mcg/hr patch weekly at home. Was previously on Oxycodone-APAP but this was stopped when she was started on buprenorphine patches. Pt sees patient specialist as outpatient. Reports that pain is \"severe\" but was falling asleep after telling me this. Most recent pain score was 10/10.   2/24: Pain scores 5-10 overnight. Per RN notes, pain managed with current medications. Required 7 doses of IV hydromorphone overnight. Patient's stated pain goal: 0    Current pain regimen:   Medication  Home med? Amount used yesterday    Acetaminophen 1000mg q6h Yes  3g (3 doses)   Gabapentin 300mg TID  Yes  3 doses   Hydromorphone 0.25-0.5mg IV q3h PRN No  3.5mg IV (7 doses)   Oxycodone IR 5-10mg q4h PRN No  30 mg (3 doses)   Methocarbamol 750mg PO q6h No  4 doses   Diazepam 5mg PO q6h PRN anxiety/spasms  No None   Lidocaine 4% patch daily No  1 patch     Morphine Equivalent Daily Dose (MEDD):   Date MEDD (mg)   2/22 165 mg   2/23 119 mg       ASSESSMENT/PLAN:  1)  Pain Management:   Agree with scheduled Acetaminophen, continuing home gabapentin and methocarbamol to encourage use of multimodal analgesia. Lidocaine patch added. Pt was on Buprenorphine patch at home - does not have patch on currently and has no one to bring patch from home. Unclear when pt was taken off. No new recommendations today. Would encourage primary team to speak with outpatient pain specialist to establish discharge plan.  Buprenorphine is partial opioid agonist -- there is a potential for buprenorphine to precipitate withdraw if patients receiving opioids. Will continue to monitor and assist with adjustments to regimen as needed. Please call with any questions.   Glo Ventura PharmD, BCPS  Wireless: [phone]   2/24/2023 9:55 AM

## 2023-02-24 NOTE — PLAN OF CARE
Problem: Pain  Goal: Verbalizes/displays adequate comfort level or baseline comfort level  2/24/2023 0017 by Seema Maldonado RN  Outcome: Progressing  Note: Pt. Managing pain per MAR. Problem: Safety - Adult  Goal: Free from fall injury  2/24/2023 0017 by Seema Maldonado RN  Outcome: Progressing  Note: All fall precautions in place and call light is within reach.

## 2023-02-24 NOTE — PROGRESS NOTES
NEUROSURGERY POST-OP PROGRESS NOTE    Patient Name: Alyse Gonzalez YOB: 1965   Sex: Female Age: 61 yrs     Medical Record Number: [id number] Acct Number: [de-identified]   Room Number: 7516/9625-38 Hospital Day: Hospital Day: 3     Interval History:  Post-operative Day# 2 s/p T10-T11 LAMINECTOMY, FACETECTOMY, PEDICLE SCREW FIXATION FOR REMOVAL OF THORACIC MASS     Subjective: Patient complains of continued pain at incision site, she is getting up with therapy. Objective:    VITAL SIGNS   /75   Pulse 60   Temp 98.4 °F (36.9 °C) (Oral)   Resp 16   Ht 5' 7\" (1.702 m)   Wt 241 lb (109.3 kg)   LMP 02/19/2018   SpO2 98%   BMI 37.75 kg/m²    Height Height: 5' 7\" (170.2 cm)   Weight Weight: 241 lb (109.3 kg)        Allergies Allergies   Allergen Reactions    Pork-Derived Products      Religion reasons -- no pork products of any kind      NPO Status ADULT DIET; Regular; NO PORK FOR [employer]andaen REASONS   Isolation No active isolations     LABS   Basic Metabolic Profile Recent Labs     02/23/23  0837         CO2 29   BUN 10   CREATININE <0.5*   GLUCOSE 109*        Complete Blood Count Recent Labs     02/23/23  0837   WBC 9.0   RBC 3.98*        Coagulation Studies Recent Labs     02/23/23  0837   INR 1.06          MEDICATIONS   Inpatient Medications     scopolamine, 1 patch, TransDERmal, Q72H    lidocaine, 1 patch, TransDERmal, Daily    furosemide, 40 mg, Oral, Daily    gabapentin, 300 mg, Oral, TID    sodium chloride flush, 5-40 mL, IntraVENous, 2 times per day    acetaminophen, 1,000 mg, Oral, 4 times per day    pantoprazole, 40 mg, Oral, QAM AC    [COMPLETED] methocarbamol IVPB, 1,000 mg, IntraVENous, Q8H **FOLLOWED BY** methocarbamol, 750 mg, Oral, 4 times per day   Infusions    sodium chloride        Antibiotics   Recent Abx Admin        No antibiotic orders with administrations found.                      Neurologic Exam:    Mental status: awake/alert, oriented x 4    Musculoskeletal:   Gait: Not tested   Tone: normal   Sensory: chronic paraesthesias to both legs  Motor strength:    Right  Left    Right  Left    Deltoid  5 5  Hip Flex  4 4   Biceps  5 5  Knee Extensors  4 4   Triceps  5 5  Knee Flexors  4 4   Wrist Ext  5 5  Ankle Dorsiflex. 5 5   Wrist Flex  5 5  Ankle Plantarflex. 5 5   Handgrip  5 5  Ext Rafael Longus  5 5   Thumb Ext  5 5         Incision: staples PEPE c/d/i    Respiratory:  Unlabored respiratory pattern    Abdomen:   Soft, ND   +BS, +flatus        Cardiovascular:  Warm, well perfused    Assessment   62 yo female POD 2 s/p T10-T11 LAMINECTOMY, FACETECTOMY, PEDICLE SCREW FIXATION FOR REMOVAL OF THORACIC MASS with Dr. Saravia Prow:  Neurologic exam frequency: q 4   Mobility: PT/OT, activity as tolerated   Diet: ADAT  DVT Prophylaxis: SCDs, unable to have lovenox/heparin 2/2 pork derivative   GI Prophylaxis: protonix  Bowel Regimen: senokot, glycolax   Pain control: tylenol, dilaudid, Roxicodone, add lidocaine patch  -pharmacy following for assistance w/ pain control   Robaxin/valium for spasms   Incisional Care: cleanse daily with soap/water, pat dry with clean towel and paint with CHG swab, patient may shower  Scopolamine patch for nausea   Hospitalist for post op medical management   Dispo Planning: continue care on 5T, SW following for discharge plan, awaiting SNF    Patient was discussed with Dr. Fior Isaac who agrees with above assessment and plan. Electronically signed by:  YURY Aguilar NP, 2/24/2023 10:09 AM   Neurosurgery Nurse Practitioner  [phone]

## 2023-02-24 NOTE — PROGRESS NOTES
Pt A&O, VSS. Pt complains of pain which is being managed per mar. Pt often expresses frustration related to pain medication regimen. This RN has educated pt on use of oral pain medication before IV, as well as the schedule for which medication can be given. Surgical site CDI, PEPE. Pt x1 walker gb to bathroom. Pt showered this shift, tolerating well. All fall and safety precautions in place, bed locked and in lowest position, call light and belongings within reach, pt denies further needs at this time.

## 2023-02-24 NOTE — PROGRESS NOTES
Pt. Oriented x4. VSS on 2L nasal cannula with exception to low HR. Pt. Managing pain per MAR. Pt. Managing nausea with prn compazine. Pt. Complaining of heartburn, PRN Tums ordered. Incision CDI. Pt. Voiding well via BRP, up x1 gb/walker. All fall precautions in place and call light is within reach.

## 2023-02-24 NOTE — PROGRESS NOTES
Physical Therapy  Facility/Department: [facility]  Physical Therapy Treatment    Name: Nilda Merritt  : 1965  MRN: [id number]  Date of Service: 2023    Discharge Recommendations:  Nilda Merritt scored a 16/24 on the AM-PAC short mobility form. Current research shows that an AM-PAC score of 17 or less is typically not associated with a discharge to the patient's home setting. Based on the patient's AM-PAC score and their current functional mobility deficits, it is recommended that the patient have 3-5 sessions per week of Physical Therapy at d/c to increase the patient's independence. Please see assessment section for further patient specific details. If patient discharges prior to next session this note will serve as a discharge summary. Please see below for the latest assessment towards goals. PT Equipment Recommendations  Other: defer      Patient Diagnosis(es): The encounter diagnosis was Mass of thoracic vertebra. Past Medical History:  has a past medical history of Arthritis, Avoids pork and pork products due to cultural beliefs, Chronic pain, TAN (dyspnea on exertion), Frequent headaches, Gallbladder disorder, Gastroesophageal reflux disease, Hx of blood clots, Hx of degenerative disc disease, Hyperlipidemia, Hypertension, Morbid obesity with body mass index (BMI) of 60.0 to 69.9 in adult ([facility] [address] Neuropathy, SHYANN (obstructive sleep apnea), Poor circulation, Primary osteoarthritis of right knee, Type 2 diabetes mellitus without complication ([address] Urinary incontinence, Vitamin D deficiency, and Wears glasses. Past Surgical History:  has a past surgical history that includes back surgery; Breast lumpectomy (Bilateral); Dilation and curettage of uterus (N/A, 2018); Gastric bypass surgery; Sleeve Gastrectomy (10/13/2018); Colonoscopy; and laminectomy (N/A, 2023).     Assessment   Body Structures, Functions, Activity Limitations Requiring Skilled Therapeutic Intervention: Decreased functional mobility ;Decreased safe awareness;Decreased endurance;Increased pain  Assessment: Pt continues to require CGA for all OOB mobility, not receptive to safety cues throughout session.  Pt demonstrating confusion this session, perseverating on not being able to figure out how to turn on shower, and attempting to turn shower on when she is still clothed.  Safety concerns for d/c home due to pt requiring physical assist for mobility, pt does not have assist at d/c, making her a high fall risk.  Pt will continue to benefit from skilled therapy to maximize safety and independence.  Treatment Diagnosis: gait difficulty  Activity Tolerance  Activity Tolerance: Patient limited by pain     Plan   Physcial Therapy Plan  General Plan: 5-7 times per week  Current Treatment Recommendations: Strengthening, Balance training, Functional mobility training, Transfer training, Gait training, Stair training, Patient/Caregiver education & training, Equipment evaluation, education, & procurement, Therapeutic activities, Endurance training, Safety education & training  Safety Devices  Type of Devices:  (pt left seated on shower chair with RN present)     Restrictions  Position Activity Restriction  Spinal Precautions: No Bending, No Lifting, No Twisting  Other position/activity restrictions: up with assistance     Subjective   General  Chart Reviewed: Yes  Patient assessed for rehabilitation services?: Yes  Additional Pertinent Hx: T10-T11 LAMINECTOMY, FACETECTOMY, PEDICLE SCREW FIXATION FOR REMOVAL OF THORACIC MASS (Spine Thoracic)  Family / Caregiver Present: No  Referring Practitioner: YURY Aguero - CNP  Diagnosis: mass of thoracic vertebra  Follows Commands: Within Functional Limits  General Comment  Comments: Pt found supine in bed upon PT arrival.  Pt agreeable to therapy session.  Some confusion during session, pt demonstrating significantly decreased safety awareness despite cues.  RN  notified. Subjective  Subjective: \"I don't understand why I have to be in pain after having surgery. \"         Social/Functional History  Social/Functional History  Lives With: Alone  Type of Home: House (looking for new wc accessible apt)  Home Layout: One level  Home Access: Level entry  Bathroom Toilet: Standard (sink beside toilet)  Home Equipment: Carlos Mon, Rollator  Has the patient had two or more falls in the past year or any fall with injury in the past year?: No  ADL Assistance: Independent (sponge baths at sink in wheelchair, would complete dressing IND)  Homemaking Assistance: Independent  Ambulation Assistance: Independent (states she is only able to walk ~5ft before needing to sit down using rollator. Will also use wc or scoot on seat of rollator)  Transfer Assistance: Independent (would use walker to transfer, can stand for short periods of time)  Active : No  Patient's  Info: medical transport  Additional Comments: Was previously going to OP PT. Daughter is urology surgeon in [facility], currently pregnant and unable to leave [facility] to assist pt. Vision/Hearing       Cognition         Objective   Heart Rate: 68  Heart Rate Source: Monitor  BP: 132/76  BP Location: Left upper arm  BP Method: Automatic  Patient Position: Semi fowlers  MAP (Calculated): 95  Resp: 16  SpO2: 100 %  O2 Device: Nasal cannula                             Bed mobility  Supine to Sit: Stand by assistance (increased time, HOB slightly elevated, cues for log roll)  Transfers  Sit to Stand: Contact guard assistance (From EOB to RW, cues for hand placement.   Pt impulsively standing from toilet with no AD despite cues.)  Stand to Sit: Contact guard assistance (cues for hand placement and to align with surface fully prior to sitting, as pt tends to rush to surface)  Ambulation  Surface: Level tile  Device: Rolling Walker  Assistance: Contact guard assistance  Quality of Gait: decreased shanel, forward trunk flexion, wide CARROL, decreased B step height/length  Distance: 10' (into bathroom)  Comments: Cues for upright posture and RW management, pt minimally receptive to cues. Balance  Comments: Pt making no mention of needing to use restroom when initially asked by PT upon arrival.  PT offering toilet as an ambulation destination for session. Pt incontinent of urine for half of distance to toilet, quickly sitting onto toilet without pulling gown out of the way, urinating onto gown, down legs, and onto floor. PT attempting to clean area and provide new socks/gown/etc for patient, offering to assist pt to clean pt's legs with bath wipes or washcloth/body wash. Pt stating, \"I want a shower. \"  PT educating pt that PT is unable to provide a full shower to the pt, but PT can alert pt's RN that pt would like to shower. Pt states, \"I'm not going back to bed without a shower. \"  RN notified. Pt impulsively standing from toilet and walking along wall using grab bar to reach shower in bathroom. Pt then beginning to attempt to turn shower on. PT instructing pt to sit for safety, as pt did not allow PT to don new hospital socks prior to pt impulsively attempting to get to shower. Pt stating, \"turn this on. Why won't it turn on?\"  PT again asking pt to please sit down for safety. Pt did sit onto shower chair and again stated, \"turn this on. \"  PT reminding pt that PT is unable to give her a shower, and she must wait for her RN to arrive for the shower to begin. PT also reminding pt that the pt currently has her hospital gown on, and this should be removed before she showers. Pt continued to state, \"Turn on this water. I've been waiting. \"  RN entering room and took over assisting pt. RN notified of pt's decreased safety awareness.            OutComes Score                                                  AM-PAC Score  AM-PAC Inpatient Mobility Raw Score : 16 (02/24/23 1324)  AM-PAC Inpatient T-Scale Score : 40.78 (02/24/23 1324)  Mobility Inpatient CMS 0-100% Score: 54.16 (02/24/23 1324)  Mobility Inpatient CMS G-Code Modifier : CK (02/24/23 1324)          Tinneti Score       Goals  Short Term Goals  Time Frame for Short Term Goals: discharge  Short Term Goal 1: pt will perform supine<>sit via logroll mod I -ongoing  Short Term Goal 2: pt will perform STS to RW mod I -ongoing  Short Term Goal 3: pt will amb 30ft with RW mod I -ongoing       Education  Patient Education  Education Given To: Patient  Education Provided Comments: RW management  Education Method: Verbal  Barriers to Learning: None  Education Outcome: Continued education needed      Therapy Time   Individual Concurrent Group Co-treatment   Time In 1106         Time Out 1144         Minutes 38             Timed Code Treatment Minutes:   38    Total Treatment Minutes:  [address], PT   This note to serve as discharge summary if patient discharged before next session.

## 2023-02-25 PROCEDURE — 6370000000 HC RX 637 (ALT 250 FOR IP): Performed by: NURSE PRACTITIONER

## 2023-02-25 PROCEDURE — 94150 VITAL CAPACITY TEST: CPT

## 2023-02-25 PROCEDURE — 2060000000 HC ICU INTERMEDIATE R&B

## 2023-02-25 PROCEDURE — 6360000002 HC RX W HCPCS: Performed by: NURSE PRACTITIONER

## 2023-02-25 PROCEDURE — 97530 THERAPEUTIC ACTIVITIES: CPT

## 2023-02-25 PROCEDURE — 97535 SELF CARE MNGMENT TRAINING: CPT

## 2023-02-25 PROCEDURE — 94761 N-INVAS EAR/PLS OXIMETRY MLT: CPT

## 2023-02-25 PROCEDURE — 6360000002 HC RX W HCPCS: Performed by: INTERNAL MEDICINE

## 2023-02-25 PROCEDURE — 2580000003 HC RX 258: Performed by: NURSE PRACTITIONER

## 2023-02-25 PROCEDURE — 6360000002 HC RX W HCPCS: Performed by: HOSPITALIST

## 2023-02-25 PROCEDURE — 97116 GAIT TRAINING THERAPY: CPT

## 2023-02-25 PROCEDURE — 2700000000 HC OXYGEN THERAPY PER DAY

## 2023-02-25 RX ORDER — LIDOCAINE 4 G/G
1 PATCH TOPICAL DAILY
Qty: 7 EACH | Refills: 0 | Status: SHIPPED | OUTPATIENT
Start: 2023-02-25 | End: 2023-03-04

## 2023-02-25 RX ADMIN — OXYCODONE 10 MG: 5 TABLET ORAL at 00:58

## 2023-02-25 RX ADMIN — OXYCODONE 10 MG: 5 TABLET ORAL at 13:05

## 2023-02-25 RX ADMIN — HYDROMORPHONE HYDROCHLORIDE 0.5 MG: 1 INJECTION, SOLUTION INTRAMUSCULAR; INTRAVENOUS; SUBCUTANEOUS at 10:10

## 2023-02-25 RX ADMIN — ACETAMINOPHEN 1000 MG: 500 TABLET ORAL at 00:01

## 2023-02-25 RX ADMIN — METHOCARBAMOL 750 MG: 750 TABLET ORAL at 05:05

## 2023-02-25 RX ADMIN — METHOCARBAMOL 750 MG: 750 TABLET ORAL at 00:01

## 2023-02-25 RX ADMIN — OXYCODONE 10 MG: 5 TABLET ORAL at 17:06

## 2023-02-25 RX ADMIN — ACETAMINOPHEN 1000 MG: 500 TABLET ORAL at 05:04

## 2023-02-25 RX ADMIN — ACETAMINOPHEN 1000 MG: 500 TABLET ORAL at 18:01

## 2023-02-25 RX ADMIN — PROCHLORPERAZINE EDISYLATE 10 MG: 5 INJECTION, SOLUTION INTRAMUSCULAR; INTRAVENOUS at 00:02

## 2023-02-25 RX ADMIN — DIAZEPAM 5 MG: 5 TABLET ORAL at 21:06

## 2023-02-25 RX ADMIN — HYDROMORPHONE HYDROCHLORIDE 0.5 MG: 1 INJECTION, SOLUTION INTRAMUSCULAR; INTRAVENOUS; SUBCUTANEOUS at 14:08

## 2023-02-25 RX ADMIN — SODIUM CHLORIDE, PRESERVATIVE FREE 10 ML: 5 INJECTION INTRAVENOUS at 06:03

## 2023-02-25 RX ADMIN — OXYCODONE 10 MG: 5 TABLET ORAL at 21:05

## 2023-02-25 RX ADMIN — OXYCODONE 10 MG: 5 TABLET ORAL at 05:05

## 2023-02-25 RX ADMIN — FUROSEMIDE 40 MG: 40 TABLET ORAL at 09:06

## 2023-02-25 RX ADMIN — ONDANSETRON 4 MG: 2 INJECTION INTRAMUSCULAR; INTRAVENOUS at 18:15

## 2023-02-25 RX ADMIN — HYDROMORPHONE HYDROCHLORIDE 0.5 MG: 1 INJECTION, SOLUTION INTRAMUSCULAR; INTRAVENOUS; SUBCUTANEOUS at 06:01

## 2023-02-25 RX ADMIN — PROCHLORPERAZINE EDISYLATE 10 MG: 5 INJECTION, SOLUTION INTRAMUSCULAR; INTRAVENOUS at 06:01

## 2023-02-25 RX ADMIN — HYDROMORPHONE HYDROCHLORIDE 0.5 MG: 1 INJECTION, SOLUTION INTRAMUSCULAR; INTRAVENOUS; SUBCUTANEOUS at 18:01

## 2023-02-25 RX ADMIN — GABAPENTIN 300 MG: 300 CAPSULE ORAL at 14:08

## 2023-02-25 RX ADMIN — HYDROMORPHONE HYDROCHLORIDE 0.5 MG: 1 INJECTION, SOLUTION INTRAMUSCULAR; INTRAVENOUS; SUBCUTANEOUS at 22:06

## 2023-02-25 RX ADMIN — SODIUM CHLORIDE, PRESERVATIVE FREE 10 ML: 5 INJECTION INTRAVENOUS at 09:14

## 2023-02-25 RX ADMIN — METHOCARBAMOL 750 MG: 750 TABLET ORAL at 18:01

## 2023-02-25 RX ADMIN — METHOCARBAMOL 750 MG: 750 TABLET ORAL at 12:13

## 2023-02-25 RX ADMIN — GABAPENTIN 300 MG: 300 CAPSULE ORAL at 09:06

## 2023-02-25 RX ADMIN — SODIUM CHLORIDE, PRESERVATIVE FREE 10 ML: 5 INJECTION INTRAVENOUS at 21:06

## 2023-02-25 RX ADMIN — HYDROMORPHONE HYDROCHLORIDE 0.5 MG: 1 INJECTION, SOLUTION INTRAMUSCULAR; INTRAVENOUS; SUBCUTANEOUS at 02:01

## 2023-02-25 RX ADMIN — GABAPENTIN 300 MG: 300 CAPSULE ORAL at 21:06

## 2023-02-25 RX ADMIN — PROCHLORPERAZINE EDISYLATE 10 MG: 5 INJECTION, SOLUTION INTRAMUSCULAR; INTRAVENOUS at 12:13

## 2023-02-25 RX ADMIN — PANTOPRAZOLE SODIUM 40 MG: 40 TABLET, DELAYED RELEASE ORAL at 06:01

## 2023-02-25 RX ADMIN — OXYCODONE 10 MG: 5 TABLET ORAL at 09:05

## 2023-02-25 RX ADMIN — SODIUM CHLORIDE, PRESERVATIVE FREE 10 ML: 5 INJECTION INTRAVENOUS at 02:00

## 2023-02-25 ASSESSMENT — PAIN DESCRIPTION - FREQUENCY
FREQUENCY: CONTINUOUS

## 2023-02-25 ASSESSMENT — PAIN DESCRIPTION - DESCRIPTORS
DESCRIPTORS: ACHING
DESCRIPTORS: SHARP
DESCRIPTORS: ACHING
DESCRIPTORS: ACHING
DESCRIPTORS: SHARP
DESCRIPTORS: SHARP
DESCRIPTORS: ACHING

## 2023-02-25 ASSESSMENT — PAIN SCALES - GENERAL
PAINLEVEL_OUTOF10: 0
PAINLEVEL_OUTOF10: 10
PAINLEVEL_OUTOF10: 10
PAINLEVEL_OUTOF10: 0
PAINLEVEL_OUTOF10: 9
PAINLEVEL_OUTOF10: 10
PAINLEVEL_OUTOF10: 9
PAINLEVEL_OUTOF10: 10
PAINLEVEL_OUTOF10: 0
PAINLEVEL_OUTOF10: 7
PAINLEVEL_OUTOF10: 0
PAINLEVEL_OUTOF10: 9
PAINLEVEL_OUTOF10: 7
PAINLEVEL_OUTOF10: 10
PAINLEVEL_OUTOF10: 10
PAINLEVEL_OUTOF10: 0
PAINLEVEL_OUTOF10: 10

## 2023-02-25 ASSESSMENT — PAIN DESCRIPTION - PAIN TYPE
TYPE: SURGICAL PAIN

## 2023-02-25 ASSESSMENT — PAIN - FUNCTIONAL ASSESSMENT
PAIN_FUNCTIONAL_ASSESSMENT: PREVENTS OR INTERFERES SOME ACTIVE ACTIVITIES AND ADLS
PAIN_FUNCTIONAL_ASSESSMENT: ACTIVITIES ARE NOT PREVENTED

## 2023-02-25 ASSESSMENT — PAIN SCALES - WONG BAKER
WONGBAKER_NUMERICALRESPONSE: 0

## 2023-02-25 ASSESSMENT — PAIN DESCRIPTION - ORIENTATION
ORIENTATION: MID

## 2023-02-25 ASSESSMENT — PAIN DESCRIPTION - ONSET
ONSET: ON-GOING

## 2023-02-25 ASSESSMENT — PAIN DESCRIPTION - LOCATION
LOCATION: BACK

## 2023-02-25 NOTE — PROGRESS NOTES
Physical Therapy  Facility/Department: [facility]  Physical Therapy Treatment    Name: Beryle Draft  : 1965  MRN: [id number]  Date of Service: 2023    Discharge Recommendations:    Beryle Draft scored a 16/24 on the AM-PAC short mobility form. Current research shows that an AM-PAC score of 17 or less is typically not associated with a discharge to the patient's home setting. Based on the patient's AM-PAC score and their current functional mobility deficits, it is recommended that the patient have 3-5 sessions per week of Physical Therapy at d/c to increase the patient's independence. Please see assessment section for further patient specific details. If patient discharges prior to next session this note will serve as a discharge summary. Please see below for the latest assessment towards goals. PT Equipment Recommendations  Equipment Needed:  (defer to next LOC)      Patient Diagnosis(es): The primary encounter diagnosis was S/P spinal fusion. A diagnosis of Mass of thoracic vertebra was also pertinent to this visit. Past Medical History:  has a past medical history of Arthritis, Avoids pork and pork products due to cultural beliefs, Chronic pain, TAN (dyspnea on exertion), Frequent headaches, Gallbladder disorder, Gastroesophageal reflux disease, Hx of blood clots, Hx of degenerative disc disease, Hyperlipidemia, Hypertension, Morbid obesity with body mass index (BMI) of 60.0 to 69.9 in adult ([address] Neuropathy, SHYANN (obstructive sleep apnea), Poor circulation, Primary osteoarthritis of right knee, Type 2 diabetes mellitus without complication ([address] Urinary incontinence, Vitamin D deficiency, and Wears glasses. Past Surgical History:  has a past surgical history that includes back surgery; Breast lumpectomy (Bilateral); Dilation and curettage of uterus (N/A, 2018); Gastric bypass surgery; Sleeve Gastrectomy (10/13/2018);  Colonoscopy; and laminectomy (N/A, 2/22/2023). Assessment   Assessment: Pt normally lives alone and is independent with functional mobility, gait and ADL. Currently limited by 10/10 pain even with medication and needs assist for transfers and limited gait. Recommend continued IP therapies to maximize mobility, safety and independence. Activity Tolerance  Activity Tolerance: Patient limited by pain; Patient limited by endurance     Plan   Physcial Therapy Plan  General Plan: 5-7 times per week  Current Treatment Recommendations: Strengthening, Balance training, Functional mobility training, Transfer training, Gait training, Stair training, Patient/Caregiver education & training, Equipment evaluation, education, & procurement, Therapeutic activities, Endurance training, Safety education & training  Safety Devices  Type of Devices: Left in bed, Call light within reach, Nurse notified, Bed alarm in place     Restrictions  Position Activity Restriction  Spinal Precautions: No Bending, No Lifting, No Twisting  Other position/activity restrictions: up with assistance     Subjective   Subjective  Subjective: Pt in chair upon PT entry, agreeable to working with PT. Notes pain is 10/10 depiste calm demeanor, talking, no SOB noted. RN aware, meds given. Social/Functional History  Social/Functional History  Lives With: Alone  Type of Home: House (looking for new wc accessible apt)  Home Layout: One level  Home Access: Level entry  Bathroom Toilet: Standard (sink beside toilet)  Home Equipment: Lonzaina Bates, Rollator  Has the patient had two or more falls in the past year or any fall with injury in the past year?: No  ADL Assistance: Independent (sponge baths at sink in wheelchair, would complete dressing IND)  Homemaking Assistance: Independent  Ambulation Assistance: Independent (states she is only able to walk ~5ft before needing to sit down using rollator.  Will also use wc or scoot on seat of rollator)  Transfer Assistance: Independent (would use walker to transfer, can stand for short periods of time)  Active : No  Patient's  Info: medical transport  Additional Comments: Was previously going to OP PT. Daughter is urology surgeon in [place], currently pregnant and unable to leave [place] to assist pt.       Objective   Heart Rate: 65  Heart Rate Source: Monitor  BP: 103/64  BP Location: Left upper arm  BP Method: Automatic  Patient Position: Semi fowlers  MAP (Calculated): 77  Resp: 16  SpO2: 94 %  O2 Device: Nasal cannula      Bed mobility  Sit to Supine: Minimal assistance (min A to help lift LE's into bed)  Scooting: Stand by assistance (up in bed)  Transfers  Sit to Stand: Contact guard assistance (from chair x 2)  Stand to Sit: Contact guard assistance (to chair and eob)  Ambulation  Surface: Level tile  Device: Rolling Walker  Assistance: Contact guard assistance  Quality of Gait: cues to stay within frame of RW  Gait Deviations: Slow Rebecca;Decreased step length;Decreased step height  Distance: 30 ft x 2     Balance  Comments: good stability with functional mobility and gait    AM-PAC Score  AM-PAC Inpatient Mobility Raw Score : 16 (02/25/23 1153)  AM-PAC Inpatient T-Scale Score : 40.78 (02/25/23 1153)  Mobility Inpatient CMS 0-100% Score: 54.16 (02/25/23 1153)  Mobility Inpatient CMS G-Code Modifier : CK (02/25/23 1153)      Goals  Short Term Goals  Time Frame for Short Term Goals: discharge  ongoing 2/25  Short Term Goal 1: pt will perform supine<>sit via logroll mod I -ongoing  Short Term Goal 2: pt will perform STS to RW mod I -ongoing  Short Term Goal 3: pt will amb 30ft with RW mod I -ongoing       Education role of PT, safety, bed mobility, transfers, gait         Therapy Time   Individual Concurrent Group Co-treatment   Time In 0904         Time Out 0935         Minutes 31             Timed Code Treatment Minutes:   31    Total Treatment Minutes:  [address] [facility], [facility]

## 2023-02-25 NOTE — PROGRESS NOTES
Discharge order placed by Dr. Sixto Cooper (internal medicine). Dr. Ayah Fernando (neurosurgery) rounded on patient and stated to take out discharge order since admitting provider is Dr. Julee Murphy (neurosurgery). Discharge order cancelled.

## 2023-02-25 NOTE — PROGRESS NOTES
Clinical Pharmacy Progress Note    Admit date: 2/22/2023     Subjective/Objective:  56 yo F with PMH of GERD, HTN, DDD, OA, DM; admitted s/p T10-T11 laminectomy.   Pharmacy has been consulted to make pain medication recommendations by nursing staff.    Home Pain Regimen:  Buprenorphine 10 mcg/hr patch weekly  Gabapentin 300mg TID     2/23: Spoke with patient at bedside who was drowsy during our conversation. She states she takes a Buprenorphine 10mcg/hr patch weekly at home. Was previously on Oxycodone-APAP but this was stopped when she was started on buprenorphine patches. Pt sees patient specialist as outpatient. Reports that pain is \"severe\" but was falling asleep after telling me this. Most recent pain score was 10/10.   2/24: Pain scores 5-10 overnight. Per RN notes, pain managed with current medications. Required 7 doses of IV hydromorphone overnight.  2/25:  Pain scores still high at 7-10 / 10.  Much less usage of IV hydromorphone, with corresponding increase in oxycodone PO usage.  Possible DC home soon.     Patient's stated pain goal: 0    Current pain regimen:   Medication  Home med?  Amount used yesterday    Acetaminophen 1000mg q6h Yes  4g (4 doses)   Gabapentin 300mg TID  Yes  3 doses   Hydromorphone 0.25-0.5mg IV q4h PRN No  1mg IV (2 doses)   Oxycodone IR 5-10mg q4h PRN No  70 mg (7 doses)   Methocarbamol 750mg PO q6h No  4 doses   Diazepam 5mg PO q6h PRN anxiety/spasms  No None   Lidocaine 4% patch daily No  1 patch     Morphine Equivalent Daily Dose (MEDD):   Date MEDD (mg)   2/22 165 mg   2/23 119 mg   2/24 109 mg       ASSESSMENT/PLAN:  1)  Pain Management:   Agree with scheduled Acetaminophen, continuing home gabapentin and methocarbamol to encourage use of multimodal analgesia. Lidocaine patch added.   Pt was on Buprenorphine patch at home - does not have patch on currently and has no one to bring patch from home. Unclear when pt was taken off.   No new recommendations today.  Would encourage  primary team to speak with outpatient pain specialist to establish discharge plan. Buprenorphine is partial opioid agonist -- there is a potential for buprenorphine to precipitate withdraw if patients receiving opioids. Will continue to monitor and assist with adjustments to regimen as needed. Please call with any questions.   Vladislav JohnsonD., BCPS   2/25/2023 3:30 PM  Wireless: [phone]

## 2023-02-25 NOTE — PROGRESS NOTES
Occupational Therapy  Occupational Therapy  Daily Treatment Note  Patient Name: Leora Montano  MRN: [id number]    Assessment:   Pt continues to be limited by pain and fatigue requiring increased time for completion of tasks. Pt declining to answer questions during session with flat affect. Transfers requiring CGA and pt unable to complete ADLs in stance completing seated. Pt educated on log roll tech. Pt would benefit from inpt OT for maximizing functional independence and safety. Continue OT per [facility]    Discharge Recommendations:    Leora Montano scored a 17/24 on the AM-PAC ADL Inpatient form. Current research shows that an AM-PAC score of 17 or less is typically not associated with a discharge to the patient's home setting. Based on the patient's AM-PAC score and their current ADL deficits, it is recommended that the patient have 3-5 sessions per week of Occupational Therapy at d/c to increase the patient's independence. Please see assessment section for further patient specific details. If patient discharges prior to next session this note will serve as a discharge summary. Please see below for the latest assessment towards goals. Equipment Needs:      Chart Reviewed: Yes       Other position/activity restrictions: up with assistance     Additional Pertinent Hx: T10-T11 LAMINECTOMY, FACETECTOMY, PEDICLE SCREW FIXATION FOR REMOVAL OF THORACIC MASS (Spine Thoracic)      Diagnosis: Mass of Thoracic Vertebra  Treatment Diagnosis: impaired ADLs/transfers s/p T10-T11 LAMINECTOMY, FACETECTOMY, PEDICLE SCREW FIXATION FOR REMOVAL OF THORACIC MASS    Subjective:  Pt met supine in bed requiring encouragement for participation. All tasks requiring increased time. Minimal conversation with flat affect. Pain: increased pain with movement. Pt requesting pain medication.  RN aware and pt repositioned to comfort    Social/Functional History  Lives With: Alone  Type of Home: House (looking for new wc accessible apt)  Home Layout: One level  Home Access: Level entry  Bathroom Toilet: Standard (sink beside toilet)  Home Equipment: Carlos Mon, Rollator  Has the patient had two or more falls in the past year or any fall with injury in the past year?: No  ADL Assistance: Independent (sponge baths at sink in wheelchair, would complete dressing IND)  Homemaking Assistance: Independent  Ambulation Assistance: Independent (states she is only able to walk ~5ft before needing to sit down using rollator. Will also use wc or scoot on seat of rollator)  Transfer Assistance: Independent (would use walker to transfer, can stand for short periods of time)  Active : No  Patient's  Info: medical transport  Additional Comments: Was previously going to OP PT. Daughter is urology surgeon in [place], currently pregnant and unable to leave [place] to assist pt. Prior Function  ADL Assistance: Independent (sponge baths at sink in wheelchair, would complete dressing IND)  Homemaking Assistance: Independent  Ambulation Assistance: Independent (states she is only able to walk ~5ft before needing to sit down using rollator. Will also use wc or scoot on seat of rollator)  Transfer Assistance: Independent (would use walker to transfer, can stand for short periods of time)  Additional Comments: Was previously going to OP PT. Daughter is urology surgeon in [place], currently pregnant and unable to leave [place] to assist pt. Objective:    Cognition/Orientation:  WFL    Bed mobility   Rolling: CGA with cues for log roll.    Supine to sit:  CGA  Scooting: CGA scooting to EOB     Functional Mobility   Sit to Stand: Min A for first stand from EOB then CGA from toilet and chair infront of sink  Stand to Sit: CGA with cues for hand placement  Bed to Chair Transfer:  CGA to Min A from EOB to toilet to chair in front of sink with cues for safe positioning  Commode Transfer: CGA with use of GB  Other: Functional mobility to and from bathroom with CGA and use of RW. ADLs   Grooming: CGA in stance and SBA seated with cues for safe positioning. Pt tending to lean over sink both in stance and seated  Bathing: seated in front of sink for UE  UB dressing: CGA for ties on gown  LB dressing: decline  Toileting: CGA with danni care completed seated        Activity Tolerance:  Pt limited by pain and weakness requiring increased time for completion    Patient Education:   Log roll tech, Roll of OT, safe transfers, safe RW use, precautions    Safety Devices in Place:  Pt left seated in recliner chair with alarm on and call light in reach. Therapy Time:   Individual Concurrent Group Co-treatment   Time In 1132         Time Out 1215         Minutes 43           Timed Code Treatment Minutes: 43     Total Treatment Minutes:     Timed Code Treatment Minutes: Total Treatment Time:     Goals: (as determined and assessed by primary OT)   Short Term Goals  Time Frame for Short Term Goals: Discharge  Short Term Goal 1: 3in1 transfer w/ Supervision - ongoing  Short Term Goal 2: LB dressing using AE w/ Supervision - ongoing  Short Term Goal 3: increase functional standing tolerance to 10 minutes - ongoing  Short Term Goal 4: supine to sit via log roll w/ Supervision - ongoing         Plan:      Times Per Week: 5-7        If patient is discharged prior to next treatment, this note will serve as the discharge summary.       [address] [place] [address], Ne, [place]/L

## 2023-02-25 NOTE — PROGRESS NOTES
Progress Note     PCP: Darien Rojo MD    Date of Admission: 2/22/2023    Date of Service: Pt seen/examined on  2/23/23 and Admitted to Inpatient with expected LOS greater than two midnights due to medical therapy. Chief Complaint:        History Of Present Illness: The patient is a 61 y.o. female who presents to [facility] with   PMH of HTN , DLP , SHYANN , DM type 2   Is s/p  T10-T11 LAMINECTOMY, FACETECTOMY, PEDICLE SCREW FIXATION FOR REMOVAL OF THORACIC MASS   Surgery on 2/22/23    Patient sitting in chair  C/o nausea   C/o pain over operative site     Denies sob or fever or chills       Interval History     C/o pain over operative site  No fever or chills or sob        Past Medical History:        Diagnosis Date    Arthritis     Avoids pork and pork products due to cultural beliefs     Chronic pain     TAN (dyspnea on exertion)     Frequent headaches 12/13/2022    Gallbladder disorder     Gastroesophageal reflux disease 01/28/2020    Hx of blood clots     in legs -- 13 years ago    Hx of degenerative disc disease     Hyperlipidemia     Hypertension     Morbid obesity with body mass index (BMI) of 60.0 to 69.9 in adult ([address] 07/02/2018    Neuropathy     SHYANN (obstructive sleep apnea)     machine broke down, awaiting new machine at the end of June 2023    Poor circulation     Primary osteoarthritis of right knee 07/02/2018    Type 2 diabetes mellitus without complication ([address]     controlled with diet    Urinary incontinence     Vitamin D deficiency 01/28/2020    Wears glasses        Past Surgical History:        Procedure Laterality Date    BACK SURGERY      2001 -- L4 and L5 -- no metal or implants    BREAST LUMPECTOMY Bilateral     2015    COLONOSCOPY      DILATION AND CURETTAGE OF UTERUS N/A 08/30/2018    GASTRIC BYPASS SURGERY      LAMINECTOMY N/A 2/22/2023    T10-T11 LAMINECTOMY, FACETECTOMY, PEDICLE SCREW FIXATION FOR REMOVAL OF THORACIC MASS performed by Gayatri Archer MD at [facility] OR    SLEEVE GASTRECTOMY  10/13/2018       Medications Prior to Admission:    Prior to Admission medications    Medication Sig Start Date End Date Taking? Authorizing Provider   oxyCODONE (ROXICODONE) 5 MG immediate release tablet Take 1-2 tablets by mouth every 6 hours as needed for Pain for up to 7 days. Max Daily Amount: 40 mg 2/24/23 3/3/23 Yes YURY Guidry NP   diazePAM (VALIUM) 5 MG tablet Take 1 tablet by mouth every 8 hours as needed (Muscle spasms) for up to 10 days. Max Daily Amount: 15 mg 2/24/23 3/6/23 Yes YURY Guidry NP   lidocaine 4 % external patch Place 1 patch onto the skin daily for 7 days 2/25/23 3/4/23 Yes YURY Guidry NP   methocarbamol (ROBAXIN) 750 MG tablet Take 1 tablet by mouth every 6 hours as needed (muscle spasms) 2/24/23 3/6/23 Yes YURY Guidry NP   GAVILYTE-G 236 g solution  2/10/23   Historical Provider, MD   docusate sodium (COLACE) 100 MG capsule Take 1 capsule by mouth 2 times daily as needed for Constipation As needed for constipation 1/27/23   Yamel Quintana MD   loperamide (RA ANTI-DIARRHEAL) 2 MG capsule Take 1 capsule by mouth 4 times daily as needed for Diarrhea 1/27/23   Yamel Quintana MD   omeprazole (PRILOSEC) 20 MG delayed release capsule Take 1 capsule by mouth in the morning and 1 capsule in the evening.  1/27/23   Yamel Quintana MD   furosemide (LASIX) 40 MG tablet TAKE 1 TABLET BY MOUTH EVERY DAY 12/12/22   Juanis Mcallister MD   Blood Glucose Monitoring Suppl (BLOOD GLUCOSE MONITOR SYSTEM) w/Device KIT Dispense glucometer covered by patient's insurance 12/1/22   Yamel Quintana MD   blood glucose monitor strips Test once daily 12/1/22   Yamel Quintana MD   zoster recombinant adjuvanted vaccine [facility]) 50 MCG/0.5ML SUSR injection Inject 0.5 mLs into the muscle See Admin Instructions 1 dose now and repeat in 2-6 months 12/1/22 5/30/23  Yamel Quintana MD   Lancets MISC Use to test blood sugar once daily 12/1/22   Gissel Patterson MD   acetaminophen (TYLENOL) 500 MG tablet Take 1 tablet by mouth 4 times daily as needed for Pain 12/1/22   Gissel Patterson MD   gabapentin (NEURONTIN) 300 MG capsule Take 1 capsule by mouth 3 times daily for 180 days. Intended supply: 30 days 10/24/22 4/22/23  Irene TONG Georgefelix, APRN - CNP       Allergies:  Pork-derived products    Social History:  The patient currently lives     TOBACCO:   reports that she has never smoked. She has never used smokeless tobacco.  ETOH:   reports no history of alcohol use.      Family History:  Reviewed in detail and Positive as follows:        Problem Relation Age of Onset    Diabetes Mother     Stroke Mother     Osteoarthritis Mother     Heart Disease Father     Other Father     High Blood Pressure Father     Osteoarthritis Father     Diabetes Maternal Aunt     Cancer Maternal Aunt     Diabetes Maternal Grandmother     Cancer Paternal Aunt        REVIEW OF SYSTEMS:   Positive and negative as noted in the HPI. All other systems reviewed and negative.    PHYSICAL EXAM:    BP 97/63   Pulse 59   Temp 99.4 °F (37.4 °C) (Oral)   Resp 14   Ht 5' 7\" (1.702 m)   Wt 241 lb (109.3 kg)   LMP 02/19/2018   SpO2 90%   BMI 37.75 kg/m²     General appearance: No apparent distress appears stated age and cooperative.  HEENT Normal cephalic, atraumatic without obvious deformity.  Pupils equal, round, and reactive to light.  Extra ocular muscles intact.  Conjunctivae/corneas clear.  Neck: Supple, No jugular venous distention/bruits.  Trachea midline without thyromegaly or adenopathy with full range of motion.  Lungs: Clear to auscultation, bilaterally without Rales/Wheezes/Rhonchi with good respiratory effort.  Heart: Regular rate and rhythm with Normal S1/S2 without murmurs, rubs or gallops, point of maximum impulse non-displaced  Abdomen: Soft, non-tender or non-distended without rigidity or guarding and positive bowel sounds all four quadrants.  Extremities:  No clubbing, cyanosis, or edema bilaterally. Full range of motion without deformity and normal gait intact. Skin: Skin color, texture, turgor normal.  No rashes or lesions. Neurologic: Alert and oriented X 3, neurovascularly intact with sensory/motor intact upper extremities/lower extremities, bilaterally. Cranial nerves: II-XII intact, grossly non-focal.  Mental status: Alert, oriented, thought content appropriate. Capillary refill is brisk  Peripheral pulses 2 +         The following labs reviewed personally:   CBC   Recent Labs     02/23/23  0837   WBC 9.0   HGB 11.4*   HCT 34.6*           RENAL  Recent Labs     02/23/23  0837      K 3.7      CO2 29   BUN 10   CREATININE <0.5*       LFT'S  Recent Labs     02/23/23  0837   AST 14*   ALT 8*   BILITOT 0.4   ALKPHOS 63       COAG  Recent Labs     02/23/23  0837   INR 1.06       CARDIAC ENZYMES  No results for input(s): CKTOTAL, CKMB, CKMBINDEX, TROPONINI in the last 72 hours.     U/A:    Lab Results   Component Value Date/Time    NITRITE small 06/15/2021 11:46 AM    COLORU yellow 06/15/2021 11:46 AM    COLORU YELLOW 01/09/2020 08:08 PM    WBCUA 50 01/09/2020 08:08 PM    RBCUA 5 01/09/2020 08:08 PM    BACTERIA RARE 08/21/2018 02:28 PM    CLARITYU clear 06/15/2021 11:46 AM    CLARITYU Clear 01/09/2020 08:08 PM    SPECGRAV 1.030 06/15/2021 11:46 AM    SPECGRAV 1.029 01/09/2020 08:08 PM    LEUKOCYTESUR trace 06/15/2021 11:46 AM    LEUKOCYTESUR LARGE 01/09/2020 08:08 PM    BLOODU trace 06/15/2021 11:46 AM    BLOODU TRACE 01/09/2020 08:08 PM    GLUCOSEU neg 06/15/2021 11:46 AM    GLUCOSEU Negative 01/09/2020 08:08 PM    AMORPHOUS 2+ 01/28/2019 04:34 PM       ABG  No results found for: TWS0ZXU, BEART, N9NVBVLF, PHART, THGBART, ICJ4RJR, PO2ART, FTP1FOL        Active Hospital Problems    Diagnosis Date Noted    Mass of thoracic vertebra [M48.9] 02/22/2023     Priority: Medium         ASSESSMENT/PLAN:      Thoracic mass  s/p  T10-T11 LAMINECTOMY, FACETECTOMY, PEDICLE SCREW FIXATION FOR REMOVAL OF THORACIC MASS       Neurologic exam every 4 hr    PT and OT    Post operative pain control : Dilaudid prn, oxycodone prn    Nausea/vomiting : zofran prn , compazine prn     Stop IVF    GERD : PPI        DVT Prophylaxis: allergic to heparin , lovenox   SCDs  Diet: ADULT DIET; Regular; NO PORK FOR [employer] REASONS  Code Status: Full Code  PT/OT Eval Status:         Dispo - TBD    DC plan as per neurosurgery        Cheng Mims MD    Thank you Kai Perdue MD for the opportunity to be involved in this patient's care. If you have any questions or concerns please feel free to contact me at [phone].

## 2023-02-25 NOTE — PROGRESS NOTES
Assessment complete, see flow sheet. Pt has no new complaints at this time, pt states her biggest issue is controlling her pain, ordered PRN's given (see MAR) I will continue to follow the pt's pain control throughout the shift. Encouraged the pt to use her IS (see flowsheets). Pt denied any other needs at this time, call light is in reach and the pt's bed alarm is on for safety. I will continue to follow.   Keith Li RN

## 2023-02-25 NOTE — PROGRESS NOTES
Neurosurgery Progress Note    Patient seen and examined on 02/25/23. No acute events overnight. Reports persistent back pain despite medications however she is compliant and moving well with therapy.  Neurologically stable on exam.     A/P: 62 yo woman POD 3 s/p T10-11 laminectomy for resection of meningioma with PSF    -Neuro stable  -Pain control with PO meds  -Muscle relaxants prn  -Mobilize, OOB  -PT/OT  -DVT ppx  -Dispo: Pending placement      Kimmy Cervantes MD, PhD  [address], [address] [place] [address], [id number] [phone] (c), [phone] (o)

## 2023-02-25 NOTE — PROGRESS NOTES
Patient is alert and oriented x4. VSS, on RA. No acute neuro changes throughout shift. Sx site to mid back remains PEPE, closed with staples, no drainage noted. Has been endorsing 10/10 surgical pain throughout shift. PRN dilaudid and oxy given per the [facility]. Patient educated on utilizing PO medications and weaning off IV but still requesting IV in between PO. Voiding without complications. Up x1 assist with gait belt and walker. Fall precautions in place. Bed locked in lowest position with alarm on. Call light within reach and patient using appropriately. Will continue to monitor.

## 2023-02-25 NOTE — CARE COORDINATION
Cm called [facility], [phone], left message checking on precert. Asked for return call if precert received.

## 2023-02-25 NOTE — PLAN OF CARE
Problem: Pain  Goal: Verbalizes/displays adequate comfort level or baseline comfort level  Outcome: Progressing  Note: Pt encouraged to notify RN if pain occurs. Pain managed with non pharm measures and medication per MAR. Problem: Safety - Adult  Goal: Free from fall injury  Outcome: Progressing  Note: Fall precautions in place. Bed alarm on, bed in lowest position, call light/belongings within reach, non slip socks, hourly rounding.

## 2023-02-25 NOTE — PROGRESS NOTES
Throughout the shift the pt had requested to get her oxycodone, IV Dilaudid and IV compazine around the clock and not late. The pt would also be drowsy throughout the shift, but would state her pain was either a \"9/10\" or \"10/10. \" Pain medications have been given per order and patient request (see MAR). This last time this nurse gave the IV Dilaudid at [facility], and after giving the medication the pt grabbed the empty syringe and said \"let me see this. \" She then looked at it and tried to push the syringe to see if any more drops of dilaudid would come out, but none did since this nurse gave her the 0.5 mg dose. This nurse thought it was odd that the pt wanted to see the empty syringe to make sure nothing was left in the syringe. When this nurse asked why she wanted to see the empty syring the pt stated \"I thought more was in there. \"  Dano Magallanes RN

## 2023-02-25 NOTE — PROGRESS NOTES
Progress Note     PCP: Alfreda Marino MD    Date of Admission: 2/22/2023    Date of Service: Pt seen/examined on  2/23/23 and Admitted to Inpatient with expected LOS greater than two midnights due to medical therapy. Chief Complaint:        History Of Present Illness:       The patient is a 61 y.o. female who presents to [facility] with   PMH of HTN , DLP , SHYANN , DM type 2   Is s/p  T10-T11 LAMINECTOMY, FACETECTOMY, PEDICLE SCREW FIXATION FOR REMOVAL OF THORACIC MASS   Surgery on 2/22/23    Patient sitting in chair  C/o nausea   C/o pain over operative site     Denies sob or fever or chills       Interval History     Asking for more pain medicines  During my conversation with patient , she appears not in distress or in severe discomfort     C/o pain over operative site  No fever or chills or sob        Past Medical History:        Diagnosis Date    Arthritis     Avoids pork and pork products due to cultural beliefs     Chronic pain     TAN (dyspnea on exertion)     Frequent headaches 12/13/2022    Gallbladder disorder     Gastroesophageal reflux disease 01/28/2020    Hx of blood clots     in legs -- 13 years ago    Hx of degenerative disc disease     Hyperlipidemia     Hypertension     Morbid obesity with body mass index (BMI) of 60.0 to 69.9 in adult ([address] 07/02/2018    Neuropathy     SHYANN (obstructive sleep apnea)     machine broke down, awaiting new machine at the end of June 2023    Poor circulation     Primary osteoarthritis of right knee 07/02/2018    Type 2 diabetes mellitus without complication ([address]     controlled with diet    Urinary incontinence     Vitamin D deficiency 01/28/2020    Wears glasses        Past Surgical History:        Procedure Laterality Date    BACK SURGERY      2001 -- L4 and L5 -- no metal or implants    BREAST LUMPECTOMY Bilateral     2015    COLONOSCOPY      DILATION AND CURETTAGE OF UTERUS N/A 08/30/2018    GASTRIC BYPASS SURGERY      LAMINECTOMY N/A 2/22/2023 T10-T11 LAMINECTOMY, FACETECTOMY, PEDICLE SCREW FIXATION FOR REMOVAL OF THORACIC MASS performed by Elsie Freeman. Alen Archer MD at [address] [facility]  10/13/2018       Medications Prior to Admission:    Prior to Admission medications    Medication Sig Start Date End Date Taking? Authorizing Provider   oxyCODONE (ROXICODONE) 5 MG immediate release tablet Take 1-2 tablets by mouth every 6 hours as needed for Pain for up to 7 days. Max Daily Amount: 40 mg 2/24/23 3/3/23 Yes YURY Harden NP   diazePAM (VALIUM) 5 MG tablet Take 1 tablet by mouth every 8 hours as needed (Muscle spasms) for up to 10 days. Max Daily Amount: 15 mg 2/24/23 3/6/23 Yes YURY Harden NP   lidocaine 4 % external patch Place 1 patch onto the skin daily for 7 days 2/25/23 3/4/23 Yes YURY Harden NP   methocarbamol (ROBAXIN) 750 MG tablet Take 1 tablet by mouth every 6 hours as needed (muscle spasms) 2/24/23 3/6/23 Yes YURY Harden NP   GAVILYTE-G 236 g solution  2/10/23   Historical Provider, MD   docusate sodium (COLACE) 100 MG capsule Take 1 capsule by mouth 2 times daily as needed for Constipation As needed for constipation 1/27/23   Shaina Fox MD   loperamide (RA ANTI-DIARRHEAL) 2 MG capsule Take 1 capsule by mouth 4 times daily as needed for Diarrhea 1/27/23   Shaina Fox MD   omeprazole (PRILOSEC) 20 MG delayed release capsule Take 1 capsule by mouth in the morning and 1 capsule in the evening.  1/27/23   Shaina Fox MD   furosemide (LASIX) 40 MG tablet TAKE 1 TABLET BY MOUTH EVERY DAY 12/12/22   Prem Kim MD   Blood Glucose Monitoring Suppl (BLOOD GLUCOSE MONITOR SYSTEM) w/Device KIT Dispense glucometer covered by patient's insurance 12/1/22   Shaina Fox MD   blood glucose monitor strips Test once daily 12/1/22   Shaina Fox MD   zoster recombinant adjuvanted vaccine [facility]) 50 MCG/0.5ML SUSR injection Inject 0.5 mLs into the muscle See Admin Instructions 1 dose now and repeat in 2-6 months 12/1/22 5/30/23  Phyllis Galicia MD   Lancets MISC Use to test blood sugar once daily 12/1/22   Phyllis Galicia MD   acetaminophen (TYLENOL) 500 MG tablet Take 1 tablet by mouth 4 times daily as needed for Pain 12/1/22   Phyllis Galicia MD   gabapentin (NEURONTIN) 300 MG capsule Take 1 capsule by mouth 3 times daily for 180 days. Intended supply: 30 days 10/24/22 4/22/23  Norlene Gist, APRN - CNP       Allergies:  Pork-derived products    Social History:  The patient currently lives     TOBACCO:   reports that she has never smoked. She has never used smokeless tobacco.  ETOH:   reports no history of alcohol use. Family History:  Reviewed in detail and Positive as follows:        Problem Relation Age of Onset    Diabetes Mother     Stroke Mother     Osteoarthritis Mother     Heart Disease Father     Other Father     High Blood Pressure Father     Osteoarthritis Father     Diabetes Maternal Aunt     Cancer Maternal Aunt     Diabetes Maternal Grandmother     Cancer Paternal Aunt        REVIEW OF SYSTEMS:   Positive and negative as noted in the HPI. All other systems reviewed and negative. PHYSICAL EXAM:    /64   Pulse 65   Temp 99 °F (37.2 °C) (Oral)   Resp 16   Ht 5' 7\" (1.702 m)   Wt 241 lb (109.3 kg)   LMP 02/19/2018   SpO2 94%   BMI 37.75 kg/m²     General appearance: No apparent distress appears stated age and cooperative. HEENT Normal cephalic, atraumatic without obvious deformity. Pupils equal, round, and reactive to light. Extra ocular muscles intact. Conjunctivae/corneas clear. Neck: Supple, No jugular venous distention/bruits. Trachea midline without thyromegaly or adenopathy with full range of motion. Lungs: Clear to auscultation, bilaterally without Rales/Wheezes/Rhonchi with good respiratory effort.   Heart: Regular rate and rhythm with Normal S1/S2 without murmurs, rubs or gallops, point of maximum impulse non-displaced  Abdomen: Soft, non-tender or non-distended without rigidity or guarding and positive bowel sounds all four quadrants. Extremities: No clubbing, cyanosis, or edema bilaterally. Full range of motion without deformity and normal gait intact. Skin: Skin color, texture, turgor normal.  No rashes or lesions. Neurologic: Alert and oriented X 3, neurovascularly intact with sensory/motor intact upper extremities/lower extremities, bilaterally. Cranial nerves: II-XII intact, grossly non-focal.  Mental status: Alert, oriented, thought content appropriate. Capillary refill is brisk  Peripheral pulses 2 +         The following labs reviewed personally:   CBC   Recent Labs     02/23/23  0837   WBC 9.0   HGB 11.4*   HCT 34.6*           RENAL  Recent Labs     02/23/23  0837      K 3.7      CO2 29   BUN 10   CREATININE <0.5*       LFT'S  Recent Labs     02/23/23  0837   AST 14*   ALT 8*   BILITOT 0.4   ALKPHOS 63       COAG  Recent Labs     02/23/23  0837   INR 1.06       CARDIAC ENZYMES  No results for input(s): CKTOTAL, CKMB, CKMBINDEX, TROPONINI in the last 72 hours.     U/A:    Lab Results   Component Value Date/Time    NITRITE small 06/15/2021 11:46 AM    COLORU yellow 06/15/2021 11:46 AM    COLORU YELLOW 01/09/2020 08:08 PM    WBCUA 50 01/09/2020 08:08 PM    RBCUA 5 01/09/2020 08:08 PM    BACTERIA RARE 08/21/2018 02:28 PM    CLARITYU clear 06/15/2021 11:46 AM    CLARITYU Clear 01/09/2020 08:08 PM    SPECGRAV 1.030 06/15/2021 11:46 AM    SPECGRAV 1.029 01/09/2020 08:08 PM    LEUKOCYTESUR trace 06/15/2021 11:46 AM    LEUKOCYTESUR LARGE 01/09/2020 08:08 PM    BLOODU trace 06/15/2021 11:46 AM    BLOODU TRACE 01/09/2020 08:08 PM    GLUCOSEU neg 06/15/2021 11:46 AM    GLUCOSEU Negative 01/09/2020 08:08 PM    AMORPHOUS 2+ 01/28/2019 04:34 PM       ABG  No results found for: JBN5CFM, BEART, F6MALIJH, PHART, THGBART, GEB1GBQ, PO2ART, PIO0XGR        Active Hospital Problems    Diagnosis Date Noted Mass of thoracic vertebra [M48.9] 02/22/2023     Priority: Medium         ASSESSMENT/PLAN:      Thoracic mass  s/p  T10-T11 LAMINECTOMY, FACETECTOMY, PEDICLE SCREW FIXATION FOR REMOVAL OF THORACIC MASS       Neurologic exam every 4 hr    PT and OT    Post operative pain control : Dilaudid prn, oxycodone prn    Nausea/vomiting : zofran prn , compazine prn         GERD : PPI    Pain management    Dilaudid 0.25- 0.5 mg q4 prn x next 24 hrs  Roxicodone IR 5 mg q4 prn        DVT Prophylaxis: allergic to heparin , lovenox   SCDs  Diet: ADULT DIET; Regular; NO PORK FOR [employer] REASONS  Code Status: Full Code  PT/OT Eval Status:         Dispo -     Medically stable for discharge  Awaiting placement       Lisa Titus MD    Thank you Dale Farooq MD for the opportunity to be involved in this patient's care. If you have any questions or concerns please feel free to contact me at [phone].

## 2023-02-25 NOTE — PLAN OF CARE
Problem: Chronic Conditions and Co-morbidities  Goal: Patient's chronic conditions and co-morbidity symptoms are monitored and maintained or improved  2/24/2023 2108 by Omaira Torrez RN  Outcome: Progressing  2/24/2023 0749 by Gisell Carrillo RN  Outcome: Progressing     Problem: Discharge Planning  Goal: Discharge to home or other facility with appropriate resources  2/24/2023 2108 by Omaira Torrez RN  Outcome: Progressing  2/24/2023 0749 by Gisell Carrillo RN  Outcome: Progressing     Problem: Pain  Goal: Verbalizes/displays adequate comfort level or baseline comfort level  2/24/2023 2108 by Omaira Torrez RN  Outcome: Progressing  2/24/2023 0749 by Gisell Carrillo RN  Outcome: Progressing     Problem: Safety - Adult  Goal: Free from fall injury  2/24/2023 2108 by Omaira Torrez RN  Outcome: Progressing  2/24/2023 0749 by Gisell Carrillo RN  Outcome: Progressing     Problem: Skin/Tissue Integrity  Goal: Absence of new skin breakdown  Description: 1. Monitor for areas of redness and/or skin breakdown  2. Assess vascular access sites hourly  3. Every 4-6 hours minimum:  Change oxygen saturation probe site  4. Every 4-6 hours:  If on nasal continuous positive airway pressure, respiratory therapy assess nares and determine need for appliance change or resting period.   Outcome: Progressing     Problem: ABCDS Injury Assessment  Goal: Absence of physical injury  2/24/2023 2108 by Omaira Torrez RN  Outcome: Progressing  2/24/2023 0749 by Gisell Carrillo RN  Outcome: Progressing

## 2023-02-26 PROCEDURE — 2580000003 HC RX 258: Performed by: NURSE PRACTITIONER

## 2023-02-26 PROCEDURE — 6370000000 HC RX 637 (ALT 250 FOR IP): Performed by: NURSE PRACTITIONER

## 2023-02-26 PROCEDURE — 6360000002 HC RX W HCPCS: Performed by: NURSE PRACTITIONER

## 2023-02-26 PROCEDURE — 2060000000 HC ICU INTERMEDIATE R&B

## 2023-02-26 PROCEDURE — 6360000002 HC RX W HCPCS: Performed by: HOSPITALIST

## 2023-02-26 PROCEDURE — 6370000000 HC RX 637 (ALT 250 FOR IP): Performed by: HOSPITALIST

## 2023-02-26 PROCEDURE — 2580000003 HC RX 258: Performed by: NEUROLOGICAL SURGERY

## 2023-02-26 RX ORDER — PROCHLORPERAZINE EDISYLATE 5 MG/ML
10 INJECTION INTRAMUSCULAR; INTRAVENOUS EVERY 6 HOURS PRN
Status: DISCONTINUED | OUTPATIENT
Start: 2023-02-26 | End: 2023-03-02 | Stop reason: HOSPADM

## 2023-02-26 RX ORDER — DOCUSATE SODIUM 100 MG/1
100 CAPSULE, LIQUID FILLED ORAL 2 TIMES DAILY
Status: DISCONTINUED | OUTPATIENT
Start: 2023-02-26 | End: 2023-03-02 | Stop reason: HOSPADM

## 2023-02-26 RX ORDER — MECOBALAMIN 5000 MCG
5 TABLET,DISINTEGRATING ORAL NIGHTLY PRN
Status: DISCONTINUED | OUTPATIENT
Start: 2023-02-26 | End: 2023-03-02 | Stop reason: HOSPADM

## 2023-02-26 RX ORDER — 0.9 % SODIUM CHLORIDE 0.9 %
500 INTRAVENOUS SOLUTION INTRAVENOUS ONCE
Status: COMPLETED | OUTPATIENT
Start: 2023-02-26 | End: 2023-02-26

## 2023-02-26 RX ORDER — POLYETHYLENE GLYCOL 3350 17 G/17G
17 POWDER, FOR SOLUTION ORAL DAILY
Status: COMPLETED | OUTPATIENT
Start: 2023-02-26 | End: 2023-02-27

## 2023-02-26 RX ADMIN — PROCHLORPERAZINE EDISYLATE 10 MG: 5 INJECTION, SOLUTION INTRAMUSCULAR; INTRAVENOUS at 15:44

## 2023-02-26 RX ADMIN — METHOCARBAMOL 750 MG: 750 TABLET ORAL at 17:45

## 2023-02-26 RX ADMIN — HYDROMORPHONE HYDROCHLORIDE 0.5 MG: 1 INJECTION, SOLUTION INTRAMUSCULAR; INTRAVENOUS; SUBCUTANEOUS at 02:20

## 2023-02-26 RX ADMIN — GABAPENTIN 300 MG: 300 CAPSULE ORAL at 09:19

## 2023-02-26 RX ADMIN — ACETAMINOPHEN 1000 MG: 500 TABLET ORAL at 05:15

## 2023-02-26 RX ADMIN — PANTOPRAZOLE SODIUM 40 MG: 40 TABLET, DELAYED RELEASE ORAL at 05:15

## 2023-02-26 RX ADMIN — DOCUSATE SODIUM 100 MG: 100 CAPSULE, LIQUID FILLED ORAL at 20:45

## 2023-02-26 RX ADMIN — OXYCODONE 10 MG: 5 TABLET ORAL at 17:45

## 2023-02-26 RX ADMIN — DIAZEPAM 5 MG: 5 TABLET ORAL at 15:38

## 2023-02-26 RX ADMIN — OXYCODONE 10 MG: 5 TABLET ORAL at 13:33

## 2023-02-26 RX ADMIN — PROCHLORPERAZINE EDISYLATE 10 MG: 5 INJECTION, SOLUTION INTRAMUSCULAR; INTRAVENOUS at 21:45

## 2023-02-26 RX ADMIN — BISACODYL 5 MG: 5 TABLET, COATED ORAL at 11:05

## 2023-02-26 RX ADMIN — METHOCARBAMOL 750 MG: 750 TABLET ORAL at 11:05

## 2023-02-26 RX ADMIN — POLYETHYLENE GLYCOL 3350 17 G: 17 POWDER, FOR SOLUTION ORAL at 11:05

## 2023-02-26 RX ADMIN — HYDROMORPHONE HYDROCHLORIDE 0.5 MG: 1 INJECTION, SOLUTION INTRAMUSCULAR; INTRAVENOUS; SUBCUTANEOUS at 06:15

## 2023-02-26 RX ADMIN — OXYCODONE HYDROCHLORIDE 5 MG: 5 TABLET ORAL at 05:15

## 2023-02-26 RX ADMIN — DIAZEPAM 5 MG: 5 TABLET ORAL at 21:45

## 2023-02-26 RX ADMIN — FUROSEMIDE 40 MG: 40 TABLET ORAL at 09:19

## 2023-02-26 RX ADMIN — ACETAMINOPHEN 1000 MG: 500 TABLET ORAL at 00:15

## 2023-02-26 RX ADMIN — OXYCODONE 10 MG: 5 TABLET ORAL at 21:45

## 2023-02-26 RX ADMIN — ONDANSETRON 4 MG: 2 INJECTION INTRAMUSCULAR; INTRAVENOUS at 11:05

## 2023-02-26 RX ADMIN — OXYCODONE 10 MG: 5 TABLET ORAL at 09:19

## 2023-02-26 RX ADMIN — ACETAMINOPHEN 1000 MG: 500 TABLET ORAL at 11:05

## 2023-02-26 RX ADMIN — METHOCARBAMOL 750 MG: 750 TABLET ORAL at 23:26

## 2023-02-26 RX ADMIN — SODIUM CHLORIDE, PRESERVATIVE FREE 10 ML: 5 INJECTION INTRAVENOUS at 09:27

## 2023-02-26 RX ADMIN — DIAZEPAM 5 MG: 5 TABLET ORAL at 09:19

## 2023-02-26 RX ADMIN — GABAPENTIN 300 MG: 300 CAPSULE ORAL at 20:44

## 2023-02-26 RX ADMIN — GABAPENTIN 300 MG: 300 CAPSULE ORAL at 13:33

## 2023-02-26 RX ADMIN — ACETAMINOPHEN 1000 MG: 500 TABLET ORAL at 23:26

## 2023-02-26 RX ADMIN — METHOCARBAMOL 750 MG: 750 TABLET ORAL at 00:15

## 2023-02-26 RX ADMIN — OXYCODONE HYDROCHLORIDE 5 MG: 5 TABLET ORAL at 01:15

## 2023-02-26 RX ADMIN — METHOCARBAMOL 750 MG: 750 TABLET ORAL at 05:15

## 2023-02-26 RX ADMIN — ONDANSETRON 4 MG: 2 INJECTION INTRAMUSCULAR; INTRAVENOUS at 04:31

## 2023-02-26 RX ADMIN — SODIUM CHLORIDE 500 ML: 9 INJECTION, SOLUTION INTRAVENOUS at 02:56

## 2023-02-26 RX ADMIN — ONDANSETRON 4 MG: 2 INJECTION INTRAMUSCULAR; INTRAVENOUS at 00:15

## 2023-02-26 RX ADMIN — SODIUM CHLORIDE, PRESERVATIVE FREE 10 ML: 5 INJECTION INTRAVENOUS at 20:46

## 2023-02-26 ASSESSMENT — PAIN DESCRIPTION - PAIN TYPE
TYPE: SURGICAL PAIN

## 2023-02-26 ASSESSMENT — PAIN DESCRIPTION - DESCRIPTORS
DESCRIPTORS: ACHING

## 2023-02-26 ASSESSMENT — PAIN DESCRIPTION - LOCATION
LOCATION: BACK

## 2023-02-26 ASSESSMENT — PAIN - FUNCTIONAL ASSESSMENT

## 2023-02-26 ASSESSMENT — PAIN SCALES - GENERAL
PAINLEVEL_OUTOF10: 9
PAINLEVEL_OUTOF10: 10
PAINLEVEL_OUTOF10: 9
PAINLEVEL_OUTOF10: 10
PAINLEVEL_OUTOF10: 10
PAINLEVEL_OUTOF10: 9
PAINLEVEL_OUTOF10: 10
PAINLEVEL_OUTOF10: 10
PAINLEVEL_OUTOF10: 9
PAINLEVEL_OUTOF10: 10

## 2023-02-26 ASSESSMENT — PAIN SCALES - WONG BAKER
WONGBAKER_NUMERICALRESPONSE: 0

## 2023-02-26 ASSESSMENT — PAIN DESCRIPTION - ORIENTATION
ORIENTATION: LOWER
ORIENTATION: MID
ORIENTATION: LOWER
ORIENTATION: MID
ORIENTATION: MID
ORIENTATION: LOWER

## 2023-02-26 ASSESSMENT — PAIN DESCRIPTION - FREQUENCY
FREQUENCY: CONTINUOUS

## 2023-02-26 ASSESSMENT — PAIN DESCRIPTION - ONSET
ONSET: ON-GOING

## 2023-02-26 NOTE — PLAN OF CARE
Problem: Pain  Goal: Verbalizes/displays adequate comfort level or baseline comfort level  2/25/2023 2057 by Lorena Hernandez RN  Outcome: Progressing       Problem: Safety - Adult  Goal: Free from fall injury  2/25/2023 2057 by Lorena Hernandez RN  Outcome: Progressing       Problem: Skin/Tissue Integrity  Goal: Absence of new skin breakdown  Description: 1. Monitor for areas of redness and/or skin breakdown  2. Assess vascular access sites hourly  3. Every 4-6 hours minimum:  Change oxygen saturation probe site  4. Every 4-6 hours:  If on nasal continuous positive airway pressure, respiratory therapy assess nares and determine need for appliance change or resting period.   Outcome: Progressing

## 2023-02-26 NOTE — PROGRESS NOTES
Pt alert & oriented x4; VSS On room air. Ambulating x1 with GB and walker- yet RN saw patient walking in the room to and from the chair without the use of walker. RN told patient that bed alarm needs to be put back on and that RN needs to be notified for safety reasons during ambulation due to patient being a fall risk. Bed alarm turned back on and patient in bed. Patient states she in in severe pain constantly; RN giving PRN pain medication per MAR. One incident happened today where RN went to go give PRN medication. Patient had emesis bag with small amount of emesis sitting on the bedside table already. RN proceeded to give PRN medication. RN left the room to get supplies for a shower and patient stated she threw up the medication. RN stated that she saw the emesis bag prior to her stating she threw up the PRN pain medication; emesis bag revealed no medication and only stomach bile in emesis bag. RN told patient that she knows she did not throw up medication and that RN would bring medication in when it is due. Patient was agreeable. Tolerating PO fluids and meals despite one episode of emesis today.

## 2023-02-26 NOTE — PLAN OF CARE
Problem: Pain  Goal: Verbalizes/displays adequate comfort level or baseline comfort level  2/26/2023 1012 by Gonzalez Dove RN  Outcome: Progressing- pain mediation given per [facility]   2/25/2023 2057 by Alvarez Simmons RN  Outcome: Progressing     Problem: Safety - Adult  Goal: Free from fall injury  2/26/2023 1012 by Thor Dove RN  Outcome: Progressing- free of injury and falls during ambulation and transfers    2/25/2023 2057 by Alvarez Simmons RN  Outcome: Progressing     Problem: Skin/Tissue Integrity  Goal: Absence of new skin breakdown  Description: 1. Monitor for areas of redness and/or skin breakdown  2. Assess vascular access sites hourly  3. Every 4-6 hours minimum:  Change oxygen saturation probe site  4. Every 4-6 hours:  If on nasal continuous positive airway pressure, respiratory therapy assess nares and determine need for appliance change or resting period.   2/26/2023 1012 by Gonzalez Dove RN  Outcome: Progressing- no new skin issues; incision is CDI  2/25/2023 2057 by Alvarez Simmons RN  Outcome: Progressing

## 2023-02-26 NOTE — CONSULTS
Clinical Pharmacy Progress Note    Admit date: 2/22/2023     Subjective/Objective:  60 yo F with PMH of GERD, HTN, DDD, OA, DM; admitted s/p T10-T11 laminectomy. Pharmacy has been consulted to make pain medication recommendations by nursing staff. Home Pain Regimen:  Buprenorphine 10 mcg/hr patch weekly  Gabapentin 300mg TID     2/23: Spoke with patient at bedside who was drowsy during our conversation. She states she takes a Buprenorphine 10mcg/hr patch weekly at home. Was previously on Oxycodone-APAP but this was stopped when she was started on buprenorphine patches. Pt sees patient specialist as outpatient. Reports that pain is \"severe\" but was falling asleep after telling me this. Most recent pain score was 10/10.   2/24: Pain scores 5-10 overnight. Per RN notes, pain managed with current medications. Required 7 doses of IV hydromorphone overnight.  2/25:  Pain scores still high at 7-10 / 10. Much less usage of IV hydromorphone, with corresponding increase in oxycodone PO usage. Possible DC home soon. 2/26:  Pain scores still high in the 7-10 / 10 range. IV hydromorphone stopped. MEDD downtrending as expected. Complaining of constipation; no BM in several days. Patient's stated pain goal: 0    Current pain regimen:   Medication  Home med? Amount used yesterday    Acetaminophen 1000mg q6h Yes  4g (4 doses)   Gabapentin 300mg TID  Yes  3 doses   Oxycodone IR 5-10mg q4h PRN No  50 mg (6 doses)   Methocarbamol 750mg PO q6h No  4 doses   Diazepam 5mg PO q6h PRN anxiety/spasms  No 10mg (2 doses)   Lidocaine 4% patch daily No  1 patch     Morphine Equivalent Daily Dose (MEDD):   Date MEDD (mg)   2/22 165 mg   2/23 119 mg   2/24 109 mg   2/25 75 mg       ASSESSMENT/PLAN:  1)  Pain Management:   Agree with scheduled Acetaminophen, continuing home gabapentin and methocarbamol to encourage use of multimodal analgesia. Lidocaine patch added.    Pt was on Buprenorphine patch at home - does not have patch on currently and has no one to bring patch from home. Unclear when pt was taken off. Recommend to add Senna Two tabs BID for a more aggressive bowel regimen. Currently on Miralax scheduled daily, and docusate BID added today. No further recommendations today. Pharmacy will sign off. Would encourage primary team to speak with outpatient pain specialist to establish discharge plan. Buprenorphine is partial opioid agonist -- there is a potential for buprenorphine to precipitate withdraw if patients receiving opioids. Please reach out if further assistance needed. Please call with any questions.   Cynthia Mendoza PharmD., BCPS   2/26/2023 1:13 PM  Wireless: [phone]

## 2023-02-26 NOTE — PROGRESS NOTES
Pt is A&Ox4. Pt up to bathroom  Assist with gait belt and walker. No acute neuro changes . Incision site CDI. Stable vitals exception low BP. Educated  pt opioids Pain medication can cause severe hypotension and constipation. Pt does not seems to understanding. Still needing all her pain medication on time and endorse 10 out 10 every pain assessment. And pt also concern about not having bowel movement since surgery. All fall precaution are in place. call light within a reach. bed is in lowest position. Bed Alarm is on for safety. Will continue monitor . Keke Robertson ...

## 2023-02-27 ENCOUNTER — TELEPHONE (OUTPATIENT)
Dept: PRIMARY CARE CLINIC | Age: 58
End: 2023-02-27

## 2023-02-27 PROCEDURE — 2060000000 HC ICU INTERMEDIATE R&B

## 2023-02-27 PROCEDURE — APPNB30 APP NON BILLABLE TIME 0-30 MINS: Performed by: NURSE PRACTITIONER

## 2023-02-27 PROCEDURE — 6370000000 HC RX 637 (ALT 250 FOR IP): Performed by: INTERNAL MEDICINE

## 2023-02-27 PROCEDURE — 6360000002 HC RX W HCPCS: Performed by: HOSPITALIST

## 2023-02-27 PROCEDURE — 6370000000 HC RX 637 (ALT 250 FOR IP): Performed by: NURSE PRACTITIONER

## 2023-02-27 PROCEDURE — 2580000003 HC RX 258: Performed by: NURSE PRACTITIONER

## 2023-02-27 PROCEDURE — 99024 POSTOP FOLLOW-UP VISIT: CPT | Performed by: NURSE PRACTITIONER

## 2023-02-27 PROCEDURE — 6360000002 HC RX W HCPCS: Performed by: STUDENT IN AN ORGANIZED HEALTH CARE EDUCATION/TRAINING PROGRAM

## 2023-02-27 PROCEDURE — 6370000000 HC RX 637 (ALT 250 FOR IP): Performed by: HOSPITALIST

## 2023-02-27 PROCEDURE — 97535 SELF CARE MNGMENT TRAINING: CPT

## 2023-02-27 PROCEDURE — 97530 THERAPEUTIC ACTIVITIES: CPT

## 2023-02-27 RX ORDER — HYDROMORPHONE HYDROCHLORIDE 2 MG/1
2 TABLET ORAL EVERY 4 HOURS PRN
Status: DISCONTINUED | OUTPATIENT
Start: 2023-02-27 | End: 2023-02-27

## 2023-02-27 RX ORDER — HYDROMORPHONE HYDROCHLORIDE 2 MG/1
4 TABLET ORAL EVERY 4 HOURS PRN
Status: DISCONTINUED | OUTPATIENT
Start: 2023-02-27 | End: 2023-03-02 | Stop reason: HOSPADM

## 2023-02-27 RX ORDER — HYDROMORPHONE HYDROCHLORIDE 2 MG/1
2 TABLET ORAL EVERY 4 HOURS PRN
Status: DISCONTINUED | OUTPATIENT
Start: 2023-02-27 | End: 2023-03-02 | Stop reason: HOSPADM

## 2023-02-27 RX ORDER — HYDROMORPHONE HYDROCHLORIDE 2 MG/1
1 TABLET ORAL EVERY 4 HOURS PRN
Status: DISCONTINUED | OUTPATIENT
Start: 2023-02-27 | End: 2023-02-27

## 2023-02-27 RX ORDER — CEPHALEXIN 250 MG/1
250 CAPSULE ORAL EVERY 6 HOURS SCHEDULED
Status: DISCONTINUED | OUTPATIENT
Start: 2023-02-27 | End: 2023-03-02 | Stop reason: HOSPADM

## 2023-02-27 RX ADMIN — FUROSEMIDE 40 MG: 40 TABLET ORAL at 10:36

## 2023-02-27 RX ADMIN — Medication 5 MG: at 21:03

## 2023-02-27 RX ADMIN — DIAZEPAM 5 MG: 5 TABLET ORAL at 21:03

## 2023-02-27 RX ADMIN — GABAPENTIN 300 MG: 300 CAPSULE ORAL at 14:43

## 2023-02-27 RX ADMIN — OXYCODONE 10 MG: 5 TABLET ORAL at 02:17

## 2023-02-27 RX ADMIN — METHOCARBAMOL 750 MG: 750 TABLET ORAL at 11:51

## 2023-02-27 RX ADMIN — ONDANSETRON 4 MG: 4 TABLET, ORALLY DISINTEGRATING ORAL at 10:36

## 2023-02-27 RX ADMIN — ACETAMINOPHEN 1000 MG: 500 TABLET ORAL at 11:51

## 2023-02-27 RX ADMIN — Medication 5 MG: at 02:17

## 2023-02-27 RX ADMIN — GABAPENTIN 300 MG: 300 CAPSULE ORAL at 10:35

## 2023-02-27 RX ADMIN — PANTOPRAZOLE SODIUM 40 MG: 40 TABLET, DELAYED RELEASE ORAL at 06:20

## 2023-02-27 RX ADMIN — CEPHALEXIN 250 MG: 250 CAPSULE ORAL at 21:03

## 2023-02-27 RX ADMIN — METHOCARBAMOL 750 MG: 750 TABLET ORAL at 23:35

## 2023-02-27 RX ADMIN — SODIUM CHLORIDE, PRESERVATIVE FREE 10 ML: 5 INJECTION INTRAVENOUS at 10:39

## 2023-02-27 RX ADMIN — ACETAMINOPHEN 1000 MG: 500 TABLET ORAL at 06:20

## 2023-02-27 RX ADMIN — DOCUSATE SODIUM 100 MG: 100 CAPSULE, LIQUID FILLED ORAL at 10:39

## 2023-02-27 RX ADMIN — BISACODYL 5 MG: 5 TABLET, COATED ORAL at 14:43

## 2023-02-27 RX ADMIN — GABAPENTIN 300 MG: 300 CAPSULE ORAL at 21:03

## 2023-02-27 RX ADMIN — OXYCODONE 10 MG: 5 TABLET ORAL at 06:20

## 2023-02-27 RX ADMIN — HYDROMORPHONE HYDROCHLORIDE 4 MG: 2 TABLET ORAL at 14:43

## 2023-02-27 RX ADMIN — CEPHALEXIN 250 MG: 250 CAPSULE ORAL at 17:57

## 2023-02-27 RX ADMIN — ACETAMINOPHEN 1000 MG: 500 TABLET ORAL at 17:58

## 2023-02-27 RX ADMIN — PROCHLORPERAZINE EDISYLATE 10 MG: 5 INJECTION, SOLUTION INTRAMUSCULAR; INTRAVENOUS at 06:23

## 2023-02-27 RX ADMIN — ONDANSETRON 4 MG: 4 TABLET, ORALLY DISINTEGRATING ORAL at 02:17

## 2023-02-27 RX ADMIN — HYDROMORPHONE HYDROCHLORIDE 0.5 MG: 1 INJECTION, SOLUTION INTRAMUSCULAR; INTRAVENOUS; SUBCUTANEOUS at 01:15

## 2023-02-27 RX ADMIN — HYDROMORPHONE HYDROCHLORIDE 4 MG: 2 TABLET ORAL at 23:46

## 2023-02-27 RX ADMIN — ACETAMINOPHEN 1000 MG: 500 TABLET ORAL at 23:35

## 2023-02-27 RX ADMIN — DOCUSATE SODIUM 100 MG: 100 CAPSULE, LIQUID FILLED ORAL at 21:03

## 2023-02-27 RX ADMIN — HYDROMORPHONE HYDROCHLORIDE 4 MG: 2 TABLET ORAL at 10:35

## 2023-02-27 RX ADMIN — HYDROMORPHONE HYDROCHLORIDE 4 MG: 2 TABLET ORAL at 19:29

## 2023-02-27 RX ADMIN — METHOCARBAMOL 750 MG: 750 TABLET ORAL at 06:20

## 2023-02-27 RX ADMIN — POLYETHYLENE GLYCOL 3350 17 G: 17 POWDER, FOR SOLUTION ORAL at 10:36

## 2023-02-27 RX ADMIN — NALOXEGOL OXALATE 12.5 MG: 12.5 TABLET, FILM COATED ORAL at 10:35

## 2023-02-27 RX ADMIN — METHOCARBAMOL 750 MG: 750 TABLET ORAL at 17:58

## 2023-02-27 ASSESSMENT — PAIN DESCRIPTION - LOCATION
LOCATION: BACK
LOCATION: CHEST
LOCATION: BACK

## 2023-02-27 ASSESSMENT — PAIN - FUNCTIONAL ASSESSMENT

## 2023-02-27 ASSESSMENT — PAIN SCALES - WONG BAKER
WONGBAKER_NUMERICALRESPONSE: 0

## 2023-02-27 ASSESSMENT — PAIN DESCRIPTION - ORIENTATION
ORIENTATION: MID
ORIENTATION: MID;LOWER
ORIENTATION: MID
ORIENTATION: MID
ORIENTATION: LOWER
ORIENTATION: MID
ORIENTATION: MID;LOWER
ORIENTATION: MID
ORIENTATION: MID

## 2023-02-27 ASSESSMENT — PAIN SCALES - GENERAL
PAINLEVEL_OUTOF10: 9
PAINLEVEL_OUTOF10: 9
PAINLEVEL_OUTOF10: 10
PAINLEVEL_OUTOF10: 9
PAINLEVEL_OUTOF10: 5
PAINLEVEL_OUTOF10: 10
PAINLEVEL_OUTOF10: 9
PAINLEVEL_OUTOF10: 10
PAINLEVEL_OUTOF10: 9
PAINLEVEL_OUTOF10: 5
PAINLEVEL_OUTOF10: 10
PAINLEVEL_OUTOF10: 10
PAINLEVEL_OUTOF10: 9
PAINLEVEL_OUTOF10: 5
PAINLEVEL_OUTOF10: 10
PAINLEVEL_OUTOF10: 9
PAINLEVEL_OUTOF10: 10
PAINLEVEL_OUTOF10: 9
PAINLEVEL_OUTOF10: 9
PAINLEVEL_OUTOF10: 5
PAINLEVEL_OUTOF10: 9

## 2023-02-27 ASSESSMENT — PAIN DESCRIPTION - PAIN TYPE
TYPE: SURGICAL PAIN

## 2023-02-27 ASSESSMENT — PAIN DESCRIPTION - ONSET
ONSET: ON-GOING

## 2023-02-27 ASSESSMENT — PAIN DESCRIPTION - FREQUENCY
FREQUENCY: CONTINUOUS

## 2023-02-27 ASSESSMENT — PAIN DESCRIPTION - DESCRIPTORS
DESCRIPTORS: DULL;ACHING
DESCRIPTORS: ACHING
DESCRIPTORS: DULL;ACHING
DESCRIPTORS: ACHING;DULL
DESCRIPTORS: DISCOMFORT
DESCRIPTORS: ACHING
DESCRIPTORS: ACHING

## 2023-02-27 NOTE — PROGRESS NOTES
Pt is A&Ox4. Pt up to bathroom  Assist with gait belt and walker. Pt endorse pain 10 out of 10 throughout the shift even she taking q4h oxy and other schedule pain medications. Pt is not happy that her pain is not managing, continuously asking for more pain medication. Notified MD, got new order one time dose 0.5 mg dilaudid via telephone read back verbal order. All fall precaution are in place. call light within a reach. bed is lowest position. Bed Alarm is on for safety. Will continue monitor . Christa Reyes ...

## 2023-02-27 NOTE — PROGRESS NOTES
Patient up to bathroom with assistance, washing bed pans and emesis bag. Requested patient bag so she can take these home. Informed patient that we can send home an unused pan and emesis bag if she has concerns. New emesis bag and patient bags brought in to patient room. Denies further needs at this time. Wants to wait until noon to get cleaned up and requests that bed sheets not be changed until she is cleaned.

## 2023-02-27 NOTE — PLAN OF CARE
Pain  Goal: Verbalizes/displays adequate comfort level or baseline comfort level  Outcome: Progressing     Safety - Adult  Goal: Free from fall injury  Outcome: Progressing     Skin/Tissue Integrity  Goal: Absence of new skin breakdown  Outcome: Progressing

## 2023-02-27 NOTE — ACP (ADVANCE CARE PLANNING)
Advance Care Planning     Advance Care Planning Inpatient Note  [facility]    Today's Date: 2/27/2023  Unit: [facility] 5T ORTHO/NEURO    Received request from [employer]. Upon review of chart and communication with care team, patient's decision making abilities are not in question. . Patient was/were present in the room during visit. Goals of ACP Conversation:  Discuss advance care planning documents    Health Care Decision Makers:       Primary Decision Maker: Toñito Kilpatrick [facility] - [phone]  Summary:  Verified Healthcare Decision Maker    Advance Care Planning Documents (Patient Wishes):  Healthcare Power of /Advance Directive Appointment of Health Care Agent     Assessment:  PT is in good sprits and has nearly completed her HCPOA. I am expecting a page from the unit at any time from the PT to complete the HCPOA. In the meantime, she did select her daughter as the PDM as above.       Interventions:  Provided education on documents for clarity and greater understanding    Care Preferences Communicated:   No    Outcomes/Plan:  ACP Discussion: Postponed    Electronically signed by Radha Dodson [facility] on 2/27/2023 at 2:05 PM

## 2023-02-27 NOTE — PROGRESS NOTES
Occupational Therapy  Facility/Department: [facility] 5T ORTHO/NEURO  Occupational Therapy Daily Treatment    Name: Sharlene Rivas  : 1965  MRN: [id number]  Date of Service: 2023    Discharge Recommendations:  Sharlene Rivas scored a 17/24 on the AM-PAC ADL Inpatient form. Current research shows that an AM-PAC score of 17 or less is typically not associated with a discharge to the patient's home setting. Based on the patient's AM-PAC score and their current ADL deficits, it is recommended that the patient have 3-5 sessions per week of Occupational Therapy at d/c to increase the patient's independence. Please see assessment section for further patient specific details. If patient discharges prior to next session this note will serve as a discharge summary. Please see below for the latest assessment towards goals. OT Equipment Recommendations  Other: defer recommendations to discharge facility; if home - reacher sock aide, LH shoehorn/sponge, toilet safety frame, shower chair       Patient Diagnosis(es): The primary encounter diagnosis was S/P spinal fusion. A diagnosis of Mass of thoracic vertebra was also pertinent to this visit. Past Medical History:  has a past medical history of Arthritis, Avoids pork and pork products due to cultural beliefs, Chronic pain, TAN (dyspnea on exertion), Frequent headaches, Gallbladder disorder, Gastroesophageal reflux disease, Hx of blood clots, Hx of degenerative disc disease, Hyperlipidemia, Hypertension, Morbid obesity with body mass index (BMI) of 60.0 to 69.9 in adult ([address] Neuropathy, SHYANN (obstructive sleep apnea), Poor circulation, Primary osteoarthritis of right knee, Type 2 diabetes mellitus without complication ([address] Urinary incontinence, Vitamin D deficiency, and Wears glasses. Past Surgical History:  has a past surgical history that includes back surgery; Breast lumpectomy (Bilateral); Dilation and curettage of uterus (N/A, 2018);  Gastric bypass surgery; Sleeve Gastrectomy (10/13/2018); Colonoscopy; and laminectomy (N/A, 2/22/2023). Treatment Diagnosis: impaired ADLs/transfers s/p T10-T11 LAMINECTOMY, FACETECTOMY, PEDICLE SCREW FIXATION FOR REMOVAL OF THORACIC MASS      Assessment   Performance deficits / Impairments: Decreased functional mobility ; Decreased ADL status; Decreased endurance  Treatment Diagnosis: impaired ADLs/transfers s/p T10-T11 LAMINECTOMY, FACETECTOMY, PEDICLE SCREW FIXATION FOR REMOVAL OF THORACIC MASS  Prognosis: Good  Activity Tolerance  Activity Tolerance: Patient Tolerated treatment well  Activity Tolerance Comments: increased time spent assisting pt with calling PCP office        Plan   Occupational Therapy Plan  Times Per Week: 5-7  Current Treatment Recommendations: Strengthening, Balance training, Functional mobility training, Endurance training, Safety education & training, Self-Care / ADL, Home management training     Restrictions  Position Activity Restriction  Spinal Precautions: No Bending, No Lifting, No Twisting  Other position/activity restrictions: up with assistance    Subjective   General  Chart Reviewed: Yes  Patient assessed for rehabilitation services?: Yes  Additional Pertinent Hx: 61 y.o. F to OR 2/22 for T10-T11 LAMINECTOMY, FACETECTOMY, PEDICLE SCREW FIXATION FOR REMOVAL OF THORACIC MASS. PMH: Chronic Pain, DDD, HTN, Hyperlipidemia, Morbid Obesity s/p Sleeve Gastrectomy, SHYANN, DM, Urinary Incontinence, Vit D Deficiency, Back Surgery (2001). Family / Caregiver Present: No  Referring Practitioner:  YURY Choi CNP  Diagnosis: Mass of Thoracic Vertebra  Subjective  Subjective: Pt in bathroom w/ RN present upon arrival. Pt agreeable to OT session     Social/Functional History  Social/Functional History  Lives With: Alone  Type of Home: House (looking for new wc accessible apt)  Home Layout: One level  Home Access: Level entry  Bathroom Toilet: Standard (sink beside toilet)  Home Equipment: Sanket Esteban  Has the patient had two or more falls in the past year or any fall with injury in the past year?: No  ADL Assistance: Independent (sponge baths at sink in wheelchair, would complete dressing IND)  Homemaking Assistance: Independent  Ambulation Assistance: Independent (states she is only able to walk ~5ft before needing to sit down using rollator. Will also use wc or scoot on seat of rollator)  Transfer Assistance: Independent (would use walker to transfer, can stand for short periods of time)  Active : No  Patient's  Info: medical transport  Additional Comments: Was previously going to OP PT. Daughter is urology surgeon in [place], currently pregnant and unable to leave [place] to assist pt. Objective   Heart Rate: 66  Heart Rate Source: Monitor  BP: 105/60  BP Location: Left upper arm  BP Method: Automatic  Patient Position: Semi fowlers  MAP (Calculated): 75  Resp: 18  SpO2: 94 %             Safety Devices  Type of Devices: Gait belt;Call light within reach;Nurse notified; All fall risk precautions in place; Left in bed;Bed alarm in place  Balance  Sitting: Intact  Standing: Impaired (CGA w/ RW)  Gait  Overall Level of Assistance: Contact-guard assistance (pt ambulated in room and short-distance in hallway w/ CGA)        ADL  Grooming: Stand by assistance  Grooming Skilled Clinical Factors: washing hands at sink level standing        Bed mobility  Sit to Supine: Stand by assistance (HOB elevated, increased time and effort)  Transfers  Sit to stand: Contact guard assistance  Stand to sit: Contact guard assistance     Cognition  Overall Cognitive Status: WFL  Orientation  Overall Orientation Status: Within Functional Limits  Orientation Level: Oriented X4                  Education Given To: Patient  Education Provided: Role of Therapy;Plan of Care;Precautions; ADL Adaptive Strategies;Transfer Training  Education Method: Verbal  Barriers to Learning: None  Education Outcome: Verbalized understanding;Continued education needed                        G-Code     OutComes Score                                                  AM-PAC Score        AM-PAC Inpatient Daily Activity Raw Score: 17 (02/27/23 1249)  AM-PAC Inpatient ADL T-Scale Score : 37.26 (02/27/23 1249)  ADL Inpatient CMS 0-100% Score: 50.11 (02/27/23 1249)  ADL Inpatient CMS G-Code Modifier : CK (02/27/23 1249)    Tinneti Score       Goals  Short Term Goals  Time Frame for Short Term Goals: Discharge  Short Term Goal 1: 3in1 transfer w/ Supervision- ongiong  Short Term Goal 2: LB dressing using AE w/ Supervision- ongoing  Short Term Goal 3: increase functional standing tolerance to 10 minutes- ongoing  Short Term Goal 4: supine to sit via log roll w/ Supervision- ongoing  Patient Goals   Patient goals : to be independent       Therapy Time   Individual Concurrent Group Co-treatment   Time In 0818         Time Out 0856         Minutes [address], OT

## 2023-02-27 NOTE — PROGRESS NOTES
PT has been able to shower and tolerate walking this shift. She continues to have complaints of pain in her upper back from 9-10/10. She is voiding adequately.

## 2023-02-27 NOTE — PROGRESS NOTES
02/27/23 1400   Encounter Summary   Encounter Overview/Reason  Advance Care Planning   Service Provided For: Patient   Referral/Consult From: Nurse   Last Encounter  02/27/23   Complexity of Encounter High   Begin Time 1048   End Time  1145   Total Time Calculated 57 min   Encounter    Type Follow up   Assessment/Intervention/Outcome   Assessment Calm; Hopeful   Intervention Active listening;Explored/Affirmed feelings, thoughts, concerns;Nurtured Hope   Outcome Comfort;Engaged in conversation;Expressed feelings, needs, and concerns;Expressed Gratitude;Receptive   PT was found to be in good spirits. I then assisted her with her HCPOA packet and she has signed it. However, she wishes to add some optional comments. She will seek to reach me once she is done with this part so it HCPOA can be witnessed.    Staff Stephany Velasquez MA, [facility]

## 2023-02-27 NOTE — CARE COORDINATION
ADDENDUM:  [facility] denied pt. Electronically signed by MARY Nunez LSW on 2/27/2023 at 2:13 PM    SW met w/pt at bedside. SW provided snf options closest to pt's grant\e she is in agreement w/referrals to [facility], [facility], [facility], [facility] and [facility]. Pt asked about resources for relocating into senior housing. SW provided contact information for [employer]. SW sent referrals. Electronically signed by MARY Nunez LSW on 2/27/2023 at 1:49 PM    SW spoke to Clive at [facility], pt was denied and there is no P2P option. SW will follow up w/Pt regarding DC options.      Electronically signed by MARY Nunez LSW on 2/27/2023 at 10:41 AM  [phone]

## 2023-02-27 NOTE — PROGRESS NOTES
02/27/23 1531   Encounter Summary   Service Provided For: Patient   Last Encounter  02/27/23   Complexity of Encounter Moderate   Begin Time 1500   End Time  1530   Total Time Calculated 30 min   Encounter    Type Follow up   Assessment/Intervention/Outcome   Assessment Calm; Hopeful   Intervention Active listening;Explored/Affirmed feelings, thoughts, concerns   Outcome Comfort;Engaged in conversation;Expressed feelings, needs, and concerns;Expressed Gratitude   Plan and Referrals   Plan/Referrals Continue Support (comment)  (f/u 2/28 for HCPOA)   Staff Malina Dodson MA, [facility]

## 2023-02-27 NOTE — PROGRESS NOTES
Pt was found in the family room. Pt's Rn states her bed alarm was on. Pt c/o pain and want to see a MD now about pain control. Pt refuses to walk back to her room after several requests to do so. Pt continues to state that she needs a MD. Pt informed that a MD has been paged. Pt continues to refuse to go back to the room. Pt educated that for safety reasons she can not be left alone. Security was called for support and assistance to take pt to her room. Pt agreed to go back to the her room. Pt states she can not sleep d/t pain. Per the [facility] pt has been getting PO pain medication and muscle relaxers when available. Will ask MD for a sleeping aid.

## 2023-02-27 NOTE — PROGRESS NOTES
Progress Note     PCP: Willa Prince MD    Date of Admission: 2/22/2023    Date of Service: Pt seen/examined on  2/23/23 and Admitted to Inpatient with expected LOS greater than two midnights due to medical therapy. Chief Complaint:        History Of Present Illness:       The patient is a 61 y.o. female who presents to [facility] with   PMH of HTN , DLP , SHYANN , DM type 2   Is s/p  T10-T11 LAMINECTOMY, FACETECTOMY, PEDICLE SCREW FIXATION FOR REMOVAL OF THORACIC MASS   Surgery on 2/22/23    Patient sitting in chair  C/o nausea   C/o pain over operative site     Denies sob or fever or chills       Interval History     C/o right forearm area swelling , had IV line at that site , which is removed now    Overall pain better controlled  C/o constipation   During my conversation with patient , she appears not in distress or in severe discomfort   C/o pain over operative site  No fever or chills or sob        Past Medical History:        Diagnosis Date    Arthritis     Avoids pork and pork products due to cultural beliefs     Chronic pain     TAN (dyspnea on exertion)     Frequent headaches 12/13/2022    Gallbladder disorder     Gastroesophageal reflux disease 01/28/2020    Hx of blood clots     in legs -- 13 years ago    Hx of degenerative disc disease     Hyperlipidemia     Hypertension     Morbid obesity with body mass index (BMI) of 60.0 to 69.9 in adult ([address] 07/02/2018    Neuropathy     SHYANN (obstructive sleep apnea)     machine broke down, awaiting new machine at the end of June 2023    Poor circulation     Primary osteoarthritis of right knee 07/02/2018    Type 2 diabetes mellitus without complication ([address]     controlled with diet    Urinary incontinence     Vitamin D deficiency 01/28/2020    Wears glasses        Past Surgical History:        Procedure Laterality Date    BACK SURGERY      2001 -- L4 and L5 -- no metal or implants    BREAST LUMPECTOMY Bilateral     2015    COLONOSCOPY      DILATION AND CURETTAGE OF UTERUS N/A 08/30/2018    GASTRIC BYPASS SURGERY      LAMINECTOMY N/A 2/22/2023    T10-T11 LAMINECTOMY, FACETECTOMY, PEDICLE SCREW FIXATION FOR REMOVAL OF THORACIC MASS performed by Jolanta Olivares. Alen Archer MD at [address] [facility]  10/13/2018       Medications Prior to Admission:    Prior to Admission medications    Medication Sig Start Date End Date Taking? Authorizing Provider   lidocaine 4 % external patch Place 1 patch onto the skin daily for 7 days 2/25/23 3/4/23 Yes Karyle American, MD   oxyCODONE (ROXICODONE) 5 MG immediate release tablet Take 1-2 tablets by mouth every 6 hours as needed for Pain for up to 7 days. Max Daily Amount: 40 mg 2/24/23 3/3/23 Yes YURY Bal NP   diazePAM (VALIUM) 5 MG tablet Take 1 tablet by mouth every 8 hours as needed (Muscle spasms) for up to 10 days. Max Daily Amount: 15 mg 2/24/23 3/6/23 Yes YURY Bal NP   methocarbamol (ROBAXIN) 750 MG tablet Take 1 tablet by mouth every 6 hours as needed (muscle spasms) 2/24/23 3/6/23 Yes YURY Bal NP   GAVILYTE-G 236 g solution  2/10/23   Historical Provider, MD   docusate sodium (COLACE) 100 MG capsule Take 1 capsule by mouth 2 times daily as needed for Constipation As needed for constipation 1/27/23   Yovani Duron MD   loperamide (RA ANTI-DIARRHEAL) 2 MG capsule Take 1 capsule by mouth 4 times daily as needed for Diarrhea 1/27/23   Yovani Duron MD   omeprazole (PRILOSEC) 20 MG delayed release capsule Take 1 capsule by mouth in the morning and 1 capsule in the evening.  1/27/23   Yovani Duron MD   furosemide (LASIX) 40 MG tablet TAKE 1 TABLET BY MOUTH EVERY DAY 12/12/22   Dallas Slade MD   Blood Glucose Monitoring Suppl (BLOOD GLUCOSE MONITOR SYSTEM) w/Device KIT Dispense glucometer covered by patient's insurance 12/1/22   Yovani Duron MD   blood glucose monitor strips Test once daily 12/1/22   Yovani Duron MD   zoster recombinant adjuvanted vaccine [facility]) 50 MCG/0.5ML SUSR injection Inject 0.5 mLs into the muscle See Admin Instructions 1 dose now and repeat in 2-6 months 12/1/22 5/30/23  Kait Rangel MD   Lancets MISC Use to test blood sugar once daily 12/1/22   Kait Rangel MD   acetaminophen (TYLENOL) 500 MG tablet Take 1 tablet by mouth 4 times daily as needed for Pain 12/1/22   Kait Rangel MD   gabapentin (NEURONTIN) 300 MG capsule Take 1 capsule by mouth 3 times daily for 180 days. Intended supply: 30 days 10/24/22 4/22/23  Romulo YURY Tanner - CNP       Allergies:  Pork-derived products    Social History:  The patient currently lives     TOBACCO:   reports that she has never smoked. She has never used smokeless tobacco.  ETOH:   reports no history of alcohol use. Family History:  Reviewed in detail and Positive as follows:        Problem Relation Age of Onset    Diabetes Mother     Stroke Mother     Osteoarthritis Mother     Heart Disease Father     Other Father     High Blood Pressure Father     Osteoarthritis Father     Diabetes Maternal Aunt     Cancer Maternal Aunt     Diabetes Maternal Grandmother     Cancer Paternal Aunt        REVIEW OF SYSTEMS:   Positive and negative as noted in the HPI. All other systems reviewed and negative. PHYSICAL EXAM:    /60   Pulse 66   Temp 98 °F (36.7 °C) (Oral)   Resp 18   Ht 5' 7\" (1.702 m)   Wt 244 lb 14.9 oz (111.1 kg)   LMP 02/19/2018   SpO2 94%   BMI 38.36 kg/m²     General appearance: No apparent distress appears stated age and cooperative. HEENT Normal cephalic, atraumatic without obvious deformity. Pupils equal, round, and reactive to light. Extra ocular muscles intact. Conjunctivae/corneas clear. Neck: Supple, No jugular venous distention/bruits. Trachea midline without thyromegaly or adenopathy with full range of motion. Lungs: Clear to auscultation, bilaterally without Rales/Wheezes/Rhonchi with good respiratory effort.   Heart: Regular rate and rhythm with Normal S1/S2 without murmurs, rubs or gallops, point of maximum impulse non-displaced  Abdomen: Soft, non-tender or non-distended without rigidity or guarding and positive bowel sounds all four quadrants. Extremities: No clubbing, cyanosis, or edema bilaterally. Full range of motion without deformity and normal gait intact. Skin: Skin color, texture, turgor normal.  No rashes or lesions. Neurologic: Alert and oriented X 3, neurovascularly intact with sensory/motor intact upper extremities/lower extremities, bilaterally. Cranial nerves: II-XII intact, grossly non-focal.  Mental status: Alert, oriented, thought content appropriate. Capillary refill is brisk  Peripheral pulses 2 +         The following labs reviewed personally:   CBC   No results for input(s): WBC, HGB, HCT, PLT in the last 72 hours. RENAL  No results for input(s): NA, K, CL, CO2, PHOS, BUN, CREATININE, CA in the last 72 hours. LFT'S  No results for input(s): AST, ALT, ALB, BILIDIR, BILITOT, ALKPHOS in the last 72 hours. COAG  No results for input(s): INR in the last 72 hours. CARDIAC ENZYMES  No results for input(s): CKTOTAL, CKMB, CKMBINDEX, TROPONINI in the last 72 hours.     U/A:    Lab Results   Component Value Date/Time    NITRITE small 06/15/2021 11:46 AM    COLORU yellow 06/15/2021 11:46 AM    COLORU YELLOW 01/09/2020 08:08 PM    WBCUA 50 01/09/2020 08:08 PM    RBCUA 5 01/09/2020 08:08 PM    BACTERIA RARE 08/21/2018 02:28 PM    CLARITYU clear 06/15/2021 11:46 AM    CLARITYU Clear 01/09/2020 08:08 PM    SPECGRAV 1.030 06/15/2021 11:46 AM    SPECGRAV 1.029 01/09/2020 08:08 PM    LEUKOCYTESUR trace 06/15/2021 11:46 AM    LEUKOCYTESUR LARGE 01/09/2020 08:08 PM    BLOODU trace 06/15/2021 11:46 AM    BLOODU TRACE 01/09/2020 08:08 PM    GLUCOSEU neg 06/15/2021 11:46 AM    GLUCOSEU Negative 01/09/2020 08:08 PM    AMORPHOUS 2+ 01/28/2019 04:34 PM       ABG  No results found for: HYQ0GGN, BEART, K6MRCZUT, PHART, THGBART, HSK0AMK, PO2ART, [address] Problems    Diagnosis Date Noted    Mass of thoracic vertebra [M48.9] 02/22/2023     Priority: Medium         ASSESSMENT/PLAN:      Thoracic mass  s/p  T10-T11 LAMINECTOMY, FACETECTOMY, PEDICLE SCREW FIXATION FOR REMOVAL OF THORACIC MASS       Neurologic exam every 4 hr    PT and OT    Post operative pain control : Dilaudid prn, oxycodone prn    Nausea/vomiting : zofran prn , compazine prn     Right forearm area swelling , ? Cellulitis , 2/2 IV line     IV line removed   Give Keflex         GERD : PPI    Pain management    Dilaudid 0.25- 0.5 mg q4 prn x next 24 hrs  Roxicodone IR 5 mg q4 prn      Constipation    Dulcolax prn  Colace BID  Miralax      DVT Prophylaxis: allergic to heparin , lovenox   SCDs  Diet: ADULT DIET; Regular; NO PORK FOR Buddhist REASONS  Code Status: Full Code  PT/OT Eval Status:         DC plan    Medically stable for discharge  Awaiting placement       Frank Ceron MD    Thank you Phyllis Galicia MD for the opportunity to be involved in this patient's care. If you have any questions or concerns please feel free to contact me at [phone].

## 2023-02-27 NOTE — OP NOTE
Operative Report    PATIENT NAME: Farrah Rivers  YOB: 1965  MEDICAL RECORD# [id number]  SURGERY DATE: 2/22/2023  SURGEON:  Juhi Velarde MD, PhD  ASSISTANT:  Rl Teixeira MD, PhD , served as assistant on this surgery due to the complex nature of the surgery and the lack of a resident or fellow assistant. She provided assistance with critical tissue retraction at parts of the surgery, wound closure and assistance with protecting critical neuro elements  DICTATED BY: Juhi Velarde MD          PREOPERATIVE DIAGNOSIS:  1.  T10-T11 intradural, extramedullary mass lesion  2. Thoracic central spinal stenosis with cord compression        POSTOPERATIVE DIAGNOSIS:  1. Same     PROCEDURES PERFORMED:  1.  T10-11 bilateral laminectomy and decompression, T10-11 facetectomy left  2. Resection of intradural, extramedullary mass  3. Placement of pedicle screws T10-11  4. Posterolateral arthrodesis and fusion T10-11 bilateral  5. Intraoperative fluoroscopy and SpinzoO stereotactic navigation  6. Neuro-monitoring for MEP, SSEP and EMG  7. Microdissection     ANESTHESIA:  General     ESTIMATED BLOOD LOSS:  100 cc     INDICATIONS FOR SURGERY:  The patient is a 61 y.o. with inability to ambulate and severe mid-back pain. 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 Patient had clinical signs of myelopathy, and was unable to stand. Having failed conservative management and experiencing persistent symptoms, the patient elected to proceed ahead with T1-11 laminectomy and decompression,resection of the epidural mass and stabilization T10-11. The patient was brought to the operating room and placed under general anesthesia and neuromonitoring initiated. She was then placed prone on a Hadley frame. A time out was carried out, antibiotics were administered by anesthesia.   All bony prominences were inspected and padded prior to sterile draping. AIRO was brought into the field and used to localize the proper level, starting at the sacrum and counting up to the T10 vertebrae. The incision was planned appropriately and opened using a #15 blade knife, the skin was incised in the midline and monopolar cautery was used to dissect through the subcutaneous tissue to open the fascia and reflect the paraspinal muscles laterally exposing the T10-11 interspaces. The stereotactic array was secured to the spinous process of T11 and the [employer] AIRO CT brought into the field to acquire imaging. Using stereotactic guidance and anatomic landmarks, the T10 pedicle on the right side was targeted. The Midas Jamie drill was used to create a  hole and the pedicel cannulated with the navigated awl-tap. A ball tipped explorer was used to palpate the boundaries of the pedicle to ensure no breach was present and to size the length. An appropriately sized titanium Verse pedicle screw was then selected and placed into the pedicle. This procedure was repeated at the right T11, and the same pedicles on the left in identical fashion. The rongeur was then used to remove the spinous process of T10, the top of T11. A wide laminectomy was performed using the midas Jamie drill to create a trough along the lamina-facet junction, allowing the lamina to be removed in a single piece. The underlying ligamentum was then carefully  from the underlying dura and removed with a 2 mm micropunch. A wide central decompression was accomplished. The bottom of T9 top of the T11 lamina was also removed in the midline to establish excellent central decompression. The Orvan Creamer was then used to remove the facet joint at T10, carrying the bony removal out over the foramina on the left. The operating microscope was brought into the field and used for microdissection. Meticulous hemostasis was obtained using flow seal and cottonoid patties.  The dura was opened slightly off midline to the left with a #11 blade. Donna Halsted was used to extend the dural opening and the dural edges were tacked up with 4-0 Nurolon stitches. The spinal cord was seen descending. The arachnoid connection and dentate ligaments were dissected and brought toward the right side with the micro-scissors and microdissector. The mass was seen in the leftward aspect of the canal. It was progressively  from the ipsilateral dura, to which it was densely attached. The center was debulked with the Sonopet. Microdissection was used to separate it from its interface with the spinal cord and the final attachments to the dura were coagulated and cut. It came free and was removed en bloc. The specimen was sent for permanent pathology. The intradural space was irrigated free of surgical debris and blood. A 4-0 Nurolon was used to continuously stitch the dura closed in a water tight fashion. The would was again irrigated and the closure sprayed with Adherus sealant. The [employer] AIRO was brought back into the field and used to verify proper implant placement. The implants were found to be in excellent position. A ramy was placed into the screw heads on the right and locked into the placed to maintain spinal alignment. The lamina and transverse processes were decorticated with the Midas tye drill. The wound was copiously irrigated with antibiotic solution. Autograft bone and Fibergraft allograft was packed over the lamina and transverse processes establishing a posterolateral arthrodesis and fusion at T10-11. An appropriately sized ramy was place into the pedicle screw heads on the left and locked into place with titanium head caps. Final tightening was performed bilataterally. The fascia was then reapproximated using interrupted 0 Vicryl sutures and interrupted 2-0 Vicryl sutures were used in the deep dermis.  A 4-0 Monocryl was used to reapproximate the subcuticular layer and a derma-bond dressing was then applied. The patient was extubated in the operating room and transferred to the recovery room in stable condition. There were no complications. Monitoring remained stable throughout the operation. In accordance with CMS guidelines, I attest that I was present for the entire procedure from the creation of the skin incision to the closure.      DRAINS: None

## 2023-02-27 NOTE — PROGRESS NOTES
NEUROSURGERY POST-OP PROGRESS NOTE    Patient Name: Marysol Ng YOB: 1965   Sex: Female Age: 61 yrs     Medical Record Number: [id number] Acct Number: [de-identified]   Room Number: 7532/3259-14 Hospital Day: Hospital Day: 6     Interval History:  Post-operative Day# 5 s/p T10-T11 LAMINECTOMY, FACETECTOMY, PEDICLE SCREW FIXATION FOR REMOVAL OF THORACIC MASS     Subjective: patient reports uncontrolled surgical incision pain, per nursing notes has been up and ambulatory. Objective:    VITAL SIGNS   /60   Pulse 66   Temp 98 °F (36.7 °C) (Oral)   Resp 18   Ht 5' 7\" (1.702 m)   Wt 244 lb 14.9 oz (111.1 kg)   LMP 02/19/2018   SpO2 94%   BMI 38.36 kg/m²    Height Height: 5' 7\" (170.2 cm)   Weight Weight: 244 lb 14.9 oz (111.1 kg)        Allergies Allergies   Allergen Reactions    Pork-Derived Products      Uatsdin reasons -- no pork products of any kind      NPO Status ADULT DIET; Regular; NO PORK FOR Orthodox REASONS   Isolation No active isolations     LABS   Basic Metabolic Profile No results for input(s): NA, POTASSIUM, CL, CO2, BUN, CREATININE, GLUCOSE, CA, ALB, PHOS, MG in the last 72 hours. Complete Blood Count No results for input(s): WBC, RBC, HEMOGLOBIN in the last 72 hours. Invalid input(s): HEMATOCRIT     Coagulation Studies No results for input(s): PTT, INR in the last 72 hours.     Invalid input(s): PLATELETS, PROA, PT, PTTA       MEDICATIONS   Inpatient Medications     docusate sodium, 100 mg, Oral, BID    polyethylene glycol, 17 g, Oral, Daily    scopolamine, 1 patch, TransDERmal, Q72H    lidocaine, 1 patch, TransDERmal, Daily    furosemide, 40 mg, Oral, Daily    gabapentin, 300 mg, Oral, TID    sodium chloride flush, 5-40 mL, IntraVENous, 2 times per day    acetaminophen, 1,000 mg, Oral, 4 times per day    pantoprazole, 40 mg, Oral, QAM AC    [COMPLETED] methocarbamol IVPB, 1,000 mg, IntraVENous, Q8H **FOLLOWED BY** methocarbamol, 750 mg, Oral, 4 times per day   Infusions    sodium chloride        Antibiotics   Recent Abx Admin        No antibiotic orders with administrations found. Neurologic Exam:    Mental status: awake/alert, oriented x 4    Musculoskeletal:   Gait: Not tested   Tone: normal   Sensory: chronic paraesthesias to both legs  Motor strength:    Right  Left    Right  Left    Deltoid  5 5  Hip Flex  4 4   Biceps  5 5  Knee Extensors  4 4   Triceps  5 5  Knee Flexors  4 4   Wrist Ext  5 5  Ankle Dorsiflex. 5 5   Wrist Flex  5 5  Ankle Plantarflex. 5 5   Handgrip  5 5  Ext Rafael Longus  5 5   Thumb Ext  5 5         Incision: staples PEPE c/d/i    Respiratory:  Unlabored respiratory pattern    Abdomen:   Soft, ND   +BS, +flatus        Cardiovascular:  Warm, well perfused    Assessment   62 yo female POD 5 s/p T10-T11 LAMINECTOMY, FACETECTOMY, PEDICLE SCREW FIXATION FOR REMOVAL OF THORACIC MASS with Dr. Kathryn Navas:  Neurologic exam frequency: q 4   Mobility: PT/OT, activity as tolerated   Diet: ADAT  DVT Prophylaxis: SCDs, unable to have lovenox/heparin 2/2 pork derivative   GI Prophylaxis: protonix  Bowel Regimen: senokot, glycolax, add movantik  Pain control: tylenol, lidocaine patch, switch to PO dilaudid   Robaxin/valium for spasms   Incisional Care: cleanse daily with soap/water, pat dry with clean towel and paint with CHG swab, patient may shower  Hospitalist for post op medical management   Ok to dispo once placement obtained     Patient was discussed with Dr. Nicolasa Salas who agrees with above assessment and plan. Electronically signed by:  YURY Koenig NP, 2/27/2023 7:14 AM   Neurosurgery Nurse Practitioner  [phone]

## 2023-02-27 NOTE — PROGRESS NOTES
PT does not have IV access. Notifying physician. Both arms are swollen and red from previous IV sites. Physician prescribed antibiotic.

## 2023-02-27 NOTE — PLAN OF CARE
Problem: Discharge Planning  Goal: Discharge to home or other facility with appropriate resources  Outcome: Progressing     Problem: Pain  Goal: Verbalizes/displays adequate comfort level or baseline comfort level  2/26/2023 1932 by Negrito Mondragon RN  Outcome: Progressing    Problem: Safety - Adult  Goal: Free from fall injury  2/26/2023 1932 by Negrito Mondragon RN  Outcome: Progressing    Problem: Skin/Tissue Integrity  Goal: Absence of new skin breakdown  Description: 1. Monitor for areas of redness and/or skin breakdown  2. Assess vascular access sites hourly  3. Every 4-6 hours minimum:  Change oxygen saturation probe site  4. Every 4-6 hours:  If on nasal continuous positive airway pressure, respiratory therapy assess nares and determine need for appliance change or resting period.   2/26/2023 1932 by Negrito Mondragon RN  Outcome: Progressing

## 2023-02-28 ENCOUNTER — APPOINTMENT (OUTPATIENT)
Dept: VASCULAR LAB | Age: 58
End: 2023-02-28
Attending: NEUROLOGICAL SURGERY
Payer: COMMERCIAL

## 2023-02-28 ENCOUNTER — APPOINTMENT (OUTPATIENT)
Dept: GENERAL RADIOLOGY | Age: 58
End: 2023-02-28
Attending: NEUROLOGICAL SURGERY
Payer: COMMERCIAL

## 2023-02-28 LAB
BASOPHILS ABSOLUTE: 0 K/UL (ref 0–0.2)
BASOPHILS RELATIVE PERCENT: 0.2 %
EOSINOPHILS ABSOLUTE: 0 K/UL (ref 0–0.6)
EOSINOPHILS RELATIVE PERCENT: 0.6 %
HCT VFR BLD CALC: 32.5 % (ref 36–48)
HEMOGLOBIN: 10.5 G/DL (ref 12–16)
LYMPHOCYTES ABSOLUTE: 0.9 K/UL (ref 1–5.1)
LYMPHOCYTES RELATIVE PERCENT: 11.3 %
MCH RBC QN AUTO: 29.3 PG (ref 26–34)
MCHC RBC AUTO-ENTMCNC: 32.3 G/DL (ref 31–36)
MCV RBC AUTO: 90.5 FL (ref 80–100)
MONOCYTES ABSOLUTE: 1.1 K/UL (ref 0–1.3)
MONOCYTES RELATIVE PERCENT: 14.2 %
NEUTROPHILS ABSOLUTE: 5.7 K/UL (ref 1.7–7.7)
NEUTROPHILS RELATIVE PERCENT: 73.7 %
PDW BLD-RTO: 14.1 % (ref 12.4–15.4)
PLATELET # BLD: 258 K/UL (ref 135–450)
PMV BLD AUTO: 7.2 FL (ref 5–10.5)
RBC # BLD: 3.59 M/UL (ref 4–5.2)
WBC # BLD: 7.7 K/UL (ref 4–11)

## 2023-02-28 PROCEDURE — 97116 GAIT TRAINING THERAPY: CPT

## 2023-02-28 PROCEDURE — 94761 N-INVAS EAR/PLS OXIMETRY MLT: CPT

## 2023-02-28 PROCEDURE — 99024 POSTOP FOLLOW-UP VISIT: CPT | Performed by: NURSE PRACTITIONER

## 2023-02-28 PROCEDURE — 36415 COLL VENOUS BLD VENIPUNCTURE: CPT

## 2023-02-28 PROCEDURE — 2060000000 HC ICU INTERMEDIATE R&B

## 2023-02-28 PROCEDURE — 6370000000 HC RX 637 (ALT 250 FOR IP): Performed by: NURSE PRACTITIONER

## 2023-02-28 PROCEDURE — 97530 THERAPEUTIC ACTIVITIES: CPT

## 2023-02-28 PROCEDURE — 85025 COMPLETE CBC W/AUTO DIFF WBC: CPT

## 2023-02-28 PROCEDURE — 93970 EXTREMITY STUDY: CPT

## 2023-02-28 PROCEDURE — 6370000000 HC RX 637 (ALT 250 FOR IP): Performed by: HOSPITALIST

## 2023-02-28 PROCEDURE — 74018 RADEX ABDOMEN 1 VIEW: CPT

## 2023-02-28 PROCEDURE — 6370000000 HC RX 637 (ALT 250 FOR IP)

## 2023-02-28 PROCEDURE — APPNB30 APP NON BILLABLE TIME 0-30 MINS: Performed by: NURSE PRACTITIONER

## 2023-02-28 PROCEDURE — 97535 SELF CARE MNGMENT TRAINING: CPT

## 2023-02-28 RX ORDER — BISACODYL 10 MG
10 SUPPOSITORY, RECTAL RECTAL ONCE
Status: COMPLETED | OUTPATIENT
Start: 2023-02-28 | End: 2023-02-28

## 2023-02-28 RX ADMIN — METHOCARBAMOL 750 MG: 750 TABLET ORAL at 04:59

## 2023-02-28 RX ADMIN — ACETAMINOPHEN 1000 MG: 500 TABLET ORAL at 12:30

## 2023-02-28 RX ADMIN — APIXABAN 10 MG: 5 TABLET, FILM COATED ORAL at 21:06

## 2023-02-28 RX ADMIN — GABAPENTIN 300 MG: 300 CAPSULE ORAL at 08:21

## 2023-02-28 RX ADMIN — ACETAMINOPHEN 1000 MG: 500 TABLET ORAL at 18:14

## 2023-02-28 RX ADMIN — DIAZEPAM 5 MG: 5 TABLET ORAL at 02:59

## 2023-02-28 RX ADMIN — Medication 10 MG: at 13:58

## 2023-02-28 RX ADMIN — GABAPENTIN 300 MG: 300 CAPSULE ORAL at 21:07

## 2023-02-28 RX ADMIN — HYDROMORPHONE HYDROCHLORIDE 4 MG: 2 TABLET ORAL at 08:21

## 2023-02-28 RX ADMIN — HYDROMORPHONE HYDROCHLORIDE 4 MG: 2 TABLET ORAL at 12:30

## 2023-02-28 RX ADMIN — CEPHALEXIN 250 MG: 250 CAPSULE ORAL at 22:51

## 2023-02-28 RX ADMIN — CEPHALEXIN 250 MG: 250 CAPSULE ORAL at 04:59

## 2023-02-28 RX ADMIN — DOCUSATE SODIUM 100 MG: 100 CAPSULE, LIQUID FILLED ORAL at 08:21

## 2023-02-28 RX ADMIN — PANTOPRAZOLE SODIUM 40 MG: 40 TABLET, DELAYED RELEASE ORAL at 05:00

## 2023-02-28 RX ADMIN — FUROSEMIDE 40 MG: 40 TABLET ORAL at 08:21

## 2023-02-28 RX ADMIN — CEPHALEXIN 250 MG: 250 CAPSULE ORAL at 11:02

## 2023-02-28 RX ADMIN — DIAZEPAM 5 MG: 5 TABLET ORAL at 10:11

## 2023-02-28 RX ADMIN — DIAZEPAM 5 MG: 5 TABLET ORAL at 16:34

## 2023-02-28 RX ADMIN — GABAPENTIN 300 MG: 300 CAPSULE ORAL at 12:30

## 2023-02-28 RX ADMIN — DOCUSATE SODIUM 100 MG: 100 CAPSULE, LIQUID FILLED ORAL at 21:07

## 2023-02-28 RX ADMIN — METHOCARBAMOL 750 MG: 750 TABLET ORAL at 18:14

## 2023-02-28 RX ADMIN — DIAZEPAM 5 MG: 5 TABLET ORAL at 22:51

## 2023-02-28 RX ADMIN — CEPHALEXIN 250 MG: 250 CAPSULE ORAL at 16:34

## 2023-02-28 RX ADMIN — HYDROMORPHONE HYDROCHLORIDE 4 MG: 2 TABLET ORAL at 16:34

## 2023-02-28 RX ADMIN — ACETAMINOPHEN 1000 MG: 500 TABLET ORAL at 22:51

## 2023-02-28 RX ADMIN — NALOXEGOL OXALATE 12.5 MG: 12.5 TABLET, FILM COATED ORAL at 05:00

## 2023-02-28 RX ADMIN — METHOCARBAMOL 750 MG: 750 TABLET ORAL at 12:30

## 2023-02-28 RX ADMIN — HYDROMORPHONE HYDROCHLORIDE 4 MG: 2 TABLET ORAL at 21:06

## 2023-02-28 RX ADMIN — METHOCARBAMOL 750 MG: 750 TABLET ORAL at 22:51

## 2023-02-28 RX ADMIN — HYDROMORPHONE HYDROCHLORIDE 4 MG: 2 TABLET ORAL at 04:09

## 2023-02-28 RX ADMIN — ANTACID TABLETS 500 MG: 500 TABLET, CHEWABLE ORAL at 03:13

## 2023-02-28 ASSESSMENT — PAIN DESCRIPTION - DESCRIPTORS
DESCRIPTORS: ACHING;DISCOMFORT
DESCRIPTORS: ACHING
DESCRIPTORS: ACHING;DISCOMFORT

## 2023-02-28 ASSESSMENT — PAIN DESCRIPTION - LOCATION
LOCATION: BACK

## 2023-02-28 ASSESSMENT — PAIN DESCRIPTION - ORIENTATION
ORIENTATION: MID

## 2023-02-28 ASSESSMENT — PAIN DESCRIPTION - ONSET
ONSET: ON-GOING

## 2023-02-28 ASSESSMENT — PAIN SCALES - GENERAL
PAINLEVEL_OUTOF10: 10
PAINLEVEL_OUTOF10: 9
PAINLEVEL_OUTOF10: 9
PAINLEVEL_OUTOF10: 10
PAINLEVEL_OUTOF10: 10
PAINLEVEL_OUTOF10: 0
PAINLEVEL_OUTOF10: 10
PAINLEVEL_OUTOF10: 9
PAINLEVEL_OUTOF10: 10
PAINLEVEL_OUTOF10: 7
PAINLEVEL_OUTOF10: 10
PAINLEVEL_OUTOF10: 10
PAINLEVEL_OUTOF10: 9

## 2023-02-28 ASSESSMENT — PAIN - FUNCTIONAL ASSESSMENT
PAIN_FUNCTIONAL_ASSESSMENT: ACTIVITIES ARE NOT PREVENTED
PAIN_FUNCTIONAL_ASSESSMENT: ACTIVITIES ARE NOT PREVENTED
PAIN_FUNCTIONAL_ASSESSMENT: PREVENTS OR INTERFERES SOME ACTIVE ACTIVITIES AND ADLS

## 2023-02-28 ASSESSMENT — PAIN SCALES - WONG BAKER: WONGBAKER_NUMERICALRESPONSE: 0

## 2023-02-28 ASSESSMENT — PAIN DESCRIPTION - FREQUENCY
FREQUENCY: CONTINUOUS

## 2023-02-28 ASSESSMENT — PAIN DESCRIPTION - PAIN TYPE
TYPE: SURGICAL PAIN
TYPE: ACUTE PAIN;SURGICAL PAIN
TYPE: ACUTE PAIN;SURGICAL PAIN

## 2023-02-28 NOTE — PROGRESS NOTES
NEUROSURGERY POST-OP PROGRESS NOTE    Patient Name: Desean Oviedo YOB: 1965   Sex: Female Age: [id number] yrs     Medical Record Number: [id number] Acct Number: [de-identified]   Room Number: 5831/3919-41 Hospital Day: Hospital Day: 7     Interval History: low grade temp overnight, check CBC    Post-operative Day# 6 s/p T10-T11 LAMINECTOMY, FACETECTOMY, PEDICLE SCREW FIXATION FOR REMOVAL OF THORACIC MASS     Subjective: Patient complains of surgical incision pain, PO dilaudid slightly more effective then roxicodone. Objective:    VITAL SIGNS   BP 96/60   Pulse 67   Temp (!) 100.5 °F (38.1 °C) (Oral)   Resp 16   Ht 5' 7\" (1.702 m)   Wt 244 lb 14.9 oz (111.1 kg)   LMP 02/19/2018   SpO2 97%   BMI 38.36 kg/m²    Height Height: 5' 7\" (170.2 cm)   Weight Weight: 244 lb 14.9 oz (111.1 kg)        Allergies Allergies   Allergen Reactions    Pork-Derived Products      Yazidi reasons -- no pork products of any kind      NPO Status ADULT DIET; Regular; NO PORK FOR Hoahaoism REASONS   Isolation No active isolations     LABS   Basic Metabolic Profile No results for input(s): NA, POTASSIUM, CL, CO2, BUN, CREATININE, GLUCOSE, CA, ALB, PHOS, MG in the last 72 hours. Complete Blood Count No results for input(s): WBC, RBC, HEMOGLOBIN in the last 72 hours. Invalid input(s): HEMATOCRIT     Coagulation Studies No results for input(s): PTT, INR in the last 72 hours.     Invalid input(s): PLATELETS, PROA, PT, PTTA       MEDICATIONS   Inpatient Medications     naloxegol, 12.5 mg, Oral, QAM AC    cephALEXin, 250 mg, Oral, 4 times per day    docusate sodium, 100 mg, Oral, BID    scopolamine, 1 patch, TransDERmal, Q72H    lidocaine, 1 patch, TransDERmal, Daily    furosemide, 40 mg, Oral, Daily    gabapentin, 300 mg, Oral, TID    sodium chloride flush, 5-40 mL, IntraVENous, 2 times per day    acetaminophen, 1,000 mg, Oral, 4 times per day    pantoprazole, 40 mg, Oral, QAM AC    [COMPLETED] methocarbamol IVPB, 1,000 mg, IntraVENous, Q8H **FOLLOWED BY** methocarbamol, 750 mg, Oral, 4 times per day   Infusions    sodium chloride        Antibiotics   Recent Abx Admin                     cephALEXin (KEFLEX) capsule 250 mg (mg) 250 mg Given 02/28/23 0459     250 mg Given 02/27/23 2103     250 mg Given  1757                     Neurologic Exam:    Mental status: awake/alert, oriented x 4    Musculoskeletal:   Gait: Not tested   Tone: normal   Sensory: chronic paraesthesias to both legs  Motor strength:    Right  Left    Right  Left    Deltoid  5 5  Hip Flex  4 4   Biceps  5 5  Knee Extensors  4 4   Triceps  5 5  Knee Flexors  4 4   Wrist Ext  5 5  Ankle Dorsiflex. 5 5   Wrist Flex  5 5  Ankle Plantarflex. 5 5   Handgrip  5 5  Ext Rafael Longus  5 5   Thumb Ext  5 5         Incision: staples PEPE c/d/i    Respiratory:  Unlabored respiratory pattern    Abdomen:   Soft, ND   +BS, +flatus        Cardiovascular:  Warm, well perfused    Assessment   60 yo female POD 6 s/p T10-T11 LAMINECTOMY, FACETECTOMY, PEDICLE SCREW FIXATION FOR REMOVAL OF THORACIC MASS with Dr. Marks Schmidt:  Neurologic exam frequency: q 4   Mobility: PT/OT, activity as tolerated   Diet: ADAT  DVT Prophylaxis: SCDs, unable to have lovenox/heparin 2/2 pork derivative   GI Prophylaxis: protonix  Bowel Regimen: senokot, glycolax, movantik, dulcolax suppository today   Pain control: tylenol, lidocaine patch, switch to PO dilaudid   Robaxin/valium for spasms   Incisional Care: cleanse daily with soap/water, pat dry with clean towel and paint with CHG swab, patient may shower  Hospitalist for post op medical management   Ok to dispo once placement obtained     Patient was discussed with Dr. Alen Archer who agrees with above assessment and plan. Electronically signed by:  YURY Francois NP, 2/28/2023 7:28 AM   Neurosurgery Nurse Practitioner  [phone]

## 2023-02-28 NOTE — PROGRESS NOTES
Progress Note     PCP: Silvino Suero MD    Date of Admission: 2/22/2023    Date of Service: Pt seen/examined on  2/23/23 and Admitted to Inpatient with expected LOS greater than two midnights due to medical therapy. Chief Complaint:        History Of Present Illness:       The patient is a [id number] y.o. female who presents to [facility] with   PMH of HTN , DLP , SHYANN , DM type 2   Is s/p  T10-T11 LAMINECTOMY, FACETECTOMY, PEDICLE SCREW FIXATION FOR REMOVAL OF THORACIC MASS   Surgery on 2/22/23    Patient sitting in chair  C/o nausea   C/o pain over operative site     Denies sob or fever or chills       Interval History     C/o right forearm area swelling , had IV line at that site , which is removed now -----> right forearm swelling and redness improving     C/o left forearm swelling 2/2 IV site     Overall pain better controlled  C/o constipation   During my conversation with patient , she appears not in distress or in severe discomfort   C/o pain over operative site  No fever or chills or sob        Past Medical History:        Diagnosis Date    Arthritis     Avoids pork and pork products due to cultural beliefs     Chronic pain     TAN (dyspnea on exertion)     Frequent headaches 12/13/2022    Gallbladder disorder     Gastroesophageal reflux disease 01/28/2020    Hx of blood clots     in legs -- 13 years ago    Hx of degenerative disc disease     Hyperlipidemia     Hypertension     Morbid obesity with body mass index (BMI) of 60.0 to 69.9 in adult ([address] 07/02/2018    Neuropathy     SHYANN (obstructive sleep apnea)     machine broke down, awaiting new machine at the end of June 2023    Poor circulation     Primary osteoarthritis of right knee 07/02/2018    Type 2 diabetes mellitus without complication ([address]     controlled with diet    Urinary incontinence     Vitamin D deficiency 01/28/2020    Wears glasses        Past Surgical History:        Procedure Laterality Date    BACK SURGERY      2001 -- L4 and L5 -- no metal or implants    BREAST LUMPECTOMY Bilateral     2015    COLONOSCOPY      DILATION AND CURETTAGE OF UTERUS N/A 08/30/2018    GASTRIC BYPASS SURGERY      LAMINECTOMY N/A 2/22/2023    T10-T11 LAMINECTOMY, FACETECTOMY, PEDICLE SCREW FIXATION FOR REMOVAL OF THORACIC MASS performed by Celeste Alarcon. John Erazo MD at [address] [facility]  10/13/2018       Medications Prior to Admission:    Prior to Admission medications    Medication Sig Start Date End Date Taking? Authorizing Provider   lidocaine 4 % external patch Place 1 patch onto the skin daily for 7 days 2/25/23 3/4/23 Yes Sil Garber MD   oxyCODONE (ROXICODONE) 5 MG immediate release tablet Take 1-2 tablets by mouth every 6 hours as needed for Pain for up to 7 days. Max Daily Amount: 40 mg 2/24/23 3/3/23 Yes YURY Webb NP   diazePAM (VALIUM) 5 MG tablet Take 1 tablet by mouth every 8 hours as needed (Muscle spasms) for up to 10 days. Max Daily Amount: 15 mg 2/24/23 3/6/23 Yes YURY Webb NP   methocarbamol (ROBAXIN) 750 MG tablet Take 1 tablet by mouth every 6 hours as needed (muscle spasms) 2/24/23 3/6/23 Yes YURY Webb NP   GAVILYTE-G 236 g solution  2/10/23   Historical Provider, MD   docusate sodium (COLACE) 100 MG capsule Take 1 capsule by mouth 2 times daily as needed for Constipation As needed for constipation 1/27/23   Jules Davenport MD   loperamide (RA ANTI-DIARRHEAL) 2 MG capsule Take 1 capsule by mouth 4 times daily as needed for Diarrhea 1/27/23   Jules Davenport MD   omeprazole (PRILOSEC) 20 MG delayed release capsule Take 1 capsule by mouth in the morning and 1 capsule in the evening.  1/27/23   Jules Davenport MD   furosemide (LASIX) 40 MG tablet TAKE 1 TABLET BY MOUTH EVERY DAY 12/12/22   Timothy Bryant MD   Blood Glucose Monitoring Suppl (BLOOD GLUCOSE MONITOR SYSTEM) w/Device KIT Dispense glucometer covered by patient's insurance 12/1/22   Jules Davenport MD   blood glucose monitor strips Test once daily 22   Temple Apgar, MD   zoster recombinant adjuvanted vaccine [facility]) 50 MCG/0.5ML SUSR injection Inject 0.5 mLs into the muscle See Admin Instructions 1 dose now and repeat in 2-6 months 22  Temple Apgar, MD   Lancets MISC Use to test blood sugar once daily 22   Temple Apgar, MD   acetaminophen (TYLENOL) 500 MG tablet Take 1 tablet by mouth 4 times daily as needed for Pain 22   Temple Apgar, MD   gabapentin (NEURONTIN) 300 MG capsule Take 1 capsule by mouth 3 times daily for 180 days. Intended supply: 30 days 10/24/22 4/22/23  YURY Dowd CNP       Allergies:  Pork-derived products    Social History:  The patient currently lives     TOBACCO:   reports that she has never smoked. She has never used smokeless tobacco.  ETOH:   reports no history of alcohol use. Family History:  Reviewed in detail and Positive as follows:        Problem Relation Age of Onset    Diabetes Mother     Stroke Mother     Osteoarthritis Mother     Heart Disease Father     Other Father     High Blood Pressure Father     Osteoarthritis Father     Diabetes Maternal Aunt     Cancer Maternal Aunt     Diabetes Maternal Grandmother     Cancer Paternal Aunt        REVIEW OF SYSTEMS:   Positive and negative as noted in the HPI. All other systems reviewed and negative. PHYSICAL EXAM:    BP 97/65   Pulse 63   Temp 98.4 °F (36.9 °C) (Oral)   Resp 19   Ht 5' 7\" (1.702 m)   Wt 244 lb 14.9 oz (111.1 kg)   LMP 2018   SpO2 99%   BMI 38.36 kg/m²     General appearance: No apparent distress appears stated age and cooperative. HEENT Normal cephalic, atraumatic without obvious deformity. Pupils equal, round, and reactive to light. Extra ocular muscles intact. Conjunctivae/corneas clear. Neck: Supple, No jugular venous distention/bruits. Trachea midline without thyromegaly or adenopathy with full range of motion.   Lungs: Clear to auscultation, bilaterally without Rales/Wheezes/Rhonchi with good respiratory effort. Heart: Regular rate and rhythm with Normal S1/S2 without murmurs, rubs or gallops, point of maximum impulse non-displaced  Abdomen: Soft, non-tender or non-distended without rigidity or guarding and positive bowel sounds all four quadrants. Extremities: No clubbing, cyanosis, or edema bilaterally. Full range of motion without deformity and normal gait intact. Skin: Skin color, texture, turgor normal.  No rashes or lesions. Neurologic: Alert and oriented X 3, neurovascularly intact with sensory/motor intact upper extremities/lower extremities, bilaterally. Cranial nerves: II-XII intact, grossly non-focal.  Mental status: Alert, oriented, thought content appropriate. Capillary refill is brisk  Peripheral pulses 2 +         The following labs reviewed personally:   CBC   Recent Labs     02/28/23  0911   WBC 7.7   HGB 10.5*   HCT 32.5*           RENAL  No results for input(s): NA, K, CL, CO2, PHOS, BUN, CREATININE, CA in the last 72 hours. LFT'S  No results for input(s): AST, ALT, ALB, BILIDIR, BILITOT, ALKPHOS in the last 72 hours. COAG  No results for input(s): INR in the last 72 hours. CARDIAC ENZYMES  No results for input(s): CKTOTAL, CKMB, CKMBINDEX, TROPONINI in the last 72 hours.     U/A:    Lab Results   Component Value Date/Time    NITRITE small 06/15/2021 11:46 AM    COLORU yellow 06/15/2021 11:46 AM    COLORU YELLOW 01/09/2020 08:08 PM    WBCUA 50 01/09/2020 08:08 PM    RBCUA 5 01/09/2020 08:08 PM    BACTERIA RARE 08/21/2018 02:28 PM    CLARITYU clear 06/15/2021 11:46 AM    CLARITYU Clear 01/09/2020 08:08 PM    SPECGRAV 1.030 06/15/2021 11:46 AM    SPECGRAV 1.029 01/09/2020 08:08 PM    LEUKOCYTESUR trace 06/15/2021 11:46 AM    LEUKOCYTESUR LARGE 01/09/2020 08:08 PM    BLOODU trace 06/15/2021 11:46 AM    BLOODU TRACE 01/09/2020 08:08 PM    GLUCOSEU neg 06/15/2021 11:46 AM    GLUCOSEU Negative 01/09/2020 08:08 PM AMORPHOUS 2+ 01/28/2019 04:34 PM       ABG  No results found for: MPF4FXP, BEART, L4GFQRIN, PHART, THGBART, PBN9RZH, PO2ART, TAB9TUN        Active Hospital Problems    Diagnosis Date Noted    Mass of thoracic vertebra [M48.9] 02/22/2023     Priority: Medium         ASSESSMENT/PLAN:      Thoracic mass  s/p  T10-T11 LAMINECTOMY, FACETECTOMY, PEDICLE SCREW FIXATION FOR REMOVAL OF THORACIC MASS           PT and OT    Post operative pain control : Dilaudid prn, lidocain patch    Nausea/vomiting : zofran prn , compazine prn     Right forearm area swelling , ? Cellulitis , 2/2 IV line     Right forearm cellulitis     IV line removed   Give Keflex   Check BL UE venous doppler to rule out DVT      GERD : PPI    Pain management    Dilaudid 0.25- 0.5 mg q4 prn x next 24 hrs  Roxicodone IR 5 mg q4 prn      Constipation    Dulcolax prn  Colace BID  Miralax      DVT Prophylaxis: allergic to heparin , lovenox   SCDs  Diet: ADULT DIET; Regular; NO PORK FOR [employer] REASONS  Code Status: Full Code  PT/OT Eval Status:         DC plan    Medically stable for discharge  Awaiting placement       Olivia Ferro MD    Thank you Osmany Cartagena MD for the opportunity to be involved in this patient's care. If you have any questions or concerns please feel free to contact me at [phone].

## 2023-02-28 NOTE — PROGRESS NOTES
RN administered Pt's medications at bedside. Pt demanded RN give her the pouch that lidocaine patch comes in, This RN explained to Pt that cannot be given to Pt's and has to be disposed of properly. Pt was upset and said \"it is mine how can you not give to me\". This RN reinforced explanation. Pt took pictures of lidocaine pouch. Pt demanded RN give her all pouches that her pills come in. Pt then wanted to know why she was not getting dilauded. RN explained each medication has two names and dilauded is hydromorphone. Pt continued to ask why she is not getting dilauded. RN showed Pt her screen so Pt could see both names beside each other.

## 2023-02-28 NOTE — PROGRESS NOTES
VSS on room air. Aox4. No acute events this shift. Ambulating x1 with rw/gb. Pt does turn off bed alarm off and ambulate by herself, RN educate Pt that staff needs to be with Pt for any ambulation. Voiding adequately via BRP, Pt is continent at baseline, but does urinate on self occasionally. Denies pain or difficulty when urinating. Tolerating oral diet and PO fluids, denies N/V. Skin C/D/I with exception to BUE and incision. Redness to BUE on forearms, warm to touch. Pt has been icing BUE, RN educated Pt that icing might not be a good idea at this time, but disregarded RN's education. Incision C/D/I, no drainage noted, cleansed with soap, water, and painted with CHG. All fall precautions in place. Pt showered this shift.

## 2023-02-28 NOTE — PLAN OF CARE
Problem: Pain  Goal: Verbalizes/displays adequate comfort level or baseline comfort level  2/28/2023 0902 by Bhupendra Saez RN  Outcome: Progressing   Pt endorsing pain to back. Being treated with PRN pain medication, rest, and frequent repositioning with pillow support for comfort and pressure relief. Pt reports some relief from pain with above interventions.   Problem: Safety - Adult  Goal: Free from fall injury  2/28/2023 0902 by Bhupendra Saez RN  Outcome: Progressing   All fall precautions in place. Bed locked and in lowest position with alarm on. Overbed table and personal belonings within reach. Call light within reach and patient instructed to use call light for assistance. Non-skid socks on.

## 2023-02-28 NOTE — CARE COORDINATION
SATURNINO following: SATURNINO spoke with Yulissa Espitia  [phone] at [facility] who is reviewing the referral.  CM spoke with Glenny Puga [phone] with [facility] who is coming to meet the pt today to evaluate appropriateness. CM will continue to follow. Electronically signed by Emily Schwab, MSW on 2/28/2023 at 10:03 AM  [phone]    UPDATE: SATURNINO spoke with Charlotte with [facility] who met with the pt at bedside. Pt is in agreement with discharge to [facility] and Charlotte will start precert today. CM will continue to follow for discharge planning. Electronically signed by Emily Schwab, MSW on 2/28/2023 at 1:05 PM  [phone]    UPDATE: CM met with pt at bedside. Pt met with Lidia Mo from [facility] and reports that she would like to discharge to [facility] as it is closer to her home. SATURNINO spoke with Lidia Mo [phone] with [facility] who can accept the pt and will start precert today. SATURNINO spoke with Charlotte with [facility] and Brianna Rothman will withdraw the precert she started today. CM will continue to follow for discharge planning.   Electronically signed by Emily Schwab, MSW on 2/28/2023 at 3:39 PM  [phone]

## 2023-02-28 NOTE — PLAN OF CARE
Problem: Pain  Goal: Verbalizes/displays adequate comfort level or baseline comfort level  Outcome: Progressing  Note: Pt encouraged to notify RN if pain occurs. Pain managed via nonpharm measures and mediation per MAR. Problem: Safety - Adult  Goal: Free from fall injury  Outcome: Progressing  Note: Fall precautions in place. Bed alarm on, bed in lowest position, call light/belongings within reach, non slip socks, hourly rounding.

## 2023-02-28 NOTE — PROGRESS NOTES
A&Ox4. VSS on RA. Pt endorsing sx pain to back. Pain managed via medication per MAR. Voiding adequately. Tolerating diet and fluids. Fall precautions in place, call light within reach. Hyperlipidemia    Hypertension

## 2023-02-28 NOTE — PROGRESS NOTES
Physical Therapy  Daily Treatment Note    Discharge Recommendations: Farrah Rivers scored a 17/24 on the AM-PAC short mobility form. Current research shows that an AM-PAC score of 17 or less is typically not associated with a discharge to the patient's home setting. Based on the patient's AM-PAC score and their current functional mobility deficits, it is recommended that the patient have 3-5 sessions per week of Physical Therapy at d/c to increase the patient's independence. Please see assessment section for further patient specific details. Assessment:  Pt with improved gait tolerance this session. Moves very slowly and takes lengthy breaks between activities. Currently needing CGA to Min assist for transfers and CGA with walker for ambulation. Limited by fatigue and discomfort. Pt lives alone in a house. She is currently functioning below her baseline s/p spine surgery. Would benefit from continued IP PT at D/C prior to returning home. Plan is for SNF. Equipment Needs: Defer to next level of care    Chart Reviewed: Yes     Other position/activity restrictions: up with assistance   Additional Pertinent Hx: T10-T11 LAMINECTOMY, FACETECTOMY, PEDICLE SCREW FIXATION FOR REMOVAL OF THORACIC MASS (Spine Thoracic)      Diagnosis: mass of thoracic vertebra   Treatment Diagnosis: gait difficulty    Subjective: Pt in bed initially. Agreeable to working with PT. Pain: 10/10 at surgery site with activity. Also c/o discomfort B lower arms. RN aware and has been addressing. Ice packs to areas at end of session. Objective:    Bed mobility  Supine to sit: SBA, HOB up with use of rail.    Scooting: SBA to EOB    Transfers  Sit to stand: CGA from bed; Min assist x 1 from wheelchair; Min assist x 1 from raised height commode with armrests  Stand to sit: CGA into wheelchair; CGA onto raised height commode with armrests; CGA into recliner    Ambulation  Assistance Level: CGA   Assistive device: Wheeled walker Distance: 80 ft x 2 in crowe (seated rest between), 5 ft + 20 ft in room (to sink/commode)  Quality of gait: Step-through pattern; decreased pace; moderate reliance on walker for support; decreased step length/height. Balance  Sat EOB ~10 minutes with SBA. Sat on commode >5 minutes with SBA  Static stance with wheeled walker SBA  Ambulation with wheeled walker CGA  Standing at sink for dynamic activities 5+ minutes (pt cleaning up/washing hands after using the toilet) with CGA. Patient Education  Role of PT. Calling for assist with needs. Expressed understanding. Safety Devices  Pt left with needs in reach. In chair (reclined) with chair alarm on. RN updated. AM-PAC score  AM-PAC Inpatient Mobility Raw Score : 17  AM-PAC Inpatient T-Scale Score : 42.13  Mobility Inpatient CMS 0-100% Score: 50.57  Mobility Inpatient CMS G-Code Modifier : CK    Goals: (as determined and assessed by primary PT)  Time Frame for Short Term Goals: discharge  ongoing 2/27  Short Term Goal 1: pt will perform supine<>sit via logroll mod I -ongoing   Short Term Goal 2: pt will perform STS to RW mod I -ongoing   Short Term Goal 3: pt will amb 30ft with RW mod I -ongoing          Plan:  Plan: 5-7 times per week  Current Treatment Recommendations: Strengthening, Balance training, Functional mobility training, Transfer training, Gait training, Stair training, Patient/Caregiver education & training, Equipment evaluation, education, & procurement, Therapeutic activities, Endurance training, Safety education & training    Therapy Time    Individual  Concurrent  Group  Co-treatment    Time In  935            Time Out  1029            Minutes  54              Timed Code Treatment Minutes: 54  Total Treatment Minutes: 54    Will continue per plan of care. If patient is discharged prior to next treatment, this note will serve as the discharge summary.     [place], [place] #[id number]        Goals not met this treatment, continue with current goals.   [place]

## 2023-02-28 NOTE — PROGRESS NOTES
Occupational Therapy  Facility/Department: [facility] 5T ORTHO/NEURO  Occupational Therapy Daily Treatment    Name: Marilyn Green  : 1965  MRN: [id number]  Date of Service: 2023    Discharge Recommendations:  Marilyn Green scored a 17/24 on the AM-PAC ADL Inpatient form. Current research shows that an AM-PAC score of 17 or less is typically not associated with a discharge to the patient's home setting. Based on the patient's AM-PAC score and their current ADL deficits, it is recommended that the patient have 3-5 sessions per week of Occupational Therapy at d/c to increase the patient's independence. Please see assessment section for further patient specific details. If patient discharges prior to next session this note will serve as a discharge summary. Please see below for the latest assessment towards goals. OT Equipment Recommendations  Other: defer recommendations to discharge facility; if home - reacher sock aide, LH shoehorn/sponge, toilet safety frame, shower chair       Patient Diagnosis(es): The primary encounter diagnosis was S/P spinal fusion. A diagnosis of Mass of thoracic vertebra was also pertinent to this visit. Past Medical History:  has a past medical history of Arthritis, Avoids pork and pork products due to cultural beliefs, Chronic pain, TAN (dyspnea on exertion), Frequent headaches, Gallbladder disorder, Gastroesophageal reflux disease, Hx of blood clots, Hx of degenerative disc disease, Hyperlipidemia, Hypertension, Morbid obesity with body mass index (BMI) of 60.0 to 69.9 in adult ([facility] [address] Neuropathy, SHYANN (obstructive sleep apnea), Poor circulation, Primary osteoarthritis of right knee, Type 2 diabetes mellitus without complication ([facility] [address] Urinary incontinence, Vitamin D deficiency, and Wears glasses. Past Surgical History:  has a past surgical history that includes back surgery; Breast lumpectomy (Bilateral); Dilation and curettage of uterus (N/A, 2018);  Gastric bypass surgery; Sleeve Gastrectomy (10/13/2018); Colonoscopy; and laminectomy (N/A, 2/22/2023). Treatment Diagnosis: impaired ADLs/transfers s/p T10-T11 LAMINECTOMY, FACETECTOMY, PEDICLE SCREW FIXATION FOR REMOVAL OF THORACIC MASS      Assessment   Performance deficits / Impairments: Decreased functional mobility ; Decreased ADL status; Decreased endurance  Assessment: Pt demo improved participation in OT session this date compared to previous session. Pt completed LE bathing and dressing w/ Max A and toileting w/ CGA. Pt would benefit from practice w/ AE for LE dressing/bathing in order to increase independence while safely maintaining spinal precautions. Pt cont to demo difficulty w/ standing tolerance/balance and requires frequent rest breaks d/t fatigue and pain. Pt would benefit from ongoing skilled OT at dc to maximize functional status and safety for return home alone. If dc home, recommend 24hr A (which pt does not currently have), HHOT, reacher, sockaid, LH shoehorn/sponge, shower chair, and toilet safety frame.  Cont OT per POC  Treatment Diagnosis: impaired ADLs/transfers s/p T10-T11 LAMINECTOMY, FACETECTOMY, PEDICLE SCREW FIXATION FOR REMOVAL OF THORACIC MASS  REQUIRES OT FOLLOW-UP: Yes  Activity Tolerance  Activity Tolerance: Patient Tolerated treatment well;Patient limited by pain        Plan   Occupational Therapy Plan  Times Per Week: 5-7  Current Treatment Recommendations: Strengthening, Balance training, Functional mobility training, Endurance training, Safety education & training, Self-Care / ADL, Home management training     Restrictions  Position Activity Restriction  Spinal Precautions: No Bending, No Lifting, No Twisting  Other position/activity restrictions: up with assistance    Subjective   General  Chart Reviewed: Yes  Patient assessed for rehabilitation services?: Yes  Additional Pertinent Hx: 61 y.o. F to OR 2/22 for T10-T11 LAMINECTOMY, FACETECTOMY, PEDICLE SCREW FIXATION FOR REMOVAL OF THORACIC MASS. PMH: Chronic Pain, DDD, HTN, Hyperlipidemia, Morbid Obesity s/p Sleeve Gastrectomy, SHYANN, DM, Urinary Incontinence, Vit D Deficiency, Back Surgery (2001). Family / Caregiver Present: No  Referring Practitioner: YURY Joaquin CNP  Diagnosis: Mass of Thoracic Vertebra  Subjective  Subjective: Pt seated in recliner upon arrival and agreeable to OT session     Social/Functional History  Social/Functional History  Lives With: Alone  Type of Home: House (looking for new wc accessible apt)  Home Layout: One level  Home Access: Level entry  Bathroom Toilet: Standard (sink beside toilet)  Home Equipment: Michael Dmitriy, Rollator  Has the patient had two or more falls in the past year or any fall with injury in the past year?: No  ADL Assistance: Independent (sponge baths at sink in wheelchair, would complete dressing IND)  Homemaking Assistance: Independent  Ambulation Assistance: Independent (states she is only able to walk ~5ft before needing to sit down using rollator. Will also use wc or scoot on seat of rollator)  Transfer Assistance: Independent (would use walker to transfer, can stand for short periods of time)  Active : No  Patient's  Info: medical transport  Additional Comments: Was previously going to OP PT. Daughter is urology surgeon in [place], currently pregnant and unable to leave [place] to assist pt.        Objective   Heart Rate: 61  Heart Rate Source: Monitor  BP: (!) 92/51  BP Location: Left upper arm  BP Method: Automatic  Patient Position: Semi fowlers  MAP (Calculated): 65  Resp: 18  SpO2: 98 %  O2 Device: None (Room air)             Safety Devices  Type of Devices: Nurse notified;Call light within reach (Pt left seated EOB w/ RN present)  Balance  Sitting: Intact  Standing: With support (CGA w/ RW)  Gait  Overall Level of Assistance: Contact-guard assistance (pt demo functional mobility to/from bathroom using RW w/ CGA)  Toilet Transfers  Toilet - Technique: Ambulating (w/ RW)  Equipment Used: Standard toilet (w/ BSC placed over toilet)  Toilet Transfer: Contact guard assistance     ADL  Feeding: Independent; Beverage management  Grooming: Stand by assistance; Increased time to complete  Grooming Skilled Clinical Factors: sitting on shower chair to wash hair and face w/ SBA  UE Bathing: Stand by assistance;Setup  UE Bathing Skilled Clinical Factors: seated  LE Bathing: Maximum assistance  LE Bathing Skilled Clinical Factors: to wash lower legs/feet; able to wash beneath pannus and perform frontal hygiene seated w/ SBA; posterior pericare in standing SBA  UE Dressing: Minimal assistance  UE Dressing Skilled Clinical Factors: gown change  LE Dressing: Maximum assistance  LE Dressing Skilled Clinical Factors: doffed socks seated on shower chair. assist to don socks. Pt required assist to thread BLE into brief while seated. Pt completed clothing mgmt at hips in stance w/CGA. pt would benefit from practice w/ reacher and sock aid for increased independence w/ LE dressing while maintaining spinal precautions  Toileting: Contact guard assistance  Toileting Skilled Clinical Factors: Pt urinated seated on toilet. Pt required CGA in stance for clothing mgmt at hips  Additional Comments: pt required increased time for all ADL tasks        Bed mobility  Bed Mobility Comments: Pt seated in recliner upon arrival and left seated EOB w/ RN present at end of session  Transfers  Sit to stand: Contact guard assistance  Stand to sit: Contact guard assistance  Vision  Vision: Within Functional Limits  Hearing  Hearing: Within functional limits  Cognition  Overall Cognitive Status: WFL  Orientation  Overall Orientation Status: Within Functional Limits  Orientation Level: Oriented X4                  Education Given To: Patient  Education Provided: Role of Therapy;Plan of Care;Precautions; ADL Adaptive Strategies;Transfer Training  Education Method: Verbal  Barriers to Learning: None  Education Outcome: Verbalized understanding;Continued education needed                        G-Code     OutComes Score                                                  AM-PAC Score        AM-PAC Inpatient Daily Activity Raw Score: 17 (02/28/23 1507)  AM-PAC Inpatient ADL T-Scale Score : 37.26 (02/28/23 1507)  ADL Inpatient CMS 0-100% Score: 50.11 (02/28/23 1507)  ADL Inpatient CMS G-Code Modifier : CK (02/28/23 1507)    Tinneti Score       Goals  Short Term Goals  Time Frame for Short Term Goals: Discharge  Short Term Goal 1: 3in1 transfer w/ Supervision- ongiong  Short Term Goal 2: LB dressing using AE w/ Supervision- ongoing  Short Term Goal 3: increase functional standing tolerance to 10 minutes- ongoing  Short Term Goal 4: supine to sit via log roll w/ Supervision- ongoing  Patient Goals   Patient goals : to be independent       Therapy Time   Individual Concurrent Group Co-treatment   Time In 1030         Time Out 1140         Minutes 70         Timed Code Treatment Minutes: [address], OT

## 2023-02-28 NOTE — TELEPHONE ENCOUNTER
This has been addressed and paperwork and updated visit notes have been faxed to [facility].

## 2023-02-28 NOTE — PROGRESS NOTES
02/28/23 1535   Encounter Summary   Service Provided For: Patient   Last Encounter  02/28/23  (geovanna)   Complexity of Encounter Moderate   Begin Time 1445   End Time  1515   Total Time Calculated 30 min   Encounter    Type Follow up   Assessment/Intervention/Outcome   Assessment Calm; Hopeful   Intervention Active listening;Explored/Affirmed feelings, thoughts, concerns; Empowerment   Outcome Comfort;Engaged in conversation;Expressed feelings, needs, and concerns;Expressed Gratitude;Receptive   Plan and Referrals   Plan/Referrals Continue to visit, (comment)  (PT still speaking with family about HCPOA)   Staff Jitendra Villaseñor MA, [facility]

## 2023-03-01 PROCEDURE — 6370000000 HC RX 637 (ALT 250 FOR IP): Performed by: HOSPITALIST

## 2023-03-01 PROCEDURE — 6370000000 HC RX 637 (ALT 250 FOR IP): Performed by: NURSE PRACTITIONER

## 2023-03-01 PROCEDURE — 97116 GAIT TRAINING THERAPY: CPT

## 2023-03-01 PROCEDURE — 2060000000 HC ICU INTERMEDIATE R&B

## 2023-03-01 PROCEDURE — 97530 THERAPEUTIC ACTIVITIES: CPT

## 2023-03-01 PROCEDURE — 99024 POSTOP FOLLOW-UP VISIT: CPT | Performed by: NURSE PRACTITIONER

## 2023-03-01 PROCEDURE — 6370000000 HC RX 637 (ALT 250 FOR IP): Performed by: INTERNAL MEDICINE

## 2023-03-01 PROCEDURE — APPNB30 APP NON BILLABLE TIME 0-30 MINS: Performed by: NURSE PRACTITIONER

## 2023-03-01 RX ORDER — POLYETHYLENE GLYCOL 3350 17 G/17G
17 POWDER, FOR SOLUTION ORAL DAILY PRN
Status: DISCONTINUED | OUTPATIENT
Start: 2023-03-01 | End: 2023-03-02 | Stop reason: HOSPADM

## 2023-03-01 RX ORDER — BISACODYL 10 MG
10 SUPPOSITORY, RECTAL RECTAL ONCE
Status: COMPLETED | OUTPATIENT
Start: 2023-03-01 | End: 2023-03-01

## 2023-03-01 RX ORDER — POLYETHYLENE GLYCOL 3350 17 G/17G
17 POWDER, FOR SOLUTION ORAL ONCE
Status: COMPLETED | OUTPATIENT
Start: 2023-03-01 | End: 2023-03-01

## 2023-03-01 RX ADMIN — CEPHALEXIN 250 MG: 250 CAPSULE ORAL at 16:55

## 2023-03-01 RX ADMIN — DIAZEPAM 5 MG: 5 TABLET ORAL at 05:19

## 2023-03-01 RX ADMIN — FUROSEMIDE 40 MG: 40 TABLET ORAL at 09:15

## 2023-03-01 RX ADMIN — DIAZEPAM 5 MG: 5 TABLET ORAL at 23:04

## 2023-03-01 RX ADMIN — DOCUSATE SODIUM 100 MG: 100 CAPSULE, LIQUID FILLED ORAL at 09:15

## 2023-03-01 RX ADMIN — Medication 10 MG: at 21:29

## 2023-03-01 RX ADMIN — HYDROMORPHONE HYDROCHLORIDE 4 MG: 2 TABLET ORAL at 02:42

## 2023-03-01 RX ADMIN — DOCUSATE SODIUM 100 MG: 100 CAPSULE, LIQUID FILLED ORAL at 21:35

## 2023-03-01 RX ADMIN — PANTOPRAZOLE SODIUM 40 MG: 40 TABLET, DELAYED RELEASE ORAL at 05:20

## 2023-03-01 RX ADMIN — METHOCARBAMOL 750 MG: 750 TABLET ORAL at 13:11

## 2023-03-01 RX ADMIN — DIAZEPAM 5 MG: 5 TABLET ORAL at 16:55

## 2023-03-01 RX ADMIN — HYDROMORPHONE HYDROCHLORIDE 4 MG: 2 TABLET ORAL at 13:12

## 2023-03-01 RX ADMIN — APIXABAN 10 MG: 5 TABLET, FILM COATED ORAL at 21:35

## 2023-03-01 RX ADMIN — CEPHALEXIN 250 MG: 250 CAPSULE ORAL at 09:15

## 2023-03-01 RX ADMIN — METHOCARBAMOL 750 MG: 750 TABLET ORAL at 17:51

## 2023-03-01 RX ADMIN — APIXABAN 10 MG: 5 TABLET, FILM COATED ORAL at 09:15

## 2023-03-01 RX ADMIN — METHOCARBAMOL 750 MG: 750 TABLET ORAL at 05:19

## 2023-03-01 RX ADMIN — ACETAMINOPHEN 1000 MG: 500 TABLET ORAL at 05:20

## 2023-03-01 RX ADMIN — METHOCARBAMOL 750 MG: 750 TABLET ORAL at 23:04

## 2023-03-01 RX ADMIN — NALOXEGOL OXALATE 12.5 MG: 12.5 TABLET, FILM COATED ORAL at 05:19

## 2023-03-01 RX ADMIN — HYDROMORPHONE HYDROCHLORIDE 4 MG: 2 TABLET ORAL at 16:55

## 2023-03-01 RX ADMIN — Medication 5 MG: at 21:35

## 2023-03-01 RX ADMIN — CEPHALEXIN 250 MG: 250 CAPSULE ORAL at 23:04

## 2023-03-01 RX ADMIN — GABAPENTIN 300 MG: 300 CAPSULE ORAL at 09:15

## 2023-03-01 RX ADMIN — ACETAMINOPHEN 1000 MG: 500 TABLET ORAL at 23:04

## 2023-03-01 RX ADMIN — ACETAMINOPHEN 1000 MG: 500 TABLET ORAL at 17:51

## 2023-03-01 RX ADMIN — GABAPENTIN 300 MG: 300 CAPSULE ORAL at 13:11

## 2023-03-01 RX ADMIN — HYDROMORPHONE HYDROCHLORIDE 4 MG: 2 TABLET ORAL at 21:35

## 2023-03-01 RX ADMIN — CEPHALEXIN 250 MG: 250 CAPSULE ORAL at 05:20

## 2023-03-01 RX ADMIN — GABAPENTIN 300 MG: 300 CAPSULE ORAL at 21:35

## 2023-03-01 RX ADMIN — POLYETHYLENE GLYCOL 3350 17 G: 17 POWDER, FOR SOLUTION ORAL at 13:12

## 2023-03-01 RX ADMIN — HYDROMORPHONE HYDROCHLORIDE 4 MG: 2 TABLET ORAL at 09:15

## 2023-03-01 ASSESSMENT — PAIN SCALES - GENERAL
PAINLEVEL_OUTOF10: 9
PAINLEVEL_OUTOF10: 5
PAINLEVEL_OUTOF10: 10
PAINLEVEL_OUTOF10: 9
PAINLEVEL_OUTOF10: 10
PAINLEVEL_OUTOF10: 10
PAINLEVEL_OUTOF10: 9
PAINLEVEL_OUTOF10: 0
PAINLEVEL_OUTOF10: 10

## 2023-03-01 ASSESSMENT — PAIN DESCRIPTION - LOCATION
LOCATION: BACK
LOCATION: ARM;BACK
LOCATION: BACK;ARM
LOCATION: BACK

## 2023-03-01 ASSESSMENT — PAIN DESCRIPTION - PAIN TYPE
TYPE: ACUTE PAIN;SURGICAL PAIN

## 2023-03-01 ASSESSMENT — PAIN - FUNCTIONAL ASSESSMENT
PAIN_FUNCTIONAL_ASSESSMENT: PREVENTS OR INTERFERES SOME ACTIVE ACTIVITIES AND ADLS
PAIN_FUNCTIONAL_ASSESSMENT: PREVENTS OR INTERFERES SOME ACTIVE ACTIVITIES AND ADLS

## 2023-03-01 ASSESSMENT — PAIN DESCRIPTION - DESCRIPTORS
DESCRIPTORS: ACHING;DISCOMFORT
DESCRIPTORS: ACHING;DISCOMFORT
DESCRIPTORS: ACHING
DESCRIPTORS: ACHING
DESCRIPTORS: ACHING;DISCOMFORT

## 2023-03-01 ASSESSMENT — PAIN DESCRIPTION - ONSET
ONSET: ON-GOING

## 2023-03-01 ASSESSMENT — PAIN DESCRIPTION - FREQUENCY
FREQUENCY: CONTINUOUS
FREQUENCY: CONTINUOUS

## 2023-03-01 ASSESSMENT — PAIN DESCRIPTION - ORIENTATION
ORIENTATION: MID
ORIENTATION: MID
ORIENTATION: RIGHT;LEFT
ORIENTATION: RIGHT;LEFT;MID
ORIENTATION: MID

## 2023-03-01 NOTE — PROGRESS NOTES
Hospitalist Consult Progress Note      PCP: Chris Velarde MD    Date of Admission: 2/22/2023    Subjective: An  was used for this encounter per patient's request. Discussed at length regarding venous US findings. Patient was upset about the clots. She thinks they are related to the IV sites not appropriately placed. Her left arm is more swollen and tender. She requests hematology to see her while she is in the hospital for her clots. Dr. Falguni Diaz started her on eliquis and she took a dose earlier this morning. I have checked with neurosurgery and the NP and they are ok to continue Eliquis. Patient states she has left side mid abdominal pain. She states she eats small portions of food every meal. Her KUB from yesterday showed constipation. She had a very small bowel movement yesterday. No nausea/vomiting. She is agreeable with miralax today. Also discussed that if her abdominal pain is not relieved after a BM or if it persists or gets worse then we may do a CT a/p. She also has a lot of questions regarding her biopsy. I informed of her the pathology report. She was encouraged to discuss with neurosurgery for more questions.      Medications:  Reviewed    Infusion Medications    sodium chloride       Scheduled Medications    apixaban  10 mg Oral BID    Followed by    Teresita Schwartz ON 3/7/2023] apixaban  5 mg Oral BID    naloxegol  12.5 mg Oral QAM AC    cephALEXin  250 mg Oral 4 times per day    docusate sodium  100 mg Oral BID    scopolamine  1 patch TransDERmal Q72H    lidocaine  1 patch TransDERmal Daily    furosemide  40 mg Oral Daily    gabapentin  300 mg Oral TID    sodium chloride flush  5-40 mL IntraVENous 2 times per day    acetaminophen  1,000 mg Oral 4 times per day    pantoprazole  40 mg Oral QAM AC    methocarbamol  750 mg Oral 4 times per day     PRN Meds: HYDROmorphone **OR** HYDROmorphone, prochlorperazine, melatonin, calcium carbonate, sodium chloride flush, sodium chloride, naloxone, diazePAM, ondansetron **OR** ondansetron      Intake/Output Summary (Last 24 hours) at 3/1/2023 1229  Last data filed at 2/28/2023 2150  Gross per 24 hour   Intake 540 ml   Output --   Net 540 ml       Physical Exam Performed:    /84   Pulse 67   Temp 98.9 °F (37.2 °C) (Oral)   Resp 18   Ht 5' 7\" (1.702 m)   Wt 244 lb 14.9 oz (111.1 kg)   LMP 02/19/2018   SpO2 96%   BMI 38.36 kg/m²     General appearance: No apparent distress, appears stated age and cooperative. Respiratory:  Normal respiratory effort. Clear to auscultation  Cardiovascular: Regular rate and rhythm with normal S1/S2 without murmurs, rubs or gallops. Abdomen: Soft, mild tenderness on the left mid abd, no guarding or rebound  Musculoskeletal: edema present on the left arm and right arm, more on the left. Left arm warm to touch, and has erythema. No edema on both LE. Psychiatric: Alert and oriented, thought content appropriate   Capillary Refill: Brisk, 3 seconds, normal   Peripheral Pulses: +2 palpable, equal bilaterally     Labs:   Recent Labs     02/28/23  0911   WBC 7.7   HGB 10.5*   HCT 32.5*        No results for input(s): NA, K, CL, CO2, BUN, CREATININE, CALCIUM, PHOS in the last 72 hours. Invalid input(s): MAGNES  No results for input(s): AST, ALT, BILIDIR, BILITOT, ALKPHOS in the last 72 hours. No results for input(s): INR in the last 72 hours. No results for input(s): Kai Chalet in the last 72 hours. Urinalysis:      Lab Results   Component Value Date/Time    NITRU Negative 01/09/2020 08:08 PM    WBCUA 50 01/09/2020 08:08 PM    BACTERIA RARE 08/21/2018 02:28 PM    RBCUA 5 01/09/2020 08:08 PM    BLOODU trace 06/15/2021 11:46 AM    BLOODU TRACE 01/09/2020 08:08 PM    SPECGRAV 1.030 06/15/2021 11:46 AM    SPECGRAV 1.029 01/09/2020 08:08 PM    GLUCOSEU neg 06/15/2021 11:46 AM    GLUCOSEU Negative 01/09/2020 08:08 PM       Radiology:  XR ABDOMEN (KUB) (SINGLE AP VIEW)   Final Result   1.  Moderate to large amount of retained colonic stool in the cecum and ascending colon compatible with constipation. VL Extremity Venous Bilateral         CT GUIDED STEREOTACTIC LOCALIZATION   Final Result   1. Intraoperative CT images obtained for localization purposes with no acute complication identified. 2. Dependent opacities in the lungs most consistent with atelectasis. 3. Cardiomegaly. 4. Extensive cholelithiasis without acute inflammation identified. CT GUIDED STEREOTACTIC LOCALIZATION   Final Result   1. Intraoperative CT images obtained for localization purposes with no acute complication identified. 2. Dependent opacities in the lungs most consistent with atelectasis. 3. Cardiomegaly. 4. Extensive cholelithiasis without acute inflammation identified. 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 Biopsy showed meningioma. Management per neurosurgery. Continue PT/OT. Pain control and bowel regimen     Left arm DVT  Right arm superficial thrombosis    -neurosurgery is ok with eliquis. Patient reports no hx of GIB. She states she had a normal colonoscopy before her back surgery. Left arm cellulitis   -continue on keflex. Left abdominal pain   Constipation  -KUB showed large stool burden. Continue colace. Miralax today. If abd pain not improving with a BM, may need a CT a/p.  -continue home meds: lasix, neurontin, and protonix      DVT Prophylaxis: eliquis  Diet: ADULT DIET;  Regular; NO PORK FOR [employer] REASONS  Code Status: Full Code  PT/OT Eval Status: ordered    Stefani Ramos MD

## 2023-03-01 NOTE — PROGRESS NOTES
US UE venous doppler report reviewed    Left   Evidence of acute partially occluding deep venous thrombosis in the axillary   vein and brachial vein. DVT likely 2/2 IV access ?     Will start Eliquis DVT dose

## 2023-03-01 NOTE — PROGRESS NOTES
Met with patient yesterday during rounding. At that time the patient voiced complaints regarding her forearms being swollen and painful. Patient stated she felt her \"IVs were the cause of the problem. \"  Patient was scheduled for an ultrasound to r/o DVTs. This morning rounded on patient to follow up on her concerns regarding her arms being swollen from IVs and to answer any additional questions. Upon entering the room, noted the patient had ice packs to both arms. I informed the patient to remove ice packs until the physician reviews the findings from the us with her and give their recommendation of any possible treatment. Re-enforce to patient the importance of using the IS. Franklin Stevens RN.

## 2023-03-01 NOTE — PROGRESS NOTES
0710 Bedside report given with Jovany España, pt completed showered, asked this nurse to brush hair this nurse told pt would return after report and shift change. 0745 Combed pt hair, oil given to pt for hair from kitchen. Pt requested to speak with Nba regarding incident report, Sonia Alas called and aware of request. Medication schedule reviewed with pt, will return when medications are due at 0900. Pt denied any further needs at time. Call device within reach. Safety measures in place.

## 2023-03-01 NOTE — PROGRESS NOTES
Patient a/o x4. VSS. Pain treated with PO medication. Ambulating x1 walker/gb. Voiding adequately. Resting well overnight.

## 2023-03-01 NOTE — CARE COORDINATION
CM following: SATURNINO spoke to Eric Jordan [phone] with Devin Hightower who reports precert is still pending at this time. Eric Jordan will f/u with the CM with any updates on precert. SATURNINO will continue to follow for discharge planning.   Electronically signed by MARY Castle on 3/1/2023 at 1:52 PM  [phone]

## 2023-03-01 NOTE — PROGRESS NOTES
Physical Therapy  Daily Treatment Note    Discharge Recommendations: Farrah Rivers scored a 16/24 on the AM-PAC short mobility form. Current research shows that an AM-PAC score of 17 or less is typically not associated with a discharge to the patient's home setting. Based on the patient's AM-PAC score and their current functional mobility deficits, it is recommended that the patient have 3-5 sessions per week of Physical Therapy at d/c to increase the patient's independence. Please see assessment section for further patient specific details. Assessment:  Pt needing rest breaks between short bouts of ambulation this session. Gait steady with wheeled walker. Having to most difficulty with bed mobility from a flat bed. Pt from home alone. Does not have 24 hour assist. She is currently functioning below her baseline s/p spine surgery. Would benefit from continued IP PT at D/C, to maximize strength and independence prior to returning to her house. Plan is for SNF. Equipment Needs: Defer to next level of care    Chart Reviewed: Yes     Other position/activity restrictions: up with assistance   Additional Pertinent Hx: T10-T11 LAMINECTOMY, FACETECTOMY, PEDICLE SCREW FIXATION FOR REMOVAL OF THORACIC MASS (Spine Thoracic)      Diagnosis: mass of thoracic vertebra   Treatment Diagnosis: gait difficulty    Subjective: Pt in bed initially. Agreeable to working with PT. Pain: 9/10, surgical site (thoracic spine) and B lower arms. RN aware. Ice packs in place at surgical site at end of session. Objective:    Bed mobility  Supine to sit: SBA, HOB up with use of rail  Scooting: SBA to EOB  Sit to Supine: Moc assist for LEs, HOB flat. Log roll technique. Transfers  Sit to stand: CGA from bed (x 4 times); CGA from raised height commode with armrests.    Stand to sit: SBA onto raised height commode with armrests; SBA onto bed (x 4 times)    Ambulation  Assistance Level: CGA to SBA  Assistive device: Wheeled walker  Distance: 15 ft x 2, 30 ft x 3. Seated rests between walks. Quality of gait: Step-through pattern; moderate reliance on walker for support; decreased pace; guarded    Balance  Sat EOB with Supervision (>20 minutes total)  Sat on commode ~5 minutes with Supervision  Stood at sink to clean self & wash hands (10+ minutes) with SBA  Ambulation with wheeled walker CGA to SBA    Patient Education  Role of PT. Calling for assist with needs. Expressed understanding. Safety Devices  Pt left with needs in reach. In bed with bed alarm on. RN updated. AM-PAC score  AM-PAC Inpatient Mobility Raw Score : 16  AM-PAC Inpatient T-Scale Score : 40.78  Mobility Inpatient CMS 0-100% Score: 54.16  Mobility Inpatient CMS G-Code Modifier : CK    Goals: (as determined and assessed by primary PT)  Time Frame for Short Term Goals: discharge  Short Term Goal 1: pt will perform supine<>sit via logroll mod I   Short Term Goal 2: pt will perform STS to RW mod I   Short Term Goal 3: pt will amb 30ft with RW mod I          Plan:  Plan: 5-7 times per week  Current Treatment Recommendations: Strengthening, Balance training, Functional mobility training, Transfer training, Gait training, Stair training, Patient/Caregiver education & training, Equipment evaluation, education, & procurement, Therapeutic activities, Endurance training, Safety education & training    Therapy Time    Individual  Concurrent  Group  Co-treatment    Time In  1057            Time Out  1152            Minutes  55              Timed Code Treatment Minutes: 55  Total Treatment Minutes: 55    Will continue per plan of care. If patient is discharged prior to next treatment, this note will serve as the discharge summary.     [place], [place] #[id number]

## 2023-03-01 NOTE — PROGRESS NOTES
RN and orientee witnessed picture of , Baltazar Novoa, in Pt's phone. Pt said no one took picture of her incision and RN informed Pt that RN took picture of incision this AM. RN scrolled back through pictures with Pt's consent in front of Pt in Pt's view and witnessed picture of staff. Charge RN notified.

## 2023-03-01 NOTE — PROGRESS NOTES
NEUROSURGERY POST-OP PROGRESS NOTE    Patient Name: Beryle Draft YOB: 1965   Sex: Female Age: 61 yrs     Medical Record Number: [id number] Acct Number: [de-identified]   Room Number: 2988/4016-69 Hospital Day: Hospital Day: 8     Interval History: upper extremity duplex ordered for swelling, superficial DVT in LUE, deep DVT in RUE, started on Eliquis by medical team     Post-operative Day# 7 s/p T10-T11 LAMINECTOMY, FACETECTOMY, PEDICLE SCREW FIXATION FOR REMOVAL OF THORACIC MASS     Subjective: Patient resting in bed, talking on phone. She had a bowel movement yesterday. I discussed the results of her venous duplex with patient, she is requesting to speak to the hospitalist as well. Objective:    VITAL SIGNS   /84   Pulse 67   Temp 98.9 °F (37.2 °C) (Oral)   Resp 18   Ht 5' 7\" (1.702 m)   Wt 244 lb 14.9 oz (111.1 kg)   LMP 02/19/2018   SpO2 96%   BMI 38.36 kg/m²    Height Height: 5' 7\" (170.2 cm)   Weight Weight: 244 lb 14.9 oz (111.1 kg)        Allergies Allergies   Allergen Reactions    Pork-Derived Products      Yazidism reasons -- no pork products of any kind      NPO Status ADULT DIET; Regular; NO PORK FOR Taoism REASONS   Isolation No active isolations     LABS   Basic Metabolic Profile No results for input(s): NA, POTASSIUM, CL, CO2, BUN, CREATININE, GLUCOSE, CA, ALB, PHOS, MG in the last 72 hours. Complete Blood Count Recent Labs     02/28/23  0911   WBC 7.7   RBC 3.59*        Coagulation Studies No results for input(s): PTT, INR in the last 72 hours.     Invalid input(s): PLATELETS, PROA, PT, PTTA       MEDICATIONS   Inpatient Medications     apixaban, 10 mg, Oral, BID **FOLLOWED BY** [START ON 3/7/2023] apixaban, 5 mg, Oral, BID    naloxegol, 12.5 mg, Oral, QAM AC    cephALEXin, 250 mg, Oral, 4 times per day    docusate sodium, 100 mg, Oral, BID    scopolamine, 1 patch, TransDERmal, Q72H    lidocaine, 1 patch, TransDERmal, Daily    furosemide, 40 mg, Oral, Daily    gabapentin, 300 mg, Oral, TID    sodium chloride flush, 5-40 mL, IntraVENous, 2 times per day    acetaminophen, 1,000 mg, Oral, 4 times per day    pantoprazole, 40 mg, Oral, QAM AC    [COMPLETED] methocarbamol IVPB, 1,000 mg, IntraVENous, Q8H **FOLLOWED BY** methocarbamol, 750 mg, Oral, 4 times per day   Infusions    sodium chloride        Antibiotics   Recent Abx Admin                     cephALEXin (KEFLEX) capsule 250 mg (mg) 250 mg Given 03/01/23 0520     250 mg Given 02/28/23 2251     250 mg Given  1634     250 mg Given  1102                     XR ABDOMEN (KUB) (SINGLE AP VIEW)   Final Result   1. Moderate to large amount of retained colonic stool in the cecum and ascending colon compatible with constipation. VL Extremity Venous Bilateral         CT GUIDED STEREOTACTIC LOCALIZATION   Final Result   1. Intraoperative CT images obtained for localization purposes with no acute complication identified. 2. Dependent opacities in the lungs most consistent with atelectasis. 3. Cardiomegaly. 4. Extensive cholelithiasis without acute inflammation identified. CT GUIDED STEREOTACTIC LOCALIZATION   Final Result   1. Intraoperative CT images obtained for localization purposes with no acute complication identified. 2. Dependent opacities in the lungs most consistent with atelectasis. 3. Cardiomegaly. 4. Extensive cholelithiasis without acute inflammation identified. Neurologic Exam:    Mental status: awake/alert, oriented x 4    Musculoskeletal:   Gait: Not tested   Tone: normal   Sensory: chronic paraesthesias to both legs  Motor strength:    Right  Left    Right  Left    Deltoid  5 5  Hip Flex  4 4   Biceps  5 5  Knee Extensors  4 4   Triceps  5 5  Knee Flexors  4 4   Wrist Ext  5 5  Ankle Dorsiflex. 5 5   Wrist Flex  5 5  Ankle Plantarflex.   5 5   Handgrip  5 5  Ext Rafael Longus  5 5   Thumb Ext  5 5         Incision: staples PEPE c/d/i    Respiratory:  Unlabored respiratory pattern    Abdomen:   Soft, ND   Last BM 2/28      Cardiovascular:  Warm, well perfused    Assessment   62 yo female POD 7 s/p T10-T11 LAMINECTOMY, FACETECTOMY, PEDICLE SCREW FIXATION FOR REMOVAL OF THORACIC MASS with Dr. Arpit Roldan:  Neurologic exam frequency: q 4   Mobility: PT/OT, activity as tolerated   Diet: ADAT  DVT Prophylaxis: SCDs, unable to have lovenox/heparin 2/2 pork derivative   GI Prophylaxis: protonix  Bowel Regimen: senokot, glycolax, movantik, dulcolax suppository   Pain control: tylenol, lidocaine patch, PO dilaudid   Robaxin/valium for spasms   Incisional Care: cleanse daily with soap/water, pat dry with clean towel and paint with CHG swab, patient may shower  DVT in bilateral upper extremity- started on Eliquis by hospitalist overnight   -7 days post op still poses risk of anticoagulation, monitor patient for any neurologic changes however patient received dose last night and remains uncharged   Ok to dispo once placement obtained     Patient was discussed with Dr. Nakia Mills who agrees with above assessment and plan. Electronically signed by:  YURY Leyva NP, 3/1/2023 7:29 AM   Neurosurgery Nurse Practitioner  [phone]

## 2023-03-01 NOTE — PROGRESS NOTES
Education provided to pt by nurse about applying cold therapy to bilateral arms until DVT is ruled out, pt questioned education but was agreeable and took off ice packs. Education also provided to pt about taking breaks from ice packs to back, surgical area, pt refused and stated wanted to keep ice packs in place. Pt again asked for all medications to be verbalized to her, this nurse went through all medications and dosage amount at time they were administered this nurse will write down medications for pt.

## 2023-03-01 NOTE — PLAN OF CARE
Problem: Pain  Goal: Verbalizes/displays adequate comfort level or baseline comfort level  Outcome: Progressing     Problem: Safety - Adult  Goal: Free from fall injury  Outcome: Progressing     Problem: Skin/Tissue Integrity  Goal: Absence of new skin breakdown  Outcome: Progressing

## 2023-03-01 NOTE — PROGRESS NOTES
Made rounds on patient to see if she had any additional questions after speaking with her physician about bilateral DVTs to her arms. Patient asked Savannah Roblero is the treatment for the clots in her arm. \" Informed the patient she is taking Eliquis. I also pointed to her communication board that has the drug listed and includes time for the next dose. Printed patient education as well.   Amber BENAVIDES

## 2023-03-02 VITALS
RESPIRATION RATE: 16 BRPM | HEIGHT: 67 IN | DIASTOLIC BLOOD PRESSURE: 71 MMHG | BODY MASS INDEX: 38.44 KG/M2 | OXYGEN SATURATION: 95 % | SYSTOLIC BLOOD PRESSURE: 124 MMHG | HEART RATE: 73 BPM | WEIGHT: 244.93 LBS | TEMPERATURE: 98.2 F

## 2023-03-02 LAB
A/G RATIO: 0.9 (ref 1.1–2.2)
ALBUMIN SERPL-MCNC: 3.5 G/DL (ref 3.4–5)
ALP BLD-CCNC: 102 U/L (ref 40–129)
ALT SERPL-CCNC: 11 U/L (ref 10–40)
ANION GAP SERPL CALCULATED.3IONS-SCNC: 10 MMOL/L (ref 3–16)
AST SERPL-CCNC: 9 U/L (ref 15–37)
BASOPHILS ABSOLUTE: 0 K/UL (ref 0–0.2)
BASOPHILS RELATIVE PERCENT: 0.2 %
BILIRUB SERPL-MCNC: <0.2 MG/DL (ref 0–1)
BUN BLDV-MCNC: 18 MG/DL (ref 7–20)
CALCIUM SERPL-MCNC: 9.3 MG/DL (ref 8.3–10.6)
CHLORIDE BLD-SCNC: 97 MMOL/L (ref 99–110)
CO2: 30 MMOL/L (ref 21–32)
CREAT SERPL-MCNC: 0.6 MG/DL (ref 0.6–1.1)
EOSINOPHILS ABSOLUTE: 0.1 K/UL (ref 0–0.6)
EOSINOPHILS RELATIVE PERCENT: 0.9 %
GFR SERPL CREATININE-BSD FRML MDRD: >60 ML/MIN/{1.73_M2}
GLUCOSE BLD-MCNC: 128 MG/DL (ref 70–99)
HCT VFR BLD CALC: 32.8 % (ref 36–48)
HEMOGLOBIN: 10.9 G/DL (ref 12–16)
LYMPHOCYTES ABSOLUTE: 1 K/UL (ref 1–5.1)
LYMPHOCYTES RELATIVE PERCENT: 11.5 %
MCH RBC QN AUTO: 29 PG (ref 26–34)
MCHC RBC AUTO-ENTMCNC: 33.1 G/DL (ref 31–36)
MCV RBC AUTO: 87.6 FL (ref 80–100)
MONOCYTES ABSOLUTE: 0.9 K/UL (ref 0–1.3)
MONOCYTES RELATIVE PERCENT: 10.6 %
NEUTROPHILS ABSOLUTE: 6.5 K/UL (ref 1.7–7.7)
NEUTROPHILS RELATIVE PERCENT: 76.8 %
PDW BLD-RTO: 13.7 % (ref 12.4–15.4)
PLATELET # BLD: 407 K/UL (ref 135–450)
PMV BLD AUTO: 6.5 FL (ref 5–10.5)
POTASSIUM REFLEX MAGNESIUM: 4 MMOL/L (ref 3.5–5.1)
RBC # BLD: 3.75 M/UL (ref 4–5.2)
SODIUM BLD-SCNC: 137 MMOL/L (ref 136–145)
TOTAL PROTEIN: 7.3 G/DL (ref 6.4–8.2)
WBC # BLD: 8.5 K/UL (ref 4–11)

## 2023-03-02 PROCEDURE — 99024 POSTOP FOLLOW-UP VISIT: CPT | Performed by: NURSE PRACTITIONER

## 2023-03-02 PROCEDURE — APPNB30 APP NON BILLABLE TIME 0-30 MINS: Performed by: NURSE PRACTITIONER

## 2023-03-02 PROCEDURE — 6370000000 HC RX 637 (ALT 250 FOR IP): Performed by: NURSE PRACTITIONER

## 2023-03-02 PROCEDURE — 97535 SELF CARE MNGMENT TRAINING: CPT

## 2023-03-02 PROCEDURE — 36415 COLL VENOUS BLD VENIPUNCTURE: CPT

## 2023-03-02 PROCEDURE — 80053 COMPREHEN METABOLIC PANEL: CPT

## 2023-03-02 PROCEDURE — 97530 THERAPEUTIC ACTIVITIES: CPT

## 2023-03-02 PROCEDURE — 6370000000 HC RX 637 (ALT 250 FOR IP): Performed by: HOSPITALIST

## 2023-03-02 PROCEDURE — 85025 COMPLETE CBC W/AUTO DIFF WBC: CPT

## 2023-03-02 RX ORDER — POLYETHYLENE GLYCOL 3350 17 G/17G
17 POWDER, FOR SOLUTION ORAL DAILY PRN
Qty: 527 G | Refills: 1 | Status: SHIPPED | OUTPATIENT
Start: 2023-03-02 | End: 2023-03-09 | Stop reason: SDUPTHER

## 2023-03-02 RX ORDER — CEPHALEXIN 250 MG/1
250 CAPSULE ORAL 4 TIMES DAILY
Qty: 8 CAPSULE | Refills: 0 | Status: SHIPPED | OUTPATIENT
Start: 2023-03-02 | End: 2023-03-04

## 2023-03-02 RX ORDER — HYDROMORPHONE HYDROCHLORIDE 2 MG/1
2-4 TABLET ORAL EVERY 6 HOURS PRN
Qty: 42 TABLET | Refills: 0 | Status: SHIPPED | OUTPATIENT
Start: 2023-03-02 | End: 2023-03-09

## 2023-03-02 RX ADMIN — METHOCARBAMOL 750 MG: 750 TABLET ORAL at 05:37

## 2023-03-02 RX ADMIN — HYDROMORPHONE HYDROCHLORIDE 4 MG: 2 TABLET ORAL at 09:19

## 2023-03-02 RX ADMIN — DIAZEPAM 5 MG: 5 TABLET ORAL at 09:03

## 2023-03-02 RX ADMIN — ACETAMINOPHEN 1000 MG: 500 TABLET ORAL at 11:53

## 2023-03-02 RX ADMIN — NALOXEGOL OXALATE 12.5 MG: 12.5 TABLET, FILM COATED ORAL at 05:37

## 2023-03-02 RX ADMIN — CEPHALEXIN 250 MG: 250 CAPSULE ORAL at 05:37

## 2023-03-02 RX ADMIN — METHOCARBAMOL 750 MG: 750 TABLET ORAL at 17:54

## 2023-03-02 RX ADMIN — HYDROMORPHONE HYDROCHLORIDE 4 MG: 2 TABLET ORAL at 13:01

## 2023-03-02 RX ADMIN — HYDROMORPHONE HYDROCHLORIDE 4 MG: 2 TABLET ORAL at 05:37

## 2023-03-02 RX ADMIN — CEPHALEXIN 250 MG: 250 CAPSULE ORAL at 17:47

## 2023-03-02 RX ADMIN — HYDROMORPHONE HYDROCHLORIDE 4 MG: 2 TABLET ORAL at 17:47

## 2023-03-02 RX ADMIN — FUROSEMIDE 40 MG: 40 TABLET ORAL at 09:02

## 2023-03-02 RX ADMIN — DOCUSATE SODIUM 100 MG: 100 CAPSULE, LIQUID FILLED ORAL at 09:03

## 2023-03-02 RX ADMIN — HYDROMORPHONE HYDROCHLORIDE 4 MG: 2 TABLET ORAL at 01:55

## 2023-03-02 RX ADMIN — APIXABAN 10 MG: 5 TABLET, FILM COATED ORAL at 09:03

## 2023-03-02 RX ADMIN — DIAZEPAM 5 MG: 5 TABLET ORAL at 15:19

## 2023-03-02 RX ADMIN — PANTOPRAZOLE SODIUM 40 MG: 40 TABLET, DELAYED RELEASE ORAL at 05:37

## 2023-03-02 RX ADMIN — GABAPENTIN 300 MG: 300 CAPSULE ORAL at 09:02

## 2023-03-02 RX ADMIN — CEPHALEXIN 250 MG: 250 CAPSULE ORAL at 11:52

## 2023-03-02 RX ADMIN — GABAPENTIN 300 MG: 300 CAPSULE ORAL at 15:19

## 2023-03-02 RX ADMIN — ACETAMINOPHEN 1000 MG: 500 TABLET ORAL at 05:37

## 2023-03-02 RX ADMIN — METHOCARBAMOL 750 MG: 750 TABLET ORAL at 11:53

## 2023-03-02 ASSESSMENT — PAIN DESCRIPTION - DESCRIPTORS
DESCRIPTORS: ACHING

## 2023-03-02 ASSESSMENT — PAIN DESCRIPTION - PAIN TYPE
TYPE: SURGICAL PAIN

## 2023-03-02 ASSESSMENT — PAIN SCALES - GENERAL
PAINLEVEL_OUTOF10: 9
PAINLEVEL_OUTOF10: 8
PAINLEVEL_OUTOF10: 8
PAINLEVEL_OUTOF10: 9
PAINLEVEL_OUTOF10: 9
PAINLEVEL_OUTOF10: 8
PAINLEVEL_OUTOF10: 0

## 2023-03-02 ASSESSMENT — PAIN DESCRIPTION - LOCATION
LOCATION: BACK;ARM
LOCATION: BACK;ARM
LOCATION: BACK
LOCATION: BACK

## 2023-03-02 ASSESSMENT — PAIN DESCRIPTION - FREQUENCY
FREQUENCY: CONTINUOUS

## 2023-03-02 ASSESSMENT — PAIN DESCRIPTION - ONSET
ONSET: ON-GOING

## 2023-03-02 ASSESSMENT — PAIN - FUNCTIONAL ASSESSMENT
PAIN_FUNCTIONAL_ASSESSMENT: PREVENTS OR INTERFERES SOME ACTIVE ACTIVITIES AND ADLS
PAIN_FUNCTIONAL_ASSESSMENT: ACTIVITIES ARE NOT PREVENTED
PAIN_FUNCTIONAL_ASSESSMENT: ACTIVITIES ARE NOT PREVENTED

## 2023-03-02 ASSESSMENT — PAIN DESCRIPTION - ORIENTATION
ORIENTATION: LEFT;RIGHT
ORIENTATION: LEFT;RIGHT;POSTERIOR
ORIENTATION: LEFT;RIGHT;POSTERIOR
ORIENTATION: ANTERIOR

## 2023-03-02 NOTE — PROGRESS NOTES
Patient given full shower during shift handoff. Surgical site to mid back cleansed with soap/water and CHG. Zinc cream to danni area and buttocks per pt request. New gown, brief, and socks given. Complete linen change. Patient repositioned up in chair. All belongings and call light within reach. Patient requesting to have interpretor I-pad in room at all times. I-pad currently no charged. This RN hooked I-pad up to .

## 2023-03-02 NOTE — DISCHARGE SUMMARY
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 Alvaro England MD  Procedures: Procedure(s) (LRB):  T10-T11 LAMINECTOMY, FACETECTOMY, PEDICLE SCREW FIXATION FOR REMOVAL OF THORACIC MASS (N/A)  Consults: IP CONSULT TO SOCIAL WORK  IP CONSULT TO HOSPITALIST  IP CONSULT TO SPIRITUAL SERVICES  IP CONSULT TO PHARMACY  IP CONSULT TO HEMATOLOGY    Reason for Admission:  Tonya Zamudio is a 61 y.o. female patient who was admitted to the hospital for complaints of inability to ambulate and severe mid-back pain. 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 Patient had clinical signs of myelopathy, and was unable to stand. She underwent the procedure listed above on 2/22/2023. Hospital Course:  After surgery, Her pre-operative back pain was improving, she was ambulating with a walker. She complained of surgical incision pain. The pain was well-controlled on oral medications. The incision was clean, dry and intact. There was no erythema or edema around the surgical site. Hospital course included swelling in upper extremities, duplex showed left arm DVT and right arm superficial thrombosis, started on Eliquis POD 7. She was having bowel movements at time of discharge, KUB revealed no obstruction or ileus. She was voiding adequately and tolerating PO intake. She was cleared by the hospitalist medical team for discharge on 3/2.         Discharge Vitals/Labs:  /79   Pulse 71   Temp 98.6 °F (37 °C) (Oral)   Resp 16   Ht 5' 7\" (1.702 m)   Wt 244 lb 14.9 oz (111.1 kg)   LMP 02/19/2018   SpO2 95%   BMI 38.36 kg/m²   CBC with Differential:    Lab Results   Component Value Date/Time    WBC 8.5 03/02/2023 09:42 AM    RBC 3.75 03/02/2023 09:42 AM HGB 10.9 03/02/2023 09:42 AM    HCT 32.8 03/02/2023 09:42 AM     03/02/2023 09:42 AM    MCV 87.6 03/02/2023 09:42 AM    MCH 29.0 03/02/2023 09:42 AM    MCHC 33.1 03/02/2023 09:42 AM    RDW 13.7 03/02/2023 09:42 AM    LYMPHOPCT 11.5 03/02/2023 09:42 AM    MONOPCT 10.6 03/02/2023 09:42 AM    BASOPCT 0.2 03/02/2023 09:42 AM    MONOSABS 0.9 03/02/2023 09:42 AM    LYMPHSABS 1.0 03/02/2023 09:42 AM    EOSABS 0.1 03/02/2023 09:42 AM    BASOSABS 0.0 03/02/2023 09:42 AM     CMP:    Lab Results   Component Value Date/Time     03/02/2023 09:42 AM    K 4.0 03/02/2023 09:42 AM    CL 97 03/02/2023 09:42 AM    CO2 30 03/02/2023 09:42 AM    BUN 18 03/02/2023 09:42 AM    CREATININE 0.6 03/02/2023 09:42 AM    GFRAA >60 06/15/2021 12:32 PM    AGRATIO 0.9 03/02/2023 09:42 AM    LABGLOM >60 03/02/2023 09:42 AM    GLUCOSE 128 03/02/2023 09:42 AM    PROT 7.3 03/02/2023 09:42 AM    LABALBU 3.5 03/02/2023 09:42 AM    CALCIUM 9.3 03/02/2023 09:42 AM    BILITOT <0.2 03/02/2023 09:42 AM    ALKPHOS 102 03/02/2023 09:42 AM    AST 9 03/02/2023 09:42 AM    ALT 11 03/02/2023 09:42 AM       Discharge Medications: The patient suffers from a major neurological surgery that pain cannot be managed within an average of 30 MED per day. Severe acute postoperative pain is the reason for exceeding the 30 MED average, and the prescription reflects the same dosage patient received while inpatient, which is the lowest dose consistent with the patients medical condition. Non-opioid treatment options have been considered prior to prescribing opioids, and the patient has been advised of the benefits and risks of the opioid (including the potential for addiction).      Medication List        START taking these medications      apixaban 5 MG Tabs tablet  Commonly known as: ELIQUIS  Take 2 tablets by mouth 2 times daily     cephALEXin 250 MG capsule  Commonly known as: KEFLEX  Take 1 capsule by mouth 4 times daily for 2 days     diazePAM 5 MG tablet  Commonly known as: VALIUM  Take 1 tablet by mouth every 8 hours as needed (Muscle spasms) for up to 10 days. Max Daily Amount: 15 mg     HYDROmorphone 2 MG tablet  Commonly known as: DILAUDID  Take 1-2 tablets by mouth every 6 hours as needed for Pain for up to 7 days. Max Daily Amount: 16 mg     lidocaine 4 % external patch  Place 1 patch onto the skin daily for 7 days     methocarbamol 750 MG tablet  Commonly known as: ROBAXIN  Take 1 tablet by mouth every 6 hours as needed (muscle spasms)     naloxegol 12.5 MG Tabs tablet  Commonly known as: MOVANTIK  Take 1 tablet by mouth every morning (before breakfast) for 7 days  Start taking on: March 3, 2023     polyethylene glycol 17 g packet  Commonly known as: GLYCOLAX  Take 17 g by mouth daily as needed (constipation)            CONTINUE taking these medications      acetaminophen 500 MG tablet  Commonly known as: TYLENOL  Take 1 tablet by mouth 4 times daily as needed for Pain     Blood Glucose Monitor System w/Device Kit  Dispense glucometer covered by patient's insurance     blood glucose test strips  Test once daily     docusate sodium 100 MG capsule  Commonly known as: Colace  Take 1 capsule by mouth 2 times daily as needed for Constipation As needed for constipation     furosemide 40 MG tablet  Commonly known as: LASIX  TAKE 1 TABLET BY MOUTH EVERY DAY     gabapentin 300 MG capsule  Commonly known as: NEURONTIN  Take 1 capsule by mouth 3 times daily for 180 days. Intended supply: 30 days     GaviLyte-G 236 g solution  Generic drug: polyethylene glycol     Lancets Misc  Use to test blood sugar once daily     loperamide 2 MG capsule  Commonly known as: RA Anti-Diarrheal  Take 1 capsule by mouth 4 times daily as needed for Diarrhea     omeprazole 20 MG delayed release capsule  Commonly known as: PRILOSEC  Take 1 capsule by mouth in the morning and 1 capsule in the evening.      Shingrix 50 MCG/0.5ML Susr injection  Generic drug: zoster recombinant adjuvanted vaccine  Inject 0.5 mLs into the muscle See Admin Instructions 1 dose now and repeat in 2-6 months            STOP taking these medications      b complex vitamins capsule     buprenorphine 10 MCG/HR Ptwk  Commonly known as: BUPRENEX     diclofenac sodium 1 % Gel  Commonly known as: VOLTAREN     meloxicam 15 MG tablet  Commonly known as: MOBIC     therapeutic multivitamin-minerals tablet     vitamin B-12 1000 MCG tablet  Commonly known as: CYANOCOBALAMIN     vitamin D 1.25 MG (88111 UT) Caps capsule  Commonly known as: ERGOCALCIFEROL               Where to Get Your Medications        These medications were sent to [facility] [address] [facility]  [address] [facility], [address]      Phone: [phone]   cephALEXin 250 MG capsule       You can get these medications from any pharmacy    Bring a paper prescription for each of these medications  diazePAM 5 MG tablet  HYDROmorphone 2 MG tablet  lidocaine 4 % external patch  methocarbamol 750 MG tablet  polyethylene glycol 17 g packet       Information about where to get these medications is not yet available    Ask your nurse or doctor about these medications  apixaban 5 MG Tabs tablet  naloxegol 12.5 MG Tabs tablet         Discharge Destination:  The patient was discharged to SNF. Follow-up:  The patient is to follow-up with Pierre Morin. Tarah Samuels MD in the office on 3/7/23. Discharge Instructions:   Verbal and written discharge instructions were given to the patient at the time of discharge. Electronically signed by:  YURY Rich NP,  3/2/2023 1:55 PM  [phone]

## 2023-03-02 NOTE — PROGRESS NOTES
Reason for Admission: Mass of thoracic vertebra     Major Shift Events: No significant events during shift. Pt can be very demanding/assertive. Pt became frustrated with this RN due to not being in her room at exactly 2100 with medications. Explained to pt that I was in another pt's room at that time. Pt states \"I need a nurse who will be on time with medications\" Explained to pt that all medications have a window of time and I was within the window time, also educated pt of difference between scheduled and PRN medications as pt was upset that her PRN medication was not given \"on time\". Pt more understanding and pain is managed with PRN medications. Pt also continuously asks for ice packs for both left and right arm. Full shower and bed change completed from 6:30am to 8:00am.     Systems Review   Neuro:    A&O: x4   NIHSS: 0  Sedation/RASS   RASS: 0   Pain: 9/10- when rechecking pt, pt is sleeping     Cardiac   Rhythm: Not on tele   Gtts: N/A    Pulmonary   O2 Modality: RA    GI/    Last BM: 3/2/23   I/O:    02/28 0700   03/01 0659 03/01 0700   03/02 0659   P.O. (mL/kg/hr) 740 (0.3) 1560 (0.6)   Total Intake(mL/kg) 740 (6.7) 1560 (14)   Net +740 +1560        Urine Occurrence 4 x 6 x   Stool Occurrence 1 x 4 x   Emesis Occurrence  0 x      NPO/PO/TF/TPN/rate(goal): PO, regular, NO PORK!     Hematology   Blood Products: N/A   VTE Prophylaxis/Anticoagulation: Eliquis    H/H:    Latest Reference Range & Units Most Recent   Hemoglobin Quant 12.0 - 16.0 g/dL 10.5 (L)  2/28/23 09:11   Hematocrit 36.0 - 48.0 % 32.5 (L)  2/28/23 09:11   (L): Data is abnormally low    Infectious Disease   Culture results/pending: N/A   ABX: Keflex    Isolation: none    Skin: C/D/I except incision upper middle back, PEPE with staples    Lines/tubes: None   Lab or unit collect: LAB    Mobility    PT/OT: Up x1 SBA with walker

## 2023-03-02 NOTE — PLAN OF CARE
Problem: Pain  Goal: Verbalizes/displays adequate comfort level or baseline comfort level  Outcome: Progressing  Flowsheets (Taken 3/2/2023 0544)  Verbalizes/displays adequate comfort level or baseline comfort level:   Encourage patient to monitor pain and request assistance   Assess pain using appropriate pain scale   Administer analgesics based on type and severity of pain and evaluate response   Implement non-pharmacological measures as appropriate and evaluate response  Note: Patient endorsing 8-9/10 surgical site pain to mid back. PRN dilaudid and valium administered per the [facility] along with scheduled robaxin and tylenol. Patient educated on medications, side effects, and administration schedule. Medication scheduled written on white board. Patient very adamant about receiving PRN medication exactly when they are due. This RN educated patient on the difference between scheduled and PRN medications. Ice packs utilized for non-pharmacological pain control measures. Assisted with repositioning in bed/chair for comfort. Will continue to monitor and reassess. Problem: Safety - Adult  Goal: Free from fall injury  Outcome: Progressing  Note: Ambulating x1 assist with walker and gait belt. Fall precautions in place. Non-skid socks on. Bed locked in lowest position with alarm on and side rails up. Bedside table, belongings, and call light within reach. Hourly rounding in anticipation of patient needs. Floor clean and free from clutter. Room door open. Will continue to monitor.

## 2023-03-02 NOTE — PROGRESS NOTES
NEUROSURGERY POST-OP PROGRESS NOTE    Patient Name: Lora Olvera YOB: 1965   Sex: Female Age: 61 yrs     Medical Record Number: [id number] Acct Number: [de-identified]   Room Number: 9920/9773-92 Hospital Day: Hospital Day: 9     Interval History: neurologically stable on exam, no changes overnight     Post-operative Day# 8 s/p T10-T11 LAMINECTOMY, FACETECTOMY, PEDICLE SCREW FIXATION FOR REMOVAL OF THORACIC MASS     Subjective:  Patient up on edge of bed working with therapy. She complains that nurses are not giving her pain medication on time, again educated that her pain medications are ordered prn, and scheduled medication have a window of time for nurses to administer. She remains unsatisfied with this response. Objective:    VITAL SIGNS   /68   Pulse 68   Temp 98.2 °F (36.8 °C) (Oral)   Resp 16   Ht 5' 7\" (1.702 m)   Wt 244 lb 14.9 oz (111.1 kg)   LMP 02/19/2018   SpO2 95%   BMI 38.36 kg/m²    Height Height: 5' 7\" (170.2 cm)   Weight Weight: 244 lb 14.9 oz (111.1 kg)        Allergies Allergies   Allergen Reactions    Pork-Derived Products      Orthodox reasons -- no pork products of any kind      NPO Status ADULT DIET; Regular; NO PORK FOR Taoist REASONS   Isolation No active isolations     LABS   Basic Metabolic Profile Recent Labs     03/02/23  0942      CL 97*   CO2 30   BUN 18   CREATININE 0.6   GLUCOSE 128*        Complete Blood Count Recent Labs     02/28/23  0911 03/02/23  0942   WBC 7.7 8.5   RBC 3.59* 3.75*        Coagulation Studies No results for input(s): PTT, INR in the last 72 hours.     Invalid input(s): PLATELETS, PROA, PT, PTTA       MEDICATIONS   Inpatient Medications     apixaban, 10 mg, Oral, BID **FOLLOWED BY** [START ON 3/7/2023] apixaban, 5 mg, Oral, BID    naloxegol, 12.5 mg, Oral, QAM AC    cephALEXin, 250 mg, Oral, 4 times per day    docusate sodium, 100 mg, Oral, BID    scopolamine, 1 patch, TransDERmal, Q72H    lidocaine, 1 patch, TransDERmal, Daily    furosemide, 40 mg, Oral, Daily    gabapentin, 300 mg, Oral, TID    sodium chloride flush, 5-40 mL, IntraVENous, 2 times per day    acetaminophen, 1,000 mg, Oral, 4 times per day    pantoprazole, 40 mg, Oral, QAM AC    [COMPLETED] methocarbamol IVPB, 1,000 mg, IntraVENous, Q8H **FOLLOWED BY** methocarbamol, 750 mg, Oral, 4 times per day   Infusions    sodium chloride        Antibiotics   Recent Abx Admin                     cephALEXin (KEFLEX) capsule 250 mg (mg) 250 mg Given 03/02/23 1152     250 mg Given  0537     250 mg Given 03/01/23 2304     250 mg Given  1655                     XR ABDOMEN (KUB) (SINGLE AP VIEW)   Final Result   1. Moderate to large amount of retained colonic stool in the cecum and ascending colon compatible with constipation. VL Extremity Venous Bilateral   Final Result      CT GUIDED STEREOTACTIC LOCALIZATION   Final Result   1. Intraoperative CT images obtained for localization purposes with no acute complication identified. 2. Dependent opacities in the lungs most consistent with atelectasis. 3. Cardiomegaly. 4. Extensive cholelithiasis without acute inflammation identified. CT GUIDED STEREOTACTIC LOCALIZATION   Final Result   1. Intraoperative CT images obtained for localization purposes with no acute complication identified. 2. Dependent opacities in the lungs most consistent with atelectasis. 3. Cardiomegaly. 4. Extensive cholelithiasis without acute inflammation identified. Neurologic Exam:    Mental status: awake/alert, oriented x 4    Musculoskeletal:   Gait: Not tested   Tone: normal   Sensory: chronic paraesthesias to both legs  Motor strength:    Right  Left    Right  Left    Deltoid  5 5  Hip Flex  4 4   Biceps  5 5  Knee Extensors  4 4   Triceps  5 5  Knee Flexors  4 4   Wrist Ext  5 5  Ankle Dorsiflex. 5 5   Wrist Flex  5 5  Ankle Plantarflex.   5 5   Handgrip  5 5  Ext Rafael Longus  5 5   Thumb Ext  5 5         Incision: staples PEPE c/d/i    Respiratory:  Unlabored respiratory pattern    Abdomen:   Soft, ND   Last BM 3/2     Cardiovascular:  Warm, well perfused    Assessment   62 yo female POD 8 s/p T10-T11 LAMINECTOMY, FACETECTOMY, PEDICLE SCREW FIXATION FOR REMOVAL OF THORACIC MASS with Dr. Arpit Roldan:  Neurologic exam frequency: q 4   Mobility: PT/OT, activity as tolerated   Diet: ADAT  DVT Prophylaxis: SCDs, on eliquis   GI Prophylaxis: protonix  Bowel Regimen: senokot, glycolax, movantik, suppository   Pain control: tylenol, lidocaine patch, PO dilaudid   Robaxin/valium for spasms   Incisional Care: cleanse daily with soap/water, pat dry with clean towel and paint with CHG swab, patient may shower  DVT in bilateral upper extremity- started on Eliquis by hospitalist  Ok to dispo once placement obtained     Patient was discussed with Dr. Nakia Mills who agrees with above assessment and plan. Electronically signed by:  YURY Leyva NP, 3/2/2023 12:32 PM   Neurosurgery Nurse Practitioner  [phone]

## 2023-03-02 NOTE — PROGRESS NOTES
Pt sitting up in bed, eliquis handout given for education. Pt verbalized understanding. This nurse listed out every medication, dose and time given at the hospital. Discussed all 14 medications with patient and what they are prescribed for. Pt verbalized frustration about time that gabapentin was given, explained to pt, our system automatically schedules medications depending on the duration of medication, pt continued to verbalize frustration explained to pt there is nothing that nursing is able to do to change time. Pt expressed frustration that this nurse did not give PRN valium every 6 hours, this nurse explained that the medication is as needed and when checked on pt did not ask for medication and was receiving scheduled robaxin around the clock. The pt continued to verbalize frustration, recommended pt speak to dr about having valium scheduled around the clock. Pt noted to ask dr. Communication given to pt that she can continue ice packs to bilateral upper arms per dr order, pt verbalized understanding. Suppository ordered one time order at 1600 pt stated she wanted to take suppository when up to bathroom, rescheduled and passed on to night shift nurse. Education continued to be provided to pt about use of IS, pt has very poor effort and does not use as educated. Denied any further needs at time, call device within reach, safety measures in place.

## 2023-03-02 NOTE — PROGRESS NOTES
Nutrition Note       NUTRITION RECOMMENDATIONS:   1. PO Diet: Continue current diet regular   2. ONS: not indicated     NUTRITION ASSESSMENT:  Nutritional summary & status: LOS. Pt occupied w/ staff x 2 attempts on this day. MST 0, no PI's noted.  lbs w/ no wt loss noted per EMR. Pt is on a regular diet w/ PO intake ave % & 51-76% most meals since admit. Suspect pt meeting EEN at this time w/ current intakes in place. No indication for ONS at this time. Rec'd continuing POC. RD following. Admission/PMH: 61 y.o. female who presents to [facility] with  PMH of HTN , DLP , SHYANN , DM type 2    MALNUTRITION ASSESSMENT  Context of Malnutrition: Acute Illness   Malnutrition Status: No malnutrition    NUTRITION DIAGNOSIS   No nutrition diagnosis at this time    [facility] and/or Nutrient Delivery:  Continue Current Diet  Nutrition Education/Counseling:  Education not indicated     The patient will still be monitored per nutrition standards of care. Consult dietitian if nutrition interventions essential to patient care is needed.      Halley Javier, [facility]:  [phone]  Office:  [phone]

## 2023-03-02 NOTE — PROGRESS NOTES
Hospitalist Consult Progress Note      PCP: Rossi Butt MD    Date of Admission: 2/22/2023    Subjective: An  was used for this encounter per patient's request. Discussed at length regarding venous US findings. Patient was upset about the clots. She thinks they are related to the IV sites not appropriately placed. Her left arm is more swollen and tender. She requests hematology to see her while she is in the hospital for her clots. Dr. Shayy Vargas started her on eliquis and she took a dose earlier this morning. I have checked with neurosurgery and the NP and they are ok to continue Eliquis. Medications:  Reviewed    Infusion Medications    sodium chloride       Scheduled Medications    apixaban  10 mg Oral BID    Followed by    Diane Davidson ON 3/7/2023] apixaban  5 mg Oral BID    naloxegol  12.5 mg Oral QAM AC    cephALEXin  250 mg Oral 4 times per day    docusate sodium  100 mg Oral BID    scopolamine  1 patch TransDERmal Q72H    lidocaine  1 patch TransDERmal Daily    furosemide  40 mg Oral Daily    gabapentin  300 mg Oral TID    sodium chloride flush  5-40 mL IntraVENous 2 times per day    acetaminophen  1,000 mg Oral 4 times per day    pantoprazole  40 mg Oral QAM AC    methocarbamol  750 mg Oral 4 times per day     PRN Meds: polyethylene glycol, HYDROmorphone **OR** HYDROmorphone, prochlorperazine, melatonin, calcium carbonate, sodium chloride flush, sodium chloride, naloxone, diazePAM, ondansetron **OR** ondansetron      Intake/Output Summary (Last 24 hours) at 3/2/2023 1318  Last data filed at 3/2/2023 0920  Gross per 24 hour   Intake 1320 ml   Output --   Net 1320 ml       Physical Exam Performed:    /79   Pulse 71   Temp 98.6 °F (37 °C) (Oral)   Resp 16   Ht 5' 7\" (1.702 m)   Wt 244 lb 14.9 oz (111.1 kg)   LMP 02/19/2018   SpO2 95%   BMI 38.36 kg/m²     General appearance: No apparent distress, appears stated age and cooperative.     Respiratory:  Normal respiratory effort. Clear to auscultation  Cardiovascular: Regular rate and rhythm with normal S1/S2 without murmurs, rubs or gallops. Abdomen: Soft, mild tenderness on the left mid abd, no guarding or rebound  Musculoskeletal: edema present on the left arm and right arm, more on the left. Left arm warm to touch, and has erythema. No edema on both LE. Psychiatric: Alert and oriented, thought content appropriate   Capillary Refill: Brisk, 3 seconds, normal   Peripheral Pulses: +2 palpable, equal bilaterally     Labs:   Recent Labs     02/28/23  0911 03/02/23 0942   WBC 7.7 8.5   HGB 10.5* 10.9*   HCT 32.5* 32.8*    407     Recent Labs     03/02/23 0942      K 4.0   CL 97*   CO2 30   BUN 18   CREATININE 0.6   CALCIUM 9.3     Recent Labs     03/02/23 0942   AST 9*   ALT 11   BILITOT <0.2   ALKPHOS 102     No results for input(s): INR in the last 72 hours. No results for input(s): [place] in the last 72 hours. Urinalysis:      Lab Results   Component Value Date/Time    NITRU Negative 01/09/2020 08:08 PM    WBCUA 50 01/09/2020 08:08 PM    BACTERIA RARE 08/21/2018 02:28 PM    RBCUA 5 01/09/2020 08:08 PM    BLOODU trace 06/15/2021 11:46 AM    BLOODU TRACE 01/09/2020 08:08 PM    SPECGRAV 1.030 06/15/2021 11:46 AM    SPECGRAV 1.029 01/09/2020 08:08 PM    GLUCOSEU neg 06/15/2021 11:46 AM    GLUCOSEU Negative 01/09/2020 08:08 PM       Radiology:  XR ABDOMEN (KUB) (SINGLE AP VIEW)   Final Result   1. Moderate to large amount of retained colonic stool in the cecum and ascending colon compatible with constipation. VL Extremity Venous Bilateral   Final Result      CT GUIDED STEREOTACTIC LOCALIZATION   Final Result   1. Intraoperative CT images obtained for localization purposes with no acute complication identified. 2. Dependent opacities in the lungs most consistent with atelectasis. 3. Cardiomegaly. 4. Extensive cholelithiasis without acute inflammation identified.       CT GUIDED STEREOTACTIC LOCALIZATION   Final Result   1. Intraoperative CT images obtained for localization purposes with no acute complication identified. 2. Dependent opacities in the lungs most consistent with atelectasis. 3. Cardiomegaly. 4. Extensive cholelithiasis without acute inflammation identified. 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 Biopsy showed meningioma. Management per neurosurgery. Continue PT/OT. Pain control and bowel regimen     Left arm DVT  Right arm superficial thrombosis    -neurosurgery is ok with eliquis. Patient reports no hx of GIB. She states she had a normal colonoscopy before her back surgery. Left arm cellulitis   -continue on keflex. Left abdominal pain   Constipation  -KUB showed large stool burden. Continue colace. Miralax today. If abd pain not improving with a BM, may need a CT a/p.  -continue home meds: lasix, neurontin, and protonix      DVT Prophylaxis: eliquis  Diet: ADULT DIET;  Regular; NO PORK FOR [place] REASONS  Code Status: Full Code  PT/OT Eval Status: ordered    Dispo - inpt, medically stable for stable       Johnathon Chen MD

## 2023-03-02 NOTE — CARE COORDINATION
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]   Date / Time: 2/22/2023  5:30 AM    Current PCP: Saskia Iqbal MD  Clinic patient: Yes    Hospitalization in the last 30 days: No    Advance Directives:  Code Status: Full Code  [place] DNR form completed and on chart: No    Financial:  Payor: Sheba Russell / Plan: Luz Valdes / Product Type: *No Product type* /      Pharmacy:    [address]  [place] [address]  Phone: [phone] Fax: [address], [address] [facility] [phone] Alexia Mahmoodme [phone]  [address] [place] [address],[address] [facility]  Phone: [phone] Fax: [phone]    [facility] [address] [place], [place] [address]s [phone] - F [phone]  [address] [facility] [address]  Phone: [phone] Fax: [phone]      Assistance purchasing medications?:    Assistance provided by Case Management: None at this time    Does patient want to participate in local refill/ meds to beds program?: No    Meds To Beds General Rules:  1. Can ONLY be done Monday- Friday between 8:30am-5pm  2. Prescription(s) must be in pharmacy by 3pm to be filled same day  3. Copy of patient's insurance/ prescription drug card and patient face sheet must be sent along with the prescription(s)  4. Cost of Rx cannot be added to hospital bill. If financial assistance is needed, please contact unit  or ;  or  CANNOT provide pharmacy voucher for patients co-pays  5.  Patients can then  the prescription on their way out of the hospital at discharge, or pharmacy can deliver to the bedside if staff is available. (payment due at time of pick-up or delivery - cash, check, or card accepted)     Able to afford home medications/ co-pay costs: Yes    ADLS:  Current PT AM-PAC Score: 16 /24  Current OT AM-PAC Score: 17 /24      DISCHARGE Disposition: Emmanuel Aguirre (SNF): [place] Phone: [phone] Fax: [phone]    LOC at discharge: Skilled  [address] Completed: Yes    Notification completed in HENS/PAS?:  Yes : CM has completed HENS online through secure website for SNF admission at [facility]. Document ID #:  [id number]    IMM Completed:   Not Indicated    Transportation:  Transportation PLAN for discharge: EMS transportation   Mode of Transport: Ambulance stretcher - BLS  Reason for medical transport: Bed confined: Meets the following criteria 1) unable to get out of bed without assistance or ambulate, 2) unable to safely sit up in a wheelchair, 3) unable to maintain erect seating position in a chair for time needed for transport  Name of Transport Company: [employer]: [id number]  Time of Transport: 6566    Transport form completed: Yes      Referrals made at [employer] for outpatient continued care:  Not Applicable    Additional CM Notes:     SW met w/pt at bedside. Pt is from home and will DC to [facility]. Pt is in agreement w/DCP. SW provided housing resources and patient relations number. SW also discussed pt can access all her medical records through her my chart. Pt has no other needs at this time     NORMAN confirmed w/Scooter in admission they are accepting Pt pending pre-cert and are aware of the transport time.     RN to call report to: [phone]  Clinicals faxed to [phone]        The Plan for Transition of Care is related to the following treatment goals of Mass of thoracic vertebra [M48.9]    The Patient and/or patient representative Amel and her family were provided with a choice of provider and agrees with the discharge plan Yes    Freedom of choice list was provided with basic dialogue that supports the patient's individualized plan of care/goals and shares the quality data associated with the providers.  Yes    Care Transitions patient: No    MARY Doshi, [facility]  Case Management Department  Ph: [phone]

## 2023-03-02 NOTE — CARE COORDINATION
CM following: CM received a VM from [facility]  with Axel Higgins requesting a call back [phone] with no indication of what the phone call was about. CM returned Brenda's call and left a VM for [place]. CM will continue to follow for discharge planning. Electronically signed by MARY Cruz on 3/2/2023 at 8:34 AM  [phone]    UPDATE: CM received second VM from [place] with Axel Higgins and left [place] a VM in response. CM will continue to follow for discharge planning.   Electronically signed by MARY Cruz on 3/2/2023 at 11:42 AM  [phone]

## 2023-03-02 NOTE — PLAN OF CARE
Problem: Chronic Conditions and Co-morbidities  Goal: Patient's chronic conditions and co-morbidity symptoms are monitored and maintained or improved  Outcome: Not Progressing  Flowsheets (Taken 3/1/2023 2001)  Care Plan - Patient's Chronic Conditions and Co-Morbidity Symptoms are Monitored and Maintained or Improved: Monitor and assess patient's chronic conditions and comorbid symptoms for stability, deterioration, or improvement     Problem: Discharge Planning  Goal: Discharge to home or other facility with appropriate resources  Outcome: Not Progressing  Flowsheets (Taken 3/1/2023 2001)  Discharge to home or other facility with appropriate resources:   Identify barriers to discharge with patient and caregiver   Arrange for needed discharge resources and transportation as appropriate   Identify discharge learning needs (meds, wound care, etc)     Problem: Pain  Goal: Verbalizes/displays adequate comfort level or baseline comfort level  Outcome: Not Progressing  Flowsheets (Taken 3/1/2023 2001)  Verbalizes/displays adequate comfort level or baseline comfort level:   Encourage patient to monitor pain and request assistance   Assess pain using appropriate pain scale   Administer analgesics based on type and severity of pain and evaluate response  Note: Encourage pt to use other methods for pain relief, educate pain scale.       Problem: Chronic Conditions and Co-morbidities  Goal: Patient's chronic conditions and co-morbidity symptoms are monitored and maintained or improved  Outcome: Not Progressing  Flowsheets (Taken 3/1/2023 2001)  Care Plan - Patient's Chronic Conditions and Co-Morbidity Symptoms are Monitored and Maintained or Improved: Monitor and assess patient's chronic conditions and comorbid symptoms for stability, deterioration, or improvement     Problem: Discharge Planning  Goal: Discharge to home or other facility with appropriate resources  Outcome: Not Progressing  Flowsheets (Taken 3/1/2023 2001)  Discharge to home or other facility with appropriate resources:   Identify barriers to discharge with patient and caregiver   Arrange for needed discharge resources and transportation as appropriate   Identify discharge learning needs (meds, wound care, etc)     Problem: Pain  Goal: Verbalizes/displays adequate comfort level or baseline comfort level  Outcome: Not Progressing  Flowsheets (Taken 3/1/2023 2001)  Verbalizes/displays adequate comfort level or baseline comfort level:   Encourage patient to monitor pain and request assistance   Assess pain using appropriate pain scale   Administer analgesics based on type and severity of pain and evaluate response  Note: Encourage pt to use other methods for pain relief, educate pain scale.

## 2023-03-02 NOTE — PROGRESS NOTES
Patient is alert and oriented x4. VSS, on room air. No acute neuro changes throughout shift. Surgical site to mid back cleansed with soap/water and CHG swab. No drainage noted. Endorsing 8-9/10 surgical pain. PRN dilaudid and valium administered per the [facility]. Patient very adamant about receiving PRN medication on time. This RN explained the difference between scheduled and PRN medication but patient still requesting pain medication be given on time. Medication administration schedule written on board and updated throughout shift. Bilateral forearms red and swollen, L>R. Ice packs applied to bilateral arms per patient request.     Ambulating x1 assist with walker and gait belt. Voiding without complications. Had x1 BM this shift. Discharge order placed by neurosurgery. Patient requesting results of dopplers and abdominal x-ray on CD. This RN notified patient that she would have to get in touch with medical records to request hard copies of results. Medical records phone number given to patient. Patient was also requesting to see hematology/oncology MD before discharge. MD met patient at bedside and answered all questions. Plan to continue Eliquis for DVTs and follow up outpatient for blood draws and coagulation studies. Follow up information and number to call to make appointment in AVS.     Transport at bedside. All belongings packed and sent with patient including wheelchair, cell phones (x3), chargers, and glasses. This RN called report to Bora. All questions answered.

## 2023-03-02 NOTE — PLAN OF CARE
Problem: Chronic Conditions and Co-morbidities  Goal: Patient's chronic conditions and co-morbidity symptoms are monitored and maintained or improved  3/1/2023 2347 by Acacia Simpson RN  Outcome: Progressing  3/1/2023 2001 by Dee Malagon RN  Outcome: Not Progressing  Flowsheets (Taken 3/1/2023 2001)  Care Plan - Patient's Chronic Conditions and Co-Morbidity Symptoms are Monitored and Maintained or Improved: Monitor and assess patient's chronic conditions and comorbid symptoms for stability, deterioration, or improvement     Problem: Discharge Planning  Goal: Discharge to home or other facility with appropriate resources  3/1/2023 2347 by Acacia Simpson RN  Outcome: Progressing  3/1/2023 2001 by Dee Malagon RN  Outcome: Not Progressing  Flowsheets (Taken 3/1/2023 2001)  Discharge to home or other facility with appropriate resources:   Identify barriers to discharge with patient and caregiver   Arrange for needed discharge resources and transportation as appropriate   Identify discharge learning needs (meds, wound care, etc)     Problem: Pain  Goal: Verbalizes/displays adequate comfort level or baseline comfort level  3/1/2023 2347 by Acacia Simpson RN  Outcome: Progressing  3/1/2023 2001 by Dee Malagon RN  Outcome: Not Progressing  Flowsheets (Taken 3/1/2023 2001)  Verbalizes/displays adequate comfort level or baseline comfort level:   Encourage patient to monitor pain and request assistance   Assess pain using appropriate pain scale   Administer analgesics based on type and severity of pain and evaluate response  Note: Encourage pt to use other methods for pain relief, educate pain scale.

## 2023-03-02 NOTE — PROGRESS NOTES
Occupational Therapy  Occupational Therapy  Daily Treatment Note  Patient Name: Farrah Rivers  MRN: [id number]    Assessment:   Pt requiring increased time and encouragement for all tasks. Pt easily frustrated with simple requests unless tasks are self directed. Pt requiring frequent rest breaks with all task. Noted increased pain during transfers and declining standing for ADLs. Pt lives alone and does not have assist. Pt would benefit from inpt OT for maximizing functional independence and safety.     Discharge Recommendations:     Farrah Rivers scored a 17/24 on the AM-PAC ADL Inpatient form. Current research shows that an AM-PAC score of 17 or less is typically not associated with a discharge to the patient's home setting. Based on the patient's AM-PAC score and their current ADL deficits, it is recommended that the patient have 3-5 sessions per week of Occupational Therapy at d/c to increase the patient's independence.  Please see assessment section for further patient specific details.    If patient discharges prior to next session this note will serve as a discharge summary.  Please see below for the latest assessment towards goals.    Equipment Needs:      Chart Reviewed: Yes       Other position/activity restrictions: up with assistance     Additional Pertinent Hx: T10-T11 LAMINECTOMY, FACETECTOMY, PEDICLE SCREW FIXATION FOR REMOVAL OF THORACIC MASS (Spine Thoracic)      Diagnosis: Mass of Thoracic Vertebra  Treatment Diagnosis: impaired ADLs/transfers s/p T10-T11 LAMINECTOMY, FACETECTOMY, PEDICLE SCREW FIXATION FOR REMOVAL OF THORACIC MASS    Subjective:    Pt met seated in chair requiring increased time for participation.       Pain: Pain reported during transfers.       Social/Functional History  Lives With: Alone  Type of Home: House (looking for new wc accessible apt)  Home Layout: One level  Home Access: Level entry  Bathroom Toilet: Standard (sink beside toilet)  Home Equipment: Wheelchair-manual,  Arnol Ingram  Has the patient had two or more falls in the past year or any fall with injury in the past year?: No  ADL Assistance: Independent (sponge baths at sink in wheelchair, would complete dressing IND)  Homemaking Assistance: Independent  Ambulation Assistance: Independent (states she is only able to walk ~5ft before needing to sit down using rollator. Will also use wc or scoot on seat of rollator)  Transfer Assistance: Independent (would use walker to transfer, can stand for short periods of time)  Active : No  Patient's  Info: medical transport  Additional Comments: Was previously going to OP PT. Daughter is urology surgeon in [place], currently pregnant and unable to leave [place] to assist pt. Prior Function  ADL Assistance: Independent (sponge baths at sink in wheelchair, would complete dressing IND)  Homemaking Assistance: Independent  Ambulation Assistance: Independent (states she is only able to walk ~5ft before needing to sit down using rollator. Will also use wc or scoot on seat of rollator)  Transfer Assistance: Independent (would use walker to transfer, can stand for short periods of time)  Additional Comments: Was previously going to OP PT. Daughter is urology surgeon in [place], currently pregnant and unable to leave [place] to assist pt. Objective:    Cognition/Orientation:  Oriented x4    Scooting: SBA scooting forward and back in recliner chair    Functional Mobility   Sit to Stand: CGA to Min A  for stance from recliner chair and EOB with cues for safe hand placement  Stand to Sit: Min A due to pain  Bed to Chair Transfer:  CGA to Min A to and from EOB and recliner chair  Other: Functional mobility to and from room door x3 and short distance in hallway with RW and CGA. Pt very concerned about all skin being covered when walking out into hallway.      Pt clearing items from sink counter and placing in closet requiring CGA and RW with cues for spinal precauitons. ADLs   UB dressing: Min A and cues  orientation donning gown like a robe to cover back  LB dressing: Mod  A donning pants however pt stating pants are not comfortable and requesting doffing      Activity Tolerance:  Pt limited by endurance, pain and self limiting behavior    Patient Education:   Safe transfers, need for activity, safe use of rW, pt resistant to education    Safety Devices in Place:  Pt left seated in recliner chair with alarm on and calll light inreach. Therapy Time:   Individual Concurrent Group Co-treatment   Time In  1017         Time Out  1125         Minutes  68           Timed Code Treatment Minutes:  68    Total Treatment Minutes:     Timed Code Treatment Minutes: Total Treatment Time:     Goals: (as determined and assessed by primary OT)   Short Term Goals  Time Frame for Short Term Goals: Discharge  Short Term Goal 1: 3in1 transfer w/ Supervision- ongiong  Short Term Goal 2: LB dressing using AE w/ Supervision- ongoing  Short Term Goal 3: increase functional standing tolerance to 10 minutes- ongoing  Short Term Goal 4: supine to sit via log roll w/ Supervision- ongoing         Plan:      Times Per Week: 5-7        If patient is discharged prior to next treatment, this note will serve as the discharge summary.       [address] [place] [address], Ne, [place]/L

## 2023-03-02 NOTE — DISCHARGE INSTR - COC
Continuity of Care Form    Patient Name: Farrah Rivers   :  1965  MRN:  [id number]    Admit date:  2023  Discharge date:  ***    Code Status Order: Full Code   Advance Directives:   Advance Care Flowsheet Documentation       Date/Time Healthcare Directive Type of Healthcare Directive Copy in Chart Healthcare Agent Appointed Healthcare Agent's Name Healthcare Agent's Phone Number    23 0737 No, patient does not have an advance directive for healthcare treatment -- -- -- -- --            Admitting Physician:  Mayo Garibay MD  PCP: Gissel Patterson MD    Discharging Nurse: ***  Discharging Hospital Unit/Room#: 5517/5517-01  Discharging Unit Phone Number: ***    Emergency Contact:   Extended Emergency Contact Information  Primary Emergency Contact: transport,cincy medic  Home Phone: [phone]  Mobile Phone: [phone]  Relation: Other  Secondary Emergency Contact: Arlene Marte   [facility]  Home Phone: [phone]  Relation: Child    Past Surgical History:  Past Surgical History:   Procedure Laterality Date    BACK SURGERY       -- L4 and L5 -- no metal or implants    BREAST LUMPECTOMY Bilateral         COLONOSCOPY      DILATION AND CURETTAGE OF UTERUS N/A 2018    GASTRIC BYPASS SURGERY      LAMINECTOMY N/A 2023    T10-T11 LAMINECTOMY, FACETECTOMY, PEDICLE SCREW FIXATION FOR REMOVAL OF THORACIC MASS performed by Mayo Garibay MD at [facility] OR    SLEEVE GASTRECTOMY  10/13/2018       Immunization History:   Immunization History   Administered Date(s) Administered    COVID-19, PFIZER Bivalent BOOSTER, DO NOT Dilute, (age 12y+), IM, 30 mcg/0.3 mL 2022    COVID-19, PFIZER PURPLE top, DILUTE for use, (age 12 y+), 30mcg/0.3mL 2021, 2021    Influenza, AFLURIA (age 3 yrs+), FLUZONE, (age 6 mo+), MDV, 0.5mL 2018    Influenza, FLUARIX, FLULAVAL, FLUZONE (age 6 mo+) AND AFLURIA, (age 3 y+), PF, 0.5mL 2018, 2020, 2022  Pneumococcal Polysaccharide ([id number]) 05/03/2018    Pneumococcal Vaccine 05/03/2018    Tdap (Boostrix, Adacel) 01/09/2020    Zoster Recombinant (Shingrix) 12/19/2022       Active Problems:  Patient Active Problem List   Diagnosis Code    Postlaminectomy syndrome of lumbar region M96.1    Lumbar radiculopathy M54.16    SHYANN (obstructive sleep apnea) G47.33    Chronic, continuous use of opioids F11.90    Class 2 severe obesity due to excess calories with serious comorbidity and body mass index (BMI) of 37.0 to 37.9 in adult ([facility] [address] E66.01, Z68.37    Primary osteoarthritis of right knee M17.11    Primary osteoarthritis of left knee M17.12    Polyarthropathy, multiple sites M13.0    Chronic pain syndrome G89.4    Postmenopausal bleeding N95.0    Other spondylosis, cervical region M47.892    Pain disorder with psychological factors F45.42    Palpitations R00.2    Sinus bradycardia R00.1    Dizziness R42    Gastroesophageal reflux disease K21.9    Urinary tract infection symptoms R39.9    Vitamin D deficiency E55.9    Bilateral leg pain M79.604, M79.605    Cellulitis of lower extremity L03.119    Constipation K59.00    Hemorrhoids K64.9    Numbness and tingling of both feet R20.0, R20.2    Nausea R11.0    Epigastric abdominal pain R10.13    Bilateral flank pain R10.9    Pain medication agreement Z02.89    Other spondylosis, lumbar region M47.896    Stress fracture of left tibia M84.362A    Left ankle pain M25.572    Peroneal tendon tear, left, initial encounter S86.312A    H/O diabetic neuropathy Z86.39    Chronic pain in right shoulder M25.511, G89.29    Type 2 diabetes mellitus with diabetic polyneuropathy, without long-term current use of insulin (HCC) E11.42    Vaginal discharge N89.8    Vaginal itching N89.8    Precordial pain R07.2    Bilateral leg edema R60.0    Nausea and vomiting R11.2    Gallstones K80.20    Frequent headaches R51.9    Memory problem R41. 3    Anger R45.4    Hoarding behavior F42.3    Anemia D64.9    Excess skin L98.7    Cerebrovascular small vessel disease I67.9    Arachnoid cyst G93.0    Mixed hyperlipidemia E78.2    Diarrhea R19.7    Gait disorder R26.9    Neuropathy G62.9    Diabetic polyneuropathy associated with type 2 diabetes mellitus (HCC) E11.42    Chest pain R07.9    Mass of spine M89.8X8    Mass of thoracic vertebra M48.9       Isolation/Infection:   Isolation            No Isolation          Patient Infection Status       None to display            Nurse Assessment:  Last Vital Signs: /68   Pulse 68   Temp 98.2 °F (36.8 °C) (Oral)   Resp 16   Ht 5' 7\" (1.702 m)   Wt 244 lb 14.9 oz (111.1 kg)   LMP 02/19/2018   SpO2 95%   BMI 38.36 kg/m²     Last documented pain score (0-10 scale): Pain Level: 9  Last Weight:   Wt Readings from Last 1 Encounters:   02/26/23 244 lb 14.9 oz (111.1 kg)     Mental Status:  oriented, alert, coherent, and logical    IV Access:  - None    Nursing Mobility/ADLs:  Walking   Assisted  Transfer  Assisted  Bathing  Assisted  Dressing  Assisted  Toileting  Assisted  Feeding  Independent  Med Admin  Independent  Med Delivery   whole    Wound Care Documentation and Therapy:  Incision 02/22/23 Back Medial (Active)   Dressing Status Clean;Dry; Intact; Other (Comment) 03/02/23 0719   Incision Cleansed Soap and water; Other (Comment) 03/02/23 0719   Dressing/Treatment Open to air 03/02/23 0719   Closure Staples 03/02/23 0719   Margins Approximated 03/02/23 0719   Incision Assessment Dry 03/02/23 0719   Drainage Amount None 03/02/23 0719   Odor None 03/02/23 0719   Vicky-incision Assessment Intact 03/02/23 0719   Number of days: 8        Elimination:  Continence:    Bowel: Yes  Bladder: Yes  Urinary Catheter: None   Colostomy/Ileostomy/Ileal Conduit: No       Date of Last BM: 3/2/2023    Intake/Output Summary (Last 24 hours) at 3/2/2023 1007  Last data filed at 3/2/2023 0529  Gross per 24 hour   Intake 1080 ml   Output --   Net 1080 ml     I/O last 3 completed shifts: In: 1800 [P.O.:1800]  Out: -     Safety Concerns: At Risk for Falls    Impairments/Disabilities:      None    Nutrition Therapy:  Current Nutrition Therapy:   - Oral Diet:  General    Routes of Feeding: Oral  Liquids: No Restrictions  Daily Fluid Restriction: no  Last Modified Barium Swallow with Video (Video Swallowing Test): not done    Treatments at the Time of Hospital Discharge:   Respiratory Treatments: n/a  Oxygen Therapy:  is not on home oxygen therapy. Ventilator:    - No ventilator support    Rehab Therapies: Physical Therapy and Occupational Therapy  Weight Bearing Status/Restrictions: No weight bearing restrictions  Other Medical Equipment (for information only, NOT a DME order):  walker, bath bench, bedside commode, and patient requesting grabber   Other Treatments: n/a    Patient's personal belongings (please select all that are sent with patient):  Glasses    RN SIGNATURE:  Electronically signed by Nya Magaña RN on 3/2/23 at 4:43 PM EST    CASE MANAGEMENT/SOCIAL WORK SECTION    Inpatient Status Date: 02/22/2023    Readmission Risk Assessment Score:  Readmission Risk              Risk of Unplanned Readmission:  22           Discharging to Facility/ Agency   Name: [facility]  Address: [address] [place] [address]  Phone: [phone]  Fax: [phone]    Dialysis Facility (if applicable)   Name:  Address:  Dialysis Schedule:  Phone:  Fax:    / signature: Electronically signed by MARY Bey on 3/2/23 at 2:23 PM EST    PHYSICIAN SECTION    Prognosis: Good    Condition at Discharge: Stable    Rehab Potential (if transferring to Rehab): Good    Recommended Labs or Other Treatments After Discharge:     Incisional Care: Open to air. Wash incision daily with warm soapy water or shower daily, and pat dry with clean dry towel. Paint with CHG swab. Patient should shower.     Appointment with Dr. Nicolasa Salas (neurosurgeon) on 3/7/23 @ 130PM  Rita Vasquez [address]    Magalis Hoang will be removed at above appointment                   Physician Certification: I certify the above information and transfer of Arabella Gardner  is necessary for the continuing treatment of the diagnosis listed and that she requires [facility] for less 30 days.      Update Admission H&P: Changes in H&P as follows -      S/p T10-T11 LAMINECTOMY, FACETECTOMY, PEDICLE SCREW FIXATION FOR REMOVAL OF THORACIC MASS, pathology pending      Post op swelling- found bilateral upper extremity DVT, started on Eliquis     PHYSICIAN SIGNATURE:  Electronically signed by YURY Rich NP on 3/2/23 at 1:11 PM EST

## 2023-03-03 ENCOUNTER — TELEPHONE (OUTPATIENT)
Dept: PRIMARY CARE CLINIC | Age: 58
End: 2023-03-03

## 2023-03-03 ENCOUNTER — CARE COORDINATION (OUTPATIENT)
Dept: CARE COORDINATION | Age: 58
End: 2023-03-03

## 2023-03-03 NOTE — CONSULTS
[place] [address], [address]                                  CONSULTATION    PATIENT NAME: Daija Irizarry                     :        1965  MED REC NO:   [id number]                          ROOM:       [id number]  ACCOUNT NO:   [de-identified]                           ADMIT DATE: 2023  PROVIDER:     Heather Riggins MD    CONSULT DATE:  2023    REASON FOR REFERRAL:  Upper extremities DVTs, and patient requesting  Hematology consult prior to discharge. PRIMARY CARE PHYSICIAN:  Sachi Miller MD    HISTORY OF PRESENT ILLNESS:  The patient is a 65-year-old woman with  multiple underlying medical problems. She underwent neurosurgical  procedure consisting of T10-T11 laminectomy, facetectomy, pedicle screw  fixation, and removal of thoracic mass on 2023. After that, she  developed bilateral upper extremity swelling. She was treated with a  course of oral antibiotics that failed to respond. She ultimately had  ultrasound of the extremities done that revealed acute superficial  venous thrombosis involving the right upper extremity as well as acute  superficial venous thrombosis involving the left upper extremity. She  was started on anticoagulation with Eliquis and has tolerated that well. She has no history of bleeding or clotting diathesis in her personal or  family history. PAST MEDICAL HISTORY:  Osteoarthritis, chronic pain, frequent headaches,  GERD, dyslipidemia, hypertension, morbid obesity (BMI over 60), history  of neuropathy, obstructive sleep apnea, history of PVD, diabetes  mellitus, vitamin D deficiency, and urinary incontinence. PAST SURGICAL HISTORY:  Back surgery in , breast lumpectomy in ,  dilatation and curettage, gastric bypass surgery, sleeve gastrectomy.     ALLERGIES:  No known drug allergies, but avoids [employer] at all cost  given [employer] beliefs.     SOCIAL HISTORY:  Has no history of tobacco, alcohol, or illicit drug  use.    FAMILY HISTORY:  Negative for malignancy, bleeding, or clotting  diathesis.    REVIEW OF SYSTEMS:  She reports upper extremity swelling but otherwise  no significant complaints from that.  She reports generalized weakness  which is chronic.  The remainder of her 10-point review of systems is  negative.    PHYSICAL EXAMINATION:  GENERAL:  She is a morbidly obese woman, alert, oriented, and in no  acute distress.  VITAL SIGNS:  Blood pressure 124/71, heart rate of 73, respiratory rate  is 16, temperature is 98.2, sating 95% on room air.  Pain is described  as 8/10 mostly at the surgical site.  HEENT:  Pupils are equal and reactive.  Oral mucosa is intact.  RESPIRATORY:  Breath sounds present bilaterally with no dullness to  percussion.  No wheezing or rhonchi.  CARDIOVASCULAR:  Regular rate and rhythm.  No rales or murmurs.  ABDOMEN:  Obese.  Difficult to appreciate hepatosplenomegaly or masses  due to body habitus.  EXTREMITIES:  With swelling of the upper extremities more so on the left  with no gross tenderness to palpation.  There is no warmth to touch.   There is normal range of motion.  She has palpable pulses on both upper  and lower extremities.  SKIN:  Other than erythema noted on left upper extremity, no rash or  dermatitis.    LABORATORY DATA:  Reviewed in Epic.    ASSESSMENT AND PLAN:  A 57-year-old woman with multiple underlying  medical problems, now with bilateral upper extremity DVTs with no clear  etiology.  We discussed the risk of thrombosis following  orthopedic/neurosurgical procedures.  The patient is adamant that those  were caused by the IVs that were placed for that procedure.  She does  have a myriad of underlying comorbid conditions that we predisposed to  thrombosis.  Hence, the exact etiology is unclear.  She requested a  thrombophilia workup which I recommend be done through the outpatient  setting  preferably in my office. She agreed to have those labs drawn in  the outpatient setting in two to three weeks and myself or one of my  colleagues will meet with her shortly after that to review those results  and decide if any additional testing is needed. In the meantime, she  will need to continue on anticoagulation, preferably with Eliquis with  no interruption for the next three to six months. The only reason to  interrupt her therapy would be if she is at far risk actively bleeding  or if additional elective surgical procedures are needed. The above conversation was had with the use of an  despite  the patient being very fluent in [place] in my opinion. Thank you for this interesting consult. Please do not hesitate to  contact me for questions or concerns regarding this patient's  evaluation.         Cyndi Bates MD    D: 03/02/2023 17:55:33       T: 03/02/2023 20:35:06     [email or web address]  Job#: [id number]     Doc#: [id number]    CC:

## 2023-03-03 NOTE — TELEPHONE ENCOUNTER
Care Transitions Initial Follow Up Call    Outreach made within 2 business days of discharge: No    Patient: Karl Cosby Patient : 1965   MRN: [id number]  Reason for Admission: There are no discharge diagnoses documented for the most recent discharge. Discharge Date: 3/2/23       Spoke with: Patient     Discharge department/facility: neurosurgery [facility]     TCM Interactive Patient Contact:  Was patient able to fill all prescriptions: Yes  Was patient instructed to bring all medications to the follow-up visit: Yes  Is patient taking all medications as directed in the discharge summary?  Yes  Does patient understand their discharge instructions: Yes  Does patient have questions or concerns that need addressed prior to 7-14 day follow up office visit: no    Offered patient a sooner appointment and she declined because she only wants to see PCP not NP    Scheduled appointment with PCP within 7-14 days    Follow Up  Future Appointments   Date Time Provider Leo Santiago   3/20/2023 11:15 Mj Boyle MD  [facility]    2023  1:30 PM MD [facility] [place] - DYD   2023 10:20 AM Jose L Recio MD KW SLEEP MED [facility]   2023 11:00 AM Kirsten Lubin MD PLASTICS/REC [facility]   2023 12:00 PM MD SCOT [facility] CARDIO [facility]   2024 10:00 AM Frankie Albert MD [address] [place]       [facility] [place]

## 2023-03-03 NOTE — CARE COORDINATION
ACM completed chart review and patient was noted to be d/c to SNF. ACM will follow up in a week with SNF in regards to d/c plans home.

## 2023-03-06 ENCOUNTER — TELEPHONE (OUTPATIENT)
Dept: PRIMARY CARE CLINIC | Age: 58
End: 2023-03-06

## 2023-03-06 NOTE — TELEPHONE ENCOUNTER
Patient called and requested to resend the prescriptions for diabetic shoes to    [facility] [address] [place] [address]    Fax--[phone]    Tel--[phone]    Please review    Thanks

## 2023-03-09 ENCOUNTER — HOSPITAL ENCOUNTER (EMERGENCY)
Age: 58
Discharge: HOME | End: 2023-03-09
Attending: EMERGENCY MEDICINE
Payer: COMMERCIAL

## 2023-03-09 VITALS
SYSTOLIC BLOOD PRESSURE: 118 MMHG | WEIGHT: 248.24 LBS | BODY MASS INDEX: 38.88 KG/M2 | TEMPERATURE: 98.8 F | RESPIRATION RATE: 18 BRPM | OXYGEN SATURATION: 99 % | HEART RATE: 60 BPM | DIASTOLIC BLOOD PRESSURE: 71 MMHG

## 2023-03-09 DIAGNOSIS — Z98.890 BACK PAIN WITH HISTORY OF SPINAL SURGERY: ICD-10-CM

## 2023-03-09 DIAGNOSIS — Z98.1 S/P SPINAL FUSION: ICD-10-CM

## 2023-03-09 DIAGNOSIS — Z78.9 HEALTH CARE HOME, ACTIVE CARE COORDINATION: Primary | ICD-10-CM

## 2023-03-09 DIAGNOSIS — R79.89 ELEVATED BRAIN NATRIURETIC PEPTIDE (BNP) LEVEL: ICD-10-CM

## 2023-03-09 DIAGNOSIS — M54.16 LUMBAR RADICULOPATHY: ICD-10-CM

## 2023-03-09 DIAGNOSIS — M54.9 BACK PAIN WITH HISTORY OF SPINAL SURGERY: ICD-10-CM

## 2023-03-09 DIAGNOSIS — K59.00 CONSTIPATION, UNSPECIFIED CONSTIPATION TYPE: ICD-10-CM

## 2023-03-09 PROCEDURE — 6370000000 HC RX 637 (ALT 250 FOR IP): Performed by: EMERGENCY MEDICINE

## 2023-03-09 PROCEDURE — 99283 EMERGENCY DEPT VISIT LOW MDM: CPT

## 2023-03-09 RX ORDER — POLYETHYLENE GLYCOL 3350 17 G/17G
17 POWDER, FOR SOLUTION ORAL DAILY PRN
Qty: 527 G | Refills: 1 | Status: SHIPPED | OUTPATIENT
Start: 2023-03-09 | End: 2023-03-16

## 2023-03-09 RX ORDER — LIDOCAINE 50 MG/G
1 PATCH TOPICAL DAILY
Qty: 30 PATCH | Refills: 0 | Status: SHIPPED | OUTPATIENT
Start: 2023-03-09

## 2023-03-09 RX ORDER — HYDROMORPHONE HYDROCHLORIDE 2 MG/1
2 TABLET ORAL ONCE
Status: COMPLETED | OUTPATIENT
Start: 2023-03-09 | End: 2023-03-09

## 2023-03-09 RX ORDER — DOCUSATE SODIUM 100 MG/1
100 CAPSULE, LIQUID FILLED ORAL 2 TIMES DAILY PRN
Qty: 60 CAPSULE | Refills: 0 | Status: SHIPPED | OUTPATIENT
Start: 2023-03-09 | End: 2023-03-16 | Stop reason: SDUPTHER

## 2023-03-09 RX ORDER — GABAPENTIN 300 MG/1
300 CAPSULE ORAL 3 TIMES DAILY
Qty: 90 CAPSULE | Refills: 0 | Status: SHIPPED | OUTPATIENT
Start: 2023-03-09 | End: 2023-04-08

## 2023-03-09 RX ORDER — LIDOCAINE 50 MG/G
1 PATCH TOPICAL DAILY
COMMUNITY
End: 2023-03-09 | Stop reason: SDUPTHER

## 2023-03-09 RX ORDER — FUROSEMIDE 40 MG/1
40 TABLET ORAL DAILY
Qty: 30 TABLET | Refills: 0 | Status: SHIPPED | OUTPATIENT
Start: 2023-03-09 | End: 2023-04-08

## 2023-03-09 RX ADMIN — HYDROMORPHONE HYDROCHLORIDE 2 MG: 2 TABLET ORAL at 14:16

## 2023-03-09 RX ADMIN — HYDROMORPHONE HYDROCHLORIDE 2 MG: 2 TABLET ORAL at 19:36

## 2023-03-09 ASSESSMENT — ENCOUNTER SYMPTOMS
ABDOMINAL PAIN: 0
GASTROINTESTINAL NEGATIVE: 1
SHORTNESS OF BREATH: 0
EYES NEGATIVE: 1
BACK PAIN: 1
RESPIRATORY NEGATIVE: 1
SORE THROAT: 0

## 2023-03-09 ASSESSMENT — PAIN DESCRIPTION - DESCRIPTORS
DESCRIPTORS: ACHING
DESCRIPTORS: ACHING

## 2023-03-09 ASSESSMENT — PAIN SCALES - GENERAL
PAINLEVEL_OUTOF10: 10
PAINLEVEL_OUTOF10: 10

## 2023-03-09 ASSESSMENT — PAIN DESCRIPTION - ORIENTATION
ORIENTATION: MID
ORIENTATION: MID

## 2023-03-09 ASSESSMENT — PAIN DESCRIPTION - LOCATION
LOCATION: BACK
LOCATION: BACK

## 2023-03-09 ASSESSMENT — PAIN - FUNCTIONAL ASSESSMENT: PAIN_FUNCTIONAL_ASSESSMENT: 0-10

## 2023-03-09 NOTE — ED PROVIDER NOTES
[facility]        Pt Name: Seb Hwang  MRN: [id number]  [place] 1965  Date of evaluation: 3/9/2023  Provider: Anali Berg MD  PCP: Chandni Trivedi MD  Note Started: 2:31 PM EST 3/9/23    CHIEF COMPLAINT       Chief Complaint   Patient presents with    Other     Pt from [facility] doesn't want to stay there, c/o some back pain to EMS        HISTORY OF PRESENT ILLNESS: 1 or more Elements     History from : Patient and EMS    Limitations to history : None    Seb Hwang is a 61 y.o. female who presents from nursing home with chief complaint that she does not want to be at the nursing home anymore. Patient had a recent T10-T11 back surgery completed at [facility]..  Patient was in the hospital and was sent to nursing home for rehabilitation however she is very unhappy there. Patient reports that she has had back pain since the surgery does not necessarily worse. She states that she feels like she has not gotten her medication on time she states that her pain has not been under control she does not feel like she has been getting adequate therapy there. She states that there is mold growing in the ceiling she states that the staff are rude and she does not like them. She called 911 so that she could be brought to the ER and she states that she would like us to help arrange home health care. She had an appointment with her surgeon last week and had her staples taken out. She states that the wound itself she was told was otherwise fine. She just does not have any family in town that has been helping her. No other complaints at this time besides for acute on chronic back pain. Nursing Notes were all reviewed and agreed with or any disagreements were addressed in the HPI. REVIEW OF SYSTEMS :      Review of Systems   Constitutional: Negative. HENT:  Negative for congestion and sore throat.     Eyes: Negative. Respiratory: Negative. Negative for shortness of breath. Cardiovascular: Negative. Negative for chest pain. Gastrointestinal: Negative. Negative for abdominal pain. Genitourinary: Negative. Musculoskeletal:  Positive for back pain. Neurological: Negative. Negative for headaches. Positives and Pertinent negatives as per HPI. SURGICAL HISTORY     Past Surgical History:   Procedure Laterality Date    BACK SURGERY      2001 -- L4 and L5 -- no metal or implants    BREAST LUMPECTOMY Bilateral     2015    COLONOSCOPY      DILATION AND CURETTAGE OF UTERUS N/A 08/30/2018    GASTRIC BYPASS SURGERY      LAMINECTOMY N/A 2/22/2023    T10-T11 LAMINECTOMY, FACETECTOMY, PEDICLE SCREW FIXATION FOR REMOVAL OF THORACIC MASS performed by Mesfin Jolley MD at [address]  10/13/2018       [address]       Current Discharge Medication List        CONTINUE these medications which have NOT CHANGED    Details   HYDROmorphone (DILAUDID) 2 MG tablet Take 1-2 tablets by mouth every 6 hours as needed for Pain for up to 7 days. Max Daily Amount: 16 mg  Qty: 42 tablet, Refills: 0    Comments: Reduce doses taken as pain becomes manageable  Associated Diagnoses: S/P spinal fusion      GAVILYTE-G 236 g solution       loperamide (RA ANTI-DIARRHEAL) 2 MG capsule Take 1 capsule by mouth 4 times daily as needed for Diarrhea  Qty: 30 capsule, Refills: 0    Associated Diagnoses: Diarrhea, unspecified type      omeprazole (PRILOSEC) 20 MG delayed release capsule Take 1 capsule by mouth in the morning and 1 capsule in the evening.   Qty: 60 capsule, Refills: 5    Associated Diagnoses: Gastroesophageal reflux disease, unspecified whether esophagitis present      Blood Glucose Monitoring Suppl (BLOOD GLUCOSE MONITOR SYSTEM) w/Device KIT Dispense glucometer covered by patient's insurance  Qty: 1 kit, Refills: 0    Associated Diagnoses: Type 2 diabetes mellitus with diabetic polyneuropathy, without long-term current use of insulin ([facility])      blood glucose monitor strips Test once daily  Qty: 100 strip, Refills: 3    Associated Diagnoses: Type 2 diabetes mellitus with diabetic polyneuropathy, without long-term current use of insulin ([facility])      zoster recombinant adjuvanted vaccine [facility]) 50 MCG/0.5ML SUSR injection Inject 0.5 mLs into the muscle See Admin Instructions 1 dose now and repeat in 2-6 months  Qty: 0.5 mL, Refills: 0    Associated Diagnoses: Need for shingles vaccine      Lancets MISC Use to test blood sugar once daily  Qty: 100 each, Refills: 3    Associated Diagnoses: Type 2 diabetes mellitus with diabetic polyneuropathy, without long-term current use of insulin ([facility])      acetaminophen (TYLENOL) 500 MG tablet Take 1 tablet by mouth 4 times daily as needed for Pain  Qty: 60 tablet, Refills: 2    Associated Diagnoses: Frequent headaches             ALLERGIES     Pork-derived products    FAMILYHISTORY       Family History   Problem Relation Age of Onset    Diabetes Mother     Stroke Mother     Osteoarthritis Mother     Heart Disease Father     Other Father     High Blood Pressure Father     Osteoarthritis Father     Diabetes Maternal Aunt     Cancer Maternal Aunt     Diabetes Maternal Grandmother     Cancer Paternal Aunt         SOCIAL HISTORY       Social History     Tobacco Use    Smoking status: Never    Smokeless tobacco: Never   Vaping Use    Vaping Use: Never used   Substance Use Topics    Alcohol use: Never    Drug use: Never       SCREENINGS        Gabriela Coma Scale  Eye Opening: Spontaneous  Best Verbal Response: Oriented  Best Motor Response: Obeys commands  Gabriela Coma Scale Score: 15                CIWA Assessment  BP: 118/71  Heart Rate: 60           PHYSICAL EXAM  1 or more Elements     ED Triage Vitals [03/09/23 1316]   BP Temp Temp Source Heart Rate Resp SpO2 Height Weight   117/69 98.8 °F (37.1 °C) Oral 56 17 99 % -- 248 lb 3.8 oz (112.6 kg)       Physical Exam  Vitals and nursing note reviewed. Constitutional:       General: She is not in acute distress. Appearance: Normal appearance. She is not ill-appearing, toxic-appearing or diaphoretic. HENT:      Head: Normocephalic and atraumatic. Mouth/Throat:      Mouth: Mucous membranes are moist.   Eyes:      Extraocular Movements: Extraocular movements intact. Cardiovascular:      Rate and Rhythm: Normal rate. Pulses: Normal pulses. Pulmonary:      Effort: Pulmonary effort is normal.   Abdominal:      Palpations: Abdomen is soft. Tenderness: There is no abdominal tenderness. Musculoskeletal:      Cervical back: Normal range of motion. No rigidity or tenderness. Lymphadenopathy:      Cervical: No cervical adenopathy. Skin:     General: Skin is warm and dry. Capillary Refill: Capillary refill takes less than 2 seconds. Neurological:      General: No focal deficit present. Mental Status: She is alert. Psychiatric:         Mood and Affect: Mood normal.       DIAGNOSTIC RESULTS   LABS:    Labs Reviewed - No data to display    When ordered only abnormal lab results are displayed. All other labs were within normal range or not returned as of this dictation. RADIOLOGY:   Non-plain film images such as CT, Ultrasound and MRI are read by the radiologist. Plain radiographic images are visualized and preliminarily interpreted by the ED Provider with the below findings:    Interpretation per the Radiologist below, if available at the time of this note:    No orders to display     No results found. No results found. PROCEDURES   Unless otherwise noted below, none     Procedures    CRITICAL CARE TIME   Total Critical Care time was 0 minutes, excluding separately reportable procedures. There was a high probability of clinically significant/life threatening deterioration in the patient's condition which required my urgent intervention.        This includes multiple reevaluations, vital sign monitoring, pulse oximetry monitoring, telemetry monitoring, clinical response to the IV medications, reviewing the nursing notes, consultation time, dictation/documentation time, and interpretation of the labwork. (This time excludes time spent performing procedures). PAST MEDICAL HISTORY      has a past medical history of Arthritis, Avoids pork and pork products due to cultural beliefs, Chronic pain, TAN (dyspnea on exertion), Frequent headaches (12/13/2022), Gallbladder disorder, Gastroesophageal reflux disease (01/28/2020), blood clots, degenerative disc disease, Hyperlipidemia, Hypertension, Morbid obesity with body mass index (BMI) of 60.0 to 69.9 in adult [facility]) (07/02/2018), Neuropathy, SHYANN (obstructive sleep apnea), Poor circulation, Primary osteoarthritis of right knee (07/02/2018), Type 2 diabetes mellitus without complication ([facility] Urinary incontinence, Vitamin D deficiency (01/28/2020), and Wears glasses. EMERGENCY DEPARTMENT COURSE and DIFFERENTIAL DIAGNOSIS/MDM:   Vitals:    Vitals:    03/09/23 1630 03/09/23 1745 03/09/23 1815 03/09/23 1830   BP: 129/87 118/78 122/73 118/71   Pulse:    60   Resp:    18   Temp:       TempSrc:       SpO2: 98%   99%   Weight:             Is this patient to be included in the SEP-1 Core Measure due to severe sepsis or septic shock? No   Exclusion criteria - the patient is NOT to be included for SEP-1 Core Measure due to:   Infection is not suspected    Chronic Conditions: Arthritis, chronic pain, GERD, degenerative disc disease, hyperlipidemia, hypertension, obstructive sleep apnea, diabetes    CONSULTS: (Who and What was discussed)  IP CONSULT TO HOME CARE NEEDS    Discussion with Other Profesionals : None    Social Determinants : None    Records Reviewed : Inpatient Notes reviewed inpatient notes from [facility] where patient underwent removal of mass for thoracic vertebra neurosurgery team.  Upon discharge patient was ambulating with a walker on oral pain medication. Of note patient showed DVT in her right arm and patient has been on Eliquis. CC/HPI Summary, DDx, ED Course, and Reassessment:     Differential diagnosis: Musculoskeletal back pain, postoperative complication, postoperative pain, postoperative wound, cauda equina syndrome, concern for housing, home care needs, other    65-year-old female who presents for concerns for her nursing home. She has typical postoperative back pain the wound on her back is clean it is dry it shows no signs of acute infection. She is afebrile not tachycardic saturating well on room air she is moving all extremities. She has no obvious red flag symptoms. Patient is willing to speak to social work but she would like her home Dilaudid p.o. I will give it to her at this time and can potentially redose if necessary. No obvious indication for imaging or laboratory work-up at this time no signs of infection. ED Course as of 03/09/23 1933   Thu Mar 09, 2023   1717 Social work able to find home health care placement will get patient her medications and we will discharge her to home. [SC]   [address]  discussion had with patient and social work. It appears that patient has previously been at the same nursing home. I told the patient that we can prescribe her all of the medications except I cannot copy her active narcotic medication and she will need to contact either her surgeon or the nursing home to receive her narcotic medications. Patient is very upset with this. I will give another dose of Dilaudid prior to patient leaving the ER p.o. however patient is adamant that she would like to go home. Social work was able to arrange for her to stop at the pharmacy to  her medications I have sent all medications to the pharmacy at this time. Everything except the narcotic medication she can also stop at the nursing home to  her personal effects.   Patient is still very upset with this but would rather go home and go back to the nursing home. [SC]      ED Course User Index  [SC] Audrey Powers MD       Patient was given the following medications:  Medications   HYDROmorphone (DILAUDID) tablet 2 mg (has no administration in time range)   HYDROmorphone (DILAUDID) tablet 2 mg (2 mg Oral Given 3/9/23 1416)       Disposition Considerations (tests considered but not done, Shared Decision Making, Pt Expectation of Test or Tx.): Shared decision-making used for discharge. The patient is at low risk for mortality based on demographic, history and clinical factors. Given the best available information and clinical assessment, I estimate the risk of hospitalization to be greater than risk of treatment at home. I have explained to the patient that the risk could rapidly change, given precautions for return and instructions. Explained to patient that the risk for mortality is low based on demographic, history and clinical factors. I discussed with patient the results of evaluation in the ED, diagnosis, care, and prognosis. The plan is to discharge to home. Patient is in agreement with plan and questions have been answered. I also discussed with patient the reasons which may require a return visit and the importance of follow-up care. The patient is well-appearing, nontoxic, and improved at the time of discharge. Patient agrees to call to arrange follow-up care as directed. Patient understands to return immediately for worsening/change in symptoms. I am the Primary Clinician of Record. FINAL IMPRESSION      1. Health care home, active care coordination    2. Back pain with history of spinal surgery    3. Elevated brain natriuretic peptide (BNP) level    4. Lumbar radiculopathy    5. S/P spinal fusion    6.  Constipation, unspecified constipation type          DISPOSITION/PLAN     DISPOSITION Decision To Discharge 03/09/2023 05:18:42 PM      PATIENT REFERRED TO:  Hetty Lesch, MD  [facility] [address]  [phone]    Schedule an appointment as soon as possible for a visit       DISCHARGE MEDICATIONS:  Current Discharge Medication List          DISCONTINUED MEDICATIONS:  Current Discharge Medication List                 (Please note that portions of this note were completed with a voice recognition program.  Efforts were made to edit the dictations but occasionally words are mis-transcribed.)    Gustabo Bhagat MD (electronically signed)           Gustabo Bhagat MD  03/09/23 2500 Adrien Baxter MD  03/09/23 9405

## 2023-03-09 NOTE — ED TRIAGE NOTES
Pt was from [facility], pt was admitted on 3/2 from [facility] following spinal sx.  Pt doesn't want to remain there

## 2023-03-09 NOTE — CARE COORDINATION
[facility]    Referral received from CM to follow for home care services. I will follow for needs, and speak with patient to verify demos.   HC to see patient in 24-48 hrs      Cira Arguelles RN, BSN CTN  [facility] [phone]

## 2023-03-09 NOTE — CARE COORDINATION
SW consult:  RN asked SW to see patient - patient reports that she doesn't want to return to SNF- [facility]. SW entered room, patient on phone call and requested that SW come back once she is done with her phone call. 1:55pm Spoke with patient briefly, she reports that she is in pain and would like some pain medication. ( Left message for RN- Obdulia Ye)    Attempted to speak to patient again, she reported that she only wants to speak to a doctor- she had a big surgery on her back and she is in pain, asked about returning to SNF. Patient reports that she will not be returning to SNF due to it being dirty and no one is there to help you. She plans to go home and discussed home care PT,OT and RN. SW following for discharge needs. SW spoke with patient she is requesting to discharge to her home. With home care. Referral made to [facility]. Patient reports that she needs her belongings at [facility] and her wheelchair. She also needs her medications. NORMAN advised I would check with [employer] and see if they would provide Ride home with 2 stops one to  her belongings and the other her medications. MD davis's meal- NORMAN called and ordered meal for patient, provided Orange Juice ( meal - roast beef on texas toast, garden salad w/ ranch dressing , broccoli,carrot,green beans, fruit cup, apple crisp and apple juice and cup of Ice.)    NORMNA spoke with patient about calling [facility], to pack up her belongings and her wheel chair. Oliveriozackery Self in admissions at [facility]- his mail Box is filled. NORMAN called facility and asked to talk to DON and was on hold for 18 minutes. NORMAN contacted [employer], advised of situation. Nicho Cooper will try to make arrangements for patient's belongings and  one week of medications to be available. Emailed requested to Nicho Gamboa at [email or web address]. Shoutlet    [employer] [phone] and requested transport from [facility] to patient's home( wheel chair needs to be provided) with one stop at [facility] to  her belongings. Trip # [id number]. Patient to be transported at 5:30pm    SW called patient's PCP and requested appt within the next 7 days, they will call her back with appt date and time. [facility] will not provide RX's to patient due to her leaving AMA- NORMAN spoke with Aura Gonzales in Admissions. MD to send  one week of RX to [facility]. SW called Transportation and added Pharmacy stop. At 5:50 pm SW called Darlene Goode [phone], they advised that never received the trip. SW called [facility] transport and asked. about transport home. After being on hold for over 40 minutes Transport was then set up with [facility] [phone], To  patient in 30 Minutes. MD unable to give write for pain medications. Patient advised.

## 2023-03-09 NOTE — DISCHARGE INSTR - COC
Continuity of Care Form    Patient Name: Manuel Magdaleno   :  1965  MRN:  [id number]    Admit date:  3/9/2023  Discharge date:  3/9/2023    Code Status Order: Prior   Advance Directives:     Admitting Physician:  No admitting provider for patient encounter. PCP: Ryan Mckinney MD    Discharging Nurse: Natty Matos RN  [address] Unit/Room#: 027/C-27  Discharging Unit Phone Number: [phone]    Emergency Contact:   Extended Emergency Contact Information  Primary Emergency Contact: toro,isela medic  Home Phone: [phone]  Mobile Phone: [phone]  Relation: Other  Secondary Emergency Contact: Leighann Gregorio [address] Phone: [phone]  Relation: Child    Past Surgical History:  Past Surgical History:   Procedure Laterality Date    BACK SURGERY       -- L4 and L5 -- no metal or implants    BREAST LUMPECTOMY Bilateral         COLONOSCOPY      DILATION AND CURETTAGE OF UTERUS N/A 2018    GASTRIC BYPASS SURGERY      LAMINECTOMY N/A 2023    T10-T11 LAMINECTOMY, FACETECTOMY, PEDICLE SCREW FIXATION FOR REMOVAL OF THORACIC MASS performed by Sue Barba.  Alen Archer MD at [facility]  10/13/2018       Immunization History:   Immunization History   Administered Date(s) Administered    COVID-19, PFIZER Bivalent BOOSTER, DO NOT Dilute, (age 12y+), IM, 30 mcg/0.3 mL 2022    COVID-19, PFIZER PURPLE top, DILUTE for use, (age 15 y+), 30mcg/0.3mL 2021, 2021    Influenza, AFLURIA (age 1 yrs+), FLUZONE, (age 10 mo+), MDV, 0.5mL 2018    Influenza, FLUARIX, FLULAVAL, FLUZONE (age 10 mo+) AND AFLURIA, (age 1 y+), PF, 0.5mL 2018, 2020, 2022    Pneumococcal Polysaccharide ([id number]) 2018    Pneumococcal Vaccine 2018    Tdap (Boostrix, Adacel) 2020    Zoster Recombinant (Shingrix) 2022       Active Problems:  Patient Active Problem List   Diagnosis Code    Postlaminectomy syndrome of lumbar region M96.1    Lumbar radiculopathy M54.16    SHYANN (obstructive sleep apnea) G47.33    Chronic, continuous use of opioids F11.90    Class 2 severe obesity due to excess calories with serious comorbidity and body mass index (BMI) of 37.0 to 37.9 in adult ([facility]) E66.01, Z68.37    Primary osteoarthritis of right knee M17.11    Primary osteoarthritis of left knee M17.12    Polyarthropathy, multiple sites M13.0    Chronic pain syndrome G89.4    Postmenopausal bleeding N95.0    Other spondylosis, cervical region M47.892    Pain disorder with psychological factors F45.42    Palpitations R00.2    Sinus bradycardia R00.1    Dizziness R42    Gastroesophageal reflux disease K21.9    Urinary tract infection symptoms R39.9    Vitamin D deficiency E55.9    Bilateral leg pain M79.604, M79.605    Cellulitis of lower extremity L03.119    Constipation K59.00    Hemorrhoids K64.9    Numbness and tingling of both feet R20.0, R20.2    Nausea R11.0    Epigastric abdominal pain R10.13    Bilateral flank pain R10.9    Pain medication agreement Z02.89    Other spondylosis, lumbar region M47.896    Stress fracture of left tibia M84.362A    Left ankle pain M25.572    Peroneal tendon tear, left, initial encounter S86.312A    H/O diabetic neuropathy Z86.39    Chronic pain in right shoulder M25.511, G89.29    Type 2 diabetes mellitus with diabetic polyneuropathy, without long-term current use of insulin ([facility]) E11.42    Vaginal discharge N89.8    Vaginal itching N89.8    Precordial pain R07.2    Bilateral leg edema R60.0    Nausea and vomiting R11.2    Gallstones K80.20    Frequent headaches R51.9    Memory problem R41. 3    Anger R45.4    Hoarding behavior F42.3    Anemia D64.9    Excess skin L98.7    Cerebrovascular small vessel disease I67.9    Arachnoid cyst G93.0    Mixed hyperlipidemia E78.2    Diarrhea R19.7    Gait disorder R26.9    Neuropathy G62.9    Diabetic polyneuropathy associated with type 2 diabetes mellitus ([facility]) E11.42 Chest pain R07.9    Mass of spine M89.8X8    Mass of thoracic vertebra M48.9       Isolation/Infection:   Isolation            No Isolation          Patient Infection Status       None to display            Nurse Assessment:  Last Vital Signs: /66   Pulse 55   Temp 98.8 °F (37.1 °C) (Oral)   Resp 16   Wt 248 lb 3.8 oz (112.6 kg)   LMP 02/19/2018   SpO2 99%   BMI 38.88 kg/m²     Last documented pain score (0-10 scale): Pain Level: 10  Last Weight:   Wt Readings from Last 1 Encounters:   03/09/23 248 lb 3.8 oz (112.6 kg)     Mental Status:  oriented, alert, coherent, logical, thought processes intact, and able to concentrate and follow conversation    IV Access:  - None    Nursing Mobility/ADLs:  Walking   Assisted  Transfer  Assisted  Bathing  Assisted  Dressing  Assisted  Toileting  Assisted  Feeding  Independent  Med [address]  Med Delivery   whole    Wound Care Documentation and Therapy:  Incision 02/22/23 Back Medial (Active)   Dressing Status Clean;Dry; Intact; Other (Comment) 03/02/23 1519   Incision Cleansed Soap and water; Other (Comment) 03/02/23 0719   Dressing/Treatment Open to air 03/02/23 1519   Closure [place] 03/02/23 1519   Margins Approximated 03/02/23 1519   Incision Assessment Dry 03/02/23 1519   Drainage Amount None 03/02/23 1519   Odor None 03/02/23 1519   Vicky-incision Assessment Intact 03/02/23 1519   Number of days: 15        Elimination:  Continence: Bowel: Yes  Bladder: Yes  Urinary Catheter: None   Colostomy/Ileostomy/Ileal Conduit: No       Date of Last BM: 3/9/2023  No intake or output data in the 24 hours ending 03/09/23 1656  No intake/output data recorded. Safety Concerns:      At Risk for Falls    Impairments/Disabilities:      None    Nutrition Therapy:  Current Nutrition Therapy:   - Oral Diet:  General    Routes of Feeding: Oral  Liquids: No Restrictions  Daily Fluid Restriction: no  Last Modified Barium Swallow with Video (Video Swallowing Test): not done    Treatments at the Time of Hospital Discharge:   Respiratory Treatments: None  Oxygen Therapy:  is not on home oxygen therapy. Ventilator:    - No ventilator support    Rehab Therapies: Physical Therapy  Weight Bearing Status/Restrictions: No weight bearing restrictions  Other Medical Equipment (for information only, NOT a DME order):  wheelchair  Other Treatments: ***    Patient's personal belongings (please select all that are sent with patient):  Dave    RN SIGNATURE:  {Esignature:[id number]}    CASE MANAGEMENT/SOCIAL WORK SECTION    Inpatient Status Date: N/A    Readmission Risk Assessment Score:  Readmission Risk              Risk of Unplanned Readmission:  0         Discharging to Facility/ Agency   Name:  [facility] care    Address: [address] [place] [address], [address] [place] [address], [place] [address]  Phone: [phone]  Fax: [phone]          / signature: Electronically signed by ANTELMO Alegria on 3/9/23 at 4:57 PM EST    PHYSICIAN SECTION    Prognosis: Good    Condition at Discharge: Stable    Rehab Potential (if transferring to Rehab): Fair    Recommended Labs or Other Treatments After Discharge: PT,OT and Nursing    Physician Certification: I certify the above information and transfer of Ghada Weinberg  is necessary for the continuing treatment of the diagnosis listed and that she requires Home Care for greater 30 days.      Update Admission H&P: No change in H&P    PHYSICIAN SIGNATURE:  Jose Thomas MD

## 2023-03-10 ENCOUNTER — CARE COORDINATION (OUTPATIENT)
Dept: CARE COORDINATION | Age: 58
End: 2023-03-10
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
## 2023-03-10 NOTE — ED NOTES
Pt discharged at this time. Discharge instructions and medications reviewed,  Questions were answered. PT verbalized understanding. VSS, Afebrile. Follow up appointments were discussed.            Selena Méndez RN  03/09/23 2822

## 2023-03-13 ENCOUNTER — TELEPHONE (OUTPATIENT)
Dept: PRIMARY CARE CLINIC | Age: 58
End: 2023-03-13

## 2023-03-13 NOTE — TELEPHONE ENCOUNTER
Please contact patient and see if she will see nurse practioner or another doctor in practice before 4/4/23 appointment scheduled. Unable to do PA until she is seen and eliquis is a blood thinner she needs to stay on that.

## 2023-03-13 NOTE — TELEPHONE ENCOUNTER
Garth Ellis called and stated pt has 3 medications that are not covered by insurance that pt got at the hospital. Garth Ellis is wanting to know if PCP can do a PA on the three medications. Lododerm patches, Movantik, and eliquis. Garth Ellis is sending a fax over with all the information.

## 2023-03-14 ENCOUNTER — TELEPHONE (OUTPATIENT)
Dept: PRIMARY CARE CLINIC | Age: 58
End: 2023-03-14

## 2023-03-15 ENCOUNTER — TELEPHONE (OUTPATIENT)
Dept: PULMONOLOGY | Age: 58
End: 2023-03-15
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
## 2023-03-16 ENCOUNTER — HOSPITAL ENCOUNTER (OUTPATIENT)
Dept: GENERAL RADIOLOGY | Age: 58
Discharge: HOME | End: 2023-03-16
Payer: COMMERCIAL

## 2023-03-16 DIAGNOSIS — R68.83 CHILLS: ICD-10-CM

## 2023-03-16 PROBLEM — I82.622 ACUTE DEEP VEIN THROMBOSIS (DVT) OF LEFT UPPER EXTREMITY (HCC): Status: ACTIVE | Noted: 2023-03-16

## 2023-03-16 PROBLEM — I82.611 ACUTE THROMBOSIS OF SUPERFICIAL VEINS OF RIGHT UPPER EXTREMITY: Status: ACTIVE | Noted: 2023-03-16

## 2023-03-16 LAB
ANION GAP SERPL CALCULATED.3IONS-SCNC: 16 MMOL/L (ref 3–16)
BACTERIA URNS QL MICRO: ABNORMAL /HPF
BASOPHILS # BLD: 0.1 K/UL (ref 0–0.2)
BASOPHILS NFR BLD: 1.2 %
BILIRUB UR QL STRIP.AUTO: NEGATIVE
BUN SERPL-MCNC: 16 MG/DL (ref 7–20)
CALCIUM SERPL-MCNC: 9.8 MG/DL (ref 8.3–10.6)
CHLORIDE SERPL-SCNC: 103 MMOL/L (ref 99–110)
CLARITY UR: CLEAR
CO2 SERPL-SCNC: 22 MMOL/L (ref 21–32)
COLOR UR: YELLOW
CREAT SERPL-MCNC: 0.5 MG/DL (ref 0.6–1.1)
DEPRECATED RDW RBC AUTO: 13.9 % (ref 12.4–15.4)
EOSINOPHIL # BLD: 0.1 K/UL (ref 0–0.6)
EOSINOPHIL NFR BLD: 1 %
EPI CELLS #/AREA URNS AUTO: 2 /HPF (ref 0–5)
GFR SERPLBLD CREATININE-BSD FMLA CKD-EPI: >60 ML/MIN/{1.73_M2}
GLUCOSE SERPL-MCNC: 85 MG/DL (ref 70–99)
GLUCOSE UR STRIP.AUTO-MCNC: NEGATIVE MG/DL
HCT VFR BLD AUTO: 40.4 % (ref 36–48)
HGB BLD-MCNC: 13.4 G/DL (ref 12–16)
HGB UR QL STRIP.AUTO: ABNORMAL
HYALINE CASTS #/AREA URNS AUTO: 0 /LPF (ref 0–8)
KETONES UR STRIP.AUTO-MCNC: NEGATIVE MG/DL
LEUKOCYTE ESTERASE UR QL STRIP.AUTO: NEGATIVE
LYMPHOCYTES # BLD: 1.4 K/UL (ref 1–5.1)
LYMPHOCYTES NFR BLD: 28.1 %
MCH RBC QN AUTO: 28.4 PG (ref 26–34)
MCHC RBC AUTO-ENTMCNC: 33 G/DL (ref 31–36)
MCV RBC AUTO: 85.9 FL (ref 80–100)
MONOCYTES # BLD: 0.3 K/UL (ref 0–1.3)
MONOCYTES NFR BLD: 6.5 %
NEUTROPHILS # BLD: 3.2 K/UL (ref 1.7–7.7)
NEUTROPHILS NFR BLD: 63.2 %
NITRITE UR QL STRIP.AUTO: NEGATIVE
PH UR STRIP.AUTO: 5.5 [PH] (ref 5–8)
PLATELET # BLD AUTO: 404 K/UL (ref 135–450)
PMV BLD AUTO: 6.9 FL (ref 5–10.5)
POTASSIUM SERPL-SCNC: 4.5 MMOL/L (ref 3.5–5.1)
PROT UR STRIP.AUTO-MCNC: NEGATIVE MG/DL
RBC # BLD AUTO: 4.71 M/UL (ref 4–5.2)
RBC CLUMPS #/AREA URNS AUTO: 6 /HPF (ref 0–4)
SODIUM SERPL-SCNC: 141 MMOL/L (ref 136–145)
SP GR UR STRIP.AUTO: 1.02 (ref 1–1.03)
UA DIPSTICK W REFLEX MICRO PNL UR: YES
URN SPEC COLLECT METH UR: ABNORMAL
UROBILINOGEN UR STRIP-ACNC: 0.2 E.U./DL
WBC # BLD AUTO: 5 K/UL (ref 4–11)
WBC #/AREA URNS AUTO: 2 /HPF (ref 0–5)

## 2023-03-16 PROCEDURE — 71046 X-RAY EXAM CHEST 2 VIEWS: CPT

## 2023-03-17 DIAGNOSIS — R31.29 MICROSCOPIC HEMATURIA: Primary | ICD-10-CM

## 2023-03-17 LAB — SARS-COV-2 RNA RESP QL NAA+PROBE: NOT DETECTED

## 2023-03-20 ENCOUNTER — CARE COORDINATION (OUTPATIENT)
Dept: CARE COORDINATION | Age: 58
End: 2023-03-20

## 2023-03-20 NOTE — CARE COORDINATION
ACM completed chart review and noted patient has neurology appt. ACM will make follow up call outreach tomorrow.

## 2023-03-20 NOTE — TELEPHONE ENCOUNTER
Medication:   Requested Prescriptions     Pending Prescriptions Disp Refills    lidocaine (LIDODERM) 5 % 30 patch 0     Sig: Place 1 patch onto the skin daily 12 hours on, 12 hours off. Last Filled:  03/09/2023    Last appt: 3/16/2023   Next appt: 4/13/2023    Last OARRS:   RX Monitoring 8/7/2020   Attestation -   Periodic Controlled Substance Monitoring Possible medication side effects, risk of tolerance/dependence & alternative treatments discussed. ;Assessed functional status. ;Obtaining appropriate analgesic effect of treatment. Chronic Pain > 50 MEDD Obtained or confirmed \"Consent for Opioid Use\" on file.

## 2023-03-21 ENCOUNTER — TELEPHONE (OUTPATIENT)
Dept: PRIMARY CARE CLINIC | Age: 58
End: 2023-03-21

## 2023-03-21 NOTE — TELEPHONE ENCOUNTER
Cyndee from [facility] returning [place] call, cyndee stated that all the documents sent over need to be completed by the doctor who did the face to face encounter     Call back # [phone]

## 2023-03-21 NOTE — TELEPHONE ENCOUNTER
Paperwork has be filled out and put on Dr. Marin Senior desk to be signed. Will fax once signed and given back.

## 2023-03-23 RX ORDER — LIDOCAINE 50 MG/G
1 PATCH TOPICAL DAILY
Qty: 30 PATCH | Refills: 0 | Status: SHIPPED | OUTPATIENT
Start: 2023-03-23

## 2023-03-23 NOTE — TELEPHONE ENCOUNTER
Medication:   Requested Prescriptions     Pending Prescriptions Disp Refills    lidocaine (LIDODERM) 5 % 30 patch 0     Sig: Place 1 patch onto the skin daily 12 hours on, 12 hours off. Last Filled:  3.9.23    Last appt: 3/16/2023   Next appt: 4/13/2023    Last OARRS:   RX Monitoring 8/7/2020   Attestation -   Periodic Controlled Substance Monitoring Possible medication side effects, risk of tolerance/dependence & alternative treatments discussed. ;Assessed functional status. ;Obtaining appropriate analgesic effect of treatment. Chronic Pain > 50 MEDD Obtained or confirmed \"Consent for Opioid Use\" on file.

## 2023-03-24 ENCOUNTER — TELEPHONE (OUTPATIENT)
Dept: PRIMARY CARE CLINIC | Age: 58
End: 2023-03-24

## 2023-03-24 ENCOUNTER — CARE COORDINATION (OUTPATIENT)
Dept: CARE COORDINATION | Age: 58
End: 2023-03-24

## 2023-03-24 DIAGNOSIS — I67.9 CEREBROVASCULAR SMALL VESSEL DISEASE: ICD-10-CM

## 2023-03-24 DIAGNOSIS — R41.3 MEMORY PROBLEM: ICD-10-CM

## 2023-03-24 DIAGNOSIS — G93.0 ARACHNOID CYST: ICD-10-CM

## 2023-03-24 DIAGNOSIS — R51.9 FREQUENT HEADACHES: Primary | ICD-10-CM

## 2023-03-24 LAB
BILIRUBIN, URINE: NEGATIVE
CLARITY: CLEAR
COLOR: ABNORMAL
GLUCOSE URINE: NEGATIVE MG/DL
HB: SOURCE: ABNORMAL
KETONES, URINE: NEGATIVE MG/DL
LEUKOCYTE ESTERASE, URINE: NEGATIVE
MUCUS, URINE: ABNORMAL /LPF
NITRITE, URINE: NEGATIVE
PH, URINE: 6.5 PH (ref 5–8)
PROTEIN, URINE: NEGATIVE MG/DL
RBC URINE: 2 /HPF (ref 0–3)
RBC URINE: ABNORMAL
SPECIFIC GRAVITY UA: 1.02 NO UNITS (ref 1–1.03)
SQUAMOUS CELLS: ABNORMAL /LPF
URINE TYPE: ABNORMAL
UROBILINOGEN, URINE: NORMAL MG/DL
WBC URINE: 3 /HPF (ref 0–4)

## 2023-03-24 NOTE — CARE COORDINATION
Ambulatory Care Coordination Note  3/24/2023    Patient Current Location:  Home: [address] [address]     ACM contacted the patient by telephone. Verified name and  with patient as identifiers. Provided introduction to self, and explanation of the ACM role. Challenges to be reviewed by the provider   Additional needs identified to be addressed with provider: No  none               Method of communication with provider: chart routing. ACM: Mario Forrester RN     ACM completed outreach to patient who expressed concern regarding power wheelchair. ACM informed patient that all documents have been faxed and [facility] has been in contact with the office. Patient expressed no additional needs or follow up calls. Plan:    Graduate from  [facility] patient enrollment in the Remote Patient Monitoring (RPM) program for in-home monitoring: NA. Lab Results       None            Care Coordination Interventions    Referral from Primary Care Provider: No  Suggested Interventions and Community Resources  Social Work: Completed (Comment: SW referral)  Other Services: Completed (Comment: [facility] Neurology)  Transportation Support: Completed  Zone Management Tools:  In Process (Comment: DM zone mangement tool)          Goals Addressed    None         Future Appointments   Date Time Provider Leo Santiago   3/29/2023 10:45 AM Shakeel Portillo MD [facility] DRFLD [facility]   2023 10:20 AM Vale Lopez MD KW SLEEP MED [facility]   2023 10:30 AM MD SCOT [place] [facility] [place] - DYD   2023 11:00 AM Amarjit Hardy MD PLASTICS/REC [facility]   2023 12:00 PM MD SCOT [place] CARDIO [facility]   2024 10:00 AM Allison Sena MD [place] GYN [facility]

## 2023-03-24 NOTE — TELEPHONE ENCOUNTER
Marleni from [place] called and stated pt was a no show for Neurology appt with Dr. J Carlos Flannery. That office will no longer see pt bc pt no showed them.  Stephanie wants a new referral put in for [facility] Fax # [phone] or [facility] fax # [phone]

## 2023-03-25 LAB
CULTURE RESULT: NORMAL
Lab: NORMAL
REPORT STATUS: NORMAL
SPECIMEN DESCRIPTION: NORMAL

## 2023-03-25 RX ORDER — LIDOCAINE 50 MG/G
1 PATCH TOPICAL DAILY
Qty: 30 PATCH | Refills: 0 | OUTPATIENT
Start: 2023-03-25

## 2023-03-29 ENCOUNTER — TELEPHONE (OUTPATIENT)
Dept: PRIMARY CARE CLINIC | Age: 58
End: 2023-03-29

## 2023-03-29 NOTE — TELEPHONE ENCOUNTER
[facility] and [facility] but needs script for diabetic shoes and inserts. Sent to [facility] fax # [phone]  Phone # [phone]    Also, Pt needs a new referral to neurology bc transportation was late piking her up and she was late for her appt and doc said she can not reschedule. Also,  Pt is asking about the wheelchair for her. What is happening with that? Also, pt had to repeat a urine lab test and pt hasnt gotten results. Pls call pt for results.

## 2023-03-30 ENCOUNTER — TELEPHONE (OUTPATIENT)
Dept: PRIMARY CARE CLINIC | Age: 58
End: 2023-03-30

## 2023-03-30 ENCOUNTER — TELEPHONE (OUTPATIENT)
Dept: ORTHOPEDIC SURGERY | Age: 58
End: 2023-03-30

## 2023-03-30 DIAGNOSIS — M17.11 PRIMARY OSTEOARTHRITIS OF RIGHT KNEE: ICD-10-CM

## 2023-03-30 DIAGNOSIS — M17.12 PRIMARY OSTEOARTHRITIS OF LEFT KNEE: ICD-10-CM

## 2023-03-30 DIAGNOSIS — E11.42 TYPE 2 DIABETES MELLITUS WITH DIABETIC POLYNEUROPATHY, WITHOUT LONG-TERM CURRENT USE OF INSULIN (HCC): Primary | ICD-10-CM

## 2023-03-30 DIAGNOSIS — R31.29 MICROSCOPIC HEMATURIA: ICD-10-CM

## 2023-03-30 NOTE — TELEPHONE ENCOUNTER
Talked   with  patient  the diabetic  rx was faxed  today    The wheel chair was faxed with all the codes  Gave her the neurologist

## 2023-03-30 NOTE — TELEPHONE ENCOUNTER
Patient has submitted an authorization and is under the impression that Dr. Sangita Dang and was sent to [facility] but not seeing anything that an evaluation was done for a motorized wheel chair.  Please let Berto Cantu know if one has been completed       Thank you      Case#[id number]

## 2023-03-30 NOTE — TELEPHONE ENCOUNTER
PATIENT REQUESTING   ELECTRIC ADJUSTABLE WHEELCHAIR  WITH ADJUSTABLE BACKSEAT AND FOOT REST    SPOKE WITH GIACOMO  HER PCP IS WORKING ON GETTING A POWER WC  THAT SHE QUALIFIES WITH INSURANCE FOR    SHE DOES NOT QUALIFY FOR THE ADJUSTABLE   [employer] IS WORKING ON  PROVIDING HER A NEW WC

## 2023-03-30 NOTE — TELEPHONE ENCOUNTER
Pt would like to talk to  about an order for her diabetic shoe she had talked with [facility] and was told the rx had been faxed over and she is wanting to talk to someone fels things not being handled    Please call [phone]

## 2023-04-03 NOTE — TELEPHONE ENCOUNTER
I took care of this fax. I faxed it back to [facility] with the information that they requested. The fax came in last Friday when Dr. Collins Garza was on vacation.

## 2023-04-03 NOTE — TELEPHONE ENCOUNTER
[address] calling - (Appeals Dept)  Sanya Goodman Utilization Review   Requesting PCP submit a Prior Authorization request for power wheelchair  Needing CPT code, evaluation, medical records & diagnosis to support

## 2023-04-12 PROBLEM — R10.9 SIDE PAIN: Status: ACTIVE | Noted: 2023-04-12

## 2023-04-12 PROBLEM — R68.83 CHILLS: Status: ACTIVE | Noted: 2023-04-12

## 2023-04-12 PROBLEM — M54.9 BACK PAIN WITH HISTORY OF SPINAL SURGERY: Status: ACTIVE | Noted: 2023-04-12

## 2023-04-12 PROBLEM — Z98.890 BACK PAIN WITH HISTORY OF SPINAL SURGERY: Status: ACTIVE | Noted: 2023-04-12

## 2023-04-13 ENCOUNTER — OFFICE VISIT (OUTPATIENT)
Dept: PRIMARY CARE CLINIC | Age: 58
End: 2023-04-13
Payer: COMMERCIAL

## 2023-04-13 VITALS
DIASTOLIC BLOOD PRESSURE: 74 MMHG | OXYGEN SATURATION: 99 % | WEIGHT: 238 LBS | HEIGHT: 67 IN | BODY MASS INDEX: 37.35 KG/M2 | SYSTOLIC BLOOD PRESSURE: 126 MMHG | RESPIRATION RATE: 13 BRPM | HEART RATE: 49 BPM | TEMPERATURE: 97.6 F

## 2023-04-13 DIAGNOSIS — E55.9 VITAMIN D DEFICIENCY: ICD-10-CM

## 2023-04-13 DIAGNOSIS — E66.01 CLASS 2 SEVERE OBESITY DUE TO EXCESS CALORIES WITH SERIOUS COMORBIDITY AND BODY MASS INDEX (BMI) OF 37.0 TO 37.9 IN ADULT (HCC): ICD-10-CM

## 2023-04-13 DIAGNOSIS — I82.611: ICD-10-CM

## 2023-04-13 DIAGNOSIS — K59.00 CONSTIPATION, UNSPECIFIED CONSTIPATION TYPE: Primary | ICD-10-CM

## 2023-04-13 DIAGNOSIS — L02.91 ABSCESS: ICD-10-CM

## 2023-04-13 DIAGNOSIS — L30.4 INTERTRIGO: ICD-10-CM

## 2023-04-13 DIAGNOSIS — I82.622 ACUTE DEEP VEIN THROMBOSIS (DVT) OF LEFT UPPER EXTREMITY, UNSPECIFIED VEIN (HCC): ICD-10-CM

## 2023-04-13 PROCEDURE — 3017F COLORECTAL CA SCREEN DOC REV: CPT | Performed by: INTERNAL MEDICINE

## 2023-04-13 PROCEDURE — 99214 OFFICE O/P EST MOD 30 MIN: CPT | Performed by: INTERNAL MEDICINE

## 2023-04-13 PROCEDURE — G8427 DOCREV CUR MEDS BY ELIG CLIN: HCPCS | Performed by: INTERNAL MEDICINE

## 2023-04-13 PROCEDURE — 1036F TOBACCO NON-USER: CPT | Performed by: INTERNAL MEDICINE

## 2023-04-13 PROCEDURE — G8417 CALC BMI ABV UP PARAM F/U: HCPCS | Performed by: INTERNAL MEDICINE

## 2023-04-13 RX ORDER — DOXYCYCLINE HYCLATE 100 MG
100 TABLET ORAL 2 TIMES DAILY
Qty: 20 TABLET | Refills: 0 | Status: SHIPPED | OUTPATIENT
Start: 2023-04-13 | End: 2023-04-23

## 2023-04-13 RX ORDER — NYSTATIN 100000 U/G
CREAM TOPICAL
Qty: 30 G | Refills: 0 | Status: SHIPPED | OUTPATIENT
Start: 2023-04-13

## 2023-04-13 RX ORDER — ERGOCALCIFEROL 1.25 MG/1
50000 CAPSULE ORAL WEEKLY
Qty: 4 CAPSULE | Refills: 5 | Status: SHIPPED | OUTPATIENT
Start: 2023-04-13

## 2023-04-17 ENCOUNTER — OFFICE VISIT (OUTPATIENT)
Dept: ORTHOPEDIC SURGERY | Age: 58
End: 2023-04-17

## 2023-04-17 DIAGNOSIS — E66.01 MORBID OBESITY (HCC): ICD-10-CM

## 2023-04-17 DIAGNOSIS — M17.12 PRIMARY OSTEOARTHRITIS OF LEFT KNEE: ICD-10-CM

## 2023-04-17 DIAGNOSIS — M17.11 PRIMARY OSTEOARTHRITIS OF RIGHT KNEE: Primary | ICD-10-CM

## 2023-04-17 RX ORDER — HYALURONATE SODIUM 10 MG/ML
20 SYRINGE (ML) INTRAARTICULAR ONCE
Status: COMPLETED | OUTPATIENT
Start: 2023-04-17 | End: 2023-04-17

## 2023-04-17 RX ADMIN — Medication 20 MG: at 17:13

## 2023-04-17 RX ADMIN — Medication 20 MG: at 17:12

## 2023-04-17 NOTE — PROGRESS NOTES
placed on knee preset function and the linear transducer was placed transversely  over the medial patellofemoral joint and the medial patella femoral  joint recess was identified. The skin was prepped in sterile fashion. Sterile ultrasound gel  and topical anesthetic were utilized. Using axial technique, a 22 GA 40 mm needle was advanced under direct guidance into the medial patellofemoral joint recess and 2 ml of Euflexxa  was injected . The joint space was visualized distending with Injectate. There was no resistance to the Injectate. Patient tolerated this with minimal to no discomfort. Band-Aid applied to puncture wound. This was first performed on the right than the left using the same exact technique. Technically successful ultrasound guided injection    Image stored    The media patellafemoral joint recess was visualized in short axis. No evidence of effusion, soft tissue mass or cystic lesions. Other Outside Imaging and Testing Personally Reviewed:          Assessment   Impression: . Encounter Diagnoses   Name Primary? Primary osteoarthritis of right knee Yes    Primary osteoarthritis of left knee     Morbid obesity ([address]               Plan:       Postinjection protocol and follow-up for consecutive injections         Orders:        Orders Placed This Encounter   Procedures    US GUIDED NEEDLE PLACEMENT     Standing Status:   Future     Number of Occurrences:   1     Standing Expiration Date:   4/17/2024     Order Specific Question:   Reason for exam:     Answer:   HA inj    NE ARTHROCENTESIS ASPIR&/INJ MAJOR JT/BURSA W/O US         Sarahi Driscoll MD.      Disclaimer: \"This note was dictated with voice recognition software. Though review and correction are routine, we apologize for any errors. \"

## 2023-04-18 ENCOUNTER — TELEPHONE (OUTPATIENT)
Dept: BARIATRICS/WEIGHT MGMT | Age: 58
End: 2023-04-18

## 2023-04-18 NOTE — TELEPHONE ENCOUNTER
Patient has had bariatric surgery with another healthcare system and [facility] provider feels she would be better managed by her primary bariatric surgeon and/or facility. For safety and because other facility has already seen patient and knows her medical history.

## 2023-04-24 ENCOUNTER — OFFICE VISIT (OUTPATIENT)
Dept: PRIMARY CARE CLINIC | Age: 58
End: 2023-04-24
Payer: COMMERCIAL

## 2023-04-24 VITALS
RESPIRATION RATE: 16 BRPM | WEIGHT: 246 LBS | BODY MASS INDEX: 38.61 KG/M2 | OXYGEN SATURATION: 99 % | SYSTOLIC BLOOD PRESSURE: 100 MMHG | HEART RATE: 54 BPM | HEIGHT: 67 IN | TEMPERATURE: 96.9 F | DIASTOLIC BLOOD PRESSURE: 62 MMHG

## 2023-04-24 DIAGNOSIS — L02.91 ABSCESS: ICD-10-CM

## 2023-04-24 DIAGNOSIS — R10.9 ABDOMINAL PAIN, UNSPECIFIED ABDOMINAL LOCATION: ICD-10-CM

## 2023-04-24 DIAGNOSIS — R11.2 NAUSEA AND VOMITING, UNSPECIFIED VOMITING TYPE: ICD-10-CM

## 2023-04-24 DIAGNOSIS — R31.9 HEMATURIA, UNSPECIFIED TYPE: ICD-10-CM

## 2023-04-24 DIAGNOSIS — R11.2 NAUSEA AND VOMITING, UNSPECIFIED VOMITING TYPE: Primary | ICD-10-CM

## 2023-04-24 DIAGNOSIS — R10.9 BILATERAL FLANK PAIN: ICD-10-CM

## 2023-04-24 LAB
ALBUMIN SERPL-MCNC: 4.2 G/DL (ref 3.4–5)
ALBUMIN/GLOB SERPL: 2.2 {RATIO} (ref 1.1–2.2)
ALP SERPL-CCNC: 77 U/L (ref 40–129)
ALT SERPL-CCNC: 10 U/L (ref 10–40)
ANION GAP SERPL CALCULATED.3IONS-SCNC: 10 MMOL/L (ref 3–16)
AST SERPL-CCNC: 14 U/L (ref 15–37)
BASOPHILS # BLD: 0 K/UL (ref 0–0.2)
BASOPHILS NFR BLD: 0.4 %
BILIRUB SERPL-MCNC: 0.3 MG/DL (ref 0–1)
BILIRUBIN, POC: NORMAL
BLOOD URINE, POC: NORMAL
BUN SERPL-MCNC: 15 MG/DL (ref 7–20)
CALCIUM SERPL-MCNC: 8.9 MG/DL (ref 8.3–10.6)
CHLORIDE SERPL-SCNC: 104 MMOL/L (ref 99–110)
CLARITY, POC: CLEAR
CO2 SERPL-SCNC: 26 MMOL/L (ref 21–32)
COLOR, POC: YELLOW
CREAT SERPL-MCNC: <0.5 MG/DL (ref 0.6–1.1)
DEPRECATED RDW RBC AUTO: 15.1 % (ref 12.4–15.4)
EOSINOPHIL # BLD: 0.1 K/UL (ref 0–0.6)
EOSINOPHIL NFR BLD: 1.4 %
GFR SERPLBLD CREATININE-BSD FMLA CKD-EPI: >60 ML/MIN/{1.73_M2}
GLUCOSE SERPL-MCNC: 92 MG/DL (ref 70–99)
GLUCOSE URINE, POC: NORMAL
HCT VFR BLD AUTO: 34.5 % (ref 36–48)
HGB BLD-MCNC: 11.4 G/DL (ref 12–16)
KETONES, POC: NORMAL
LEUKOCYTE EST, POC: NORMAL
LYMPHOCYTES # BLD: 1.6 K/UL (ref 1–5.1)
LYMPHOCYTES NFR BLD: 31.8 %
MCH RBC QN AUTO: 28.8 PG (ref 26–34)
MCHC RBC AUTO-ENTMCNC: 32.9 G/DL (ref 31–36)
MCV RBC AUTO: 87.5 FL (ref 80–100)
MONOCYTES # BLD: 0.3 K/UL (ref 0–1.3)
MONOCYTES NFR BLD: 6.7 %
NEUTROPHILS # BLD: 3 K/UL (ref 1.7–7.7)
NEUTROPHILS NFR BLD: 59.7 %
NITRITE, POC: NORMAL
PH, POC: 6
PLATELET # BLD AUTO: 226 K/UL (ref 135–450)
PMV BLD AUTO: 8 FL (ref 5–10.5)
POTASSIUM SERPL-SCNC: 4.7 MMOL/L (ref 3.5–5.1)
PROT SERPL-MCNC: 6.1 G/DL (ref 6.4–8.2)
PROTEIN, POC: NORMAL
RBC # BLD AUTO: 3.94 M/UL (ref 4–5.2)
SODIUM SERPL-SCNC: 140 MMOL/L (ref 136–145)
SPECIFIC GRAVITY, POC: 1.02
UROBILINOGEN, POC: 0.2
WBC # BLD AUTO: 5 K/UL (ref 4–11)

## 2023-04-24 PROCEDURE — 99214 OFFICE O/P EST MOD 30 MIN: CPT | Performed by: INTERNAL MEDICINE

## 2023-04-24 PROCEDURE — 81002 URINALYSIS NONAUTO W/O SCOPE: CPT | Performed by: INTERNAL MEDICINE

## 2023-04-24 PROCEDURE — 3017F COLORECTAL CA SCREEN DOC REV: CPT | Performed by: INTERNAL MEDICINE

## 2023-04-24 PROCEDURE — G8417 CALC BMI ABV UP PARAM F/U: HCPCS | Performed by: INTERNAL MEDICINE

## 2023-04-24 PROCEDURE — 1036F TOBACCO NON-USER: CPT | Performed by: INTERNAL MEDICINE

## 2023-04-24 PROCEDURE — G8427 DOCREV CUR MEDS BY ELIG CLIN: HCPCS | Performed by: INTERNAL MEDICINE

## 2023-04-24 RX ORDER — HYDROCODONE BITARTRATE AND ACETAMINOPHEN 5; 325 MG/1; MG/1
TABLET ORAL
COMMUNITY
Start: 2023-04-18

## 2023-04-24 RX ORDER — ONDANSETRON 4 MG/1
4 TABLET, FILM COATED ORAL 3 TIMES DAILY PRN
Qty: 15 TABLET | Refills: 0 | Status: SHIPPED | OUTPATIENT
Start: 2023-04-24

## 2023-04-25 ENCOUNTER — HOSPITAL ENCOUNTER (OUTPATIENT)
Dept: CT IMAGING | Age: 58
Discharge: HOME | End: 2023-04-25
Payer: COMMERCIAL

## 2023-04-25 LAB — BACTERIA UR CULT: NORMAL

## 2023-04-25 PROCEDURE — 6360000004 HC RX CONTRAST MEDICATION: Performed by: INTERNAL MEDICINE

## 2023-04-25 PROCEDURE — 74177 CT ABD & PELVIS W/CONTRAST: CPT

## 2023-04-25 RX ADMIN — IOHEXOL 100 ML: 350 INJECTION, SOLUTION INTRAVENOUS at 12:29

## 2023-04-27 ENCOUNTER — OFFICE VISIT (OUTPATIENT)
Dept: ORTHOPEDIC SURGERY | Age: 58
End: 2023-04-27

## 2023-04-27 VITALS — BODY MASS INDEX: 38.62 KG/M2 | HEIGHT: 67 IN | WEIGHT: 246.03 LBS

## 2023-04-27 DIAGNOSIS — M17.12 PRIMARY OSTEOARTHRITIS OF LEFT KNEE: ICD-10-CM

## 2023-04-27 DIAGNOSIS — M17.11 PRIMARY OSTEOARTHRITIS OF RIGHT KNEE: Primary | ICD-10-CM

## 2023-04-27 RX ORDER — HYALURONATE SODIUM 10 MG/ML
20 SYRINGE (ML) INTRAARTICULAR ONCE
Status: COMPLETED | OUTPATIENT
Start: 2023-04-27 | End: 2023-04-27

## 2023-04-27 RX ADMIN — Medication 20 MG: at 10:09

## 2023-04-27 NOTE — PROGRESS NOTES
Chief Complaint:   Chief Complaint   Patient presents with    Knee Pain     bilat, had increased pain for 3-4 days, but now they feel much better, Euflexxa #2 today          History of Present Illness:       Patient is a 61 y.o. female returns follow up for the above complaint. The patient was last seen approximately 1 weeksago. The symptoms show no change since the last visit. The patient has had no further testing for the problem. Pain level 7/10    She would like to proceed with second HA injection bilateral knees         Past Medical History:        Past Medical History:   Diagnosis Date    Arthritis     Avoids pork and pork products due to cultural beliefs     Chronic pain     TAN (dyspnea on exertion)     Frequent headaches 12/13/2022    Gallbladder disorder     Gastroesophageal reflux disease 01/28/2020    Hx of blood clots     in legs -- 13 years ago    Hx of degenerative disc disease     Hyperlipidemia     Hypertension     Morbid obesity with body mass index (BMI) of 60.0 to 69.9 in adult [facility]) 07/02/2018    Neuropathy     SHYANN (obstructive sleep apnea)     machine broke down, awaiting new machine at the end of June 2023    Poor circulation     Primary osteoarthritis of right knee 07/02/2018    Type 2 diabetes mellitus without complication ([facility]     controlled with diet    Urinary incontinence     Vitamin D deficiency 01/28/2020    Wears glasses         Present Medications:         Current Outpatient Medications   Medication Sig Dispense Refill    HYDROcodone-acetaminophen (NORCO) 5-325 MG per tablet TAKE 1 TABLET BY MOUTH EVERY 4 TO 6 HOURS      ondansetron (ZOFRAN) 4 MG tablet Take 1 tablet by mouth 3 times daily as needed for Nausea or Vomiting 15 tablet 0    vitamin D (ERGOCALCIFEROL) 1.25 MG (39007 UT) CAPS capsule Take 1 capsule by mouth once a week 4 capsule 5    nystatin (MYCOSTATIN) 451164 UNIT/GM cream Apply topically 2 times daily.  30 g 0    naloxegol (MOVANTIK) 12.5 MG TABS tablet Take 1

## 2023-05-01 ENCOUNTER — OFFICE VISIT (OUTPATIENT)
Dept: ORTHOPEDIC SURGERY | Age: 58
End: 2023-05-01

## 2023-05-01 DIAGNOSIS — M17.12 PRIMARY OSTEOARTHRITIS OF LEFT KNEE: ICD-10-CM

## 2023-05-01 DIAGNOSIS — M17.11 PRIMARY OSTEOARTHRITIS OF RIGHT KNEE: Primary | ICD-10-CM

## 2023-05-01 DIAGNOSIS — E66.01 MORBID OBESITY (HCC): ICD-10-CM

## 2023-05-01 RX ORDER — HYALURONATE SODIUM 10 MG/ML
20 SYRINGE (ML) INTRAARTICULAR ONCE
Status: COMPLETED | OUTPATIENT
Start: 2023-05-01 | End: 2023-05-01

## 2023-05-01 RX ADMIN — Medication 20 MG: at 11:01

## 2023-05-01 RX ADMIN — Medication 20 MG: at 11:00

## 2023-05-01 NOTE — PROGRESS NOTES
Chief Complaint:   No chief complaint on file. History of Present Illness:       Patient is a 61 y.o. female returns follow up for the above complaint. The patient was last seen approximately 1 weeksago. The symptoms show no change since the last visit. The patient has had no further testing for the problem. Pain level 7/10    She would like to proceed with second HA injection bilateral knees         Past Medical History:        Past Medical History:   Diagnosis Date    Arthritis     Avoids pork and pork products due to cultural beliefs     Chronic pain     TAN (dyspnea on exertion)     Frequent headaches 12/13/2022    Gallbladder disorder     Gastroesophageal reflux disease 01/28/2020    Hx of blood clots     in legs -- 13 years ago    Hx of degenerative disc disease     Hyperlipidemia     Hypertension     Morbid obesity with body mass index (BMI) of 60.0 to 69.9 in adult [facility]) 07/02/2018    Neuropathy     SHYANN (obstructive sleep apnea)     machine broke down, awaiting new machine at the end of June 2023    Poor circulation     Primary osteoarthritis of right knee 07/02/2018    Type 2 diabetes mellitus without complication ([facility]     controlled with diet    Urinary incontinence     Vitamin D deficiency 01/28/2020    Wears glasses         Present Medications:         Current Outpatient Medications   Medication Sig Dispense Refill    HYDROcodone-acetaminophen (NORCO) 5-325 MG per tablet TAKE 1 TABLET BY MOUTH EVERY 4 TO 6 HOURS      ondansetron (ZOFRAN) 4 MG tablet Take 1 tablet by mouth 3 times daily as needed for Nausea or Vomiting 15 tablet 0    vitamin D (ERGOCALCIFEROL) 1.25 MG (75416 UT) CAPS capsule Take 1 capsule by mouth once a week 4 capsule 5    nystatin (MYCOSTATIN) 932169 UNIT/GM cream Apply topically 2 times daily.  30 g 0    naloxegol (MOVANTIK) 12.5 MG TABS tablet Take 1 tablet by mouth every morning (before breakfast) (Patient not taking: Reported on 4/24/2023) 30 tablet 1    diclofenac

## 2023-05-02 ENCOUNTER — TELEPHONE (OUTPATIENT)
Dept: PRIMARY CARE CLINIC | Age: 58
End: 2023-05-02

## 2023-05-02 NOTE — PROGRESS NOTES
Faxed a signed Detailed products description request to [facility] for this patient today @ 9:34 a.m.

## 2023-05-02 NOTE — TELEPHONE ENCOUNTER
Bret Barker - ([facility] [place] - [place] calling  # [phone]  Fax# [phone]  *(Updated) Detailed Product Description -  Updated on 4/18/2023  Should be signed by Dr Yvonne Rivero needs the (Updated) Form to be signed by Dr Cheko Lizama & faxed back

## 2023-05-02 NOTE — TELEPHONE ENCOUNTER
I faxed a signed detailed products description request to [facility] for this patient today @ 9:34 a.m.

## 2023-05-05 PROBLEM — L02.91 ABSCESS: Status: ACTIVE | Noted: 2023-05-05

## 2023-05-05 PROBLEM — L30.4 INTERTRIGO: Status: ACTIVE | Noted: 2023-05-05

## 2023-05-05 ASSESSMENT — ENCOUNTER SYMPTOMS
RHINORRHEA: 0
ABDOMINAL PAIN: 0
COUGH: 0
NAUSEA: 0
SHORTNESS OF BREATH: 0
CHEST TIGHTNESS: 0
SORE THROAT: 0
VOMITING: 0
DIARRHEA: 0
CONSTIPATION: 1

## 2023-05-11 PROBLEM — R31.9 HEMATURIA: Status: ACTIVE | Noted: 2023-05-11

## 2023-05-11 ASSESSMENT — ENCOUNTER SYMPTOMS
BACK PAIN: 0
VOMITING: 1
RHINORRHEA: 0
DIARRHEA: 0
SINUS PRESSURE: 0
ABDOMINAL PAIN: 1
COUGH: 0
ABDOMINAL DISTENTION: 0
NAUSEA: 1
CHEST TIGHTNESS: 0
SHORTNESS OF BREATH: 0
WHEEZING: 0
CONSTIPATION: 0
SORE THROAT: 0
BLOOD IN STOOL: 0

## 2023-05-22 DIAGNOSIS — M54.16 LUMBAR RADICULOPATHY: ICD-10-CM

## 2023-05-22 DIAGNOSIS — E11.42 TYPE 2 DIABETES MELLITUS WITH DIABETIC POLYNEUROPATHY, WITHOUT LONG-TERM CURRENT USE OF INSULIN (HCC): ICD-10-CM

## 2023-05-22 DIAGNOSIS — R51.9 FREQUENT HEADACHES: ICD-10-CM

## 2023-05-22 DIAGNOSIS — R10.13 EPIGASTRIC ABDOMINAL PAIN: ICD-10-CM

## 2023-05-22 DIAGNOSIS — K21.9 GASTROESOPHAGEAL REFLUX DISEASE, UNSPECIFIED WHETHER ESOPHAGITIS PRESENT: ICD-10-CM

## 2023-05-22 DIAGNOSIS — R79.89 ELEVATED BRAIN NATRIURETIC PEPTIDE (BNP) LEVEL: ICD-10-CM

## 2023-05-22 RX ORDER — CALCIUM CITRATE/VITAMIN D3 200MG-6.25
TABLET ORAL
Qty: 100 STRIP | Refills: 3 | Status: SHIPPED | OUTPATIENT
Start: 2023-05-22

## 2023-05-22 RX ORDER — OMEPRAZOLE 40 MG/1
CAPSULE, DELAYED RELEASE ORAL
Qty: 90 CAPSULE | Refills: 1 | Status: SHIPPED | OUTPATIENT
Start: 2023-05-22

## 2023-05-22 RX ORDER — GABAPENTIN 300 MG/1
CAPSULE ORAL
Qty: 90 CAPSULE | Refills: 1 | OUTPATIENT
Start: 2023-05-22

## 2023-05-22 RX ORDER — PSEUDOEPHED/ACETAMINOPH/DIPHEN 30MG-500MG
TABLET ORAL
Qty: 60 TABLET | Refills: 2 | Status: SHIPPED | OUTPATIENT
Start: 2023-05-22

## 2023-05-22 RX ORDER — FUROSEMIDE 40 MG/1
TABLET ORAL
Qty: 90 TABLET | Refills: 3 | Status: SHIPPED | OUTPATIENT
Start: 2023-05-22

## 2023-05-22 NOTE — TELEPHONE ENCOUNTER
Medication:   Requested Prescriptions     Pending Prescriptions Disp Refills    omeprazole (PRILOSEC) 40 MG delayed release capsule [Pharmacy Med Name: OMEPRAZOLE DR 40 MG CAPSULE] 90 capsule 1     Sig: TAKE 1 CAPSULE BY MOUTH EVERY DAY IN THE MORNING BEFORE BREAKFAST    blood glucose test strips (TRUE METRIX BLOOD GLUCOSE TEST) strip [Pharmacy Med Name: TRUE METRIX GLUCOSE TEST STRIP] 100 strip 3     Sig: USE TO TEST ONCE DAILY    ACETAMINOPHEN EXTRA STRENGTH 500 MG tablet [Pharmacy Med Name: ACETAMINOPHEN 500 MG TABLET] 60 tablet 2     Sig: TAKE 1 TABLET BY MOUTH 4 TIMES DAILY AS NEEDED FOR PAIN     Last Filled:  1.27.23    Last appt: 4/24/2023   Next appt: 6.14.23    Last OARRS:   RX Monitoring 8/7/2020   Attestation -   Periodic Controlled Substance Monitoring Possible medication side effects, risk of tolerance/dependence & alternative treatments discussed. ;Assessed functional status. ;Obtaining appropriate analgesic effect of treatment. Chronic Pain > 50 MEDD Obtained or confirmed \"Consent for Opioid Use\" on file.

## 2023-05-25 DIAGNOSIS — M54.16 LUMBAR RADICULOPATHY: ICD-10-CM

## 2023-05-25 RX ORDER — GABAPENTIN 300 MG/1
CAPSULE ORAL
Qty: 90 CAPSULE | Refills: 1 | OUTPATIENT
Start: 2023-05-25

## 2023-05-30 RX ORDER — GABAPENTIN 300 MG/1
300 CAPSULE ORAL 3 TIMES DAILY
Qty: 90 CAPSULE | Refills: 0 | Status: SHIPPED | OUTPATIENT
Start: 2023-05-30 | End: 2023-06-29

## 2023-06-05 NOTE — TELEPHONE ENCOUNTER
Medication:   Requested Prescriptions     Pending Prescriptions Disp Refills    naloxegol (MOVANTIK) 12.5 MG TABS tablet 30 tablet 1     Sig: Take 1 tablet by mouth every morning (before breakfast)     Last Filled:  4/12/23    Last appt: 4/24/2023   Next appt: 6/14/2023    Last OARRS:   RX Monitoring 8/7/2020   Attestation -   Periodic Controlled Substance Monitoring Possible medication side effects, risk of tolerance/dependence & alternative treatments discussed. ;Assessed functional status. ;Obtaining appropriate analgesic effect of treatment. Chronic Pain > 50 MEDD Obtained or confirmed \"Consent for Opioid Use\" on file.

## 2023-06-07 ENCOUNTER — TELEPHONE (OUTPATIENT)
Dept: PULMONOLOGY | Age: 58
End: 2023-06-07

## 2023-06-07 DIAGNOSIS — K59.00 CONSTIPATION, UNSPECIFIED CONSTIPATION TYPE: ICD-10-CM

## 2023-06-07 RX ORDER — DOCUSATE SODIUM 100 MG/1
CAPSULE, LIQUID FILLED ORAL
Qty: 60 CAPSULE | Refills: 3 | Status: SHIPPED | OUTPATIENT
Start: 2023-06-07 | End: 2023-06-08 | Stop reason: SDUPTHER

## 2023-06-07 NOTE — TELEPHONE ENCOUNTER
Pt called and r/s July appt for November and she asked for a new script for a new machine so I informed the pt that we would need to see her before we could write that script because it is out of the six month window. Pt verbalized understanding.

## 2023-06-07 NOTE — TELEPHONE ENCOUNTER
Medication:   Requested Prescriptions     Pending Prescriptions Disp Refills    EQUATE STOOL SOFTENER 100 MG capsule [Pharmacy Med Name: EQ Stool Softener 100 MG Oral Capsule] 60 capsule 0     Sig: TAKE 1 CAPSULE BY MOUTH TWICE DAILY AS NEEDED FOR CONSTIPATION     Last Filled:      Last appt: 4/24/2023   Next appt: 6/14/2023    Last OARRS:   RX Monitoring 8/7/2020   Attestation -   Periodic Controlled Substance Monitoring Possible medication side effects, risk of tolerance/dependence & alternative treatments discussed. ;Assessed functional status. ;Obtaining appropriate analgesic effect of treatment. Chronic Pain > 50 MEDD Obtained or confirmed \"Consent for Opioid Use\" on file.

## 2023-06-08 ENCOUNTER — OFFICE VISIT (OUTPATIENT)
Dept: PRIMARY CARE CLINIC | Age: 58
End: 2023-06-08
Payer: COMMERCIAL

## 2023-06-08 VITALS
BODY MASS INDEX: 39.15 KG/M2 | SYSTOLIC BLOOD PRESSURE: 106 MMHG | WEIGHT: 250 LBS | DIASTOLIC BLOOD PRESSURE: 79 MMHG | HEART RATE: 60 BPM

## 2023-06-08 DIAGNOSIS — K83.8 DILATED BILE DUCT: ICD-10-CM

## 2023-06-08 DIAGNOSIS — E11.42 TYPE 2 DIABETES MELLITUS WITH DIABETIC POLYNEUROPATHY, WITHOUT LONG-TERM CURRENT USE OF INSULIN (HCC): Primary | ICD-10-CM

## 2023-06-08 DIAGNOSIS — K21.9 GASTROESOPHAGEAL REFLUX DISEASE, UNSPECIFIED WHETHER ESOPHAGITIS PRESENT: ICD-10-CM

## 2023-06-08 DIAGNOSIS — K42.9 UMBILICAL HERNIA WITHOUT OBSTRUCTION AND WITHOUT GANGRENE: ICD-10-CM

## 2023-06-08 DIAGNOSIS — R10.33 UMBILICAL PAIN: ICD-10-CM

## 2023-06-08 DIAGNOSIS — K80.20 GALLSTONES: ICD-10-CM

## 2023-06-08 DIAGNOSIS — I82.622 DEEP VEIN THROMBOSIS (DVT) OF LEFT UPPER EXTREMITY, UNSPECIFIED CHRONICITY, UNSPECIFIED VEIN (HCC): ICD-10-CM

## 2023-06-08 DIAGNOSIS — K59.00 CONSTIPATION, UNSPECIFIED CONSTIPATION TYPE: ICD-10-CM

## 2023-06-08 LAB — HBA1C MFR BLD: 5.3 %

## 2023-06-08 PROCEDURE — 2022F DILAT RTA XM EVC RTNOPTHY: CPT | Performed by: INTERNAL MEDICINE

## 2023-06-08 PROCEDURE — 3044F HG A1C LEVEL LT 7.0%: CPT | Performed by: INTERNAL MEDICINE

## 2023-06-08 PROCEDURE — 3017F COLORECTAL CA SCREEN DOC REV: CPT | Performed by: INTERNAL MEDICINE

## 2023-06-08 PROCEDURE — G8427 DOCREV CUR MEDS BY ELIG CLIN: HCPCS | Performed by: INTERNAL MEDICINE

## 2023-06-08 PROCEDURE — 1036F TOBACCO NON-USER: CPT | Performed by: INTERNAL MEDICINE

## 2023-06-08 PROCEDURE — 83037 HB GLYCOSYLATED A1C HOME DEV: CPT | Performed by: INTERNAL MEDICINE

## 2023-06-08 PROCEDURE — 99214 OFFICE O/P EST MOD 30 MIN: CPT | Performed by: INTERNAL MEDICINE

## 2023-06-08 PROCEDURE — G8417 CALC BMI ABV UP PARAM F/U: HCPCS | Performed by: INTERNAL MEDICINE

## 2023-06-08 RX ORDER — DOCUSATE SODIUM 100 MG/1
CAPSULE, LIQUID FILLED ORAL
Qty: 180 CAPSULE | Refills: 1 | Status: SHIPPED | OUTPATIENT
Start: 2023-06-08

## 2023-06-08 ASSESSMENT — ENCOUNTER SYMPTOMS
NAUSEA: 1
VOMITING: 1

## 2023-06-19 PROBLEM — R10.33 UMBILICAL PAIN: Status: ACTIVE | Noted: 2023-06-19

## 2023-06-19 PROBLEM — K83.8 DILATED BILE DUCT: Status: ACTIVE | Noted: 2023-06-19

## 2023-06-19 PROBLEM — K42.9 UMBILICAL HERNIA WITHOUT OBSTRUCTION AND WITHOUT GANGRENE: Status: ACTIVE | Noted: 2023-06-19

## 2023-06-19 PROBLEM — I82.622 DEEP VEIN THROMBOSIS (DVT) OF LEFT UPPER EXTREMITY (HCC): Status: ACTIVE | Noted: 2023-06-19

## 2023-06-19 ASSESSMENT — ENCOUNTER SYMPTOMS
CHEST TIGHTNESS: 0
CONSTIPATION: 1
BACK PAIN: 1
DIARRHEA: 0
ABDOMINAL PAIN: 1
SORE THROAT: 0
RHINORRHEA: 0
WHEEZING: 0
TROUBLE SWALLOWING: 0
SINUS PRESSURE: 0
COLOR CHANGE: 0
ABDOMINAL DISTENTION: 0
COUGH: 0
SHORTNESS OF BREATH: 0
BLOOD IN STOOL: 0

## 2023-07-20 ENCOUNTER — TELEPHONE (OUTPATIENT)
Dept: PRIMARY CARE CLINIC | Age: 58
End: 2023-07-20

## 2023-11-01 ENCOUNTER — OFFICE VISIT (OUTPATIENT)
Dept: ORTHOPEDIC SURGERY | Age: 58
End: 2023-11-01

## 2023-11-01 VITALS — WEIGHT: 260.9 LBS | BODY MASS INDEX: 40.85 KG/M2

## 2023-11-01 DIAGNOSIS — E11.42 DIABETIC POLYNEUROPATHY ASSOCIATED WITH TYPE 2 DIABETES MELLITUS (HCC): ICD-10-CM

## 2023-11-01 DIAGNOSIS — Z90.3 HISTORY OF SLEEVE GASTRECTOMY: ICD-10-CM

## 2023-11-01 DIAGNOSIS — M16.32 OSTEOARTHRITIS RESULTING FROM LEFT HIP DYSPLASIA: Primary | ICD-10-CM

## 2023-11-01 DIAGNOSIS — E66.01 CLASS 2 SEVERE OBESITY DUE TO EXCESS CALORIES WITH SERIOUS COMORBIDITY AND BODY MASS INDEX (BMI) OF 37.0 TO 37.9 IN ADULT (HCC): Chronic | ICD-10-CM

## 2023-11-01 DIAGNOSIS — M25.859 FEMORAL ACETABULAR IMPINGEMENT: ICD-10-CM

## 2023-11-01 RX ORDER — ROPIVACAINE HYDROCHLORIDE 5 MG/ML
3 INJECTION, SOLUTION EPIDURAL; INFILTRATION; PERINEURAL ONCE
Status: COMPLETED | OUTPATIENT
Start: 2023-11-01 | End: 2023-11-01

## 2023-11-01 RX ORDER — LIDOCAINE HYDROCHLORIDE 10 MG/ML
5 INJECTION, SOLUTION INFILTRATION; PERINEURAL ONCE
Status: COMPLETED | OUTPATIENT
Start: 2023-11-01 | End: 2023-11-01

## 2023-11-01 RX ORDER — METHYLPREDNISOLONE ACETATE 40 MG/ML
40 INJECTION, SUSPENSION INTRA-ARTICULAR; INTRALESIONAL; INTRAMUSCULAR; SOFT TISSUE ONCE
Status: COMPLETED | OUTPATIENT
Start: 2023-11-01 | End: 2023-11-01

## 2023-11-01 RX ORDER — BUPIVACAINE HYDROCHLORIDE 2.5 MG/ML
3 INJECTION, SOLUTION INFILTRATION; PERINEURAL ONCE
Status: COMPLETED | OUTPATIENT
Start: 2023-11-01 | End: 2023-11-01

## 2023-11-01 RX ADMIN — ROPIVACAINE HYDROCHLORIDE 3 ML: 5 INJECTION, SOLUTION EPIDURAL; INFILTRATION; PERINEURAL at 14:04

## 2023-11-01 RX ADMIN — METHYLPREDNISOLONE ACETATE 40 MG: 40 INJECTION, SUSPENSION INTRA-ARTICULAR; INTRALESIONAL; INTRAMUSCULAR; SOFT TISSUE at 14:04

## 2023-11-01 RX ADMIN — BUPIVACAINE HYDROCHLORIDE 7.5 MG: 2.5 INJECTION, SOLUTION INFILTRATION; PERINEURAL at 14:00

## 2023-11-01 RX ADMIN — LIDOCAINE HYDROCHLORIDE 5 ML: 10 INJECTION, SOLUTION INFILTRATION; PERINEURAL at 14:02

## 2023-11-01 NOTE — PROGRESS NOTES
total of 5cc 1% Lidocaine /2cc of 0.25% Marcaine was utilized. The syringe was exchanged and under direct guidance 1 mL of  Depo-Medrol 3 mL of 0.5% ropivacaine was injected into the anterior joint recess at the femoral acetabular joint. The anterior capsule was visualized to hydrodissect with injectate. Needle was withdrawn pressure applied to the puncture wound. Images stored    Technically successful intra-articular injection of the hip       Other Outside Imaging and Testing Personally Reviewed:    US GUIDED NEEDLE PLACEMENT    Result Date: 11/1/2023  Radiology result is complete; follow up with provider / physician office for radiology results     XR HIP 1 VW W PELVIS LEFT    Result Date: 11/1/2023  Radiology exam is complete. No Radiologist dictation. Please follow up with ordering provider. Assessment   Impression: . Encounter Diagnoses   Name Primary?     Osteoarthritis resulting from left hip dysplasia Yes    Femoral acetabular impingement     Class 2 severe obesity due to excess calories with serious comorbidity and body mass index (BMI) of 37.0 to 37.9 in adult [facility])     Diabetic polyneuropathy associated with type 2 diabetes mellitus (HCC)     History of sleeve gastrectomy               Plan:     Postinjection protocol and clinical follow-up with Dr. Devin Roy as scheduled  Behavior modification to promote weight loss measures and consider consultation with weight management if not already done  Consider advanced imaging of the hips prior to any repeat hip abductor steroid injection/exposure         Orders:        Orders Placed This Encounter   Procedures    XR HIP 1 VW W PELVIS LEFT     Order Specific Question:   Reason for exam:     Answer:   Hip pain    US GUIDED NEEDLE PLACEMENT     Standing Status:   Future     Number of Occurrences:   1     Standing Expiration Date:   11/1/2024     Order Specific Question:   Reason for exam:     Answer:   CSI    NE ARTHROCENTESIS ASPIR&/INJ

## 2023-11-07 ENCOUNTER — OFFICE VISIT (OUTPATIENT)
Dept: PRIMARY CARE CLINIC | Age: 58
End: 2023-11-07
Payer: COMMERCIAL

## 2023-11-07 VITALS
OXYGEN SATURATION: 98 % | HEART RATE: 78 BPM | WEIGHT: 252 LBS | SYSTOLIC BLOOD PRESSURE: 130 MMHG | DIASTOLIC BLOOD PRESSURE: 86 MMHG | BODY MASS INDEX: 39.46 KG/M2

## 2023-11-07 DIAGNOSIS — E66.01 CLASS 3 SEVERE OBESITY DUE TO EXCESS CALORIES WITH SERIOUS COMORBIDITY AND BODY MASS INDEX (BMI) OF 40.0 TO 44.9 IN ADULT (HCC): ICD-10-CM

## 2023-11-07 DIAGNOSIS — E55.9 VITAMIN D DEFICIENCY: ICD-10-CM

## 2023-11-07 DIAGNOSIS — K59.00 CONSTIPATION, UNSPECIFIED CONSTIPATION TYPE: ICD-10-CM

## 2023-11-07 DIAGNOSIS — Z12.31 ENCOUNTER FOR SCREENING MAMMOGRAM FOR BREAST CANCER: ICD-10-CM

## 2023-11-07 DIAGNOSIS — K21.9 GASTROESOPHAGEAL REFLUX DISEASE, UNSPECIFIED WHETHER ESOPHAGITIS PRESENT: ICD-10-CM

## 2023-11-07 DIAGNOSIS — E11.42 TYPE 2 DIABETES MELLITUS WITH DIABETIC POLYNEUROPATHY, WITHOUT LONG-TERM CURRENT USE OF INSULIN (HCC): Primary | ICD-10-CM

## 2023-11-07 DIAGNOSIS — Z23 NEED FOR PNEUMOCOCCAL VACCINATION: ICD-10-CM

## 2023-11-07 DIAGNOSIS — M54.16 LUMBAR RADICULOPATHY: ICD-10-CM

## 2023-11-07 PROBLEM — E66.813 CLASS 3 SEVERE OBESITY DUE TO EXCESS CALORIES WITH SERIOUS COMORBIDITY AND BODY MASS INDEX (BMI) OF 40.0 TO 44.9 IN ADULT: Status: ACTIVE | Noted: 2023-11-07

## 2023-11-07 LAB — HBA1C MFR BLD: 5.5 %

## 2023-11-07 PROCEDURE — 1036F TOBACCO NON-USER: CPT | Performed by: INTERNAL MEDICINE

## 2023-11-07 PROCEDURE — G0009 ADMIN PNEUMOCOCCAL VACCINE: HCPCS | Performed by: INTERNAL MEDICINE

## 2023-11-07 PROCEDURE — 99214 OFFICE O/P EST MOD 30 MIN: CPT | Performed by: INTERNAL MEDICINE

## 2023-11-07 PROCEDURE — 2022F DILAT RTA XM EVC RTNOPTHY: CPT | Performed by: INTERNAL MEDICINE

## 2023-11-07 PROCEDURE — G8417 CALC BMI ABV UP PARAM F/U: HCPCS | Performed by: INTERNAL MEDICINE

## 2023-11-07 PROCEDURE — 83036 HEMOGLOBIN GLYCOSYLATED A1C: CPT | Performed by: INTERNAL MEDICINE

## 2023-11-07 PROCEDURE — 3044F HG A1C LEVEL LT 7.0%: CPT | Performed by: INTERNAL MEDICINE

## 2023-11-07 PROCEDURE — 3017F COLORECTAL CA SCREEN DOC REV: CPT | Performed by: INTERNAL MEDICINE

## 2023-11-07 PROCEDURE — G8484 FLU IMMUNIZE NO ADMIN: HCPCS | Performed by: INTERNAL MEDICINE

## 2023-11-07 PROCEDURE — G8427 DOCREV CUR MEDS BY ELIG CLIN: HCPCS | Performed by: INTERNAL MEDICINE

## 2023-11-07 PROCEDURE — 90677 PCV20 VACCINE IM: CPT | Performed by: INTERNAL MEDICINE

## 2023-11-07 RX ORDER — PANTOPRAZOLE SODIUM 40 MG/1
40 TABLET, DELAYED RELEASE ORAL
Qty: 30 TABLET | Refills: 1 | Status: SHIPPED | OUTPATIENT
Start: 2023-11-07

## 2023-11-07 RX ORDER — FAMOTIDINE 20 MG/1
20 TABLET, FILM COATED ORAL 2 TIMES DAILY PRN
Qty: 60 TABLET | Refills: 1 | Status: SHIPPED | OUTPATIENT
Start: 2023-11-07

## 2023-11-07 NOTE — PROGRESS NOTES
[facility]     Consultation requested by: Jj Adan MD      CC: Body contouring     CONSULTATION WITH     HPI: 53 y.o. female with a PMHx as delineated below who presents in consultation for body contouring. She has undergone a sleeve gastrectomy (10/18) at [facility] losing approximately 200 lbs. Since that time, she has had problems with her excess skin. She has had rashes requiring prescription creams from her PCP with intermittent benefit. She notes that exercise is difficult as she is having some pain from the pannus, especially with the weight. She additionally notes problems with excess skin from her arms as well as medial thighs as well. She was lost to follow-up due to [facility]. She gained approximately 20 lbs and states that she had previously been down to 200 lbs. Since her last evaluation, she has increased her weight 8 lbs.      Bariatric surgery: Yes / date: 10/18 (Type: sleeve gastrectomy)     Max weight (lbs): 420  Lowest weight (lbs): 224  Current (lbs): 253 (247, 224)  Weight change in the past 3 months: Yes  Max BMI: 59  Current BMI: 39.6 (35.9)     Last Mammogram: PEDING     Current bra size: 40D  Desired bra size: More full  Breast feeding: no future plans        History of DVT/PE: No  Excess skin cover genitals: Yes     Previous body contouring procedures: No             Smoking: No       Pregnancy / Miscarriage: 2/0     Past Medical History        Past Medical History:   Diagnosis Date    Arthritis      TAN (dyspnea on exertion)      Gastroesophageal reflux disease 1/28/2020    Hyperlipidemia      Hypertension      Morbid obesity with body mass index (BMI) of 60.0 to 69.9 in adult ([address]) 7/2/2018    Neuropathy      SHYANN (obstructive sleep apnea)      Primary osteoarthritis of right knee 7/2/2018    Type 2 diabetes mellitus without complication ([address])       controlled with diet    Urinary incontinence        HgbA1c 5.5 (11/7); 5.1

## 2023-11-07 NOTE — PROGRESS NOTES
Date of Visit: 2023    Farrah Rivers (:  1965) is a 60 y.o. female,  Established patient here for evaluation of the following chief complaint(s):  Diabetes, Gastroesophageal Reflux, Constipation, and Weight Loss (Refer to specialist )      ASSESSMENT/PLAN:    1. Type 2 diabetes mellitus with diabetic polyneuropathy, without long-term current use of insulin ([facility])  -Hemoglobin A1c of 5.5% shows diabetes is well controlled  -Continue diet control  -Limit carbohydrates to 45 grams with meals and 15 grams with snacks  -monitor blood sugars  -goal for blood sugar fasting or pre-meal  is   -goal for blood sugar 2 hours after a meal is less than 180  -goal for blood sugar at bedtime is less than 150  -POCT glycosylated hemoglobin (Hb A1C)  -Comprehensive Metabolic Panel; Future  -Lipid Panel; Future    2. Gastroesophageal reflux disease, unspecified whether esophagitis present  -not controlled  -Discontinue Omeprazole  -Start pantoprazole (PROTONIX) 40 MG tablet; Take 1 tablet by mouth every morning (before breakfast)  Dispense: 30 tablet; Refill: 1  - Start famotidine (PEPCID) 20 MG tablet; Take 1 tablet by mouth 2 times daily as needed (gerd)  Dispense: 60 tablet; Refill: 1  -Decrease caffeine, avoid spicy foods, avoid tomato based foods  -Eat small meals instead of large meals  -Wait 2-3 hours after eating before lying down    3. Constipation, unspecified constipation type  -stable  -continue Colace 100mg 2 times daily as needed  -high fiber diet    4. Class 3 severe obesity due to excess calories with serious comorbidity and body mass index (BMI) of 40.0 to 44.9 in adult ([address])  -BMI 39.31  -Referral to  [facility] (Bariatric Surgery) [place]    5. Vitamin D deficiency  -continue vitamin D 50,000 IU weekly  - Vitamin D 25 Hydroxy; Future    6. Lumbar radiculopathy  -continue Gabapentin  -Referral to  Imani Burciaga MD, Pain Management, [place]    7.  Need

## 2023-11-08 ENCOUNTER — OFFICE VISIT (OUTPATIENT)
Dept: SURGERY | Age: 58
End: 2023-11-08
Payer: COMMERCIAL

## 2023-11-08 ENCOUNTER — TELEPHONE (OUTPATIENT)
Dept: PULMONOLOGY | Age: 58
End: 2023-11-08

## 2023-11-08 VITALS
OXYGEN SATURATION: 98 % | BODY MASS INDEX: 39.71 KG/M2 | WEIGHT: 253 LBS | TEMPERATURE: 98.6 F | HEIGHT: 67 IN | HEART RATE: 78 BPM | SYSTOLIC BLOOD PRESSURE: 155 MMHG | DIASTOLIC BLOOD PRESSURE: 59 MMHG

## 2023-11-08 DIAGNOSIS — E11.42 TYPE 2 DIABETES MELLITUS WITH DIABETIC POLYNEUROPATHY, WITHOUT LONG-TERM CURRENT USE OF INSULIN (HCC): ICD-10-CM

## 2023-11-08 DIAGNOSIS — E66.9 CLASS 2 OBESITY WITH BODY MASS INDEX (BMI) OF 39.0 TO 39.9 IN ADULT, UNSPECIFIED OBESITY TYPE, UNSPECIFIED WHETHER SERIOUS COMORBIDITY PRESENT: ICD-10-CM

## 2023-11-08 DIAGNOSIS — L98.7 EXCESS SKIN OF ABDOMEN: Primary | ICD-10-CM

## 2023-11-08 DIAGNOSIS — R31.29 MICROSCOPIC HEMATURIA: ICD-10-CM

## 2023-11-08 DIAGNOSIS — E55.9 VITAMIN D DEFICIENCY: ICD-10-CM

## 2023-11-08 LAB
25(OH)D3 SERPL-MCNC: 23.1 NG/ML
ALBUMIN SERPL-MCNC: 4.5 G/DL (ref 3.4–5)
ALBUMIN/GLOB SERPL: 2 {RATIO} (ref 1.1–2.2)
ALP SERPL-CCNC: 87 U/L (ref 40–129)
ALT SERPL-CCNC: 7 U/L (ref 10–40)
ANION GAP SERPL CALCULATED.3IONS-SCNC: 12 MMOL/L (ref 3–16)
AST SERPL-CCNC: 10 U/L (ref 15–37)
BACTERIA URNS QL MICRO: ABNORMAL /HPF
BILIRUB SERPL-MCNC: 0.6 MG/DL (ref 0–1)
BUN SERPL-MCNC: 12 MG/DL (ref 7–20)
CALCIUM SERPL-MCNC: 8.9 MG/DL (ref 8.3–10.6)
CHLORIDE SERPL-SCNC: 101 MMOL/L (ref 99–110)
CHOLEST SERPL-MCNC: 214 MG/DL (ref 0–199)
CO2 SERPL-SCNC: 25 MMOL/L (ref 21–32)
CREAT SERPL-MCNC: <0.5 MG/DL (ref 0.6–1.1)
EPI CELLS #/AREA URNS AUTO: 1 /HPF (ref 0–5)
GFR SERPLBLD CREATININE-BSD FMLA CKD-EPI: >60 ML/MIN/{1.73_M2}
GLUCOSE SERPL-MCNC: 92 MG/DL (ref 70–99)
HDLC SERPL-MCNC: 102 MG/DL (ref 40–60)
HYALINE CASTS #/AREA URNS AUTO: 0 /LPF (ref 0–8)
LDLC SERPL CALC-MCNC: 96 MG/DL
POTASSIUM SERPL-SCNC: 4.3 MMOL/L (ref 3.5–5.1)
PROT SERPL-MCNC: 6.8 G/DL (ref 6.4–8.2)
RBC CLUMPS #/AREA URNS AUTO: 8 /HPF (ref 0–4)
SODIUM SERPL-SCNC: 138 MMOL/L (ref 136–145)
TRIGL SERPL-MCNC: 79 MG/DL (ref 0–150)
URN SPEC COLLECT METH UR: ABNORMAL
VLDLC SERPL CALC-MCNC: 16 MG/DL
WBC #/AREA URNS AUTO: 3 /HPF (ref 0–5)

## 2023-11-08 PROCEDURE — G8484 FLU IMMUNIZE NO ADMIN: HCPCS | Performed by: SURGERY

## 2023-11-08 PROCEDURE — G8427 DOCREV CUR MEDS BY ELIG CLIN: HCPCS | Performed by: SURGERY

## 2023-11-08 PROCEDURE — 3017F COLORECTAL CA SCREEN DOC REV: CPT | Performed by: SURGERY

## 2023-11-08 PROCEDURE — 1036F TOBACCO NON-USER: CPT | Performed by: SURGERY

## 2023-11-08 PROCEDURE — 99213 OFFICE O/P EST LOW 20 MIN: CPT | Performed by: SURGERY

## 2023-11-08 PROCEDURE — G8417 CALC BMI ABV UP PARAM F/U: HCPCS | Performed by: SURGERY

## 2023-11-09 ENCOUNTER — OFFICE VISIT (OUTPATIENT)
Dept: ORTHOPEDIC SURGERY | Age: 58
End: 2023-11-09

## 2023-11-09 VITALS — BODY MASS INDEX: 39.71 KG/M2 | WEIGHT: 253 LBS | HEIGHT: 67 IN

## 2023-11-09 DIAGNOSIS — M25.512 BILATERAL SHOULDER PAIN, UNSPECIFIED CHRONICITY: Primary | ICD-10-CM

## 2023-11-09 DIAGNOSIS — M19.012 PRIMARY OSTEOARTHRITIS OF LEFT SHOULDER: ICD-10-CM

## 2023-11-09 DIAGNOSIS — M75.81 TENDINITIS OF RIGHT ROTATOR CUFF: ICD-10-CM

## 2023-11-09 DIAGNOSIS — M25.511 BILATERAL SHOULDER PAIN, UNSPECIFIED CHRONICITY: Primary | ICD-10-CM

## 2023-11-09 RX ORDER — METHYLPREDNISOLONE ACETATE 40 MG/ML
80 INJECTION, SUSPENSION INTRA-ARTICULAR; INTRALESIONAL; INTRAMUSCULAR; SOFT TISSUE ONCE
Status: COMPLETED | OUTPATIENT
Start: 2023-11-09 | End: 2023-11-09

## 2023-11-09 RX ORDER — LIDOCAINE HYDROCHLORIDE 10 MG/ML
8 INJECTION, SOLUTION INFILTRATION; PERINEURAL ONCE
Status: COMPLETED | OUTPATIENT
Start: 2023-11-09 | End: 2023-11-09

## 2023-11-09 RX ORDER — ASPIRIN 81 MG
1 TABLET, DELAYED RELEASE (ENTERIC COATED) ORAL DAILY
COMMUNITY
Start: 2023-10-25

## 2023-11-09 RX ORDER — MULTIVIT,CALC,MINS/IRON/FOLIC 9MG-400MCG
1 TABLET ORAL DAILY
COMMUNITY
Start: 2023-10-25

## 2023-11-09 RX ADMIN — METHYLPREDNISOLONE ACETATE 80 MG: 40 INJECTION, SUSPENSION INTRA-ARTICULAR; INTRALESIONAL; INTRAMUSCULAR; SOFT TISSUE at 17:38

## 2023-11-09 RX ADMIN — LIDOCAINE HYDROCHLORIDE 8 ML: 10 INJECTION, SOLUTION INFILTRATION; PERINEURAL at 17:37

## 2023-11-09 RX ADMIN — LIDOCAINE HYDROCHLORIDE 8 ML: 10 INJECTION, SOLUTION INFILTRATION; PERINEURAL at 17:36

## 2023-11-09 NOTE — PROGRESS NOTES
placed over the injection site. Appropriate post injections instructions were given to the patient. Plan:  P  We had a lengthy discussion with [address] [place] [address] Road and with the aid of the . We recommend a trial of conservative treatment because this has been helpful for her in the past.  We recommend doing a corticosteroid injection as this was also helpful for her in the past. She had a  throughout the entire visit. [address] [place] [address] Road will follow up in 6 weeks and/or as needed. She was in agreement with this plan and all questions were answered to her satisfaction. She was encouraged to call with any questions. Greater than 30 minutes were expended on all aspects of today's visit. 11/9/2023  3:46 PM      Ayden Garcia PA-C  Orthopaedic Sports Medicine Physician Assistant    During this examination, I, Ayden Garcia PA-C, functioned as a scribe for Dr. Darius Angel. This dictation was performed with a verbal recognition program (DRAGON) and it was checked for errors. It is possible that there are still dictated errors within this office note. If so, please bring any errors to my attention for an addendum. All efforts were made to ensure that this office note is accurate.  __________  I, Dr. Darius Angel, personally performed the services described in this documentation as described by Ayden Garcia PA-C in my presence, and it is both accurate and complete. Saad Mg MD, PhD  11/9/2023

## 2023-11-10 ENCOUNTER — OFFICE VISIT (OUTPATIENT)
Dept: SURGERY | Age: 58
End: 2023-11-10
Payer: COMMERCIAL

## 2023-11-10 VITALS
SYSTOLIC BLOOD PRESSURE: 118 MMHG | WEIGHT: 293 LBS | DIASTOLIC BLOOD PRESSURE: 79 MMHG | BODY MASS INDEX: 55.6 KG/M2 | HEART RATE: 56 BPM

## 2023-11-10 DIAGNOSIS — K80.20 SYMPTOMATIC CHOLELITHIASIS: Primary | ICD-10-CM

## 2023-11-10 PROCEDURE — 99204 OFFICE O/P NEW MOD 45 MIN: CPT | Performed by: SURGERY

## 2023-11-10 PROCEDURE — G8427 DOCREV CUR MEDS BY ELIG CLIN: HCPCS | Performed by: SURGERY

## 2023-11-10 PROCEDURE — G8417 CALC BMI ABV UP PARAM F/U: HCPCS | Performed by: SURGERY

## 2023-11-10 PROCEDURE — 1036F TOBACCO NON-USER: CPT | Performed by: SURGERY

## 2023-11-10 PROCEDURE — G8484 FLU IMMUNIZE NO ADMIN: HCPCS | Performed by: SURGERY

## 2023-11-10 PROCEDURE — 3017F COLORECTAL CA SCREEN DOC REV: CPT | Performed by: SURGERY

## 2023-11-13 ENCOUNTER — OFFICE VISIT (OUTPATIENT)
Dept: ORTHOPEDIC SURGERY | Age: 58
End: 2023-11-13
Payer: COMMERCIAL

## 2023-11-13 VITALS — HEIGHT: 67 IN | WEIGHT: 293 LBS | BODY MASS INDEX: 45.99 KG/M2

## 2023-11-13 DIAGNOSIS — M54.16 LUMBAR RADICULOPATHY: ICD-10-CM

## 2023-11-13 DIAGNOSIS — M17.0 OSTEOARTHRITIS OF PATELLOFEMORAL JOINTS OF BOTH KNEES: ICD-10-CM

## 2023-11-13 DIAGNOSIS — M17.12 PRIMARY OSTEOARTHRITIS OF LEFT KNEE: Primary | ICD-10-CM

## 2023-11-13 DIAGNOSIS — Z90.3 HISTORY OF SLEEVE GASTRECTOMY: ICD-10-CM

## 2023-11-13 DIAGNOSIS — M17.11 PRIMARY OSTEOARTHRITIS OF RIGHT KNEE: ICD-10-CM

## 2023-11-13 DIAGNOSIS — E66.01 CLASS 2 SEVERE OBESITY DUE TO EXCESS CALORIES WITH SERIOUS COMORBIDITY AND BODY MASS INDEX (BMI) OF 37.0 TO 37.9 IN ADULT (HCC): ICD-10-CM

## 2023-11-13 DIAGNOSIS — E11.42 TYPE 2 DIABETES MELLITUS WITH DIABETIC POLYNEUROPATHY, WITHOUT LONG-TERM CURRENT USE OF INSULIN (HCC): Primary | ICD-10-CM

## 2023-11-13 PROCEDURE — 1036F TOBACCO NON-USER: CPT | Performed by: INTERNAL MEDICINE

## 2023-11-13 PROCEDURE — G8417 CALC BMI ABV UP PARAM F/U: HCPCS | Performed by: INTERNAL MEDICINE

## 2023-11-13 PROCEDURE — G8427 DOCREV CUR MEDS BY ELIG CLIN: HCPCS | Performed by: INTERNAL MEDICINE

## 2023-11-13 PROCEDURE — 3017F COLORECTAL CA SCREEN DOC REV: CPT | Performed by: INTERNAL MEDICINE

## 2023-11-13 PROCEDURE — 99214 OFFICE O/P EST MOD 30 MIN: CPT | Performed by: INTERNAL MEDICINE

## 2023-11-13 PROCEDURE — G8484 FLU IMMUNIZE NO ADMIN: HCPCS | Performed by: INTERNAL MEDICINE

## 2023-11-13 NOTE — TELEPHONE ENCOUNTER
Medication:   Requested Prescriptions     Pending Prescriptions Disp Refills    gabapentin (NEURONTIN) 300 MG capsule [Pharmacy Med Name: GABAPENTIN 300 MG CAPSULE] 90 capsule 0     Sig: Take by mouth 3 times daily. for 30 days        Last Filled:      Patient Phone Number: [phone] (home)     Last appt: 11/7/2023   Next appt: 12/18/2023    Last OARRS:       8/7/2020     1:12 PM   RX Monitoring   Periodic Controlled Substance Monitoring Possible medication side effects, risk of tolerance/dependence & alternative treatments discussed. ;Assessed functional status. ;Obtaining appropriate analgesic effect of treatment. Chronic Pain > 50 MEDD Obtained or confirmed \"Consent for Opioid Use\" on file. Preferred Pharmacy:   [facility], [address] [facility]  Phone: [phone] Fax: [address] [place], [place] [address]way [address], [address] [phone] [place] [phone] [address] [place] [address]  Phone: [phone] Fax: [phone]    [facility] [address] [place], [address]  [address] [address]  Phone: [phone] Fax: [phone]    [facility]/pharmacy #[id number] [address] Warren Sommers [phone] - F [phone]  [address] [place] [address]. [facility] [address]  Phone: [phone] Fax: [phone]  Medication:   Requested Prescriptions     Pending Prescriptions Disp Refills    gabapentin (NEURONTIN) 300 MG capsule [Pharmacy Med Name: GABAPENTIN 300 MG CAPSULE] 90 capsule 0     Sig: Take by mouth 3 times daily.  for 30 days        Last Filled:      Patient Phone Number: [phone] (home)     Last appt: 11/7/2023   Next appt: 12/18/2023    Last OARRS:       8/7/2020     1:12 PM   RX Monitoring   Periodic Controlled Substance Monitoring Possible medication side effects, risk of

## 2023-11-13 NOTE — PROGRESS NOTES
Chief Complaint:   Chief Complaint   Patient presents with    Knee Pain     Bilateral knee pain- pain in both knees, wanting to do another gel injection          History of Present Illness:       Patient is a 60 y.o. female returns follow up for the above complaint. The patient was last seen approximately 6 monthsago for assessment of her bilateral knees. The symptoms are worsening since the last visit. The patient has had no further testing for the problem. Status post bilateral HA therapy-Euflexxa with therapeutic benefit completed 5/1/2023    Pain localizes to the front side of the knee primarily left symptomatic than right and  does seems to follow a typical patella femoral provacative pattern. There are not mechanical symptoms that suggest meniscal injury. The patient denies subjective instability about the knee and denies new onset weakness of the lower extremity. Pain level 9    The patient denies a pattern of activity related swelling. Treatment to date: Other injection HA therapy as outlined above with good improvement. There is no prior history of recent knee trauma. There is no prior history of autoimmune disease, inflammatory arthropathy, or crystal arthropathy.             Past Medical History:        Past Medical History:   Diagnosis Date    Arthritis     Avoids pork and pork products due to cultural beliefs     Chronic pain     TAN (dyspnea on exertion)     Frequent headaches 12/13/2022    Gallbladder disorder     Gastroesophageal reflux disease 01/28/2020    Hx of blood clots     in legs -- 13 years ago    Hx of degenerative disc disease     Hyperlipidemia     Hypertension     Morbid obesity with body mass index (BMI) of 60.0 to 69.9 in adult [facility]) 07/02/2018    Neuropathy     SHYANN (obstructive sleep apnea)     machine broke down, awaiting new machine at the end of June 2023    Poor circulation     Primary osteoarthritis of right knee 07/02/2018    Type 2 diabetes mellitus

## 2023-11-15 RX ORDER — GABAPENTIN 300 MG/1
300 CAPSULE ORAL 3 TIMES DAILY
Qty: 90 CAPSULE | Refills: 0 | Status: SHIPPED | OUTPATIENT
Start: 2023-11-15 | End: 2024-01-14

## 2023-11-16 ENCOUNTER — TELEPHONE (OUTPATIENT)
Dept: SURGERY | Age: 58
End: 2023-11-16

## 2023-11-16 ENCOUNTER — TELEPHONE (OUTPATIENT)
Dept: ORTHOPEDIC SURGERY | Age: 58
End: 2023-11-16

## 2023-11-16 NOTE — TELEPHONE ENCOUNTER
I called the patient to let her know that her injections were approved by insurance. As I was waiting for her to get her calendar, the call was disconnected. I attempted to call the patient's number again and was unable to get through. I called her secondary number and left a message for her to call back. The patient may be scheduled for her Euflexxa injections upon call back. *PATIENT MUST BE SCHEDULED IN AN  SLOT - EITHER [phone] OR [phone]. *

## 2023-11-16 NOTE — TELEPHONE ENCOUNTER
Patient scheduled using     Patient seen 11/10/23 surgery letter received Laparoscopic Cholecystectomy with gram possible umbilical hernia repair 1 hr OR time needed under general     Covid vaccinated     Pre op instructions reviewed including the need for a H&P with a EKG  Pre op packet mailed     Post op appt scheduled     Faxed to scheduling     Placed on Calendar and outlook

## 2023-11-16 NOTE — TELEPHONE ENCOUNTER
Controlled Substance Monitoring:    Acute and Chronic Pain Monitoring:   RX Monitoring Periodic Controlled Substance Monitoring   11/15/2023   7:31 PM No signs of potential drug abuse or diversion identified.

## 2023-11-16 NOTE — PROGRESS NOTES
PATIENT NAME: Farrah Rivers     YOB: 1965     TODAY'S DATE: 11/20/2023    Reason for Visit:  Postprandial pain    Requesting Physician:  Dr. Steven Hanna:              The patient is a 60 y.o. female with a PMHx as delineated below who presents with multiple episodes of post prandial pain worse with fattier foods. She also notes periumbilical pain with activities without obstructive complaints.      Chief Complaint   Patient presents with    New Patient     Umbilical hernia and Gallstones & Dilated bile duct seen 2/3/20       REVIEW OF SYSTEMS:  CONSTITUTIONAL:  negative  HEENT:  negative  RESPIRATORY:  negative  CARDIOVASCULAR:  negative  GASTROINTESTINAL:  negative except for nausea and abdominal pain  GENITOURINARY:  negative  HEMATOLOGIC/LYMPHATIC:  negative  MUSCULOSKELETAL: negative  NEUROLOGICAL:  negative    PMH  Past Medical History:   Diagnosis Date    Arthritis     Avoids pork and pork products due to cultural beliefs     Chronic pain     TAN (dyspnea on exertion)     Frequent headaches 12/13/2022    Gallbladder disorder     Gastroesophageal reflux disease 01/28/2020    Hx of blood clots     in legs -- 13 years ago    Hx of degenerative disc disease     Hyperlipidemia     Hypertension     Morbid obesity with body mass index (BMI) of 60.0 to 69.9 in adult ([address]) 07/02/2018    Neuropathy     SHYANN (obstructive sleep apnea)     machine broke down, awaiting new machine at the end of June 2023    Poor circulation     Primary osteoarthritis of right knee 07/02/2018    Type 2 diabetes mellitus without complication ([address])     controlled with diet    Urinary incontinence     Vitamin D deficiency 01/28/2020    Wears glasses        PSH  Past Surgical History:   Procedure Laterality Date    BACK SURGERY      2001 -- L4 and L5 -- no metal or implants    BREAST LUMPECTOMY Bilateral     2015    COLONOSCOPY      DILATION AND CURETTAGE OF UTERUS N/A 08/30/2018    GASTRIC BYPASS SURGERY

## 2023-11-16 NOTE — TELEPHONE ENCOUNTER
Patient thought about what was discussed at her last appointment, and she's ready to move forward and schedule her surgery.     Please call back at:  [phone]  Or  [phone]

## 2023-11-17 ENCOUNTER — TELEPHONE (OUTPATIENT)
Dept: PRIMARY CARE CLINIC | Age: 58
End: 2023-11-17

## 2023-11-17 NOTE — TELEPHONE ENCOUNTER
----- Message from Mane Boyle sent at 11/17/2023  3:33 PM EST -----  Subject: Message to Provider    QUESTIONS  Information for Provider? Pt has question about her referral for pain   management . Pt has not been able to scheduled and she is asking speak with   someone in regards of helping schedule with this provider . Pt also have   questions that are in regards blood test result that was ordered on 11/7   . Pt also would like to get information on her weight management . Dilma Ray Please   reach out to pt in this regards . I did search to see if any appointment   but there was no appointment available . Pt surgery is 11/30 and is asking   for a appointment before  ---------------------------------------------------------------------------  --------------  [place] Gert[place]ude  [phone]; OK to leave message on voicemail  ---------------------------------------------------------------------------  --------------  SCRIPT ANSWERS  Relationship to Patient? Self  Do you have questions for your provider that need to be answered prior to   scheduling your pre-op appointment?  Yes

## 2023-11-20 ASSESSMENT — ENCOUNTER SYMPTOMS
EYE DISCHARGE: 0
BLOOD IN STOOL: 0
SHORTNESS OF BREATH: 0
VOMITING: 0
ABDOMINAL DISTENTION: 0
BACK PAIN: 0
COLOR CHANGE: 0
COUGH: 0
WHEEZING: 0
ABDOMINAL PAIN: 0
CHEST TIGHTNESS: 0
FACIAL SWELLING: 0

## 2023-11-20 NOTE — PROGRESS NOTES
Yes Provider, MD Yanick   loperamide (RA ANTI-DIARRHEAL) 2 MG capsule Take 1 capsule by mouth 4 times daily as needed for Diarrhea 1/27/23  Yes Luther Edwards MD   Blood Glucose Monitoring Suppl (BLOOD GLUCOSE MONITOR SYSTEM) w/Device KIT Dispense glucometer covered by patient's insurance 12/1/22  Yes Luther Edwards MD   Lancets MISC Use to test blood sugar once daily 12/1/22  Yes Luther Edwards MD        Review of Systems   Constitutional:  Negative for activity change, appetite change, diaphoresis, fatigue, fever and unexpected weight change. HENT:  Negative for congestion, facial swelling, mouth sores and nosebleeds. Eyes:  Negative for discharge and visual disturbance. Respiratory:  Negative for cough, chest tightness, shortness of breath and wheezing. Cardiovascular:  Positive for chest pain and leg swelling. Negative for palpitations. Gastrointestinal:  Negative for abdominal distention, abdominal pain, blood in stool and vomiting. Endocrine: Negative for cold intolerance, heat intolerance and polyuria. Genitourinary:  Negative for difficulty urinating, dysuria, frequency and hematuria. Musculoskeletal:  Negative for back pain, joint swelling, myalgias and neck pain. Skin:  Negative for color change, pallor and rash. Allergic/Immunologic: Negative for immunocompromised state. Neurological:  Negative for dizziness, syncope, weakness, light-headedness, numbness and headaches. Hematological:  Negative for adenopathy. Does not bruise/bleed easily. Psychiatric/Behavioral:  Negative for behavioral problems, confusion, decreased concentration and suicidal ideas. The patient is not nervous/anxious.         Vitals:    12/05/23 1229   BP: 110/84   Pulse: 68    Weight - Scale: 112.5 kg (248 lb)       Vitals:    12/05/23 1229   BP: 110/84   Pulse: 68   Weight: 112.5 kg (248 lb)       BP Readings from Last 3 Encounters:   12/05/23 110/84   11/30/23 134/77   11/27/23 130/80

## 2023-11-20 NOTE — PROGRESS NOTES
Preoperative Consultation    Name: Franky Cadet  YOB: 1965    This patient presents to the office today for a preoperative consultation at the request of surgeon, Dr.S. Stacy Patterson, who plans on performing LAPAROSCOPIC CHOLECYSTECTOMY WITH CHOLANGIOGRAM , Will  on November 30, 2023 at [facility]      Planned anesthesia: General   Known anesthesia problems: None   Bleeding risk: Anticoagulant therapy-Eliquis 5 mg twice daily  Personal or FH ofDVT/PE: Yes -history of DVT left upper extremity and SVT right arm     present via iPad    DMII  Controlled without medications  Last A1c 5.5  History of neuropathy    CARDIO  Cardiac clearance received per Dr. Jacque Oliveira 2/2023 for spinal surgery  No recent surgery  Still taking Eliquis 5mg BID. Has been seeing hematologist, rx from him. GERD  On omeprazole 20 mg twice daily    SHYANN  Follows with sleep med    BACK PAIN  History of chronic back pain  Status post back surgery 2/2023  Patient has been referred to pain management.   Awaiting appointment  Follows with Ortho  Uses power wheelchair    NEURO  Referred to neurology for vertigo and headaches and memory issues    Patient Active Problem List   Diagnosis    Postlaminectomy syndrome of lumbar region    Lumbar radiculopathy    SHYANN (obstructive sleep apnea)    Chronic, continuous use of opioids    Class 2 severe obesity due to excess calories with serious comorbidity and body mass index (BMI) of 37.0 to 37.9 in adult [facility])    Primary osteoarthritis of right knee    Primary osteoarthritis of left knee    Polyarthropathy, multiple sites    Chronic pain syndrome    Postmenopausal bleeding    Other spondylosis, cervical region    Pain disorder with psychological factors    Palpitations    Sinus bradycardia    Dizziness    Gastroesophageal reflux disease    Urinary tract infection symptoms    Vitamin D deficiency    Bilateral leg pain    Cellulitis of lower extremity

## 2023-11-21 ENCOUNTER — OFFICE VISIT (OUTPATIENT)
Dept: ORTHOPEDIC SURGERY | Age: 58
End: 2023-11-21
Payer: COMMERCIAL

## 2023-11-21 ENCOUNTER — OFFICE VISIT (OUTPATIENT)
Dept: PRIMARY CARE CLINIC | Age: 58
End: 2023-11-21
Payer: COMMERCIAL

## 2023-11-21 VITALS
HEART RATE: 62 BPM | BODY MASS INDEX: 38.8 KG/M2 | SYSTOLIC BLOOD PRESSURE: 122 MMHG | WEIGHT: 247.7 LBS | DIASTOLIC BLOOD PRESSURE: 80 MMHG

## 2023-11-21 VITALS
DIASTOLIC BLOOD PRESSURE: 88 MMHG | HEART RATE: 56 BPM | SYSTOLIC BLOOD PRESSURE: 136 MMHG | OXYGEN SATURATION: 98 % | TEMPERATURE: 97.3 F | BODY MASS INDEX: 39.31 KG/M2 | WEIGHT: 251 LBS

## 2023-11-21 DIAGNOSIS — R00.1 SINUS BRADYCARDIA: ICD-10-CM

## 2023-11-21 DIAGNOSIS — G89.4 CHRONIC PAIN SYNDROME: ICD-10-CM

## 2023-11-21 DIAGNOSIS — E11.42 TYPE 2 DIABETES MELLITUS WITH DIABETIC POLYNEUROPATHY, WITHOUT LONG-TERM CURRENT USE OF INSULIN (HCC): Chronic | ICD-10-CM

## 2023-11-21 DIAGNOSIS — M16.31 OSTEOARTHRITIS RESULTING FROM RIGHT HIP DYSPLASIA: Primary | ICD-10-CM

## 2023-11-21 DIAGNOSIS — Z90.3 HISTORY OF SLEEVE GASTRECTOMY: ICD-10-CM

## 2023-11-21 DIAGNOSIS — M25.859 FEMORAL ACETABULAR IMPINGEMENT: ICD-10-CM

## 2023-11-21 DIAGNOSIS — Z01.818 PRE-OP EXAM: ICD-10-CM

## 2023-11-21 DIAGNOSIS — K21.9 GASTROESOPHAGEAL REFLUX DISEASE, UNSPECIFIED WHETHER ESOPHAGITIS PRESENT: Chronic | ICD-10-CM

## 2023-11-21 DIAGNOSIS — E66.01 MORBID OBESITY (HCC): ICD-10-CM

## 2023-11-21 DIAGNOSIS — K80.20 SYMPTOMATIC CHOLELITHIASIS: Primary | ICD-10-CM

## 2023-11-21 DIAGNOSIS — I82.622 DEEP VEIN THROMBOSIS (DVT) OF LEFT UPPER EXTREMITY, UNSPECIFIED CHRONICITY, UNSPECIFIED VEIN (HCC): ICD-10-CM

## 2023-11-21 PROCEDURE — G8484 FLU IMMUNIZE NO ADMIN: HCPCS | Performed by: NURSE PRACTITIONER

## 2023-11-21 PROCEDURE — 3044F HG A1C LEVEL LT 7.0%: CPT | Performed by: NURSE PRACTITIONER

## 2023-11-21 PROCEDURE — 20610 DRAIN/INJ JOINT/BURSA W/O US: CPT | Performed by: INTERNAL MEDICINE

## 2023-11-21 PROCEDURE — 3017F COLORECTAL CA SCREEN DOC REV: CPT | Performed by: NURSE PRACTITIONER

## 2023-11-21 PROCEDURE — G8427 DOCREV CUR MEDS BY ELIG CLIN: HCPCS | Performed by: NURSE PRACTITIONER

## 2023-11-21 PROCEDURE — 3017F COLORECTAL CA SCREEN DOC REV: CPT | Performed by: INTERNAL MEDICINE

## 2023-11-21 PROCEDURE — 1036F TOBACCO NON-USER: CPT | Performed by: NURSE PRACTITIONER

## 2023-11-21 PROCEDURE — G8484 FLU IMMUNIZE NO ADMIN: HCPCS | Performed by: INTERNAL MEDICINE

## 2023-11-21 PROCEDURE — G8417 CALC BMI ABV UP PARAM F/U: HCPCS | Performed by: INTERNAL MEDICINE

## 2023-11-21 PROCEDURE — 2022F DILAT RTA XM EVC RTNOPTHY: CPT | Performed by: NURSE PRACTITIONER

## 2023-11-21 PROCEDURE — G8417 CALC BMI ABV UP PARAM F/U: HCPCS | Performed by: NURSE PRACTITIONER

## 2023-11-21 PROCEDURE — 99214 OFFICE O/P EST MOD 30 MIN: CPT | Performed by: NURSE PRACTITIONER

## 2023-11-21 PROCEDURE — 1036F TOBACCO NON-USER: CPT | Performed by: INTERNAL MEDICINE

## 2023-11-21 PROCEDURE — G8428 CUR MEDS NOT DOCUMENT: HCPCS | Performed by: INTERNAL MEDICINE

## 2023-11-21 PROCEDURE — 99213 OFFICE O/P EST LOW 20 MIN: CPT | Performed by: INTERNAL MEDICINE

## 2023-11-21 PROCEDURE — 93000 ELECTROCARDIOGRAM COMPLETE: CPT | Performed by: NURSE PRACTITIONER

## 2023-11-21 RX ORDER — ROPIVACAINE HYDROCHLORIDE 5 MG/ML
3 INJECTION, SOLUTION EPIDURAL; INFILTRATION; PERINEURAL ONCE
Status: COMPLETED | OUTPATIENT
Start: 2023-11-21 | End: 2023-11-21

## 2023-11-21 RX ORDER — METHYLPREDNISOLONE ACETATE 40 MG/ML
40 INJECTION, SUSPENSION INTRA-ARTICULAR; INTRALESIONAL; INTRAMUSCULAR; SOFT TISSUE ONCE
Status: COMPLETED | OUTPATIENT
Start: 2023-11-21 | End: 2023-11-21

## 2023-11-21 RX ORDER — LIDOCAINE HYDROCHLORIDE 10 MG/ML
5 INJECTION, SOLUTION INFILTRATION; PERINEURAL ONCE
Status: COMPLETED | OUTPATIENT
Start: 2023-11-21 | End: 2023-11-21

## 2023-11-21 RX ORDER — BUPIVACAINE HYDROCHLORIDE 2.5 MG/ML
6 INJECTION, SOLUTION INFILTRATION; PERINEURAL ONCE
Status: COMPLETED | OUTPATIENT
Start: 2023-11-21 | End: 2023-11-21

## 2023-11-21 RX ADMIN — ROPIVACAINE HYDROCHLORIDE 3 ML: 5 INJECTION, SOLUTION EPIDURAL; INFILTRATION; PERINEURAL at 10:39

## 2023-11-21 RX ADMIN — LIDOCAINE HYDROCHLORIDE 5 ML: 10 INJECTION, SOLUTION INFILTRATION; PERINEURAL at 10:36

## 2023-11-21 RX ADMIN — BUPIVACAINE HYDROCHLORIDE 15 MG: 2.5 INJECTION, SOLUTION INFILTRATION; PERINEURAL at 10:36

## 2023-11-21 RX ADMIN — METHYLPREDNISOLONE ACETATE 40 MG: 40 INJECTION, SUSPENSION INTRA-ARTICULAR; INTRALESIONAL; INTRAMUSCULAR; SOFT TISSUE at 10:37

## 2023-11-21 ASSESSMENT — ENCOUNTER SYMPTOMS
BACK PAIN: 1
SHORTNESS OF BREATH: 0
VOMITING: 0
COUGH: 0
DIARRHEA: 0
NAUSEA: 0
WHEEZING: 0

## 2023-11-21 NOTE — PATIENT INSTRUCTIONS
SCHEDULE APT WITH DR Ronalee Osler FOR PRE OP CLEARANCE. SCHEDULE APT WITH DR Raymondo Gowers TO DISCUSS OTHER CONCERNS. CHECK WITH YOUR HEMATOLOGIST THAT IT IS OK TO HOLD ELIQUIS 3 DAYS PRIOR TO SURGERY. AVOID NSAIDS (IBUPROFEN, MOTRIN, ALEVE, ASA) FOR 7 DAYS PRIOR TO SURGERY. OK TO TAKE TYLENOL. CALL THE [employer] OF THE CHAIR, OR THE PLACE YOU GOT IT FROM TO REPLACE THE BROKEN PARTS OF THE CHAIR. MESSAGE FROM CECE Yan,       Your referral to pain management has been sent and I spoke with them. They were supposed to reach out to you and explain the process of referrals. They have a 2 week process and I sent over all the pertaining tests that they requested. Once the provider okays to see you as a patient their office will reach out to you and schedule an appointment. The weight management referral information is as follows, please call their office and schedule an appointment.  [facility]) - [facility], [address]   [phone]     Lorena Jamil, [address]

## 2023-11-21 NOTE — PROGRESS NOTES
ibuprofen (ADVIL;MOTRIN) 400 MG tablet TAKE 400-800 MG TWICE DAILY AS NEEDED FOR HEADACHES. MAX IS 2-3 TIMES PER WEEK      gabapentin (NEURONTIN) 300 MG capsule Take 1 capsule by mouth 3 times daily for 60 days. for 30 days 90 capsule 0    calcium carb-cholecalciferol 600-5 MG-MCG TABS tablet Take 1 tablet by mouth daily      Multiple Vitamins-Minerals (THEREMS-M) TABS Take 1 tablet by mouth daily      pantoprazole (PROTONIX) 40 MG tablet Take 1 tablet by mouth every morning (before breakfast) 30 tablet 1    famotidine (PEPCID) 20 MG tablet Take 1 tablet by mouth 2 times daily as needed (gerd) 60 tablet 1    docusate sodium (EQUATE STOOL SOFTENER) 100 MG capsule TAKE 1 CAPSULE BY MOUTH TWICE DAILY AS NEEDED FOR CONSTIPATION 180 capsule 1    naloxegol (MOVANTIK) 12.5 MG TABS tablet Take 1 tablet by mouth every morning (before breakfast) 90 tablet 1    furosemide (LASIX) 40 MG tablet TAKE 1 TABLET BY MOUTH EVERY DAY 90 tablet 3    blood glucose test strips (TRUE METRIX BLOOD GLUCOSE TEST) strip USE TO TEST ONCE DAILY 100 strip 3    ACETAMINOPHEN EXTRA STRENGTH 500 MG tablet TAKE 1 TABLET BY MOUTH 4 TIMES DAILY AS NEEDED FOR PAIN 60 tablet 2    HYDROcodone-acetaminophen (NORCO) 5-325 MG per tablet TAKE 1 TABLET BY MOUTH EVERY 4 TO 6 HOURS (Patient not taking: Reported on 11/21/2023)      ondansetron (ZOFRAN) 4 MG tablet Take 1 tablet by mouth 3 times daily as needed for Nausea or Vomiting 15 tablet 0    vitamin D (ERGOCALCIFEROL) 1.25 MG (15232 UT) CAPS capsule Take 1 capsule by mouth once a week 4 capsule 5    nystatin (MYCOSTATIN) 931191 UNIT/GM cream Apply topically 2 times daily.  30 g 0    diclofenac sodium (VOLTAREN) 1 % GEL APPLY 2 GRAMS TOPICALLY 4 TIMES DAILY 100 g 0    Diabetic Shoe MISC by Does not apply route Dispense pair of diabetic shoes with custom inserts 1 each 0    apixaban (ELIQUIS) 5 MG TABS tablet Take 1 tablet by mouth 2 times daily 60 tablet 2    GAVILYTE-G 236 g solution       loperamide (RA

## 2023-11-22 ENCOUNTER — TELEPHONE (OUTPATIENT)
Dept: ORTHOPEDIC SURGERY | Age: 58
End: 2023-11-22

## 2023-11-22 RX ORDER — IBUPROFEN 400 MG/1
TABLET ORAL
COMMUNITY
Start: 2023-11-14

## 2023-11-22 NOTE — TELEPHONE ENCOUNTER
Medical Facility Question     Facility Name: [facility] Name: [facility] Number: [phone]  Request or Information: REQUESTING A REFERRAL GO TO [facility] FOR REPAIR OF WHEELCHAIR AND 2 FOOT RESTS.   PHONE: [phone]: [phone]

## 2023-11-24 ASSESSMENT — ENCOUNTER SYMPTOMS
SHORTNESS OF BREATH: 0
BACK PAIN: 1
COUGH: 0
DIARRHEA: 0
TROUBLE SWALLOWING: 0
ABDOMINAL PAIN: 0
NAUSEA: 0
CONSTIPATION: 1
SINUS PRESSURE: 0
WHEEZING: 0
BLOOD IN STOOL: 0
RHINORRHEA: 0
VOMITING: 0
SORE THROAT: 0
CHEST TIGHTNESS: 0

## 2023-11-27 ENCOUNTER — OFFICE VISIT (OUTPATIENT)
Dept: CARDIOLOGY CLINIC | Age: 58
End: 2023-11-27
Payer: COMMERCIAL

## 2023-11-27 ENCOUNTER — OFFICE VISIT (OUTPATIENT)
Dept: ORTHOPEDIC SURGERY | Age: 58
End: 2023-11-27

## 2023-11-27 VITALS — SYSTOLIC BLOOD PRESSURE: 130 MMHG | HEART RATE: 60 BPM | DIASTOLIC BLOOD PRESSURE: 80 MMHG

## 2023-11-27 VITALS — HEIGHT: 67 IN | BODY MASS INDEX: 39.39 KG/M2 | WEIGHT: 251 LBS

## 2023-11-27 DIAGNOSIS — M17.12 PRIMARY OSTEOARTHRITIS OF LEFT KNEE: Primary | ICD-10-CM

## 2023-11-27 DIAGNOSIS — M17.11 PRIMARY OSTEOARTHRITIS OF RIGHT KNEE: ICD-10-CM

## 2023-11-27 DIAGNOSIS — E66.01 MORBID OBESITY (HCC): ICD-10-CM

## 2023-11-27 DIAGNOSIS — Z01.818 PRE-OP EVALUATION: Primary | ICD-10-CM

## 2023-11-27 PROCEDURE — 3017F COLORECTAL CA SCREEN DOC REV: CPT | Performed by: INTERNAL MEDICINE

## 2023-11-27 PROCEDURE — G8428 CUR MEDS NOT DOCUMENT: HCPCS | Performed by: INTERNAL MEDICINE

## 2023-11-27 PROCEDURE — 99214 OFFICE O/P EST MOD 30 MIN: CPT | Performed by: INTERNAL MEDICINE

## 2023-11-27 PROCEDURE — G8417 CALC BMI ABV UP PARAM F/U: HCPCS | Performed by: INTERNAL MEDICINE

## 2023-11-27 PROCEDURE — G8484 FLU IMMUNIZE NO ADMIN: HCPCS | Performed by: INTERNAL MEDICINE

## 2023-11-27 PROCEDURE — 1036F TOBACCO NON-USER: CPT | Performed by: INTERNAL MEDICINE

## 2023-11-27 ASSESSMENT — ENCOUNTER SYMPTOMS
SHORTNESS OF BREATH: 0
CHOKING: 0
COUGH: 0
CHEST TIGHTNESS: 0

## 2023-11-27 NOTE — PROGRESS NOTES
injection    Image stored    The media patellafemoral joint recess was visualized in short axis. No evidence of effusion, soft tissue mass or cystic   lesions. Other Outside Imaging and Testing Personally Reviewed:    US GUIDED NEEDLE PLACEMENT    Result Date: 11/21/2023  Radiology result is complete; follow up with provider / physician office for radiology results              Assessment   Impression: . Encounter Diagnoses   Name Primary? Primary osteoarthritis of left knee Yes    Primary osteoarthritis of right knee     Morbid obesity ([address])               Plan:       Postinjection protocol and follow-up for consecutive injections         Orders:        Orders Placed This Encounter   Procedures    US GUIDED NEEDLE PLACEMENT     Standing Status:   Future     Number of Occurrences:   1     Standing Expiration Date:   11/27/2024     Order Specific Question:   Reason for exam:     Answer:   Roxi Davis MD.      Disclaimer: \"This note was dictated with voice recognition software. Though review and correction are routine, we apologize for any errors. \"

## 2023-11-27 NOTE — PROGRESS NOTES
Subjective:      Patient ID: Kenneth Gates is a 60 y.o. female. HPI Referred pre op david. Seen in past for Bradycardia by Dr Orion Buchanan. Negative cardiac evaluation at that time. Similar vague chest sx. No sob. No syncope/near syncope. HR stable. No pnd/orthopnea. Edema controlled. Past Medical History:   Diagnosis Date    Arthritis     Avoids pork and pork products due to cultural beliefs     Chronic pain     TAN (dyspnea on exertion)     Frequent headaches 12/13/2022    Gallbladder disorder     Gastroesophageal reflux disease 01/28/2020    Hx of blood clots     in legs -- 13 years ago    Hx of degenerative disc disease     Hyperlipidemia     Hypertension     Morbid obesity with body mass index (BMI) of 60.0 to 69.9 in adult [facility]) 07/02/2018    Neuropathy     SHYANN (obstructive sleep apnea)     machine broke down, awaiting new machine at the end of June 2023    Poor circulation     Primary osteoarthritis of right knee 07/02/2018    Type 2 diabetes mellitus without complication ([address])     controlled with diet    Urinary incontinence     Vitamin D deficiency 01/28/2020    Wears glasses      Past Surgical History:   Procedure Laterality Date    BACK SURGERY      2001 -- L4 and L5 -- no metal or implants    BREAST LUMPECTOMY Bilateral     2015    COLONOSCOPY      DILATION AND CURETTAGE OF UTERUS N/A 08/30/2018    GASTRIC BYPASS SURGERY      LAMINECTOMY N/A 2/22/2023    T10-T11 LAMINECTOMY, FACETECTOMY, PEDICLE SCREW FIXATION FOR REMOVAL OF THORACIC MASS performed by Misha Blair.  Jailene Mccray MD at [address] [facility]  10/13/2018     Social History     Socioeconomic History    Marital status:      Spouse name: Not on file    Number of children: Not on file    Years of education: Not on file    Highest education level: Not on file   Occupational History    Not on file   Tobacco Use    Smoking status: Never    Smokeless tobacco: Never   Vaping Use    Vaping Use: Never used   Substance and Sexual

## 2023-11-29 ENCOUNTER — ANESTHESIA EVENT (OUTPATIENT)
Dept: OPERATING ROOM | Age: 58
End: 2023-11-29
Payer: COMMERCIAL

## 2023-11-30 ENCOUNTER — APPOINTMENT (OUTPATIENT)
Dept: GENERAL RADIOLOGY | Age: 58
End: 2023-11-30
Attending: SURGERY
Payer: COMMERCIAL

## 2023-11-30 ENCOUNTER — HOSPITAL ENCOUNTER (OUTPATIENT)
Age: 58
Setting detail: OUTPATIENT SURGERY
Discharge: HOME | End: 2023-11-30
Attending: SURGERY | Admitting: SURGERY
Payer: COMMERCIAL

## 2023-11-30 ENCOUNTER — ANESTHESIA (OUTPATIENT)
Dept: OPERATING ROOM | Age: 58
End: 2023-11-30
Payer: COMMERCIAL

## 2023-11-30 VITALS
HEIGHT: 68 IN | DIASTOLIC BLOOD PRESSURE: 77 MMHG | BODY MASS INDEX: 37.89 KG/M2 | WEIGHT: 250 LBS | HEART RATE: 78 BPM | TEMPERATURE: 98.1 F | SYSTOLIC BLOOD PRESSURE: 134 MMHG | RESPIRATION RATE: 16 BRPM | OXYGEN SATURATION: 100 %

## 2023-11-30 DIAGNOSIS — K80.20 SYMPTOMATIC CHOLELITHIASIS: ICD-10-CM

## 2023-11-30 LAB
GLUCOSE BLD-MCNC: 90 MG/DL (ref 70–99)
PERFORMED ON: NORMAL

## 2023-11-30 PROCEDURE — 2500000003 HC RX 250 WO HCPCS: Performed by: NURSE ANESTHETIST, CERTIFIED REGISTERED

## 2023-11-30 PROCEDURE — 7100000000 HC PACU RECOVERY - FIRST 15 MIN: Performed by: SURGERY

## 2023-11-30 PROCEDURE — 6360000002 HC RX W HCPCS: Performed by: SURGERY

## 2023-11-30 PROCEDURE — L0450 TLSO FLEX TRUNK/THOR PRE OTS: HCPCS | Performed by: SURGERY

## 2023-11-30 PROCEDURE — 2580000003 HC RX 258: Performed by: SURGERY

## 2023-11-30 PROCEDURE — 7100000011 HC PHASE II RECOVERY - ADDTL 15 MIN: Performed by: SURGERY

## 2023-11-30 PROCEDURE — 7100000001 HC PACU RECOVERY - ADDTL 15 MIN: Performed by: SURGERY

## 2023-11-30 PROCEDURE — 2580000003 HC RX 258: Performed by: ANESTHESIOLOGY

## 2023-11-30 PROCEDURE — 88302 TISSUE EXAM BY PATHOLOGIST: CPT

## 2023-11-30 PROCEDURE — 3600000004 HC SURGERY LEVEL 4 BASE: Performed by: SURGERY

## 2023-11-30 PROCEDURE — 3600000014 HC SURGERY LEVEL 4 ADDTL 15MIN: Performed by: SURGERY

## 2023-11-30 PROCEDURE — 6360000002 HC RX W HCPCS: Performed by: NURSE ANESTHETIST, CERTIFIED REGISTERED

## 2023-11-30 PROCEDURE — 88304 TISSUE EXAM BY PATHOLOGIST: CPT

## 2023-11-30 PROCEDURE — C1758 CATHETER, URETERAL: HCPCS | Performed by: SURGERY

## 2023-11-30 PROCEDURE — 74300 X-RAY BILE DUCTS/PANCREAS: CPT

## 2023-11-30 PROCEDURE — 6370000000 HC RX 637 (ALT 250 FOR IP): Performed by: ANESTHESIOLOGY

## 2023-11-30 PROCEDURE — 2580000003 HC RX 258: Performed by: NURSE ANESTHETIST, CERTIFIED REGISTERED

## 2023-11-30 PROCEDURE — 6360000002 HC RX W HCPCS: Performed by: ANESTHESIOLOGY

## 2023-11-30 PROCEDURE — 6360000004 HC RX CONTRAST MEDICATION: Performed by: SURGERY

## 2023-11-30 PROCEDURE — 3700000000 HC ANESTHESIA ATTENDED CARE: Performed by: SURGERY

## 2023-11-30 PROCEDURE — 7100000010 HC PHASE II RECOVERY - FIRST 15 MIN: Performed by: SURGERY

## 2023-11-30 PROCEDURE — 2709999900 HC NON-CHARGEABLE SUPPLY: Performed by: SURGERY

## 2023-11-30 PROCEDURE — 3700000001 HC ADD 15 MINUTES (ANESTHESIA): Performed by: SURGERY

## 2023-11-30 RX ORDER — OXYCODONE HYDROCHLORIDE 5 MG/1
5 TABLET ORAL
Status: COMPLETED | OUTPATIENT
Start: 2023-11-30 | End: 2023-11-30

## 2023-11-30 RX ORDER — HYDROMORPHONE HYDROCHLORIDE 1 MG/ML
0.5 INJECTION, SOLUTION INTRAMUSCULAR; INTRAVENOUS; SUBCUTANEOUS EVERY 5 MIN PRN
Status: COMPLETED | OUTPATIENT
Start: 2023-11-30 | End: 2023-11-30

## 2023-11-30 RX ORDER — HYDRALAZINE HYDROCHLORIDE 20 MG/ML
10 INJECTION INTRAMUSCULAR; INTRAVENOUS
Status: DISCONTINUED | OUTPATIENT
Start: 2023-11-30 | End: 2023-11-30 | Stop reason: HOSPADM

## 2023-11-30 RX ORDER — PROCHLORPERAZINE EDISYLATE 5 MG/ML
5 INJECTION INTRAMUSCULAR; INTRAVENOUS
Status: COMPLETED | OUTPATIENT
Start: 2023-11-30 | End: 2023-11-30

## 2023-11-30 RX ORDER — ONDANSETRON 2 MG/ML
INJECTION INTRAMUSCULAR; INTRAVENOUS PRN
Status: DISCONTINUED | OUTPATIENT
Start: 2023-11-30 | End: 2023-11-30 | Stop reason: SDUPTHER

## 2023-11-30 RX ORDER — HYALURONATE SODIUM 10 MG/ML
20 SYRINGE (ML) INTRAARTICULAR WEEKLY
Status: SHIPPED | OUTPATIENT
Start: 2023-11-30

## 2023-11-30 RX ORDER — PROPOFOL 10 MG/ML
INJECTION, EMULSION INTRAVENOUS PRN
Status: DISCONTINUED | OUTPATIENT
Start: 2023-11-30 | End: 2023-11-30 | Stop reason: SDUPTHER

## 2023-11-30 RX ORDER — MIDAZOLAM HYDROCHLORIDE 1 MG/ML
INJECTION INTRAMUSCULAR; INTRAVENOUS PRN
Status: DISCONTINUED | OUTPATIENT
Start: 2023-11-30 | End: 2023-11-30 | Stop reason: SDUPTHER

## 2023-11-30 RX ORDER — SODIUM CHLORIDE, SODIUM LACTATE, POTASSIUM CHLORIDE, CALCIUM CHLORIDE 600; 310; 30; 20 MG/100ML; MG/100ML; MG/100ML; MG/100ML
INJECTION, SOLUTION INTRAVENOUS CONTINUOUS
Status: DISCONTINUED | OUTPATIENT
Start: 2023-11-30 | End: 2023-11-30 | Stop reason: HOSPADM

## 2023-11-30 RX ORDER — DEXAMETHASONE SODIUM PHOSPHATE 4 MG/ML
INJECTION, SOLUTION INTRA-ARTICULAR; INTRALESIONAL; INTRAMUSCULAR; INTRAVENOUS; SOFT TISSUE PRN
Status: DISCONTINUED | OUTPATIENT
Start: 2023-11-30 | End: 2023-11-30 | Stop reason: SDUPTHER

## 2023-11-30 RX ORDER — GLYCOPYRROLATE 0.2 MG/ML
INJECTION INTRAMUSCULAR; INTRAVENOUS PRN
Status: DISCONTINUED | OUTPATIENT
Start: 2023-11-30 | End: 2023-11-30 | Stop reason: SDUPTHER

## 2023-11-30 RX ORDER — SODIUM CHLORIDE 0.9 % (FLUSH) 0.9 %
5-40 SYRINGE (ML) INJECTION EVERY 12 HOURS SCHEDULED
Status: DISCONTINUED | OUTPATIENT
Start: 2023-11-30 | End: 2023-11-30 | Stop reason: HOSPADM

## 2023-11-30 RX ORDER — SUCCINYLCHOLINE/SOD CL,ISO/PF 200MG/10ML
SYRINGE (ML) INTRAVENOUS PRN
Status: DISCONTINUED | OUTPATIENT
Start: 2023-11-30 | End: 2023-11-30 | Stop reason: SDUPTHER

## 2023-11-30 RX ORDER — LABETALOL HYDROCHLORIDE 5 MG/ML
10 INJECTION, SOLUTION INTRAVENOUS
Status: DISCONTINUED | OUTPATIENT
Start: 2023-11-30 | End: 2023-11-30 | Stop reason: HOSPADM

## 2023-11-30 RX ORDER — OMEPRAZOLE 20 MG/1
20 CAPSULE, DELAYED RELEASE ORAL DAILY
COMMUNITY

## 2023-11-30 RX ORDER — MEPERIDINE HYDROCHLORIDE 25 MG/ML
12.5 INJECTION INTRAMUSCULAR; INTRAVENOUS; SUBCUTANEOUS EVERY 5 MIN PRN
Status: DISCONTINUED | OUTPATIENT
Start: 2023-11-30 | End: 2023-11-30 | Stop reason: HOSPADM

## 2023-11-30 RX ORDER — SODIUM CHLORIDE 9 MG/ML
INJECTION, SOLUTION INTRAVENOUS PRN
Status: DISCONTINUED | OUTPATIENT
Start: 2023-11-30 | End: 2023-11-30 | Stop reason: HOSPADM

## 2023-11-30 RX ORDER — FAMOTIDINE 10 MG/ML
INJECTION, SOLUTION INTRAVENOUS PRN
Status: DISCONTINUED | OUTPATIENT
Start: 2023-11-30 | End: 2023-11-30 | Stop reason: SDUPTHER

## 2023-11-30 RX ORDER — LIDOCAINE HYDROCHLORIDE 20 MG/ML
INJECTION, SOLUTION INTRAVENOUS PRN
Status: DISCONTINUED | OUTPATIENT
Start: 2023-11-30 | End: 2023-11-30 | Stop reason: SDUPTHER

## 2023-11-30 RX ORDER — FENTANYL CITRATE 50 UG/ML
INJECTION, SOLUTION INTRAMUSCULAR; INTRAVENOUS PRN
Status: DISCONTINUED | OUTPATIENT
Start: 2023-11-30 | End: 2023-11-30 | Stop reason: SDUPTHER

## 2023-11-30 RX ORDER — PHENYLEPHRINE HYDROCHLORIDE 10 MG/ML
INJECTION INTRAVENOUS PRN
Status: DISCONTINUED | OUTPATIENT
Start: 2023-11-30 | End: 2023-11-30 | Stop reason: SDUPTHER

## 2023-11-30 RX ORDER — SODIUM CHLORIDE 9 MG/ML
INJECTION, SOLUTION INTRAVENOUS CONTINUOUS PRN
Status: DISCONTINUED | OUTPATIENT
Start: 2023-11-30 | End: 2023-11-30 | Stop reason: SDUPTHER

## 2023-11-30 RX ORDER — SODIUM CHLORIDE 0.9 % (FLUSH) 0.9 %
5-40 SYRINGE (ML) INJECTION PRN
Status: DISCONTINUED | OUTPATIENT
Start: 2023-11-30 | End: 2023-11-30 | Stop reason: HOSPADM

## 2023-11-30 RX ORDER — ROCURONIUM BROMIDE 10 MG/ML
INJECTION, SOLUTION INTRAVENOUS PRN
Status: DISCONTINUED | OUTPATIENT
Start: 2023-11-30 | End: 2023-11-30 | Stop reason: SDUPTHER

## 2023-11-30 RX ORDER — OXYCODONE HYDROCHLORIDE 5 MG/1
5 TABLET ORAL EVERY 6 HOURS PRN
Qty: 28 TABLET | Refills: 0 | Status: SHIPPED | OUTPATIENT
Start: 2023-11-30 | End: 2023-12-07

## 2023-11-30 RX ORDER — ONDANSETRON 2 MG/ML
4 INJECTION INTRAMUSCULAR; INTRAVENOUS
Status: COMPLETED | OUTPATIENT
Start: 2023-11-30 | End: 2023-11-30

## 2023-11-30 RX ORDER — HYDROMORPHONE HYDROCHLORIDE 1 MG/ML
0.5 INJECTION, SOLUTION INTRAMUSCULAR; INTRAVENOUS; SUBCUTANEOUS EVERY 5 MIN PRN
Status: DISCONTINUED | OUTPATIENT
Start: 2023-11-30 | End: 2023-11-30 | Stop reason: HOSPADM

## 2023-11-30 RX ORDER — BUPIVACAINE HYDROCHLORIDE 5 MG/ML
INJECTION, SOLUTION EPIDURAL; INTRACAUDAL PRN
Status: DISCONTINUED | OUTPATIENT
Start: 2023-11-30 | End: 2023-11-30 | Stop reason: HOSPADM

## 2023-11-30 RX ADMIN — HYDROMORPHONE HYDROCHLORIDE 0.5 MG: 1 INJECTION, SOLUTION INTRAMUSCULAR; INTRAVENOUS; SUBCUTANEOUS at 14:11

## 2023-11-30 RX ADMIN — SODIUM CHLORIDE, POTASSIUM CHLORIDE, SODIUM LACTATE AND CALCIUM CHLORIDE: 600; 310; 30; 20 INJECTION, SOLUTION INTRAVENOUS at 13:17

## 2023-11-30 RX ADMIN — Medication 160 MG: at 11:54

## 2023-11-30 RX ADMIN — ONDANSETRON 4 MG: 2 INJECTION INTRAMUSCULAR; INTRAVENOUS at 14:05

## 2023-11-30 RX ADMIN — SUGAMMADEX 300 MG: 100 INJECTION, SOLUTION INTRAVENOUS at 13:43

## 2023-11-30 RX ADMIN — PROPOFOL 150 MG: 10 INJECTION, EMULSION INTRAVENOUS at 11:54

## 2023-11-30 RX ADMIN — OXYCODONE 5 MG: 5 TABLET ORAL at 15:13

## 2023-11-30 RX ADMIN — FENTANYL CITRATE 50 MCG: 50 INJECTION, SOLUTION INTRAMUSCULAR; INTRAVENOUS at 13:40

## 2023-11-30 RX ADMIN — HYDROMORPHONE HYDROCHLORIDE 0.5 MG: 1 INJECTION, SOLUTION INTRAMUSCULAR; INTRAVENOUS; SUBCUTANEOUS at 14:36

## 2023-11-30 RX ADMIN — LIDOCAINE HYDROCHLORIDE 100 MG: 20 INJECTION, SOLUTION INTRAVENOUS at 11:53

## 2023-11-30 RX ADMIN — GLYCOPYRROLATE 0.2 MG: 0.2 INJECTION INTRAMUSCULAR; INTRAVENOUS at 11:50

## 2023-11-30 RX ADMIN — ROCURONIUM BROMIDE 40 MG: 10 INJECTION, SOLUTION INTRAVENOUS at 12:05

## 2023-11-30 RX ADMIN — SODIUM CHLORIDE 2000 MG: 900 INJECTION INTRAVENOUS at 12:00

## 2023-11-30 RX ADMIN — FENTANYL CITRATE 25 MCG: 50 INJECTION, SOLUTION INTRAMUSCULAR; INTRAVENOUS at 12:28

## 2023-11-30 RX ADMIN — FENTANYL CITRATE 25 MCG: 50 INJECTION, SOLUTION INTRAMUSCULAR; INTRAVENOUS at 12:25

## 2023-11-30 RX ADMIN — ONDANSETRON 4 MG: 2 INJECTION INTRAMUSCULAR; INTRAVENOUS at 12:10

## 2023-11-30 RX ADMIN — PROCHLORPERAZINE EDISYLATE 5 MG: 5 INJECTION INTRAMUSCULAR; INTRAVENOUS at 14:11

## 2023-11-30 RX ADMIN — MIDAZOLAM HYDROCHLORIDE 2 MG: 2 INJECTION, SOLUTION INTRAMUSCULAR; INTRAVENOUS at 11:40

## 2023-11-30 RX ADMIN — FENTANYL CITRATE 50 MCG: 50 INJECTION, SOLUTION INTRAMUSCULAR; INTRAVENOUS at 13:51

## 2023-11-30 RX ADMIN — FAMOTIDINE 20 MG: 10 INJECTION, SOLUTION INTRAVENOUS at 12:10

## 2023-11-30 RX ADMIN — DEXAMETHASONE SODIUM PHOSPHATE 8 MG: 4 INJECTION, SOLUTION INTRAMUSCULAR; INTRAVENOUS at 12:10

## 2023-11-30 RX ADMIN — SODIUM CHLORIDE, POTASSIUM CHLORIDE, SODIUM LACTATE AND CALCIUM CHLORIDE: 600; 310; 30; 20 INJECTION, SOLUTION INTRAVENOUS at 10:49

## 2023-11-30 RX ADMIN — ROCURONIUM BROMIDE 10 MG: 10 INJECTION, SOLUTION INTRAVENOUS at 11:54

## 2023-11-30 RX ADMIN — FENTANYL CITRATE 25 MCG: 50 INJECTION, SOLUTION INTRAMUSCULAR; INTRAVENOUS at 12:12

## 2023-11-30 RX ADMIN — FENTANYL CITRATE 25 MCG: 50 INJECTION, SOLUTION INTRAMUSCULAR; INTRAVENOUS at 13:33

## 2023-11-30 RX ADMIN — PHENYLEPHRINE HYDROCHLORIDE 50 MCG: 10 INJECTION INTRAVENOUS at 12:36

## 2023-11-30 RX ADMIN — SODIUM CHLORIDE: 9 INJECTION, SOLUTION INTRAVENOUS at 12:05

## 2023-11-30 RX ADMIN — GLYCOPYRROLATE 0.2 MG: 0.2 INJECTION INTRAMUSCULAR; INTRAVENOUS at 12:28

## 2023-11-30 RX ADMIN — ROCURONIUM BROMIDE 20 MG: 10 INJECTION, SOLUTION INTRAVENOUS at 13:05

## 2023-11-30 ASSESSMENT — PAIN DESCRIPTION - ORIENTATION
ORIENTATION: RIGHT
ORIENTATION: UPPER
ORIENTATION: RIGHT

## 2023-11-30 ASSESSMENT — PAIN DESCRIPTION - PAIN TYPE
TYPE: SURGICAL PAIN
TYPE: SURGICAL PAIN

## 2023-11-30 ASSESSMENT — PAIN DESCRIPTION - LOCATION
LOCATION: ABDOMEN;SHOULDER
LOCATION: ABDOMEN;SHOULDER
LOCATION: ABDOMEN
LOCATION: SHOULDER
LOCATION: ABDOMEN;SHOULDER

## 2023-11-30 ASSESSMENT — PAIN DESCRIPTION - DESCRIPTORS
DESCRIPTORS: DISCOMFORT;SHARP
DESCRIPTORS: ACHING
DESCRIPTORS: ACHING;SHARP
DESCRIPTORS: ACHING
DESCRIPTORS: ACHING

## 2023-11-30 ASSESSMENT — PAIN SCALES - GENERAL
PAINLEVEL_OUTOF10: 2
PAINLEVEL_OUTOF10: 8
PAINLEVEL_OUTOF10: 6
PAINLEVEL_OUTOF10: 4
PAINLEVEL_OUTOF10: 3
PAINLEVEL_OUTOF10: 3
PAINLEVEL_OUTOF10: 6
PAINLEVEL_OUTOF10: 5
PAINLEVEL_OUTOF10: 4
PAINLEVEL_OUTOF10: 0

## 2023-11-30 ASSESSMENT — ENCOUNTER SYMPTOMS: SHORTNESS OF BREATH: 1

## 2023-11-30 ASSESSMENT — LIFESTYLE VARIABLES: SMOKING_STATUS: 0

## 2023-11-30 ASSESSMENT — PAIN - FUNCTIONAL ASSESSMENT: PAIN_FUNCTIONAL_ASSESSMENT: 0-10

## 2023-11-30 NOTE — FLOWSHEET NOTE
Pt urinated per bedpan 100cc. Falls asleep easily but complains of pain at 8/10 in belly and shoulder area. Pain pill given with snack. Tolerating well.

## 2023-11-30 NOTE — OP NOTE
Operative Note      Patient: Peg Hopper  YOB: 1965  MRN: [id number]    Date of Procedure: 11/30/2023    Pre-Op Diagnosis Codes: * Symptomatic cholelithiasis [K80.20]    Post-Op Diagnosis: {MH OR LLOL:890140705}       Procedure(s):  LAPAROSCOPIC CHOLECYSTECTOMY WITH CHOLANGIOGRAM , POSSIBLE UMBILICAL HERNIA REPAIR  . Surgeon(s):  Zaria Brown MD    Assistant:   * No surgical staff found *    Anesthesia: General    Estimated Blood Loss (mL): {NUMBERS; WBL:41842}    Complications: {Symptoms;  Intra-op complications:25522}    Specimens:   ID Type Source Tests Collected by Time Destination   A : A. hernia sac Specimen Abdomen SURGICAL PATHOLOGY Lanie Valadez MD 11/30/2023 1225    B : b. gallbladder Specimen Abdomen SURGICAL PATHOLOGY Lanie Valadez MD 11/30/2023 1253        Implants:  * No implants in log *      Drains: * No LDAs found *    Findings: ***        Detailed Description of Procedure:   ***    Electronically signed by Paulette Haider DO on 11/30/2023 at 1:56 PM securement of the endotracheal tube, the patient was prepped and draped in the standard fashion. A timeout was performed according to hospital protocol. Antibiotics were confirmed to have been given. An incision was planned below the umbilicus and this area was anesthetized with 0.5% marcaine. The incision was made using an 11 blade. This was carried down to the fascia with electrocautery. The hernia sac was dissected bluntly with a finger and Beth clamp. Once the umbilical hernia was encircled we proceeded to dissect the umbilicus off of the hernia sac with Bovie electrocautery. We then cleared off soft tissue from the hernia sac using Bovie electrocautery down through the fascia. We amputated the hernia sac with electrocautery, cleared off the fascia  Following this, the 10-mm Sabina trocar was then introduced into the abdominal cavity through the hernia defect. The abdomen was insufflated. Following adequate insufflation, three additional 5-mm trocars were placed in the epigastrium, midclavicular line, and right anterior axillary line. Following placement of these trocars, the fundus of the gallbladder was grasped, retracted cephalad. The omental attachments to the gallbladder were taken down using electrocautery to allow for full exposure of the gallbladder. The cystic duct was identified and circumferentially dissected, noted to be heading directly to the gallbladder. A clip was then applied to the proximal cystic duct. The cystic duct was opened and the cholangiogram catheter was positioned in the cystic duct, and a cholangiogram was performed, which revealed no evidence of choledocholithiasis, prompt filling of the duodenum and appropriate filling of the intrahepatic biliary tree. Following this, the catheter was removed. Two clips were applied to the distal cystic duct. The cystic duct was divided. The cytic artery was then encountered behind the node of Calot.  The node was carefully dissected off

## 2023-11-30 NOTE — DISCHARGE INSTRUCTIONS
Discharge Instructions:    Diet:   You may resume a regular diet. Wound Care:   Skin glue was used to cover your incision(s). It will fall off on its own in about 10 days. You may shower, but do not scrub the incision sites directly or soak (tub, pool, etc.). Wear your abdominal binder as much as possible. Activity:   No heavy lifting greater than a milk jug until follow up. Pain management:   Unless informed of any restrictions by your primary care physician, please use your preferred over-the-counter pain reliever (Tylenol or Ibuprofen) as your primary pain medication. If you have pain that persists despite over-the-counter pain medications, you have been provided with a prescription for an opioid/narcotic pain reliever (Roxicodone). No driving or operating machinery while taking opioid/narcotic medications. Bowel Regimen:   Opioid/Narcotic pain relievers have a common side effect of constipation; therefore, you have been provided with a prescription for a stool softener, Docusate (Colace). These medications are intended to help prevent you from experiencing this very common side effect and also help to regulate your bowels after surgery. If your stools become too loose and/or frequent, decrease the Colace to one pill one time each day. If your stools are still loose after this modification, stop taking this medication all together. Return Precautions:   Call/ Return to ED for increased redness, worsening pain, drainage from wound, fevers, or any other concerns about your incision or post op course. Follow up with Dr. Leonarda Sauceda in 7-10 days - call [phone] for appointment. [facility]    There are potential side effects of anesthesia or sedation you may experience for the first 24 hours.   These side effects include:    Confusion or Memory loss, Dizziness, or Delayed Reaction Times   [x]A responsible person should be with you for the next 24 hours. Do not operate any vehicles (automobiles, bicycles, motorcycles) or power tools or machinery for 24 hours. Do not sign any legal documents or make any legal decisions for 24 hours. Do not drink alcohol for 24 hours or while taking narcotic pain medication. Nausea    [x]Start with light diet and progress to your normal diet as you feel like eating. However, if you experience nausea or repeated episodes of vomiting which persist beyond 12-24 hours, notify your physician. Once nausea has passed, remember to keep drinking fluids. Difficulty Passing Urine  [x]Drink extra amounts of fluid today. Notify your physician if you have not urinated within 8 hours after your procedure or you feel uncomfortable. Irritated Throat from a Breathing Tube  [x]Drink extra amounts of fluid today. Lozenges may help. Muscle Aches  [x]You may experience some generalized body aches as your muscles recover from medications used to relax them during surgery. These will gradually subside. MEDICATION INSTRUCTIONS:  [x]Prescription(S) x  1   sent with you. Use as directed. When taking pain medications, you may experience the side effect of dizziness or drowsiness. Do not drink alcohol or drive when taking these medications. []Prescription(S) x          Called to Pharmacy Name and location:    [x]Give the list of your medications to your primary care physician on your next visit. Keep your med list updated and carry it with in case of emergencies. [x] Narcotic pain medications can cause the side effect of significant constipation. You may want to add a stool softener to your postoperative medication schedule or speak to your surgeon on how best to manage this side effect. NARCOTIC SAFETY:  Your pain medicine is only for you to take. Safely store your medicines. Store pills up high and out of reach of children and pets.   Ensure safety caps are snapped tightly  Keep track of how many pills you

## 2023-11-30 NOTE — ANESTHESIA POSTPROCEDURE EVALUATION
Department of Anesthesiology  Postprocedure Note    Patient: Lowell Wilson  MRN: [id number]  YOB: 1965  Date of evaluation: 11/30/2023      Procedure Summary       Date: 11/30/23 Room / Location: [address] [place] [address]    Anesthesia Start: 3116 Anesthesia Stop: 1149    Procedures:       LAPAROSCOPIC CHOLECYSTECTOMY WITH CHOLANGIOGRAM , POSSIBLE UMBILICAL HERNIA REPAIR (Abdomen)      .  (Abdomen) Diagnosis:       Symptomatic cholelithiasis      (Symptomatic cholelithiasis [K80.20])    Surgeons: Margarito Barnes MD Responsible Provider: Geo Rod MD    Anesthesia Type: General ASA Status: 3            Anesthesia Type: General    Óscar Phase I: Óscar Score: 10    Óscar Phase II: Óscar Score: 9      Anesthesia Post Evaluation    Patient location during evaluation: PACU  Patient participation: complete - patient participated  Level of consciousness: awake and alert  Pain score: 0  Airway patency: patent  Nausea & Vomiting: no nausea and no vomiting  Complications: no  Cardiovascular status: hemodynamically stable  Respiratory status: acceptable  Hydration status: euvolemic  Pain management: adequate

## 2023-11-30 NOTE — H&P
Update History & Physical    The patient's History and Physical of November 21,  was reviewed with the patient and I examined the patient. There was no change. The surgical site was confirmed by the patient and me. Plan: The risks, benefits, expected outcome, and alternative to the recommended procedure have been discussed with the patient. Patient understands and wants to proceed with the procedure.      Electronically signed by Sandeep Bryant DO on 11/30/2023 at 9:52 AM No

## 2023-11-30 NOTE — BRIEF OP NOTE
Brief Postoperative Note      Patient: Elijah Arrieta  YOB: 1965  MRN: [id number]    Date of Procedure: 11/30/2023    Pre-Op Diagnosis Codes: * Symptomatic cholelithiasis [K80.20]    Post-Op Diagnosis: Same       Procedure(s):  LAPAROSCOPIC CHOLECYSTECTOMY WITH CHOLANGIOGRAM , POSSIBLE UMBILICAL HERNIA REPAIR  . Surgeon(s):  Isabell Mulligan MD    Assistant:  * No surgical staff found *    Anesthesia: General    Estimated Blood Loss (mL): 15    Complications: None    Specimens:   ID Type Source Tests Collected by Time Destination   A : A. hernia sac Specimen Abdomen SURGICAL PATHOLOGY Cheyenne Valadez MD 11/30/2023 1225    B : b. gallbladder Specimen Abdomen SURGICAL PATHOLOGY Cheyenne Valadez MD 11/30/2023 1253        Implants:  * No implants in log *      Drains: * No LDAs found *    Findings: Gallbladder normal appearing, slightly distended. Cholangiogram from cystic duct with promt flow into the duodenum without filling defects and normal backfilling of hepatic tree. Liver bed hemostatic and all clips confirmed intact prior to closure.        Electronically signed by Marco Antonio Porras DO on 11/30/2023 at 1:54 PM

## 2023-12-04 ENCOUNTER — OFFICE VISIT (OUTPATIENT)
Dept: ORTHOPEDIC SURGERY | Age: 58
End: 2023-12-04
Payer: COMMERCIAL

## 2023-12-04 DIAGNOSIS — E66.01 MORBID OBESITY (HCC): ICD-10-CM

## 2023-12-04 DIAGNOSIS — M17.11 PRIMARY OSTEOARTHRITIS OF RIGHT KNEE: ICD-10-CM

## 2023-12-04 DIAGNOSIS — M17.12 PRIMARY OSTEOARTHRITIS OF LEFT KNEE: Primary | ICD-10-CM

## 2023-12-04 PROCEDURE — 99999 PR OFFICE/OUTPT VISIT,PROCEDURE ONLY: CPT | Performed by: INTERNAL MEDICINE

## 2023-12-04 PROCEDURE — 20611 DRAIN/INJ JOINT/BURSA W/US: CPT | Performed by: INTERNAL MEDICINE

## 2023-12-04 RX ORDER — HYALURONATE SODIUM 10 MG/ML
20 SYRINGE (ML) INTRAARTICULAR WEEKLY
Status: SHIPPED | OUTPATIENT
Start: 2023-12-04

## 2023-12-04 RX ADMIN — Medication 20 MG: at 16:43

## 2023-12-04 NOTE — PROGRESS NOTES
Assessment   Impression: . No diagnosis found. Plan:        ***         Orders:      No orders of the defined types were placed in this encounter. Pete Godoy MD.      Disclaimer: \"This note was dictated with voice recognition software. Though review and correction are routine, we apologize for any errors. \"

## 2023-12-04 NOTE — PROGRESS NOTES
discomfort. Band-Aid applied to puncture wound. This was first performed on the right than on the left than on the right using the same exact technique. Technically successful ultrasound guided injection    Image stored    The media patellafemoral joint recess was visualized in short axis. No evidence of effusion, soft tissue mass or cystic   lesions. Other Outside Imaging and Testing Personally Reviewed:    US GUIDED NEEDLE PLACEMENT    Result Date: 11/21/2023  Radiology result is complete; follow up with provider / physician office for radiology results              Assessment   Impression: . Encounter Diagnoses   Name Primary? Primary osteoarthritis of left knee Yes    Primary osteoarthritis of right knee     Morbid obesity ([address])                 Plan:       Postinjection protocol and follow-up for consecutive injections         Orders:        No orders of the defined types were placed in this encounter. Luis Patton MD.      Disclaimer: \"This note was dictated with voice recognition software. Though review and correction are routine, we apologize for any errors. \"

## 2023-12-05 ENCOUNTER — OFFICE VISIT (OUTPATIENT)
Dept: CARDIOLOGY CLINIC | Age: 58
End: 2023-12-05
Payer: COMMERCIAL

## 2023-12-05 VITALS
DIASTOLIC BLOOD PRESSURE: 84 MMHG | SYSTOLIC BLOOD PRESSURE: 110 MMHG | HEART RATE: 68 BPM | BODY MASS INDEX: 37.71 KG/M2 | WEIGHT: 248 LBS

## 2023-12-05 DIAGNOSIS — E78.2 MIXED HYPERLIPIDEMIA: ICD-10-CM

## 2023-12-05 DIAGNOSIS — R07.9 CHEST PAIN, UNSPECIFIED TYPE: ICD-10-CM

## 2023-12-05 DIAGNOSIS — G47.33 OSA (OBSTRUCTIVE SLEEP APNEA): Chronic | ICD-10-CM

## 2023-12-05 DIAGNOSIS — K22.4 ESOPHAGEAL SPASM: ICD-10-CM

## 2023-12-05 DIAGNOSIS — R60.0 BILATERAL LEG EDEMA: ICD-10-CM

## 2023-12-05 DIAGNOSIS — R00.1 SINUS BRADYCARDIA: ICD-10-CM

## 2023-12-05 DIAGNOSIS — I67.9 CEREBROVASCULAR SMALL VESSEL DISEASE: ICD-10-CM

## 2023-12-05 DIAGNOSIS — R07.2 PRECORDIAL PAIN: Primary | ICD-10-CM

## 2023-12-05 PROCEDURE — G8427 DOCREV CUR MEDS BY ELIG CLIN: HCPCS | Performed by: INTERNAL MEDICINE

## 2023-12-05 PROCEDURE — 1036F TOBACCO NON-USER: CPT | Performed by: INTERNAL MEDICINE

## 2023-12-05 PROCEDURE — 93000 ELECTROCARDIOGRAM COMPLETE: CPT | Performed by: INTERNAL MEDICINE

## 2023-12-05 PROCEDURE — G8417 CALC BMI ABV UP PARAM F/U: HCPCS | Performed by: INTERNAL MEDICINE

## 2023-12-05 PROCEDURE — 99214 OFFICE O/P EST MOD 30 MIN: CPT | Performed by: INTERNAL MEDICINE

## 2023-12-05 PROCEDURE — 3017F COLORECTAL CA SCREEN DOC REV: CPT | Performed by: INTERNAL MEDICINE

## 2023-12-05 PROCEDURE — G8484 FLU IMMUNIZE NO ADMIN: HCPCS | Performed by: INTERNAL MEDICINE

## 2023-12-05 RX ORDER — NITROGLYCERIN 0.4 MG/1
0.4 TABLET SUBLINGUAL EVERY 5 MIN PRN
Qty: 25 TABLET | Refills: 2 | Status: SHIPPED | OUTPATIENT
Start: 2023-12-05

## 2023-12-05 NOTE — ASSESSMENT & PLAN NOTE
Possibly esophageal spasms. Pain in the past resolved with nitroglycerin. Cardiac work-up completely normal with a completely normal CTA performed last year. Will prescribe as needed nitro given that episodes happen to 3 times a year.   GI referral.

## 2023-12-11 ENCOUNTER — OFFICE VISIT (OUTPATIENT)
Dept: ORTHOPEDIC SURGERY | Age: 58
End: 2023-12-11
Payer: COMMERCIAL

## 2023-12-11 VITALS — BODY MASS INDEX: 37.59 KG/M2 | WEIGHT: 248.02 LBS | HEIGHT: 68 IN

## 2023-12-11 DIAGNOSIS — M17.11 PRIMARY OSTEOARTHRITIS OF RIGHT KNEE: ICD-10-CM

## 2023-12-11 DIAGNOSIS — E66.01 MORBID OBESITY (HCC): ICD-10-CM

## 2023-12-11 DIAGNOSIS — M17.12 PRIMARY OSTEOARTHRITIS OF LEFT KNEE: Primary | ICD-10-CM

## 2023-12-11 DIAGNOSIS — Z90.3 HISTORY OF SLEEVE GASTRECTOMY: ICD-10-CM

## 2023-12-11 PROCEDURE — 99999 PR OFFICE/OUTPT VISIT,PROCEDURE ONLY: CPT | Performed by: INTERNAL MEDICINE

## 2023-12-11 PROCEDURE — 20611 DRAIN/INJ JOINT/BURSA W/US: CPT | Performed by: INTERNAL MEDICINE

## 2023-12-11 RX ORDER — HYALURONATE SODIUM 10 MG/ML
20 SYRINGE (ML) INTRAARTICULAR WEEKLY
Status: SHIPPED | OUTPATIENT
Start: 2023-12-11

## 2023-12-11 RX ADMIN — Medication 20 MG: at 10:58

## 2023-12-11 RX ADMIN — Medication 20 MG: at 10:57

## 2023-12-11 NOTE — PROGRESS NOTES
Chief Complaint:   Chief Complaint   Patient presents with    Knee Pain          History of Present Illness:       Patient is a 60 y.o. female returns follow up for the above complaint. The patient was last seen approximately 1 weeksago. The symptoms continued to improve. Since the last visit. The patient has had no further testing for the problem. No adverse effects to prior injection    She denies any constitutional symptoms    No pattern of activity related swelling of significance     Past Medical History:        Past Medical History:   Diagnosis Date    Arthritis     Avoids pork and pork products due to cultural beliefs     Chronic pain     TAN (dyspnea on exertion)     Frequent headaches 12/13/2022    Gallbladder disorder     Gastroesophageal reflux disease 01/28/2020    Hx of blood clots     in legs -- 13 years ago    Hx of degenerative disc disease     Hyperlipidemia     Hypertension     Morbid obesity with body mass index (BMI) of 60.0 to 69.9 in adult [facility]) 07/02/2018    Neuropathy     SHYANN (obstructive sleep apnea)     machine broke down, awaiting new machine at the end of June 2023    Poor circulation     Primary osteoarthritis of right knee 07/02/2018    Type 2 diabetes mellitus without complication ([address])     controlled with diet    Urinary incontinence     Vitamin D deficiency 01/28/2020    Wears glasses         Present Medications:         Current Outpatient Medications   Medication Sig Dispense Refill    diclofenac sodium (VOLTAREN) 1 % GEL APPLY 4 GRAMS TO THE AFFECTED  tid-qid 500 g 3    nitroGLYCERIN (NITROSTAT) 0.4 MG SL tablet Place 1 tablet under the tongue every 5 minutes as needed for Chest pain Every 5 minutes x 3 25 tablet 2    omeprazole (PRILOSEC) 20 MG delayed release capsule Take 1 capsule by mouth daily      ibuprofen (ADVIL;MOTRIN) 400 MG tablet TAKE 400-800 MG TWICE DAILY AS NEEDED FOR HEADACHES.  MAX IS 2-3 TIMES PER WEEK      gabapentin (NEURONTIN) 300 MG capsule Take 1

## 2023-12-15 PROBLEM — M17.0 PRIMARY OSTEOARTHRITIS OF BOTH KNEES: Status: ACTIVE | Noted: 2018-07-02

## 2023-12-15 PROBLEM — G47.00 INSOMNIA: Status: ACTIVE | Noted: 2023-11-01

## 2023-12-15 PROBLEM — I49.9 CARDIAC DYSRHYTHMIA: Status: ACTIVE | Noted: 2020-01-21

## 2023-12-15 PROBLEM — M54.9 BACK PAIN, CHRONIC: Status: ACTIVE | Noted: 2018-04-10

## 2023-12-15 PROBLEM — H91.90 HEARING LOSS: Status: ACTIVE | Noted: 2023-11-01

## 2023-12-15 PROBLEM — I82.509 CHRONIC DEEP VEIN THROMBOSIS (DVT) (HCC): Status: ACTIVE | Noted: 2023-03-16

## 2023-12-15 PROBLEM — G47.33 OBSTRUCTIVE SLEEP APNEA SYNDROME: Status: ACTIVE | Noted: 2022-11-29

## 2023-12-15 PROBLEM — G89.29 BACK PAIN, CHRONIC: Status: ACTIVE | Noted: 2018-04-10

## 2023-12-15 PROBLEM — R60.9 EDEMA: Status: ACTIVE | Noted: 2022-11-08

## 2023-12-15 PROBLEM — R32 URINARY INCONTINENCE: Status: ACTIVE | Noted: 2023-11-01

## 2023-12-15 PROBLEM — M48.9 MASS OF THORACIC VERTEBRA: Status: ACTIVE | Noted: 2022-12-15

## 2023-12-15 PROBLEM — F32.A DEPRESSION, UNSPECIFIED: Status: ACTIVE | Noted: 2023-11-03

## 2023-12-15 PROBLEM — Z90.3 HISTORY OF SLEEVE GASTRECTOMY: Status: ACTIVE | Noted: 2023-01-11

## 2023-12-18 ENCOUNTER — OFFICE VISIT (OUTPATIENT)
Dept: PRIMARY CARE CLINIC | Age: 58
End: 2023-12-18
Payer: COMMERCIAL

## 2023-12-18 VITALS
BODY MASS INDEX: 38.26 KG/M2 | OXYGEN SATURATION: 92 % | SYSTOLIC BLOOD PRESSURE: 124 MMHG | HEART RATE: 53 BPM | DIASTOLIC BLOOD PRESSURE: 82 MMHG | WEIGHT: 251.6 LBS

## 2023-12-18 DIAGNOSIS — K21.9 GASTROESOPHAGEAL REFLUX DISEASE, UNSPECIFIED WHETHER ESOPHAGITIS PRESENT: Primary | ICD-10-CM

## 2023-12-18 DIAGNOSIS — L85.3 DRY SKIN: ICD-10-CM

## 2023-12-18 DIAGNOSIS — T14.8XXA SCRATCH MARK: ICD-10-CM

## 2023-12-18 DIAGNOSIS — E11.42 DIABETIC PERIPHERAL NEUROPATHY ASSOCIATED WITH TYPE 2 DIABETES MELLITUS (HCC): ICD-10-CM

## 2023-12-18 DIAGNOSIS — R23.8: ICD-10-CM

## 2023-12-18 PROCEDURE — 3044F HG A1C LEVEL LT 7.0%: CPT | Performed by: INTERNAL MEDICINE

## 2023-12-18 PROCEDURE — G8427 DOCREV CUR MEDS BY ELIG CLIN: HCPCS | Performed by: INTERNAL MEDICINE

## 2023-12-18 PROCEDURE — G8484 FLU IMMUNIZE NO ADMIN: HCPCS | Performed by: INTERNAL MEDICINE

## 2023-12-18 PROCEDURE — 99214 OFFICE O/P EST MOD 30 MIN: CPT | Performed by: INTERNAL MEDICINE

## 2023-12-18 PROCEDURE — 1036F TOBACCO NON-USER: CPT | Performed by: INTERNAL MEDICINE

## 2023-12-18 PROCEDURE — G8417 CALC BMI ABV UP PARAM F/U: HCPCS | Performed by: INTERNAL MEDICINE

## 2023-12-18 PROCEDURE — 2022F DILAT RTA XM EVC RTNOPTHY: CPT | Performed by: INTERNAL MEDICINE

## 2023-12-18 PROCEDURE — 3017F COLORECTAL CA SCREEN DOC REV: CPT | Performed by: INTERNAL MEDICINE

## 2023-12-18 RX ORDER — AMMONIUM LACTATE 12 G/100G
CREAM TOPICAL
Qty: 385 G | Refills: 3 | Status: SHIPPED | OUTPATIENT
Start: 2023-12-18

## 2023-12-18 RX ORDER — PANTOPRAZOLE SODIUM 40 MG/1
40 TABLET, DELAYED RELEASE ORAL
Qty: 90 TABLET | Refills: 1 | Status: SHIPPED | OUTPATIENT
Start: 2023-12-18

## 2023-12-18 RX ORDER — FAMOTIDINE 20 MG/1
20 TABLET, FILM COATED ORAL 2 TIMES DAILY PRN
Qty: 180 TABLET | Refills: 1 | Status: SHIPPED | OUTPATIENT
Start: 2023-12-18

## 2023-12-31 PROBLEM — E11.42 DIABETIC PERIPHERAL NEUROPATHY ASSOCIATED WITH TYPE 2 DIABETES MELLITUS (HCC): Status: ACTIVE | Noted: 2023-12-31

## 2023-12-31 PROBLEM — L85.3 DRY SKIN: Status: ACTIVE | Noted: 2023-12-31

## 2024-01-11 NOTE — TELEPHONE ENCOUNTER
Medication:   Requested Prescriptions     Pending Prescriptions Disp Refills    omeprazole (PRILOSEC) 40 MG delayed release capsule [Pharmacy Med Name: OMEPRAZOLE DR 40 MG CAPSULE] 90 capsule      Sig: TAKE 1 CAPSULE BY MOUTH EVERY DAY IN THE MORNING       Last appt: 12/18/2023   Next appt: 3/20/2024    Last OAR[place]:       11/15/2023     7:31 PM   RX Monitoring   Periodic Controlled Substance Monitoring No signs of potential drug abuse or diversion identified.

## 2024-01-16 RX ORDER — OMEPRAZOLE 40 MG/1
40 CAPSULE, DELAYED RELEASE ORAL EVERY MORNING
Qty: 90 CAPSULE | Refills: 1 | OUTPATIENT
Start: 2024-01-16

## 2024-01-16 NOTE — TELEPHONE ENCOUNTER
Ortho Upper Extremity Followup    S:  He returns for follow-up of his left thumb metacarpal ORIF.  He has been doing well, minimal pain, no new numbness or tingling.    O:  On examination, there are no signs of infection.  The wound is well-approximated.  Thumb is clinically straight.  Compartments are soft and compressible.  The tip of the thumb is pink and perfused.  Sensation is intact to light touch at the tip of the thumb.  He has nearly full active flexion extension of the IP joint of the thumb.    XR:  X-rays demonstrate maintained hardware position, and bony alignment    Impression:   Status post ORIF of open left thumb metacarpal fracture    Plan:  I would like him to begin therapy this week, and at that time a thermoplastic brace should be provided, and we should begin active motion focusing especially on the IP joint, and he will return in 2 weeks for removal of sutures.    XRs do not need to be obtained prior to next visit     Patient refill for omeprazole denied. Patient is not taking this medication. Spoke with patient and confirmed. She states refill request was sent by mistake.

## 2024-01-18 ENCOUNTER — TELEPHONE (OUTPATIENT)
Dept: PRIMARY CARE CLINIC | Age: 59
End: 2024-01-18

## 2024-01-18 NOTE — TELEPHONE ENCOUNTER
After reviewing chart I do not see where patient is on cholesterol medication . My chart message sent for clarification on which medication is needed.

## 2024-01-28 NOTE — PROGRESS NOTES
30 days 90 capsule 0     Current Facility-Administered Medications on File Prior to Visit   Medication Dose Route Frequency Provider Last Rate Last Admin    sodium hyaluronate (EUFLEXXA, HYALGAN) injection 20 mg  20 mg Intra-artICUlar Weekly Brenden Caballero MD   20 mg at 12/11/23 1058    sodium hyaluronate (EUFLEXXA, HYALGAN) injection 20 mg  20 mg Intra-artICUlar Weekly Brenden Caballero MD   20 mg at 12/11/23 1057    sodium hyaluronate (EUFLEXXA, HYALGAN) injection 20 mg  20 mg Intra-artICUlar Weekly Brenden Caballero MD   20 mg at 12/04/23 1643    sodium hyaluronate (EUFLEXXA, HYALGAN) injection 20 mg  20 mg Intra-artICUlar Weekly Brenden Caballero MD   20 mg at 12/04/23 1643    sodium hyaluronate (EUFLEXXA, HYALGAN) injection 20 mg  20 mg Intra-artICUlar Weekly Brenden Caballero MD   20 mg at 12/04/23 0905    sodium hyaluronate (EUFLEXXA, HYALGAN) injection 20 mg  20 mg Intra-artICUlar Weekly Brenden Caballero MD   20 mg at 12/04/23 0904      Social History     Tobacco Use    Smoking status: Never    Smokeless tobacco: Never   Substance Use Topics    Alcohol use: Never        CARE TEAM  Patient Care Team:  Gissel Patterson MD as PCP - General (Internal Medicine)  Gissel Patterson MD as PCP - Empaneled Provider  Johnny Saenz MD as Consulting Physician (Otolaryngology)  Lucius Hardy AuD as Audiologist (Audiology)  Gissel Patterson MD (Internal Medicine)  Konstantin Valadez MD as Surgeon (General Surgery)    Electronically signed by YURY Smiley CNP on 1/29/2024 at 1:25 PM     This dictation was generated by voice recognition computer software.  Although all attempts are made to edit the dictation for accuracy, there may be errors in the transcription that are not intended.

## 2024-01-29 ENCOUNTER — OFFICE VISIT (OUTPATIENT)
Dept: PRIMARY CARE CLINIC | Age: 59
End: 2024-01-29
Payer: MEDICARE

## 2024-01-29 VITALS
TEMPERATURE: 97.6 F | OXYGEN SATURATION: 98 % | HEART RATE: 57 BPM | SYSTOLIC BLOOD PRESSURE: 124 MMHG | BODY MASS INDEX: 24.63 KG/M2 | DIASTOLIC BLOOD PRESSURE: 82 MMHG | RESPIRATION RATE: 16 BRPM | WEIGHT: 162 LBS

## 2024-01-29 DIAGNOSIS — B37.2 CANDIDIASIS OF SKIN: Primary | ICD-10-CM

## 2024-01-29 DIAGNOSIS — M54.16 LUMBAR RADICULOPATHY: ICD-10-CM

## 2024-01-29 DIAGNOSIS — E78.2 MIXED HYPERLIPIDEMIA: Primary | ICD-10-CM

## 2024-01-29 DIAGNOSIS — G89.4 CHRONIC PAIN SYNDROME: ICD-10-CM

## 2024-01-29 PROCEDURE — 99214 OFFICE O/P EST MOD 30 MIN: CPT | Performed by: NURSE PRACTITIONER

## 2024-01-29 RX ORDER — ATORVASTATIN CALCIUM 10 MG/1
10 TABLET, FILM COATED ORAL NIGHTLY
Qty: 30 TABLET | Refills: 3 | Status: SHIPPED | OUTPATIENT
Start: 2024-01-29

## 2024-01-29 RX ORDER — NYSTATIN 100000 U/G
CREAM TOPICAL
Qty: 30 G | Refills: 0 | Status: SHIPPED | OUTPATIENT
Start: 2024-01-29

## 2024-01-29 ASSESSMENT — PATIENT HEALTH QUESTIONNAIRE - PHQ9
10. IF YOU CHECKED OFF ANY PROBLEMS, HOW DIFFICULT HAVE THESE PROBLEMS MADE IT FOR YOU TO DO YOUR WORK, TAKE CARE OF THINGS AT HOME, OR GET ALONG WITH OTHER PEOPLE: 0
8. MOVING OR SPEAKING SO SLOWLY THAT OTHER PEOPLE COULD HAVE NOTICED. OR THE OPPOSITE, BEING SO FIGETY OR RESTLESS THAT YOU HAVE BEEN MOVING AROUND A LOT MORE THAN USUAL: 0
1. LITTLE INTEREST OR PLEASURE IN DOING THINGS: 0
7. TROUBLE CONCENTRATING ON THINGS, SUCH AS READING THE NEWSPAPER OR WATCHING TELEVISION: 0
SUM OF ALL RESPONSES TO PHQ QUESTIONS 1-9: 0
SUM OF ALL RESPONSES TO PHQ QUESTIONS 1-9: 0
4. FEELING TIRED OR HAVING LITTLE ENERGY: 0
5. POOR APPETITE OR OVEREATING: 0
9. THOUGHTS THAT YOU WOULD BE BETTER OFF DEAD, OR OF HURTING YOURSELF: 0
2. FEELING DOWN, DEPRESSED OR HOPELESS: 0
3. TROUBLE FALLING OR STAYING ASLEEP: 0
SUM OF ALL RESPONSES TO PHQ QUESTIONS 1-9: 0
SUM OF ALL RESPONSES TO PHQ QUESTIONS 1-9: 0
SUM OF ALL RESPONSES TO PHQ9 QUESTIONS 1 & 2: 0
6. FEELING BAD ABOUT YOURSELF - OR THAT YOU ARE A FAILURE OR HAVE LET YOURSELF OR YOUR FAMILY DOWN: 0

## 2024-01-29 ASSESSMENT — ENCOUNTER SYMPTOMS
WHEEZING: 0
COUGH: 0
DIARRHEA: 0
BACK PAIN: 1
SHORTNESS OF BREATH: 0
NAUSEA: 0
VOMITING: 0

## 2024-02-10 DIAGNOSIS — E55.9 VITAMIN D DEFICIENCY: ICD-10-CM

## 2024-02-12 NOTE — TELEPHONE ENCOUNTER
Medication:   Requested Prescriptions     Pending Prescriptions Disp Refills    vitamin D (ERGOCALCIFEROL) 1.25 MG (26164 UT) CAPS capsule [Pharmacy Med Name: Vitamin D (Ergocalciferol) 1.25 MG (48108 UT) Oral Capsule] 4 capsule 0     Sig: Take 1 capsule by mouth once a week     Last Filled:  4.13.23    Last appt: 1/29/2024   Next appt: 3/20/2024    Last OARRS:       11/15/2023     7:31 PM   RX Monitoring   Periodic Controlled Substance Monitoring No signs of potential drug abuse or diversion identified.

## 2024-02-13 RX ORDER — ERGOCALCIFEROL 1.25 MG/1
50000 CAPSULE ORAL WEEKLY
Qty: 12 CAPSULE | Refills: 1 | Status: SHIPPED | OUTPATIENT
Start: 2024-02-13

## 2024-03-07 NOTE — PROGRESS NOTES
[facility]  [facility]  [address]  [address]  [facility] [address]  Dept: [phone]      Isha Edward is a 35 y.o. female who presents today for follow up on her  medical conditions as noted below.      Chief Complaint   Patient presents with    Pre-op Exam       Patient Active Problem List:     Morbid obesity with BMI of 50.0-59.9, adult (HCC)     Recurrent major depressive disorder, in full remission (HCC)     Type 2 diabetes mellitus without complication, with long-term current use of insulin (HCC)     Depression     Migraines     Seasonal allergies     Obstructive sleep apnea syndrome     S/P laparoscopy     Colitis     Lower abdominal pain     Nausea     Diarrhea     Elevated lipase     Pancreatitis, acute     Past Medical History:   Diagnosis Date    Anesthesia complication     O2 stat dropped, bp dropped, trouble breathing, dizziness, N/V    Anxiety and depression     Depression     Essential hypertension     Patient denies but on PCP note from 2023- states Rx was for renal protection, and has never had HTN    Migraines     Mixed hyperlipidemia     Patient denies but on PCP note from 2023    Obesity     Pancreatitis, acute 2023    Personal history of other medical treatment     Numbness and tingling bilateral hands    PONV (postoperative nausea and vomiting)     Seasonal allergies     Sleep apnea     uses cpap nightly    Type 2 diabetes mellitus without complication ([facility])     Under care of team 2024    PCP: Dr. Alessandra Bang Butler, last visit 2023    Under care of team 2024    Pulmonary: Bryce Hitchcock MD, [facility], last visit 2023    Uses self-applied continuous glucose monitoring device     Vitamin D deficiency     Wears glasses     Wears glasses       Past Surgical History:   Procedure Laterality Date     SECTION      ESOPHAGOGASTRODUODENOSCOPY  2024    LAPAROSCOPY  2018    LAPAROSCOPY DIAGNOSTIC  her back in a couple of weeks for injections for the shoulder, or if she prefers she may continue to follow-up with Dr. Brett Mendez. Benja Bran 484     04/29/20  11:58 AM    The encounter with Anastasia Finn was supervised by Dr Cathryn Baker who personally examined the patient and reviewed the plan. This dictation was performed with a verbal recognition program (DRAGON) and it was checked for errors. It is possible that there are still dictated errors within this office note. If so, please bring any errors to my attention for an addendum. All efforts were made to ensure that this office note is accurate. Attestation:  I was physically present and performed my own examination of this patient and have discussed the case, including pertinent history and exam findings with the fellow. I agree with the documented assessment and plan. Josefina Garcia.  Cathryn Baker MD      .

## 2024-03-09 RX ORDER — NITROGLYCERIN 0.4 MG/1
TABLET SUBLINGUAL
Qty: 25 TABLET | Refills: 0 | Status: SHIPPED | OUTPATIENT
Start: 2024-03-09

## 2024-03-19 ENCOUNTER — TELEPHONE (OUTPATIENT)
Dept: GYNECOLOGY | Age: 59
End: 2024-03-19

## 2024-03-19 NOTE — TELEPHONE ENCOUNTER
Patient is requesting an earlier appointment for annual exam pt says within the next weeks    Patient can be reached at [phone]

## 2024-03-21 NOTE — TELEPHONE ENCOUNTER
Patient has had 2 appointment scheduled and they both have been cancelled per provider. She's requesting an appointment for Friday (tomorrow) or next week. I let her know that Dr Rao will be out of the office and that her partners will be covering for her. There was definitely a language barrier. Please contact patient regarding a new appoint,ent. She doesn't want to wait past next week. Patient can be reached at [phone]

## 2024-03-21 NOTE — TELEPHONE ENCOUNTER
Tell patient that I can see her at 12:20 pm on 4/17. Tell her I am out of the office for the next few weeks and I do not have appointments on Friday.

## 2024-03-25 ENCOUNTER — OFFICE VISIT (OUTPATIENT)
Dept: PRIMARY CARE CLINIC | Age: 59
End: 2024-03-25
Payer: MEDICARE

## 2024-03-25 VITALS
HEART RATE: 65 BPM | OXYGEN SATURATION: 98 % | WEIGHT: 249 LBS | BODY MASS INDEX: 37.86 KG/M2 | DIASTOLIC BLOOD PRESSURE: 80 MMHG | SYSTOLIC BLOOD PRESSURE: 130 MMHG

## 2024-03-25 DIAGNOSIS — K21.9 GASTROESOPHAGEAL REFLUX DISEASE, UNSPECIFIED WHETHER ESOPHAGITIS PRESENT: ICD-10-CM

## 2024-03-25 DIAGNOSIS — M79.644 THUMB PAIN, RIGHT: ICD-10-CM

## 2024-03-25 DIAGNOSIS — R82.998 LEUKOCYTES IN URINE: ICD-10-CM

## 2024-03-25 DIAGNOSIS — M79.671 PAIN OF RIGHT HEEL: ICD-10-CM

## 2024-03-25 DIAGNOSIS — E55.9 VITAMIN D DEFICIENCY: ICD-10-CM

## 2024-03-25 DIAGNOSIS — E78.2 MIXED HYPERLIPIDEMIA: ICD-10-CM

## 2024-03-25 DIAGNOSIS — E11.42 TYPE 2 DIABETES MELLITUS WITH DIABETIC POLYNEUROPATHY, WITHOUT LONG-TERM CURRENT USE OF INSULIN (HCC): ICD-10-CM

## 2024-03-25 DIAGNOSIS — E11.42 TYPE 2 DIABETES MELLITUS WITH DIABETIC POLYNEUROPATHY, WITHOUT LONG-TERM CURRENT USE OF INSULIN (HCC): Primary | ICD-10-CM

## 2024-03-25 DIAGNOSIS — N89.8 VAGINAL DISCHARGE: ICD-10-CM

## 2024-03-25 LAB
25(OH)D3 SERPL-MCNC: 25.1 NG/ML
ALBUMIN SERPL-MCNC: 4.4 G/DL (ref 3.4–5)
ALBUMIN/GLOB SERPL: 2.1 {RATIO} (ref 1.1–2.2)
ALP SERPL-CCNC: 91 U/L (ref 40–129)
ALT SERPL-CCNC: 15 U/L (ref 10–40)
ANION GAP SERPL CALCULATED.3IONS-SCNC: 11 MMOL/L (ref 3–16)
AST SERPL-CCNC: 13 U/L (ref 15–37)
BILIRUB SERPL-MCNC: 0.4 MG/DL (ref 0–1)
BILIRUBIN, POC: NORMAL
BLOOD URINE, POC: NORMAL
BUN SERPL-MCNC: 15 MG/DL (ref 7–20)
CALCIUM SERPL-MCNC: 9.1 MG/DL (ref 8.3–10.6)
CHLORIDE SERPL-SCNC: 102 MMOL/L (ref 99–110)
CHOLEST SERPL-MCNC: 208 MG/DL (ref 0–199)
CLARITY, POC: CLEAR
CO2 SERPL-SCNC: 26 MMOL/L (ref 21–32)
COLOR, POC: YELLOW
CREAT SERPL-MCNC: <0.5 MG/DL (ref 0.6–1.1)
CREAT UR-MCNC: 117.7 MG/DL (ref 28–259)
GFR SERPLBLD CREATININE-BSD FMLA CKD-EPI: >90 ML/MIN/{1.73_M2}
GLUCOSE SERPL-MCNC: 86 MG/DL (ref 70–99)
GLUCOSE URINE, POC: NORMAL
HBA1C MFR BLD: 5.3 %
HDLC SERPL-MCNC: 86 MG/DL (ref 40–60)
KETONES, POC: 40
LDLC SERPL CALC-MCNC: 108 MG/DL
LEUKOCYTE EST, POC: NORMAL
MICROALBUMIN UR DL<=1MG/L-MCNC: <1.2 MG/DL
MICROALBUMIN/CREAT UR: NORMAL MG/G (ref 0–30)
NITRITE, POC: NORMAL
PH, POC: 5.5
POTASSIUM SERPL-SCNC: 4.5 MMOL/L (ref 3.5–5.1)
PROT SERPL-MCNC: 6.5 G/DL (ref 6.4–8.2)
PROTEIN, POC: NORMAL
SODIUM SERPL-SCNC: 139 MMOL/L (ref 136–145)
SPECIFIC GRAVITY, POC: 1.02
TRIGL SERPL-MCNC: 71 MG/DL (ref 0–150)
UROBILINOGEN, POC: 0.2
VLDLC SERPL CALC-MCNC: 14 MG/DL

## 2024-03-25 PROCEDURE — 3017F COLORECTAL CA SCREEN DOC REV: CPT | Performed by: INTERNAL MEDICINE

## 2024-03-25 PROCEDURE — G8484 FLU IMMUNIZE NO ADMIN: HCPCS | Performed by: INTERNAL MEDICINE

## 2024-03-25 PROCEDURE — 81002 URINALYSIS NONAUTO W/O SCOPE: CPT | Performed by: INTERNAL MEDICINE

## 2024-03-25 PROCEDURE — 3044F HG A1C LEVEL LT 7.0%: CPT | Performed by: INTERNAL MEDICINE

## 2024-03-25 PROCEDURE — G8417 CALC BMI ABV UP PARAM F/U: HCPCS | Performed by: INTERNAL MEDICINE

## 2024-03-25 PROCEDURE — 2022F DILAT RTA XM EVC RTNOPTHY: CPT | Performed by: INTERNAL MEDICINE

## 2024-03-25 PROCEDURE — G8427 DOCREV CUR MEDS BY ELIG CLIN: HCPCS | Performed by: INTERNAL MEDICINE

## 2024-03-25 PROCEDURE — 1036F TOBACCO NON-USER: CPT | Performed by: INTERNAL MEDICINE

## 2024-03-25 PROCEDURE — 99214 OFFICE O/P EST MOD 30 MIN: CPT | Performed by: INTERNAL MEDICINE

## 2024-03-25 PROCEDURE — 83036 HEMOGLOBIN GLYCOSYLATED A1C: CPT | Performed by: INTERNAL MEDICINE

## 2024-03-25 SDOH — ECONOMIC STABILITY: FOOD INSECURITY: WITHIN THE PAST 12 MONTHS, THE FOOD YOU BOUGHT JUST DIDN'T LAST AND YOU DIDN'T HAVE MONEY TO GET MORE.: NEVER TRUE

## 2024-03-25 SDOH — ECONOMIC STABILITY: FOOD INSECURITY: WITHIN THE PAST 12 MONTHS, YOU WORRIED THAT YOUR FOOD WOULD RUN OUT BEFORE YOU GOT MONEY TO BUY MORE.: NEVER TRUE

## 2024-03-25 SDOH — ECONOMIC STABILITY: INCOME INSECURITY: HOW HARD IS IT FOR YOU TO PAY FOR THE VERY BASICS LIKE FOOD, HOUSING, MEDICAL CARE, AND HEATING?: NOT HARD AT ALL

## 2024-03-25 NOTE — PROGRESS NOTES
Date of Visit: 3/25/2024    Farrah Rivers (:  1965) is a 58 y.o. female,  Established patient here for evaluation of the following chief complaint(s):  Diabetes; Gastroesophageal Reflux; Letter for traveling with medication; Letter for explanation of disability for housing ; and Letter for diability, unable to work       ASSESSMENT/PLAN:    1. Type 2 diabetes mellitus with diabetic polyneuropathy, without long-term current use of insulin ([facility])  -Hemoglobin A1c of 5.3% shows diabetes is well controlled  -Continue diet control  -Limit carbohydrates to 45 grams with meals and 15 grams with snacks  -monitor blood sugars  -goal for blood sugar fasting or pre-meal  is   -goal for blood sugar 2 hours after a meal is less than 180  -goal for blood sugar at bedtime is less than 150  -Regular aerobic exercise  - POCT glycosylated hemoglobin (Hb A1C)  - MICROALBUMIN / CREATININE URINE RATIO  -  DIABETES FOOT EXAM  - Comprehensive Metabolic Panel; Future    2. Gastroesophageal reflux disease, unspecified whether esophagitis present  -stable  -Continue pantoprazole 40 mg once daily before breakfast  -Continue famotidine 20 mg 2 times daily as needed  -Decrease caffeine, avoid spicy foods, avoid tomato based foods  -Eat small meals instead of large meals  -Wait 2-3 hours after eating before lying down     3. Vitamin D deficiency  -not controlled   -last vitamin D is low but had improved  -Continue vitamin D 50,000 IU weekly  -Vitamin D 25 Hydroxy; Future    4. Mixed hyperlipidemia  -Stable  -LDL is at goal  -Continue atorvastatin 10 mg nightly  -Low fat, low cholesterol diet  -Regular aerobic exercise  -Comprehensive Metabolic Panel; Future  -Lipid Panel; Future    5. Vaginal discharge  -No vaginal discharge on exam  - POCT Urinalysis no Micro  -Follow-up with Gynecology as scheduled    6. Leukocytes in urine  - Culture, Urine    7. Pain of right heel  -Ibuprofen as needed  -Referral to Chriss Sutton

## 2024-03-26 ENCOUNTER — OFFICE VISIT (OUTPATIENT)
Dept: PULMONOLOGY | Age: 59
End: 2024-03-26
Payer: MEDICARE

## 2024-03-26 VITALS
HEART RATE: 83 BPM | OXYGEN SATURATION: 97 % | DIASTOLIC BLOOD PRESSURE: 82 MMHG | WEIGHT: 253.4 LBS | SYSTOLIC BLOOD PRESSURE: 130 MMHG | BODY MASS INDEX: 38.53 KG/M2

## 2024-03-26 DIAGNOSIS — E11.42 TYPE 2 DIABETES MELLITUS WITH DIABETIC POLYNEUROPATHY, WITHOUT LONG-TERM CURRENT USE OF INSULIN (HCC): ICD-10-CM

## 2024-03-26 DIAGNOSIS — G47.33 OSA (OBSTRUCTIVE SLEEP APNEA): Primary | ICD-10-CM

## 2024-03-26 DIAGNOSIS — E66.01 CLASS 2 SEVERE OBESITY DUE TO EXCESS CALORIES WITH SERIOUS COMORBIDITY AND BODY MASS INDEX (BMI) OF 38.0 TO 38.9 IN ADULT (HCC): ICD-10-CM

## 2024-03-26 PROBLEM — E66.812 CLASS 2 SEVERE OBESITY DUE TO EXCESS CALORIES WITH SERIOUS COMORBIDITY AND BODY MASS INDEX (BMI) OF 37.0 TO 37.9 IN ADULT: Status: ACTIVE | Noted: 2023-11-07

## 2024-03-26 LAB — BACTERIA UR CULT: NORMAL

## 2024-03-26 PROCEDURE — 1036F TOBACCO NON-USER: CPT | Performed by: NURSE PRACTITIONER

## 2024-03-26 PROCEDURE — G8417 CALC BMI ABV UP PARAM F/U: HCPCS | Performed by: NURSE PRACTITIONER

## 2024-03-26 PROCEDURE — 3017F COLORECTAL CA SCREEN DOC REV: CPT | Performed by: NURSE PRACTITIONER

## 2024-03-26 PROCEDURE — 3044F HG A1C LEVEL LT 7.0%: CPT | Performed by: NURSE PRACTITIONER

## 2024-03-26 PROCEDURE — G8484 FLU IMMUNIZE NO ADMIN: HCPCS | Performed by: NURSE PRACTITIONER

## 2024-03-26 PROCEDURE — 2022F DILAT RTA XM EVC RTNOPTHY: CPT | Performed by: NURSE PRACTITIONER

## 2024-03-26 PROCEDURE — 99213 OFFICE O/P EST LOW 20 MIN: CPT | Performed by: NURSE PRACTITIONER

## 2024-03-26 PROCEDURE — G8427 DOCREV CUR MEDS BY ELIG CLIN: HCPCS | Performed by: NURSE PRACTITIONER

## 2024-03-26 ASSESSMENT — SLEEP AND FATIGUE QUESTIONNAIRES
HOW LIKELY ARE YOU TO NOD OFF OR FALL ASLEEP WHEN YOU ARE A PASSENGER IN A CAR FOR AN HOUR WITHOUT A BREAK: HIGH CHANCE OF DOZING
HOW LIKELY ARE YOU TO NOD OFF OR FALL ASLEEP WHILE SITTING AND TALKING TO SOMEONE: MODERATE CHANCE OF DOZING
HOW LIKELY ARE YOU TO NOD OFF OR FALL ASLEEP WHILE LYING DOWN TO REST IN THE AFTERNOON WHEN CIRCUMSTANCES PERMIT: HIGH CHANCE OF DOZING
HOW LIKELY ARE YOU TO NOD OFF OR FALL ASLEEP WHILE WATCHING TV: HIGH CHANCE OF DOZING
HOW LIKELY ARE YOU TO NOD OFF OR FALL ASLEEP IN A CAR, WHILE STOPPED FOR A FEW MINUTES IN TRAFFIC: HIGH CHANCE OF DOZING
HOW LIKELY ARE YOU TO NOD OFF OR FALL ASLEEP WHILE SITTING QUIETLY AFTER LUNCH WITHOUT ALCOHOL: HIGH CHANCE OF DOZING
HOW LIKELY ARE YOU TO NOD OFF OR FALL ASLEEP WHILE SITTING INACTIVE IN A PUBLIC PLACE: MODERATE CHANCE OF DOZING
ESS TOTAL SCORE: 22
HOW LIKELY ARE YOU TO NOD OFF OR FALL ASLEEP WHILE SITTING AND READING: HIGH CHANCE OF DOZING

## 2024-03-26 NOTE — PROGRESS NOTES
Magnus Lobato CNP  Latonia Demarco CNP [facility]  [address]  [address]  [place] [address]  P- [phone]  F- [phone]     [facility]  [facility]  [address]  [address]  [facility] [address]  Dept: [phone]  Dept Fax: [phone]  Loc: [phone]      Assessment/Plan:      1. SHYANN (obstructive sleep apnea)  Assessment & Plan:  Chronic - Question if stable: Supplies and parts as needed for the machine. These are medically necessary. Limit caffeine use after 3 PM. Showed her how to use the machine and how to adjust the humidity settings on the machine so she can start using the machine tonight. Her machine was set with the following: Auto CPAP P min 6, P max 16. Explained to her that we will review the download data and her symptoms when she returns for a follow up, then consider adjusting pressure settings as necessary. Discussed trial of different styles of masks/headgear for comfort and reviewed a few options but she will use the current full face mask that was sent with the machine for now. She will return in 4-6 weeks for follow up to check progress and compliance. Instructed her to contact the office if experiences new or worsening of symptoms. Encouraged her to start using the machine all night, every night.     Also discussed the content of the letter she is requesting. I will provide her a letter with her diagnosis, indicating she is on the PAP machine for treatment. Explained to her that she should not be feeling sleepy while driving if she is compliant with her PAP therapy and her sleep apnea is controlled. Also advised her that she should not be driving if she feels sleepy while driving regardless of driving distance.     Discussed the importance of consistence use of the machine and encouraged consistent use of the machine each night. Also discussed the importance of treating Obstructive

## 2024-03-26 NOTE — ASSESSMENT & PLAN NOTE
Chronic - Question if stable: Supplies and parts as needed for the machine. These are medically necessary. Limit caffeine use after 3 PM. Showed her how to use the machine and how to adjust the humidity settings on the machine so she can start using the machine tonight. Her machine was set with the following: Auto CPAP P min 6, P max 16. Explained to her that we will review the download data and her symptoms when she returns for a follow up, then consider adjusting pressure settings as necessary. Discussed trial of different styles of masks/headgear for comfort and reviewed a few options but she will use the current full face mask that was sent with the machine for now. She will return in 4-6 weeks for follow up to check progress and compliance. Instructed her to contact the office if experiences new or worsening of symptoms. Encouraged her to start using the machine all night, every night.     Also discussed the content of the letter she is requesting. I will provide her a letter with her diagnosis, indicating she is on the PAP machine for treatment. Explained to her that she should not be feeling sleepy while driving if she is compliant with her PAP therapy and her sleep apnea is controlled. Also advised her that she should not be driving if she feels sleepy while driving regardless of driving distance.     Discussed the importance of consistence use of the machine and encouraged consistent use of the machine each night. Also discussed the importance of treating Obstructive Sleep Apnea from a physiological standpoint. Instructed not to drive unless had 4 hours of effective therapy for SHYANN the night before. Did review the risks of under or untreated SHYANN including, but not limited to, higher risks of motor vehicle accidents, stroke, heart attacks, and death. Patients verbalized understanding and accepts all these risks.

## 2024-03-31 PROBLEM — M79.644 THUMB PAIN, RIGHT: Status: ACTIVE | Noted: 2024-03-31

## 2024-03-31 PROBLEM — I82.509 CHRONIC DEEP VEIN THROMBOSIS (DVT) (HCC): Status: RESOLVED | Noted: 2023-03-16 | Resolved: 2024-03-31

## 2024-03-31 PROBLEM — I82.622 DEEP VEIN THROMBOSIS (DVT) OF LEFT UPPER EXTREMITY (HCC): Status: RESOLVED | Noted: 2023-06-19 | Resolved: 2024-03-31

## 2024-03-31 PROBLEM — M79.671 PAIN OF RIGHT HEEL: Status: ACTIVE | Noted: 2024-03-31

## 2024-04-01 ENCOUNTER — OFFICE VISIT (OUTPATIENT)
Dept: PAIN MANAGEMENT | Age: 59
End: 2024-04-01
Payer: MEDICARE

## 2024-04-01 ENCOUNTER — OFFICE VISIT (OUTPATIENT)
Dept: GYNECOLOGY | Age: 59
End: 2024-04-01
Payer: MEDICARE

## 2024-04-01 ENCOUNTER — TELEPHONE (OUTPATIENT)
Dept: PHARMACY | Facility: CLINIC | Age: 59
End: 2024-04-01

## 2024-04-01 VITALS
SYSTOLIC BLOOD PRESSURE: 127 MMHG | WEIGHT: 247 LBS | HEART RATE: 89 BPM | OXYGEN SATURATION: 99 % | DIASTOLIC BLOOD PRESSURE: 74 MMHG | BODY MASS INDEX: 37.56 KG/M2

## 2024-04-01 VITALS
BODY MASS INDEX: 37.44 KG/M2 | SYSTOLIC BLOOD PRESSURE: 138 MMHG | WEIGHT: 247 LBS | DIASTOLIC BLOOD PRESSURE: 76 MMHG | RESPIRATION RATE: 17 BRPM | HEART RATE: 82 BPM | HEIGHT: 68 IN

## 2024-04-01 DIAGNOSIS — F51.01 PRIMARY INSOMNIA: ICD-10-CM

## 2024-04-01 DIAGNOSIS — G89.4 CHRONIC PAIN SYNDROME: ICD-10-CM

## 2024-04-01 DIAGNOSIS — N89.8 VAGINAL DISCHARGE: ICD-10-CM

## 2024-04-01 DIAGNOSIS — M79.7 FIBROMYALGIA: ICD-10-CM

## 2024-04-01 DIAGNOSIS — Z01.419 WELL WOMAN EXAM WITH ROUTINE GYNECOLOGICAL EXAM: Primary | ICD-10-CM

## 2024-04-01 DIAGNOSIS — M54.16 LUMBAR RADICULOPATHY: ICD-10-CM

## 2024-04-01 DIAGNOSIS — M96.1 FAILED BACK SURGICAL SYNDROME: ICD-10-CM

## 2024-04-01 PROCEDURE — 3017F COLORECTAL CA SCREEN DOC REV: CPT | Performed by: INTERNAL MEDICINE

## 2024-04-01 PROCEDURE — G8427 DOCREV CUR MEDS BY ELIG CLIN: HCPCS | Performed by: INTERNAL MEDICINE

## 2024-04-01 PROCEDURE — 99204 OFFICE O/P NEW MOD 45 MIN: CPT | Performed by: INTERNAL MEDICINE

## 2024-04-01 PROCEDURE — G8417 CALC BMI ABV UP PARAM F/U: HCPCS | Performed by: INTERNAL MEDICINE

## 2024-04-01 PROCEDURE — 99396 PREV VISIT EST AGE 40-64: CPT | Performed by: OBSTETRICS & GYNECOLOGY

## 2024-04-01 PROCEDURE — 1036F TOBACCO NON-USER: CPT | Performed by: INTERNAL MEDICINE

## 2024-04-01 RX ORDER — PREGABALIN 75 MG/1
CAPSULE ORAL
Qty: 90 CAPSULE | Refills: 0 | Status: SHIPPED | OUTPATIENT
Start: 2024-04-01 | End: 2024-04-29 | Stop reason: SDUPTHER

## 2024-04-01 RX ORDER — NORTRIPTYLINE HYDROCHLORIDE 25 MG/1
25-50 CAPSULE ORAL NIGHTLY
Qty: 60 CAPSULE | Refills: 0 | Status: SHIPPED | OUTPATIENT
Start: 2024-04-01 | End: 2024-04-29 | Stop reason: SDUPTHER

## 2024-04-01 RX ORDER — DULOXETIN HYDROCHLORIDE 30 MG/1
30 CAPSULE, DELAYED RELEASE ORAL DAILY
Qty: 30 CAPSULE | Refills: 0 | Status: SHIPPED | OUTPATIENT
Start: 2024-04-01 | End: 2024-04-29 | Stop reason: ALTCHOICE

## 2024-04-01 NOTE — TELEPHONE ENCOUNTER
[facility] CLINICAL PHARMACY: ADHERENCE REVIEW  Identified care gap per Bivins: fills at [facility]: Statin adherence      ASSESSMENT  STATIN ADHERENCE    Insurance Records claims through 3/26/24 (Prior Year PDC = not reported; YTD PDC = FIRST FILL; Potential Fail Date: n/a):   ATORVASTATIN 10 MG TABLET last filled on 24 for 30 day supply. Next refill due: 24    Prescribed si tablet/capsule daily    Per Reconcile Dispense History: last filled on 24 for 30 day supply.     Per [facility]: last picked up on 24 for 30 day supply. will get 30 day supply ready to  since past due.    Lab Results   Component Value Date    CHOL 208 (H) 2024    TRIG 71 2024    HDL 86 (H) 2024    LDLCALC 108 (H) 2024     ALT   Date Value Ref Range Status   2024 15 10 - 40 U/L Final     AST   Date Value Ref Range Status   2024 13 (L) 15 - 37 U/L Final     The 10-year ASCVD risk score (Yoshi FORD, et al., 2019) is: 4.2%    Values used to calculate the score:      Age: 58 years      Sex: Female      Is Non- : No      Diabetic: Yes      Tobacco smoker: No      Systolic Blood Pressure: 127 mmHg      Is BP treated: No      HDL Cholesterol: 86 mg/dL      Total Cholesterol: 208 mg/dL     PLAN    The following are interventions that have been identified:   Patient OVERDUE refilling Atorvastatin and active on home medication list.   Pharmacy has filled a 30 day supply of Atorvastatin today, patient is eligible for a 100 day supply. A new prescription is needed FOR NEXT FILL.    Attempting to reach patient to review - unable to leave message. Letter sent to patient.      Last Visit: 3/25/24  Next Visit: 24    Messi Solorio CPhT  [facility] Clinical   [facility] Clinical Pharmacy  Department, toll free: [phone], option 1       For Pharmacy Admin Tracking Only    Program: [employer]  CPA in place:  No  Recommendation

## 2024-04-08 NOTE — PROGRESS NOTES
Subjective:      Patient ID: Farrah Rivers is a 58 y.o. female.    Patient is here for annual. Patient with some brown discharge. Patient with some ambulation issues.     Gynecologic Exam  Associated symptoms include arthralgias and myalgias.       Review of Systems   Genitourinary:  Positive for vaginal bleeding.   Musculoskeletal:  Positive for arthralgias and myalgias.     Date of Birth 1965  Past Medical History:   Diagnosis Date    Arthritis     Avoids pork and pork products due to cultural beliefs     Back pain, chronic 04/10/2018    Cerebrovascular small vessel disease 12/31/2022    Chronic pain     Depression, unspecified 11/03/2023    TAN (dyspnea on exertion)     Frequent headaches 12/13/2022    Gallbladder disorder     Gastroesophageal reflux disease 01/28/2020    Hx of blood clots     in legs -- 13 years ago    Hx of degenerative disc disease     Hyperlipidemia     Hypertension     Morbid obesity with body mass index (BMI) of 60.0 to 69.9 in adult (HCC) 07/02/2018    Neuropathy     SHYANN (obstructive sleep apnea)     machine broke down, awaiting new machine at the end of June 2023    Poor circulation     Primary osteoarthritis of right knee 07/02/2018    Type 2 diabetes mellitus without complication ([facility])     controlled with diet    Urinary incontinence     Vitamin D deficiency 01/28/2020    Wears glasses      Past Surgical History:   Procedure Laterality Date    BACK SURGERY      2001 -- L4 and L5 -- no metal or implants    BREAST BIOPSY Bilateral 2015    excisional bx, benign    CHOLECYSTECTOMY, LAPAROSCOPIC N/A 11/30/2023    LAPAROSCOPIC CHOLECYSTECTOMY WITH CHOLANGIOGRAM , POSSIBLE UMBILICAL HERNIA REPAIR performed by Konstantin Valadez MD at [facility] OR    COLONOSCOPY      DILATION AND CURETTAGE OF UTERUS N/A 08/30/2018    GASTRIC BYPASS SURGERY      LAMINECTOMY N/A 02/22/2023    T10-T11 LAMINECTOMY, FACETECTOMY, PEDICLE SCREW FIXATION FOR REMOVAL OF THORACIC MASS performed by Mayo Garibay MD

## 2024-04-09 RX ORDER — OMEPRAZOLE 40 MG/1
40 CAPSULE, DELAYED RELEASE ORAL
Qty: 90 CAPSULE | OUTPATIENT
Start: 2024-04-09

## 2024-04-09 NOTE — TELEPHONE ENCOUNTER
Medication:   Requested Prescriptions     Pending Prescriptions Disp Refills    omeprazole (PRILOSEC) 40 MG delayed release capsule [Pharmacy Med Name: OMEPRAZOLE DR 40 MG CAPSULE] 90 capsule      Sig: TAKE 1 CAPSULE BY MOUTH EVERY DAY IN THE MORNING BEFORE BREAKFAST     Last Filled:      Last appt: 3/25/2024   Next appt: 6/25/2024    Last OARRS:       11/15/2023     7:31 PM   RX Monitoring   Periodic Controlled Substance Monitoring No signs of potential drug abuse or diversion identified.

## 2024-04-09 NOTE — TELEPHONE ENCOUNTER
Iman Judge J Der Triage Nurse Msg Pool  Skyrizi 150mg/ml pen  -  Approved - Ready to fill     Authorization Number: [id number]  Valid from 4/2/24 to 10/2/24     The script has been released to [facility]     Prescription Insurance: [place] Medicaid  Type of Coverage: Medicaid  Patient's Co-Pay: $0     Co pay Assistance Information                  Assistance not required.       Additional Information:  Spoke to patient they are aware of approval and filling pharmacy.         Iman Judge  4/8/24   Requested Prescriptions     Pending Prescriptions Disp Refills    furosemide (LASIX) 40 MG tablet [Pharmacy Med Name: FUROSEMIDE 40 MG TABLET] 90 tablet 3     Sig: TAKE 1 TABLET BY MOUTH EVERY DAY       Last Office Visit: 11/28/2022     Next Office Visit: 12.05.2023

## 2024-04-11 ENCOUNTER — TELEPHONE (OUTPATIENT)
Dept: PRIMARY CARE CLINIC | Age: 59
End: 2024-04-11

## 2024-04-11 NOTE — TELEPHONE ENCOUNTER
Verification of Form Received  Receipt of Form - [employer] -   (Verification of Disability Form)  Faxed 4/10/2024  From - [employer]  *If we email to requestor - also email to patient  *Please follow up with patient status of received Form

## 2024-04-12 NOTE — TELEPHONE ENCOUNTER
Patient returning call - seeking status of whether received Form ([place]) had been completed by Dr Patterson and submitted back to requester.  Please call Amel to provide completed Form status.  (Amel is also requesting a copy of  signed Form too)

## 2024-04-24 ENCOUNTER — TELEPHONE (OUTPATIENT)
Dept: PULMONOLOGY | Age: 59
End: 2024-04-24

## 2024-04-29 ENCOUNTER — OFFICE VISIT (OUTPATIENT)
Dept: PAIN MANAGEMENT | Age: 59
End: 2024-04-29
Payer: MEDICARE

## 2024-04-29 VITALS — OXYGEN SATURATION: 99 % | SYSTOLIC BLOOD PRESSURE: 128 MMHG | HEART RATE: 58 BPM | DIASTOLIC BLOOD PRESSURE: 80 MMHG

## 2024-04-29 DIAGNOSIS — M96.1 FAILED BACK SURGICAL SYNDROME: ICD-10-CM

## 2024-04-29 DIAGNOSIS — M17.0 PRIMARY OSTEOARTHRITIS OF BOTH KNEES: ICD-10-CM

## 2024-04-29 DIAGNOSIS — F51.01 PRIMARY INSOMNIA: ICD-10-CM

## 2024-04-29 DIAGNOSIS — G89.4 CHRONIC PAIN SYNDROME: ICD-10-CM

## 2024-04-29 DIAGNOSIS — E11.42 DIABETIC POLYNEUROPATHY ASSOCIATED WITH TYPE 2 DIABETES MELLITUS (HCC): ICD-10-CM

## 2024-04-29 DIAGNOSIS — F39 MOOD DISORDER (HCC): ICD-10-CM

## 2024-04-29 DIAGNOSIS — M79.7 FIBROMYALGIA: ICD-10-CM

## 2024-04-29 DIAGNOSIS — L85.3 DRY SKIN: ICD-10-CM

## 2024-04-29 DIAGNOSIS — M54.16 LUMBAR RADICULOPATHY: ICD-10-CM

## 2024-04-29 PROCEDURE — G8427 DOCREV CUR MEDS BY ELIG CLIN: HCPCS | Performed by: INTERNAL MEDICINE

## 2024-04-29 PROCEDURE — 3017F COLORECTAL CA SCREEN DOC REV: CPT | Performed by: INTERNAL MEDICINE

## 2024-04-29 PROCEDURE — 1036F TOBACCO NON-USER: CPT | Performed by: INTERNAL MEDICINE

## 2024-04-29 PROCEDURE — 3044F HG A1C LEVEL LT 7.0%: CPT | Performed by: INTERNAL MEDICINE

## 2024-04-29 PROCEDURE — G8417 CALC BMI ABV UP PARAM F/U: HCPCS | Performed by: INTERNAL MEDICINE

## 2024-04-29 PROCEDURE — 99214 OFFICE O/P EST MOD 30 MIN: CPT | Performed by: INTERNAL MEDICINE

## 2024-04-29 PROCEDURE — 2022F DILAT RTA XM EVC RTNOPTHY: CPT | Performed by: INTERNAL MEDICINE

## 2024-04-29 RX ORDER — APIXABAN 5 MG/1
5 TABLET, FILM COATED ORAL 2 TIMES DAILY
COMMUNITY

## 2024-04-29 RX ORDER — DULOXETIN HYDROCHLORIDE 60 MG/1
60 CAPSULE, DELAYED RELEASE ORAL DAILY
Qty: 30 CAPSULE | Refills: 1 | Status: SHIPPED | OUTPATIENT
Start: 2024-04-29

## 2024-04-29 RX ORDER — PREGABALIN 75 MG/1
CAPSULE ORAL
Qty: 90 CAPSULE | Refills: 1 | Status: SHIPPED | OUTPATIENT
Start: 2024-04-29 | End: 2024-05-27

## 2024-04-29 RX ORDER — ESTRADIOL 0.1 MG/G
CREAM VAGINAL
COMMUNITY

## 2024-04-29 RX ORDER — AMMONIUM LACTATE 12 G/100G
CREAM TOPICAL
Qty: 385 G | Refills: 3 | Status: SHIPPED | OUTPATIENT
Start: 2024-04-29

## 2024-04-29 RX ORDER — NORTRIPTYLINE HYDROCHLORIDE 25 MG/1
25-50 CAPSULE ORAL NIGHTLY
Qty: 60 CAPSULE | Refills: 1 | Status: SHIPPED | OUTPATIENT
Start: 2024-04-29

## 2024-04-29 NOTE — TELEPHONE ENCOUNTER
Medication:   Requested Prescriptions     Pending Prescriptions Disp Refills    ammonium lactate (LAC-HYDRIN) 12 % cream 385 g 3     Sig: Apply topically 2 times as needed.        Last Filled:  12/18/23    Patient Phone Number: [phone] (home)     Last appt: 3/25/2024   Next appt: 6/25/2024    Last OARRS:       11/15/2023     7:31 PM   RX Monitoring   Periodic Controlled Substance Monitoring No signs of potential drug abuse or diversion identified.

## 2024-04-29 NOTE — PROGRESS NOTES
Farrah Rivers  1965  [id number]    HISTORY OF PRESENT ILLNESS:  Ms. Rivers is a 58 y.o. female returns for a follow up visit for multiple medical problems.  Her  presenting problems are   1. Chronic pain syndrome    2. Fibromyalgia    3. Failed back surgical syndrome    4. Lumbar radiculopathy    5. Primary insomnia    6. Diabetic polyneuropathy associated with type 2 diabetes mellitus (HCC)    7. Primary osteoarthritis of both knees    .    As per information/history obtained from the PADT(patient assessment and documentation tool) -  She complains of pain in the upper back, mid back, and lower back with radiation to the shoulders Bilateral, buttocks, hips Bilateral, upper leg Bilateral, knees Bilateral, lower leg Bilateral, ankles Bilateral, and feet Bilateral She rates the pain 7/10 and describes it as aching, burning, numbness, pins and needles.  Pain is made worse by: walking, standing, lifting.  Current treatment regimen has helped relieve about 20% of the pain.  She denies side effects from the current pain regimen.   Patient reports that since last follow up visit the physical functioning is better, family/social relationships are better, mood is better sleep patterns are better.  Ms. Rivers states that since starting the treatment with the current regimen the  overall functioning  in the above aspects is  better,Patient denies neurological bowel or bladder. Patient denies misusing/abusing her narcotic pain medications or using any illegal drugs.  There are No indicators for possible drug abuse, addiction or diversion problems.   Upon obtaining the medical history from Ms. Rivers regarding today's office visit for her presenting problems, patient states she is doing somewhat better but still hurts. Ms. Rivers mentions she is using Lyrica along with other adjuvants. She states her left knee is hurting also, status post knee injection in the past. Patient states she sleeps well. Has normal sleep

## 2024-04-30 NOTE — PROGRESS NOTES
Ms. Rivers is a 58 y.o. female who is seen in consultation at the request of Dr. Gissel Patterson for pain management.  Patient states that she has been having chronic pain in the back for a number of years most of the history is obtained via  although patient does understand and speak English patient denies any accident injury or trauma states she has had back surgery times 2/2001 in 2023 apparently there was some mass in the extradural space as per records states that the pain is worse since the surgery has had physical therapy done although states does not has not had many epidurals although thinks she has had injections in the back and also injection in the shoulder hips and knees states pain in the back goes into the legs states she has been a wheelchair since 2012 with back hurting more than the legs states she is also has gastric surgery gastric sleeve done describes the pain as aching stabbing pain burning and some pins and needle.  Sleep is poor about 2 to 3 hours does complain headaches complains of morning stiffness complains of fatigue.  Is on disability states she used to work as a  as she is single  lives on her own has some help at home symptoms started gradually has a prior episodes which been treated medications muscle relaxers heating pad chiropractic manipulation massage therapy home exercises ultrasound different other modalities.  Most the pain is in the low back area addition to the calf and foot activities such as standing walking lifting bending twisting cooking vacuuming doing yard work doing other ADLs causes her to have increased pain self with medications pain is constant does wax and wane does wake her up at night denies logical bowel or bladder grades of pain 7-9/10 has been taking Neurontin 300 mg 3 times a day off it currently states she has been on hydrocodone Percocet and Butrans in the past gives a history of high blood pressure blood clots

## 2024-05-03 ENCOUNTER — HOSPITAL ENCOUNTER (OUTPATIENT)
Dept: ULTRASOUND IMAGING | Age: 59
Discharge: HOME | End: 2024-05-03
Attending: OBSTETRICS & GYNECOLOGY
Payer: MEDICARE

## 2024-05-03 ENCOUNTER — HOSPITAL ENCOUNTER (OUTPATIENT)
Age: 59
Discharge: HOME | End: 2024-05-03
Attending: OBSTETRICS & GYNECOLOGY
Payer: MEDICARE

## 2024-05-03 DIAGNOSIS — N89.8 VAGINAL DISCHARGE: ICD-10-CM

## 2024-05-03 LAB
AMPHETAMINES UR QL SCN>1000 NG/ML: NORMAL
BARBITURATES UR QL SCN>200 NG/ML: NORMAL
BENZODIAZ UR QL SCN>200 NG/ML: NORMAL
CANNABINOIDS UR QL SCN>50 NG/ML: NORMAL
COCAINE UR QL SCN: NORMAL
DRUG SCREEN COMMENT UR-IMP: NORMAL
FENTANYL SCREEN, URINE: NORMAL
METHADONE UR QL SCN>300 NG/ML: NORMAL
OPIATES UR QL SCN>300 NG/ML: NORMAL
OXYCODONE UR QL SCN: NORMAL
PCP UR QL SCN>25 NG/ML: NORMAL
PH UR STRIP: 5 [PH]

## 2024-05-03 PROCEDURE — 76856 US EXAM PELVIC COMPLETE: CPT

## 2024-05-03 PROCEDURE — 80307 DRUG TEST PRSMV CHEM ANLYZR: CPT

## 2024-05-03 PROCEDURE — 76830 TRANSVAGINAL US NON-OB: CPT

## 2024-05-07 ENCOUNTER — TELEPHONE (OUTPATIENT)
Dept: PAIN MANAGEMENT | Age: 59
End: 2024-05-07

## 2024-05-07 DIAGNOSIS — G89.4 CHRONIC PAIN SYNDROME: Primary | ICD-10-CM

## 2024-05-07 DIAGNOSIS — M54.16 LUMBAR RADICULOPATHY: ICD-10-CM

## 2024-05-07 DIAGNOSIS — M96.1 FAILED BACK SURGICAL SYNDROME: ICD-10-CM

## 2024-05-07 NOTE — TELEPHONE ENCOUNTER
Pt has an appointment to get her MRI done but central scheduling needs [facility] to fax over the order and for it to say with and without contrast. They also need an order for lab work to be done. Pt has an appointment at 12 so it needs to be done asap if possible.      Fax: [phone]

## 2024-05-09 ENCOUNTER — TELEPHONE (OUTPATIENT)
Dept: PAIN MANAGEMENT | Age: 59
End: 2024-05-09

## 2024-05-09 NOTE — TELEPHONE ENCOUNTER
Pt was scheduled to have MRI done but due to being a diabetic she needed GFR blood work prior to the MRI so it was canceled. They need another order for the MRI with and without contrast and an order for the GFR blood work.

## 2024-05-10 NOTE — TELEPHONE ENCOUNTER
Called patient to inform her that her Blood work is in the [facility] system, she also was told she can go to any [facility] facility to get her blood work done.

## 2024-05-22 DIAGNOSIS — M17.11 PRIMARY OSTEOARTHRITIS OF RIGHT KNEE: ICD-10-CM

## 2024-05-22 DIAGNOSIS — M17.12 PRIMARY OSTEOARTHRITIS OF LEFT KNEE: ICD-10-CM

## 2024-05-23 DIAGNOSIS — R53.83 FATIGUE, UNSPECIFIED TYPE: ICD-10-CM

## 2024-05-23 DIAGNOSIS — G89.4 CHRONIC PAIN SYNDROME: ICD-10-CM

## 2024-05-23 LAB
CREAT SERPL-MCNC: <0.5 MG/DL (ref 0.6–1.1)
GFR SERPLBLD CREATININE-BSD FMLA CKD-EPI: >90 ML/MIN/{1.73_M2}

## 2024-05-24 ENCOUNTER — TELEPHONE (OUTPATIENT)
Dept: PAIN MANAGEMENT | Age: 59
End: 2024-05-24

## 2024-05-24 ENCOUNTER — OFFICE VISIT (OUTPATIENT)
Dept: PAIN MANAGEMENT | Age: 59
End: 2024-05-24
Payer: MEDICARE

## 2024-05-24 VITALS — HEART RATE: 88 BPM | OXYGEN SATURATION: 96 % | SYSTOLIC BLOOD PRESSURE: 115 MMHG | DIASTOLIC BLOOD PRESSURE: 81 MMHG

## 2024-05-24 DIAGNOSIS — M17.0 PRIMARY OSTEOARTHRITIS OF BOTH KNEES: ICD-10-CM

## 2024-05-24 DIAGNOSIS — M79.7 FIBROMYALGIA: ICD-10-CM

## 2024-05-24 DIAGNOSIS — F39 MOOD DISORDER (HCC): ICD-10-CM

## 2024-05-24 DIAGNOSIS — E11.42 DIABETIC POLYNEUROPATHY ASSOCIATED WITH TYPE 2 DIABETES MELLITUS (HCC): ICD-10-CM

## 2024-05-24 DIAGNOSIS — M54.16 LUMBAR RADICULOPATHY: ICD-10-CM

## 2024-05-24 DIAGNOSIS — F51.01 PRIMARY INSOMNIA: ICD-10-CM

## 2024-05-24 DIAGNOSIS — G89.4 CHRONIC PAIN SYNDROME: ICD-10-CM

## 2024-05-24 DIAGNOSIS — M96.1 FAILED BACK SURGICAL SYNDROME: ICD-10-CM

## 2024-05-24 DIAGNOSIS — G89.4 CHRONIC PAIN SYNDROME: Primary | ICD-10-CM

## 2024-05-24 PROCEDURE — G8417 CALC BMI ABV UP PARAM F/U: HCPCS | Performed by: INTERNAL MEDICINE

## 2024-05-24 PROCEDURE — 3044F HG A1C LEVEL LT 7.0%: CPT | Performed by: INTERNAL MEDICINE

## 2024-05-24 PROCEDURE — 2022F DILAT RTA XM EVC RTNOPTHY: CPT | Performed by: INTERNAL MEDICINE

## 2024-05-24 PROCEDURE — 1036F TOBACCO NON-USER: CPT | Performed by: INTERNAL MEDICINE

## 2024-05-24 PROCEDURE — G8427 DOCREV CUR MEDS BY ELIG CLIN: HCPCS | Performed by: INTERNAL MEDICINE

## 2024-05-24 PROCEDURE — 3017F COLORECTAL CA SCREEN DOC REV: CPT | Performed by: INTERNAL MEDICINE

## 2024-05-24 PROCEDURE — 99214 OFFICE O/P EST MOD 30 MIN: CPT | Performed by: INTERNAL MEDICINE

## 2024-05-24 RX ORDER — BUPRENORPHINE 5 UG/H
1 PATCH TRANSDERMAL WEEKLY
Qty: 4 PATCH | Refills: 0 | Status: SHIPPED | OUTPATIENT
Start: 2024-05-24 | End: 2024-06-21

## 2024-05-24 RX ORDER — PREGABALIN 100 MG/1
100 CAPSULE ORAL 3 TIMES DAILY
Qty: 90 CAPSULE | Refills: 1 | Status: SHIPPED | OUTPATIENT
Start: 2024-05-24 | End: 2024-07-23

## 2024-05-24 RX ORDER — HYDROXYZINE HYDROCHLORIDE 25 MG/1
TABLET, FILM COATED ORAL
Qty: 1 TABLET | Refills: 0 | Status: SHIPPED | OUTPATIENT
Start: 2024-05-24

## 2024-05-24 RX ORDER — NORTRIPTYLINE HYDROCHLORIDE 25 MG/1
25-50 CAPSULE ORAL NIGHTLY
Qty: 60 CAPSULE | Refills: 1 | Status: SHIPPED | OUTPATIENT
Start: 2024-05-24

## 2024-05-24 RX ORDER — DULOXETIN HYDROCHLORIDE 60 MG/1
60 CAPSULE, DELAYED RELEASE ORAL DAILY
Qty: 30 CAPSULE | Refills: 1 | Status: SHIPPED | OUTPATIENT
Start: 2024-05-24

## 2024-05-24 NOTE — TELEPHONE ENCOUNTER
Submitted PA for Diclofenac Sodium Via CMM Key: [id number] STATUS: NOT SENT    Please complete Chart Note.    Once complete, respond to the pool ( P [facility] MEDICATION PRE-AUTH).      Thank you.

## 2024-05-24 NOTE — TELEPHONE ENCOUNTER
Submitted PA for Buprenorphine Via CMM Key: [id number] STATUS: NOT SENT    Please complete Chart Note.    Once complete, respond to the pool ( P [facility] MEDICATION PRE-AUTH).      Thank you.

## 2024-05-24 NOTE — PROGRESS NOTES
Farrah Rivers  1965  [id number]    HISTORY OF PRESENT ILLNESS:  Ms. Rivers is a 58 y.o. female returns for a follow up visit for multiple medical problems.  Her  presenting problems are   1. Chronic pain syndrome    2. Fibromyalgia    3. Primary osteoarthritis of both knees    4. Failed back surgical syndrome    5. Primary insomnia    6. Mood disorder (HCC)    7. Lumbar radiculopathy    8. Diabetic polyneuropathy associated with type 2 diabetes mellitus (HCC)    .    As per information/history obtained from the PADT(patient assessment and documentation tool) -  She complains of pain in the shoulders Left and lower back with radiation to the buttocks, hips Bilateral, upper leg Bilateral, knees Bilateral, lower leg Bilateral, ankles Bilateral, and feet Bilateral She rates the pain 8/10 and describes it as aching, numbness, pins and needles.  Pain is made worse by: movement, walking, standing, sitting, bending, lifting.  Current treatment regimen has helped relieve about 20% of the pain.  She denies side effects from the current pain regimen.   Patient reports that since last follow up visit the physical functioning is unchanged, family/social relationships are unchanged, mood is unchanged sleep patterns are unchanged.  Ms. Rivers states that since starting the treatment with the current regimen the  overall functioning  in the above aspects is  better,Patient denies neurological bowel or bladder. Patient denies misusing/abusing her narcotic pain medications or using any illegal drugs.  There are No indicators for possible drug abuse, addiction or diversion problems.   Upon obtaining the medical history from Ms. Rivers regarding today's office visit for her presenting problems, patient states her pain is still there. Ms. Rivers states the pain is worse in the back than the legs. She states her back hurts all the time. Patient states she has been compliant with her medications. She mentions she is using Lyrica

## 2024-05-24 NOTE — TELEPHONE ENCOUNTER
Patient's Pharmacy ([facility] [address]) called stating that the patient is waiting there at the pharmacy. She states that the patient was suppose to get all her medications sent to the Pharmacy. Patient would like a call back once the medications was sent.

## 2024-05-24 NOTE — TELEPHONE ENCOUNTER
Patient called us and stated that she is going [facility] [address] [facility] [address] to  her medications, she states that she was sitting at [facility] for 2 hours and did not get the medications. Patient states that [facility] transportation [facility]

## 2024-05-28 NOTE — TELEPHONE ENCOUNTER
The medication was DENIED; DENIAL letter uploaded to MEDIA.    \"Medicare law does not cover OTC medications under medicare Part D.\"    If this requires a response please respond to the pool ( P MHCX PSC MEDICATION PRE-AUTH).      Thank you please advise patient.

## 2024-05-28 NOTE — TELEPHONE ENCOUNTER
Noticed Chart Note was completed.   Submitted PA for Diclofenac Via CM Key: [id number] STATUS: PENDING.    Follow up done daily; if no decision with in three days we will refax.  If another three days goes by with no decision will call the insurance for status.

## 2024-05-28 NOTE — TELEPHONE ENCOUNTER
Noticed chart note was completed.  Submitted PA for Buprenorphine Via CMM  Key: [id number] STATUS: PENDING.    Follow up done daily; if no decision with in three days we will refax.  If another three days goes by with no decision will call the insurance for status.

## 2024-05-28 NOTE — TELEPHONE ENCOUNTER
All the OPIOIDS in history are in 2023. I did not see dates to support opioid use in the past 30 days.     Percocet ended 3/6/2018-2/23/2023.   Butrans 1/31/2023-2/24/2023.    Please list the dates, so I can include them in the appeal.    Please respond to the pool ( P MHCX PSC MEDICATION PRE-AUTH).      Thank you.

## 2024-05-28 NOTE — TELEPHONE ENCOUNTER
The medication was DENIED; DENIAL letter uploaded to MEDIA.    *NON PREFERRED LONG ACTING AGENT. Patient not currently on a short acting or switching from a long acting.    If you want an APPEAL; please note in this encounter what new information you would like to APPEAL with.  Once complete route back to PA POOL.    If this requires a response please respond to the pool ( P MHCX PSC MEDICATION PRE-AUTH).      Thank you please advise patient.

## 2024-05-28 NOTE — TELEPHONE ENCOUNTER
Per RSM Please submit a appeal, Patient has been using the Butrans patch in the past and has been on Percocet from .

## 2024-05-31 DIAGNOSIS — E11.42 TYPE 2 DIABETES MELLITUS WITH DIABETIC POLYNEUROPATHY, WITHOUT LONG-TERM CURRENT USE OF INSULIN (HCC): ICD-10-CM

## 2024-05-31 DIAGNOSIS — M54.16 LUMBAR RADICULOPATHY: ICD-10-CM

## 2024-05-31 NOTE — TELEPHONE ENCOUNTER
Per RSM appeal with that patient has Chronic pain and this is a not a acute pain situation.state that patient has been on the medication in the past and needs it to help with her chronic pain.

## 2024-05-31 NOTE — TELEPHONE ENCOUNTER
Per insurance, this patient is considered \"Opioid Naive,\" which is why the insurance denied the Buprenorphine.     The denial letter stated that they would only approve Buprenorphine if the \"Patient is currently on a short acting or switching from one long acting opioid to another.\"

## 2024-06-03 RX ORDER — GABAPENTIN 300 MG/1
CAPSULE ORAL 3 TIMES DAILY
Qty: 90 CAPSULE | Refills: 0 | OUTPATIENT
Start: 2024-06-03

## 2024-06-03 RX ORDER — HYDROCODONE BITARTRATE AND ACETAMINOPHEN 5; 325 MG/1; MG/1
1 TABLET ORAL 2 TIMES DAILY PRN
Qty: 30 TABLET | Refills: 0 | Status: SHIPPED | OUTPATIENT
Start: 2024-06-03 | End: 2024-06-05 | Stop reason: SDUPTHER

## 2024-06-03 NOTE — TELEPHONE ENCOUNTER
Please advised patient when she calls please inform her that Per RSVICENTE has sent in a Norco medication to her pharmacy for 15 days.    Please be advised.

## 2024-06-04 NOTE — TELEPHONE ENCOUNTER
Pt calling, requested to speak to Radha since Jesus is out, pt thinks PA is taking too long. [phone]     Also, pt wants to know when PA is approved by our office.

## 2024-06-05 RX ORDER — HYDROCODONE BITARTRATE AND ACETAMINOPHEN 5; 325 MG/1; MG/1
1 TABLET ORAL 2 TIMES DAILY PRN
Qty: 28 TABLET | Refills: 0 | Status: SHIPPED | OUTPATIENT
Start: 2024-06-05 | End: 2024-06-19

## 2024-06-05 NOTE — TELEPHONE ENCOUNTER
Called and spoke with patient on 06/04/2024. I advised her that her insurance was not wanting to cover the Butrans patch, they wanted her to try a short acting medications first. I advised patient that RS had called in Rx to her pharmacy for a partial script. Patient states she does not have an appointment coming up. I advised patient that RS said he could see her on 06/21/24 and would cx that rx at the pharmacy and call in enough till then. Patient states she can not come in that day, but can come in on 06/27/24, 06/28/24 or 07/01/2024 because her daughter is having surgery and she has to take care of her. Patient states that [facility] [address] [place] [address]. Phone: [phone].     Rx at [facility] on [address] was cancel since patient had not schedule appointment yet.

## 2024-06-05 NOTE — TELEPHONE ENCOUNTER
PT called back and I have scheduled her on 6/28 she would like her script sent to [facility] at [address] [address]. Phone: [phone].

## 2024-06-05 NOTE — TELEPHONE ENCOUNTER
Called patient to advise her that per RSM he is willing to see her on the 06/27/2024,06/28/2024 or 07/01/2024,but he is only about to give her enough Norco for 2 weeks, she will have to make stretch the medication until her appointment or he can see her on 06/21/24.Patient did not answer and unable to leave a voice message because mail box is full. Patients Norco has not been sent until she is scheduled.

## 2024-06-06 ENCOUNTER — PATIENT MESSAGE (OUTPATIENT)
Dept: PRIMARY CARE CLINIC | Age: 59
End: 2024-06-06

## 2024-06-18 DIAGNOSIS — F51.01 PRIMARY INSOMNIA: ICD-10-CM

## 2024-06-18 DIAGNOSIS — G89.4 CHRONIC PAIN SYNDROME: ICD-10-CM

## 2024-06-18 DIAGNOSIS — F39 MOOD DISORDER (HCC): ICD-10-CM

## 2024-06-18 DIAGNOSIS — E78.2 MIXED HYPERLIPIDEMIA: ICD-10-CM

## 2024-06-18 RX ORDER — ATORVASTATIN CALCIUM 10 MG/1
10 TABLET, FILM COATED ORAL NIGHTLY
Qty: 90 TABLET | Refills: 1 | Status: SHIPPED | OUTPATIENT
Start: 2024-06-18

## 2024-06-18 NOTE — TELEPHONE ENCOUNTER
Medication:   Requested Prescriptions     Pending Prescriptions Disp Refills    atorvastatin (LIPITOR) 10 MG tablet [Pharmacy Med Name: Atorvastatin Calcium 10 MG Oral Tablet] 30 tablet 0     Sig: Take 1 tablet by mouth nightly     Last Filled:  1/29/24    Last appt: 3/25/2024   Next appt: 6/25/2024    Last Lipid:   Lab Results   Component Value Date/Time    CHOL 208 03/25/2024 02:59 PM    TRIG 71 03/25/2024 02:59 PM    HDL 86 03/25/2024 02:59 PM

## 2024-06-19 RX ORDER — DULOXETIN HYDROCHLORIDE 60 MG/1
60 CAPSULE, DELAYED RELEASE ORAL DAILY
Qty: 30 CAPSULE | Refills: 0 | OUTPATIENT
Start: 2024-06-19

## 2024-06-19 RX ORDER — NORTRIPTYLINE HYDROCHLORIDE 25 MG/1
CAPSULE ORAL
Qty: 60 CAPSULE | Refills: 0 | OUTPATIENT
Start: 2024-06-19

## 2024-06-25 ENCOUNTER — OFFICE VISIT (OUTPATIENT)
Dept: PRIMARY CARE CLINIC | Age: 59
End: 2024-06-25

## 2024-06-25 VITALS
BODY MASS INDEX: 36.86 KG/M2 | WEIGHT: 242.4 LBS | SYSTOLIC BLOOD PRESSURE: 112 MMHG | DIASTOLIC BLOOD PRESSURE: 70 MMHG | OXYGEN SATURATION: 96 % | HEART RATE: 52 BPM

## 2024-06-25 DIAGNOSIS — E11.42 TYPE 2 DIABETES MELLITUS WITH DIABETIC POLYNEUROPATHY, WITHOUT LONG-TERM CURRENT USE OF INSULIN (HCC): Primary | ICD-10-CM

## 2024-06-25 DIAGNOSIS — E55.9 VITAMIN D DEFICIENCY: ICD-10-CM

## 2024-06-25 DIAGNOSIS — K21.9 GASTROESOPHAGEAL REFLUX DISEASE, UNSPECIFIED WHETHER ESOPHAGITIS PRESENT: ICD-10-CM

## 2024-06-25 DIAGNOSIS — E78.2 MIXED HYPERLIPIDEMIA: ICD-10-CM

## 2024-06-25 DIAGNOSIS — K59.00 CONSTIPATION, UNSPECIFIED CONSTIPATION TYPE: ICD-10-CM

## 2024-06-25 RX ORDER — FAMOTIDINE 20 MG/1
20 TABLET, FILM COATED ORAL 2 TIMES DAILY PRN
Qty: 180 TABLET | Refills: 1 | Status: SHIPPED | OUTPATIENT
Start: 2024-06-25

## 2024-06-25 RX ORDER — ESOMEPRAZOLE MAGNESIUM 40 MG/1
40 CAPSULE, DELAYED RELEASE ORAL
Qty: 30 CAPSULE | Refills: 1 | Status: SHIPPED | OUTPATIENT
Start: 2024-06-25

## 2024-06-25 RX ORDER — DOCUSATE SODIUM 100 MG/1
CAPSULE, LIQUID FILLED ORAL
Qty: 180 CAPSULE | Refills: 1 | Status: SHIPPED | OUTPATIENT
Start: 2024-06-25

## 2024-06-25 RX ORDER — ATORVASTATIN CALCIUM 20 MG/1
20 TABLET, FILM COATED ORAL NIGHTLY
Qty: 90 TABLET | Refills: 1 | Status: SHIPPED | OUTPATIENT
Start: 2024-06-25

## 2024-06-25 RX ORDER — ERGOCALCIFEROL 1.25 MG/1
50000 CAPSULE ORAL WEEKLY
Qty: 12 CAPSULE | Refills: 1 | Status: SHIPPED | OUTPATIENT
Start: 2024-06-25

## 2024-06-25 NOTE — PROGRESS NOTES
Date of Visit: 2024    Farrah Rivers (:  1965) is a 58 y.o. female,  Established patient here for evaluation of the following chief complaint(s):  Diabetes, Cholesterol Problem, Gastroesophageal Reflux, and Vitamin D       ASSESSMENT/PLAN:    1. Type 2 diabetes mellitus with diabetic polyneuropathy, without long-term current use of insulin (HCC)  -most recent Hemoglobin A1c of 5.3% shows diabetes is well controlled  -Continue diet control  -Limit carbohydrates to 45 grams with meals and 15 grams with snacks  -monitor blood sugars  -goal for blood sugar fasting or pre-meal  is   -goal for blood sugar 2 hours after a meal is less than 180  -goal for blood sugar at bedtime is less than 150  -Regular aerobic exercise  -Referral to  Chriss Sutton DPM, Podiatry, [facility]    2. Mixed hyperlipidemia  -not controlled  -Increase atorvastatin (LIPITOR) 20 MG tablet; Take 1 tablet by mouth at bedtime  Dispense: 90 tablet; Refill: 1  -Low fat, low cholesterol diet  -Regular aerobic exercise    3. Vitamin D deficiency   -not controlled  - Continue vitamin D (ERGOCALCIFEROL) 1.25 MG (84702 UT) CAPS capsule; Take 1 capsule by mouth once a week  Dispense: 12 capsule; Refill: 1    4. Gastroesophageal reflux disease, unspecified whether esophagitis present  -not controlled  -Discontinue Pantoprazole  -Start esomeprazole (NEXIUM) 40 MG delayed release capsule; Take 1 capsule by mouth every morning (before breakfast)  Dispense: 30 capsule; Refill: 1  -Continue famotidine (PEPCID) 20 MG tablet; Take 1 tablet by mouth 2 times daily as needed (gerd)  Dispense: 180 tablet; Refill: 1  -Decrease caffeine, avoid spicy foods, avoid tomato based foods  -Eat small meals instead of large meals  -Wait 2-3 hours after eating before lying down  -Referral to Kar Lee MD, Gastroenterology ([facility]), [facility]    5. Constipation, unspecified constipation type  -not controlled  -start

## 2024-06-28 ENCOUNTER — OFFICE VISIT (OUTPATIENT)
Dept: PAIN MANAGEMENT | Age: 59
End: 2024-06-28
Payer: MEDICARE

## 2024-06-28 VITALS
SYSTOLIC BLOOD PRESSURE: 112 MMHG | BODY MASS INDEX: 36.04 KG/M2 | OXYGEN SATURATION: 97 % | WEIGHT: 237 LBS | HEART RATE: 62 BPM | DIASTOLIC BLOOD PRESSURE: 69 MMHG

## 2024-06-28 DIAGNOSIS — M96.1 FAILED BACK SURGICAL SYNDROME: ICD-10-CM

## 2024-06-28 DIAGNOSIS — G89.4 CHRONIC PAIN SYNDROME: ICD-10-CM

## 2024-06-28 DIAGNOSIS — M17.0 PRIMARY OSTEOARTHRITIS OF BOTH KNEES: ICD-10-CM

## 2024-06-28 DIAGNOSIS — M54.16 LUMBAR RADICULOPATHY: ICD-10-CM

## 2024-06-28 DIAGNOSIS — E11.42 DIABETIC POLYNEUROPATHY ASSOCIATED WITH TYPE 2 DIABETES MELLITUS (HCC): ICD-10-CM

## 2024-06-28 DIAGNOSIS — M79.7 FIBROMYALGIA: ICD-10-CM

## 2024-06-28 PROCEDURE — 3017F COLORECTAL CA SCREEN DOC REV: CPT | Performed by: INTERNAL MEDICINE

## 2024-06-28 PROCEDURE — 99213 OFFICE O/P EST LOW 20 MIN: CPT | Performed by: INTERNAL MEDICINE

## 2024-06-28 PROCEDURE — G8427 DOCREV CUR MEDS BY ELIG CLIN: HCPCS | Performed by: INTERNAL MEDICINE

## 2024-06-28 PROCEDURE — 3044F HG A1C LEVEL LT 7.0%: CPT | Performed by: INTERNAL MEDICINE

## 2024-06-28 PROCEDURE — 2022F DILAT RTA XM EVC RTNOPTHY: CPT | Performed by: INTERNAL MEDICINE

## 2024-06-28 PROCEDURE — 1036F TOBACCO NON-USER: CPT | Performed by: INTERNAL MEDICINE

## 2024-06-28 PROCEDURE — G8417 CALC BMI ABV UP PARAM F/U: HCPCS | Performed by: INTERNAL MEDICINE

## 2024-06-28 RX ORDER — HYDROCODONE BITARTRATE AND ACETAMINOPHEN 5; 325 MG/1; MG/1
1 TABLET ORAL 2 TIMES DAILY PRN
Qty: 56 TABLET | Refills: 0 | Status: SHIPPED | OUTPATIENT
Start: 2024-06-28 | End: 2024-07-26

## 2024-06-28 RX ORDER — PREGABALIN 100 MG/1
100 CAPSULE ORAL 3 TIMES DAILY
Qty: 90 CAPSULE | Refills: 1 | Status: SHIPPED | OUTPATIENT
Start: 2024-06-28 | End: 2024-08-27

## 2024-06-28 RX ORDER — HYDROXYZINE HYDROCHLORIDE 25 MG/1
TABLET, FILM COATED ORAL
Qty: 1 TABLET | Refills: 0 | Status: CANCELLED | OUTPATIENT
Start: 2024-06-28

## 2024-06-28 RX ORDER — NORTRIPTYLINE HYDROCHLORIDE 25 MG/1
25-50 CAPSULE ORAL NIGHTLY
Qty: 60 CAPSULE | Refills: 1 | Status: SHIPPED | OUTPATIENT
Start: 2024-06-28

## 2024-06-28 RX ORDER — DULOXETIN HYDROCHLORIDE 60 MG/1
60 CAPSULE, DELAYED RELEASE ORAL DAILY
Qty: 30 CAPSULE | Refills: 1 | Status: SHIPPED | OUTPATIENT
Start: 2024-06-28

## 2024-06-28 NOTE — PROGRESS NOTES
Medications   Medication Sig Dispense Refill    atorvastatin (LIPITOR) 20 MG tablet Take 1 tablet by mouth at bedtime 90 tablet 1    esomeprazole (NEXIUM) 40 MG delayed release capsule Take 1 capsule by mouth every morning (before breakfast) 30 capsule 1    famotidine (PEPCID) 20 MG tablet Take 1 tablet by mouth 2 times daily as needed (gerd) 180 tablet 1    vitamin D (ERGOCALCIFEROL) 1.25 MG (99118 UT) CAPS capsule Take 1 capsule by mouth once a week 12 capsule 1    docusate sodium (EQUATE STOOL SOFTENER) 100 MG capsule TAKE 1 CAPSULE BY MOUTH TWICE DAILY AS NEEDED FOR CONSTIPATION 180 capsule 1    atorvastatin (LIPITOR) 10 MG tablet Take 1 tablet by mouth nightly 90 tablet 1    DULoxetine (CYMBALTA) 60 MG extended release capsule Take 1 capsule by mouth daily 30 capsule 1    nortriptyline (PAMELOR) 25 MG capsule Take 1-2 capsules by mouth nightly 60 capsule 1    hydrOXYzine HCl (ATARAX) 25 MG tablet Take 1 tablet by mouth 20 minutes before procedure 1 tablet 0    pregabalin (LYRICA) 100 MG capsule Take 1 capsule by mouth 3 times daily for 60 days. 90 capsule 1    ammonium lactate (LAC-HYDRIN) 12 % cream Apply topically 2 times as needed. 385 g 3    ELIQUIS 5 MG TABS tablet Take 1 tablet by mouth 2 times daily      estradiol (ESTRACE) 0.1 MG/GM vaginal cream APPLY 1/2 GRAM INTO VAGINA TWICE A WEEK APPLY PEA SIZED AMOUNT VAGINALLY TWICE A WEEK      nitroGLYCERIN (NITROSTAT) 0.4 MG SL tablet DISSOLVE ONE TABLET UNDER THE TONGUE EVERY 5 MINUTES AS NEEDED FOR CHEST PAIN.  DO NOT EXCEED A TOTAL OF 3 DOSES IN 15 MINUTES 25 tablet 0    nystatin (MYCOSTATIN) 979447 UNIT/GM cream Apply topically 2 times daily. 30 g 0    diclofenac sodium (VOLTAREN) 1 % GEL APPLY 4 GRAMS TO THE AFFECTED  tid-qid 500 g 3    ibuprofen (ADVIL;MOTRIN) 400 MG tablet TAKE 400-800 MG TWICE DAILY AS NEEDED FOR HEADACHES. MAX IS 2-3 TIMES PER WEEK      calcium carb-cholecalciferol 600-5 MG-MCG TABS tablet Take 1 tablet by mouth daily      Multiple

## 2024-07-02 ENCOUNTER — TELEPHONE (OUTPATIENT)
Dept: PRIMARY CARE CLINIC | Age: 59
End: 2024-07-02

## 2024-07-02 NOTE — TELEPHONE ENCOUNTER
Received paperwork from [facility]. Form filled out and placed in Dr. Patterson's basket for review and signature when she returns from being out of office.

## 2024-07-02 NOTE — TELEPHONE ENCOUNTER
Patient states she needs to have labs done and urine test before doing having her MRI done tomorrow. Can we order. Thanks

## 2024-07-02 NOTE — TELEPHONE ENCOUNTER
Dr. Boogie already placed these orders are completed. He would be the physician to order the tests since he ordered the MRI

## 2024-07-02 NOTE — TELEPHONE ENCOUNTER
Voicemail left for patient asking her why the bidet is medically necessary for her. And notified her that I spoke with [facility] about the diabetic shoes they are supposed to be sending the paperwork via fax.

## 2024-07-02 NOTE — TELEPHONE ENCOUNTER
Patient is requesting Dr. Patterson make her a letter stating that her bidet is medically neccessary. Call patient for more information if needed

## 2024-07-02 NOTE — TELEPHONE ENCOUNTER
Pt returned call to discuss why bidet is medically necessary. Pt stated she has irritation in excess skin folds and gets rashes. She says the bidet keeps her skin clean and dry, which allows her to apply medication properly. Please review and contact pt.

## 2024-07-03 ENCOUNTER — HOSPITAL ENCOUNTER (OUTPATIENT)
Dept: MRI IMAGING | Age: 59
Discharge: HOME | End: 2024-07-03
Attending: INTERNAL MEDICINE
Payer: MEDICARE

## 2024-07-03 ENCOUNTER — TELEPHONE (OUTPATIENT)
Dept: PRIMARY CARE CLINIC | Age: 59
End: 2024-07-03

## 2024-07-03 DIAGNOSIS — M54.16 LUMBAR RADICULOPATHY: ICD-10-CM

## 2024-07-03 DIAGNOSIS — M96.1 FAILED BACK SURGICAL SYNDROME: ICD-10-CM

## 2024-07-03 DIAGNOSIS — G89.4 CHRONIC PAIN SYNDROME: ICD-10-CM

## 2024-07-03 PROCEDURE — 72148 MRI LUMBAR SPINE W/O DYE: CPT

## 2024-07-03 NOTE — TELEPHONE ENCOUNTER
Patient called and stated to inform that she is in [facility] for MRI and she also informed that she was supposed to have lab order on the system.    She stated that she spoke to Dr Patterson for her lab work.    She is on fasting.    Pl review and advice    thanks

## 2024-07-03 NOTE — TELEPHONE ENCOUNTER
MRI was ordered by Dr. Boogie and lab was previously done in May by him as well.  Advised to call Dr. Boogie to see if any other lab needed to be completed.

## 2024-07-08 ASSESSMENT — ENCOUNTER SYMPTOMS
SHORTNESS OF BREATH: 0
NAUSEA: 0
CHEST TIGHTNESS: 0
COUGH: 0
BLOOD IN STOOL: 0
DIARRHEA: 0
SINUS PRESSURE: 0
RHINORRHEA: 0
VOMITING: 0
CONSTIPATION: 1
WHEEZING: 0
TROUBLE SWALLOWING: 0
ABDOMINAL PAIN: 0
SORE THROAT: 0

## 2024-07-08 NOTE — PATIENT INSTRUCTIONS
-Decrease caffeine, avoid spicy foods, avoid tomato based foods  -Eat small meals instead of large meals  -Wait 2-3 hours after eating before lying down

## 2024-07-17 DIAGNOSIS — G89.4 CHRONIC PAIN SYNDROME: ICD-10-CM

## 2024-07-19 RX ORDER — NORTRIPTYLINE HCL 25 MG
25-50 CAPSULE ORAL NIGHTLY
Qty: 180 CAPSULE | Refills: 1 | OUTPATIENT
Start: 2024-07-19

## 2024-07-25 ENCOUNTER — OFFICE VISIT (OUTPATIENT)
Dept: PAIN MANAGEMENT | Age: 59
End: 2024-07-25
Payer: MEDICARE

## 2024-07-25 VITALS
DIASTOLIC BLOOD PRESSURE: 69 MMHG | OXYGEN SATURATION: 100 % | WEIGHT: 260 LBS | SYSTOLIC BLOOD PRESSURE: 128 MMHG | BODY MASS INDEX: 39.53 KG/M2 | HEART RATE: 44 BPM

## 2024-07-25 DIAGNOSIS — M79.7 FIBROMYALGIA: ICD-10-CM

## 2024-07-25 DIAGNOSIS — E11.42 DIABETIC POLYNEUROPATHY ASSOCIATED WITH TYPE 2 DIABETES MELLITUS (HCC): ICD-10-CM

## 2024-07-25 DIAGNOSIS — M17.0 PRIMARY OSTEOARTHRITIS OF BOTH KNEES: ICD-10-CM

## 2024-07-25 DIAGNOSIS — M96.1 FAILED BACK SURGICAL SYNDROME: ICD-10-CM

## 2024-07-25 DIAGNOSIS — G89.4 CHRONIC PAIN SYNDROME: ICD-10-CM

## 2024-07-25 DIAGNOSIS — M54.16 LUMBAR RADICULOPATHY: ICD-10-CM

## 2024-07-25 PROCEDURE — G8427 DOCREV CUR MEDS BY ELIG CLIN: HCPCS | Performed by: INTERNAL MEDICINE

## 2024-07-25 PROCEDURE — 99213 OFFICE O/P EST LOW 20 MIN: CPT | Performed by: INTERNAL MEDICINE

## 2024-07-25 PROCEDURE — 2022F DILAT RTA XM EVC RTNOPTHY: CPT | Performed by: INTERNAL MEDICINE

## 2024-07-25 PROCEDURE — G8417 CALC BMI ABV UP PARAM F/U: HCPCS | Performed by: INTERNAL MEDICINE

## 2024-07-25 PROCEDURE — 3017F COLORECTAL CA SCREEN DOC REV: CPT | Performed by: INTERNAL MEDICINE

## 2024-07-25 PROCEDURE — 3044F HG A1C LEVEL LT 7.0%: CPT | Performed by: INTERNAL MEDICINE

## 2024-07-25 PROCEDURE — 1036F TOBACCO NON-USER: CPT | Performed by: INTERNAL MEDICINE

## 2024-07-25 RX ORDER — NORTRIPTYLINE HYDROCHLORIDE 25 MG/1
25-50 CAPSULE ORAL NIGHTLY
Qty: 60 CAPSULE | Refills: 1 | Status: SHIPPED | OUTPATIENT
Start: 2024-07-25

## 2024-07-25 RX ORDER — DULOXETIN HYDROCHLORIDE 60 MG/1
60 CAPSULE, DELAYED RELEASE ORAL DAILY
Qty: 30 CAPSULE | Refills: 1 | Status: SHIPPED | OUTPATIENT
Start: 2024-07-25

## 2024-07-25 RX ORDER — HYDROCODONE BITARTRATE AND ACETAMINOPHEN 5; 325 MG/1; MG/1
1 TABLET ORAL 2 TIMES DAILY PRN
Qty: 56 TABLET | Refills: 0 | Status: SHIPPED | OUTPATIENT
Start: 2024-07-25 | End: 2024-08-22

## 2024-07-25 RX ORDER — PREGABALIN 100 MG/1
100 CAPSULE ORAL 3 TIMES DAILY
Qty: 90 CAPSULE | Refills: 1 | Status: SHIPPED | OUTPATIENT
Start: 2024-07-25 | End: 2024-09-23

## 2024-07-25 RX ORDER — HYDROXYZINE HYDROCHLORIDE 25 MG/1
TABLET, FILM COATED ORAL
Qty: 1 TABLET | Refills: 0 | Status: SHIPPED | OUTPATIENT
Start: 2024-07-25

## 2024-07-25 NOTE — PROGRESS NOTES
Dispense Refill    diclofenac sodium (VOLTAREN) 1 % GEL Apply 4 gm to both knees tid-qid prn . 500 g 2    HYDROcodone-acetaminophen (NORCO) 5-325 MG per tablet Take 1 tablet by mouth 2 times daily as needed for Pain for up to 28 days. 56 tablet 0    DULoxetine (CYMBALTA) 60 MG extended release capsule Take 1 capsule by mouth daily 30 capsule 1    nortriptyline (PAMELOR) 25 MG capsule Take 1-2 capsules by mouth nightly 60 capsule 1    pregabalin (LYRICA) 100 MG capsule Take 1 capsule by mouth 3 times daily for 60 days. 90 capsule 1    atorvastatin (LIPITOR) 20 MG tablet Take 1 tablet by mouth at bedtime 90 tablet 1    esomeprazole (NEXIUM) 40 MG delayed release capsule Take 1 capsule by mouth every morning (before breakfast) 30 capsule 1    famotidine (PEPCID) 20 MG tablet Take 1 tablet by mouth 2 times daily as needed (gerd) 180 tablet 1    vitamin D (ERGOCALCIFEROL) 1.25 MG (12183 UT) CAPS capsule Take 1 capsule by mouth once a week 12 capsule 1    docusate sodium (EQUATE STOOL SOFTENER) 100 MG capsule TAKE 1 CAPSULE BY MOUTH TWICE DAILY AS NEEDED FOR CONSTIPATION 180 capsule 1    atorvastatin (LIPITOR) 10 MG tablet Take 1 tablet by mouth nightly 90 tablet 1    hydrOXYzine HCl (ATARAX) 25 MG tablet Take 1 tablet by mouth 20 minutes before procedure 1 tablet 0    ammonium lactate (LAC-HYDRIN) 12 % cream Apply topically 2 times as needed. 385 g 3    ELIQUIS 5 MG TABS tablet Take 1 tablet by mouth 2 times daily      estradiol (ESTRACE) 0.1 MG/GM vaginal cream APPLY 1/2 GRAM INTO VAGINA TWICE A WEEK APPLY PEA SIZED AMOUNT VAGINALLY TWICE A WEEK      nitroGLYCERIN (NITROSTAT) 0.4 MG SL tablet DISSOLVE ONE TABLET UNDER THE TONGUE EVERY 5 MINUTES AS NEEDED FOR CHEST PAIN.  DO NOT EXCEED A TOTAL OF 3 DOSES IN 15 MINUTES 25 tablet 0    nystatin (MYCOSTATIN) 239197 UNIT/GM cream Apply topically 2 times daily. 30 g 0    ibuprofen (ADVIL;MOTRIN) 400 MG tablet TAKE 400-800 MG TWICE DAILY AS NEEDED FOR HEADACHES. MAX IS 2-3

## 2024-07-30 ENCOUNTER — TELEPHONE (OUTPATIENT)
Dept: SLEEP CENTER | Age: 59
End: 2024-07-30

## 2024-07-30 NOTE — TELEPHONE ENCOUNTER
Called to schedule a PSG per Mayumi     Left  with  (Lao) - left phone # for the lab.   This is to qualify for inspire     [employer] medicare insurance

## 2024-08-07 ENCOUNTER — TELEPHONE (OUTPATIENT)
Dept: ORTHOPEDIC SURGERY | Age: 59
End: 2024-08-07

## 2024-08-07 NOTE — TELEPHONE ENCOUNTER
General Question     Subject: Pt REQUESTING GEL INJECTIONS FOR   Patient and /or Facility Request: Farrah Rivers   Contact Number: [phone]      Pt HAS MADE APPT WITH DR HERNANDEZ PER DR YO'S REQUEST. Pt ASKING IF GEL INJECTIONS CAN STILL BE ORDERED FOR HER OR SHOULD SHE WAIT UNTIL AFTER SEEING DR HERNANDEZ? IF YES, PLEASE SUBMIT FOR GEL INJECTIONS.

## 2024-08-08 ENCOUNTER — TELEPHONE (OUTPATIENT)
Dept: PRIMARY CARE CLINIC | Age: 59
End: 2024-08-08

## 2024-08-08 NOTE — TELEPHONE ENCOUNTER
Patient called and requested to send in the new prescriptions for the following.    1.New prescription for new walker with 4 wheels, seat and rollator.    The prescription needs to be faxed to     [facility]    Name of the person--aga    Fax--[phone]    Ph--[phone]    2. Repair for power wheel chair    Front wheels are missing    Needs to be faxed --[facility]    Name of the person Bruna    Fax no--[phone].    She also requested to let her know when the order is placed    Pl review and advice    Thanks

## 2024-08-13 ENCOUNTER — OFFICE VISIT (OUTPATIENT)
Dept: ORTHOPEDIC SURGERY | Age: 59
End: 2024-08-13
Payer: MEDICARE

## 2024-08-13 DIAGNOSIS — Z90.3 HISTORY OF SLEEVE GASTRECTOMY: ICD-10-CM

## 2024-08-13 DIAGNOSIS — Z98.890 HX OF DECOMPRESSIVE LUMBAR LAMINECTOMY: ICD-10-CM

## 2024-08-13 DIAGNOSIS — M47.816 LUMBAR SPONDYLOSIS: ICD-10-CM

## 2024-08-13 DIAGNOSIS — M51.36 DDD (DEGENERATIVE DISC DISEASE), LUMBAR: ICD-10-CM

## 2024-08-13 DIAGNOSIS — E11.42 DIABETIC POLYNEUROPATHY ASSOCIATED WITH TYPE 2 DIABETES MELLITUS (HCC): ICD-10-CM

## 2024-08-13 DIAGNOSIS — E66.01 MORBID OBESITY (HCC): ICD-10-CM

## 2024-08-13 DIAGNOSIS — R20.2 PARESTHESIA OF BOTH LOWER EXTREMITIES: Primary | ICD-10-CM

## 2024-08-13 DIAGNOSIS — M47.816 LUMBAR FACET ARTHROPATHY: ICD-10-CM

## 2024-08-13 PROCEDURE — 99215 OFFICE O/P EST HI 40 MIN: CPT | Performed by: INTERNAL MEDICINE

## 2024-08-13 PROCEDURE — 3044F HG A1C LEVEL LT 7.0%: CPT | Performed by: INTERNAL MEDICINE

## 2024-08-13 NOTE — PROGRESS NOTES
Chief Complaint:   Chief Complaint   Patient presents with    Lower Back Pain     LBP for several yrs on/off, pain has been escalating lately, NKI, but pt is currently having difficulty with her new living situation since she does not have a bed, so is sleeping on the floor, h/o previous back sx (2/2/23 to remove mass from T10-11 and 2001 at L4-5)          History of Present Illness:       Patient is a 58 y.o. female presents with the above complaint. The symptoms began worsening 1 yearsago and started without an injury. The patient describes a sharp, aching, pins and needles pain that does radiate.  The symptoms of back pain are constant  and are are worsening since the onset.      EMG evaluation bilateral lower extremities Dr. STEFANIE Bal 12/14/2022 [facility] neurology: \"This patient has isolated denervation in the lumbar paraspinal muscles.  This could be as a result of lumbar spinal stenosis.  It could also be from postsurgical fibrillations from previous lumbar disc surgery.  Clinical correlation is recommended.  There is no electrophysiologic evidence of peripheral neuropathy in this study.\"    Prior surgical spine intervention:      Left L4/L5 lumbar hemilaminectomy 2001    TheSURGERY DATE:      2/22/2023  SURGEON:                 Mayo Dumont MD, PhD  DICTATED BY:Mayo Dumont MD                                               PREOPERATIVE DIAGNOSIS:  1.  T10-T11 intradural, extramedullary mass lesion  2.  Thoracic central spinal stenosis with cord compression  POSTOPERATIVE DIAGNOSIS:  1.  Same       The symptoms of back pain  do not show a discogenic pattern and is worsened by standing and improved with sitting. There is not new onset weakness or progressive weakness of the lower extremities that has developed. The patient denies new onset bowel or bladder dysfunction.  There is no history of previous spinal trauma.    The patient does not have history or orthopaedic lumbar spine surgery.    Pain

## 2024-08-14 NOTE — TELEPHONE ENCOUNTER
Dr. Merritt originally saw this pt for her knee pain, and then referred her to Dr. Caballero for her gel injections, due to needing ultrasound guidance for her size.  Our typical protocol is to perform the injections under ultrasound and then send the pt back to F/U with the referring physician.  Pt would like to again have the injections and would like to know if she needs to return to Dr. Merritt first.  Dr. Caballero asked that I check with your office to see what Dr. Merritt would like for the pt to do.  Pt is currently scheduled to see Dr. Merritt on 8/20/24 or 8/21/24.    Please advise.

## 2024-08-15 ENCOUNTER — OFFICE VISIT (OUTPATIENT)
Dept: ORTHOPEDIC SURGERY | Age: 59
End: 2024-08-15
Payer: MEDICARE

## 2024-08-15 VITALS — HEIGHT: 68 IN | BODY MASS INDEX: 39.53 KG/M2

## 2024-08-15 DIAGNOSIS — M75.81 TENDINITIS OF RIGHT ROTATOR CUFF: ICD-10-CM

## 2024-08-15 DIAGNOSIS — M25.512 BILATERAL SHOULDER PAIN, UNSPECIFIED CHRONICITY: Primary | ICD-10-CM

## 2024-08-15 DIAGNOSIS — M25.511 BILATERAL SHOULDER PAIN, UNSPECIFIED CHRONICITY: Primary | ICD-10-CM

## 2024-08-15 DIAGNOSIS — M19.012 PRIMARY OSTEOARTHRITIS OF LEFT SHOULDER: ICD-10-CM

## 2024-08-15 PROCEDURE — 20610 DRAIN/INJ JOINT/BURSA W/O US: CPT | Performed by: ORTHOPAEDIC SURGERY

## 2024-08-15 PROCEDURE — 99213 OFFICE O/P EST LOW 20 MIN: CPT | Performed by: ORTHOPAEDIC SURGERY

## 2024-08-15 RX ORDER — LIDOCAINE HYDROCHLORIDE 10 MG/ML
8 INJECTION, SOLUTION INFILTRATION; PERINEURAL ONCE
Status: COMPLETED | OUTPATIENT
Start: 2024-08-15 | End: 2024-08-15

## 2024-08-15 RX ORDER — METHYLPREDNISOLONE ACETATE 40 MG/ML
80 INJECTION, SUSPENSION INTRA-ARTICULAR; INTRALESIONAL; INTRAMUSCULAR; SOFT TISSUE ONCE
Status: COMPLETED | OUTPATIENT
Start: 2024-08-15 | End: 2024-08-15

## 2024-08-15 RX ORDER — ONDANSETRON 8 MG/1
8 TABLET, ORALLY DISINTEGRATING ORAL EVERY 8 HOURS PRN
COMMUNITY
Start: 2024-06-05

## 2024-08-15 RX ADMIN — LIDOCAINE HYDROCHLORIDE 8 ML: 10 INJECTION, SOLUTION INFILTRATION; PERINEURAL at 13:16

## 2024-08-15 RX ADMIN — METHYLPREDNISOLONE ACETATE 80 MG: 40 INJECTION, SUSPENSION INTRA-ARTICULAR; INTRALESIONAL; INTRAMUSCULAR; SOFT TISSUE at 13:18

## 2024-08-15 RX ADMIN — LIDOCAINE HYDROCHLORIDE 8 ML: 10 INJECTION, SOLUTION INFILTRATION; PERINEURAL at 13:10

## 2024-08-15 RX ADMIN — METHYLPREDNISOLONE ACETATE 80 MG: 40 INJECTION, SUSPENSION INTRA-ARTICULAR; INTRALESIONAL; INTRAMUSCULAR; SOFT TISSUE at 13:19

## 2024-08-19 DIAGNOSIS — K21.9 GASTROESOPHAGEAL REFLUX DISEASE, UNSPECIFIED WHETHER ESOPHAGITIS PRESENT: ICD-10-CM

## 2024-08-19 NOTE — TELEPHONE ENCOUNTER
Medication:   Requested Prescriptions     Pending Prescriptions Disp Refills    esomeprazole (NEXIUM) 40 MG delayed release capsule [Pharmacy Med Name: Esomeprazole Magnesium 40 MG Oral Capsule Delayed Release] 30 capsule 0     Sig: TAKE 1 CAPSULE BY MOUTH ONCE DAILY IN THE MORNING BEFORE BREAKFAST     Last Filled:  6/25/2024    Last appt: 6/25/2024   Next appt: 9/26/2024    Last OARRS:       11/15/2023     7:31 PM   RX Monitoring   Periodic Controlled Substance Monitoring No signs of potential drug abuse or diversion identified.

## 2024-08-20 DIAGNOSIS — L85.3 DRY SKIN: ICD-10-CM

## 2024-08-20 RX ORDER — ESOMEPRAZOLE MAGNESIUM 40 MG/1
CAPSULE, DELAYED RELEASE ORAL
Qty: 90 CAPSULE | Refills: 0 | Status: SHIPPED | OUTPATIENT
Start: 2024-08-20

## 2024-08-20 RX ORDER — AMMONIUM LACTATE 12 G/100G
CREAM TOPICAL
Qty: 280 G | Refills: 0 | Status: SHIPPED | OUTPATIENT
Start: 2024-08-20

## 2024-08-20 NOTE — PROGRESS NOTES
History of Present Illness:  Farrah Rivers returns for follow-up of her shoulders. She has chronic cuff dysfunction and arthritis, more symptomatic on the left. She has responded well to corticosteroid injections, most recently in November. These helped for about 4-5 months but the symptoms including pain have returned over the last 3 months. She is hoping for repeat injections. She lost over 100# following bariatric surgery but is concerned she is gaining wait. She had transitioned to a walker but she needs a new one and now she is using a WC mostly. She couldn't see me sooner because she was moving from one home to another.       Medical History:  Patient's medications, allergies, past medical, surgical, social and family histories were reviewed and updated as appropriate.    Pertinent items are noted in HPI  Review of systems reviewed from Patient History Form dated on 11/9/23 and available in the patient's chart under the Media tab.     Vital Signs:  There were no vitals filed for this visit.  Constitutional: in no distress. Sitting in a WC. Obese.      Bilateral. Shoulder Examination:    Inspection:  no deformity. Mild cuff atrophy.     Palpation:  tender anterior /posterior joint lines.  NO SAC on left.    Active Range of Motion: Right: 140/40/IBP  Left: 90/40 painful/IBP    Passive Range of Motion:  deferred.    Strength:  3/5 to 4/5 cuff strength. 4-/5 Champagne toast. Negative lag.     Special Tests:  deferred.    Radiology:     Plain radiographs of the shoulders were not obtained today.         Assessment :  Farrah Rivers has cuff disease on the right and advancing primary osteoarthritis of the left glenohumeral joint. She is a poor surgical candidate.     Impression:  Encounter Diagnoses   Name Primary?    Bilateral shoulder pain, unspecified chronicity Yes    Primary osteoarthritis of left shoulder     Tendinitis of right rotator cuff        Office Procedures:  Orders Placed This Encounter   Procedures

## 2024-08-20 NOTE — TELEPHONE ENCOUNTER
Medication:   Requested Prescriptions     Pending Prescriptions Disp Refills    ammonium lactate (AMLACTIN) 12 % cream [Pharmacy Med Name: Ammonium Lactate 12 % External Cream] 280 g 0     Sig: APPLY  CREAM TOPICALLY TWICE DAILY AS NEEDED     Last Filled:  4.29.24    Last appt: 6/25/2024   Next appt: 9/26/2024    Last OARRS:       11/15/2023     7:31 PM   RX Monitoring   Periodic Controlled Substance Monitoring No signs of potential drug abuse or diversion identified.

## 2024-08-22 ENCOUNTER — TELEPHONE (OUTPATIENT)
Dept: PRIMARY CARE CLINIC | Age: 59
End: 2024-08-22

## 2024-08-22 DIAGNOSIS — M54.16 LUMBAR RADICULOPATHY: Primary | ICD-10-CM

## 2024-08-22 NOTE — TELEPHONE ENCOUNTER
Patient requested orders for adjustable bed with air mattress, rising recliner, and oval shaped raised toilet seat with arm rests. Orders have been pended with diagnosis that could be appropriate. Please advise.

## 2024-08-22 NOTE — TELEPHONE ENCOUNTER
Pt called and requested Dr. Patterson write prescriptions for:  Hospital adjustable bed with air mattress  Electronic recliner seat that raises and lowers  Oval-shaped raised toilet seat with arm rests    Pt's insurance said Dr. Patterson can fax these prescriptions to any DME company she knows of.

## 2024-08-23 ENCOUNTER — TELEPHONE (OUTPATIENT)
Dept: PAIN MANAGEMENT | Age: 59
End: 2024-08-23

## 2024-08-23 ENCOUNTER — OFFICE VISIT (OUTPATIENT)
Dept: PAIN MANAGEMENT | Age: 59
End: 2024-08-23
Payer: MEDICARE

## 2024-08-23 VITALS
SYSTOLIC BLOOD PRESSURE: 116 MMHG | WEIGHT: 238.4 LBS | BODY MASS INDEX: 36.25 KG/M2 | OXYGEN SATURATION: 97 % | HEART RATE: 67 BPM | DIASTOLIC BLOOD PRESSURE: 66 MMHG

## 2024-08-23 DIAGNOSIS — M17.0 PRIMARY OSTEOARTHRITIS OF BOTH KNEES: ICD-10-CM

## 2024-08-23 DIAGNOSIS — M79.7 FIBROMYALGIA: ICD-10-CM

## 2024-08-23 DIAGNOSIS — E11.42 DIABETIC POLYNEUROPATHY ASSOCIATED WITH TYPE 2 DIABETES MELLITUS (HCC): ICD-10-CM

## 2024-08-23 DIAGNOSIS — R53.83 FATIGUE, UNSPECIFIED TYPE: ICD-10-CM

## 2024-08-23 DIAGNOSIS — F39 MOOD DISORDER (HCC): ICD-10-CM

## 2024-08-23 DIAGNOSIS — G89.4 CHRONIC PAIN SYNDROME: ICD-10-CM

## 2024-08-23 DIAGNOSIS — M54.16 LUMBAR RADICULOPATHY: ICD-10-CM

## 2024-08-23 DIAGNOSIS — M17.12 PRIMARY OSTEOARTHRITIS OF LEFT KNEE: ICD-10-CM

## 2024-08-23 DIAGNOSIS — F51.01 PRIMARY INSOMNIA: ICD-10-CM

## 2024-08-23 DIAGNOSIS — M96.1 FAILED BACK SURGICAL SYNDROME: ICD-10-CM

## 2024-08-23 DIAGNOSIS — M17.11 PRIMARY OSTEOARTHRITIS OF RIGHT KNEE: ICD-10-CM

## 2024-08-23 PROCEDURE — 3044F HG A1C LEVEL LT 7.0%: CPT | Performed by: INTERNAL MEDICINE

## 2024-08-23 PROCEDURE — 99214 OFFICE O/P EST MOD 30 MIN: CPT | Performed by: INTERNAL MEDICINE

## 2024-08-23 RX ORDER — DULOXETIN HYDROCHLORIDE 60 MG/1
60 CAPSULE, DELAYED RELEASE ORAL DAILY
Qty: 30 CAPSULE | Refills: 1 | Status: SHIPPED | OUTPATIENT
Start: 2024-08-23

## 2024-08-23 RX ORDER — NORTRIPTYLINE HCL 25 MG
25-50 CAPSULE ORAL NIGHTLY
Qty: 60 CAPSULE | Refills: 1 | Status: SHIPPED | OUTPATIENT
Start: 2024-08-23

## 2024-08-23 RX ORDER — HYDROXYZINE HYDROCHLORIDE 25 MG/1
TABLET, FILM COATED ORAL
Qty: 1 TABLET | Refills: 0 | Status: SHIPPED | OUTPATIENT
Start: 2024-08-23

## 2024-08-23 RX ORDER — PREGABALIN 100 MG/1
100 CAPSULE ORAL 3 TIMES DAILY
Qty: 90 CAPSULE | Refills: 1 | Status: SHIPPED | OUTPATIENT
Start: 2024-08-23 | End: 2024-10-22

## 2024-08-23 RX ORDER — HYDROCODONE BITARTRATE AND ACETAMINOPHEN 5; 325 MG/1; MG/1
1 TABLET ORAL 3 TIMES DAILY PRN
Qty: 84 TABLET | Refills: 0 | Status: SHIPPED | OUTPATIENT
Start: 2024-08-23 | End: 2024-09-20

## 2024-08-23 NOTE — PROGRESS NOTES
concentration/cognition, agitation, confusion.     PHYSICAL EXAM:   Nursing note and vitals reviewed. /66   Pulse 67   Wt 108.1 kg (238 lb 6.4 oz)   LMP 08/28/2014   SpO2 97%   BMI 36.25 kg/m²   General Appearance: Patient is well nourished, well developed, well groomed and in no acute distress.  Skin: Skin is warm and dry, good turgor . No rash or lesions noted. She is not diaphoretic.     Pulmonary/Chest: Effort normal. No respiratory distress or use of accessory muscles. Auscultation revealing normal air entry. She does not have wheezes in the lung fields. She has no rales.   Cardiovascular: Normal rate, regular rhythm, normal heart sounds, and does not have murmur.  Exam reveals no gallop and no friction rub.    Musculoskeletal / Extremities: Range of motion is normal. Gait is normal, assistive devices use: wheelchair.    She exhibits edema: none, and no tenderness.   Neurological/Psychiatric:She is alert and oriented to person, place, and time. Coordination is  normal.   Judgement and Insight is normal  Her mood is Appropriate and affect is Neutral/Euthymic(normal) . Her behavior is normal.   thought content normal.        IMPRESSION:     1. Chronic pain syndrome    2. Fibromyalgia    3. Failed back surgical syndrome    4. Lumbar radiculopathy    5. Primary osteoarthritis of both knees    6. Diabetic polyneuropathy associated with type 2 diabetes mellitus (HCC)    7. Mood disorder (HCC)    8. Primary insomnia    9. Fatigue, unspecified type    10. Primary osteoarthritis of left knee    11. Primary osteoarthritis of right knee        PLAN:  Informed verbal consent was obtained.  -[place] record was obtained and reviewed  for the last one year and no indicators of drug misuse  were found. Any other controlled substance prescriptions  seen on the record have been accounted for, I am aware of the patient receiving these medications. [place] record will be rechecked as part of office protocol.   social and leisure activities.Improve psychosocial and physical functioning. she is showing progression towards this treatment goal with the current regimen.    2.   Improving sleep to 6-7 hours a night. Restorative sleep either with assist device if recommended or with medications.  Improve mood/ anxiety and depression symptoms such as crying spells, low energy, problems with concentration, motivation.- she is maintaining her treatment goal with the current regimen.    3.   Reduction of reliance on opioid analgesia/more appropriate opioid use. Using the least effective dose to help with pain control and making a concerted effort to decrease the dose when possible.- she is maintaining her treatment goal with the current regimen.      Risks and benefits of the medications and other alternative treatments have been/were  discussed with the patient. Any questions on the  common side effects of these medications were also answered.  She was advised against drinking alcohol with the narcotic pain medicines, advised against driving or handling machinery when  starting or adjusting the dose of medicines, feeling groggy or drowsy, or if having any cognitive issues related to the current medications. Sheis fully aware of the risk of overdose and death, if medicines are misused and not taken as prescribed. If she develops new symptoms or if the symptoms worsen, she was told to call the office. .  Thank you for allowing me to participate in the care of this patient.    Gabe Boogie MD    Cc: Gissel Chauhan MD

## 2024-08-23 NOTE — TELEPHONE ENCOUNTER
Hospital bed, lift chair, raise toilet seat with arms. Scripts were requested to be faxed to:     [phone], Gina @ [facility]

## 2024-08-26 NOTE — TELEPHONE ENCOUNTER
Call [facility] to see if they will accept the DME orders for this patient and if so fax orders to [employer].

## 2024-08-27 ENCOUNTER — TELEPHONE (OUTPATIENT)
Dept: PRIMARY CARE CLINIC | Age: 59
End: 2024-08-27

## 2024-08-27 NOTE — TELEPHONE ENCOUNTER
----- Message from Jevon DURAND sent at 8/27/2024  4:51 PM EDT -----  Regarding: E[facility] Referral Request  E[facility] Referral Request    Reason for referral request: Lab/Test Order    Specialist/Lab/Test patient is requesting (if known): Lab Order     Specialist Phone Number (if applicable):    Additional Information : Patient would like to have a lab order for her upcoming appointment this 09/26/2024 week before her appointment. Patient also requested for a hospital bed prescription with an adjustable air mattress, left chair recliner, raise toilet seat and a repair for her chair with a missing wheel and a security lock.  --------------------------------------------------------------------------------------------------------------------------    Relationship to Patient: Self     Call Back Information: OK to leave message on voicemail  Preferred Call Back Number: Phone [phone]

## 2024-08-28 ENCOUNTER — OFFICE VISIT (OUTPATIENT)
Dept: ORTHOPEDIC SURGERY | Age: 59
End: 2024-08-28
Payer: MEDICARE

## 2024-08-28 VITALS — WEIGHT: 260 LBS | BODY MASS INDEX: 39.4 KG/M2 | HEIGHT: 68 IN

## 2024-08-28 DIAGNOSIS — M17.12 PRIMARY OSTEOARTHRITIS OF LEFT KNEE: Primary | ICD-10-CM

## 2024-08-28 DIAGNOSIS — M17.11 PRIMARY OSTEOARTHRITIS OF RIGHT KNEE: ICD-10-CM

## 2024-08-28 PROCEDURE — 20610 DRAIN/INJ JOINT/BURSA W/O US: CPT | Performed by: ORTHOPAEDIC SURGERY

## 2024-08-28 PROCEDURE — 99214 OFFICE O/P EST MOD 30 MIN: CPT | Performed by: ORTHOPAEDIC SURGERY

## 2024-08-28 RX ORDER — METHYLPREDNISOLONE ACETATE 40 MG/ML
40 INJECTION, SUSPENSION INTRA-ARTICULAR; INTRALESIONAL; INTRAMUSCULAR; SOFT TISSUE ONCE
Status: COMPLETED | OUTPATIENT
Start: 2024-08-28 | End: 2024-08-28

## 2024-08-28 RX ADMIN — METHYLPREDNISOLONE ACETATE 40 MG: 40 INJECTION, SUSPENSION INTRA-ARTICULAR; INTRALESIONAL; INTRAMUSCULAR; SOFT TISSUE at 16:42

## 2024-08-28 RX ADMIN — Medication 4 ML: at 16:40

## 2024-08-28 RX ADMIN — Medication 4 ML: at 16:41

## 2024-08-28 RX ADMIN — METHYLPREDNISOLONE ACETATE 40 MG: 40 INJECTION, SUSPENSION INTRA-ARTICULAR; INTRALESIONAL; INTRAMUSCULAR; SOFT TISSUE at 16:43

## 2024-08-28 NOTE — PROGRESS NOTES
radiographic studies and medical records associated with this patient and supervised the services that are described above.     Drew Merritt MD

## 2024-09-03 ENCOUNTER — TELEPHONE (OUTPATIENT)
Dept: ORTHOPEDIC SURGERY | Age: 59
End: 2024-09-03

## 2024-09-03 NOTE — TELEPHONE ENCOUNTER
All notes are available on my chart and they do have requested information    Left message    Not applicable

## 2024-09-03 NOTE — TELEPHONE ENCOUNTER
General Question     Subject: patient would like to know if she can get her office notes from 08/15/24 and what kind of injection did she get and how many ounces was put in both shoulders. She would like to have that mail to her home that is on file and a card with her updated appointment so she can keep up with her appointments.. Please Advise.   Patient Farrah Rivers   Contact Number: [phone]

## 2024-09-10 DIAGNOSIS — K21.9 GASTROESOPHAGEAL REFLUX DISEASE, UNSPECIFIED WHETHER ESOPHAGITIS PRESENT: ICD-10-CM

## 2024-09-11 RX ORDER — ESOMEPRAZOLE MAGNESIUM 40 MG/1
CAPSULE, DELAYED RELEASE ORAL
Qty: 90 CAPSULE | Refills: 0 | Status: SHIPPED | OUTPATIENT
Start: 2024-09-11

## 2024-09-13 DIAGNOSIS — G89.4 CHRONIC PAIN SYNDROME: ICD-10-CM

## 2024-09-17 DIAGNOSIS — L85.3 DRY SKIN: ICD-10-CM

## 2024-09-17 RX ORDER — AMMONIUM LACTATE 12 G/100G
CREAM TOPICAL
Qty: 280 G | Refills: 0 | Status: SHIPPED | OUTPATIENT
Start: 2024-09-17

## 2024-09-20 ENCOUNTER — OFFICE VISIT (OUTPATIENT)
Dept: PAIN MANAGEMENT | Age: 59
End: 2024-09-20
Payer: MEDICARE

## 2024-09-20 VITALS — SYSTOLIC BLOOD PRESSURE: 131 MMHG | DIASTOLIC BLOOD PRESSURE: 88 MMHG | HEART RATE: 77 BPM | OXYGEN SATURATION: 100 %

## 2024-09-20 DIAGNOSIS — E11.42 DIABETIC POLYNEUROPATHY ASSOCIATED WITH TYPE 2 DIABETES MELLITUS (HCC): ICD-10-CM

## 2024-09-20 DIAGNOSIS — M96.1 FAILED BACK SURGICAL SYNDROME: ICD-10-CM

## 2024-09-20 DIAGNOSIS — G89.4 CHRONIC PAIN SYNDROME: ICD-10-CM

## 2024-09-20 DIAGNOSIS — Z91.89 AT RISK FOR RESPIRATORY DEPRESSION DUE TO OPIOID: ICD-10-CM

## 2024-09-20 DIAGNOSIS — M54.16 LUMBAR RADICULOPATHY: ICD-10-CM

## 2024-09-20 DIAGNOSIS — M17.11 PRIMARY OSTEOARTHRITIS OF RIGHT KNEE: ICD-10-CM

## 2024-09-20 DIAGNOSIS — M17.12 PRIMARY OSTEOARTHRITIS OF LEFT KNEE: ICD-10-CM

## 2024-09-20 DIAGNOSIS — M79.7 FIBROMYALGIA: ICD-10-CM

## 2024-09-20 DIAGNOSIS — M17.0 PRIMARY OSTEOARTHRITIS OF BOTH KNEES: ICD-10-CM

## 2024-09-20 PROCEDURE — 3044F HG A1C LEVEL LT 7.0%: CPT | Performed by: INTERNAL MEDICINE

## 2024-09-20 PROCEDURE — 99213 OFFICE O/P EST LOW 20 MIN: CPT | Performed by: INTERNAL MEDICINE

## 2024-09-20 RX ORDER — HYDROXYZINE HYDROCHLORIDE 25 MG/1
TABLET, FILM COATED ORAL
Qty: 1 TABLET | Refills: 0 | Status: SHIPPED | OUTPATIENT
Start: 2024-09-20

## 2024-09-20 RX ORDER — HYDROCODONE BITARTRATE AND ACETAMINOPHEN 5; 325 MG/1; MG/1
1 TABLET ORAL 3 TIMES DAILY PRN
Qty: 84 TABLET | Refills: 0 | Status: SHIPPED | OUTPATIENT
Start: 2024-09-20 | End: 2024-10-18

## 2024-09-20 RX ORDER — DULOXETIN HYDROCHLORIDE 60 MG/1
60 CAPSULE, DELAYED RELEASE ORAL DAILY
Qty: 30 CAPSULE | Refills: 1 | Status: SHIPPED | OUTPATIENT
Start: 2024-09-20

## 2024-09-20 RX ORDER — DULOXETIN HYDROCHLORIDE 60 MG/1
CAPSULE, DELAYED RELEASE ORAL DAILY
Qty: 30 CAPSULE | Refills: 1 | OUTPATIENT
Start: 2024-09-20

## 2024-09-20 RX ORDER — PREGABALIN 100 MG/1
100 CAPSULE ORAL 3 TIMES DAILY
Qty: 90 CAPSULE | Refills: 1 | Status: SHIPPED | OUTPATIENT
Start: 2024-09-20 | End: 2024-11-19

## 2024-09-20 RX ORDER — NORTRIPTYLINE HCL 25 MG
25-50 CAPSULE ORAL NIGHTLY
Qty: 60 CAPSULE | Refills: 1 | Status: SHIPPED | OUTPATIENT
Start: 2024-09-20

## 2024-09-25 ENCOUNTER — TELEPHONE (OUTPATIENT)
Dept: ORTHOPEDIC SURGERY | Age: 59
End: 2024-09-25

## 2024-09-25 ENCOUNTER — TELEPHONE (OUTPATIENT)
Dept: PRIMARY CARE CLINIC | Age: 59
End: 2024-09-25

## 2024-09-25 DIAGNOSIS — E66.01 CLASS 2 SEVERE OBESITY DUE TO EXCESS CALORIES WITH SERIOUS COMORBIDITY AND BODY MASS INDEX (BMI) OF 39.0 TO 39.9 IN ADULT: ICD-10-CM

## 2024-09-25 DIAGNOSIS — E11.42 TYPE 2 DIABETES MELLITUS WITH DIABETIC POLYNEUROPATHY, WITHOUT LONG-TERM CURRENT USE OF INSULIN (HCC): Primary | ICD-10-CM

## 2024-09-25 DIAGNOSIS — E55.9 VITAMIN D DEFICIENCY: ICD-10-CM

## 2024-09-25 DIAGNOSIS — Z98.84 HISTORY OF BARIATRIC SURGERY: ICD-10-CM

## 2024-09-25 DIAGNOSIS — E78.2 MIXED HYPERLIPIDEMIA: ICD-10-CM

## 2024-09-26 ENCOUNTER — OFFICE VISIT (OUTPATIENT)
Dept: PRIMARY CARE CLINIC | Age: 59
End: 2024-09-26
Payer: MEDICARE

## 2024-09-26 VITALS
HEART RATE: 91 BPM | DIASTOLIC BLOOD PRESSURE: 80 MMHG | OXYGEN SATURATION: 96 % | HEIGHT: 68 IN | SYSTOLIC BLOOD PRESSURE: 130 MMHG | WEIGHT: 238.4 LBS | BODY MASS INDEX: 36.13 KG/M2 | TEMPERATURE: 97.8 F

## 2024-09-26 DIAGNOSIS — E55.9 VITAMIN D DEFICIENCY: ICD-10-CM

## 2024-09-26 DIAGNOSIS — Z00.00 WELCOME TO MEDICARE PREVENTIVE VISIT: Primary | ICD-10-CM

## 2024-09-26 DIAGNOSIS — K21.9 GASTROESOPHAGEAL REFLUX DISEASE, UNSPECIFIED WHETHER ESOPHAGITIS PRESENT: ICD-10-CM

## 2024-09-26 DIAGNOSIS — Z91.81 AT RISK FOR FALLS: ICD-10-CM

## 2024-09-26 DIAGNOSIS — K64.9 HEMORRHOIDS, UNSPECIFIED HEMORRHOID TYPE: ICD-10-CM

## 2024-09-26 DIAGNOSIS — Z98.84 HISTORY OF BARIATRIC SURGERY: ICD-10-CM

## 2024-09-26 DIAGNOSIS — E66.01 CLASS 2 SEVERE OBESITY DUE TO EXCESS CALORIES WITH SERIOUS COMORBIDITY AND BODY MASS INDEX (BMI) OF 39.0 TO 39.9 IN ADULT: ICD-10-CM

## 2024-09-26 DIAGNOSIS — E66.01 CLASS 2 SEVERE OBESITY DUE TO EXCESS CALORIES WITH SERIOUS COMORBIDITY AND BODY MASS INDEX (BMI) OF 36.0 TO 36.9 IN ADULT: ICD-10-CM

## 2024-09-26 DIAGNOSIS — E66.812 CLASS 2 SEVERE OBESITY DUE TO EXCESS CALORIES WITH SERIOUS COMORBIDITY AND BODY MASS INDEX (BMI) OF 39.0 TO 39.9 IN ADULT: ICD-10-CM

## 2024-09-26 DIAGNOSIS — E11.42 TYPE 2 DIABETES MELLITUS WITH DIABETIC POLYNEUROPATHY, WITHOUT LONG-TERM CURRENT USE OF INSULIN (HCC): ICD-10-CM

## 2024-09-26 DIAGNOSIS — E66.812 CLASS 2 SEVERE OBESITY DUE TO EXCESS CALORIES WITH SERIOUS COMORBIDITY AND BODY MASS INDEX (BMI) OF 36.0 TO 36.9 IN ADULT: ICD-10-CM

## 2024-09-26 DIAGNOSIS — E78.2 MIXED HYPERLIPIDEMIA: ICD-10-CM

## 2024-09-26 LAB
25(OH)D3 SERPL-MCNC: 31.6 NG/ML
ALBUMIN SERPL-MCNC: 4 G/DL (ref 3.4–5)
ALBUMIN/GLOB SERPL: 1.9 {RATIO} (ref 1.1–2.2)
ALP SERPL-CCNC: 75 U/L (ref 40–129)
ALT SERPL-CCNC: 19 U/L (ref 10–40)
AMPHETAMINES UR QL SCN>1000 NG/ML: NORMAL
ANION GAP SERPL CALCULATED.3IONS-SCNC: 11 MMOL/L (ref 3–16)
AST SERPL-CCNC: 17 U/L (ref 15–37)
BARBITURATES UR QL SCN>200 NG/ML: NORMAL
BENZODIAZ UR QL SCN>200 NG/ML: NORMAL
BILIRUB SERPL-MCNC: 0.3 MG/DL (ref 0–1)
BUN SERPL-MCNC: 20 MG/DL (ref 7–20)
BUPRENORPHINE+NOR UR QL SCN: NORMAL
CALCIUM SERPL-MCNC: 8.9 MG/DL (ref 8.3–10.6)
CANNABINOIDS UR QL SCN>50 NG/ML: NORMAL
CHLORIDE SERPL-SCNC: 105 MMOL/L (ref 99–110)
CHOLEST SERPL-MCNC: 226 MG/DL (ref 0–199)
CO2 SERPL-SCNC: 25 MMOL/L (ref 21–32)
COCAINE UR QL SCN: NORMAL
CREAT SERPL-MCNC: <0.5 MG/DL (ref 0.6–1.1)
DEPRECATED RDW RBC AUTO: 15 % (ref 12.4–15.4)
DRUG SCREEN COMMENT UR-IMP: NORMAL
FENTANYL SCREEN, URINE: NORMAL
GFR SERPLBLD CREATININE-BSD FMLA CKD-EPI: >90 ML/MIN/{1.73_M2}
GLUCOSE SERPL-MCNC: 81 MG/DL (ref 70–99)
HCT VFR BLD AUTO: 37.1 % (ref 36–48)
HDLC SERPL-MCNC: 104 MG/DL (ref 40–60)
HGB BLD-MCNC: 12.1 G/DL (ref 12–16)
IRON SERPL-MCNC: 78 UG/DL (ref 37–145)
LDLC SERPL CALC-MCNC: 112 MG/DL
MCH RBC QN AUTO: 29 PG (ref 26–34)
MCHC RBC AUTO-ENTMCNC: 32.6 G/DL (ref 31–36)
MCV RBC AUTO: 88.9 FL (ref 80–100)
METHADONE UR QL SCN>300 NG/ML: NORMAL
OPIATES UR QL SCN>300 NG/ML: NORMAL
OXYCODONE UR QL SCN: NORMAL
PCP UR QL SCN>25 NG/ML: NORMAL
PH UR STRIP: 5 [PH]
PLATELET # BLD AUTO: 277 K/UL (ref 135–450)
PMV BLD AUTO: 7.7 FL (ref 5–10.5)
POTASSIUM SERPL-SCNC: 4.1 MMOL/L (ref 3.5–5.1)
PROT SERPL-MCNC: 6.1 G/DL (ref 6.4–8.2)
RBC # BLD AUTO: 4.18 M/UL (ref 4–5.2)
SODIUM SERPL-SCNC: 141 MMOL/L (ref 136–145)
TRIGL SERPL-MCNC: 49 MG/DL (ref 0–150)
VIT B12 SERPL-MCNC: 180 PG/ML (ref 211–911)
VLDLC SERPL CALC-MCNC: 10 MG/DL
WBC # BLD AUTO: 5.8 K/UL (ref 4–11)

## 2024-09-26 PROCEDURE — G0402 INITIAL PREVENTIVE EXAM: HCPCS | Performed by: INTERNAL MEDICINE

## 2024-09-26 RX ORDER — PANTOPRAZOLE SODIUM 40 MG/1
TABLET, DELAYED RELEASE ORAL
Qty: 30 TABLET | Refills: 0 | OUTPATIENT
Start: 2024-09-26

## 2024-09-26 RX ORDER — HYDROCORTISONE 25 MG/G
CREAM TOPICAL 2 TIMES DAILY
Qty: 30 G | Refills: 1 | Status: SHIPPED | OUTPATIENT
Start: 2024-09-26

## 2024-09-26 ASSESSMENT — PATIENT HEALTH QUESTIONNAIRE - PHQ9
SUM OF ALL RESPONSES TO PHQ QUESTIONS 1-9: 4
SUM OF ALL RESPONSES TO PHQ QUESTIONS 1-9: 4
SUM OF ALL RESPONSES TO PHQ9 QUESTIONS 1 & 2: 2
SUM OF ALL RESPONSES TO PHQ QUESTIONS 1-9: 4
1. LITTLE INTEREST OR PLEASURE IN DOING THINGS: SEVERAL DAYS
5. POOR APPETITE OR OVEREATING: SEVERAL DAYS
10. IF YOU CHECKED OFF ANY PROBLEMS, HOW DIFFICULT HAVE THESE PROBLEMS MADE IT FOR YOU TO DO YOUR WORK, TAKE CARE OF THINGS AT HOME, OR GET ALONG WITH OTHER PEOPLE: SOMEWHAT DIFFICULT
4. FEELING TIRED OR HAVING LITTLE ENERGY: SEVERAL DAYS
6. FEELING BAD ABOUT YOURSELF - OR THAT YOU ARE A FAILURE OR HAVE LET YOURSELF OR YOUR FAMILY DOWN: NOT AT ALL
3. TROUBLE FALLING OR STAYING ASLEEP: NOT AT ALL
9. THOUGHTS THAT YOU WOULD BE BETTER OFF DEAD, OR OF HURTING YOURSELF: NOT AT ALL
8. MOVING OR SPEAKING SO SLOWLY THAT OTHER PEOPLE COULD HAVE NOTICED. OR THE OPPOSITE, BEING SO FIGETY OR RESTLESS THAT YOU HAVE BEEN MOVING AROUND A LOT MORE THAN USUAL: NOT AT ALL
7. TROUBLE CONCENTRATING ON THINGS, SUCH AS READING THE NEWSPAPER OR WATCHING TELEVISION: NOT AT ALL
SUM OF ALL RESPONSES TO PHQ QUESTIONS 1-9: 4
2. FEELING DOWN, DEPRESSED OR HOPELESS: SEVERAL DAYS

## 2024-09-26 ASSESSMENT — LIFESTYLE VARIABLES
HOW MANY STANDARD DRINKS CONTAINING ALCOHOL DO YOU HAVE ON A TYPICAL DAY: PATIENT DOES NOT DRINK
HOW OFTEN DO YOU HAVE A DRINK CONTAINING ALCOHOL: NEVER

## 2024-09-26 NOTE — PROGRESS NOTES
Medicare Annual Wellness Visit    Amel JULISA Rivers is here for Medicare AWV    Assessment & Plan   1. Welcome to Medicare preventive visit  Assessment & Plan:  -Medicare AWV done  2. Hemorrhoids, unspecified hemorrhoid type  Assessment & Plan:  - Anusol HC 2.5% rectal cream to rectal area 2 times daily as needed   Orders:  -     hydrocortisone (ANUSOL-HC) 2.5 % CREA rectal cream; Place rectally 2 times daily, Rectal, 2 TIMES DAILY Starting Thu 9/26/2024, Disp-30 g, R-1, Normal  3. Class 2 severe obesity due to excess calories with serious comorbidity and body mass index (BMI) of 36.0 to 36.9 in adult  Assessment & Plan:  -BMI of 36.25  -History of gastric sleeve surgery  -Referral to Bariatrics   Orders:  -     [facility] (Bariatric Surgery)[facility]  4. At risk for falls  Assessment & Plan:  -Referral to Physical Therapy  Orders:  -     [facility]      Recommendations for Preventive Services Due: see orders and patient instructions/AVS.  Recommended screening schedule for the next 5-10 years is provided to the patient in written form: see Patient Instructions/AVS.       Return in about 2 weeks (around 10/10/2024) for diabetes, hyperlipidemia, GERD, and vitamin D.     Subjective   The following acute and/or chronic problems were also addressed today:     Jorje ID#[id number] by video.     Patient wants cream for hemorrhoids. Patient states her hemorrhoids bleed sometimes. Patient has colonoscopy done on 2/21/23. Patient has EGD scheduled for 10/17/24.    Patient's complete Health Risk Assessment and screening values have been reviewed and are found in Flowsheets. The following problems were reviewed today and where indicated follow up appointments were made and/or referrals ordered.    Positive Risk Factor Screenings with Interventions:    Fall Risk:  Do you feel unsteady or are you worried about falling? : (!) yes  2 or more falls in past year?: (!)

## 2024-09-27 ENCOUNTER — TELEPHONE (OUTPATIENT)
Dept: PRIMARY CARE CLINIC | Age: 59
End: 2024-09-27

## 2024-09-27 DIAGNOSIS — M17.0 PRIMARY OSTEOARTHRITIS OF BOTH KNEES: ICD-10-CM

## 2024-09-27 DIAGNOSIS — M96.1 FAILED BACK SURGICAL SYNDROME: ICD-10-CM

## 2024-09-27 DIAGNOSIS — M54.16 LUMBAR RADICULOPATHY: Primary | ICD-10-CM

## 2024-09-27 LAB
EST. AVERAGE GLUCOSE BLD GHB EST-MCNC: 111.2 MG/DL
HBA1C MFR BLD: 5.5 %

## 2024-09-27 NOTE — TELEPHONE ENCOUNTER
When patient was in for appointment yesterday she stated she does not have a bed. I reached out to social work, Jeannette Sadler, with [facility] and she stated patient is not enrolled in case management. She was assigned a  named KENNEDY Gutierrez who works with [employer] Her contact number is [phone]. I left Brenda a voicemail to please return the call so we can help patient get a bed. Patient is currently sleeping in a recliner.     Also, while patient was here she requested an update on the orders that were sent to [facility] 8/26/2024 for a raised toilet seat, lift recliner, and hospital bed. Was on hold for a few minutes then sent straight to [facility]. Will try again later.

## 2024-09-30 NOTE — TELEPHONE ENCOUNTER
Spoke with Gina from [facility] she said to fax all three of the orders to her at [phone] . She said she will find a different place that takes her insurance and she will also try to follow up with piyush as well. She will reach back out to us once she has confirmed a place that will accept the orders.

## 2024-10-02 PROBLEM — M96.1 FAILED BACK SURGICAL SYNDROME: Status: ACTIVE | Noted: 2024-10-02

## 2024-10-02 NOTE — TELEPHONE ENCOUNTER
Gina called back she stated she sent two scripts over one for a hospital bed and one for the raised toilet seat. She is requesting a order be put in for a walker since pts current one is damaged. Once order is in we will fax the order back to Gina.

## 2024-10-03 NOTE — TELEPHONE ENCOUNTER
Voicemail left for Gina asking for return call. Patient has duplicate orders from Dr. Patterson and pain management Dr. Boogie.

## 2024-10-04 ENCOUNTER — HOSPITAL ENCOUNTER (OUTPATIENT)
Dept: INTERVENTIONAL RADIOLOGY/VASCULAR | Age: 59
Discharge: HOME | End: 2024-10-06
Attending: INTERNAL MEDICINE
Payer: MEDICARE

## 2024-10-04 DIAGNOSIS — M47.816 LUMBAR SPONDYLOSIS: ICD-10-CM

## 2024-10-04 DIAGNOSIS — M47.816 LUMBAR FACET ARTHROPATHY: ICD-10-CM

## 2024-10-04 PROCEDURE — 64495 INJ PARAVERT F JNT L/S 3 LEV: CPT

## 2024-10-04 PROCEDURE — 2709999900 HC NON-CHARGEABLE SUPPLY

## 2024-10-04 PROCEDURE — 6360000002 HC RX W HCPCS: Performed by: RADIOLOGY

## 2024-10-04 PROCEDURE — 64493 INJ PARAVERT F JNT L/S 1 LEV: CPT

## 2024-10-04 PROCEDURE — 64494 INJ PARAVERT F JNT L/S 2 LEV: CPT

## 2024-10-04 PROCEDURE — 2500000003 HC RX 250 WO HCPCS: Performed by: RADIOLOGY

## 2024-10-04 RX ORDER — LIDOCAINE HYDROCHLORIDE 10 MG/ML
INJECTION, SOLUTION EPIDURAL; INFILTRATION; INTRACAUDAL; PERINEURAL PRN
Status: COMPLETED | OUTPATIENT
Start: 2024-10-04 | End: 2024-10-04

## 2024-10-04 RX ORDER — BUPIVACAINE HYDROCHLORIDE 2.5 MG/ML
INJECTION, SOLUTION EPIDURAL; INFILTRATION; INTRACAUDAL PRN
Status: COMPLETED | OUTPATIENT
Start: 2024-10-04 | End: 2024-10-04

## 2024-10-04 RX ADMIN — LIDOCAINE HYDROCHLORIDE 10 ML: 10 INJECTION, SOLUTION EPIDURAL; INFILTRATION; INTRACAUDAL; PERINEURAL at 11:39

## 2024-10-04 RX ADMIN — BUPIVACAINE HYDROCHLORIDE 20 ML: 2.5 INJECTION, SOLUTION EPIDURAL; INFILTRATION; INTRACAUDAL at 11:39

## 2024-10-04 NOTE — DISCHARGE INSTRUCTIONS
Medial Branch Block  Patient Discharge Instructions      When You Get Home    You should not drive the day of the procedure.  You may experience leg weakness during the first 24 hours following the procedure.  To prevent yourself from falling, it is important to have someone help you walk.  However, you do not need to stay in bed when you get home.  In fact, it is best to walk around if you feel up to it, but you will need assistance during the first 24 hours following the injection.   Even if you feel better right away, avoid activities that may strain your back.      Keep in mind that some patients may feel increased pain for the first 24 hours.  You should start feeling some pain relief 2-3 days following the injection.  At that time, we will evaluate your pain level to determine the need for another injection.    Remove bandaid(s) within 24 hours.       When to Call Your Doctor    Call right away if you notice any of the following symptoms:    Severe pain or headache;  Fever or chills;  Redness or swelling around the injection site.  Loss of bladder or bowel control.          You may contact [facility] for any questions or problems that may occur at [phone] during the hours of 9am-4pm Monday-Friday, or the hospital  after hours at [phone], to have the interventional radiologist on call paged.      [facility]  Cardiovascular Special Procedures  General Discharge Instructions    PROCEDURE: ____________________________________________________    ____ You may be drowsy or lightheaded after receiving sedation. DO NOT operate a vehicle (automobile, bicycle, motorcycle, machinery, or power tools), make any important decisions or sign any important/legal documents, or drink alcoholic beverages for the next 24 hours  _x___ We strongly suggest that a responsible adult be with you for the next 24 hours for your protection and safety  ____ If the intravenous catheter site is

## 2024-10-07 ENCOUNTER — TELEPHONE (OUTPATIENT)
Dept: GENERAL RADIOLOGY | Age: 59
End: 2024-10-07

## 2024-10-07 DIAGNOSIS — H92.01 EAR PAIN, RIGHT: Primary | ICD-10-CM

## 2024-10-07 NOTE — TELEPHONE ENCOUNTER
IR Brief Note    The patient called with chief complaint of right ear pain, fevers/chills, and headache worse with laying flat. She had a medial branch block on 10/4 at [facility].     I think it is unlikely that her current symptoms are related to her recent procedure. I did tell her that it was important to be evaluated by a healthcare professional due to my concern for ear infection.     She does not believe she will be able to see her primary care doctor sooner than her appointment on 10/21 so I recommended she be evaluated at the emergency room or an urgent care. She was in agreement.    Chriss Whitehead MD  10/07/24  4:16 PM

## 2024-10-08 ENCOUNTER — HOSPITAL ENCOUNTER (OUTPATIENT)
Dept: PHYSICAL THERAPY | Age: 59
Setting detail: THERAPIES SERIES
Discharge: HOME | End: 2024-10-08
Payer: MEDICARE

## 2024-10-08 DIAGNOSIS — R26.89 IMPAIRMENT OF BALANCE: Primary | ICD-10-CM

## 2024-10-08 DIAGNOSIS — G89.29 CHRONIC BILATERAL LOW BACK PAIN WITH BILATERAL SCIATICA: ICD-10-CM

## 2024-10-08 DIAGNOSIS — M54.42 CHRONIC BILATERAL LOW BACK PAIN WITH BILATERAL SCIATICA: ICD-10-CM

## 2024-10-08 DIAGNOSIS — M54.41 CHRONIC BILATERAL LOW BACK PAIN WITH BILATERAL SCIATICA: ICD-10-CM

## 2024-10-08 DIAGNOSIS — G89.4 CHRONIC PAIN SYNDROME: ICD-10-CM

## 2024-10-08 DIAGNOSIS — M53.86 DECREASED RANGE OF MOTION OF INTERVERTEBRAL DISCS OF LUMBAR SPINE: ICD-10-CM

## 2024-10-08 DIAGNOSIS — R26.2 DIFFICULTY IN WALKING: ICD-10-CM

## 2024-10-08 DIAGNOSIS — R29.898 DECREASED STRENGTH OF LOWER EXTREMITY: ICD-10-CM

## 2024-10-08 PROCEDURE — 97530 THERAPEUTIC ACTIVITIES: CPT

## 2024-10-08 PROCEDURE — 97162 PT EVAL MOD COMPLEX 30 MIN: CPT

## 2024-10-08 RX ORDER — NORTRIPTYLINE HYDROCHLORIDE 25 MG/1
25-50 CAPSULE ORAL NIGHTLY
Qty: 180 CAPSULE | Refills: 1 | OUTPATIENT
Start: 2024-10-08

## 2024-10-10 ENCOUNTER — TELEPHONE (OUTPATIENT)
Dept: PRIMARY CARE CLINIC | Age: 59
End: 2024-10-10

## 2024-10-10 NOTE — TELEPHONE ENCOUNTER
[employer] called to follow up on status of forms they faxed over for pt. Please keep an eye out for forms to be signed from [employer].

## 2024-10-10 NOTE — TELEPHONE ENCOUNTER
The previous message in this thread is older, and pt has already been contacted to schedule her Euflexxa

## 2024-10-12 DIAGNOSIS — G89.4 CHRONIC PAIN SYNDROME: ICD-10-CM

## 2024-10-14 ENCOUNTER — TELEPHONE (OUTPATIENT)
Dept: PRIMARY CARE CLINIC | Age: 59
End: 2024-10-14

## 2024-10-14 DIAGNOSIS — E66.812 CLASS 2 SEVERE OBESITY DUE TO EXCESS CALORIES WITH SERIOUS COMORBIDITY AND BODY MASS INDEX (BMI) OF 36.0 TO 36.9 IN ADULT: Primary | ICD-10-CM

## 2024-10-14 DIAGNOSIS — E66.01 CLASS 2 SEVERE OBESITY DUE TO EXCESS CALORIES WITH SERIOUS COMORBIDITY AND BODY MASS INDEX (BMI) OF 36.0 TO 36.9 IN ADULT: Primary | ICD-10-CM

## 2024-10-14 RX ORDER — NORTRIPTYLINE HYDROCHLORIDE 25 MG/1
25-50 CAPSULE ORAL NIGHTLY
Qty: 60 CAPSULE | Refills: 1 | OUTPATIENT
Start: 2024-10-14

## 2024-10-14 NOTE — TELEPHONE ENCOUNTER
Referral for weight loss clinic at  written and faxed. I do not have disability forms completed at this time. Once completed will notify patient.

## 2024-10-14 NOTE — TELEPHONE ENCOUNTER
Patient stated she called to schedule appointment with [facility] that was referred to her and wants to know why they wont see her.    Patient is requesting for another referral for weight loss clinic [facility]  Patient is asking for disability forms also

## 2024-10-15 ENCOUNTER — HOSPITAL ENCOUNTER (OUTPATIENT)
Dept: PHYSICAL THERAPY | Age: 59
Setting detail: THERAPIES SERIES
Discharge: HOME | End: 2024-10-15
Payer: MEDICARE

## 2024-10-15 PROCEDURE — 97110 THERAPEUTIC EXERCISES: CPT

## 2024-10-15 PROCEDURE — 97530 THERAPEUTIC ACTIVITIES: CPT

## 2024-10-15 NOTE — FLOWSHEET NOTE
(85948)     Manual (36940)    Estim Unattended (78151)     Ther. Act (80666) 9 1  Mech. Traction (59629)     Gait (72760)    Dry Needle 1-2 muscle (20560)     Aquatic Therex (01804)    Dry Needle 3+ muscle (20561)     Iontophoresis (13709)    VASO (16183)     Ultrasound (46111)    Group Therapy (65835)     Estim Attended (11155)    Canalith Repositioning (75360)     Physical Performance Test (67334)         Other:    Other:    Total Timed Code Tx Minutes 38 3       Total Treatment Minutes 38 + 10 min heat end of session        Charge Justification:  (57748) THERAPEUTIC EXERCISE - Provided verbal/tactile cueing for activities related to strengthening, flexibility, endurance, ROM performed to prevent loss of range of motion, maintain or improve muscular strength or increase flexibility, following either an injury or surgery.   (04604) THERAPEUTIC ACTIVITY - use of dynamic activities to improve functional performance. (Ex include squatting, ascending/descending stairs, walking, bending, lifting, catching, throwing, pushing, pulling, jumping.)  Direct, one on one contact, billed in 15-minute increments.    GOALS     Patient stated goal: \"to walk longer distances with my walker, to get rid of my wheel chair\"  [] Progressing: [] Met: [] Not Met: [] Adjusted    Therapist goals for Patient:   Short Term Goals: To be achieved in: 4 weeks  1. Independent in HEP and progression per patient tolerance, in order to prevent re-injury.   [] Progressing: [] Met: [] Not Met: [] Adjusted  2. Patient will have a decrease in worst pain to <7/10 to facilitate improvement in movement, function, and ADLs as indicated by Functional Deficits.  [] Progressing: [] Met: [] Not Met: [] Adjusted    Long Term Goals: To be achieved in: 10 weeks  1. Disability index score of 50% improvement or better for the Modified Oswestry to assist with reaching prior level of function with activities such as standing for prolonged period of time to cook.  []

## 2024-10-16 ENCOUNTER — TELEPHONE (OUTPATIENT)
Dept: PRIMARY CARE CLINIC | Age: 59
End: 2024-10-16

## 2024-10-16 ENCOUNTER — TELEPHONE (OUTPATIENT)
Dept: ADMINISTRATIVE | Age: 59
End: 2024-10-16

## 2024-10-16 NOTE — TELEPHONE ENCOUNTER
Submitted PA for Vitamin D (Ergocalciferol) 1.25 MG(89924 UT) capsules   Via [facility] (Key: [id number]) STATUS: PENDING.    Follow up done daily; if no decision with in three days we will refax.  If another three days goes by with no decision will call the insurance for status.

## 2024-10-16 NOTE — TELEPHONE ENCOUNTER
Spoke with pharmacy 8/21 90 days were dispensed. Pharmacy states patient is there daily trying to get prescription dispensed and its too early to fill. Prescription is ready for Vitamin D and it is $6.96 but patient refuses to pay and states it should be free.

## 2024-10-16 NOTE — TELEPHONE ENCOUNTER
Call patient's [facility] pharmacy. I spoke with patient yesterday and she is out of Nexium. I prescribed Nexium for 90 tablets on 9/11/24. Patient states she only got 30 day supply. Verify with pharmacy the quantity given. Patient states she needs vitamin D and it needs an authorization. I prescribed a 90 day supply of vitamin D 50,000 IU on 6/25/24 with an additional refill. Ask pharmacist if she needs a prior authorization or refill.

## 2024-10-17 ENCOUNTER — APPOINTMENT (OUTPATIENT)
Dept: ENDOSCOPY | Age: 59
End: 2024-10-17
Attending: INTERNAL MEDICINE
Payer: MEDICARE

## 2024-10-17 ENCOUNTER — ANESTHESIA EVENT (OUTPATIENT)
Dept: ENDOSCOPY | Age: 59
End: 2024-10-17
Payer: MEDICARE

## 2024-10-17 ENCOUNTER — OFFICE VISIT (OUTPATIENT)
Dept: PRIMARY CARE CLINIC | Age: 59
End: 2024-10-17
Payer: MEDICARE

## 2024-10-17 ENCOUNTER — HOSPITAL ENCOUNTER (OUTPATIENT)
Dept: PHYSICAL THERAPY | Age: 59
Setting detail: THERAPIES SERIES
End: 2024-10-17
Payer: MEDICARE

## 2024-10-17 ENCOUNTER — HOSPITAL ENCOUNTER (OUTPATIENT)
Age: 59
Setting detail: OUTPATIENT SURGERY
Discharge: HOME | End: 2024-10-17
Attending: INTERNAL MEDICINE | Admitting: INTERNAL MEDICINE
Payer: MEDICARE

## 2024-10-17 ENCOUNTER — ANESTHESIA (OUTPATIENT)
Dept: ENDOSCOPY | Age: 59
End: 2024-10-17
Payer: MEDICARE

## 2024-10-17 VITALS
SYSTOLIC BLOOD PRESSURE: 116 MMHG | BODY MASS INDEX: 36.07 KG/M2 | DIASTOLIC BLOOD PRESSURE: 71 MMHG | HEART RATE: 56 BPM | WEIGHT: 238 LBS | HEIGHT: 68 IN | OXYGEN SATURATION: 100 % | RESPIRATION RATE: 16 BRPM | TEMPERATURE: 97.9 F

## 2024-10-17 VITALS
BODY MASS INDEX: 37.98 KG/M2 | HEART RATE: 55 BPM | WEIGHT: 242 LBS | DIASTOLIC BLOOD PRESSURE: 70 MMHG | RESPIRATION RATE: 16 BRPM | OXYGEN SATURATION: 97 % | TEMPERATURE: 97.7 F | HEIGHT: 67 IN | SYSTOLIC BLOOD PRESSURE: 106 MMHG

## 2024-10-17 DIAGNOSIS — M62.838 MUSCLE SPASM: ICD-10-CM

## 2024-10-17 DIAGNOSIS — R51.9 PERSISTENT HEADACHES: ICD-10-CM

## 2024-10-17 DIAGNOSIS — E53.8 VITAMIN B 12 DEFICIENCY: ICD-10-CM

## 2024-10-17 DIAGNOSIS — K21.9 GASTROESOPHAGEAL REFLUX DISEASE, UNSPECIFIED WHETHER ESOPHAGITIS PRESENT: Primary | ICD-10-CM

## 2024-10-17 DIAGNOSIS — K21.9 CHRONIC GERD: ICD-10-CM

## 2024-10-17 DIAGNOSIS — E55.9 VITAMIN D DEFICIENCY: ICD-10-CM

## 2024-10-17 DIAGNOSIS — E78.2 MIXED HYPERLIPIDEMIA: ICD-10-CM

## 2024-10-17 DIAGNOSIS — R10.84 GENERALIZED ABDOMINAL PAIN: ICD-10-CM

## 2024-10-17 DIAGNOSIS — R53.83 FATIGUE, UNSPECIFIED TYPE: ICD-10-CM

## 2024-10-17 LAB
ANION GAP SERPL CALCULATED.3IONS-SCNC: 11 MMOL/L (ref 3–16)
BUN SERPL-MCNC: 19 MG/DL (ref 7–20)
CALCIUM SERPL-MCNC: 9 MG/DL (ref 8.3–10.6)
CHLORIDE SERPL-SCNC: 107 MMOL/L (ref 99–110)
CK SERPL-CCNC: 45 U/L (ref 26–192)
CO2 SERPL-SCNC: 23 MMOL/L (ref 21–32)
CREAT SERPL-MCNC: 0.5 MG/DL (ref 0.6–1.2)
GFR SERPLBLD CREATININE-BSD FMLA CKD-EPI: >90 ML/MIN/{1.73_M2}
GLUCOSE BLD-MCNC: 74 MG/DL (ref 70–99)
GLUCOSE SERPL-MCNC: 95 MG/DL (ref 70–99)
MAGNESIUM SERPL-MCNC: 1.98 MG/DL (ref 1.8–2.4)
PERFORMED ON: NORMAL
POTASSIUM SERPL-SCNC: 4.2 MMOL/L (ref 3.5–5.1)
SODIUM SERPL-SCNC: 141 MMOL/L (ref 136–145)

## 2024-10-17 PROCEDURE — 99214 OFFICE O/P EST MOD 30 MIN: CPT | Performed by: INTERNAL MEDICINE

## 2024-10-17 PROCEDURE — 3700000000 HC ANESTHESIA ATTENDED CARE: Performed by: INTERNAL MEDICINE

## 2024-10-17 PROCEDURE — G8427 DOCREV CUR MEDS BY ELIG CLIN: HCPCS | Performed by: INTERNAL MEDICINE

## 2024-10-17 PROCEDURE — 2709999900 HC NON-CHARGEABLE SUPPLY: Performed by: INTERNAL MEDICINE

## 2024-10-17 PROCEDURE — 1036F TOBACCO NON-USER: CPT | Performed by: INTERNAL MEDICINE

## 2024-10-17 PROCEDURE — 88305 TISSUE EXAM BY PATHOLOGIST: CPT

## 2024-10-17 PROCEDURE — 3700000001 HC ADD 15 MINUTES (ANESTHESIA): Performed by: INTERNAL MEDICINE

## 2024-10-17 PROCEDURE — G8482 FLU IMMUNIZE ORDER/ADMIN: HCPCS | Performed by: INTERNAL MEDICINE

## 2024-10-17 PROCEDURE — 6370000000 HC RX 637 (ALT 250 FOR IP): Performed by: INTERNAL MEDICINE

## 2024-10-17 PROCEDURE — 7100000010 HC PHASE II RECOVERY - FIRST 15 MIN: Performed by: INTERNAL MEDICINE

## 2024-10-17 PROCEDURE — 6360000002 HC RX W HCPCS

## 2024-10-17 PROCEDURE — 3017F COLORECTAL CA SCREEN DOC REV: CPT | Performed by: INTERNAL MEDICINE

## 2024-10-17 PROCEDURE — G8417 CALC BMI ABV UP PARAM F/U: HCPCS | Performed by: INTERNAL MEDICINE

## 2024-10-17 PROCEDURE — 96372 THER/PROPH/DIAG INJ SC/IM: CPT | Performed by: INTERNAL MEDICINE

## 2024-10-17 PROCEDURE — 2500000003 HC RX 250 WO HCPCS

## 2024-10-17 PROCEDURE — 3609012400 HC EGD TRANSORAL BIOPSY SINGLE/MULTIPLE: Performed by: INTERNAL MEDICINE

## 2024-10-17 PROCEDURE — 2580000003 HC RX 258

## 2024-10-17 PROCEDURE — 7100000011 HC PHASE II RECOVERY - ADDTL 15 MIN: Performed by: INTERNAL MEDICINE

## 2024-10-17 RX ORDER — SODIUM CHLORIDE, SODIUM LACTATE, POTASSIUM CHLORIDE, CALCIUM CHLORIDE 600; 310; 30; 20 MG/100ML; MG/100ML; MG/100ML; MG/100ML
INJECTION, SOLUTION INTRAVENOUS
Status: DISCONTINUED | OUTPATIENT
Start: 2024-10-17 | End: 2024-10-17 | Stop reason: SDUPTHER

## 2024-10-17 RX ORDER — CYANOCOBALAMIN 1000 UG/ML
1000 INJECTION, SOLUTION INTRAMUSCULAR; SUBCUTANEOUS ONCE
Status: COMPLETED | OUTPATIENT
Start: 2024-10-17 | End: 2024-10-17

## 2024-10-17 RX ORDER — IBUPROFEN 600 MG/1
600 TABLET, FILM COATED ORAL 3 TIMES DAILY PRN
Qty: 60 TABLET | Refills: 0 | Status: SHIPPED | OUTPATIENT
Start: 2024-10-17

## 2024-10-17 RX ORDER — GLYCOPYRROLATE 0.2 MG/ML
INJECTION INTRAMUSCULAR; INTRAVENOUS
Status: DISCONTINUED | OUTPATIENT
Start: 2024-10-17 | End: 2024-10-17 | Stop reason: SDUPTHER

## 2024-10-17 RX ORDER — SIMETHICONE 40MG/0.6ML
40 SUSPENSION, DROPS(FINAL DOSAGE FORM)(ML) ORAL ONCE
Status: COMPLETED | OUTPATIENT
Start: 2024-10-17 | End: 2024-10-17

## 2024-10-17 RX ORDER — PANTOPRAZOLE SODIUM 40 MG/1
40 TABLET, DELAYED RELEASE ORAL
Qty: 90 TABLET | Refills: 0 | Status: SHIPPED | OUTPATIENT
Start: 2024-10-17

## 2024-10-17 RX ORDER — LIDOCAINE HYDROCHLORIDE 20 MG/ML
INJECTION, SOLUTION INFILTRATION; PERINEURAL
Status: DISCONTINUED | OUTPATIENT
Start: 2024-10-17 | End: 2024-10-17 | Stop reason: SDUPTHER

## 2024-10-17 RX ORDER — PROPOFOL 10 MG/ML
INJECTION, EMULSION INTRAVENOUS
Status: DISCONTINUED | OUTPATIENT
Start: 2024-10-17 | End: 2024-10-17 | Stop reason: SDUPTHER

## 2024-10-17 RX ORDER — TIZANIDINE 2 MG/1
2 TABLET ORAL 2 TIMES DAILY PRN
Qty: 60 TABLET | Refills: 0 | Status: SHIPPED | OUTPATIENT
Start: 2024-10-17

## 2024-10-17 RX ORDER — FAMOTIDINE 20 MG/1
20 TABLET, FILM COATED ORAL NIGHTLY PRN
Qty: 90 TABLET | Refills: 0 | Status: SHIPPED | OUTPATIENT
Start: 2024-10-17

## 2024-10-17 RX ORDER — SODIUM CHLORIDE, SODIUM LACTATE, POTASSIUM CHLORIDE, CALCIUM CHLORIDE 600; 310; 30; 20 MG/100ML; MG/100ML; MG/100ML; MG/100ML
INJECTION, SOLUTION INTRAVENOUS CONTINUOUS
Status: DISCONTINUED | OUTPATIENT
Start: 2024-10-17 | End: 2024-10-17 | Stop reason: HOSPADM

## 2024-10-17 RX ADMIN — GLYCOPYRROLATE 0.2 MG: 0.2 INJECTION INTRAMUSCULAR; INTRAVENOUS at 09:13

## 2024-10-17 RX ADMIN — PROPOFOL 30 MG: 10 INJECTION, EMULSION INTRAVENOUS at 09:22

## 2024-10-17 RX ADMIN — SIMETHICONE 40 MG: 20 SUSPENSION/ DROPS ORAL at 10:43

## 2024-10-17 RX ADMIN — CYANOCOBALAMIN 1000 MCG: 1000 INJECTION, SOLUTION INTRAMUSCULAR; SUBCUTANEOUS at 13:33

## 2024-10-17 RX ADMIN — SODIUM CHLORIDE, SODIUM LACTATE, POTASSIUM CHLORIDE, AND CALCIUM CHLORIDE: .6; .31; .03; .02 INJECTION, SOLUTION INTRAVENOUS at 09:12

## 2024-10-17 RX ADMIN — PROPOFOL 30 MG: 10 INJECTION, EMULSION INTRAVENOUS at 09:20

## 2024-10-17 RX ADMIN — PROPOFOL 100 MG: 10 INJECTION, EMULSION INTRAVENOUS at 09:17

## 2024-10-17 RX ADMIN — LIDOCAINE HYDROCHLORIDE 100 MG: 20 INJECTION, SOLUTION INFILTRATION; PERINEURAL at 09:17

## 2024-10-17 SDOH — ECONOMIC STABILITY: FOOD INSECURITY: WITHIN THE PAST 12 MONTHS, THE FOOD YOU BOUGHT JUST DIDN'T LAST AND YOU DIDN'T HAVE MONEY TO GET MORE.: NEVER TRUE

## 2024-10-17 SDOH — ECONOMIC STABILITY: FOOD INSECURITY: WITHIN THE PAST 12 MONTHS, YOU WORRIED THAT YOUR FOOD WOULD RUN OUT BEFORE YOU GOT MONEY TO BUY MORE.: NEVER TRUE

## 2024-10-17 SDOH — ECONOMIC STABILITY: INCOME INSECURITY: HOW HARD IS IT FOR YOU TO PAY FOR THE VERY BASICS LIKE FOOD, HOUSING, MEDICAL CARE, AND HEATING?: NOT HARD AT ALL

## 2024-10-17 ASSESSMENT — PATIENT HEALTH QUESTIONNAIRE - PHQ9

## 2024-10-17 ASSESSMENT — PAIN DESCRIPTION - DESCRIPTORS: DESCRIPTORS: ACHING

## 2024-10-17 ASSESSMENT — PAIN SCALES - GENERAL
PAINLEVEL_OUTOF10: 0
PAINLEVEL_OUTOF10: 0
PAINLEVEL_OUTOF10: 8
PAINLEVEL_OUTOF10: 0

## 2024-10-17 ASSESSMENT — PAIN DESCRIPTION - ONSET: ONSET: ON-GOING

## 2024-10-17 ASSESSMENT — PAIN DESCRIPTION - PAIN TYPE: TYPE: ACUTE PAIN

## 2024-10-17 ASSESSMENT — PAIN DESCRIPTION - LOCATION: LOCATION: ABDOMEN

## 2024-10-17 ASSESSMENT — PAIN DESCRIPTION - FREQUENCY: FREQUENCY: CONTINUOUS

## 2024-10-17 ASSESSMENT — ENCOUNTER SYMPTOMS: SHORTNESS OF BREATH: 1

## 2024-10-17 ASSESSMENT — PAIN - FUNCTIONAL ASSESSMENT
PAIN_FUNCTIONAL_ASSESSMENT: PREVENTS OR INTERFERES SOME ACTIVE ACTIVITIES AND ADLS
PAIN_FUNCTIONAL_ASSESSMENT: 0-10
PAIN_FUNCTIONAL_ASSESSMENT: PREVENTS OR INTERFERES SOME ACTIVE ACTIVITIES AND ADLS

## 2024-10-17 ASSESSMENT — PAIN DESCRIPTION - ORIENTATION: ORIENTATION: UPPER

## 2024-10-17 NOTE — PROGRESS NOTES
Pt reports sore throat after procedure. Pt wanted, \"pain medication that is preferably a shot.\" Dr. Velez suggested chloraseptic throat spray and pt refused. Pt then complains of epigastric pain and Dr. Velez evaluated her and advised simethicone for gas relief. Pt given simethicone per order. Pt continues to moan in pain and states, \"something isn't right.\" Pt offered multiple times to be evaluated in the ED to make sure everything is ok with her. Pt refuses multiple times. Charge nurse Meredith at bedside and offers to take patient to ED and pt refuses. Pt moans constantly, but stops moaning and has normal WOB when talking to nursing staff. Pt refuses to go to the ED and requests to leave. The patient is adamant that she wants to see her primary care physician. Pt states she has an appointment today with her PCP and leaves via motorized wheelchair to transport.

## 2024-10-17 NOTE — PROGRESS NOTES
Mouth/Throat:      Pharynx: Oropharynx is clear.   Eyes:      General: Lids are normal.      Extraocular Movements: Extraocular movements intact.      Conjunctiva/sclera: Conjunctivae normal.      Pupils: Pupils are equal, round, and reactive to light.   Neck:      Thyroid: No thyromegaly.      Vascular: No carotid bruit.   Cardiovascular:      Rate and Rhythm: Normal rate and regular rhythm.      Heart sounds: Normal heart sounds, S1 normal and S2 normal. No murmur heard.  Pulmonary:      Effort: Pulmonary effort is normal. No respiratory distress.      Breath sounds: Normal breath sounds. No wheezing, rhonchi or rales.   Abdominal:      General: Bowel sounds are normal. There is no distension.      Palpations: Abdomen is soft.      Tenderness: There is no abdominal tenderness.   Musculoskeletal:      Cervical back: Neck supple.      Right lower leg: No tenderness. No edema.      Left lower leg: Tenderness present. No edema.   Lymphadenopathy:      Head:      Right side of head: No submandibular adenopathy.      Left side of head: No submandibular adenopathy.   Neurological:      Mental Status: She is alert and oriented to person, place, and time.   Psychiatric:         Mood and Affect: Mood normal.         No results found for this visit on 10/17/24.  Lab Review   Admission on 10/17/2024, Discharged on 10/17/2024   Component Date Value    POC Glucose 10/17/2024 74     Performed on 10/17/2024 ACCU-CHEK    Orders Only on 09/26/2024   Component Date Value    Amphetamine Screen, Ur 09/26/2024 Neg     Barbiturate Screen, Ur 09/26/2024 Neg     Benzodiazepine Screen, U* 09/26/2024 Neg     Cannabinoid Scrn, Ur 09/26/2024 Neg     Cocaine Metabolite Scree* 09/26/2024 Neg     Opiate Screen, Urine 09/26/2024 Neg     Phencyclidine (PCP), Scr* 09/26/2024 Neg     Methadone Screen, Urine 09/26/2024 Neg     Oxycodone Urine 09/26/2024 Neg     Buprenorphine Urine 09/26/2024 Neg     pH, Urine 09/26/2024 5.0     FENTANYL SCREEN, URINE

## 2024-10-17 NOTE — OP NOTE
[facility]  Esophagogastroduodenoscopy Note        Patient: Farrah Rivers  : 1965     Procedure: Esophagogastroduodenoscopy with biopsy    Date:  10/17/2024     Anesthesia: MAC    Indications: Epigastric pain.     Description of Procedure:  Informed consent was obtained from the patient after explanation of indications, benefits and possible risks and complications of the procedure.   Patient was placed in the left lateral decubitus position and the above IV sedation was administrered.    The Olympus videoendoscope was placed in the patient's mouth and under direct visualization passed into the esophagus.   The scope was then advanced into the stomach and then into the second portion of the duodenum.       Esophagus showed no abnormalities, except for free reflux of stomach contents  The stomach showed evidence of prior sleeve gastrectomy.  Significant amount of food material noted in the body of the stomach suggesting gastroparesis.  Mild chronic gastritis noted diffusely, biopsies taken for H. pylori.  The duodenum showed no abnormalities    Retroflexed views of the cardia and fundus showed no other abnormalities.  The scope was anteflexed and the stomach was decompressed, and the scope was completely withdrawn.  The patient tolerated the procedure well.    EBL: None    Impression:    Status post sleeve gastrectomy  Probable gastroparesis  Diffuse gastritis    Recommendations:     The biopsy results will be posted on the portal in a week.  May need further testing for gastroparesis, follow-up with Dr. Mclaughlin in the office.    Agustin Toney MD, MD  [facility]

## 2024-10-17 NOTE — ANESTHESIA PRE PROCEDURE
Lab Results   Component Value Date    PREGTESTUR Negative 08/30/2018        ABGs: No results found for: \"PHART\", \"PO2ART\", \"SKV2LEE\", \"DUC1EGH\", \"BEART\", \"F3JZJYCN\"     Type & Screen (If Applicable):  Lab Results   Component Value Date    ABORH A POS 02/22/2023    LABANTI NEG 02/22/2023       Drug/Infectious Status (If Applicable):  Lab Results   Component Value Date/Time    HIV Non-Reactive 03/09/2018 11:30 AM       COVID-19 Screening (If Applicable):   Lab Results   Component Value Date/Time    COVID19 Not Detected 03/16/2023 09:48 AM           Anesthesia Evaluation  Patient summary reviewed and Nursing notes reviewed   no history of anesthetic complications:   Airway: Mallampati: II  TM distance: >3 FB   Neck ROM: full  Mouth opening: > = 3 FB   Dental: normal exam         Pulmonary:normal exam    (+)   shortness of breath:   sleep apnea:                                  Cardiovascular:    (+) hypertension:, dysrhythmias:, TAN:                  Neuro/Psych:   (+) neuromuscular disease:, headaches:, psychiatric history:            GI/Hepatic/Renal:   (+) GERD:          Endo/Other:    (+) DiabetesType II DM.                 Abdominal:             Vascular: negative vascular ROS.         Other Findings:       Anesthesia Plan      MAC     ASA 3       Induction: intravenous.    MIPS: Prophylactic antiemetics administered.  Anesthetic plan and risks discussed with patient.      Plan discussed with CRNA.    Attending anesthesiologist reviewed and agrees with Preprocedure content            William Pastor MD   10/17/2024

## 2024-10-17 NOTE — ANESTHESIA POSTPROCEDURE EVALUATION
Department of Anesthesiology  Postprocedure Note    Patient: Farrah Rivers  MRN: [id number]  YOB: 1965  Date of evaluation: 10/17/2024    Procedure Summary       Date: 10/17/24 Room / Location: [place] [address] / [facility] [place]    Anesthesia Start: 0902 Anesthesia Stop: 0929    Procedure: ESOPHAGOGASTRODUODENOSCOPY BIOPSY Diagnosis:       Generalized abdominal pain      Chronic GERD      (Generalized abdominal pain [R10.84])      (Chronic GERD [K21.9])    Surgeons: Agustin Toney MD Responsible Provider: William Pastor MD    Anesthesia Type: MAC ASA Status: 3            Anesthesia Type: MAC    Óscar Phase I: Óscar Score: 10    Óscar Phase II: Óscar Score: 10    Anesthesia Post Evaluation    Patient location during evaluation: PACU  Patient participation: complete - patient participated  Level of consciousness: awake and alert  Airway patency: patent  Nausea & Vomiting: no nausea and no vomiting  Cardiovascular status: hemodynamically stable  Respiratory status: acceptable  Hydration status: euvolemic  Multimodal analgesia pain management approach  Pain management: satisfactory to patient    No notable events documented.

## 2024-10-17 NOTE — TELEPHONE ENCOUNTER
The medication was DENIED; DENIAL letter is uploaded to MEDIA.    General Denial Reasoning:    ___  Patient must try  medication before the insurance will cover this drug.  Unless there is a contraindication that you can provide.    ___  Drug is not covered for off label usage.  This drug is FDA approved for .    ___  Drug is not covered by the plan ( Plan Exclusion)    _X__  Other; please see Denial Letter.    DIABETIC MEDICATION DENIALS:    ___ Must have a A1C greater the 7.0.    ___ Hypoglycemic episodes or 3 or more injections of insulin daily for Continuous Monitors.    ___   Drug is not covered by the plan ( Plan Exclusion)    ___  Other; please see Denial Letter.    WEIGHT LOSS MEDICATIONS DENIALS:    ___  Must have Tried and Failed Diet and Exercise.    ___  Insurance requesting Weight Loss Diary.    ___   Drug is not covered by the plan ( Plan Exclusion)    ___  Other; please see Denial Letter.    Note :  We have reviewed your request for VITAMIN D2 1.25MG(50,000 UNIT) submitted for the member identified above, and we denied your request for the following reason: because Medicare law does not include vitamins and minerals under Part D coverage. This is one of those drugs. This is not a medical necessity decision. It is a benefit issue only. We based this decision on Chapter 6 section 20.1 of the Medicare Prescription Drug Benefit Manual.      If you want an APPEAL; please note in this encounter what new information you would like to APPEAL with.  Once complete route back to PA POOL.    If this requires a response please respond to the pool ( P [facility] MEDICATION PRE-AUTH).      Thank you please advise patient.

## 2024-10-17 NOTE — DISCHARGE INSTRUCTIONS
ENDOSCOPY DISCHARGE INSTRUCTIONS:    Call the physician that did your procedure for any questions or concern:    [facility]: [phone]  DR. KATHRYN BURGOS      ACTIVITY:    There are potential side effects to the medications used for sedation and anesthesia during your procedure.  These include:  Dizziness or light-headedness, confusion or memory loss, delayed reaction times, loss of coordination, nausea and vomiting.  Because of your increased risk for injury, we ask that you observe the following precautions:  For the next 24 hours,  DO NOT operate an automobile, bicycle, motorcycle, , power tools or large equipment of any kind.  Do not drink alcohol, sign any legal documents or make any legal decisions for 24 hours.  Do not bend your head over lower than your heart.  DO sit on the side of bed/couch awhile before getting up.  Plan on bedrest or quiet relaxation today.  You may resume normal activities in 24 hours.    DIET:    Your first meal today should be light, avoiding spicy and fatty foods.  If you tolerate this first meal, then you may advance to your regular diet unless otherwise advised by your physician.    NORMAL SYMPTOMS:  -Mild sore throat if you’ve had an EGD   -Gaseous discomfort    NOTIFY YOUR PHYSICIAN IF THESE SYMPTOMS OCCUR:  1. Fever (greater than 100)  5. Increased abdominal bloating  2. Severe pain    6. Excessive bleeding  3. Nausea and vomiting  7. Chest pain                                                                    4. Chills    8. Shortness of breath    ADDITIONAL INSTRUCTIONS:    Biopsy results: Call [facility] for biopsy results in 1 week         Upper GI Endoscopy: What to Expect at Home  Your Recovery  You had an upper GI endoscopy. Your doctor used a thin, lighted tube that bends to look at the inside of your esophagus, your stomach, and the first part of the small intestine, called the duodenum.  After you have an endoscopy, you will stay at the  the inside of your esophagus, your stomach, and the first part of the small intestine, called the duodenum.  After you have an endoscopy, you will stay at the hospital or clinic for 1 to 2 hours. This will allow the medicine to wear off. You will be able to go home after your doctor or nurse checks to make sure that you're not having any problems.  You may have to stay overnight if you had treatment during the test. You may have a sore throat for a day or two after the test.  This care sheet gives you a general idea about what to expect after the test.  How can you care for yourself at home?  Activity   Rest as much as you need to after you go home.  You should be able to go back to your usual activities the day after the test.  Diet   Follow your doctor's directions for eating after the test.  Drink plenty of fluids (unless your doctor has told you not to).  Medications   If you have a sore throat the day after the test, use an over-the-counter spray to numb your throat.  Follow-up care is a key part of your treatment and safety. Be sure to make and go to all appointments, and call your doctor if you are having problems. It's also a good idea to know your test results and keep a list of the medicines you take.  When should you call for help?   Call 911 anytime you think you may need emergency care. For example, call if:    You passed out (lost consciousness).     You have trouble breathing.     You pass maroon or bloody stools.   Call your doctor now or seek immediate medical care if:    You have pain that does not get better after your take pain medicine.     You have new or worse belly pain.     You have blood in your stools.     You are sick to your stomach and cannot keep fluids down.     You have a fever.     You cannot pass stools or gas.   Watch closely for changes in your health, and be sure to contact your doctor if:    Your throat still hurts after a day or two.     You do not get better as expected.

## 2024-10-17 NOTE — PROGRESS NOTES
Ambulatory Surgery/Procedure Discharge Note    Vitals:    10/17/24 0949   BP: 110/68   Pulse: 58   Resp: 16   Temp:    SpO2: 99%       In: 50 [I.V.:50]  Out: -     Restroom use offered before discharge.  Yes    Pain assessment:  level of pain (1-10, 10 severe),   Pain Level: 0    Pt and S.O./family states \"ready to go home\". Pt alert and oriented x4. IV removed. Denies N/V or pain.Voided prior to discharge. Pt tolerating po intake. Discharge instructions given to pt. Pt  verbalized understanding of all instructions. Left with all belongings,  and discharge instructions.     Patient discharged to home/self care. Patient discharged via wheel chair by transporter to waiting family/S.O.       10/17/2024 9:50 AM

## 2024-10-18 ENCOUNTER — OFFICE VISIT (OUTPATIENT)
Dept: PAIN MANAGEMENT | Age: 59
End: 2024-10-18
Payer: MEDICARE

## 2024-10-18 VITALS
BODY MASS INDEX: 38.01 KG/M2 | OXYGEN SATURATION: 95 % | DIASTOLIC BLOOD PRESSURE: 60 MMHG | WEIGHT: 250 LBS | SYSTOLIC BLOOD PRESSURE: 98 MMHG | HEART RATE: 60 BPM

## 2024-10-18 DIAGNOSIS — M96.1 FAILED BACK SURGICAL SYNDROME: ICD-10-CM

## 2024-10-18 DIAGNOSIS — M17.0 PRIMARY OSTEOARTHRITIS OF BOTH KNEES: ICD-10-CM

## 2024-10-18 DIAGNOSIS — M79.7 FIBROMYALGIA: ICD-10-CM

## 2024-10-18 DIAGNOSIS — M54.16 LUMBAR RADICULOPATHY: ICD-10-CM

## 2024-10-18 DIAGNOSIS — E11.42 DIABETIC POLYNEUROPATHY ASSOCIATED WITH TYPE 2 DIABETES MELLITUS (HCC): ICD-10-CM

## 2024-10-18 DIAGNOSIS — M17.12 PRIMARY OSTEOARTHRITIS OF LEFT KNEE: ICD-10-CM

## 2024-10-18 DIAGNOSIS — G89.4 CHRONIC PAIN SYNDROME: ICD-10-CM

## 2024-10-18 DIAGNOSIS — M17.11 PRIMARY OSTEOARTHRITIS OF RIGHT KNEE: ICD-10-CM

## 2024-10-18 PROCEDURE — G8482 FLU IMMUNIZE ORDER/ADMIN: HCPCS | Performed by: INTERNAL MEDICINE

## 2024-10-18 PROCEDURE — 3044F HG A1C LEVEL LT 7.0%: CPT | Performed by: INTERNAL MEDICINE

## 2024-10-18 PROCEDURE — 2022F DILAT RTA XM EVC RTNOPTHY: CPT | Performed by: INTERNAL MEDICINE

## 2024-10-18 PROCEDURE — G8417 CALC BMI ABV UP PARAM F/U: HCPCS | Performed by: INTERNAL MEDICINE

## 2024-10-18 PROCEDURE — 3017F COLORECTAL CA SCREEN DOC REV: CPT | Performed by: INTERNAL MEDICINE

## 2024-10-18 PROCEDURE — G8427 DOCREV CUR MEDS BY ELIG CLIN: HCPCS | Performed by: INTERNAL MEDICINE

## 2024-10-18 PROCEDURE — 1036F TOBACCO NON-USER: CPT | Performed by: INTERNAL MEDICINE

## 2024-10-18 PROCEDURE — 99213 OFFICE O/P EST LOW 20 MIN: CPT | Performed by: INTERNAL MEDICINE

## 2024-10-18 RX ORDER — DULOXETIN HYDROCHLORIDE 60 MG/1
60 CAPSULE, DELAYED RELEASE ORAL DAILY
Qty: 30 CAPSULE | Refills: 1 | Status: SHIPPED | OUTPATIENT
Start: 2024-10-18

## 2024-10-18 RX ORDER — PREGABALIN 100 MG/1
100 CAPSULE ORAL 3 TIMES DAILY
Qty: 90 CAPSULE | Refills: 1 | Status: SHIPPED | OUTPATIENT
Start: 2024-10-18 | End: 2024-12-17

## 2024-10-18 RX ORDER — NORTRIPTYLINE HYDROCHLORIDE 25 MG/1
25-50 CAPSULE ORAL NIGHTLY
Qty: 60 CAPSULE | Refills: 1 | Status: SHIPPED | OUTPATIENT
Start: 2024-10-18

## 2024-10-18 RX ORDER — HYDROCODONE BITARTRATE AND ACETAMINOPHEN 5; 325 MG/1; MG/1
1 TABLET ORAL 3 TIMES DAILY PRN
Qty: 84 TABLET | Refills: 0 | Status: SHIPPED | OUTPATIENT
Start: 2024-10-18 | End: 2024-11-15

## 2024-10-18 RX ORDER — HYDROXYZINE HYDROCHLORIDE 25 MG/1
TABLET, FILM COATED ORAL
Qty: 1 TABLET | Refills: 0 | Status: SHIPPED | OUTPATIENT
Start: 2024-10-18

## 2024-10-18 NOTE — PROGRESS NOTES
Farrah Rivers  1965  [id number]      HISTORY OF PRESENT ILLNESS:  Ms. Rivers is a 59 y.o. female returns for a follow up visit for pain management  She has a diagnosis of   1. Chronic pain syndrome    2. Lumbar radiculopathy    3. Primary osteoarthritis of left knee    4. Mood disorder (HCC)    5. Fibromyalgia    6. Diabetic polyneuropathy associated with type 2 diabetes mellitus (HCC)    7. Primary osteoarthritis of right knee    8. Primary insomnia    9. Failed back surgical syndrome    10. Primary osteoarthritis of both knees    .      As per information/history obtained from the PADT(patient assessment and documentation tool) -  She complains of pain in the shoulders Left, hands Bilateral, lower back, knees Left, and ankles Left with radiation to the buttocks, hips Left, and feet Left She rates the pain 8/10 and describes it as sharp, aching, burning, numbness, pins and needles.  Pain is made worse by: movement, walking, standing, lifting. She denies any side effects from the current pain regimen. Patient reports that since last follow up visit the physical functioning is unchanged, family/social relationships are unchanged, mood is unchanged sleep patterns are unchanged. Ms. Rivers states that since starting the treatment with the current regimen the  overall functioning  in the above aspects is  better, Patient denies misusing/abusing her narcotic pain medications or using any illegal drugs.  There are No indicators for possible drug abuse, addiction or diversion problems.   Upon obtaining the medical history from Ms. Rivers regarding today's office visit for her presenting problems, patient states she had injection in the spine but did not help. Ms. Rivers states she is having increase headaches and muscle cramps in the legs. Patient mentions she is using Lyrica along with Cymbalta and Norco 3 per day, she states it is helping some.       ALLERGIES: Patients list of allergies were reviewed

## 2024-10-23 ENCOUNTER — HOSPITAL ENCOUNTER (OUTPATIENT)
Dept: PHYSICAL THERAPY | Age: 59
Setting detail: THERAPIES SERIES
Discharge: HOME | End: 2024-10-23
Payer: MEDICARE

## 2024-10-23 PROCEDURE — 97530 THERAPEUTIC ACTIVITIES: CPT

## 2024-10-24 ENCOUNTER — HOSPITAL ENCOUNTER (OUTPATIENT)
Dept: PHYSICAL THERAPY | Age: 59
Setting detail: THERAPIES SERIES
End: 2024-10-24
Payer: MEDICARE

## 2024-10-25 NOTE — TELEPHONE ENCOUNTER
Call patient. The prior authorization for vitamin D was denied because Medicare does not cover vitamins.

## 2024-10-26 PROBLEM — Z00.00 WELCOME TO MEDICARE PREVENTIVE VISIT: Status: RESOLVED | Noted: 2024-09-26 | Resolved: 2024-10-26

## 2024-10-28 ENCOUNTER — HOSPITAL ENCOUNTER (OUTPATIENT)
Dept: PHYSICAL THERAPY | Age: 59
Setting detail: THERAPIES SERIES
Discharge: HOME | End: 2024-10-28
Payer: MEDICARE

## 2024-10-28 PROCEDURE — G0283 ELEC STIM OTHER THAN WOUND: HCPCS

## 2024-10-28 PROCEDURE — 97110 THERAPEUTIC EXERCISES: CPT

## 2024-10-28 NOTE — FLOWSHEET NOTE
[facility]- Outpatient Rehabilitation and Therapy [address] [place] [address] office: [phone] fax: [phone]           Physical Therapy: TREATMENT/PROGRESS NOTE   Patient: Farrah Rivers (59 y.o. female)   Examination Date: 10/28/2024   :  1965 MRN: [id number]   Visit #: 4   Insurance Allowable Auth Needed   BOMN - 16 [x]Yes    []No    Insurance: Payor: [facility] MEDICARE / Plan: JESSE [employer]VANTAGE / Product Type: *No Product type* /   Insurance ID: [id number] - (Medicare Managed)  Secondary Insurance (if applicable): [facility]   Treatment Diagnosis:     ICD-10-CM    1. Impairment of balance  R26.89       2. Chronic bilateral low back pain with bilateral sciatica  M54.42     M54.41     G89.29       3. Decreased range of motion of intervertebral discs of lumbar spine  M53.86       4. Decreased strength of lower extremity  R29.898       5. Difficulty in walking  R26.2          Medical Diagnosis:  At risk for falls [Z91.81]   Referring Physician: Gissel Patterson MD  PCP: Gissel Patterson MD     Plan of care signed (Y/N): Y    Date of Patient follow up with Physician:      Plan of Care Report: NO  POC update due: (10 visits /OR AUTH LIMITS, whichever is less)  2024                                             Medical History:  Comorbidities: reviewed with patient via Indonesian   Diabetes (Type I or II)  Hypertension  Osteoarthritis  Depression  Other Psychological Conditions: Frequent headaches  Other Neurological Conditions: Neuropathy  Other Cardio/Pulmonary Conditions: Cerebrovascular small vessel disease, dyspnea on exertion  Other Cardiovascular Conditions: Hx of blood clots in the legs (13 years ago),  Prior Surgeries: two spinal surgeries for herniated disc ( L4-L5 fusion,  T10-T11 fusion)  Gastrointestinal Conditions: Gallbladder disorder, GERD  Other: Morbid obesity with BMI initially 60.0 - 69, Obstructive sleep apnea, Vitamin D

## 2024-10-29 PROBLEM — E53.8 VITAMIN B 12 DEFICIENCY: Status: ACTIVE | Noted: 2024-10-29

## 2024-10-29 PROBLEM — R53.83 FATIGUE: Status: ACTIVE | Noted: 2024-10-29

## 2024-10-29 PROBLEM — R51.9 PERSISTENT HEADACHES: Status: ACTIVE | Noted: 2024-10-29

## 2024-10-29 PROBLEM — M62.838 MUSCLE SPASM: Status: ACTIVE | Noted: 2024-10-29

## 2024-10-29 ASSESSMENT — ENCOUNTER SYMPTOMS
ABDOMINAL PAIN: 0
NAUSEA: 0
SINUS PRESSURE: 0
SHORTNESS OF BREATH: 0
WHEEZING: 0
CHEST TIGHTNESS: 0
COUGH: 0
BLOOD IN STOOL: 0
VOMITING: 0
SINUS PAIN: 0
RHINORRHEA: 0
SORE THROAT: 0
DIARRHEA: 0
CONSTIPATION: 1
TROUBLE SWALLOWING: 0

## 2024-10-29 NOTE — ASSESSMENT & PLAN NOTE
-Stable  -Continue vitamin D 50,000 IU weekly  -Will have staff check on prior authorization for vitamin D

## 2024-10-29 NOTE — ASSESSMENT & PLAN NOTE
-Not controlled  -Discontinue Nexium due to ineffective  -Start pantoprazole 40 mg once daily before breakfast  -Start famotidine 20 mg nightly as needed  -Decrease caffeine, avoid spicy foods, avoid tomato based foods  -Eat small meals instead of large meals  -Wait 2-3 hours after eating before lying down

## 2024-10-29 NOTE — ASSESSMENT & PLAN NOTE
-Not controlled  -Labs show vitamin B12 deficiency   -Cyanocobalamin 1000 mcg/ML 1 mL IM once and every 30 days  -multivitamin once daily

## 2024-10-29 NOTE — ASSESSMENT & PLAN NOTE
-Not controlled  -Start tizanidine 2 times daily as needed  -Basic metabolic panel  -CK  -Magnesium  -May need to stop atorvastatin if CK is elevated

## 2024-10-29 NOTE — ASSESSMENT & PLAN NOTE
-Not controlled  -LDL is not at goal  -Patient has increased atorvastatin to 20 mg nightly  -Will not increase atorvastatin due to muscle spasms  -may need to stop atorvastatin if CK is elevated  -Low fat, low cholesterol diet  -Regular aerobic exercise

## 2024-10-30 ENCOUNTER — APPOINTMENT (OUTPATIENT)
Dept: PHYSICAL THERAPY | Age: 59
End: 2024-10-30
Payer: MEDICARE

## 2024-10-30 ENCOUNTER — HOSPITAL ENCOUNTER (OUTPATIENT)
Dept: PHYSICAL THERAPY | Age: 59
Setting detail: THERAPIES SERIES
Discharge: HOME | End: 2024-10-30
Payer: MEDICARE

## 2024-10-30 PROCEDURE — G0283 ELEC STIM OTHER THAN WOUND: HCPCS

## 2024-10-30 PROCEDURE — 97530 THERAPEUTIC ACTIVITIES: CPT

## 2024-10-30 PROCEDURE — 97110 THERAPEUTIC EXERCISES: CPT

## 2024-10-30 NOTE — PLAN OF CARE
[] Met: [] Not Met: [] Adjusted  2. Patient will have a decrease in worst pain to <7/10 to facilitate improvement in movement, function, and ADLs as indicated by Functional Deficits.  [] Progressing: [] Met: [] Not Met: [] Adjusted    Long Term Goals: To be achieved in: 10 weeks  1. Disability index score of 50% improvement or better for the Modified Oswestry to assist with reaching prior level of function with activities such as standing for prolonged period of time to cook.  [] Progressing: [] Met: [] Not Met: [] Adjusted  2. Patient will demonstrate increased AROM of lumbar extension to at least 15 degrees without pain to allow for proper joint functioning to enable patient to ambulate longer distances with proper gait mechanics.   [] Progressing: [] Met: [] Not Met: [] Adjusted  3. Patient will demonstrate increased Strength of B hip flexors to at least 4+/5 throughout without pain to allow for proper functional mobility to enable patient to return to completing functional transfers with increased ease.   [] Progressing: [] Met: [] Not Met: [] Adjusted  4. Patient will demonstrate the ability to stand for 10 minutes without increased symptoms or restriction to facilitate safely cooking a meal.   [] Progressing: [] Met: [] Not Met: [] Adjusted  5. Patient will demonstrate the ability ambulate at least 500 feet with least restrictive assistive device to promote a safe and pain-free return to community ambulation. (patient specific functional goal)    [] Progressing: [] Met: [] Not Met: [] Adjusted     Overall Progression Towards Functional goals/ Treatment Progress Update:  [x] Patient is progressing as expected towards functional goals listed.    [] Progression is slowed due to complexities/Impairments listed.  [] Progression has been slowed due to co-morbidities.  [] Plan just implemented, too soon (<30days) to assess goals progression   [] Goals require adjustment due to lack of progress  [] Patient is not

## 2024-11-01 ENCOUNTER — HOSPITAL ENCOUNTER (OUTPATIENT)
Dept: CT IMAGING | Age: 59
Discharge: HOME | End: 2024-11-01
Payer: MEDICARE

## 2024-11-01 DIAGNOSIS — R51.9 PERSISTENT HEADACHES: ICD-10-CM

## 2024-11-01 PROCEDURE — 70450 CT HEAD/BRAIN W/O DYE: CPT

## 2024-11-11 ENCOUNTER — TELEPHONE (OUTPATIENT)
Dept: PAIN MANAGEMENT | Age: 59
End: 2024-11-11

## 2024-11-11 DIAGNOSIS — F39 MOOD DISORDER (HCC): ICD-10-CM

## 2024-11-11 DIAGNOSIS — M17.0 PRIMARY OSTEOARTHRITIS OF BOTH KNEES: ICD-10-CM

## 2024-11-11 DIAGNOSIS — M79.7 FIBROMYALGIA: ICD-10-CM

## 2024-11-11 DIAGNOSIS — M96.1 FAILED BACK SURGICAL SYNDROME: ICD-10-CM

## 2024-11-11 DIAGNOSIS — M17.11 PRIMARY OSTEOARTHRITIS OF RIGHT KNEE: ICD-10-CM

## 2024-11-11 DIAGNOSIS — G89.4 CHRONIC PAIN SYNDROME: ICD-10-CM

## 2024-11-11 DIAGNOSIS — E11.42 DIABETIC POLYNEUROPATHY ASSOCIATED WITH TYPE 2 DIABETES MELLITUS (HCC): ICD-10-CM

## 2024-11-11 DIAGNOSIS — M54.16 LUMBAR RADICULOPATHY: ICD-10-CM

## 2024-11-11 DIAGNOSIS — F51.01 PRIMARY INSOMNIA: Primary | ICD-10-CM

## 2024-11-11 DIAGNOSIS — M17.12 PRIMARY OSTEOARTHRITIS OF LEFT KNEE: ICD-10-CM

## 2024-11-11 RX ORDER — NORTRIPTYLINE HYDROCHLORIDE 25 MG/1
25-50 CAPSULE ORAL NIGHTLY
Qty: 60 CAPSULE | Refills: 0 | Status: CANCELLED | OUTPATIENT
Start: 2024-11-11

## 2024-11-11 RX ORDER — NORTRIPTYLINE HYDROCHLORIDE 25 MG/1
25-50 CAPSULE ORAL NIGHTLY
Qty: 60 CAPSULE | Refills: 0 | Status: SHIPPED | OUTPATIENT
Start: 2024-11-11 | End: 2024-12-09 | Stop reason: SDUPTHER

## 2024-11-11 RX ORDER — PREGABALIN 100 MG/1
100 CAPSULE ORAL 3 TIMES DAILY
Qty: 90 CAPSULE | Refills: 0 | Status: SHIPPED | OUTPATIENT
Start: 2024-11-11 | End: 2024-12-09 | Stop reason: SDUPTHER

## 2024-11-11 RX ORDER — DULOXETIN HYDROCHLORIDE 60 MG/1
60 CAPSULE, DELAYED RELEASE ORAL DAILY
Qty: 30 CAPSULE | Refills: 0 | Status: CANCELLED | OUTPATIENT
Start: 2024-11-11

## 2024-11-11 RX ORDER — HYDROXYZINE HYDROCHLORIDE 25 MG/1
TABLET, FILM COATED ORAL
Qty: 1 TABLET | Refills: 0 | Status: SHIPPED | OUTPATIENT
Start: 2024-11-11 | End: 2024-12-09 | Stop reason: SDUPTHER

## 2024-11-11 RX ORDER — PREGABALIN 100 MG/1
100 CAPSULE ORAL 3 TIMES DAILY
Qty: 90 CAPSULE | Refills: 0 | Status: CANCELLED | OUTPATIENT
Start: 2024-11-11 | End: 2025-01-10

## 2024-11-11 RX ORDER — HYDROXYZINE HYDROCHLORIDE 25 MG/1
TABLET, FILM COATED ORAL
Qty: 1 TABLET | Refills: 0 | Status: CANCELLED | OUTPATIENT
Start: 2024-11-11

## 2024-11-11 RX ORDER — DULOXETIN HYDROCHLORIDE 60 MG/1
60 CAPSULE, DELAYED RELEASE ORAL DAILY
Qty: 30 CAPSULE | Refills: 0 | Status: SHIPPED | OUTPATIENT
Start: 2024-11-11 | End: 2024-12-09 | Stop reason: SDUPTHER

## 2024-11-11 NOTE — TELEPHONE ENCOUNTER
Pt called  asking for refill on all medications until she is able to make it in for FU appt on 12/9.    Was scheduled for follow up 11/11 but rescheduled because she was not able to make it in before 12/6.    [place], [place]  [phone]

## 2024-11-11 NOTE — TELEPHONE ENCOUNTER
Called patient LVM. Please be advised when patient calls please inform her that her medication has been sent to her pharmacy.    Please be advised.

## 2024-11-25 ENCOUNTER — HOSPITAL ENCOUNTER (OUTPATIENT)
Dept: SLEEP CENTER | Age: 59
Discharge: HOME | End: 2024-11-25
Payer: MEDICARE

## 2024-11-25 DIAGNOSIS — G47.33 OSA (OBSTRUCTIVE SLEEP APNEA): ICD-10-CM

## 2024-11-25 PROCEDURE — 95811 POLYSOM 6/>YRS CPAP 4/> PARM: CPT

## 2024-12-09 ENCOUNTER — OFFICE VISIT (OUTPATIENT)
Dept: PAIN MANAGEMENT | Age: 59
End: 2024-12-09

## 2024-12-09 VITALS
SYSTOLIC BLOOD PRESSURE: 128 MMHG | WEIGHT: 250 LBS | OXYGEN SATURATION: 99 % | HEART RATE: 73 BPM | DIASTOLIC BLOOD PRESSURE: 67 MMHG | BODY MASS INDEX: 38.01 KG/M2

## 2024-12-09 DIAGNOSIS — M96.1 FAILED BACK SURGICAL SYNDROME: ICD-10-CM

## 2024-12-09 DIAGNOSIS — M17.12 PRIMARY OSTEOARTHRITIS OF LEFT KNEE: ICD-10-CM

## 2024-12-09 DIAGNOSIS — Z91.89 AT RISK FOR RESPIRATORY DEPRESSION DUE TO OPIOID: ICD-10-CM

## 2024-12-09 DIAGNOSIS — M54.16 LUMBAR RADICULOPATHY: ICD-10-CM

## 2024-12-09 DIAGNOSIS — M17.11 PRIMARY OSTEOARTHRITIS OF RIGHT KNEE: ICD-10-CM

## 2024-12-09 DIAGNOSIS — G89.4 CHRONIC PAIN SYNDROME: ICD-10-CM

## 2024-12-09 DIAGNOSIS — M79.7 FIBROMYALGIA: ICD-10-CM

## 2024-12-09 DIAGNOSIS — M17.0 PRIMARY OSTEOARTHRITIS OF BOTH KNEES: ICD-10-CM

## 2024-12-09 DIAGNOSIS — E11.42 DIABETIC POLYNEUROPATHY ASSOCIATED WITH TYPE 2 DIABETES MELLITUS (HCC): ICD-10-CM

## 2024-12-09 RX ORDER — HYDROXYZINE HYDROCHLORIDE 25 MG/1
TABLET, FILM COATED ORAL
Qty: 1 TABLET | Refills: 0 | Status: SHIPPED | OUTPATIENT
Start: 2024-12-09

## 2024-12-09 RX ORDER — HYDROCODONE BITARTRATE AND ACETAMINOPHEN 5; 325 MG/1; MG/1
1 TABLET ORAL 3 TIMES DAILY PRN
Qty: 90 TABLET | Refills: 0 | Status: SHIPPED | OUTPATIENT
Start: 2024-12-09 | End: 2025-01-08

## 2024-12-09 RX ORDER — NORTRIPTYLINE HYDROCHLORIDE 25 MG/1
25-50 CAPSULE ORAL NIGHTLY
Qty: 60 CAPSULE | Refills: 0 | Status: SHIPPED | OUTPATIENT
Start: 2024-12-09

## 2024-12-09 RX ORDER — PREGABALIN 100 MG/1
100 CAPSULE ORAL 3 TIMES DAILY
Qty: 90 CAPSULE | Refills: 0 | Status: SHIPPED | OUTPATIENT
Start: 2024-12-09 | End: 2025-02-07

## 2024-12-09 RX ORDER — DULOXETIN HYDROCHLORIDE 60 MG/1
60 CAPSULE, DELAYED RELEASE ORAL DAILY
Qty: 30 CAPSULE | Refills: 0 | Status: SHIPPED | OUTPATIENT
Start: 2024-12-09

## 2024-12-09 NOTE — PROGRESS NOTES
Diabetic polyneuropathy associated with type 2 diabetes mellitus (HCC)    9. At risk for respiratory depression due to opioid        PLAN:  Informed verbal consent was obtained.  Risks and benefits of the medications and other alternative treatments  including no treatment have been discussed with the patient. Any questions related to these were addressed. The common side effects of these medications were also explained to the patient.    -[place] record was obtained and reviewed  for the last one year and no indicators of drug misuse  were found. Any other controlled substance prescriptions  seen on the record have been accounted for, I am aware of the patient receiving these medications. [place] record will be rechecked as part of office protocol.    -ROM/Stretching exercises as advised   -Continue/Restart Norco 2-3 per day  -Chronic pain of multifactorial etiology present for 25 years. Above diagnosis  established  after complete work up. Management of the chronic pain over the years has included 2 back surgeries and 1-2 LUBNA's and FJB with no help   Multiple other  interventional and non interventional procedures and has participated in numerous  PT visits and HEP and non pharmacological treatment modalities.  Pharmacological agents have included opioid and non opioid medications which have helped Ms. Rivers manage the chronic disabling pain, improve Her quality of life, improve physical and psychosocial functioning  and help relieve suffering.  Current MED-15  Last UDS was on 4/2024 was consistent    -Walking as tolerated    Current Outpatient Medications   Medication Sig Dispense Refill    diclofenac sodium (VOLTAREN) 1 % GEL Apply 4 gm to both knees tid-qid prn . 500 g 0    DULoxetine (CYMBALTA) 60 MG extended release capsule Take 1 capsule by mouth daily 30 capsule 0    hydrOXYzine HCl (ATARAX) 25 MG tablet Take 1 tablet by mouth 20 minutes before procedure 1 tablet 0    nortriptyline (PAMELOR) 25 MG capsule

## 2024-12-12 ENCOUNTER — TELEPHONE (OUTPATIENT)
Dept: PULMONOLOGY | Age: 59
End: 2024-12-12

## 2024-12-12 NOTE — TELEPHONE ENCOUNTER
Gregoria from [facility] called stating they need office visit notes prior to titration study addendum. She needs it to say the pap issues that prompt the titration study. Once edit and signed please fax to [phone]

## 2024-12-14 DIAGNOSIS — E78.2 MIXED HYPERLIPIDEMIA: ICD-10-CM

## 2024-12-16 RX ORDER — ATORVASTATIN CALCIUM 20 MG/1
20 TABLET, FILM COATED ORAL NIGHTLY
Qty: 90 TABLET | Refills: 0 | Status: SHIPPED | OUTPATIENT
Start: 2024-12-16

## 2024-12-16 NOTE — TELEPHONE ENCOUNTER
Medication:   Requested Prescriptions     Pending Prescriptions Disp Refills    atorvastatin (LIPITOR) 20 MG tablet [Pharmacy Med Name: Atorvastatin Calcium 20 MG Oral Tablet] 90 tablet 0     Sig: TAKE 1 TABLET BY MOUTH AT BEDTIME     Last Filled:  6/25/24    Last appt: 10/17/2024   Next appt: Visit date not found    Last [place]AR[place]:       11/15/2023     7:31 PM   RX Monitoring   Periodic Controlled Substance Monitoring No signs of potential drug abuse or diversion identified.

## 2025-01-13 DIAGNOSIS — E55.9 VITAMIN D DEFICIENCY: ICD-10-CM

## 2025-01-13 RX ORDER — ERGOCALCIFEROL 1.25 MG/1
50000 CAPSULE, LIQUID FILLED ORAL WEEKLY
Qty: 12 CAPSULE | Refills: 0 | Status: SHIPPED | OUTPATIENT
Start: 2025-01-13

## 2025-01-13 NOTE — TELEPHONE ENCOUNTER
Medication:   Requested Prescriptions     Pending Prescriptions Disp Refills    vitamin D (ERGOCALCIFEROL) 1.25 MG (80148 UT) CAPS capsule [Pharmacy Med Name: Vitamin D (Ergocalciferol) 1.25 MG (50183 UT) Oral Capsule] 12 capsule 0     Sig: Take 1 capsule by mouth once a week     Last filled: 6/25/24  Last appt: 10/17/2024   Next appt: Visit date not found    Last [place]AR[place]:       11/15/2023     7:31 PM   RX Monitoring   Periodic Controlled Substance Monitoring No signs of potential drug abuse or diversion identified.

## 2025-01-15 DIAGNOSIS — L85.3 DRY SKIN: ICD-10-CM

## 2025-01-15 RX ORDER — AMMONIUM LACTATE 12 G/100G
CREAM TOPICAL
Qty: 280 G | Refills: 0 | Status: SHIPPED | OUTPATIENT
Start: 2025-01-15

## 2025-01-15 NOTE — TELEPHONE ENCOUNTER
Medication:   Requested Prescriptions     Pending Prescriptions Disp Refills    ammonium lactate (AMLACTIN) 12 % cream [Pharmacy Med Name: Ammonium Lactate 12 % External Cream] 280 g 0     Sig: APPLY  CREAM TOPICALLY TWICE DAILY AS NEEDED     Last Filled:  09/17/2024    Last appt: 10/17/2024   Next appt: Visit date not found    Last OARRS:       11/15/2023     7:31 PM   RX Monitoring   Periodic Controlled Substance Monitoring No signs of potential drug abuse or diversion identified.

## 2025-02-13 DIAGNOSIS — K21.9 GASTROESOPHAGEAL REFLUX DISEASE, UNSPECIFIED WHETHER ESOPHAGITIS PRESENT: ICD-10-CM

## 2025-02-13 NOTE — TELEPHONE ENCOUNTER
Medication:   Requested Prescriptions     Pending Prescriptions Disp Refills    esomeprazole (NEXIUM) 40 MG delayed release capsule [Pharmacy Med Name: Esomeprazole Magnesium 40 MG Oral Capsule Delayed Release] 90 capsule 0     Sig: TAKE 1 CAPSULE BY MOUTH ONCE DAILY IN THE MORNING BEFORE BREAKFAST     Last Filled:  9.11.24    Last appt: 10/17/2024   Next appt: Visit date not found  Sent mcm due for ov    Last OARRS:       11/15/2023     7:31 PM   RX Monitoring   Periodic Controlled Substance Monitoring No signs of potential drug abuse or diversion identified.

## 2025-02-14 RX ORDER — ESOMEPRAZOLE MAGNESIUM 40 MG/1
CAPSULE, DELAYED RELEASE ORAL
Qty: 90 CAPSULE | Refills: 0 | Status: SHIPPED | OUTPATIENT
Start: 2025-02-14

## 2025-03-13 NOTE — TELEPHONE ENCOUNTER
MEDICATION ISSUES     Reported Issue:  Medication Refill    Medication Information     - Refill medication requested: Percocet     - Medication dosage and quantity: 5/325mg  #20     - Reporting side effects, if any: No     - Other issues, if any: NA     Controlled Substances     - Last refill date (per OARRS): NA      - Follow-up Appointment date: None     Pharmacy Information      - PHARMACY updated with patient: NA     - PHARMACY updated in Chart: NA      NOTE for Controlled Substances     PLEASE READ TO PATIENTS:    1. For routine refills: ALL MEDICATION REFILLS NEED 2 WORKING DAYS (48-HOUR) ADVANCED NOTICE - Not filled on weekends or holidays.     - Patient informed about the above policy:  NA    2. If switching or changing opioid pain medications -    PATIENTS NEED TO BRING OLD MEDICATION FOR PILL COUNT AND POSSIBLE DESTRUCTION - before new medication could be filled.      - Patient informed about the above policy:  NA nasal cannula

## 2025-05-01 DIAGNOSIS — L85.3 DRY SKIN: ICD-10-CM

## 2025-05-01 DIAGNOSIS — M96.1 FAILED BACK SURGICAL SYNDROME: ICD-10-CM

## 2025-05-01 DIAGNOSIS — M62.838 MUSCLE SPASM: ICD-10-CM

## 2025-05-01 DIAGNOSIS — Z91.89 AT RISK FOR RESPIRATORY DEPRESSION DUE TO OPIOID: ICD-10-CM

## 2025-05-01 DIAGNOSIS — E11.42 TYPE 2 DIABETES MELLITUS WITH DIABETIC POLYNEUROPATHY, WITHOUT LONG-TERM CURRENT USE OF INSULIN (HCC): ICD-10-CM

## 2025-05-01 DIAGNOSIS — R11.2 NAUSEA AND VOMITING, UNSPECIFIED VOMITING TYPE: ICD-10-CM

## 2025-05-01 DIAGNOSIS — E11.42 DIABETIC POLYNEUROPATHY ASSOCIATED WITH TYPE 2 DIABETES MELLITUS (HCC): ICD-10-CM

## 2025-05-01 DIAGNOSIS — K64.9 HEMORRHOIDS, UNSPECIFIED HEMORRHOID TYPE: ICD-10-CM

## 2025-05-01 DIAGNOSIS — R51.9 FREQUENT HEADACHES: ICD-10-CM

## 2025-05-01 DIAGNOSIS — R51.9 PERSISTENT HEADACHES: ICD-10-CM

## 2025-05-01 DIAGNOSIS — E55.9 VITAMIN D DEFICIENCY: ICD-10-CM

## 2025-05-01 DIAGNOSIS — M17.0 PRIMARY OSTEOARTHRITIS OF BOTH KNEES: ICD-10-CM

## 2025-05-01 DIAGNOSIS — K59.00 CONSTIPATION, UNSPECIFIED CONSTIPATION TYPE: ICD-10-CM

## 2025-05-01 DIAGNOSIS — E78.2 MIXED HYPERLIPIDEMIA: ICD-10-CM

## 2025-05-01 DIAGNOSIS — M17.11 PRIMARY OSTEOARTHRITIS OF RIGHT KNEE: ICD-10-CM

## 2025-05-01 DIAGNOSIS — G89.4 CHRONIC PAIN SYNDROME: ICD-10-CM

## 2025-05-01 DIAGNOSIS — M17.12 PRIMARY OSTEOARTHRITIS OF LEFT KNEE: ICD-10-CM

## 2025-05-01 DIAGNOSIS — M79.7 FIBROMYALGIA: ICD-10-CM

## 2025-05-01 DIAGNOSIS — M54.16 LUMBAR RADICULOPATHY: ICD-10-CM

## 2025-05-01 DIAGNOSIS — B37.2 CANDIDIASIS OF SKIN: ICD-10-CM

## 2025-05-01 DIAGNOSIS — R79.89 ELEVATED BRAIN NATRIURETIC PEPTIDE (BNP) LEVEL: ICD-10-CM

## 2025-05-01 DIAGNOSIS — K21.9 GASTROESOPHAGEAL REFLUX DISEASE, UNSPECIFIED WHETHER ESOPHAGITIS PRESENT: ICD-10-CM

## 2025-05-01 NOTE — TELEPHONE ENCOUNTER
Medication:   Requested Prescriptions     Pending Prescriptions Disp Refills    GAVILYTE-G 236 g solution      naloxone (NARCAN) 4 MG/0.1ML LIQD nasal spray 1 each 0     Si spray by Nasal route as needed for Opioid Reversal    ammonium lactate (AMLACTIN) 12 % cream 280 g 0     Sig: APPLY  CREAM TOPICALLY TWICE DAILY AS NEEDED    estradiol (ESTRACE) 0.1 MG/GM vaginal cream      diclofenac sodium (VOLTAREN) 1 %  g 0     Sig: Apply 4 gm to both knees tid-qid prn .    nystatin (MYCOSTATIN) 502213 UNIT/GM cream 30 g 0     Sig: Apply topically 2 times daily.    Diabetic Shoe MISC 1 each 0     Sig: by Does not apply route Dispense pair of diabetic shoes with custom inserts    Tens Unit MISC 1 each 0     Sig: by Does not apply route Use as directed.    Blood Glucose Monitoring Suppl (BLOOD GLUCOSE MONITOR SYSTEM) w/Device KIT 1 kit 0     Sig: Dispense glucometer covered by patient's insurance    nitroGLYCERIN (NITROSTAT) 0.4 MG SL tablet 25 tablet 0     Sig: up to max of 3 total doses. If no relief after 1 dose, call 911.    hydrocortisone (ANUSOL-HC) 2.5 % CREA rectal cream 30 g 1     Sig: Place rectally 2 times daily    pregabalin (LYRICA) 100 MG capsule 90 capsule 0     Sig: Take 1 capsule by mouth 3 times daily for 60 days.    DULoxetine (CYMBALTA) 60 MG extended release capsule 30 capsule 0     Sig: Take 1 capsule by mouth daily    vitamin D (ERGOCALCIFEROL) 1.25 MG (61510 UT) CAPS capsule 12 capsule 0     Sig: Take 1 capsule by mouth once a week    esomeprazole (NEXIUM) 40 MG delayed release capsule 90 capsule 0    docusate sodium (EQUATE STOOL SOFTENER) 100 MG capsule 180 capsule 1     Sig: TAKE 1 CAPSULE BY MOUTH TWICE DAILY AS NEEDED FOR CONSTIPATION    tiZANidine (ZANAFLEX) 2 MG tablet 60 tablet 0     Sig: Take 1 tablet by mouth 2 times daily as needed (spasms)    hydrOXYzine HCl (ATARAX) 25 MG tablet 1 tablet 0     Sig: Take 1 tablet by mouth 20 minutes before procedure    ondansetron (ZOFRAN) 4 MG

## 2025-05-02 ENCOUNTER — TELEPHONE (OUTPATIENT)
Dept: ORTHOPEDIC SURGERY | Age: 60
End: 2025-05-02

## 2025-05-02 NOTE — TELEPHONE ENCOUNTER
PATIENT IS REQ ARMAND KNEE GEL INJECTIONS. ASKING IF PRIOR AUTHORIZATION IS NEEDED.    PLEASE SEND RESPONSE TO CURRENT EMAIL ON FILE. CURRENTLY HAS NO PHONE NUMBER.

## 2025-05-08 ENCOUNTER — TELEPHONE (OUTPATIENT)
Dept: FAMILY MEDICINE CLINIC | Age: 60
End: 2025-05-08

## 2025-05-08 ENCOUNTER — OFFICE VISIT (OUTPATIENT)
Dept: PRIMARY CARE CLINIC | Age: 60
End: 2025-05-08
Payer: MEDICARE

## 2025-05-08 ENCOUNTER — RESULTS FOLLOW-UP (OUTPATIENT)
Dept: PRIMARY CARE CLINIC | Age: 60
End: 2025-05-08

## 2025-05-08 ENCOUNTER — PATIENT MESSAGE (OUTPATIENT)
Dept: ORTHOPEDIC SURGERY | Age: 60
End: 2025-05-08

## 2025-05-08 VITALS
OXYGEN SATURATION: 96 % | BODY MASS INDEX: 36.53 KG/M2 | HEIGHT: 68 IN | RESPIRATION RATE: 14 BRPM | SYSTOLIC BLOOD PRESSURE: 122 MMHG | DIASTOLIC BLOOD PRESSURE: 86 MMHG | HEART RATE: 50 BPM | WEIGHT: 241 LBS

## 2025-05-08 DIAGNOSIS — E55.9 VITAMIN D DEFICIENCY: ICD-10-CM

## 2025-05-08 DIAGNOSIS — R22.9 SUBCUTANEOUS NODULES: ICD-10-CM

## 2025-05-08 DIAGNOSIS — E78.2 MIXED HYPERLIPIDEMIA: ICD-10-CM

## 2025-05-08 DIAGNOSIS — E53.8 VITAMIN B 12 DEFICIENCY: ICD-10-CM

## 2025-05-08 DIAGNOSIS — E11.42 TYPE 2 DIABETES MELLITUS WITH DIABETIC POLYNEUROPATHY, WITHOUT LONG-TERM CURRENT USE OF INSULIN (HCC): ICD-10-CM

## 2025-05-08 DIAGNOSIS — Z12.31 ENCOUNTER FOR SCREENING MAMMOGRAM FOR BREAST CANCER: ICD-10-CM

## 2025-05-08 DIAGNOSIS — M79.644 THUMB PAIN, RIGHT: Primary | ICD-10-CM

## 2025-05-08 DIAGNOSIS — K59.00 CONSTIPATION, UNSPECIFIED CONSTIPATION TYPE: ICD-10-CM

## 2025-05-08 DIAGNOSIS — K21.9 GASTROESOPHAGEAL REFLUX DISEASE, UNSPECIFIED WHETHER ESOPHAGITIS PRESENT: Chronic | ICD-10-CM

## 2025-05-08 LAB
25(OH)D3 SERPL-MCNC: 22.4 NG/ML
ALBUMIN SERPL-MCNC: 4.4 G/DL (ref 3.4–5)
ALBUMIN/GLOB SERPL: 1.9 {RATIO} (ref 1.1–2.2)
ALP SERPL-CCNC: 88 U/L (ref 40–129)
ALT SERPL-CCNC: 15 U/L (ref 10–40)
ANION GAP SERPL CALCULATED.3IONS-SCNC: 8 MMOL/L (ref 3–16)
AST SERPL-CCNC: 16 U/L (ref 15–37)
BILIRUB SERPL-MCNC: 0.4 MG/DL (ref 0–1)
BUN SERPL-MCNC: 17 MG/DL (ref 7–20)
CALCIUM SERPL-MCNC: 8.9 MG/DL (ref 8.3–10.6)
CHLORIDE SERPL-SCNC: 104 MMOL/L (ref 99–110)
CHOLEST SERPL-MCNC: 219 MG/DL (ref 0–199)
CO2 SERPL-SCNC: 26 MMOL/L (ref 21–32)
CREAT SERPL-MCNC: 0.5 MG/DL (ref 0.6–1.2)
CREAT UR-MCNC: 107 MG/DL (ref 28–259)
GFR SERPLBLD CREATININE-BSD FMLA CKD-EPI: >90 ML/MIN/{1.73_M2}
GLUCOSE SERPL-MCNC: 86 MG/DL (ref 70–99)
HDLC SERPL-MCNC: 97 MG/DL (ref 40–60)
LDLC SERPL CALC-MCNC: 107 MG/DL
MICROALBUMIN UR DL<=1MG/L-MCNC: <1.2 MG/DL
MICROALBUMIN/CREAT UR: NORMAL MG/G (ref 0–30)
POTASSIUM SERPL-SCNC: 4.6 MMOL/L (ref 3.5–5.1)
PROT SERPL-MCNC: 6.7 G/DL (ref 6.4–8.2)
SODIUM SERPL-SCNC: 138 MMOL/L (ref 136–145)
TRIGL SERPL-MCNC: 73 MG/DL (ref 0–150)
VLDLC SERPL CALC-MCNC: 15 MG/DL

## 2025-05-08 PROCEDURE — G8417 CALC BMI ABV UP PARAM F/U: HCPCS | Performed by: INTERNAL MEDICINE

## 2025-05-08 PROCEDURE — 2022F DILAT RTA XM EVC RTNOPTHY: CPT | Performed by: INTERNAL MEDICINE

## 2025-05-08 PROCEDURE — 3017F COLORECTAL CA SCREEN DOC REV: CPT | Performed by: INTERNAL MEDICINE

## 2025-05-08 PROCEDURE — G8427 DOCREV CUR MEDS BY ELIG CLIN: HCPCS | Performed by: INTERNAL MEDICINE

## 2025-05-08 PROCEDURE — 3044F HG A1C LEVEL LT 7.0%: CPT | Performed by: INTERNAL MEDICINE

## 2025-05-08 PROCEDURE — 96372 THER/PROPH/DIAG INJ SC/IM: CPT | Performed by: INTERNAL MEDICINE

## 2025-05-08 PROCEDURE — 99214 OFFICE O/P EST MOD 30 MIN: CPT | Performed by: INTERNAL MEDICINE

## 2025-05-08 PROCEDURE — 1036F TOBACCO NON-USER: CPT | Performed by: INTERNAL MEDICINE

## 2025-05-08 RX ORDER — FUROSEMIDE 40 MG/1
40 TABLET ORAL DAILY
Qty: 90 TABLET | Refills: 3 | Status: CANCELLED | OUTPATIENT
Start: 2025-05-08

## 2025-05-08 RX ORDER — NYSTATIN 100000 U/G
CREAM TOPICAL
Qty: 30 G | Refills: 0 | Status: SHIPPED | OUTPATIENT
Start: 2025-05-08

## 2025-05-08 RX ORDER — DULOXETIN HYDROCHLORIDE 60 MG/1
60 CAPSULE, DELAYED RELEASE ORAL DAILY
Qty: 30 CAPSULE | Refills: 0 | Status: CANCELLED | OUTPATIENT
Start: 2025-05-08

## 2025-05-08 RX ORDER — IBUPROFEN 400 MG/1
TABLET, FILM COATED ORAL
Qty: 120 TABLET | Status: CANCELLED | OUTPATIENT
Start: 2025-05-08

## 2025-05-08 RX ORDER — PANTOPRAZOLE SODIUM 40 MG/1
40 TABLET, DELAYED RELEASE ORAL
Qty: 90 TABLET | Refills: 0 | Status: CANCELLED | OUTPATIENT
Start: 2025-05-08

## 2025-05-08 RX ORDER — HYDROCORTISONE 25 MG/G
CREAM TOPICAL 2 TIMES DAILY
Qty: 30 G | Refills: 1 | Status: CANCELLED | OUTPATIENT
Start: 2025-05-08

## 2025-05-08 RX ORDER — ONDANSETRON 4 MG/1
4 TABLET, FILM COATED ORAL 3 TIMES DAILY PRN
Qty: 15 TABLET | Refills: 0 | Status: CANCELLED | OUTPATIENT
Start: 2025-05-08

## 2025-05-08 RX ORDER — AVOBENZONE, HOMOSALATE, OCTISALATE, OCTOCRYLENE 30; 40; 45; 26 MG/ML; MG/ML; MG/ML; MG/ML
CREAM TOPICAL
Qty: 100 EACH | Refills: 3 | Status: CANCELLED | OUTPATIENT
Start: 2025-05-08

## 2025-05-08 RX ORDER — DOCUSATE SODIUM 100 MG/1
CAPSULE, LIQUID FILLED ORAL
Qty: 180 CAPSULE | Refills: 0 | Status: SHIPPED | OUTPATIENT
Start: 2025-05-08

## 2025-05-08 RX ORDER — ATORVASTATIN CALCIUM 20 MG/1
20 TABLET, FILM COATED ORAL NIGHTLY
Qty: 90 TABLET | Refills: 1 | Status: SHIPPED | OUTPATIENT
Start: 2025-05-08

## 2025-05-08 RX ORDER — GLUCOSAMINE HCL/CHONDROITIN SU 500-400 MG
CAPSULE ORAL
Qty: 100 STRIP | Refills: 3 | Status: SHIPPED | OUTPATIENT
Start: 2025-05-08

## 2025-05-08 RX ORDER — PREGABALIN 100 MG/1
100 CAPSULE ORAL 3 TIMES DAILY
Qty: 90 CAPSULE | Refills: 0 | Status: CANCELLED | OUTPATIENT
Start: 2025-05-08 | End: 2025-07-07

## 2025-05-08 RX ORDER — NORTRIPTYLINE HYDROCHLORIDE 25 MG/1
25-50 CAPSULE ORAL NIGHTLY
Qty: 60 CAPSULE | Refills: 0 | Status: CANCELLED | OUTPATIENT
Start: 2025-05-08

## 2025-05-08 RX ORDER — ESTRADIOL 0.1 MG/G
CREAM VAGINAL
Status: CANCELLED | OUTPATIENT
Start: 2025-05-08

## 2025-05-08 RX ORDER — FAMOTIDINE 20 MG/1
20 TABLET, FILM COATED ORAL NIGHTLY PRN
Qty: 90 TABLET | Refills: 0 | Status: SHIPPED | OUTPATIENT
Start: 2025-05-08

## 2025-05-08 RX ORDER — DIPHENHYDRAMINE HYDROCHLORIDE 25 MG/1
CAPSULE, LIQUID FILLED ORAL
Qty: 1 KIT | Refills: 0 | Status: SHIPPED | OUTPATIENT
Start: 2025-05-08

## 2025-05-08 RX ORDER — IBUPROFEN 600 MG/1
600 TABLET, FILM COATED ORAL 3 TIMES DAILY PRN
Qty: 60 TABLET | Refills: 0 | Status: CANCELLED | OUTPATIENT
Start: 2025-05-08

## 2025-05-08 RX ORDER — ERGOCALCIFEROL 1.25 MG/1
50000 CAPSULE, LIQUID FILLED ORAL WEEKLY
Qty: 12 CAPSULE | Refills: 1 | Status: SHIPPED | OUTPATIENT
Start: 2025-05-08

## 2025-05-08 RX ORDER — ONDANSETRON 8 MG/1
8 TABLET, ORALLY DISINTEGRATING ORAL EVERY 8 HOURS PRN
Status: CANCELLED | OUTPATIENT
Start: 2025-05-08

## 2025-05-08 RX ORDER — AVOBENZONE, HOMOSALATE, OCTISALATE, OCTOCRYLENE 30; 40; 45; 26 MG/ML; MG/ML; MG/ML; MG/ML
CREAM TOPICAL
Qty: 100 EACH | Refills: 3 | Status: SHIPPED | OUTPATIENT
Start: 2025-05-08

## 2025-05-08 RX ORDER — CALCIUM CITRATE/VITAMIN D3 200MG-6.25
TABLET ORAL
Qty: 100 STRIP | Refills: 3 | Status: CANCELLED | OUTPATIENT
Start: 2025-05-08

## 2025-05-08 RX ORDER — ASPIRIN 81 MG
1 TABLET, DELAYED RELEASE (ENTERIC COATED) ORAL DAILY
Status: CANCELLED | OUTPATIENT
Start: 2025-05-08

## 2025-05-08 RX ORDER — TIZANIDINE 2 MG/1
2 TABLET ORAL 2 TIMES DAILY PRN
Qty: 60 TABLET | Refills: 0 | Status: CANCELLED | OUTPATIENT
Start: 2025-05-08

## 2025-05-08 RX ORDER — HYDROXYZINE HYDROCHLORIDE 25 MG/1
TABLET, FILM COATED ORAL
Qty: 1 TABLET | Refills: 0 | Status: CANCELLED | OUTPATIENT
Start: 2025-05-08

## 2025-05-08 RX ORDER — POLYETHYLENE GLYCOL-3350 AND ELECTROLYTES 236; 6.74; 5.86; 2.97; 22.74 G/274.31G; G/274.31G; G/274.31G; G/274.31G; G/274.31G
POWDER, FOR SOLUTION ORAL
Status: CANCELLED | OUTPATIENT
Start: 2025-05-08

## 2025-05-08 RX ORDER — NITROGLYCERIN 0.4 MG/1
TABLET SUBLINGUAL
Qty: 25 TABLET | Refills: 0 | Status: CANCELLED | OUTPATIENT
Start: 2025-05-08

## 2025-05-08 RX ORDER — CYANOCOBALAMIN 1000 UG/ML
1000 INJECTION, SOLUTION INTRAMUSCULAR; SUBCUTANEOUS ONCE
Status: COMPLETED | OUTPATIENT
Start: 2025-05-08 | End: 2025-05-08

## 2025-05-08 RX ORDER — AMMONIUM LACTATE 12 G/100G
CREAM TOPICAL
Qty: 280 G | Refills: 0 | Status: SHIPPED | OUTPATIENT
Start: 2025-05-08

## 2025-05-08 RX ORDER — ESOMEPRAZOLE MAGNESIUM 40 MG/1
40 CAPSULE, DELAYED RELEASE ORAL
Qty: 90 CAPSULE | Refills: 1 | Status: SHIPPED | OUTPATIENT
Start: 2025-05-08

## 2025-05-08 RX ORDER — PSEUDOEPHED/ACETAMINOPH/DIPHEN 30MG-500MG
1 TABLET ORAL 4 TIMES DAILY PRN
Qty: 60 TABLET | Refills: 2 | Status: CANCELLED | OUTPATIENT
Start: 2025-05-08

## 2025-05-08 RX ADMIN — CYANOCOBALAMIN 1000 MCG: 1000 INJECTION, SOLUTION INTRAMUSCULAR; SUBCUTANEOUS at 15:45

## 2025-05-08 SDOH — ECONOMIC STABILITY: FOOD INSECURITY: WITHIN THE PAST 12 MONTHS, THE FOOD YOU BOUGHT JUST DIDN'T LAST AND YOU DIDN'T HAVE MONEY TO GET MORE.: NEVER TRUE

## 2025-05-08 SDOH — ECONOMIC STABILITY: FOOD INSECURITY: WITHIN THE PAST 12 MONTHS, YOU WORRIED THAT YOUR FOOD WOULD RUN OUT BEFORE YOU GOT MONEY TO BUY MORE.: NEVER TRUE

## 2025-05-08 ASSESSMENT — PATIENT HEALTH QUESTIONNAIRE - PHQ9
4. FEELING TIRED OR HAVING LITTLE ENERGY: MORE THAN HALF THE DAYS
6. FEELING BAD ABOUT YOURSELF - OR THAT YOU ARE A FAILURE OR HAVE LET YOURSELF OR YOUR FAMILY DOWN: MORE THAN HALF THE DAYS
9. THOUGHTS THAT YOU WOULD BE BETTER OFF DEAD, OR OF HURTING YOURSELF: NOT AT ALL
SUM OF ALL RESPONSES TO PHQ QUESTIONS 1-9: 15
10. IF YOU CHECKED OFF ANY PROBLEMS, HOW DIFFICULT HAVE THESE PROBLEMS MADE IT FOR YOU TO DO YOUR WORK, TAKE CARE OF THINGS AT HOME, OR GET ALONG WITH OTHER PEOPLE: SOMEWHAT DIFFICULT
1. LITTLE INTEREST OR PLEASURE IN DOING THINGS: NEARLY EVERY DAY
5. POOR APPETITE OR OVEREATING: SEVERAL DAYS
8. MOVING OR SPEAKING SO SLOWLY THAT OTHER PEOPLE COULD HAVE NOTICED. OR THE OPPOSITE, BEING SO FIGETY OR RESTLESS THAT YOU HAVE BEEN MOVING AROUND A LOT MORE THAN USUAL: SEVERAL DAYS
SUM OF ALL RESPONSES TO PHQ QUESTIONS 1-9: 15
2. FEELING DOWN, DEPRESSED OR HOPELESS: NEARLY EVERY DAY
SUM OF ALL RESPONSES TO PHQ QUESTIONS 1-9: 15
7. TROUBLE CONCENTRATING ON THINGS, SUCH AS READING THE NEWSPAPER OR WATCHING TELEVISION: SEVERAL DAYS
3. TROUBLE FALLING OR STAYING ASLEEP: MORE THAN HALF THE DAYS
SUM OF ALL RESPONSES TO PHQ QUESTIONS 1-9: 15

## 2025-05-08 NOTE — PROGRESS NOTES
Date of Visit: 2025    Farrah Rivers (:  1965) is a 59 y.o. female,  Established patient here for evaluation of the following chief complaint(s):  Finger Pain, Mass (Right leg), Diabetes, Hyperlipidemia, Gastroesophageal Reflux, Constipation, and vitamin D deficiency      ASSESSMENT/PLAN:    1. Thumb pain, right  Assessment & Plan:   -Xray right thumb  -Tylenol 650mg 3 times daily as needed  Orders:  -     XR FINGER RIGHT (MIN 2 VIEWS); Future  2. Subcutaneous nodules  Assessment & Plan:  -nodules on right thigh and left lower leg  -soft tissue ultrasound right thigh and left leg  Orders:  -     US SOFT TISSUE LIMITED AREA; Future  3. Type 2 diabetes mellitus with diabetic polyneuropathy, without long-term current use of insulin ([facility])  Assessment & Plan:  -stable  -Continue diet control  -Limit carbohydrates to 45 grams with meals and 15 grams with snacks  -monitor blood sugars  -goal for blood sugar fasting or pre-meal  is   -goal for blood sugar 2 hours after a meal is less than 180  -goal for blood sugar at bedtime is less than 150  Orders:  -     Comprehensive Metabolic Panel; Future  -     Hemoglobin A1C; Future  -     Albumin/Creatinine Ratio, Urine  -      DIABETES FOOT EXAM  4. Mixed hyperlipidemia  Assessment & Plan:  -Not controlled  -LDL is not at goal  -Patient has increased atorvastatin to 20 mg nightly but has not been taking it due to out of refills  -resume atorvastatin 20 mg nightly  -Low fat, low cholesterol diet  Orders:  -     Comprehensive Metabolic Panel; Future  -     Lipid Panel; Future  5. Vitamin D deficiency  Assessment & Plan:  -Stable  -Continue vitamin D 50,000 IU weekly  -Vitamin D 25 Hydroxy  Orders:  -     Vitamin D 25 Hydroxy; Future  6. Gastroesophageal reflux disease, unspecified whether esophagitis present  Assessment & Plan:  -Not controlled  -out of Nexium  -resume Nexium 40 mg  once daily  -Decrease caffeine, avoid spicy foods, avoid tomato based

## 2025-05-09 LAB
EST. AVERAGE GLUCOSE BLD GHB EST-MCNC: 108.3 MG/DL
HBA1C MFR BLD: 5.4 %

## 2025-05-09 NOTE — TELEPHONE ENCOUNTER
Pt called oncall provider due to her routine medication refills not received at [facility]. Instructed pt to call during reg office hours to request a refill.

## 2025-05-14 ENCOUNTER — OFFICE VISIT (OUTPATIENT)
Dept: ORTHOPEDIC SURGERY | Age: 60
End: 2025-05-14

## 2025-05-14 ENCOUNTER — HOSPITAL ENCOUNTER (OUTPATIENT)
Dept: WOMENS IMAGING | Age: 60
Discharge: HOME | End: 2025-05-14
Payer: MEDICARE

## 2025-05-14 VITALS — BODY MASS INDEX: 44.41 KG/M2 | WEIGHT: 293 LBS | HEIGHT: 68 IN

## 2025-05-14 VITALS — BODY MASS INDEX: 36.53 KG/M2 | HEIGHT: 68 IN | WEIGHT: 241 LBS

## 2025-05-14 DIAGNOSIS — F11.90 CHRONIC NARCOTIC USE: ICD-10-CM

## 2025-05-14 DIAGNOSIS — M17.11 PRIMARY OSTEOARTHRITIS OF RIGHT KNEE: ICD-10-CM

## 2025-05-14 DIAGNOSIS — Z12.31 ENCOUNTER FOR SCREENING MAMMOGRAM FOR BREAST CANCER: ICD-10-CM

## 2025-05-14 DIAGNOSIS — E11.42 DIABETIC POLYNEUROPATHY ASSOCIATED WITH TYPE 2 DIABETES MELLITUS (HCC): ICD-10-CM

## 2025-05-14 DIAGNOSIS — Z79.01 CHRONIC ANTICOAGULATION: ICD-10-CM

## 2025-05-14 DIAGNOSIS — M17.12 PRIMARY OSTEOARTHRITIS OF LEFT KNEE: Primary | ICD-10-CM

## 2025-05-14 DIAGNOSIS — Z90.3 HISTORY OF SLEEVE GASTRECTOMY: ICD-10-CM

## 2025-05-14 PROCEDURE — 77063 BREAST TOMOSYNTHESIS BI: CPT

## 2025-05-14 NOTE — PROGRESS NOTES
Chief Complaint:   Chief Complaint   Patient presents with    Knee Pain     Bilateral Knees - Left knee is worse than the right. Both knees are stiff and painful when trying to walk.          History of Present Illness:       Patient is a 59 y.o. female returns follow up for the above complaint. The patient was last seen approximately 17 monthsago. The symptoms of knee pain are resurfacing since the last visit. The patient has had no further testing for the problem.      Status post bilateral HA therapy bilateral knees 12/11/2023 with benefit    In the interim, she underwent bilateral knee CSI as performed by Dr. Drew Merritt 8/28/2024 with mild benefit.    Pain localizes to the front side of the knee and does seem to follow a typical patellofemoral provacative pattern.  There are not mechanical symptoms that suggest meniscal injury.  The patient denies subjective instability about the knee and denies new onset weakness of the lower extremity.    Pain level  moderate    The patient denies a pattern of activity related swelling.      Treatment to date: Other injection HA therapy with good improvement.     There is no prior history of recent knee trauma. Workup has included X-ray    The patient is under the supervision of pain management specialty services and uses narcotics intermittently for management of her thoracolumbar spine, status post:    SURGERY DATE:      2/22/2023  SURGEON:                 Mayo Dumont MD, PhD  PROCEDURES PERFORMED:  1.  T10-11 bilateral laminectomy and decompression, T10-11 facetectomy left  2.  Resection of intradural, extramedullary mass  3.  Placement of pedicle screws T10-11  4.  Posterolateral arthrodesis and fusion T10-11 bilateral  5.  Intraoperative fluoroscopy and [facility] AIRO stereotactic navigation  6.  Neuro-monitoring for MEP, SSEP and EMG  7.  Microdissection     There is no prior history of autoimmune disease, inflammatory arthropathy, or crystal arthropathy.

## 2025-05-15 ENCOUNTER — HOSPITAL ENCOUNTER (OUTPATIENT)
Dept: ULTRASOUND IMAGING | Age: 60
Discharge: HOME | End: 2025-05-15
Payer: MEDICARE

## 2025-05-15 ENCOUNTER — OFFICE VISIT (OUTPATIENT)
Dept: PRIMARY CARE CLINIC | Age: 60
End: 2025-05-15
Payer: MEDICARE

## 2025-05-15 ENCOUNTER — HOSPITAL ENCOUNTER (OUTPATIENT)
Dept: GENERAL RADIOLOGY | Age: 60
Discharge: HOME | End: 2025-05-15
Payer: MEDICARE

## 2025-05-15 VITALS
SYSTOLIC BLOOD PRESSURE: 118 MMHG | BODY MASS INDEX: 54.74 KG/M2 | HEART RATE: 64 BPM | OXYGEN SATURATION: 97 % | WEIGHT: 293 LBS | DIASTOLIC BLOOD PRESSURE: 72 MMHG

## 2025-05-15 DIAGNOSIS — G89.4 CHRONIC PAIN SYNDROME: ICD-10-CM

## 2025-05-15 DIAGNOSIS — M79.644 THUMB PAIN, RIGHT: ICD-10-CM

## 2025-05-15 DIAGNOSIS — R22.9 SUBCUTANEOUS NODULES: ICD-10-CM

## 2025-05-15 DIAGNOSIS — B85.0 HEAD LICE: Primary | ICD-10-CM

## 2025-05-15 PROCEDURE — 76999 ECHO EXAMINATION PROCEDURE: CPT

## 2025-05-15 PROCEDURE — 1036F TOBACCO NON-USER: CPT | Performed by: INTERNAL MEDICINE

## 2025-05-15 PROCEDURE — G8417 CALC BMI ABV UP PARAM F/U: HCPCS | Performed by: INTERNAL MEDICINE

## 2025-05-15 PROCEDURE — 73140 X-RAY EXAM OF FINGER(S): CPT

## 2025-05-15 PROCEDURE — G8427 DOCREV CUR MEDS BY ELIG CLIN: HCPCS | Performed by: INTERNAL MEDICINE

## 2025-05-15 PROCEDURE — 3017F COLORECTAL CA SCREEN DOC REV: CPT | Performed by: INTERNAL MEDICINE

## 2025-05-15 PROCEDURE — 99212 OFFICE O/P EST SF 10 MIN: CPT | Performed by: INTERNAL MEDICINE

## 2025-05-15 NOTE — PROGRESS NOTES
Date of Visit: 5/15/2025    Farrah Rivers (:  1965) is a 59 y.o. female,  Established patient here for evaluation of the following chief complaint(s):  Hair/Scalp Problem (itchy)      ASSESSMENT/PLAN:    Assessment & Plan  1. Head lice  - Permethrin (NIX) 1% liquid; apply once and leave on for 10 minutes and may repeat in 7 day    2. Will have medical assistant contact [employer] to find out out about problem with wheelchair repair and next steps          Return if symptoms worsen or fail to improve.    SUBJECTIVE:    History of Present Illness  The patient presents for evaluation of an itchy scalp.    She reports a sensation of movement in her hair, which she attempts to alleviate through scratching. This has resulted in skin irritation. Despite not observing any dry flakes in her hair, the itching persists. The onset of these symptoms was approximately 3 weeks ago. She has been maintaining her regular hair washing routine, which provides temporary relief from the itching for 1 to 2 days before it recurs. She also reports no visible rashes on her scalp.    Additionally, she mentions that her power wheelchair's footrest broke last year, leading to frequent falls. She has been attempting to get it fixed by [email or web address], the company from which she acquired the chair 2 years ago. She reports despite multiple calls and messages, she has not received a response. She reports a representative from the company had previously collected the broken part but has not returned with a replacement. She is requesting assistance in contacting [email or web address] for the repair of the footrest. She also requests a cup flores to be attached to the chair as she spends significant time outdoors and finds it inconvenient to hold a cup in her hand constantly.    Review of Systems   Constitutional:  Negative for chills and fever.   Skin:  Negative for color change, rash and wound.   Neurological:  Negative for headaches.       Allergies

## 2025-05-16 ENCOUNTER — TELEPHONE (OUTPATIENT)
Dept: PRIMARY CARE CLINIC | Age: 60
End: 2025-05-16

## 2025-05-16 DIAGNOSIS — G89.4 CHRONIC PAIN SYNDROME: ICD-10-CM

## 2025-05-16 DIAGNOSIS — M17.12 PRIMARY OSTEOARTHRITIS OF LEFT KNEE: ICD-10-CM

## 2025-05-16 DIAGNOSIS — M17.11 PRIMARY OSTEOARTHRITIS OF RIGHT KNEE: ICD-10-CM

## 2025-05-16 NOTE — TELEPHONE ENCOUNTER
Patient wants DME for Cup flores wheel chair   Also patient want would like [facility] for lice medication

## 2025-05-16 NOTE — TELEPHONE ENCOUNTER
Pt called along with a Lisbet from [facility] regarding the following items    Hospital Bed    Raised toilet Seat    Patient had requested these items sometime last year, but could not specify when.     [employer] is wanting to know where the requests were sent so that Lisbet could follow up on the requests.     Lisbet's direct number is [phone]

## 2025-05-19 ENCOUNTER — OFFICE VISIT (OUTPATIENT)
Dept: PAIN MANAGEMENT | Age: 60
End: 2025-05-19
Payer: MEDICARE

## 2025-05-19 ENCOUNTER — OFFICE VISIT (OUTPATIENT)
Dept: ORTHOPEDIC SURGERY | Age: 60
End: 2025-05-19
Payer: MEDICARE

## 2025-05-19 VITALS
BODY MASS INDEX: 54.74 KG/M2 | SYSTOLIC BLOOD PRESSURE: 124 MMHG | HEART RATE: 46 BPM | WEIGHT: 293 LBS | DIASTOLIC BLOOD PRESSURE: 75 MMHG

## 2025-05-19 DIAGNOSIS — M17.11 PRIMARY OSTEOARTHRITIS OF RIGHT KNEE: ICD-10-CM

## 2025-05-19 DIAGNOSIS — E11.42 DIABETIC POLYNEUROPATHY ASSOCIATED WITH TYPE 2 DIABETES MELLITUS (HCC): ICD-10-CM

## 2025-05-19 DIAGNOSIS — M17.12 PRIMARY OSTEOARTHRITIS OF LEFT KNEE: ICD-10-CM

## 2025-05-19 DIAGNOSIS — G89.4 CHRONIC PAIN SYNDROME: ICD-10-CM

## 2025-05-19 DIAGNOSIS — Z51.81 ENCOUNTER FOR THERAPEUTIC DRUG MONITORING: ICD-10-CM

## 2025-05-19 DIAGNOSIS — F39 MOOD DISORDER: ICD-10-CM

## 2025-05-19 DIAGNOSIS — M25.551 BILATERAL HIP PAIN: Primary | ICD-10-CM

## 2025-05-19 DIAGNOSIS — M54.16 LUMBAR RADICULOPATHY: ICD-10-CM

## 2025-05-19 DIAGNOSIS — E66.813 OBESITY, CLASS 3 (HCC): Primary | ICD-10-CM

## 2025-05-19 DIAGNOSIS — M96.1 FAILED BACK SURGICAL SYNDROME: ICD-10-CM

## 2025-05-19 DIAGNOSIS — M79.7 FIBROMYALGIA: ICD-10-CM

## 2025-05-19 DIAGNOSIS — M25.552 BILATERAL HIP PAIN: Primary | ICD-10-CM

## 2025-05-19 DIAGNOSIS — F51.01 PRIMARY INSOMNIA: ICD-10-CM

## 2025-05-19 PROBLEM — Z12.31 ENCOUNTER FOR SCREENING MAMMOGRAM FOR BREAST CANCER: Status: ACTIVE | Noted: 2025-05-19

## 2025-05-19 PROBLEM — R22.9 SUBCUTANEOUS NODULES: Status: ACTIVE | Noted: 2025-05-19

## 2025-05-19 PROCEDURE — G8427 DOCREV CUR MEDS BY ELIG CLIN: HCPCS | Performed by: INTERNAL MEDICINE

## 2025-05-19 PROCEDURE — G8417 CALC BMI ABV UP PARAM F/U: HCPCS | Performed by: INTERNAL MEDICINE

## 2025-05-19 PROCEDURE — G8427 DOCREV CUR MEDS BY ELIG CLIN: HCPCS | Performed by: ORTHOPAEDIC SURGERY

## 2025-05-19 PROCEDURE — 1036F TOBACCO NON-USER: CPT | Performed by: ORTHOPAEDIC SURGERY

## 2025-05-19 PROCEDURE — 3017F COLORECTAL CA SCREEN DOC REV: CPT | Performed by: ORTHOPAEDIC SURGERY

## 2025-05-19 PROCEDURE — G8417 CALC BMI ABV UP PARAM F/U: HCPCS | Performed by: ORTHOPAEDIC SURGERY

## 2025-05-19 PROCEDURE — 3044F HG A1C LEVEL LT 7.0%: CPT | Performed by: INTERNAL MEDICINE

## 2025-05-19 PROCEDURE — 3017F COLORECTAL CA SCREEN DOC REV: CPT | Performed by: INTERNAL MEDICINE

## 2025-05-19 PROCEDURE — 2022F DILAT RTA XM EVC RTNOPTHY: CPT | Performed by: INTERNAL MEDICINE

## 2025-05-19 PROCEDURE — 99214 OFFICE O/P EST MOD 30 MIN: CPT | Performed by: INTERNAL MEDICINE

## 2025-05-19 PROCEDURE — 1036F TOBACCO NON-USER: CPT | Performed by: INTERNAL MEDICINE

## 2025-05-19 PROCEDURE — 99213 OFFICE O/P EST LOW 20 MIN: CPT | Performed by: ORTHOPAEDIC SURGERY

## 2025-05-19 RX ORDER — NORTRIPTYLINE HYDROCHLORIDE 25 MG/1
25-50 CAPSULE ORAL NIGHTLY
Qty: 60 CAPSULE | Refills: 1 | OUTPATIENT
Start: 2025-05-19

## 2025-05-19 RX ORDER — DULOXETIN HYDROCHLORIDE 60 MG/1
CAPSULE, DELAYED RELEASE ORAL DAILY
Qty: 30 CAPSULE | Refills: 1 | OUTPATIENT
Start: 2025-05-19

## 2025-05-19 ASSESSMENT — ENCOUNTER SYMPTOMS
VOMITING: 0
BLOOD IN STOOL: 0
ABDOMINAL PAIN: 0
SHORTNESS OF BREATH: 0
COUGH: 0
NAUSEA: 0
BACK PAIN: 1
TROUBLE SWALLOWING: 0
CHEST TIGHTNESS: 0
SORE THROAT: 0
CONSTIPATION: 1
DIARRHEA: 0
SINUS PAIN: 0
RHINORRHEA: 0
SINUS PRESSURE: 0
WHEEZING: 0

## 2025-05-19 NOTE — ASSESSMENT & PLAN NOTE
-Not controlled  -out of Nexium  -resume Nexium 40 mg  once daily  -Decrease caffeine, avoid spicy foods, avoid tomato based foods  -Eat small meals instead of large meals  -Wait 2-3 hours after eating before lying down

## 2025-05-19 NOTE — ASSESSMENT & PLAN NOTE
-Not controlled  -LDL is not at goal  -Patient has increased atorvastatin to 20 mg nightly but has not been taking it due to out of refills  -resume atorvastatin 20 mg nightly  -Low fat, low cholesterol diet

## 2025-05-19 NOTE — PROGRESS NOTES
restructuring, behavioral activation and problem solving   -Continue with Cymbalta 60 mg   Medications/orders associated with this visit:  Current Outpatient Medications   Medication Sig Dispense Refill    permethrin (NIX) 1 % liquid Apply once and leave on for 10 minutes and may repeat in 7 days 120 mL 0    diclofenac sodium (VOLTAREN) 1 % GEL Apply 4 gm to both knees tid-qid prn . 500 g 2    ammonium lactate (AMLACTIN) 12 % cream APPLY  CREAM TOPICALLY TWICE DAILY AS NEEDED 280 g 0    nystatin (MYCOSTATIN) 780914 UNIT/GM cream Apply topically 2 times daily. 30 g 0    Blood Glucose Monitoring Suppl (BLOOD GLUCOSE MONITOR SYSTEM) w/Device KIT Dispense glucometer covered by patient's insurance 1 kit 0    vitamin D (ERGOCALCIFEROL) 1.25 MG (74202 UT) CAPS capsule Take 1 capsule by mouth once a week 12 capsule 1    esomeprazole (NEXIUM) 40 MG delayed release capsule Take 1 capsule by mouth every morning (before breakfast) 90 capsule 1    docusate sodium (EQUATE STOOL SOFTENER) 100 MG capsule TAKE 1 CAPSULE BY MOUTH TWICE DAILY AS NEEDED FOR CONSTIPATION 180 capsule 0    atorvastatin (LIPITOR) 20 MG tablet Take 1 tablet by mouth at bedtime 90 tablet 1    famotidine (PEPCID) 20 MG tablet Take 1 tablet by mouth nightly as needed (GERD) 90 tablet 0    Lancets MISC Use to test blood sugar once daily 100 each 3    blood glucose monitor strips Test once daily 100 strip 3    Lancets MISC Use to test blood sugar once daily 100 each 3    DULoxetine (CYMBALTA) 60 MG extended release capsule Take 1 capsule by mouth daily 30 capsule 0    hydrOXYzine HCl (ATARAX) 25 MG tablet Take 1 tablet by mouth 20 minutes before procedure 1 tablet 0    nortriptyline (PAMELOR) 25 MG capsule Take 1-2 capsules by mouth nightly 60 capsule 0    Tens Unit MISC by Does not apply route Use as directed. 1 each 0    ibuprofen (ADVIL;MOTRIN) 600 MG tablet Take 1 tablet by mouth 3 times daily as needed for Pain 60 tablet 0    pantoprazole (PROTONIX) 40 MG

## 2025-05-19 NOTE — ASSESSMENT & PLAN NOTE
-stable  -Continue diet control  -Limit carbohydrates to 45 grams with meals and 15 grams with snacks  -monitor blood sugars  -goal for blood sugar fasting or pre-meal  is   -goal for blood sugar 2 hours after a meal is less than 180  -goal for blood sugar at bedtime is less than 150

## 2025-05-20 ENCOUNTER — TELEPHONE (OUTPATIENT)
Dept: PAIN MANAGEMENT | Age: 60
End: 2025-05-20

## 2025-05-20 PROBLEM — E66.813 OBESITY, CLASS 3 (HCC): Status: ACTIVE | Noted: 2025-05-20

## 2025-05-20 RX ORDER — NORTRIPTYLINE HYDROCHLORIDE 25 MG/1
25-50 CAPSULE ORAL NIGHTLY
Qty: 60 CAPSULE | Refills: 1 | Status: SHIPPED | OUTPATIENT
Start: 2025-05-20

## 2025-05-20 RX ORDER — DULOXETIN HYDROCHLORIDE 60 MG/1
60 CAPSULE, DELAYED RELEASE ORAL DAILY
Qty: 30 CAPSULE | Refills: 1 | Status: SHIPPED | OUTPATIENT
Start: 2025-05-20

## 2025-05-20 RX ORDER — HYDROCODONE BITARTRATE AND ACETAMINOPHEN 5; 325 MG/1; MG/1
1 TABLET ORAL 3 TIMES DAILY PRN
Qty: 84 TABLET | Refills: 0 | Status: SHIPPED | OUTPATIENT
Start: 2025-05-20 | End: 2025-06-17

## 2025-05-20 RX ORDER — PREGABALIN 100 MG/1
100 CAPSULE ORAL 3 TIMES DAILY
Qty: 90 CAPSULE | Refills: 1 | Status: SHIPPED | OUTPATIENT
Start: 2025-05-20 | End: 2025-07-19

## 2025-05-20 NOTE — PROGRESS NOTES
Patient: Farrah Rivers  : 1965    MRN: [id number]    Date of Visit:     Attending Physician: Jonny Garcia    History of Present Illness  Ms.. Rivers is a very pleasant 59 y.o. patient with a several year history of progressive hip pain. There is no precipitating event or trauma. The pain is located predominantly in the groin but also on the lateral hips and aggravated by weight bearing. Walking even short distances can be painful. However, it can also awaken the patient at night. Significant loss in range of motion making it difficult to cross legs and don socks/shoes.     She is largely nonambulatory in a motorized scooter. She previously underwent hip injections with Dr. Ramirez with temporary relief    PMH/PSH:  Past Medical History:   Diagnosis Date    Arthritis     Avoids pork and pork products due to cultural beliefs     Back pain, chronic 04/10/2018    Cerebrovascular small vessel disease 2022    Chronic pain     Depression, unspecified 2023    TAN (dyspnea on exertion)     Frequent headaches 2022    Gallbladder disorder     Gastroesophageal reflux disease 2020    Heart murmur     Hx of blood clots     in legs -- 13 years ago    Hx of degenerative disc disease     Hyperlipidemia     Hypertension     Morbid obesity with body mass index (BMI) of 60.0 to 69.9 in adult (HCC) 2018    Neuropathy     SHYANN (obstructive sleep apnea)     machine broke down, awaiting new machine at the end of 2023    Poor circulation     Primary osteoarthritis of right knee 2018    Sleep apnea     uses cpap    Type 2 diabetes mellitus without complication ([facility])     controlled with diet    Urinary incontinence     Vitamin D deficiency 2020    Wears glasses      Patient Active Problem List   Diagnosis    Postlaminectomy syndrome of lumbar region    Lumbar radiculopathy    SHYANN (obstructive sleep apnea)    Chronic, continuous use of opioids    Morbid (severe)

## 2025-05-20 NOTE — TELEPHONE ENCOUNTER
Submitted PA for Hydrocodone-Acetaminophen Via CM Key: [id number] STATUS: APPROVED    The medication is APPROVED 2/18/25.    Authorization Expiration Date: 5/20/26.    [facility] has been notified. Thank you!

## 2025-05-23 LAB
1,3 CHLOROPHENYL PIPERAZINE, URINE: NOT DETECTED
6-MONOACETYLMORPHINE: NEGATIVE NG/ML
7-AMINOCLONAZEPAM/CREATININE URINE: NOT DETECTED NG/MG CREAT
8-HYDROXYAMOXAPINE, URINE: NOT DETECTED
8-HYDROXYLOXAPINE, URINE: NOT DETECTED
ACETAMINOPHEN, URINE: NEGATIVE UG/ML
ALPHA HYDROXYALPRAZOLAM/CREATININE URINE: NOT DETECTED NG/MG CREAT
ALPHA HYDROXYTRIAZOLAM/CREAT UR CFM: NOT DETECTED NG/MG CREAT
ALPHA-HYDROXYMIDAZOLAM, URINE: NOT DETECTED NG/MG CREAT
ALPRAZOLAM/CREATININE URINE: NOT DETECTED NG/MG CREAT
AMINO CHLOROPYRIDINE, URINE: NOT DETECTED
AMITRIPTYLINE: NOT DETECTED
AMOXAPINE, URINE: NOT DETECTED
AMPHETAMINE SCREEN URINE: NEGATIVE NG/ML
ANTIDEPRESSANTS SCREEN URINE: NEGATIVE
ANTIPSYCHOTICS SCREEN URINE: NEGATIVE
ARIPIPRAZOLE, URINE: NOT DETECTED
ASENAPINE, URINE: NOT DETECTED
BACLOFEN, URINE: NOT DETECTED
BARBITURATE SCREEN URINE: NEGATIVE NG/ML
BENZODIAZEPINE SCREEN, URINE: NEGATIVE
BUPRENORPHINE URINE: NEGATIVE
BUPRENORPHINE/CREATININE URINE: NOT DETECTED NG/MG CREAT
BUPROPION, URINE: NOT DETECTED
CANNABINOID SCREEN URINE: NEGATIVE NG/ML
CARBAMAZEPINE, URINE: NOT DETECTED
CARISOPRODOL, UR: NOT DETECTED
CHLORPROMAZINE, URINE: NOT DETECTED
CITALOPRAM, URINE: NOT DETECTED
CLOBAZAM, URINE: NOT DETECTED
CLOMIPRAMINE, URINE: NOT DETECTED
CLONAZEPAM/CREATININE URINE: NOT DETECTED NG/MG CREAT
CLOZAPINE, URINE: NOT DETECTED
COCAINE METABOLITE SCREEN URINE: NEGATIVE NG/ML
CREATININE URINE: 76 MG/DL
CYCLOBENZAPRINE, URINE: NOT DETECTED
DESALKYLFLURAZEPAM/CREATININE URINE: NOT DETECTED NG/MG CREAT
DESIPRAMINE: NOT DETECTED
DESMETHYLCITALOPRAM, URINE: NOT DETECTED
DESMETHYLCLOMIPRAMINE, URINE: NOT DETECTED
DESMETHYLCLOZAPINE, URINE: NOT DETECTED
DESMETHYLCYCLOBENZAPRINE, URINE: NOT DETECTED
DESMETHYLDOXEPIN, URINE: NOT DETECTED
DESMETHYLFLUNITRAZEPAM, URINE: NOT DETECTED NG/MG CREAT
DESMETHYLSERTRALINE, URINE: NOT DETECTED
DESMETHYLVENLAFAXINE, URINE: NOT DETECTED
DIAZEPAM/CREATININE URINE: NOT DETECTED NG/MG CREAT
DOXEPIN, URINE: NOT DETECTED
DULOXETINE, URINE: NOT DETECTED
EZOGABINE, URINE: NOT DETECTED
FENTANYL / ANALOGUES SCREEN URINE: NEGATIVE
FENTANYL/CREATININE URINE: NOT DETECTED NG/MG CREAT
FLUNITRAZEPAM, URINE: NOT DETECTED NG/MG CREAT
FLUOXETINE, URINE: NOT DETECTED
FLUPHENAZINE, URINE: NOT DETECTED
FLUVOXAMINE, URINE: NOT DETECTED
GABAPENTIN: NOT DETECTED
HALOPERIDOL, URINE: NOT DETECTED
HYDROXYBUPROPION, URINE: NOT DETECTED
ILOPERIDONE, URINE: NOT DETECTED
IMIPRAMINE, URINE: NOT DETECTED
KETAMINE, URINE: NOT DETECTED
LABORATORY REPORT: NORMAL
LAMOTRIGINE, URINE: NOT DETECTED
LEVETIRACETAM, URINE: NOT DETECTED
LORAZEPAM/CREATININE URINE: NOT DETECTED NG/MG CREAT
LOXAPINE, URINE: NOT DETECTED
LURASIDONE, URINE: NOT DETECTED
MAPROTILINE, URINE: NOT DETECTED
MEPERIDINE: NEGATIVE NG/ML
MEPROBAMATE, URINE: NOT DETECTED
MESORIDAZINE, URINE: NOT DETECTED
METAXALONE, URINE: NOT DETECTED
METHADONE AND METABOLITES, URINE: NEGATIVE NG/ML
METHADONE SCREEN, URINE: NEGATIVE NG/ML
METHOCARBAMOL, URINE: NOT DETECTED
METHYLPHENIDATE, URINE: NOT DETECTED
MIDAZOLAM/CREATININE URINE: NOT DETECTED NG/MG CREAT
MIRTAZAPINE, URINE: NOT DETECTED
MOLINDONE, URINE: NOT DETECTED
MUSCLE RELAXANTS SCREEN URINE: NEGATIVE
NEFAZODONE, URINE: NOT DETECTED
NORBUPRENORPHINE/CREATININE URINE: NOT DETECTED NG/MG CREAT
NORDIAZEPAM/CREATININE URINE: NOT DETECTED NG/MG CREAT
NORFENTANYL/CREATININE URINE: NOT DETECTED NG/MG CREAT
NORFLUOXETINE, URINE: NOT DETECTED
NORKETAMINE, URINE: NOT DETECTED
NORTRIPTYLINE: NOT DETECTED
OLANZAPINE, URINE: NOT DETECTED
OPIATE SCREEN URINE: NEGATIVE NG/ML
ORPHENADRINE, URINE: NOT DETECTED
OTHER HALLUCINOGENS SCREEN URINE: NEGATIVE
OXAZEPAM/CREATININE URINE: NOT DETECTED NG/MG CREAT
OXCARBAZEPINE MHD, URINE: NOT DETECTED
OXYCODONE SCREEN URINE: NEGATIVE NG/ML
PAROXETINE, URINE: NOT DETECTED
PERPHENAZINE, URINE: NOT DETECTED
PHENCYCLIDINE SCREEN URINE: NEGATIVE NG/ML
PHENYTOIN, URINE: NOT DETECTED
PIMOZIDE, URINE: NOT DETECTED
PREGABALIN, URINE: NOT DETECTED
PREGABALIN: NEGATIVE
PRESCRIBED MEDICATIONS: NORMAL
PRIMIDONE, URINE: NOT DETECTED
PROCHLORPERAZINE, URINE: NOT DETECTED
PROPOXYPHENE: NEGATIVE NG/ML
PROTRIPTYLINE, URINE: NOT DETECTED
QUETIAPINE, URINE: NOT DETECTED
RISPERIDONE, URINE: NOT DETECTED
RITALINIC ACID, UR: NOT DETECTED
RUFINAMIDE, URINE: NOT DETECTED
SEDATIVES/HYPNOTICS SCREEN URINE: NEGATIVE
SERTRALINE, URINE: NOT DETECTED
SYMPATHOMIMETICS SCREEN URINE: NEGATIVE
TAPENTADOL SCREEN URINE: NEGATIVE NG/ML
TEMAZEPAM/CREATININE URINE: NOT DETECTED NG/MG CREAT
THIORIDAZINE, URINE: NOT DETECTED
THIOTHIXENE, URINE: NOT DETECTED
TIAGABINE, URINE: NOT DETECTED
TIZANIDINE, URINE: NOT DETECTED
TOPIRAMATE, URINE: NOT DETECTED
TRAMADOL SCREEN URINE: NEGATIVE NG/ML
TRAZODONE, URINE: NOT DETECTED
TRIFLUOPERAZINE, URINE: NOT DETECTED
TRIMIPRAMINE, URINE: NOT DETECTED
VENLAFAXINE, URINE: NOT DETECTED
VILAZODONE, URINE: NOT DETECTED
ZALEPLON, URINE: NOT DETECTED
ZIPRASIDONE, URINE: NOT DETECTED
ZOLPIDEM ACID, URINE: NOT DETECTED
ZOLPIDEM, URINE: NOT DETECTED
ZONISAMIDE, URINE: NOT DETECTED
ZOPICLONE/ESZOPICLONE, URINE: NOT DETECTED

## 2025-05-26 PROBLEM — B85.0 HEAD LICE: Status: ACTIVE | Noted: 2025-05-26

## 2025-05-26 ASSESSMENT — ENCOUNTER SYMPTOMS: COLOR CHANGE: 0

## 2025-06-08 DIAGNOSIS — L85.3 DRY SKIN: ICD-10-CM

## 2025-06-09 NOTE — TELEPHONE ENCOUNTER
Medication:   Requested Prescriptions     Pending Prescriptions Disp Refills    ammonium lactate (AMLACTIN) 12 % cream [Pharmacy Med Name: Ammonium Lactate 12 % External Cream] 280 g 0     Sig: APPLY  CREAM TOPICALLY TWICE DAILY AS NEEDED     Last Filled:  05/08/2025    Last appt: 5/15/2025   Next appt: 8/13/2025    Last OARRS:       11/15/2023     7:31 PM   RX Monitoring   Periodic Controlled Substance Monitoring No signs of potential drug abuse or diversion identified.

## 2025-06-10 RX ORDER — AMMONIUM LACTATE 12 G/100G
CREAM TOPICAL
Qty: 280 G | Refills: 1 | Status: SHIPPED | OUTPATIENT
Start: 2025-06-10

## 2025-06-16 ENCOUNTER — OFFICE VISIT (OUTPATIENT)
Dept: PAIN MANAGEMENT | Age: 60
End: 2025-06-16

## 2025-06-16 VITALS
DIASTOLIC BLOOD PRESSURE: 72 MMHG | BODY MASS INDEX: 54.74 KG/M2 | SYSTOLIC BLOOD PRESSURE: 116 MMHG | HEART RATE: 74 BPM | HEIGHT: 68 IN

## 2025-06-16 DIAGNOSIS — M54.16 LUMBAR RADICULOPATHY: ICD-10-CM

## 2025-06-16 DIAGNOSIS — E11.42 DIABETIC POLYNEUROPATHY ASSOCIATED WITH TYPE 2 DIABETES MELLITUS (HCC): ICD-10-CM

## 2025-06-16 DIAGNOSIS — R60.0 BILATERAL LEG EDEMA: ICD-10-CM

## 2025-06-16 DIAGNOSIS — M17.11 PRIMARY OSTEOARTHRITIS OF RIGHT KNEE: ICD-10-CM

## 2025-06-16 DIAGNOSIS — G89.4 CHRONIC PAIN SYNDROME: ICD-10-CM

## 2025-06-16 DIAGNOSIS — M79.7 FIBROMYALGIA: ICD-10-CM

## 2025-06-16 DIAGNOSIS — M17.12 PRIMARY OSTEOARTHRITIS OF LEFT KNEE: ICD-10-CM

## 2025-06-16 DIAGNOSIS — M96.1 FAILED BACK SURGICAL SYNDROME: ICD-10-CM

## 2025-06-16 RX ORDER — HYDROCODONE BITARTRATE AND ACETAMINOPHEN 5; 325 MG/1; MG/1
1 TABLET ORAL 3 TIMES DAILY PRN
Qty: 84 TABLET | Refills: 0 | Status: SHIPPED | OUTPATIENT
Start: 2025-06-16 | End: 2025-07-14

## 2025-06-16 RX ORDER — LANOLIN ALCOHOL/MO/W.PET/CERES
400 CREAM (GRAM) TOPICAL 2 TIMES DAILY
Qty: 60 TABLET | Refills: 0 | Status: SHIPPED | OUTPATIENT
Start: 2025-06-16

## 2025-06-16 RX ORDER — DULOXETIN HYDROCHLORIDE 60 MG/1
60 CAPSULE, DELAYED RELEASE ORAL DAILY
Qty: 30 CAPSULE | Refills: 1 | Status: SHIPPED | OUTPATIENT
Start: 2025-06-16

## 2025-06-16 RX ORDER — PREGABALIN 100 MG/1
100 CAPSULE ORAL 3 TIMES DAILY
Qty: 90 CAPSULE | Refills: 1 | Status: SHIPPED | OUTPATIENT
Start: 2025-06-16 | End: 2025-08-15

## 2025-06-16 RX ORDER — NORTRIPTYLINE HYDROCHLORIDE 25 MG/1
25-50 CAPSULE ORAL NIGHTLY
Qty: 60 CAPSULE | Refills: 1 | Status: SHIPPED | OUTPATIENT
Start: 2025-06-16

## 2025-06-17 ENCOUNTER — TELEPHONE (OUTPATIENT)
Dept: PRIMARY CARE CLINIC | Age: 60
End: 2025-06-17

## 2025-06-17 ENCOUNTER — TELEPHONE (OUTPATIENT)
Dept: PAIN MANAGEMENT | Age: 60
End: 2025-06-17

## 2025-06-17 NOTE — TELEPHONE ENCOUNTER
Called patient to advise her that per [facility] he is unable to send any medication out state. Patient states that the pharmacy is not feeling her medication. I advised patient that they fill the medication based on the day she picked her medication up. I advised her that she picked her medication up on 5/22/25 last and they start the 28 day count from that day which 28 days is 6/19/25. I advised her that they are allowing her to pick her medication up tomorrow. Patient then asked if she can have the medication sent to [facility] in [place]. I [facility] at [facility]. Patient voiced her understanding.

## 2025-06-17 NOTE — TELEPHONE ENCOUNTER
Pt is calling because some of her medication can not be picked up until tomorrow but she needs it today because she is travelling later today. Please advise     She needs all of her medication sent to [facility] in [address] [phone]

## 2025-06-17 NOTE — TELEPHONE ENCOUNTER
----- Message from Ila ARMENTA sent at 6/16/2025  1:29 PM EDT -----  Regarding: ECC Message to Provider  ECC Message to Provider    Relationship to Patient: Self     Additional Information:   Patient wanted to know if when will Dr. Patterson her send her diabetic show so that she could sign the paper work for this year. --------------------------------------------------------------------------------------------------------------------------    Call Back Information: OK to leave message on voicemail  Preferred Call Back Number: Phone [phone]

## 2025-06-18 PROBLEM — Z12.31 ENCOUNTER FOR SCREENING MAMMOGRAM FOR BREAST CANCER: Status: RESOLVED | Noted: 2025-05-19 | Resolved: 2025-06-18

## 2025-06-19 NOTE — PROGRESS NOTES
capsule Take 1-2 capsules by mouth nightly 60 capsule 1    pregabalin (LYRICA) 100 MG capsule Take 1 capsule by mouth 3 times daily for 60 days. 90 capsule 1    ammonium lactate (AMLACTIN) 12 % cream APPLY  CREAM TOPICALLY TWICE DAILY AS NEEDED 280 g 1    permethrin (NIX) 1 % liquid Apply once and leave on for 10 minutes and may repeat in 7 days 120 mL 0    diclofenac sodium (VOLTAREN) 1 % GEL Apply 4 gm to both knees tid-qid prn . 500 g 2    nystatin (MYCOSTATIN) [id number] UNIT/GM cream Apply topically 2 times daily. 30 g 0    Blood Glucose Monitoring Suppl (BLOOD GLUCOSE MONITOR SYSTEM) w/Device KIT Dispense glucometer covered by patient's insurance 1 kit 0    vitamin D (ERGOCALCIFEROL) 1.25 MG (90039 UT) CAPS capsule Take 1 capsule by mouth once a week 12 capsule 1    esomeprazole (NEXIUM) 40 MG delayed release capsule Take 1 capsule by mouth every morning (before breakfast) 90 capsule 1    docusate sodium (EQUATE STOOL SOFTENER) 100 MG capsule TAKE 1 CAPSULE BY MOUTH TWICE DAILY AS NEEDED FOR CONSTIPATION 180 capsule 0    atorvastatin (LIPITOR) 20 MG tablet Take 1 tablet by mouth at bedtime 90 tablet 1    famotidine (PEPCID) 20 MG tablet Take 1 tablet by mouth nightly as needed (GERD) 90 tablet 0    blood glucose monitor strips Test once daily 100 strip 3    hydrOXYzine HCl (ATARAX) 25 MG tablet Take 1 tablet by mouth 20 minutes before procedure 1 tablet 0    Tens Unit MISC by Does not apply route Use as directed. 1 each 0    ibuprofen (ADVIL;MOTRIN) 600 MG tablet Take 1 tablet by mouth 3 times daily as needed for Pain 60 tablet 0    pantoprazole (PROTONIX) 40 MG tablet Take 1 tablet by mouth every morning (before breakfast) 90 tablet 0    tiZANidine (ZANAFLEX) 2 MG tablet Take 1 tablet by mouth 2 times daily as needed (spasms) 60 tablet 0    hydrocortisone (ANUSOL-HC) 2.5 % CREA rectal cream Place rectally 2 times daily 30 g 1    naloxone (NARCAN) 4 MG/0.1ML LIQD nasal spray 1 spray by Nasal route as needed for

## 2025-06-20 ENCOUNTER — TELEPHONE (OUTPATIENT)
Dept: PRIMARY CARE CLINIC | Age: 60
End: 2025-06-20

## 2025-06-20 NOTE — TELEPHONE ENCOUNTER
[employer] called - would like an updated list of her meds for medicaid    [phone]  Fax    Maryann from [employer]

## 2025-06-24 ENCOUNTER — TELEPHONE (OUTPATIENT)
Dept: PRIMARY CARE CLINIC | Age: 60
End: 2025-06-24

## 2025-07-14 NOTE — TELEPHONE ENCOUNTER
Call patient. I have not received any paperwork from [facility] for diabetic shoes. The last time I did paperwork for diabetic shoes was on 11/27/24 which is less than 1 year ago. Is she still using [facility] ? and if so she can request [facility] to send the paperwork when it is due to be completed.

## 2025-08-06 ENCOUNTER — TELEPHONE (OUTPATIENT)
Dept: PRIMARY CARE CLINIC | Age: 60
End: 2025-08-06

## 2025-08-25 ENCOUNTER — TELEPHONE (OUTPATIENT)
Dept: PRIMARY CARE CLINIC | Age: 60
End: 2025-08-25

## (undated) DEVICE — SOLUTION IV 1000ML 0.9% SOD CHL

## (undated) DEVICE — BINDER ABD UNIV H9IN WAIST 45-62IN E SFT COT PREM 3 PNL

## (undated) DEVICE — GLOVE SURG SZ 65 CRM LTX FREE POLYISOPRENE POLYMER BEAD ANTI

## (undated) DEVICE — UNDERGLOVE SURG SZ 8 BLU LTX FREE SYN POLYISOPRENE POLYMER

## (undated) DEVICE — SET EXTN PRIMING 4.9ML L30IN INCL SLDE CLMP SPIN M LUERLOCK

## (undated) DEVICE — GENERAL LAPAROSCOPIC: Brand: MEDLINE INDUSTRIES, INC.

## (undated) DEVICE — TOWEL,STOP FLAG GOLD N-W: Brand: MEDLINE

## (undated) DEVICE — SOLUTION INJ LR VISIV 1000ML BG

## (undated) DEVICE — SUTURE ETHBND EXCEL SZ 0 L18IN NONABSORBABLE GRN L26MM MO-6 CX45D

## (undated) DEVICE — GLOVE SURG SZ 7 L12IN FNGR THK75MIL WHT LTX POLYMER BEAD

## (undated) DEVICE — APPLICATOR PREP 26ML 0.7% IOD POVACRYLEX 74% ISO ALC ST

## (undated) DEVICE — SEALANT SURG 13 YR DURA AUTOSPRAY ADHERUS NUS109] SURGICAL ONE]

## (undated) DEVICE — GENERAL: Brand: MEDLINE INDUSTRIES, INC.

## (undated) DEVICE — CONTAINER,SPECIMEN,PNEU TUBE,3OZ,OR STRL: Brand: MEDLINE

## (undated) DEVICE — CRADLE ARM INDIV PT CARE KT COMP FOR FOR RADLUC WILSON FRME

## (undated) DEVICE — GLOVE SURG SZ 75 L12IN FNGR THK94MIL TRNSLUC YEL LTX

## (undated) DEVICE — TROCARS: Brand: KII® BALLOON BLUNT TIP SYSTEM

## (undated) DEVICE — SYRINGE MED 10ML LUERLOCK TIP W/O SFTY DISP

## (undated) DEVICE — COVER,LIGHT HANDLE,FLX,1/PK: Brand: MEDLINE INDUSTRIES, INC.

## (undated) DEVICE — GLOVE SURG SZ 65 L12IN FNGR THK79MIL GRN LTX FREE

## (undated) DEVICE — SUTURE VCRL SZ 0 L18IN ABSRB UD L36MM CT-1 1/2 CIR J840D

## (undated) DEVICE — PAD,NON-ADHERENT,3X8,STERILE,LF,1/PK: Brand: MEDLINE

## (undated) DEVICE — C-ARM: Brand: UNBRANDED

## (undated) DEVICE — TISSUE RETRIEVAL SYSTEM: Brand: INZII RETRIEVAL SYSTEM

## (undated) DEVICE — CORD ES L10FT MPLR LAP

## (undated) DEVICE — LARGE BORE STOPCOCK WITH ROTATING MALE LUER LOCK

## (undated) DEVICE — STAPLER SKIN H3.9MM WIRE DIA0.58MM CRWN 6.9MM 35 STPL ROT

## (undated) DEVICE — SHEET, T, LAPAROTOMY, STERILE: Brand: MEDLINE

## (undated) DEVICE — LIQUIBAND RAPID ADHESIVE 36/CS 0.8ML: Brand: MEDLINE

## (undated) DEVICE — GLOVE SURG SZ 7.5 L11.2IN THK9.8MIL STRW LTX POLYMER BEAD

## (undated) DEVICE — DRAPE MICSCP W54XL150IN W/ 4 BINOC GLS LENS LEICA

## (undated) DEVICE — 40583 XL ADVANCED TRENDELENBURG POSITIONING KIT: Brand: 40583 XL ADVANCED TRENDELENBURG POSITIONING KIT

## (undated) DEVICE — SUTURE MCRYL SZ 4-0 L27IN ABSRB UD L19MM PS-2 1/2 CIR PRIM Y426H

## (undated) DEVICE — SUTURE VCRL + SZ 3-0 L27IN ABSRB UD L26MM SH 1/2 CIR VCP416H

## (undated) DEVICE — ELECTRODE LAP L36CM PTFE WIRE J HK CLEANCOAT

## (undated) DEVICE — 3M™ TEGADERM™ TRANSPARENT FILM DRESSING FRAME STYLE, 1627, 4 IN X 10 IN (10 CM X 25 CM), 20/CT 4CT/CASE: Brand: 3M™ TEGADERM™

## (undated) DEVICE — BLADE ES ELASTOMERIC COAT INSUL DURABLE BEND UPTO 90DEG

## (undated) DEVICE — SUTURE VCRL SZ 3-0 L18IN ABSRB UD L26MM SH 1/2 CIR J864D

## (undated) DEVICE — KIT,ANTI FOG,W/SPONGE & FLUID,SOFT PACK: Brand: MEDLINE

## (undated) DEVICE — MEDIA CONTRAST INJ OMNIPAQUE 300 50ML

## (undated) DEVICE — 3M™ TEGADERM™ TRANSPARENT FILM DRESSING FRAME STYLE, 1626W, 4 IN X 4-3/4 IN (10 CM X 12 CM), 50/CT 4CT/CASE: Brand: 3M™ TEGADERM™

## (undated) DEVICE — SUTURE VCRL + SZ 0 L27IN ABSRB WHT CT-2 1/2 CIR TAPERPOINT VCP270H

## (undated) DEVICE — C-ARMOR C-ARM EQUIPMENT COVERS CLEAR STERILE UNIVERSAL FIT 12 PER CASE: Brand: C-ARMOR

## (undated) DEVICE — LAMINECTOMY: Brand: MEDLINE INDUSTRIES, INC.

## (undated) DEVICE — NEEDLE,22GX1.5",REG,BEVEL: Brand: MEDLINE

## (undated) DEVICE — IRRIGATION SUCTION CASSETTE: Brand: SONOPET IQ

## (undated) DEVICE — Device

## (undated) DEVICE — CATHETER URET 4FR L70CM POLYUR WHSTL FLX TIP KINK RESIST W/

## (undated) DEVICE — [HIGH FLOW INSUFFLATOR,  DO NOT USE IF PACKAGE IS DAMAGED,  KEEP DRY,  KEEP AWAY FROM SUNLIGHT,  PROTECT FROM HEAT AND RADIOACTIVE SOURCES.]: Brand: PNEUMOSURE

## (undated) DEVICE — SCISSORS ENDOSCP DIA5MM CRV MPLR CAUT W/ RATCH HNDL

## (undated) DEVICE — TOOL 14MH30 LEGEND 14CM 3MM: Brand: MIDAS REX ™

## (undated) DEVICE — PUMP SUC IRR TBNG L10FT W/ HNDPC ASSEMB STRYKEFLOW 2

## (undated) DEVICE — BLANKET WRM W40.2XL55.9IN IORT LO BODY + MISTRAL AIR

## (undated) DEVICE — SUTURE MCRYL + SZ 4-0 L18IN ABSRB UD L19MM PS-2 3/8 CIR MCP496G

## (undated) DEVICE — FORCEPS BX L240CM JAW DIA2.4MM ORNG L CAP W/ NDL DISP RAD

## (undated) DEVICE — NEURO SPONGES: Brand: DEROYAL

## (undated) DEVICE — TROCAR: Brand: KII SHIELDED BLADED ACCESS SYSTEM

## (undated) DEVICE — PROBE 8225101 5PK STD PRASS FL TIP ROHS